# Patient Record
Sex: FEMALE | Race: WHITE | NOT HISPANIC OR LATINO | URBAN - METROPOLITAN AREA
[De-identification: names, ages, dates, MRNs, and addresses within clinical notes are randomized per-mention and may not be internally consistent; named-entity substitution may affect disease eponyms.]

---

## 2017-01-23 ENCOUNTER — INPATIENT (INPATIENT)
Facility: HOSPITAL | Age: 27
LOS: 3 days | Discharge: HOME | End: 2017-01-27
Attending: HOSPITALIST | Admitting: HOSPITALIST
Payer: MEDICAID

## 2017-01-23 VITALS
HEIGHT: 63 IN | TEMPERATURE: 98 F | RESPIRATION RATE: 17 BRPM | WEIGHT: 175.05 LBS | HEART RATE: 90 BPM | OXYGEN SATURATION: 100 % | DIASTOLIC BLOOD PRESSURE: 80 MMHG | SYSTOLIC BLOOD PRESSURE: 140 MMHG

## 2017-01-23 DIAGNOSIS — Z90.89 ACQUIRED ABSENCE OF OTHER ORGANS: Chronic | ICD-10-CM

## 2017-01-23 DIAGNOSIS — Z98.89 OTHER SPECIFIED POSTPROCEDURAL STATES: Chronic | ICD-10-CM

## 2017-01-23 LAB
ALBUMIN SERPL ELPH-MCNC: 3.4 G/DL — SIGNIFICANT CHANGE UP (ref 3.3–5)
ALP SERPL-CCNC: 88 U/L — SIGNIFICANT CHANGE UP (ref 40–120)
ALT FLD-CCNC: 207 U/L — HIGH (ref 12–78)
ANION GAP SERPL CALC-SCNC: 10 MMOL/L — SIGNIFICANT CHANGE UP (ref 5–17)
AST SERPL-CCNC: 904 U/L — HIGH (ref 15–37)
BASOPHILS # BLD AUTO: 0.2 K/UL — SIGNIFICANT CHANGE UP (ref 0–0.2)
BASOPHILS NFR BLD AUTO: 1.1 % — SIGNIFICANT CHANGE UP (ref 0–2)
BILIRUB SERPL-MCNC: 1 MG/DL — SIGNIFICANT CHANGE UP (ref 0.2–1.2)
BUN SERPL-MCNC: 5 MG/DL — LOW (ref 7–23)
CALCIUM SERPL-MCNC: 8.6 MG/DL — SIGNIFICANT CHANGE UP (ref 8.5–10.1)
CHLORIDE SERPL-SCNC: 106 MMOL/L — SIGNIFICANT CHANGE UP (ref 96–108)
CO2 SERPL-SCNC: 24 MMOL/L — SIGNIFICANT CHANGE UP (ref 22–31)
CREAT SERPL-MCNC: 0.66 MG/DL — SIGNIFICANT CHANGE UP (ref 0.5–1.3)
EOSINOPHIL # BLD AUTO: 0 K/UL — SIGNIFICANT CHANGE UP (ref 0–0.5)
EOSINOPHIL NFR BLD AUTO: 0.1 % — SIGNIFICANT CHANGE UP (ref 0–6)
FLUAV SPEC QL CULT: NEGATIVE — SIGNIFICANT CHANGE UP
FLUBV AG SPEC QL IA: NEGATIVE — SIGNIFICANT CHANGE UP
GLUCOSE SERPL-MCNC: 83 MG/DL — SIGNIFICANT CHANGE UP (ref 70–99)
HCG SERPL-ACNC: <1 MIU/ML — SIGNIFICANT CHANGE UP
HCT VFR BLD CALC: 40.5 % — SIGNIFICANT CHANGE UP (ref 34.5–45)
HGB BLD-MCNC: 14.4 G/DL — SIGNIFICANT CHANGE UP (ref 11.5–15.5)
LYMPHOCYTES # BLD AUTO: 14.7 % — SIGNIFICANT CHANGE UP (ref 13–44)
LYMPHOCYTES # BLD AUTO: 2.6 K/UL — SIGNIFICANT CHANGE UP (ref 1–3.3)
MCHC RBC-ENTMCNC: 30.8 PG — SIGNIFICANT CHANGE UP (ref 27–34)
MCHC RBC-ENTMCNC: 35.6 GM/DL — SIGNIFICANT CHANGE UP (ref 32–36)
MCV RBC AUTO: 86.6 FL — SIGNIFICANT CHANGE UP (ref 80–100)
MONOCYTES # BLD AUTO: 1.1 K/UL — HIGH (ref 0–0.9)
MONOCYTES NFR BLD AUTO: 6.2 % — SIGNIFICANT CHANGE UP (ref 2–14)
NEUTROPHILS # BLD AUTO: 13.9 K/UL — HIGH (ref 1.8–7.4)
NEUTROPHILS NFR BLD AUTO: 77.9 % — HIGH (ref 43–77)
PLATELET # BLD AUTO: 363 K/UL — SIGNIFICANT CHANGE UP (ref 150–400)
POTASSIUM SERPL-MCNC: 3.8 MMOL/L — SIGNIFICANT CHANGE UP (ref 3.5–5.3)
POTASSIUM SERPL-SCNC: 3.8 MMOL/L — SIGNIFICANT CHANGE UP (ref 3.5–5.3)
PROT SERPL-MCNC: 7.4 GM/DL — SIGNIFICANT CHANGE UP (ref 6–8.3)
RBC # BLD: 4.68 M/UL — SIGNIFICANT CHANGE UP (ref 3.8–5.2)
RBC # FLD: 11.2 % — SIGNIFICANT CHANGE UP (ref 11–15)
SODIUM SERPL-SCNC: 140 MMOL/L — SIGNIFICANT CHANGE UP (ref 135–145)
WBC # BLD: 17.9 K/UL — HIGH (ref 3.8–10.5)
WBC # FLD AUTO: 17.9 K/UL — HIGH (ref 3.8–10.5)

## 2017-01-23 PROCEDURE — 99285 EMERGENCY DEPT VISIT HI MDM: CPT

## 2017-01-23 RX ORDER — METOCLOPRAMIDE HCL 10 MG
10 TABLET ORAL ONCE
Qty: 0 | Refills: 0 | Status: COMPLETED | OUTPATIENT
Start: 2017-01-23 | End: 2017-01-23

## 2017-01-23 RX ORDER — SODIUM CHLORIDE 9 MG/ML
1000 INJECTION INTRAMUSCULAR; INTRAVENOUS; SUBCUTANEOUS ONCE
Qty: 0 | Refills: 0 | Status: COMPLETED | OUTPATIENT
Start: 2017-01-23 | End: 2017-01-23

## 2017-01-23 RX ORDER — DIPHENHYDRAMINE HCL 50 MG
50 CAPSULE ORAL ONCE
Qty: 0 | Refills: 0 | Status: COMPLETED | OUTPATIENT
Start: 2017-01-23 | End: 2017-01-23

## 2017-01-23 RX ADMIN — Medication 50 MILLIGRAM(S): at 22:59

## 2017-01-23 RX ADMIN — Medication 10 MILLIGRAM(S): at 23:01

## 2017-01-23 RX ADMIN — SODIUM CHLORIDE 1000 MILLILITER(S): 9 INJECTION INTRAMUSCULAR; INTRAVENOUS; SUBCUTANEOUS at 22:58

## 2017-01-23 NOTE — ED PROVIDER NOTE - CARE PLAN
Principal Discharge DX:	Rhabdomyolysis Principal Discharge DX:	Rhabdomyolysis  Secondary Diagnosis:	Sinusitis

## 2017-01-23 NOTE — ED PROVIDER NOTE - PHYSICAL EXAMINATION
Gen: Alert, NAD, speaking in complete sentences;  Head: NC, AT, EOMI, normal lids/conjunctiva;  ENT: normal hearing, patent oropharynx without erythema/exudate, uvula midline, MMM;  Neck: supple, no tenderness/meningismus, FROM;  Pulm: Bilateral clear BS, normal resp effort, no wheeze/stridor/retractions;  CV: RRR, no M/R/G, +dist pulses;  Abd: soft, no focal TTP, ND, +BS, no guarding/rebound tenderness;  Mskel: no edema/erythema/cyanosis;  Skin: no rash;  Neuro: AAOx3, no sensory/motor deficits Gen: Alert, NAD, speaking in complete sentences;  Head: NC, AT, EOMI, normal lids/conjunctiva;  ENT: normal hearing, patent oropharynx without erythema/exudate, uvula midline, MMM, +nasal congestion, +TTP over frontal sinuses;  Neck: supple, no tenderness/meningismus, FROM;  Pulm: Bilateral clear BS, normal resp effort, no wheeze/stridor/retractions;  CV: RRR, no M/R/G, +dist pulses;  Abd: soft, no focal TTP, ND, +BS, no guarding/rebound tenderness;  Mskel: no edema/erythema/cyanosis;  Skin: no rash;  Neuro: AAOx3, no sensory/motor deficits

## 2017-01-23 NOTE — ED ADULT TRIAGE NOTE - CHIEF COMPLAINT QUOTE
" I have been having a really bad headache for the past 2 days and it is not going away, I feel lightheaded" denies blurry vision, denies dizziness

## 2017-01-23 NOTE — ED PROVIDER NOTE - MEDICAL DECISION MAKING DETAILS
pt w above dx. hydrated in ED.  Admitted for further eval/mgmt. pt w above dx. hydrated in ED.  Given dose of abx for sinusitus.  Admitted for further eval/mgmt.

## 2017-01-23 NOTE — ED PROVIDER NOTE - OBJECTIVE STATEMENT
Pertinent PMH/PSH/FHx/SHx and Review of Systems contained within:    27yo F w PMH of anxiety, GERD, asthma presents to ED c/o HA & myalgias.  Pt states sx present x2d, not improved w tylenol or excedrin.  Denies fever, chills, SOB, abd pain, N/V/D.  Pt states she has been doing at home 30min exercises but they are not too strenuous.  Denies EtOH use or other illicit drug use.    No fever/chills, No photophobia/eye pain/changes in vision, No ear pain/sore throat/dysphagia, No chest pain/palpitations, no SOB/cough/wheeze/stridor, No abdominal pain, No N/V/D, no dysuria/frequency/discharge, no rash, no changes in neurological status/function.

## 2017-01-24 DIAGNOSIS — J45.909 UNSPECIFIED ASTHMA, UNCOMPLICATED: ICD-10-CM

## 2017-01-24 DIAGNOSIS — G43.009 MIGRAINE WITHOUT AURA, NOT INTRACTABLE, WITHOUT STATUS MIGRAINOSUS: ICD-10-CM

## 2017-01-24 DIAGNOSIS — D64.9 ANEMIA, UNSPECIFIED: ICD-10-CM

## 2017-01-24 DIAGNOSIS — M62.82 RHABDOMYOLYSIS: ICD-10-CM

## 2017-01-24 DIAGNOSIS — F41.9 ANXIETY DISORDER, UNSPECIFIED: ICD-10-CM

## 2017-01-24 DIAGNOSIS — J32.9 CHRONIC SINUSITIS, UNSPECIFIED: ICD-10-CM

## 2017-01-24 DIAGNOSIS — R74.0 NONSPECIFIC ELEVATION OF LEVELS OF TRANSAMINASE AND LACTIC ACID DEHYDROGENASE [LDH]: ICD-10-CM

## 2017-01-24 DIAGNOSIS — K21.9 GASTRO-ESOPHAGEAL REFLUX DISEASE WITHOUT ESOPHAGITIS: ICD-10-CM

## 2017-01-24 LAB
ALBUMIN SERPL ELPH-MCNC: 2.9 G/DL — LOW (ref 3.3–5)
ALP SERPL-CCNC: 78 U/L — SIGNIFICANT CHANGE UP (ref 40–120)
ALT FLD-CCNC: 193 U/L — HIGH (ref 12–78)
AMPHET UR-MCNC: NEGATIVE — SIGNIFICANT CHANGE UP
ANION GAP SERPL CALC-SCNC: 7 MMOL/L — SIGNIFICANT CHANGE UP (ref 5–17)
ANION GAP SERPL CALC-SCNC: 7 MMOL/L — SIGNIFICANT CHANGE UP (ref 5–17)
APPEARANCE UR: CLEAR — SIGNIFICANT CHANGE UP
AST SERPL-CCNC: 776 U/L — HIGH (ref 15–37)
BACTERIA # UR AUTO: ABNORMAL
BARBITURATES UR SCN-MCNC: NEGATIVE — SIGNIFICANT CHANGE UP
BENZODIAZ UR-MCNC: NEGATIVE — SIGNIFICANT CHANGE UP
BILIRUB SERPL-MCNC: 0.9 MG/DL — SIGNIFICANT CHANGE UP (ref 0.2–1.2)
BILIRUB UR-MCNC: NEGATIVE — SIGNIFICANT CHANGE UP
BUN SERPL-MCNC: 5 MG/DL — LOW (ref 7–23)
BUN SERPL-MCNC: 6 MG/DL — LOW (ref 7–23)
CALCIUM SERPL-MCNC: 8.3 MG/DL — LOW (ref 8.5–10.1)
CALCIUM SERPL-MCNC: 8.8 MG/DL — SIGNIFICANT CHANGE UP (ref 8.5–10.1)
CHLORIDE SERPL-SCNC: 105 MMOL/L — SIGNIFICANT CHANGE UP (ref 96–108)
CHLORIDE SERPL-SCNC: 107 MMOL/L — SIGNIFICANT CHANGE UP (ref 96–108)
CK SERPL-CCNC: ABNORMAL U/L (ref 26–192)
CK SERPL-CCNC: ABNORMAL U/L (ref 26–192)
CO2 SERPL-SCNC: 29 MMOL/L — SIGNIFICANT CHANGE UP (ref 22–31)
CO2 SERPL-SCNC: 29 MMOL/L — SIGNIFICANT CHANGE UP (ref 22–31)
COCAINE METAB.OTHER UR-MCNC: NEGATIVE — SIGNIFICANT CHANGE UP
COLOR SPEC: YELLOW — SIGNIFICANT CHANGE UP
COMMENT - URINE: SIGNIFICANT CHANGE UP
CREAT SERPL-MCNC: 0.74 MG/DL — SIGNIFICANT CHANGE UP (ref 0.5–1.3)
CREAT SERPL-MCNC: 0.78 MG/DL — SIGNIFICANT CHANGE UP (ref 0.5–1.3)
DIFF PNL FLD: NEGATIVE — SIGNIFICANT CHANGE UP
EPI CELLS # UR: ABNORMAL
GLUCOSE SERPL-MCNC: 106 MG/DL — HIGH (ref 70–99)
GLUCOSE SERPL-MCNC: 96 MG/DL — SIGNIFICANT CHANGE UP (ref 70–99)
GLUCOSE UR QL: NEGATIVE MG/DL — SIGNIFICANT CHANGE UP
KETONES UR-MCNC: ABNORMAL
LEUKOCYTE ESTERASE UR-ACNC: ABNORMAL
METHADONE UR-MCNC: NEGATIVE — SIGNIFICANT CHANGE UP
NITRITE UR-MCNC: NEGATIVE — SIGNIFICANT CHANGE UP
OPIATES UR-MCNC: NEGATIVE — SIGNIFICANT CHANGE UP
PCP SPEC-MCNC: SIGNIFICANT CHANGE UP
PCP UR-MCNC: NEGATIVE — SIGNIFICANT CHANGE UP
PH UR: 6 — SIGNIFICANT CHANGE UP (ref 4.8–8)
POTASSIUM SERPL-MCNC: 3.9 MMOL/L — SIGNIFICANT CHANGE UP (ref 3.5–5.3)
POTASSIUM SERPL-MCNC: 4.4 MMOL/L — SIGNIFICANT CHANGE UP (ref 3.5–5.3)
POTASSIUM SERPL-SCNC: 3.9 MMOL/L — SIGNIFICANT CHANGE UP (ref 3.5–5.3)
POTASSIUM SERPL-SCNC: 4.4 MMOL/L — SIGNIFICANT CHANGE UP (ref 3.5–5.3)
PROT SERPL-MCNC: 6.6 GM/DL — SIGNIFICANT CHANGE UP (ref 6–8.3)
PROT UR-MCNC: NEGATIVE MG/DL — SIGNIFICANT CHANGE UP
SODIUM SERPL-SCNC: 141 MMOL/L — SIGNIFICANT CHANGE UP (ref 135–145)
SODIUM SERPL-SCNC: 143 MMOL/L — SIGNIFICANT CHANGE UP (ref 135–145)
SP GR SPEC: 1.01 — SIGNIFICANT CHANGE UP (ref 1.01–1.02)
THC UR QL: NEGATIVE — SIGNIFICANT CHANGE UP
UROBILINOGEN FLD QL: NEGATIVE MG/DL — SIGNIFICANT CHANGE UP
WBC UR QL: SIGNIFICANT CHANGE UP

## 2017-01-24 PROCEDURE — 99223 1ST HOSP IP/OBS HIGH 75: CPT

## 2017-01-24 PROCEDURE — 71010: CPT | Mod: 26

## 2017-01-24 RX ORDER — PANTOPRAZOLE SODIUM 20 MG/1
40 TABLET, DELAYED RELEASE ORAL ONCE
Qty: 0 | Refills: 0 | Status: COMPLETED | OUTPATIENT
Start: 2017-01-24 | End: 2017-01-24

## 2017-01-24 RX ORDER — SODIUM CHLORIDE 9 MG/ML
1000 INJECTION, SOLUTION INTRAVENOUS
Qty: 0 | Refills: 0 | Status: DISCONTINUED | OUTPATIENT
Start: 2017-01-24 | End: 2017-01-27

## 2017-01-24 RX ORDER — KETOROLAC TROMETHAMINE 30 MG/ML
15 SYRINGE (ML) INJECTION ONCE
Qty: 0 | Refills: 0 | Status: DISCONTINUED | OUTPATIENT
Start: 2017-01-24 | End: 2017-01-24

## 2017-01-24 RX ORDER — PANTOPRAZOLE SODIUM 20 MG/1
40 TABLET, DELAYED RELEASE ORAL
Qty: 0 | Refills: 0 | Status: DISCONTINUED | OUTPATIENT
Start: 2017-01-24 | End: 2017-01-27

## 2017-01-24 RX ORDER — ENOXAPARIN SODIUM 100 MG/ML
40 INJECTION SUBCUTANEOUS EVERY 24 HOURS
Qty: 0 | Refills: 0 | Status: DISCONTINUED | OUTPATIENT
Start: 2017-01-24 | End: 2017-01-24

## 2017-01-24 RX ORDER — SODIUM CHLORIDE 9 MG/ML
2000 INJECTION INTRAMUSCULAR; INTRAVENOUS; SUBCUTANEOUS ONCE
Qty: 0 | Refills: 0 | Status: COMPLETED | OUTPATIENT
Start: 2017-01-24 | End: 2017-01-24

## 2017-01-24 RX ORDER — ENOXAPARIN SODIUM 100 MG/ML
40 INJECTION SUBCUTANEOUS DAILY
Qty: 0 | Refills: 0 | Status: DISCONTINUED | OUTPATIENT
Start: 2017-01-24 | End: 2017-01-27

## 2017-01-24 RX ADMIN — SODIUM CHLORIDE 150 MILLILITER(S): 9 INJECTION, SOLUTION INTRAVENOUS at 17:00

## 2017-01-24 RX ADMIN — Medication 15 MILLIGRAM(S): at 14:03

## 2017-01-24 RX ADMIN — SODIUM CHLORIDE 1000 MILLILITER(S): 9 INJECTION INTRAMUSCULAR; INTRAVENOUS; SUBCUTANEOUS at 04:24

## 2017-01-24 RX ADMIN — Medication 15 MILLIGRAM(S): at 04:55

## 2017-01-24 RX ADMIN — Medication 15 MILLIGRAM(S): at 21:34

## 2017-01-24 RX ADMIN — ENOXAPARIN SODIUM 40 MILLIGRAM(S): 100 INJECTION SUBCUTANEOUS at 18:39

## 2017-01-24 RX ADMIN — SODIUM CHLORIDE 150 MILLILITER(S): 9 INJECTION, SOLUTION INTRAVENOUS at 21:42

## 2017-01-24 RX ADMIN — SODIUM CHLORIDE 1000 MILLILITER(S): 9 INJECTION INTRAMUSCULAR; INTRAVENOUS; SUBCUTANEOUS at 02:11

## 2017-01-24 RX ADMIN — SODIUM CHLORIDE 200 MILLILITER(S): 9 INJECTION, SOLUTION INTRAVENOUS at 05:24

## 2017-01-24 RX ADMIN — Medication 15 MILLIGRAM(S): at 05:25

## 2017-01-24 RX ADMIN — Medication 1 TABLET(S): at 04:32

## 2017-01-24 RX ADMIN — PANTOPRAZOLE SODIUM 40 MILLIGRAM(S): 20 TABLET, DELAYED RELEASE ORAL at 18:40

## 2017-01-24 RX ADMIN — Medication 15 MILLIGRAM(S): at 14:20

## 2017-01-24 RX ADMIN — Medication 15 MILLIGRAM(S): at 21:19
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
## 2017-01-24 NOTE — CONSULT NOTE ADULT - PROBLEM SELECTOR RECOMMENDATION 9
likely secondary  to Rhabdo.  would check EBV titers as well  . continue  IV hydration and check abd sonogram /  hepatitis panel

## 2017-01-24 NOTE — H&P ADULT. - HISTORY OF PRESENT ILLNESS
ED MD stated: "25yo F w PMH of anxiety, GERD, asthma presents to ED c/o HA & myalgias.  Pt states sx present x2d, not improved w tylenol or excedrin.  Denies fever, chills, SOB, abd pain, N/V/D.  Pt states she has been doing at home 30min exercises but they are not too strenuous.  Denies EtOH use or other illicit drug use.    No fever/chills, No photophobia/eye pain/changes in vision, No ear pain/sore throat/dysphagia, No chest pain/palpitations, no SOB/cough/wheeze/stridor, No abdominal pain, No N/V/D, no dysuria/frequency/discharge, no rash, no changes in neurological status/function."
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
## 2017-01-24 NOTE — CONSULT NOTE ADULT - SUBJECTIVE AND OBJECTIVE BOX
Patient presents with URI, cough and myalgias, noted CPK >14k  Renal fxn stable.    MEDICATIONS  (STANDING):  dextrose 5% 1000milliLiter(s) IV Continuous <Continuous>    MEDICATIONS  (PRN):    PHYSICAL EXAM:      T(C): 36.7, Max: 36.7 (01-23 @ 18:44)  HR: 90 (90 - 90)  BP: 104/60 (104/60 - 140/80)  RR: 16 (16 - 17)  SpO2: 98% (98% - 100%)  Wt(kg): --  Respiratory: clear anteriorly, decreased BS at bases  Cardiovascular: S1 S2  Gastrointestinal: soft NT ND +BS  Extremities:    no edema                                    14.4   17.9  )-----------( 363      ( 23 Jan 2017 22:42 )             40.5     24 Jan 2017 11:00    141    |  105    |  5      ----------------------------<  106    3.9     |  29     |  0.78     Ca    8.3        24 Jan 2017 11:00    TPro  6.6    /  Alb  2.9    /  TBili  0.9    /  DBili  x      /  AST  776    /  ALT  193    /  AlkPhos  78     24 Jan 2017 11:00      LIVER FUNCTIONS - ( 24 Jan 2017 11:00 )  Alb: 2.9 g/dL / Pro: 6.6 gm/dL / ALK PHOS: 78 U/L / ALT: 193 U/L / AST: 776 U/L / GGT: x             Assessment and Plan:    Suspected viral induced rhabdomyolysis.  IVF with bicarbonate 150-200 ml/hr  Will follow trend, d/c once CPK less than 5000.

## 2017-01-25 DIAGNOSIS — E66.9 OBESITY, UNSPECIFIED: ICD-10-CM

## 2017-01-25 DIAGNOSIS — B34.9 VIRAL INFECTION, UNSPECIFIED: ICD-10-CM

## 2017-01-25 LAB
ALBUMIN SERPL ELPH-MCNC: 2.9 G/DL — LOW (ref 3.3–5)
ALP SERPL-CCNC: 72 U/L — SIGNIFICANT CHANGE UP (ref 40–120)
ALT FLD-CCNC: 190 U/L — HIGH (ref 12–78)
ANION GAP SERPL CALC-SCNC: 7 MMOL/L — SIGNIFICANT CHANGE UP (ref 5–17)
AST SERPL-CCNC: 532 U/L — HIGH (ref 15–37)
BILIRUB DIRECT SERPL-MCNC: 0.18 MG/DL — SIGNIFICANT CHANGE UP (ref 0.05–0.2)
BILIRUB INDIRECT FLD-MCNC: 0.3 MG/DL — SIGNIFICANT CHANGE UP (ref 0.2–1)
BILIRUB SERPL-MCNC: 0.5 MG/DL — SIGNIFICANT CHANGE UP (ref 0.2–1.2)
BUN SERPL-MCNC: 4 MG/DL — LOW (ref 7–23)
CALCIUM SERPL-MCNC: 8.3 MG/DL — LOW (ref 8.5–10.1)
CHLORIDE SERPL-SCNC: 106 MMOL/L — SIGNIFICANT CHANGE UP (ref 96–108)
CHOLEST SERPL-MCNC: 152 MG/DL — SIGNIFICANT CHANGE UP (ref 10–199)
CK SERPL-CCNC: ABNORMAL U/L (ref 26–192)
CO2 SERPL-SCNC: 29 MMOL/L — SIGNIFICANT CHANGE UP (ref 22–31)
CREAT SERPL-MCNC: 0.67 MG/DL — SIGNIFICANT CHANGE UP (ref 0.5–1.3)
EBV EA AB SER IA-ACNC: <5 U/ML — SIGNIFICANT CHANGE UP
EBV EA AB TITR SER IF: POSITIVE
EBV EA IGG SER-ACNC: NEGATIVE — SIGNIFICANT CHANGE UP
EBV NA IGG SER IA-ACNC: 520 U/ML — HIGH
EBV PATRN SPEC IB-IMP: SIGNIFICANT CHANGE UP
EBV VCA IGG AVIDITY SER QL IA: POSITIVE
EBV VCA IGM SER IA-ACNC: 559 U/ML — HIGH
EBV VCA IGM SER IA-ACNC: <10 U/ML — SIGNIFICANT CHANGE UP
EBV VCA IGM TITR FLD: NEGATIVE — SIGNIFICANT CHANGE UP
GLUCOSE SERPL-MCNC: 94 MG/DL — SIGNIFICANT CHANGE UP (ref 70–99)
HBA1C BLD-MCNC: 5.3 % — SIGNIFICANT CHANGE UP (ref 4–5.6)
HDLC SERPL-MCNC: 44 MG/DL — SIGNIFICANT CHANGE UP (ref 40–125)
LIPID PNL WITH DIRECT LDL SERPL: 99 MG/DL — SIGNIFICANT CHANGE UP
POTASSIUM SERPL-MCNC: 3.7 MMOL/L — SIGNIFICANT CHANGE UP (ref 3.5–5.3)
POTASSIUM SERPL-SCNC: 3.7 MMOL/L — SIGNIFICANT CHANGE UP (ref 3.5–5.3)
PROT SERPL-MCNC: 6.8 GM/DL — SIGNIFICANT CHANGE UP (ref 6–8.3)
SODIUM SERPL-SCNC: 142 MMOL/L — SIGNIFICANT CHANGE UP (ref 135–145)
TOTAL CHOLESTEROL/HDL RATIO MEASUREMENT: 3.5 RATIO — SIGNIFICANT CHANGE UP (ref 3.3–7.1)
TRIGL SERPL-MCNC: 46 MG/DL — SIGNIFICANT CHANGE UP (ref 10–149)
TSH SERPL-MCNC: 1.21 UU/ML — SIGNIFICANT CHANGE UP (ref 0.36–3.74)

## 2017-01-25 PROCEDURE — 99233 SBSQ HOSP IP/OBS HIGH 50: CPT

## 2017-01-25 RX ORDER — FLUTICASONE PROPIONATE 50 MCG
1 SPRAY, SUSPENSION NASAL
Qty: 0 | Refills: 0 | Status: DISCONTINUED | OUTPATIENT
Start: 2017-01-25 | End: 2017-01-27

## 2017-01-25 RX ORDER — BENZOCAINE AND MENTHOL 5; 1 G/100ML; G/100ML
1 LIQUID ORAL EVERY 6 HOURS
Qty: 0 | Refills: 0 | Status: DISCONTINUED | OUTPATIENT
Start: 2017-01-25 | End: 2017-01-27

## 2017-01-25 RX ORDER — IPRATROPIUM/ALBUTEROL SULFATE 18-103MCG
3 AEROSOL WITH ADAPTER (GRAM) INHALATION EVERY 6 HOURS
Qty: 0 | Refills: 0 | Status: DISCONTINUED | OUTPATIENT
Start: 2017-01-25 | End: 2017-01-27

## 2017-01-25 RX ADMIN — SODIUM CHLORIDE 150 MILLILITER(S): 9 INJECTION, SOLUTION INTRAVENOUS at 02:00

## 2017-01-25 RX ADMIN — Medication 1 SPRAY(S): at 17:07

## 2017-01-25 RX ADMIN — Medication 3 MILLILITER(S): at 16:45

## 2017-01-25 RX ADMIN — ENOXAPARIN SODIUM 40 MILLIGRAM(S): 100 INJECTION SUBCUTANEOUS at 11:27

## 2017-01-25 RX ADMIN — SODIUM CHLORIDE 150 MILLILITER(S): 9 INJECTION, SOLUTION INTRAVENOUS at 11:26

## 2017-01-25 RX ADMIN — PANTOPRAZOLE SODIUM 40 MILLIGRAM(S): 20 TABLET, DELAYED RELEASE ORAL at 07:52

## 2017-01-25 RX ADMIN — SODIUM CHLORIDE 150 MILLILITER(S): 9 INJECTION, SOLUTION INTRAVENOUS at 18:47

## 2017-01-25 NOTE — PROGRESS NOTE ADULT - SUBJECTIVE AND OBJECTIVE BOX
Patient is a 26y old  Female who presents with a chief complaint of headache and muscle pains (2017 08:55)      OVERNIGHT EVENTS: c/o generalized body aches    MEDICATIONS  (STANDING):  dextrose 5% 1000milliLiter(s) IV Continuous <Continuous>  enoxaparin Injectable 40milliGRAM(s) SubCutaneous daily  pantoprazole    Tablet 40milliGRAM(s) Oral before breakfast    REVIEW OF SYSTEMS:  CONSTITUTIONAL: generalized body aches  EYES: No eye pain, visual disturbances, or discharge  RESPIRATORY: +dry cough, No wheezing,  or hemoptysis; No shortness of breath  CARDIOVASCULAR: No chest pain, palpitations, dizziness, or leg swelling  GASTROINTESTINAL: No abdominal or epigastric pain. No nausea, vomiting, or hematemesis   GENITOURINARY: No dysuria, frequency, hematuria, or incontinence  NEUROLOGICAL:no memory loss, loss of strength, numbness, or tremors     Vital Signs Last 24 Hrs  T(C): 36.7, Max: 37.6 ( @ 22:26)  T(F): 98.1, Max: 99.6 ( @ 22:26)  HR: 83 (83 - 92)  BP: 117/73 (114/62 - 125/83)  BP(mean): --  RR: 17 (16 - 20)  SpO2: 93% (93% - 100%)    PHYSICAL EXAM:  GENERAL: NAD, well-groomed, well-developed  NERVOUS SYSTEM:  Alert & Oriented X3, Good concentration; Motor Strength 5/5 B/L upper and lower extremities   CHEST/LUNG: Clear to percussion bilaterally; No rales, rhonchi, wheezing, or rubs  HEART: Regular rate and rhythm; No murmurs, rubs, or gallops  ABDOMEN: Soft, Nontender, Nondistended; Bowel sounds present  EXTREMITIES:  2+ Peripheral Pulses, No clubbing, cyanosis, or edema          LABS:                        14.4   17.9  )-----------( 363      ( 2017 22:42 )             40.5     2017 08:22    142    |  106    |  4      ----------------------------<  94     3.7     |  29     |  0.67     Ca    8.3        2017 08:22    TPro  6.8    /  Alb  2.9    /  TBili  0.5    /  DBili  .18    /  AST  532    /  ALT  190    /  AlkPhos  72     2017 08:22       cardiac markers Troponin --  CK >06737    Urinalysis Basic - ( 2017 01:26 )    Color: Yellow / Appearance: Clear / S.010 / pH: x  Gluc: x / Ketone: Moderate  / Bili: Negative / Urobili: Negative mg/dL   Blood: x / Protein: Negative mg/dL / Nitrite: Negative   Leuk Esterase: Trace / RBC: x / WBC 3-5   Sq Epi: x / Non Sq Epi: Moderate / Bacteria: Moderate      CAPILLARY BLOOD GLUCOSE    Cultures    RADIOLOGY & ADDITIONAL TESTS:    Imaging Personally Reviewed:  [x ] YES  [ ] NO    Consultant(s) Notes Reviewed:  [x ] YES  [ ] NO    Care Discussed with Consultants/Other Providers [x ] YES  [ ] NO

## 2017-01-25 NOTE — PROGRESS NOTE ADULT - SUBJECTIVE AND OBJECTIVE BOX
Patient seen in follow up for rhabdomyolysis. No complaints. No edema or swelling.    MEDICATIONS  (STANDING):  dextrose 5% 1000milliLiter(s) IV Continuous <Continuous>  enoxaparin Injectable 40milliGRAM(s) SubCutaneous daily  pantoprazole    Tablet 40milliGRAM(s) Oral before breakfast    MEDICATIONS  (PRN):    PHYSICAL EXAM:      T(C): 36.7, Max: 37.6 (01-24 @ 22:26)  HR: 83 (83 - 92)  BP: 117/73 (114/62 - 125/83)  RR: 17 (16 - 20)  SpO2: 93% (93% - 100%)  Wt(kg): --  Respiratory: clear anteriorly, decreased BS at bases  Cardiovascular: S1 S2  Gastrointestinal: soft NT ND +BS  Extremities:    no edema                                    14.4   17.9  )-----------( 363      ( 23 Jan 2017 22:42 )             40.5     25 Jan 2017 08:22    142    |  106    |  4      ----------------------------<  94     3.7     |  29     |  0.67     Ca    8.3        25 Jan 2017 08:22    TPro  6.8    /  Alb  2.9    /  TBili  0.5    /  DBili  .18    /  AST  532    /  ALT  190    /  AlkPhos  72     25 Jan 2017 08:22      LIVER FUNCTIONS - ( 25 Jan 2017 08:22 )  Alb: 2.9 g/dL / Pro: 6.8 gm/dL / ALK PHOS: 72 U/L / ALT: 190 U/L / AST: 532 U/L / GGT: x             Assessment and Plan:  Suspected viral induced rhabdomyolysis;  IVF; once cpk < 5k ok for d/c

## 2017-01-25 NOTE — PROGRESS NOTE ADULT - SUBJECTIVE AND OBJECTIVE BOX
GASTROENTEROLOGY  Patient seen and examined bedside resting comfortably.  generalized soreness  no Abdominal pain   Denies nausea and vomiting. Tolerating diet.  flatus/BM.     T(F): 98.1, Max: 99.6 (01-24 @ 22:26)  HR: 83 (83 - 92)  BP: 117/73 (117/73 - 125/83)  RR: 17 (16 - 17)  SpO2: 93% (93% - 100%)  Wt(kg): --  CAPILLARY BLOOD GLUCOSE      PHYSICAL EXAM:  General: NAD, WDWN.   Neuro:  Alert & oriented x 3  HEENT: NCAT, EOMI, conjunctiva clear  CV: +S1+S2 regular rate and rhythm  Lung: clear to ausculation bilaterally, respirations nonlabored, good inspiratory effort  Abdomen: soft, NTND. Normactive BS  Extremities: no pedal edema or calf tenderness noted     LABS:                        14.4   17.9  )-----------( 363      ( 23 Jan 2017 22:42 )             40.5     25 Jan 2017 08:22    142    |  106    |  4      ----------------------------<  94     3.7     |  29     |  0.67     Ca    8.3        25 Jan 2017 08:22    TPro  6.8    /  Alb  2.9    /  TBili  0.5    /  DBili  .18    /  AST  532    /  ALT  190    /  AlkPhos  72     25 Jan 2017 08:22    LIVER FUNCTIONS - ( 25 Jan 2017 08:22 )  Alb: 2.9 g/dL / Pro: 6.8 gm/dL / ALK PHOS: 72 U/L / ALT: 190 U/L / AST: 532 U/L / GGT: x             I&O's Detail

## 2017-01-26 DIAGNOSIS — B27.90 INFECTIOUS MONONUCLEOSIS, UNSPECIFIED WITHOUT COMPLICATION: ICD-10-CM

## 2017-01-26 LAB
ALBUMIN SERPL ELPH-MCNC: 3.1 G/DL — LOW (ref 3.3–5)
ALP SERPL-CCNC: 74 U/L — SIGNIFICANT CHANGE UP (ref 40–120)
ALT FLD-CCNC: 173 U/L — HIGH (ref 12–78)
ANION GAP SERPL CALC-SCNC: 5 MMOL/L — SIGNIFICANT CHANGE UP (ref 5–17)
AST SERPL-CCNC: 294 U/L — HIGH (ref 15–37)
BILIRUB SERPL-MCNC: 0.6 MG/DL — SIGNIFICANT CHANGE UP (ref 0.2–1.2)
BUN SERPL-MCNC: 6 MG/DL — LOW (ref 7–23)
CALCIUM SERPL-MCNC: 8.9 MG/DL — SIGNIFICANT CHANGE UP (ref 8.5–10.1)
CHLORIDE SERPL-SCNC: 104 MMOL/L — SIGNIFICANT CHANGE UP (ref 96–108)
CK SERPL-CCNC: HIGH U/L (ref 26–192)
CO2 SERPL-SCNC: 33 MMOL/L — HIGH (ref 22–31)
CREAT SERPL-MCNC: 0.77 MG/DL — SIGNIFICANT CHANGE UP (ref 0.5–1.3)
GLUCOSE SERPL-MCNC: 96 MG/DL — SIGNIFICANT CHANGE UP (ref 70–99)
HCT VFR BLD CALC: 42.4 % — SIGNIFICANT CHANGE UP (ref 34.5–45)
HGB BLD-MCNC: 15 G/DL — SIGNIFICANT CHANGE UP (ref 11.5–15.5)
MCHC RBC-ENTMCNC: 31.4 PG — SIGNIFICANT CHANGE UP (ref 27–34)
MCHC RBC-ENTMCNC: 35.2 GM/DL — SIGNIFICANT CHANGE UP (ref 32–36)
MCV RBC AUTO: 89 FL — SIGNIFICANT CHANGE UP (ref 80–100)
PLATELET # BLD AUTO: 364 K/UL — SIGNIFICANT CHANGE UP (ref 150–400)
POTASSIUM SERPL-MCNC: 4.2 MMOL/L — SIGNIFICANT CHANGE UP (ref 3.5–5.3)
POTASSIUM SERPL-SCNC: 4.2 MMOL/L — SIGNIFICANT CHANGE UP (ref 3.5–5.3)
PROT SERPL-MCNC: 7.2 GM/DL — SIGNIFICANT CHANGE UP (ref 6–8.3)
RBC # BLD: 4.76 M/UL — SIGNIFICANT CHANGE UP (ref 3.8–5.2)
RBC # FLD: 11.6 % — SIGNIFICANT CHANGE UP (ref 11–15)
SODIUM SERPL-SCNC: 142 MMOL/L — SIGNIFICANT CHANGE UP (ref 135–145)
WBC # BLD: 10.3 K/UL — SIGNIFICANT CHANGE UP (ref 3.8–10.5)
WBC # FLD AUTO: 10.3 K/UL — SIGNIFICANT CHANGE UP (ref 3.8–10.5)

## 2017-01-26 PROCEDURE — 99233 SBSQ HOSP IP/OBS HIGH 50: CPT

## 2017-01-26 RX ADMIN — BENZOCAINE AND MENTHOL 1 LOZENGE: 5; 1 LIQUID ORAL at 00:12

## 2017-01-26 RX ADMIN — SODIUM CHLORIDE 150 MILLILITER(S): 9 INJECTION, SOLUTION INTRAVENOUS at 23:45

## 2017-01-26 RX ADMIN — PANTOPRAZOLE SODIUM 40 MILLIGRAM(S): 20 TABLET, DELAYED RELEASE ORAL at 07:55

## 2017-01-26 RX ADMIN — SODIUM CHLORIDE 150 MILLILITER(S): 9 INJECTION, SOLUTION INTRAVENOUS at 03:17

## 2017-01-26 RX ADMIN — ENOXAPARIN SODIUM 40 MILLIGRAM(S): 100 INJECTION SUBCUTANEOUS at 11:05

## 2017-01-26 RX ADMIN — BENZOCAINE AND MENTHOL 1 LOZENGE: 5; 1 LIQUID ORAL at 18:49

## 2017-01-26 RX ADMIN — Medication 1 SPRAY(S): at 06:30

## 2017-01-26 RX ADMIN — SODIUM CHLORIDE 150 MILLILITER(S): 9 INJECTION, SOLUTION INTRAVENOUS at 12:04

## 2017-01-26 RX ADMIN — BENZOCAINE AND MENTHOL 1 LOZENGE: 5; 1 LIQUID ORAL at 06:30

## 2017-01-26 RX ADMIN — Medication 1 SPRAY(S): at 17:05

## 2017-01-26 NOTE — PROGRESS NOTE ADULT - SUBJECTIVE AND OBJECTIVE BOX
GASTROENTEROLOGY  Patient seen and examined bedside resting comfortably.  No complaints offered.   Abdominal pain   Denies nausea and vomiting. Tolerating diet.  flatus/BM.     T(F): 97.8, Max: 98.8 (01-26 @ 06:33)  HR: 92 (85 - 98)  BP: 127/66 (112/55 - 137/63)  RR: 18 (16 - 18)  SpO2: 99% (97% - 100%)  Wt(kg): --  CAPILLARY BLOOD GLUCOSE      PHYSICAL EXAM:  General: NAD, WDWN.   Neuro:  Alert & oriented x 3  HEENT: NCAT, EOMI, conjunctiva clear  CV: +S1+S2 regular rate and rhythm  Lung: clear to ausculation bilaterally, respirations nonlabored, good inspiratory effort  Abdomen: soft, NTND. Normactive BS  Extremities: no pedal edema or calf tenderness noted     LABS:                        15.0   10.3  )-----------( 364      ( 26 Jan 2017 07:57 )             42.4     26 Jan 2017 07:57    142    |  104    |  6      ----------------------------<  96     4.2     |  33     |  0.77     Ca    8.9        26 Jan 2017 07:57    TPro  7.2    /  Alb  3.1    /  TBili  0.6    /  DBili  x      /  AST  294    /  ALT  173    /  AlkPhos  74     26 Jan 2017 07:57    LIVER FUNCTIONS - ( 26 Jan 2017 07:57 )  Alb: 3.1 g/dL / Pro: 7.2 gm/dL / ALK PHOS: 74 U/L / ALT: 173 U/L / AST: 294 U/L / GGT: x           Creatine Kinase, Serum: 98657 U/L (01.26.17 @ 07:57)      Magdy-Barr Virus Serologic Test (01.25.17 @ 10:13)    EBNA IgG EIA: 520.0 U/mL    EBV VCA IgM EIA: <10.0 U/mL    EBV EA Ab EIA: <5.0 U/mL    EBV VCA IgG EIA: 559.0 U/mL    Magdy Barr Virus IgM Antibody: Negative: Magdy-Barr Virus VCA IgM Antibody  Method: Liaison Chemiluminescent Immunoassay  Reference Range: (Expressed in U/mL)       Negative     < 36.0 U/mL       Equivocal    36.0 - 43.9 U/mL       Positive       >= 44.0 U/mL    Magdy-Barr Virus Capsid Antigen IgG: Positive: Magdy-Barr Virus VCA IgG Antibody  Method: Liaison Chemiluminescent Immunoassay  Reference Range: (Expressed in U/mL)       Negative        < 18.0 U/mL       Equivocal      18.0 - 21.9 U/mL       Positive         >= 22.0 U/mL    Magdy-Barr Virus Early Antigen: Negative: Magdy-Barr Virus EA IgG Antibody  Method: Liaison Chemiluminescent Immunoassay  Reference Range: (Expressed in U/mL)       Negative        < 9.0 U/mL       Equivocal       9.0 - 10.9 U/mL       Positive          >= 11.0 U/mL    Magdy-Barr Nuclear Antigen: Positive: Magdy-Barr Virus NA IgG Antibody  Method: Liaison Chemiluminescent Immunoassay  Reference Range: (Expressed in U/mL)       Negative        < 18.0 U/mL       Equivocal      18.0 - 21.9 U/mL       Positive         >= 22.0 U/mL    EBV Interpretation: See Note: INTERPRETATION OF MAGDY BARR VIRUS (EBV) ANTIBODY RESULTS  EBV VCA IGG AB EBV NA IGG AB EBV VCA IGM AB EBV EA IGG AB Diagnosis  NEG NEG NEG NEG EBV Sero-negative  NEG NEG POS NEG Suspected primary infection (Early Phase)  POS NEG POS POS/NEG Past EBSee Note: V infection ( Convalescence)  POS POS NEG POS/NEG Past EBV infection  POS POS POS/NEG POS Reactivated Infection        I&O's Detail  I & Os for 24h ending 26 Jan 2017 07:00  =============================================  IN:    dextrose 5%: 1800 ml    Oral Fluid: 380 ml    Total IN: 2180 ml  ---------------------------------------------  OUT:    Voided: 600 ml    Total OUT: 600 ml  ---------------------------------------------  Total NET: 1580 ml    I & Os for current day (as of 26 Jan 2017 19:23)  =============================================  IN:    Oral Fluid: 1160 ml    Total IN: 1160 ml  ---------------------------------------------  OUT:    Total OUT: 0 ml  ---------------------------------------------  Total NET: 1160 ml

## 2017-01-26 NOTE — PROGRESS NOTE ADULT - SUBJECTIVE AND OBJECTIVE BOX
Patient is a 26y old  Female who presents with a chief complaint of headache and muscle pains (24 Jan 2017 08:55)       OVERNIGHT EVENTS:    MEDICATIONS  (STANDING):  dextrose 5% 1000milliLiter(s) IV Continuous <Continuous>  enoxaparin Injectable 40milliGRAM(s) SubCutaneous daily  pantoprazole    Tablet 40milliGRAM(s) Oral before breakfast  fluticasone propionate 50 MICROgram(s)/spray Nasal Spray 1Spray(s) Both Nostrils two times a day    MEDICATIONS  (PRN):  ALBUTerol/ipratropium for Nebulization 3milliLiter(s) Nebulizer every 6 hours PRN Shortness of Breath and/or Wheezing  benzocaine 15 mG/menthol 3.6 mG Lozenge 1Lozenge Oral every 6 hours PRN Sore Throat    Vital Signs Last 24 Hrs  T(C): 36.6, Max: 37.1 (01-26 @ 06:33)  T(F): 97.8, Max: 98.8 (01-26 @ 06:33)  HR: 92 (85 - 98)  BP: 127/66 (112/55 - 137/63)  BP(mean): --  RR: 18 (16 - 18)  SpO2: 99% (97% - 100%)     PHYSICAL EXAM:  GENERAL: NAD, well-groomed, well-developed  NERVOUS SYSTEM:  Alert & Oriented X3, Good concentration; Motor Strength 5/5 B/L upper and lower extremities   CHEST/LUNG: Clear to percussion bilaterally; No rales, rhonchi, wheezing, or rubs  HEART: Regular rate and rhythm; No murmurs, rubs, or gallops  ABDOMEN: Soft, Nontender, Nondistended; Bowel sounds present  EXTREMITIES:  2+ Peripheral Pulses, No clubbing, cyanosis, or edema       LABS:                        15.0   10.3  )-----------( 364      ( 26 Jan 2017 07:57 )             42.4     26 Jan 2017 07:57    142    |  104    |  6      ----------------------------<  96     4.2     |  33     |  0.77     Ca    8.9        26 Jan 2017 07:57    TPro  7.2    /  Alb  3.1    /  TBili  0.6    /  DBili  x      /  AST  294    /  ALT  173    /  AlkPhos  74     26 Jan 2017 07:57       cardiac markers Troponin --  CK 77074      CAPILLARY BLOOD GLUCOSE    Cultures    RADIOLOGY & ADDITIONAL TESTS:    Imaging Personally Reviewed:  [x ] YES  [ ] NO    Consultant(s) Notes Reviewed:  [x ] YES  [ ] NO    Care Discussed with Consultants/Other Providers [x ] YES  [ ] NO

## 2017-01-26 NOTE — PROGRESS NOTE ADULT - SUBJECTIVE AND OBJECTIVE BOX
Subjective: no complaints. Urinating a lot      MEDICATIONS  (STANDING):  dextrose 5% 1000milliLiter(s) IV Continuous <Continuous>  enoxaparin Injectable 40milliGRAM(s) SubCutaneous daily  pantoprazole    Tablet 40milliGRAM(s) Oral before breakfast  fluticasone propionate 50 MICROgram(s)/spray Nasal Spray 1Spray(s) Both Nostrils two times a day    MEDICATIONS  (PRN):  ALBUTerol/ipratropium for Nebulization 3milliLiter(s) Nebulizer every 6 hours PRN Shortness of Breath and/or Wheezing  benzocaine 15 mG/menthol 3.6 mG Lozenge 1Lozenge Oral every 6 hours PRN Sore Throat          T(C): 37.1, Max: 37.1 (01-26 @ 06:33)  HR: 98 (83 - 105)  BP: 123/57 (117/73 - 137/63)  RR: 17 (16 - 17)  SpO2: 100% (93% - 100%)  Wt(kg): --        I&O's Detail           PHYSICAL EXAM:    GENERAL: no distress  EYES: EOMI, PERRLA, conjunctiva and sclera clear  NECK: Supple, no inc in JVP  CHEST/LUNG: Clear  HEART: S1S2  ABDOMEN: Soft, Nontender, Nondistended; Bowel sounds present  EXTREMITIES:  no edema  NEURO: no asterixis      LABS:  CBC Full  -  ( 26 Jan 2017 07:57 )  WBC Count : 10.3 K/uL  Hemoglobin : 15.0 g/dL  Hematocrit : 42.4 %  Platelet Count - Automated : 364 K/uL  Mean Cell Volume : 89.0 fl  Mean Cell Hemoglobin : 31.4 pg  Mean Cell Hemoglobin Concentration : 35.2 gm/dL  Auto Neutrophil # : x  Auto Lymphocyte # : x  Auto Monocyte # : x  Auto Eosinophil # : x  Auto Basophil # : x  Auto Neutrophil % : x  Auto Lymphocyte % : x  Auto Monocyte % : x  Auto Eosinophil % : x  Auto Basophil % : x    26 Jan 2017 07:57    142    |  104    |  6      ----------------------------<  96     4.2     |  33     |  0.77     Ca    8.9        26 Jan 2017 07:57    TPro  7.2    /  Alb  3.1    /  TBili  0.6    /  DBili  x      /  AST  294    /  ALT  173    /  AlkPhos  74     26 Jan 2017 07:57          ASSESSMENT and PLAN:    * Rhabdo -- due to viral illness, rigors. Cr NL. CPK trends down. Cont saline, daily Cr, CPK check.

## 2017-01-27 VITALS
RESPIRATION RATE: 18 BRPM | TEMPERATURE: 98 F | DIASTOLIC BLOOD PRESSURE: 66 MMHG | OXYGEN SATURATION: 99 % | SYSTOLIC BLOOD PRESSURE: 119 MMHG | HEART RATE: 88 BPM

## 2017-01-27 LAB
ALBUMIN SERPL ELPH-MCNC: 3 G/DL — LOW (ref 3.3–5)
ALP SERPL-CCNC: 77 U/L — SIGNIFICANT CHANGE UP (ref 40–120)
ALT FLD-CCNC: 136 U/L — HIGH (ref 12–78)
ANION GAP SERPL CALC-SCNC: 6 MMOL/L — SIGNIFICANT CHANGE UP (ref 5–17)
AST SERPL-CCNC: 140 U/L — HIGH (ref 15–37)
BILIRUB SERPL-MCNC: 0.5 MG/DL — SIGNIFICANT CHANGE UP (ref 0.2–1.2)
BUN SERPL-MCNC: 5 MG/DL — LOW (ref 7–23)
CALCIUM SERPL-MCNC: 8.9 MG/DL — SIGNIFICANT CHANGE UP (ref 8.5–10.1)
CHLORIDE SERPL-SCNC: 103 MMOL/L — SIGNIFICANT CHANGE UP (ref 96–108)
CK SERPL-CCNC: 5533 U/L — HIGH (ref 26–192)
CO2 SERPL-SCNC: 34 MMOL/L — HIGH (ref 22–31)
CREAT SERPL-MCNC: 0.71 MG/DL — SIGNIFICANT CHANGE UP (ref 0.5–1.3)
GLUCOSE SERPL-MCNC: 98 MG/DL — SIGNIFICANT CHANGE UP (ref 70–99)
HAV IGM SER-ACNC: SIGNIFICANT CHANGE UP
HBV CORE IGM SER-ACNC: SIGNIFICANT CHANGE UP
HBV SURFACE AG SER-ACNC: SIGNIFICANT CHANGE UP
HCV AB S/CO SERPL IA: 0.13 S/CO — SIGNIFICANT CHANGE UP
HCV AB SERPL-IMP: SIGNIFICANT CHANGE UP
POTASSIUM SERPL-MCNC: 3.9 MMOL/L — SIGNIFICANT CHANGE UP (ref 3.5–5.3)
POTASSIUM SERPL-SCNC: 3.9 MMOL/L — SIGNIFICANT CHANGE UP (ref 3.5–5.3)
PROT SERPL-MCNC: 6.7 GM/DL — SIGNIFICANT CHANGE UP (ref 6–8.3)
SODIUM SERPL-SCNC: 143 MMOL/L — SIGNIFICANT CHANGE UP (ref 135–145)

## 2017-01-27 PROCEDURE — 99239 HOSP IP/OBS DSCHRG MGMT >30: CPT

## 2017-01-27 RX ADMIN — ENOXAPARIN SODIUM 40 MILLIGRAM(S): 100 INJECTION SUBCUTANEOUS at 15:12

## 2017-01-27 RX ADMIN — PANTOPRAZOLE SODIUM 40 MILLIGRAM(S): 20 TABLET, DELAYED RELEASE ORAL at 07:53

## 2017-01-27 RX ADMIN — Medication 1 SPRAY(S): at 05:44

## 2017-01-27 NOTE — PROGRESS NOTE ADULT - PROBLEM SELECTOR PROBLEM 3
Transaminitis
Ameya Deleon infection
Gastroesophageal reflux disease without esophagitis
Transaminitis

## 2017-01-27 NOTE — DISCHARGE NOTE ADULT - PLAN OF CARE
resolve PO hydration, follow up PMD in 1-2 weeks PO hydration, follow up PMD in 1-2 weeks for repeat labs -life style modifications continue with home meds

## 2017-01-27 NOTE — PROGRESS NOTE ADULT - SUBJECTIVE AND OBJECTIVE BOX
GASTROENTEROLOGY    Patient seen and examined bedside resting comfortably.  No complaints offered.   Generalized muscle aching  Denies nausea and vomiting. Tolerating diet.  + flatus/BM.     T(F): 97.8, Max: 98 (01-26 @ 23:34)  HR: 83 (69 - 92)  BP: 101/62 (101/62 - 127/66)  RR: 16 (16 - 18)  SpO2: 98% (98% - 99%)  Wt(kg): --  CAPILLARY BLOOD GLUCOSE      PHYSICAL EXAM:  General: NAD, WDWN.   Neuro:  Alert & oriented x 3  HEENT: NCAT, EOMI, conjunctiva clear  CV: +S1+S2 regular rate and rhythm  Lung: clear to ausculation bilaterally, respirations nonlabored, good inspiratory effort  Abdomen: soft, NT ND. Normal active BS  Extremities: no pedal edema or calf tenderness noted     LABS:                        15.0   10.3  )-----------( 364      ( 26 Jan 2017 07:57 )             42.4     27 Jan 2017 06:10    143    |  103    |  5      ----------------------------<  98     3.9     |  34     |  0.71     Ca    8.9        27 Jan 2017 06:10    TPro  6.7    /  Alb  3.0    /  TBili  0.5    /  DBili  x      /  AST  140    /  ALT  136    /  AlkPhos  77     27 Jan 2017 06:10    LIVER FUNCTIONS - ( 27 Jan 2017 06:10 )  Alb: 3.0 g/dL / Pro: 6.7 gm/dL / ALK PHOS: 77 U/L / ALT: 136 U/L / AST: 140 U/L / GGT: x             I&O's Detail  I & Os for 24h ending 27 Jan 2017 07:00  =============================================  IN:    dextrose 5%: 1650 ml    Oral Fluid: 1160 ml    Total IN: 2810 ml  ---------------------------------------------  OUT:    Total OUT: 0 ml  ---------------------------------------------  Total NET: 2810 ml    I & Os for current day (as of 27 Jan 2017 14:32)  =============================================  IN:    Oral Fluid: 900 ml    Total IN: 900 ml  ---------------------------------------------  OUT:    Total OUT: 0 ml  ---------------------------------------------  Total NET: 900 ml

## 2017-01-27 NOTE — PROGRESS NOTE ADULT - SUBJECTIVE AND OBJECTIVE BOX
Patient seen in follow up for rhabdomyolysis. Feels well.    MEDICATIONS  (STANDING):  dextrose 5% 1000milliLiter(s) IV Continuous <Continuous>  enoxaparin Injectable 40milliGRAM(s) SubCutaneous daily  pantoprazole    Tablet 40milliGRAM(s) Oral before breakfast  fluticasone propionate 50 MICROgram(s)/spray Nasal Spray 1Spray(s) Both Nostrils two times a day    MEDICATIONS  (PRN):  ALBUTerol/ipratropium for Nebulization 3milliLiter(s) Nebulizer every 6 hours PRN Shortness of Breath and/or Wheezing  benzocaine 15 mG/menthol 3.6 mG Lozenge 1Lozenge Oral every 6 hours PRN Sore Throat    PHYSICAL EXAM:      T(C): 36.6, Max: 36.7 (01-26 @ 23:34)  HR: 83 (69 - 92)  BP: 101/62 (101/62 - 127/66)  RR: 16 (16 - 18)  SpO2: 98% (98% - 99%)  Wt(kg): --  Respiratory: clear anteriorly, decreased BS at bases  Cardiovascular: S1 S2  Gastrointestinal: soft NT ND +BS  Extremities:    no edema                                    15.0   10.3  )-----------( 364      ( 26 Jan 2017 07:57 )             42.4     27 Jan 2017 06:10    143    |  103    |  5      ----------------------------<  98     3.9     |  34     |  0.71     Ca    8.9        27 Jan 2017 06:10    TPro  6.7    /  Alb  3.0    /  TBili  0.5    /  DBili  x      /  AST  140    /  ALT  136    /  AlkPhos  77     27 Jan 2017 06:10      LIVER FUNCTIONS - ( 27 Jan 2017 06:10 )  Alb: 3.0 g/dL / Pro: 6.7 gm/dL / ALK PHOS: 77 U/L / ALT: 136 U/L / AST: 140 U/L / GGT: x             Assessment and Plan:    CPK trend improving; safe for d/c  Recommend blood work cpk basic panel with PMD Monday. Discussed with patient in detail.

## 2017-01-27 NOTE — DISCHARGE NOTE ADULT - MEDICATION SUMMARY - MEDICATIONS TO STOP TAKING
I will STOP taking the medications listed below when I get home from the hospital:    azithromycin 250 mg oral tablet  -- 1 tab(s) by mouth once a day x 4 days  -- Do not take dairy products, antacids, or iron preparations within one hour of this medication.  Finish all this medication unless otherwise directed by prescriber.

## 2017-01-27 NOTE — DISCHARGE NOTE ADULT - MEDICATION SUMMARY - MEDICATIONS TO TAKE
I will START or STAY ON the medications listed below when I get home from the hospital:    Advair HFA  --  inhaled   -- Indication: For Asthma    famotidine 20 mg oral tablet  -- 1 tab(s) by mouth 2 times a day  -- It is very important that you take or use this exactly as directed.  Do not skip doses or discontinue unless directed by your doctor.  Obtain medical advice before taking any non-prescription drugs as some may affect the action of this medication.    -- Indication: For Gastroesophageal reflux disease without esophagitis

## 2017-01-27 NOTE — DISCHARGE NOTE ADULT - SECONDARY DIAGNOSIS.
Viral syndrome Transaminitis Ameya Deleon infection Obesity (BMI 30-39.9) Uncomplicated asthma, unspecified asthma severity Gastroesophageal reflux disease without esophagitis

## 2017-01-27 NOTE — DISCHARGE NOTE ADULT - HOSPITAL COURSE
27yo F w/h/o anxiety, GERD, asthma presents to ED c/o HA & myalgias. Admitted CK>50372,elevated LFTS, EBV+  Renal ,GI consulted, IVF given , monitored until CK<6000.  improved LFTs.   encouraged PO hydration, follow up PMD in 1-2 weeks  d/c planning home

## 2017-01-27 NOTE — DISCHARGE NOTE ADULT - PATIENT PORTAL LINK FT
“You can access the [facility] Patient Portal, offered by [facility], by registering with the following website: [email or web address]”

## 2017-01-27 NOTE — PROGRESS NOTE ADULT - PROBLEM SELECTOR PLAN 3
-EBV+  -monitor LFTs, improving  GI f/u
-EBV+  -monitor LFTs, improving  GI f/u
Past infection, EBV IGM undetectable
continue PPI.

## 2017-01-27 NOTE — DISCHARGE NOTE ADULT - CARE PLAN
Principal Discharge DX:	Non-traumatic rhabdomyolysis  Goal:	resolve  Instructions for follow-up, activity and diet:	PO hydration, follow up PMD in 1-2 weeks  Secondary Diagnosis:	Viral syndrome  Instructions for follow-up, activity and diet:	PO hydration, follow up PMD in 1-2 weeks  Secondary Diagnosis:	Transaminitis  Instructions for follow-up, activity and diet:	PO hydration, follow up PMD in 1-2 weeks for repeat labs  Secondary Diagnosis:	Ameya Barr infection  Instructions for follow-up, activity and diet:	PO hydration, follow up PMD in 1-2 weeks  Secondary Diagnosis:	Obesity (BMI 30-39.9)  Instructions for follow-up, activity and diet:	-life style modifications  Secondary Diagnosis:	Uncomplicated asthma, unspecified asthma severity  Instructions for follow-up, activity and diet:	continue with home meds  Secondary Diagnosis:	Gastroesophageal reflux disease without esophagitis  Instructions for follow-up, activity and diet:	continue with home meds

## 2017-01-27 NOTE — PROGRESS NOTE ADULT - PROBLEM SELECTOR PLAN 1
-IVF   -Monitor CK, improving   -Renal follow up  -as per renal d/c home when CK<5000
-IVF   -Monitor CK  -Renal follow up  -monitor kidney function
Past infection, EBV IGM undetectable.
likely secondary  to Rhabdo .Continue  IV hydration and check abd sonogram
likely secondary  to Rhabdo.  would check EBV titers as well  .Continue  IV hydration and check abd sonogram /  hepatitis panel.

## 2017-01-27 NOTE — PROGRESS NOTE ADULT - PROBLEM SELECTOR PROBLEM 1
Non-traumatic rhabdomyolysis
Ameya Deleon infection
Non-traumatic rhabdomyolysis
Transaminitis
Transaminitis

## 2017-01-27 NOTE — PROGRESS NOTE ADULT - PROBLEM SELECTOR PROBLEM 2
Viral syndrome
Gastroesophageal reflux disease without esophagitis
Gastroesophageal reflux disease without esophagitis
Transaminitis
Viral syndrome

## 2017-01-31 DIAGNOSIS — F41.9 ANXIETY DISORDER, UNSPECIFIED: ICD-10-CM

## 2017-01-31 DIAGNOSIS — J45.909 UNSPECIFIED ASTHMA, UNCOMPLICATED: ICD-10-CM

## 2017-01-31 DIAGNOSIS — G44.89 OTHER HEADACHE SYNDROME: ICD-10-CM

## 2017-01-31 DIAGNOSIS — K21.9 GASTRO-ESOPHAGEAL REFLUX DISEASE WITHOUT ESOPHAGITIS: ICD-10-CM

## 2017-01-31 DIAGNOSIS — G43.009 MIGRAINE WITHOUT AURA, NOT INTRACTABLE, WITHOUT STATUS MIGRAINOSUS: ICD-10-CM

## 2017-01-31 DIAGNOSIS — D64.9 ANEMIA, UNSPECIFIED: ICD-10-CM

## 2017-01-31 DIAGNOSIS — R51 HEADACHE: ICD-10-CM

## 2017-01-31 DIAGNOSIS — R74.0 NONSPECIFIC ELEVATION OF LEVELS OF TRANSAMINASE AND LACTIC ACID DEHYDROGENASE [LDH]: ICD-10-CM

## 2017-01-31 DIAGNOSIS — M62.82 RHABDOMYOLYSIS: ICD-10-CM

## 2017-01-31 DIAGNOSIS — Z88.8 ALLERGY STATUS TO OTHER DRUGS, MEDICAMENTS AND BIOLOGICAL SUBSTANCES STATUS: ICD-10-CM

## 2017-06-25 ENCOUNTER — EMERGENCY (EMERGENCY)
Facility: HOSPITAL | Age: 27
LOS: 0 days | Discharge: HOME | End: 2017-06-25
Attending: EMERGENCY MEDICINE
Payer: MEDICAID

## 2017-06-25 VITALS
HEART RATE: 110 BPM | DIASTOLIC BLOOD PRESSURE: 80 MMHG | TEMPERATURE: 98 F | HEIGHT: 65 IN | SYSTOLIC BLOOD PRESSURE: 131 MMHG | RESPIRATION RATE: 17 BRPM | OXYGEN SATURATION: 100 % | WEIGHT: 128.97 LBS

## 2017-06-25 VITALS
HEART RATE: 78 BPM | SYSTOLIC BLOOD PRESSURE: 103 MMHG | OXYGEN SATURATION: 97 % | DIASTOLIC BLOOD PRESSURE: 59 MMHG | RESPIRATION RATE: 16 BRPM | TEMPERATURE: 98 F

## 2017-06-25 DIAGNOSIS — Z98.89 OTHER SPECIFIED POSTPROCEDURAL STATES: Chronic | ICD-10-CM

## 2017-06-25 DIAGNOSIS — R51 HEADACHE: ICD-10-CM

## 2017-06-25 DIAGNOSIS — Z90.89 ACQUIRED ABSENCE OF OTHER ORGANS: Chronic | ICD-10-CM

## 2017-06-25 DIAGNOSIS — J45.909 UNSPECIFIED ASTHMA, UNCOMPLICATED: ICD-10-CM

## 2017-06-25 DIAGNOSIS — F41.9 ANXIETY DISORDER, UNSPECIFIED: ICD-10-CM

## 2017-06-25 PROCEDURE — 99283 EMERGENCY DEPT VISIT LOW MDM: CPT | Mod: 25

## 2017-06-25 RX ORDER — ACETAMINOPHEN 500 MG
650 TABLET ORAL ONCE
Qty: 0 | Refills: 0 | Status: COMPLETED | OUTPATIENT
Start: 2017-06-25 | End: 2017-06-25

## 2017-06-25 RX ADMIN — Medication 650 MILLIGRAM(S): at 08:23
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
## 2017-06-25 NOTE — ED ADULT NURSE NOTE - OBJECTIVE STATEMENT
pt is AxOx4; c/o fall. pt tripped and fell hitting her head. LOC - for a couple seconds. pt states she feels lightheaded.

## 2017-06-25 NOTE — ED PROVIDER NOTE - MEDICAL DECISION MAKING DETAILS
Ddx: Mechanical trip and fall/ young, mild headache, unlikely ich, not on blood thinner  Plan: Tylenol, reassess

## 2017-08-03 ENCOUNTER — EMERGENCY (EMERGENCY)
Facility: HOSPITAL | Age: 27
LOS: 0 days | Discharge: LEFT BEFORE DISCHARGE | End: 2017-08-03

## 2017-08-03 VITALS
RESPIRATION RATE: 18 BRPM | HEIGHT: 61 IN | SYSTOLIC BLOOD PRESSURE: 114 MMHG | DIASTOLIC BLOOD PRESSURE: 59 MMHG | TEMPERATURE: 98 F | OXYGEN SATURATION: 99 % | HEART RATE: 118 BPM | WEIGHT: 132.06 LBS

## 2017-08-03 DIAGNOSIS — R50.9 FEVER, UNSPECIFIED: ICD-10-CM

## 2017-08-03 DIAGNOSIS — Z90.89 ACQUIRED ABSENCE OF OTHER ORGANS: Chronic | ICD-10-CM

## 2017-08-03 DIAGNOSIS — Z98.89 OTHER SPECIFIED POSTPROCEDURAL STATES: Chronic | ICD-10-CM

## 2017-08-04 ENCOUNTER — INPATIENT (INPATIENT)
Facility: HOSPITAL | Age: 27
LOS: 6 days | Discharge: HOME | End: 2017-08-11
Attending: INTERNAL MEDICINE | Admitting: INTERNAL MEDICINE
Payer: MEDICAID

## 2017-08-04 VITALS
OXYGEN SATURATION: 99 % | RESPIRATION RATE: 18 BRPM | TEMPERATURE: 103 F | SYSTOLIC BLOOD PRESSURE: 109 MMHG | HEART RATE: 129 BPM | DIASTOLIC BLOOD PRESSURE: 65 MMHG

## 2017-08-04 DIAGNOSIS — Z90.89 ACQUIRED ABSENCE OF OTHER ORGANS: Chronic | ICD-10-CM

## 2017-08-04 DIAGNOSIS — N12 TUBULO-INTERSTITIAL NEPHRITIS, NOT SPECIFIED AS ACUTE OR CHRONIC: ICD-10-CM

## 2017-08-04 DIAGNOSIS — K21.9 GASTRO-ESOPHAGEAL REFLUX DISEASE WITHOUT ESOPHAGITIS: ICD-10-CM

## 2017-08-04 DIAGNOSIS — Z98.89 OTHER SPECIFIED POSTPROCEDURAL STATES: Chronic | ICD-10-CM

## 2017-08-04 LAB
ALBUMIN SERPL ELPH-MCNC: 3.4 G/DL — SIGNIFICANT CHANGE UP (ref 3.3–5)
ALP SERPL-CCNC: 157 U/L — HIGH (ref 40–120)
ALT FLD-CCNC: 29 U/L — SIGNIFICANT CHANGE UP (ref 12–78)
ANION GAP SERPL CALC-SCNC: 13 MMOL/L — SIGNIFICANT CHANGE UP (ref 5–17)
ANISOCYTOSIS BLD QL: SLIGHT — SIGNIFICANT CHANGE UP
APPEARANCE UR: ABNORMAL
APTT BLD: 30.5 SEC — SIGNIFICANT CHANGE UP (ref 27.5–37.4)
AST SERPL-CCNC: 20 U/L — SIGNIFICANT CHANGE UP (ref 15–37)
BACTERIA # UR AUTO: ABNORMAL
BILIRUB SERPL-MCNC: 0.9 MG/DL — SIGNIFICANT CHANGE UP (ref 0.2–1.2)
BILIRUB UR-MCNC: NEGATIVE — SIGNIFICANT CHANGE UP
BUN SERPL-MCNC: 15 MG/DL — SIGNIFICANT CHANGE UP (ref 7–23)
CALCIUM SERPL-MCNC: 9.2 MG/DL — SIGNIFICANT CHANGE UP (ref 8.5–10.1)
CHLORIDE SERPL-SCNC: 103 MMOL/L — SIGNIFICANT CHANGE UP (ref 96–108)
CO2 SERPL-SCNC: 21 MMOL/L — LOW (ref 22–31)
COLOR SPEC: YELLOW — SIGNIFICANT CHANGE UP
CREAT SERPL-MCNC: 1.04 MG/DL — SIGNIFICANT CHANGE UP (ref 0.5–1.3)
DIFF PNL FLD: ABNORMAL
EPI CELLS # UR: SIGNIFICANT CHANGE UP
GLUCOSE SERPL-MCNC: 99 MG/DL — SIGNIFICANT CHANGE UP (ref 70–99)
GLUCOSE UR QL: NEGATIVE MG/DL — SIGNIFICANT CHANGE UP
HCG SERPL-ACNC: <1 MIU/ML — SIGNIFICANT CHANGE UP
HCG UR QL: NEGATIVE — SIGNIFICANT CHANGE UP
HCT VFR BLD CALC: 34.7 % — SIGNIFICANT CHANGE UP (ref 34.5–45)
HGB BLD-MCNC: 10.3 G/DL — LOW (ref 11.5–15.5)
HYPOCHROMIA BLD QL: SLIGHT — SIGNIFICANT CHANGE UP
INR BLD: 1.33 RATIO — HIGH (ref 0.88–1.16)
KETONES UR-MCNC: NEGATIVE — SIGNIFICANT CHANGE UP
LACTATE SERPL-SCNC: 1.3 MMOL/L — SIGNIFICANT CHANGE UP (ref 0.7–2)
LACTATE SERPL-SCNC: 2.5 MMOL/L — HIGH (ref 0.7–2)
LEUKOCYTE ESTERASE UR-ACNC: ABNORMAL
LYMPHOCYTES # BLD AUTO: 6 % — LOW (ref 13–44)
LYMPHOCYTES # SPEC AUTO: 1 % — HIGH (ref 0–0)
MCHC RBC-ENTMCNC: 24.4 PG — LOW (ref 27–34)
MCHC RBC-ENTMCNC: 29.8 GM/DL — LOW (ref 32–36)
MCV RBC AUTO: 82 FL — SIGNIFICANT CHANGE UP (ref 80–100)
MICROCYTES BLD QL: SLIGHT — SIGNIFICANT CHANGE UP
MONOCYTES NFR BLD AUTO: 5 % — SIGNIFICANT CHANGE UP (ref 2–14)
NEUTROPHILS NFR BLD AUTO: 80 % — HIGH (ref 43–77)
NEUTS BAND # BLD: 8 % — SIGNIFICANT CHANGE UP (ref 0–8)
NITRITE UR-MCNC: POSITIVE
PH UR: 5 — SIGNIFICANT CHANGE UP (ref 5–8)
PLAT MORPH BLD: NORMAL — SIGNIFICANT CHANGE UP
PLATELET # BLD AUTO: 324 K/UL — SIGNIFICANT CHANGE UP (ref 150–400)
POLYCHROMASIA BLD QL SMEAR: SLIGHT — SIGNIFICANT CHANGE UP
POTASSIUM SERPL-MCNC: 3.5 MMOL/L — SIGNIFICANT CHANGE UP (ref 3.5–5.3)
POTASSIUM SERPL-SCNC: 3.5 MMOL/L — SIGNIFICANT CHANGE UP (ref 3.5–5.3)
PROT SERPL-MCNC: 8.2 GM/DL — SIGNIFICANT CHANGE UP (ref 6–8.3)
PROT UR-MCNC: 500 MG/DL
PROTHROM AB SERPL-ACNC: 14.6 SEC — HIGH (ref 9.8–12.7)
RBC # BLD: 4.23 M/UL — SIGNIFICANT CHANGE UP (ref 3.8–5.2)
RBC # FLD: 17.4 % — HIGH (ref 11–15)
RBC BLD AUTO: ABNORMAL
RBC CASTS # UR COMP ASSIST: ABNORMAL /HPF (ref 0–4)
SODIUM SERPL-SCNC: 137 MMOL/L — SIGNIFICANT CHANGE UP (ref 135–145)
SP GR SPEC: 1.02 — SIGNIFICANT CHANGE UP (ref 1.01–1.02)
UROBILINOGEN FLD QL: NEGATIVE MG/DL — SIGNIFICANT CHANGE UP
WBC # BLD: 31.2 K/UL — HIGH (ref 3.8–10.5)
WBC # FLD AUTO: 31.2 K/UL — HIGH (ref 3.8–10.5)
WBC UR QL: >50

## 2017-08-04 PROCEDURE — 99285 EMERGENCY DEPT VISIT HI MDM: CPT

## 2017-08-04 PROCEDURE — 74176 CT ABD & PELVIS W/O CONTRAST: CPT | Mod: 26

## 2017-08-04 RX ORDER — PIPERACILLIN AND TAZOBACTAM 4; .5 G/20ML; G/20ML
3.38 INJECTION, POWDER, LYOPHILIZED, FOR SOLUTION INTRAVENOUS EVERY 8 HOURS
Qty: 0 | Refills: 0 | Status: DISCONTINUED | OUTPATIENT
Start: 2017-08-04 | End: 2017-08-07

## 2017-08-04 RX ORDER — GENTAMICIN SULFATE 40 MG/ML
100 VIAL (ML) INJECTION ONCE
Qty: 0 | Refills: 0 | Status: COMPLETED | OUTPATIENT
Start: 2017-08-04 | End: 2017-08-04

## 2017-08-04 RX ORDER — ACETAMINOPHEN 500 MG
650 TABLET ORAL EVERY 6 HOURS
Qty: 0 | Refills: 0 | Status: DISCONTINUED | OUTPATIENT
Start: 2017-08-04 | End: 2017-08-11

## 2017-08-04 RX ORDER — MORPHINE SULFATE 50 MG/1
2 CAPSULE, EXTENDED RELEASE ORAL ONCE
Qty: 0 | Refills: 0 | Status: DISCONTINUED | OUTPATIENT
Start: 2017-08-04 | End: 2017-08-04

## 2017-08-04 RX ORDER — CEFTRIAXONE 500 MG/1
1 INJECTION, POWDER, FOR SOLUTION INTRAMUSCULAR; INTRAVENOUS ONCE
Qty: 0 | Refills: 0 | Status: DISCONTINUED | OUTPATIENT
Start: 2017-08-04 | End: 2017-08-04

## 2017-08-04 RX ORDER — ACETAMINOPHEN 500 MG
650 TABLET ORAL ONCE
Qty: 0 | Refills: 0 | Status: COMPLETED | OUTPATIENT
Start: 2017-08-04 | End: 2017-08-04

## 2017-08-04 RX ORDER — SODIUM CHLORIDE 9 MG/ML
2000 INJECTION INTRAMUSCULAR; INTRAVENOUS; SUBCUTANEOUS ONCE
Qty: 0 | Refills: 0 | Status: COMPLETED | OUTPATIENT
Start: 2017-08-04 | End: 2017-08-04

## 2017-08-04 RX ORDER — ONDANSETRON 8 MG/1
4 TABLET, FILM COATED ORAL ONCE
Qty: 0 | Refills: 0 | Status: COMPLETED | OUTPATIENT
Start: 2017-08-04 | End: 2017-08-04

## 2017-08-04 RX ORDER — KETOROLAC TROMETHAMINE 30 MG/ML
30 SYRINGE (ML) INJECTION ONCE
Qty: 0 | Refills: 0 | Status: DISCONTINUED | OUTPATIENT
Start: 2017-08-04 | End: 2017-08-04

## 2017-08-04 RX ORDER — ONDANSETRON 8 MG/1
4 TABLET, FILM COATED ORAL EVERY 8 HOURS
Qty: 0 | Refills: 0 | Status: COMPLETED | OUTPATIENT
Start: 2017-08-04 | End: 2017-08-05

## 2017-08-04 RX ORDER — IBUPROFEN 200 MG
600 TABLET ORAL EVERY 6 HOURS
Qty: 0 | Refills: 0 | Status: DISCONTINUED | OUTPATIENT
Start: 2017-08-04 | End: 2017-08-11

## 2017-08-04 RX ORDER — SODIUM CHLORIDE 9 MG/ML
1000 INJECTION INTRAMUSCULAR; INTRAVENOUS; SUBCUTANEOUS
Qty: 0 | Refills: 0 | Status: DISCONTINUED | OUTPATIENT
Start: 2017-08-04 | End: 2017-08-11

## 2017-08-04 RX ORDER — PIPERACILLIN AND TAZOBACTAM 4; .5 G/20ML; G/20ML
3.38 INJECTION, POWDER, LYOPHILIZED, FOR SOLUTION INTRAVENOUS ONCE
Qty: 0 | Refills: 0 | Status: COMPLETED | OUTPATIENT
Start: 2017-08-04 | End: 2017-08-04

## 2017-08-04 RX ADMIN — MORPHINE SULFATE 2 MILLIGRAM(S): 50 CAPSULE, EXTENDED RELEASE ORAL at 18:13

## 2017-08-04 RX ADMIN — Medication 600 MILLIGRAM(S): at 19:41

## 2017-08-04 RX ADMIN — Medication 650 MILLIGRAM(S): at 16:01

## 2017-08-04 RX ADMIN — PIPERACILLIN AND TAZOBACTAM 200 GRAM(S): 4; .5 INJECTION, POWDER, LYOPHILIZED, FOR SOLUTION INTRAVENOUS at 20:03

## 2017-08-04 RX ADMIN — Medication 500 MILLIGRAM(S): at 18:26

## 2017-08-04 RX ADMIN — SODIUM CHLORIDE 4000 MILLILITER(S): 9 INJECTION INTRAMUSCULAR; INTRAVENOUS; SUBCUTANEOUS at 17:56

## 2017-08-04 RX ADMIN — SODIUM CHLORIDE 100 MILLILITER(S): 9 INJECTION INTRAMUSCULAR; INTRAVENOUS; SUBCUTANEOUS at 20:01

## 2017-08-04 RX ADMIN — ONDANSETRON 4 MILLIGRAM(S): 8 TABLET, FILM COATED ORAL at 16:07

## 2017-08-04 RX ADMIN — SODIUM CHLORIDE 2000 MILLILITER(S): 9 INJECTION INTRAMUSCULAR; INTRAVENOUS; SUBCUTANEOUS at 15:05

## 2017-08-04 RX ADMIN — Medication 600 MILLIGRAM(S): at 18:52

## 2017-08-04 RX ADMIN — ONDANSETRON 4 MILLIGRAM(S): 8 TABLET, FILM COATED ORAL at 22:16

## 2017-08-04 RX ADMIN — Medication 30 MILLIGRAM(S): at 16:01

## 2017-08-04 RX ADMIN — MORPHINE SULFATE 2 MILLIGRAM(S): 50 CAPSULE, EXTENDED RELEASE ORAL at 17:58

## 2017-08-04 NOTE — H&P ADULT - NSHPPHYSICALEXAM_GEN_ALL_CORE
General: A/ox 3, No acute Distress  skin : Normal  Head. Delilah. No lacerations  Neck: Supple, NO JVD  Cardiac: S1 S2, No M/R/G  Pulmonary: CTAP, Breathing unlabored, No Rhonchi/Rales/Wheezing  Abdomen: Soft, Non -tender, +BS x 4 quads. Rt flank tenderness  Extremities: No Rashes, No edema  Neuro: A/o x 3, No focal deficits. Normal Motor and sensory exam  Vascular: Normal distal pulses.  Extremities: No edema  Decubiti ; None

## 2017-08-04 NOTE — H&P ADULT - NSHPLABSRESULTS_GEN_ALL_CORE
(08-04 @ 15:32):               10.3   31.2 )-----------(324                34.7   Neurophils% (auto):   80.0    manual%: x      Lymphocytes% (auto):  6.0     manual%: x      Eosinphils% (auto):   x       manual%: x      Bands%: 008.0   blasts%: x      08-04 @ 15:32    137 | 103 | 15  /9.2 | -- | --  _______________________/  3.5 | 21 | 1.04 \99                          \  Pregnancy test neg  08-04 @ 15:30  EXAM:  CT RENAL STONE [facility]UNT                            PROCEDURE DATE:  08/04/2017          INTERPRETATION:  CLINICAL INFORMATION: Right flank pain    COMPARISON: No prior examinations are available for comparison.    PROCEDURE:    Serial axial sections through the abdomen and pelvis were obtained with   the patient in prone position without the use of intravenous contrast.    Coronal and sagittal 3-D reformats were created from the axial images.    FINDINGS:    Evaluation of solid organs and vascular structures is limited by lack of   intravenous contrast, which is standard for urinary calculus assessment   CT.     LOWER CHEST: Clear lung bases.    ABDOMEN:  RIGHT KIDNEY AND URETER: No calculi. No hydronephrosis. Right kidney   appears edematous with perinephric stranding.  LEFT KIDNEY AND URETER: No calculi. No hydronephrosis.  LIVER: Within normal limits.  BILE DUCTS: Normal caliber.  GALLBLADDER: No calcified gallstones. Normal caliber wall.  PANCREAS: Within normal limits.  SPLEEN: Within normal limits.  ADRENALS: Within normal limits.    PELVIS:  REPRODUCTIVE ORGANS: The uterus and adnexa are within normal limits.  URINARY BLADDER: Within normal limits.    BOWEL: Normal caliber.  APPENDIX: Within normal limits.  PERITONEUM: No ascites or free air. No fluid collection.  VESSELS: Within normal limits.  RETROPERITONEUM: Within normal limits.  ABDOMINAL WALL: Within normal limits.  BONES: Within normal limits.    IMPRESSION:    No urolithiasis or hydronephrosis.  Edematous right kidney with perinephric stranding, which may represent   right pyelonephritis.    < end of copied text >

## 2017-08-04 NOTE — ED ADULT TRIAGE NOTE - CHIEF COMPLAINT QUOTE
pt complaining of right flank pain, difficulty urinating  and generalized body aches times 2 days. pt has history of asthma.

## 2017-08-04 NOTE — ED PROVIDER NOTE - OBJECTIVE STATEMENT
25 yo female s/p  three months ago presents with right falnk/back pain for 5 days. Patient states that she took a pill a few days ago which improved pain. Patient denies dysuria, chills, fever, rash, abdominal pain. 27 yo female s/p c section three months ago presents with right flank/back pain for 5 days. Patient states that she took a pill a few days ago which improved pain. Patient denies dysuria, chills, fever, rash, abdominal pain, vaginal discharge.
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
## 2017-08-05 DIAGNOSIS — R11.10 VOMITING, UNSPECIFIED: ICD-10-CM

## 2017-08-05 DIAGNOSIS — J45.909 UNSPECIFIED ASTHMA, UNCOMPLICATED: ICD-10-CM

## 2017-08-05 DIAGNOSIS — F41.9 ANXIETY DISORDER, UNSPECIFIED: ICD-10-CM

## 2017-08-05 RX ORDER — SODIUM CHLORIDE 9 MG/ML
2000 INJECTION INTRAMUSCULAR; INTRAVENOUS; SUBCUTANEOUS ONCE
Qty: 0 | Refills: 0 | Status: COMPLETED | OUTPATIENT
Start: 2017-08-05 | End: 2017-08-05

## 2017-08-05 RX ORDER — ONDANSETRON 8 MG/1
4 TABLET, FILM COATED ORAL ONCE
Qty: 0 | Refills: 0 | Status: COMPLETED | OUTPATIENT
Start: 2017-08-05 | End: 2017-08-05

## 2017-08-05 RX ADMIN — Medication 600 MILLIGRAM(S): at 06:30

## 2017-08-05 RX ADMIN — SODIUM CHLORIDE 2000 MILLILITER(S): 9 INJECTION INTRAMUSCULAR; INTRAVENOUS; SUBCUTANEOUS at 01:40

## 2017-08-05 RX ADMIN — PIPERACILLIN AND TAZOBACTAM 25 GRAM(S): 4; .5 INJECTION, POWDER, LYOPHILIZED, FOR SOLUTION INTRAVENOUS at 21:19

## 2017-08-05 RX ADMIN — Medication 600 MILLIGRAM(S): at 11:20

## 2017-08-05 RX ADMIN — Medication 600 MILLIGRAM(S): at 12:30

## 2017-08-05 RX ADMIN — ONDANSETRON 4 MILLIGRAM(S): 8 TABLET, FILM COATED ORAL at 05:50

## 2017-08-05 RX ADMIN — Medication 600 MILLIGRAM(S): at 18:51

## 2017-08-05 RX ADMIN — Medication 600 MILLIGRAM(S): at 05:50

## 2017-08-05 RX ADMIN — Medication 600 MILLIGRAM(S): at 18:15

## 2017-08-05 RX ADMIN — SODIUM CHLORIDE 100 MILLILITER(S): 9 INJECTION INTRAMUSCULAR; INTRAVENOUS; SUBCUTANEOUS at 18:15

## 2017-08-05 RX ADMIN — SODIUM CHLORIDE 100 MILLILITER(S): 9 INJECTION INTRAMUSCULAR; INTRAVENOUS; SUBCUTANEOUS at 05:50

## 2017-08-05 RX ADMIN — PIPERACILLIN AND TAZOBACTAM 25 GRAM(S): 4; .5 INJECTION, POWDER, LYOPHILIZED, FOR SOLUTION INTRAVENOUS at 05:50

## 2017-08-05 RX ADMIN — PIPERACILLIN AND TAZOBACTAM 25 GRAM(S): 4; .5 INJECTION, POWDER, LYOPHILIZED, FOR SOLUTION INTRAVENOUS at 14:14

## 2017-08-05 RX ADMIN — ONDANSETRON 4 MILLIGRAM(S): 8 TABLET, FILM COATED ORAL at 14:14

## 2017-08-05 RX ADMIN — ONDANSETRON 4 MILLIGRAM(S): 8 TABLET, FILM COATED ORAL at 21:51

## 2017-08-05 NOTE — PROGRESS NOTE ADULT - ASSESSMENT
urinary tract infection   continue antibiotics urinary tract infection   continue antibiotics   follow up labs am

## 2017-08-05 NOTE — PROGRESS NOTE ADULT - SUBJECTIVE AND OBJECTIVE BOX
Patient is a 26y old  Female who presents with a chief complaint of acute pyelonephritis. (04 Aug 2017 17:44)      INTERVAL HPI/OVERNIGHT EVENTS:improving. fever has subsided.     MEDICATIONS  (STANDING):  sodium chloride 0.9%. 1000 milliLiter(s) (100 mL/Hr) IV Continuous <Continuous>  ibuprofen  Tablet 600 milliGRAM(s) Oral every 6 hours  piperacillin/tazobactam IVPB. 3.375 Gram(s) IV Intermittent every 8 hours  ondansetron Injectable 4 milliGRAM(s) IV Push every 8 hours    MEDICATIONS  (PRN):  acetaminophen   Tablet 650 milliGRAM(s) Oral every 6 hours PRN For Temp greater than 38 C (100.4 F)      Allergies    codeine (Hives)    Intolerances        REVIEW OF SYSTEMS:  CONSTITUTIONAL: No fever, weight loss, or fatigue  EYES: No eye pain, visual disturbances, or discharge  ENMT:  No difficulty hearing, tinnitus, vertigo; No sinus or throat pain  NECK: No pain or stiffness  BREASTS: No pain, masses, or nipple discharge  RESPIRATORY: No cough, wheezing, chills or hemoptysis; No shortness of breath  CARDIOVASCULAR: No chest pain, palpitations, dizziness, or leg swelling  GASTROINTESTINAL: No abdominal or epigastric pain. No nausea, vomiting, or hematemesis; No diarrhea or constipation. No melena or hematochezia.  GENITOURINARY: No dysuria, frequency, hematuria, or incontinence  NEUROLOGICAL: No headaches, memory loss, loss of strength, numbness, or tremors  SKIN: No itching, burning, rashes, or lesions   LYMPH NODES: No enlarged glands  ENDOCRINE: No heat or cold intolerance; No hair loss  MUSCULOSKELETAL: No joint pain or swelling; No muscle, back, or extremity pain  PSYCHIATRIC: No depression, anxiety, mood swings, or difficulty sleeping  HEME/LYMPH: No easy bruising, or bleeding gums  ALLERY AND IMMUNOLOGIC: No hives or eczema    Vital Signs Last 24 Hrs  T(C): 36.1 (05 Aug 2017 05:34), Max: 39.3 (04 Aug 2017 14:51)  T(F): 97 (05 Aug 2017 05:34), Max: 102.8 (04 Aug 2017 16:08)  HR: 104 (05 Aug 2017 05:34) (91 - 129)  BP: 92/47 (05 Aug 2017 05:34) (92/44 - 109/65)  BP(mean): --  RR: 14 (05 Aug 2017 05:34) (14 - 19)  SpO2: 98% (05 Aug 2017 05:34) (97% - 100%)    PHYSICAL EXAM:  GENERAL: NAD, well-groomed, well-developed  HEAD:  Atraumatic, Normocephalic  EYES: EOMI, PERRLA, conjunctiva and sclera clear  ENMT: No tonsillar erythema, exudates, or enlargement; Moist mucous membranes, Good dentition, No lesions  NECK: Supple, No JVD, Normal thyroid  NERVOUS SYSTEM:  Alert & Oriented X3, Good concentration; Motor Strength 5/5 B/L upper and lower extremities; DTRs 2+ intact and symmetric  CHEST/LUNG: Clear to percussion bilaterally; No rales, rhonchi, wheezing, or rubs  HEART: Regular rate and rhythm; No murmurs, rubs, or gallops  ABDOMEN: Soft, Nontender, Nondistended; Bowel sounds present  EXTREMITIES:  2+ Peripheral Pulses, No clubbing, cyanosis, or edema  LYMPH: No lymphadenopathy noted  SKIN: No rashes or lesions    LABS:                        10.3   31.2  )-----------( 324      ( 04 Aug 2017 15:32 )             34.7     08-04    137  |  103  |  15  ----------------------------<  99  3.5   |  21<L>  |  1.04    Ca    9.2      04 Aug 2017 15:32    TPro  8.2  /  Alb  3.4  /  TBili  0.9  /  DBili  x   /  AST  20  /  ALT  29  /  AlkPhos  157<H>  08-04      PT/INR - ( 04 Aug 2017 15:32 )   PT: 14.6 sec;   INR: 1.33 ratio         PTT - ( 04 Aug 2017 15:32 )  PTT:30.5 sec  Urinalysis Basic - ( 04 Aug 2017 15:32 )    Color: Yellow / Appearance: x / S.020 / pH: x  Gluc: x / Ketone: Negative  / Bili: Negative / Urobili: Negative mg/dL   Blood: x / Protein: 500 mg/dL / Nitrite: Positive   Leuk Esterase: Moderate / RBC: 25-50 /HPF / WBC >50   Sq Epi: x / Non Sq Epi: Few / Bacteria: Many      CAPILLARY BLOOD GLUCOSE                    RADIOLOGY & ADDITIONAL TESTS:    Imaging Personally Reviewed:  [ ] YES  [ ] NO    Consultant(s) Notes Reviewed:  [ ] YES  [ ] NO    Care Discussed with Consultants/Other Providers [ ] YES  [ ] NO    Care discussed with family,         [  ]   yes  [  ]  No    imp:    stable[ ]    unstable[  ]     improving [ x  ]       unchanged  [  ]                Plans:  Continue present plans  [  x]               New consult [  ]   specialty  .......               order test[  ]    test name.  labs in AM                Discharge Planning  [  ]
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
## 2017-08-06 LAB
-  AMIKACIN: SIGNIFICANT CHANGE UP
-  AMPICILLIN/SULBACTAM: SIGNIFICANT CHANGE UP
-  AMPICILLIN: SIGNIFICANT CHANGE UP
-  AZTREONAM: SIGNIFICANT CHANGE UP
-  CEFAZOLIN: SIGNIFICANT CHANGE UP
-  CEFEPIME: SIGNIFICANT CHANGE UP
-  CEFOXITIN: SIGNIFICANT CHANGE UP
-  CEFTAZIDIME: SIGNIFICANT CHANGE UP
-  CEFTRIAXONE: SIGNIFICANT CHANGE UP
-  CIPROFLOXACIN: SIGNIFICANT CHANGE UP
-  ERTAPENEM: SIGNIFICANT CHANGE UP
-  GENTAMICIN: SIGNIFICANT CHANGE UP
-  IMIPENEM: SIGNIFICANT CHANGE UP
-  LEVOFLOXACIN: SIGNIFICANT CHANGE UP
-  MEROPENEM: SIGNIFICANT CHANGE UP
-  NITROFURANTOIN: SIGNIFICANT CHANGE UP
-  PIPERACILLIN/TAZOBACTAM: SIGNIFICANT CHANGE UP
-  TOBRAMYCIN: SIGNIFICANT CHANGE UP
-  TRIMETHOPRIM/SULFAMETHOXAZOLE: SIGNIFICANT CHANGE UP
ANION GAP SERPL CALC-SCNC: 9 MMOL/L — SIGNIFICANT CHANGE UP (ref 5–17)
BASOPHILS # BLD AUTO: 0.1 K/UL — SIGNIFICANT CHANGE UP (ref 0–0.2)
BASOPHILS NFR BLD AUTO: 0.4 % — SIGNIFICANT CHANGE UP (ref 0–2)
BUN SERPL-MCNC: 5 MG/DL — LOW (ref 7–23)
CALCIUM SERPL-MCNC: 7.8 MG/DL — LOW (ref 8.5–10.1)
CHLORIDE SERPL-SCNC: 114 MMOL/L — HIGH (ref 96–108)
CO2 SERPL-SCNC: 20 MMOL/L — LOW (ref 22–31)
CREAT SERPL-MCNC: 0.62 MG/DL — SIGNIFICANT CHANGE UP (ref 0.5–1.3)
CULTURE RESULTS: SIGNIFICANT CHANGE UP
EOSINOPHIL # BLD AUTO: 0 K/UL — SIGNIFICANT CHANGE UP (ref 0–0.5)
EOSINOPHIL NFR BLD AUTO: 0.2 % — SIGNIFICANT CHANGE UP (ref 0–6)
GLUCOSE SERPL-MCNC: 91 MG/DL — SIGNIFICANT CHANGE UP (ref 70–99)
HCT VFR BLD CALC: 25.9 % — LOW (ref 34.5–45)
HGB BLD-MCNC: 8.1 G/DL — LOW (ref 11.5–15.5)
LYMPHOCYTES # BLD AUTO: 1.2 K/UL — SIGNIFICANT CHANGE UP (ref 1–3.3)
LYMPHOCYTES # BLD AUTO: 5.9 % — LOW (ref 13–44)
MCHC RBC-ENTMCNC: 25.4 PG — LOW (ref 27–34)
MCHC RBC-ENTMCNC: 31.2 GM/DL — LOW (ref 32–36)
MCV RBC AUTO: 81.4 FL — SIGNIFICANT CHANGE UP (ref 80–100)
METHOD TYPE: SIGNIFICANT CHANGE UP
MONOCYTES # BLD AUTO: 1.9 K/UL — HIGH (ref 0–0.9)
MONOCYTES NFR BLD AUTO: 9.8 % — SIGNIFICANT CHANGE UP (ref 2–14)
NEUTROPHILS # BLD AUTO: 16.5 K/UL — HIGH (ref 1.8–7.4)
NEUTROPHILS NFR BLD AUTO: 83.7 % — HIGH (ref 43–77)
ORGANISM # SPEC MICROSCOPIC CNT: SIGNIFICANT CHANGE UP
ORGANISM # SPEC MICROSCOPIC CNT: SIGNIFICANT CHANGE UP
PLATELET # BLD AUTO: 281 K/UL — SIGNIFICANT CHANGE UP (ref 150–400)
POTASSIUM SERPL-MCNC: 3.2 MMOL/L — LOW (ref 3.5–5.3)
POTASSIUM SERPL-SCNC: 3.2 MMOL/L — LOW (ref 3.5–5.3)
RBC # BLD: 3.18 M/UL — LOW (ref 3.8–5.2)
RBC # FLD: 17.6 % — HIGH (ref 11–15)
SODIUM SERPL-SCNC: 143 MMOL/L — SIGNIFICANT CHANGE UP (ref 135–145)
SPECIMEN SOURCE: SIGNIFICANT CHANGE UP
WBC # BLD: 19.7 K/UL — HIGH (ref 3.8–10.5)
WBC # FLD AUTO: 19.7 K/UL — HIGH (ref 3.8–10.5)

## 2017-08-06 RX ORDER — METOCLOPRAMIDE HCL 10 MG
5 TABLET ORAL ONCE
Qty: 0 | Refills: 0 | Status: COMPLETED | OUTPATIENT
Start: 2017-08-06 | End: 2017-08-07

## 2017-08-06 RX ORDER — POTASSIUM CHLORIDE 20 MEQ
40 PACKET (EA) ORAL EVERY 4 HOURS
Qty: 0 | Refills: 0 | Status: COMPLETED | OUTPATIENT
Start: 2017-08-06 | End: 2017-08-06

## 2017-08-06 RX ORDER — ACETAMINOPHEN 500 MG
1000 TABLET ORAL ONCE
Qty: 0 | Refills: 0 | Status: COMPLETED | OUTPATIENT
Start: 2017-08-06 | End: 2017-08-06

## 2017-08-06 RX ORDER — METOCLOPRAMIDE HCL 10 MG
5 TABLET ORAL ONCE
Qty: 0 | Refills: 0 | Status: COMPLETED | OUTPATIENT
Start: 2017-08-06 | End: 2017-08-06

## 2017-08-06 RX ADMIN — Medication 5 MILLIGRAM(S): at 05:44

## 2017-08-06 RX ADMIN — SODIUM CHLORIDE 100 MILLILITER(S): 9 INJECTION INTRAMUSCULAR; INTRAVENOUS; SUBCUTANEOUS at 12:35

## 2017-08-06 RX ADMIN — PIPERACILLIN AND TAZOBACTAM 25 GRAM(S): 4; .5 INJECTION, POWDER, LYOPHILIZED, FOR SOLUTION INTRAVENOUS at 05:45

## 2017-08-06 RX ADMIN — Medication 40 MILLIEQUIVALENT(S): at 14:54

## 2017-08-06 RX ADMIN — PIPERACILLIN AND TAZOBACTAM 25 GRAM(S): 4; .5 INJECTION, POWDER, LYOPHILIZED, FOR SOLUTION INTRAVENOUS at 21:50

## 2017-08-06 RX ADMIN — Medication 600 MILLIGRAM(S): at 06:38

## 2017-08-06 RX ADMIN — Medication 600 MILLIGRAM(S): at 18:20

## 2017-08-06 RX ADMIN — SODIUM CHLORIDE 100 MILLILITER(S): 9 INJECTION INTRAMUSCULAR; INTRAVENOUS; SUBCUTANEOUS at 03:02

## 2017-08-06 RX ADMIN — Medication 600 MILLIGRAM(S): at 17:37

## 2017-08-06 RX ADMIN — Medication 600 MILLIGRAM(S): at 00:30

## 2017-08-06 RX ADMIN — PIPERACILLIN AND TAZOBACTAM 25 GRAM(S): 4; .5 INJECTION, POWDER, LYOPHILIZED, FOR SOLUTION INTRAVENOUS at 14:55

## 2017-08-06 RX ADMIN — Medication 600 MILLIGRAM(S): at 05:44

## 2017-08-06 RX ADMIN — Medication 40 MILLIEQUIVALENT(S): at 10:36

## 2017-08-06 RX ADMIN — Medication 600 MILLIGRAM(S): at 01:00

## 2017-08-06 NOTE — PROGRESS NOTE ADULT - SUBJECTIVE AND OBJECTIVE BOX
Patient is a 26y old  Female who presents with a chief complaint of acute pyelonephritis. (04 Aug 2017 17:44)  acute pyelonephritis with vomiting, leukomoid reaction. stll has elevated WBC count. . urine culture  growing e. coli.    INTERVAL HPI/OVERNIGHT EVENTS: uneventful. no fever. pain better controlled. hypokalemia    MEDICATIONS  (STANDING):  sodium chloride 0.9%. 1000 milliLiter(s) (100 mL/Hr) IV Continuous <Continuous>  ibuprofen  Tablet 600 milliGRAM(s) Oral every 6 hours  piperacillin/tazobactam IVPB. 3.375 Gram(s) IV Intermittent every 8 hours  potassium chloride    Tablet ER 40 milliEquivalent(s) Oral every 4 hours    MEDICATIONS  (PRN):  acetaminophen   Tablet 650 milliGRAM(s) Oral every 6 hours PRN For Temp greater than 38 C (100.4 F)      Allergies    codeine (Hives)    Intolerances        REVIEW OF SYSTEMS:  CONSTITUTIONAL: No fever, weight loss, or fatigue  EYES: No eye pain, visual disturbances, or discharge  ENMT:  No difficulty hearing, tinnitus, vertigo; No sinus or throat pain  NECK: No pain or stiffness  BREASTS: No pain, masses, or nipple discharge  RESPIRATORY: No cough, wheezing, chills or hemoptysis; No shortness of breath  CARDIOVASCULAR: No chest pain, palpitations, dizziness, or leg swelling  GASTROINTESTINAL: No abdominal or epigastric pain. No nausea, vomiting, or hematemesis; No diarrhea or constipation. No melena or hematochezia.  GENITOURINARY: No dysuria, frequency, hematuria, or incontinence  NEUROLOGICAL: No headaches, memory loss, loss of strength, numbness, or tremors  SKIN: No itching, burning, rashes, or lesions   LYMPH NODES: No enlarged glands  ENDOCRINE: No heat or cold intolerance; No hair loss  MUSCULOSKELETAL: No joint pain or swelling; No muscle, back, or extremity pain  PSYCHIATRIC: No depression, anxiety, mood swings, or difficulty sleeping  HEME/LYMPH: No easy bruising, or bleeding gums  ALLERY AND IMMUNOLOGIC: No hives or eczema    Vital Signs Last 24 Hrs  T(C): 36.7 (06 Aug 2017 05:30), Max: 37.2 (05 Aug 2017 11:25)  T(F): 98.1 (06 Aug 2017 05:30), Max: 99 (05 Aug 2017 11:25)  HR: 110 (06 Aug 2017 07:20) (97 - 110)  BP: 98/60 (06 Aug 2017 07:20) (82/41 - 110/52)  BP(mean): --  RR: 16 (06 Aug 2017 05:30) (16 - 18)  SpO2: 100% (06 Aug 2017 07:20) (97% - 100%)    PHYSICAL EXAM:  GENERAL: NAD, well-groomed, well-developed  HEAD:  Atraumatic, Normocephalic  EYES: EOMI, PERRLA, conjunctiva and sclera clear  ENMT: No tonsillar erythema, exudates, or enlargement; Moist mucous membranes, Good dentition, No lesions  NECK: Supple, No JVD, Normal thyroid  NERVOUS SYSTEM:  Alert & Oriented X3, Good concentration; Motor Strength 5/5 B/L upper and lower extremities; DTRs 2+ intact and symmetric  CHEST/LUNG: Clear to percussion bilaterally; No rales, rhonchi, wheezing, or rubs  HEART: Regular rate and rhythm; No murmurs, rubs, or gallops  ABDOMEN: Soft, Nontender, Nondistended; Bowel sounds present  EXTREMITIES:  2+ Peripheral Pulses, No clubbing, cyanosis, or edema  LYMPH: No lymphadenopathy noted  SKIN: No rashes or lesions    LABS:                        8.1    19.7  )-----------( 281      ( 06 Aug 2017 08:07 )             25.9     08-06    143  |  114<H>  |  5<L>  ----------------------------<  91  3.2<L>   |  20<L>  |  0.62    Ca    7.8<L>      06 Aug 2017 08:07    TPro  8.2  /  Alb  3.4  /  TBili  0.9  /  DBili  x   /  AST  20  /  ALT  29  /  AlkPhos  157<H>  08-04      PT/INR - ( 04 Aug 2017 15:32 )   PT: 14.6 sec;   INR: 1.33 ratio         PTT - ( 04 Aug 2017 15:32 )  PTT:30.5 sec  Urinalysis Basic - ( 04 Aug 2017 15:32 )    Color: Yellow / Appearance: x / S.020 / pH: x  Gluc: x / Ketone: Negative  / Bili: Negative / Urobili: Negative mg/dL   Blood: x / Protein: 500 mg/dL / Nitrite: Positive   Leuk Esterase: Moderate / RBC: 25-50 /HPF / WBC >50   Sq Epi: x / Non Sq Epi: Few / Bacteria: Many        Consultant(s) Notes Reviewed:  [ ] YES  [ ] NO    Care Discussed with Consultants/Other Providers [ x] YES  [ ] NO    Care discussed with family,         [  ]   yes  [  ]  No    imp:    stable[ ]    unstable[  ]     improving [  x ]       unchanged  [  ]                Plans:  Continue present plans  [x  ] potassium supplement. continue Iv zosyn.  ID folow up               New consult [  ]   specialty  .......               order test[  ]    test name. cbc, BMP in Am                  Discharge Planning  [  ]

## 2017-08-06 NOTE — CHART NOTE - NSCHARTNOTEFT_GEN_A_CORE
House Medicine PA note    Asked to evaluate patient by RN for complaints of epigastric pain and sob. Patient seen and examined at bedside.  Patient c/o severe and constant epigastric pain that started 2 hours ago, rated as 10/10 in severity, described as a squeezing, pressure pain, denies radiation of pain to back or shoulders. Associated with mild nausea. Patient states she ate some [place] food for dinner which is the usual food she eats. She states she also feels bloated and that her abdomen looks more swollen and bigger. Denies experiencing this kind of pain ever before, came to the hospital with right flank pain different then what she is experiencing currently. States she is having pain when she breaths and when she lays in bed, slightly relieved if she sits up. Denies CP, palpitations, SOB, dizziness, fever/chills, Nausea/vomiting, or change in bowel movements. Last BM normal this AM. +Flatus.     Vital Signs Last 24 Hrs  T(C): 37.3 (06 Aug 2017 23:31), Max: 37.3 (06 Aug 2017 23:31)  T(F): 99.2 (06 Aug 2017 23:31), Max: 99.2 (06 Aug 2017 23:31)  HR: 90 (06 Aug 2017 23:31) (90 - 110)  BP: 107/68 (06 Aug 2017 23:31) (82/41 - 111/62)  BP(mean): --  RR: 18 (06 Aug 2017 23:31) (16 - 18)  SpO2: 98% (06 Aug 2017 23:31) (98% - 100%)    PE:  General: sitting up in bed, appears slightly anxious  Lungs: CTABL  CV: Normal S1S2  Abdomen: Mildly distended, +BS, Soft, ++Epigastric Tenderness, no guarding  Extr: 2+ radial pulses b/l    A/P:     -CT Abdomen/pelvis STAT  -Reglan 5mg IVP for nausea  -IV Tylenol for pain  -Serial abdominal exams, monitor patient  -Continue current medical management/supportive care  -Discussed above with Dr. Bangura, agree and aware of A/P. House Medicine PA note    Asked to evaluate patient by RN for complaints of epigastric pain and sob. Patient seen and examined at bedside.  Patient c/o severe and constant epigastric pain that started 2 hours ago, rated as 10/10 in severity, described as a squeezing, pressure pain, denies radiation of pain to back or shoulders. Associated with mild nausea. Patient states she ate some [place] food for dinner which is the usual food she eats. She states she also feels bloated and that her abdomen looks more swollen and bigger. Denies experiencing this kind of pain ever before, came to the hospital with right flank pain different then what she is experiencing currently. States she is having pain when she breaths and when she lays in bed, slightly relieved if she sits up. Denies CP, palpitations, SOB, dizziness, fever/chills, Nausea/vomiting, or change in bowel movements. Last BM normal this AM. +Flatus.     Vital Signs Last 24 Hrs  T(C): 37.3 (06 Aug 2017 23:31), Max: 37.3 (06 Aug 2017 23:31)  T(F): 99.2 (06 Aug 2017 23:31), Max: 99.2 (06 Aug 2017 23:31)  HR: 90 (06 Aug 2017 23:31) (90 - 110)  BP: 107/68 (06 Aug 2017 23:31) (82/41 - 111/62)  BP(mean): --  RR: 18 (06 Aug 2017 23:31) (16 - 18)  SpO2: 98% (06 Aug 2017 23:31) (98% - 100%)    PE:  General: sitting up in bed, appears slightly anxious  Lungs: CTABL  CV: Normal S1S2  Abdomen: Mildly distended, +BS, Soft, ++Epigastric Tenderness, no guarding  Extr: 2+ radial pulses b/l    A/P: 26F with PMH Anxiety, Asthma, and GERD a/w Pyelonephritis of Right Kidney. Now with new onset epigastric abdominal pain and distention.    -CT Abdomen/pelvis w/ PO contrast  -Reglan 5mg IVP for nausea  -IV Tylenol for pain  -Serial abdominal exams, monitor patient  -Continue current medical management/supportive care  -Discussed above with Dr. Bangura, agree and aware of A/P.

## 2017-08-07 LAB
ANION GAP SERPL CALC-SCNC: 10 MMOL/L — SIGNIFICANT CHANGE UP (ref 5–17)
BASOPHILS # BLD AUTO: 0.1 K/UL — SIGNIFICANT CHANGE UP (ref 0–0.2)
BASOPHILS NFR BLD AUTO: 0.4 % — SIGNIFICANT CHANGE UP (ref 0–2)
BUN SERPL-MCNC: 8 MG/DL — SIGNIFICANT CHANGE UP (ref 7–23)
CALCIUM SERPL-MCNC: 8.4 MG/DL — LOW (ref 8.5–10.1)
CHLORIDE SERPL-SCNC: 113 MMOL/L — HIGH (ref 96–108)
CO2 SERPL-SCNC: 18 MMOL/L — LOW (ref 22–31)
CREAT SERPL-MCNC: 0.63 MG/DL — SIGNIFICANT CHANGE UP (ref 0.5–1.3)
EOSINOPHIL # BLD AUTO: 0 K/UL — SIGNIFICANT CHANGE UP (ref 0–0.5)
EOSINOPHIL NFR BLD AUTO: 0.2 % — SIGNIFICANT CHANGE UP (ref 0–6)
GLUCOSE SERPL-MCNC: 86 MG/DL — SIGNIFICANT CHANGE UP (ref 70–99)
HCT VFR BLD CALC: 25 % — LOW (ref 34.5–45)
HGB BLD-MCNC: 7.8 G/DL — LOW (ref 11.5–15.5)
LYMPHOCYTES # BLD AUTO: 1.7 K/UL — SIGNIFICANT CHANGE UP (ref 1–3.3)
LYMPHOCYTES # BLD AUTO: 9.2 % — LOW (ref 13–44)
MCHC RBC-ENTMCNC: 25.2 PG — LOW (ref 27–34)
MCHC RBC-ENTMCNC: 31.2 GM/DL — LOW (ref 32–36)
MCV RBC AUTO: 80.8 FL — SIGNIFICANT CHANGE UP (ref 80–100)
MONOCYTES # BLD AUTO: 2.2 K/UL — HIGH (ref 0–0.9)
MONOCYTES NFR BLD AUTO: 11.9 % — SIGNIFICANT CHANGE UP (ref 2–14)
NEUTROPHILS # BLD AUTO: 14.8 K/UL — HIGH (ref 1.8–7.4)
NEUTROPHILS NFR BLD AUTO: 78.4 % — HIGH (ref 43–77)
PLATELET # BLD AUTO: 303 K/UL — SIGNIFICANT CHANGE UP (ref 150–400)
POTASSIUM SERPL-MCNC: 3.9 MMOL/L — SIGNIFICANT CHANGE UP (ref 3.5–5.3)
POTASSIUM SERPL-SCNC: 3.9 MMOL/L — SIGNIFICANT CHANGE UP (ref 3.5–5.3)
RBC # BLD: 3.1 M/UL — LOW (ref 3.8–5.2)
RBC # FLD: 18.3 % — HIGH (ref 11–15)
SODIUM SERPL-SCNC: 141 MMOL/L — SIGNIFICANT CHANGE UP (ref 135–145)
WBC # BLD: 18.8 K/UL — HIGH (ref 3.8–10.5)
WBC # FLD AUTO: 18.8 K/UL — HIGH (ref 3.8–10.5)

## 2017-08-07 PROCEDURE — 74176 CT ABD & PELVIS W/O CONTRAST: CPT | Mod: 26

## 2017-08-07 RX ORDER — GENTAMICIN SULFATE 40 MG/ML
300 VIAL (ML) INJECTION EVERY 24 HOURS
Qty: 0 | Refills: 0 | Status: COMPLETED | OUTPATIENT
Start: 2017-08-07 | End: 2017-08-07

## 2017-08-07 RX ORDER — METOCLOPRAMIDE HCL 10 MG
5 TABLET ORAL ONCE
Qty: 0 | Refills: 0 | Status: COMPLETED | OUTPATIENT
Start: 2017-08-07 | End: 2017-08-07

## 2017-08-07 RX ORDER — IOHEXOL 300 MG/ML
30 INJECTION, SOLUTION INTRAVENOUS ONCE
Qty: 0 | Refills: 0 | Status: COMPLETED | OUTPATIENT
Start: 2017-08-07 | End: 2017-08-07

## 2017-08-07 RX ADMIN — SODIUM CHLORIDE 100 MILLILITER(S): 9 INJECTION INTRAMUSCULAR; INTRAVENOUS; SUBCUTANEOUS at 05:42

## 2017-08-07 RX ADMIN — Medication 200 MILLIGRAM(S): at 21:28

## 2017-08-07 RX ADMIN — IOHEXOL 30 MILLILITER(S): 300 INJECTION, SOLUTION INTRAVENOUS at 00:49

## 2017-08-07 RX ADMIN — Medication 5 MILLIGRAM(S): at 21:28

## 2017-08-07 RX ADMIN — Medication 5 MILLIGRAM(S): at 00:25

## 2017-08-07 RX ADMIN — PIPERACILLIN AND TAZOBACTAM 25 GRAM(S): 4; .5 INJECTION, POWDER, LYOPHILIZED, FOR SOLUTION INTRAVENOUS at 05:43

## 2017-08-07 RX ADMIN — Medication 1000 MILLIGRAM(S): at 01:15

## 2017-08-07 RX ADMIN — PIPERACILLIN AND TAZOBACTAM 25 GRAM(S): 4; .5 INJECTION, POWDER, LYOPHILIZED, FOR SOLUTION INTRAVENOUS at 13:18

## 2017-08-07 RX ADMIN — Medication 400 MILLIGRAM(S): at 00:25

## 2017-08-07 NOTE — PROGRESS NOTE ADULT - ASSESSMENT
urinary tract infection   continue antibiotics   urine pan sensitive e coli   start levaquin   fluoro quinolones are the drugs of choice

## 2017-08-07 NOTE — CONSULT NOTE ADULT - ASSESSMENT
right pyelonephritis, , urine culture positive , on antibiotics. no surgical intervention necessary. Will follow wbcs

## 2017-08-07 NOTE — PROGRESS NOTE ADULT - SUBJECTIVE AND OBJECTIVE BOX
Patient is a 26y old  Female who presents with a chief complaint of acute pyelonephritis. (04 Aug 2017 17:44)  Improving. Pyelonephritis, acute with elevated WBC count. repaeat Ct scan showing swelling of left kydney    INTERVAL HPI/OVERNIGHT EVENTS: T max 99.2    MEDICATIONS  (STANDING):  sodium chloride 0.9%. 1000 milliLiter(s) (100 mL/Hr) IV Continuous <Continuous>  ibuprofen  Tablet 600 milliGRAM(s) Oral every 6 hours  piperacillin/tazobactam IVPB. 3.375 Gram(s) IV Intermittent every 8 hours    MEDICATIONS  (PRN):  acetaminophen   Tablet 650 milliGRAM(s) Oral every 6 hours PRN For Temp greater than 38 C (100.4 F)      Allergies none    codeine (Hives)    Intolerances        REVIEW OF SYSTEMS:  CONSTITUTIONAL: No fever, weight loss, or fatigue  EYES: No eye pain, visual disturbances, or discharge  ENMT:  No difficulty hearing, tinnitus, vertigo; No sinus or throat pain  NECK: No pain or stiffness  BREASTS: No pain, masses, or nipple discharge  RESPIRATORY: No cough, wheezing, chills or hemoptysis; No shortness of breath  CARDIOVASCULAR: No chest pain, palpitations, dizziness, or leg swelling  GASTROINTESTINAL: No abdominal or epigastric pain. No nausea, vomiting, or hematemesis; No diarrhea or constipation. No melena or hematochezia.  GENITOURINARY: No dysuria, frequency, hematuria, or incontinence  NEUROLOGICAL: No headaches, memory loss, loss of strength, numbness, or tremors  SKIN: No itching, burning, rashes, or lesions   LYMPH NODES: No enlarged glands  ENDOCRINE: No heat or cold intolerance; No hair loss  MUSCULOSKELETAL: No joint pain or swelling; No muscle, back, or extremity pain  PSYCHIATRIC: No depression, anxiety, mood swings, or difficulty sleeping  HEME/LYMPH: No easy bruising, or bleeding gums  ALLERY AND IMMUNOLOGIC: No hives or eczema    Vital Signs Last 24 Hrs  T(C): 37.1 (07 Aug 2017 11:01), Max: 37.3 (06 Aug 2017 23:31)  T(F): 98.8 (07 Aug 2017 11:01), Max: 99.2 (06 Aug 2017 23:31)  HR: 90 (07 Aug 2017 11:01) (87 - 103)  BP: 104/60 (07 Aug 2017 11:01) (94/57 - 111/62)  BP(mean): --  RR: 15 (07 Aug 2017 11:01) (15 - 18)  SpO2: 99% (07 Aug 2017 11:01) (98% - 99%)    PHYSICAL EXAM:  GENERAL: NAD, well-groomed, well-developed  HEAD:  Atraumatic, Normocephalic  EYES: EOMI, PERRLA, conjunctiva and sclera clear  ENMT: No tonsillar erythema, exudates, or enlargement; Moist mucous membranes, Good dentition, No lesions  NECK: Supple, No JVD, Normal thyroid  NERVOUS SYSTEM:  Alert & Oriented X3, Good concentration; Motor Strength 5/5 B/L upper and lower extremities; DTRs 2+ intact and symmetric  CHEST/LUNG: Clear to percussion bilaterally; No rales, rhonchi, wheezing, or rubs  HEART: Regular rate and rhythm; No murmurs, rubs, or gallops  ABDOMEN: Soft, Nontender, Nondistended; Bowel sounds present. Left flank tenderness  EXTREMITIES:  2+ Peripheral Pulses, No clubbing, cyanosis, or edema  LYMPH: No lymphadenopathy noted  SKIN: No rashes or lesions    LABS:                        7.8    18.8  )-----------( 303      ( 07 Aug 2017 07:08 )             25.0     08-07    141  |  113<H>  |  8   ----------------------------<  86  3.9   |  18<L>  |  0.63    Ca    8.4<L>      07 Aug 2017 07:08            CAPILLARY BLOOD GLUCOSE                    RADIOLOGY & ADDITIONAL TESTS:    Imaging Personally Reviewed:  [ ] YES  [ ] NO    Consultant(s) Notes Reviewed:  [ ] YES  [ ] NO    Care Discussed with Consultants/Other Providers [ ] YES  [ ] NO    Care discussed with family,         [  ]   yes  [  ]  No    imp:    stable[ ]    unstable[  ]     improving [  x ]       unchanged  [  ]                Plans:  Continue present plans  [  x]               New consult [  ]   specialty  .......               order test[  ]    test name.  cbc                Discharge Planning  [  ] Patient is a 26y old  Female who presents with a chief complaint of acute pyelonephritis. (04 Aug 2017 17:44)  Improving. Pyelonephritis, acute with elevated WBC count. repaeat Ct scan showing swelling of right kydney    INTERVAL HPI/OVERNIGHT EVENTS: T max 99.2    MEDICATIONS  (STANDING):  sodium chloride 0.9%. 1000 milliLiter(s) (100 mL/Hr) IV Continuous <Continuous>  ibuprofen  Tablet 600 milliGRAM(s) Oral every 6 hours  piperacillin/tazobactam IVPB. 3.375 Gram(s) IV Intermittent every 8 hours    MEDICATIONS  (PRN):  acetaminophen   Tablet 650 milliGRAM(s) Oral every 6 hours PRN For Temp greater than 38 C (100.4 F)      Allergies none    codeine (Hives)    Intolerances        REVIEW OF SYSTEMS:  CONSTITUTIONAL: No fever, weight loss, or fatigue  EYES: No eye pain, visual disturbances, or discharge  ENMT:  No difficulty hearing, tinnitus, vertigo; No sinus or throat pain  NECK: No pain or stiffness  BREASTS: No pain, masses, or nipple discharge  RESPIRATORY: No cough, wheezing, chills or hemoptysis; No shortness of breath  CARDIOVASCULAR: No chest pain, palpitations, dizziness, or leg swelling  GASTROINTESTINAL: No abdominal or epigastric pain. No nausea, vomiting, or hematemesis; No diarrhea or constipation. No melena or hematochezia.  GENITOURINARY: No dysuria, frequency, hematuria, or incontinence  NEUROLOGICAL: No headaches, memory loss, loss of strength, numbness, or tremors  SKIN: No itching, burning, rashes, or lesions   LYMPH NODES: No enlarged glands  ENDOCRINE: No heat or cold intolerance; No hair loss  MUSCULOSKELETAL: No joint pain or swelling; No muscle, back, or extremity pain  PSYCHIATRIC: No depression, anxiety, mood swings, or difficulty sleeping  HEME/LYMPH: No easy bruising, or bleeding gums  ALLERY AND IMMUNOLOGIC: No hives or eczema    Vital Signs Last 24 Hrs  T(C): 37.1 (07 Aug 2017 11:01), Max: 37.3 (06 Aug 2017 23:31)  T(F): 98.8 (07 Aug 2017 11:01), Max: 99.2 (06 Aug 2017 23:31)  HR: 90 (07 Aug 2017 11:01) (87 - 103)  BP: 104/60 (07 Aug 2017 11:01) (94/57 - 111/62)  BP(mean): --  RR: 15 (07 Aug 2017 11:01) (15 - 18)  SpO2: 99% (07 Aug 2017 11:01) (98% - 99%)    PHYSICAL EXAM:  GENERAL: NAD, well-groomed, well-developed  HEAD:  Atraumatic, Normocephalic  EYES: EOMI, PERRLA, conjunctiva and sclera clear  ENMT: No tonsillar erythema, exudates, or enlargement; Moist mucous membranes, Good dentition, No lesions  NECK: Supple, No JVD, Normal thyroid  NERVOUS SYSTEM:  Alert & Oriented X3, Good concentration; Motor Strength 5/5 B/L upper and lower extremities; DTRs 2+ intact and symmetric  CHEST/LUNG: Clear to percussion bilaterally; No rales, rhonchi, wheezing, or rubs  HEART: Regular rate and rhythm; No murmurs, rubs, or gallops  ABDOMEN: Soft, Nontender, Nondistended; Bowel sounds present. Right flank tenderness  EXTREMITIES:  2+ Peripheral Pulses, No clubbing, cyanosis, or edema  LYMPH: No lymphadenopathy noted  SKIN: No rashes or lesions    LABS:                        7.8    18.8  )-----------( 303      ( 07 Aug 2017 07:08 )             25.0     08-07    141  |  113<H>  |  8   ----------------------------<  86  3.9   |  18<L>  |  0.63    Ca    8.4<L>      07 Aug 2017 07:08            CAPILLARY BLOOD GLUCOSE                    RADIOLOGY & ADDITIONAL TESTS:    Imaging Personally Reviewed:  [ ] YES  [ ] NO    Consultant(s) Notes Reviewed:  [ ] YES  [ ] NO    Care Discussed with Consultants/Other Providers [ ] YES  [ ] NO    Care discussed with family,         [  ]   yes  [  ]  No    imp:    stable[ ]    unstable[  ]     improving [  x ]       unchanged  [  ]                Plans:  Continue present plans  [  x]               New consult [  ]   specialty  .......               order test[  ]    test name.  cbc                Discharge Planning  [  ]
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
## 2017-08-08 LAB
ALBUMIN SERPL ELPH-MCNC: 2.4 G/DL — LOW (ref 3.3–5)
ALP SERPL-CCNC: 160 U/L — HIGH (ref 40–120)
ALT FLD-CCNC: 93 U/L — HIGH (ref 12–78)
ANION GAP SERPL CALC-SCNC: 11 MMOL/L — SIGNIFICANT CHANGE UP (ref 5–17)
AST SERPL-CCNC: 34 U/L — SIGNIFICANT CHANGE UP (ref 15–37)
BILIRUB SERPL-MCNC: 0.7 MG/DL — SIGNIFICANT CHANGE UP (ref 0.2–1.2)
BUN SERPL-MCNC: 8 MG/DL — SIGNIFICANT CHANGE UP (ref 7–23)
CALCIUM SERPL-MCNC: 8.4 MG/DL — LOW (ref 8.5–10.1)
CHLORIDE SERPL-SCNC: 109 MMOL/L — HIGH (ref 96–108)
CO2 SERPL-SCNC: 19 MMOL/L — LOW (ref 22–31)
CREAT SERPL-MCNC: 0.62 MG/DL — SIGNIFICANT CHANGE UP (ref 0.5–1.3)
GLUCOSE SERPL-MCNC: 74 MG/DL — SIGNIFICANT CHANGE UP (ref 70–99)
HCT VFR BLD CALC: 25.8 % — LOW (ref 34.5–45)
HGB BLD-MCNC: 8.2 G/DL — LOW (ref 11.5–15.5)
MCHC RBC-ENTMCNC: 26 PG — LOW (ref 27–34)
MCHC RBC-ENTMCNC: 31.9 GM/DL — LOW (ref 32–36)
MCV RBC AUTO: 81.5 FL — SIGNIFICANT CHANGE UP (ref 80–100)
PLATELET # BLD AUTO: 381 K/UL — SIGNIFICANT CHANGE UP (ref 150–400)
POTASSIUM SERPL-MCNC: 3.7 MMOL/L — SIGNIFICANT CHANGE UP (ref 3.5–5.3)
POTASSIUM SERPL-SCNC: 3.7 MMOL/L — SIGNIFICANT CHANGE UP (ref 3.5–5.3)
PROT SERPL-MCNC: 6.6 GM/DL — SIGNIFICANT CHANGE UP (ref 6–8.3)
RBC # BLD: 3.16 M/UL — LOW (ref 3.8–5.2)
RBC # FLD: 17.9 % — HIGH (ref 11–15)
SODIUM SERPL-SCNC: 139 MMOL/L — SIGNIFICANT CHANGE UP (ref 135–145)
WBC # BLD: 17.4 K/UL — HIGH (ref 3.8–10.5)
WBC # FLD AUTO: 17.4 K/UL — HIGH (ref 3.8–10.5)

## 2017-08-08 RX ADMIN — Medication 600 MILLIGRAM(S): at 00:41

## 2017-08-08 RX ADMIN — Medication 600 MILLIGRAM(S): at 02:52

## 2017-08-08 RX ADMIN — Medication 650 MILLIGRAM(S): at 23:49

## 2017-08-08 NOTE — PROGRESS NOTE ADULT - ASSESSMENT
urinary tract infection   continue antibiotics   urine pan sensitive e coli   start levaquin   fluoro quinolones are the drugs of choice urinary tract infection   continue antibiotics   urine pan sensitive e coli   start levaquin   fluoro quinolones are the drugs of choice   unclear why still febrile   last ct was worse edema   Gu follow up
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
## 2017-08-08 NOTE — PROGRESS NOTE ADULT - SUBJECTIVE AND OBJECTIVE BOX
Patient seen and examined bedside resting comfortably.  States she feels better.  Voiding without difficulty, denies hematuria and dysuria.  Nausea has improved.     T(F): 98.2 (08-08-17 @ 05:20), Max: 101.8 (08-07-17 @ 23:35)  HR: 83 (08-08-17 @ 05:20) (83 - 106)  BP: 97/64 (08-08-17 @ 05:20) (97/64 - 121/76)  RR: 16 (08-08-17 @ 05:20) (16 - 16)  SpO2: 98% (08-08-17 @ 05:20) (97% - 98%)    PHYSICAL EXAM:  General: NAD, alert and awake  HEENT: NCAT, EOMI, conjunctiva clear  Chest: nonlabored respirations, good inspiratory effort, CTAB  Abdomen: soft, NTND.   Extremities: no pedal edema or calf tenderness noted   : no CVAT    LABS:                        8.2    17.4  )-----------( 381      ( 08 Aug 2017 08:19 )             25.8   08-08    139  |  109<H>  |  8   ----------------------------<  74  3.7   |  19<L>  |  0.62    Ca    8.4<L>      08 Aug 2017 08:19    TPro  6.6  /  Alb  2.4<L>  /  TBili  0.7  /  DBili  x   /  AST  34  /  ALT  93<H>  /  AlkPhos  160<H>  08-08    Culture - Urine (08.04.17 @ 23:01)    -  Ampicillin/Sulbactam: S <=4/2    -  Imipenem: S <=1    -  Levofloxacin: S <=1    -  Trimethoprim/Sulfamethoxazole: S <=0.5/9.5    -  Ampicillin: S 4    -  Cefazolin: S <=2    -  Cefoxitin: S <=4    -  Ertapenem: S <=0.5    -  Gentamicin: S <=1    -  Meropenem: S <=1    -  Tobramycin: S <=2    -  Ceftazidime: S <=1    -  Ceftriaxone: S <=1    -  Ciprofloxacin: S <=0.5    -  Nitrofurantoin: S <=32    -  Piperacillin/Tazobactam: S <=8    -  Amikacin: S <=8    -  Aztreonam: S <=4    -  Cefepime: S <=2    Specimen Source: .Urine Clean Catch (Midstream)    Culture Results:   >100,000 CFU/ml Escherichia coli    Organism Identification: Escherichia coli    Organism: Escherichia coli    Method Type: ANDERSON        Impression: 26y Female admitted with right pyelonephritis, elevated WBC improving    Plan:  -cont medical management and IV levaquin (sensitivity reviewed). F/u am WBC. Monitor fevers  no surgical intervention indicated  will discuss with Dr Aggarwal for further recommendations

## 2017-08-08 NOTE — PROGRESS NOTE ADULT - SUBJECTIVE AND OBJECTIVE BOX
Patient is a 26y old  Female who presents with a chief complaint of acute pyelonephritis. (04 Aug 2017 17:44)      INTERVAL HPI/OVERNIGHT EVENTS: spiked fever 101.4 this am. ABX changed to levaquin yesterday. nauseous.    MEDICATIONS  (STANDING):  sodium chloride 0.9%. 1000 milliLiter(s) (100 mL/Hr) IV Continuous <Continuous>  ibuprofen  Tablet 600 milliGRAM(s) Oral every 6 hours  levoFLOXacin IVPB   IV Intermittent   levoFLOXacin IVPB 500 milliGRAM(s) IV Intermittent every 24 hours    MEDICATIONS  (PRN):  acetaminophen   Tablet 650 milliGRAM(s) Oral every 6 hours PRN For Temp greater than 38 C (100.4 F)      Allergies    codeine (Hives)    Intolerances        REVIEW OF SYSTEMS:  CONSTITUTIONAL: No fever, weight loss, or fatigue  EYES: No eye pain, visual disturbances, or discharge  ENMT:  No difficulty hearing, tinnitus, vertigo; No sinus or throat pain  NECK: No pain or stiffness  BREASTS: No pain, masses, or nipple discharge  RESPIRATORY: No cough, wheezing, chills or hemoptysis; No shortness of breath  CARDIOVASCULAR: No chest pain, palpitations, dizziness, or leg swelling  GASTROINTESTINAL: No abdominal or epigastric pain. No nausea, vomiting, or hematemesis; No diarrhea or constipation. No melena or hematochezia.  GENITOURINARY: No dysuria, frequency, hematuria, or incontinence  NEUROLOGICAL: No headaches, memory loss, loss of strength, numbness, or tremors  SKIN: No itching, burning, rashes, or lesions   LYMPH NODES: No enlarged glands  ENDOCRINE: No heat or cold intolerance; No hair loss  MUSCULOSKELETAL: No joint pain or swelling; No muscle, back, or extremity pain  PSYCHIATRIC: No depression, anxiety, mood swings, or difficulty sleeping  HEME/LYMPH: No easy bruising, or bleeding gums  ALLERY AND IMMUNOLOGIC: No hives or eczema    Vital Signs Last 24 Hrs  T(C): 36.8 (08 Aug 2017 05:20), Max: 38.8 (07 Aug 2017 23:35)  T(F): 98.2 (08 Aug 2017 05:20), Max: 101.8 (07 Aug 2017 23:35)  HR: 83 (08 Aug 2017 05:20) (83 - 106)  BP: 97/64 (08 Aug 2017 05:20) (97/64 - 121/76)  BP(mean): --  RR: 16 (08 Aug 2017 05:20) (15 - 16)  SpO2: 98% (08 Aug 2017 05:20) (97% - 99%)    PHYSICAL EXAM:  GENERAL: NAD, well-groomed, well-developed  HEAD:  Atraumatic, Normocephalic  EYES: EOMI, PERRLA, conjunctiva and sclera clear  ENMT: No tonsillar erythema, exudates, or enlargement; Moist mucous membranes, Good dentition, No lesions  NECK: Supple, No JVD, Normal thyroid  NERVOUS SYSTEM:  Alert & Oriented X3, Good concentration; Motor Strength 5/5 B/L upper and lower extremities; DTRs 2+ intact and symmetric  CHEST/LUNG: Clear to percussion bilaterally; No rales, rhonchi, wheezing, or rubs  HEART: Regular rate and rhythm; No murmurs, rubs, or gallops  ABDOMEN: Soft, Nontender, Nondistended; Bowel sounds present. rt flank tenderness present  EXTREMITIES:  2+ Peripheral Pulses, No clubbing, cyanosis, or edema  LYMPH: No lymphadenopathy noted  SKIN: No rashes or lesions    LABS:                        8.2    17.4  )-----------( 381      ( 08 Aug 2017 08:19 )             25.8     08-08    139  |  109<H>  |  8   ----------------------------<  74  3.7   |  19<L>  |  0.62    Ca    8.4<L>      08 Aug 2017 08:19    TPro  6.6  /  Alb  2.4<L>  /  TBili  0.7  /  DBili  x   /  AST  34  /  ALT  93<H>  /  AlkPhos  160<H>  08-08        Consultant(s) Notes Reviewed:  [ ] YES  [ ] NO    Care Discussed with Consultants/Other Providers [ x] YES  [ ] NO    Care discussed with family,         [  ]   yes  [  ]  No    imp:    stable[ ]    unstable[  ]     improving [ x  ]       unchanged  [  ]                Plans:  Continue present plans  [ x ]               New consult [  ]   specialty  .......               order test[  ]    test name.                  Discharge Planning  [  ]

## 2017-08-09 LAB
ALBUMIN SERPL ELPH-MCNC: 2.6 G/DL — LOW (ref 3.3–5)
ALP SERPL-CCNC: 186 U/L — HIGH (ref 40–120)
ALT FLD-CCNC: 79 U/L — HIGH (ref 12–78)
ANION GAP SERPL CALC-SCNC: 10 MMOL/L — SIGNIFICANT CHANGE UP (ref 5–17)
AST SERPL-CCNC: 22 U/L — SIGNIFICANT CHANGE UP (ref 15–37)
BILIRUB SERPL-MCNC: 0.7 MG/DL — SIGNIFICANT CHANGE UP (ref 0.2–1.2)
BUN SERPL-MCNC: 7 MG/DL — SIGNIFICANT CHANGE UP (ref 7–23)
CALCIUM SERPL-MCNC: 8.5 MG/DL — SIGNIFICANT CHANGE UP (ref 8.5–10.1)
CHLORIDE SERPL-SCNC: 106 MMOL/L — SIGNIFICANT CHANGE UP (ref 96–108)
CO2 SERPL-SCNC: 23 MMOL/L — SIGNIFICANT CHANGE UP (ref 22–31)
CREAT SERPL-MCNC: 0.65 MG/DL — SIGNIFICANT CHANGE UP (ref 0.5–1.3)
GLUCOSE SERPL-MCNC: 81 MG/DL — SIGNIFICANT CHANGE UP (ref 70–99)
HCT VFR BLD CALC: 27.6 % — LOW (ref 34.5–45)
HGB BLD-MCNC: 8.5 G/DL — LOW (ref 11.5–15.5)
MAGNESIUM SERPL-MCNC: 2.4 MG/DL — SIGNIFICANT CHANGE UP (ref 1.6–2.6)
MCHC RBC-ENTMCNC: 24.5 PG — LOW (ref 27–34)
MCHC RBC-ENTMCNC: 30.7 GM/DL — LOW (ref 32–36)
MCV RBC AUTO: 79.6 FL — LOW (ref 80–100)
PHOSPHATE SERPL-MCNC: 4.1 MG/DL — SIGNIFICANT CHANGE UP (ref 2.5–4.5)
PLATELET # BLD AUTO: 486 K/UL — HIGH (ref 150–400)
POTASSIUM SERPL-MCNC: 3.5 MMOL/L — SIGNIFICANT CHANGE UP (ref 3.5–5.3)
POTASSIUM SERPL-SCNC: 3.5 MMOL/L — SIGNIFICANT CHANGE UP (ref 3.5–5.3)
PROT SERPL-MCNC: 7.1 GM/DL — SIGNIFICANT CHANGE UP (ref 6–8.3)
RBC # BLD: 3.46 M/UL — LOW (ref 3.8–5.2)
RBC # FLD: 18.3 % — HIGH (ref 11–15)
SODIUM SERPL-SCNC: 139 MMOL/L — SIGNIFICANT CHANGE UP (ref 135–145)
WBC # BLD: 14.9 K/UL — HIGH (ref 3.8–10.5)
WBC # FLD AUTO: 14.9 K/UL — HIGH (ref 3.8–10.5)

## 2017-08-09 PROCEDURE — 74177 CT ABD & PELVIS W/CONTRAST: CPT | Mod: 26

## 2017-08-09 RX ADMIN — Medication 600 MILLIGRAM(S): at 11:56

## 2017-08-09 RX ADMIN — Medication 600 MILLIGRAM(S): at 13:00

## 2017-08-09 NOTE — PROGRESS NOTE ADULT - SUBJECTIVE AND OBJECTIVE BOX
Patient is a 26y old  Female who presents with a chief complaint of acute pyelonephritis. (04 Aug 2017 17:44)  much improved    INTERVAL HPI/OVERNIGHT EVENTS: uneventful    MEDICATIONS  (STANDING):  sodium chloride 0.9%. 1000 milliLiter(s) (100 mL/Hr) IV Continuous <Continuous>  ibuprofen  Tablet 600 milliGRAM(s) Oral every 6 hours  levoFLOXacin  Tablet 500 milliGRAM(s) Oral every 24 hours    MEDICATIONS  (PRN):  acetaminophen   Tablet 650 milliGRAM(s) Oral every 6 hours PRN For Temp greater than 38 C (100.4 F)      Allergies    codeine (Hives)    Intolerances        REVIEW OF SYSTEMS:  CONSTITUTIONAL: No fever, weight loss, or fatigue  EYES: No eye pain, visual disturbances, or discharge  ENMT:  No difficulty hearing, tinnitus, vertigo; No sinus or throat pain  NECK: No pain or stiffness  BREASTS: No pain, masses, or nipple discharge  RESPIRATORY: No cough, wheezing, chills or hemoptysis; No shortness of breath  CARDIOVASCULAR: No chest pain, palpitations, dizziness, or leg swelling  GASTROINTESTINAL: No abdominal or epigastric pain. No nausea, vomiting, or hematemesis; No diarrhea or constipation. No melena or hematochezia.  GENITOURINARY: No dysuria, frequency, hematuria, or incontinence  NEUROLOGICAL: No headaches, memory loss, loss of strength, numbness, or tremors  SKIN: No itching, burning, rashes, or lesions   LYMPH NODES: No enlarged glands  ENDOCRINE: No heat or cold intolerance; No hair loss  MUSCULOSKELETAL: No joint pain or swelling; No muscle, back, or extremity pain  PSYCHIATRIC: No depression, anxiety, mood swings, or difficulty sleeping  HEME/LYMPH: No easy bruising, or bleeding gums  ALLERY AND IMMUNOLOGIC: No hives or eczema    Vital Signs Last 24 Hrs  T(C): 36.7 (09 Aug 2017 05:42), Max: 38.7 (08 Aug 2017 23:53)  T(F): 98.1 (09 Aug 2017 05:42), Max: 101.7 (08 Aug 2017 23:53)  HR: 80 (09 Aug 2017 05:42) (80 - 94)  BP: 115/66 (09 Aug 2017 05:42) (104/55 - 115/66)  BP(mean): --  RR: 15 (09 Aug 2017 05:42) (15 - 20)  SpO2: 98% (09 Aug 2017 05:42) (96% - 99%)    PHYSICAL EXAM:  GENERAL: NAD, well-groomed, well-developed  HEAD:  Atraumatic, Normocephalic  EYES: EOMI, PERRLA, conjunctiva and sclera clear  ENMT: No tonsillar erythema, exudates, or enlargement; Moist mucous membranes, Good dentition, No lesions  NECK: Supple, No JVD, Normal thyroid  NERVOUS SYSTEM:  Alert & Oriented X3, Good concentration; Motor Strength 5/5 B/L upper and lower extremities; DTRs 2+ intact and symmetric  CHEST/LUNG: Clear to percussion bilaterally; No rales, rhonchi, wheezing, or rubs  HEART: Regular rate and rhythm; No murmurs, rubs, or gallops  ABDOMEN: Soft, Nontender, Nondistended; Bowel sounds present  EXTREMITIES:  2+ Peripheral Pulses, No clubbing, cyanosis, or edema  LYMPH: No lymphadenopathy noted  SKIN: No rashes or lesions    LABS:                        8.5    14.9  )-----------( 486      ( 09 Aug 2017 07:40 )             27.6     08-09    139  |  106  |  7   ----------------------------<  81  3.5   |  23  |  0.65    Ca    8.5      09 Aug 2017 07:40  Phos  4.1     08-09  Mg     2.4     08-09    TPro  7.1  /  Alb  2.6<L>  /  TBili  0.7  /  DBili  x   /  AST  22  /  ALT  79<H>  /  AlkPhos  186<H>  08-09          CAPILLARY BLOOD GLUCOSE                    RADIOLOGY & ADDITIONAL TESTS:    Imaging Personally Reviewed:  [ ] YES  [ ] NO    Consultant(s) Notes Reviewed:  [ ] YES  [ ] NO    Care Discussed with Consultants/Other Providers [ ] YES  [ ] NO    Care discussed with family,         [  ]   yes  [  ]  No    imp:    stable[ ]    unstable[  ]     improving [   ]       unchanged  [  ]                Plans:  Continue present plans  [x  ]               New consult [  ]   specialty  .......               order test[  ]    test name. xlabs in am                 Discharge Planning  [  ]

## 2017-08-09 NOTE — PROGRESS NOTE ADULT - ASSESSMENT
urinary tract infection   continue antibiotics   urine pan sensitive e coli   start levaquin   fluoro quinolones are the drugs of choice   unclear why still febrile   last ct was worse edema   Gu follow up urinary tract infection   continue antibiotics   urine pan sensitive e coli   on levaquin po as refused iv antibiotics yesterday   fluoro quinolones are the drugs of choice   unclear why still febrile   last ct was worse edema   Gu follow up
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
## 2017-08-09 NOTE — PROGRESS NOTE ADULT - SUBJECTIVE AND OBJECTIVE BOX
Patient seen and examined at bedside in no distress.  Voiding without difficulty, but admits to urinary frequency. Denies dysuria, hematuria.   Admits to fever overnight.   Denies abdominal pain, nausea, vomiting, chest pain, sob.       T(F): 98.1 (08-09-17 @ 05:42), Max: 101.7 (08-08-17 @ 23:53)  HR: 80 (08-09-17 @ 05:42) (80 - 94)  BP: 115/66 (08-09-17 @ 05:42) (104/55 - 115/66)  RR: 15 (08-09-17 @ 05:42) (15 - 16)  SpO2: 98% (08-09-17 @ 05:42) (96% - 99%)    General: Alert, oriented, NAD  CV: +S1S2 regular rate and rhythm  Lung: clear to ausculation bilaterally, respirations nonlabored  Abdomen: soft, NTND. Normactive BS  Extremities: no pedal edema or calf tenderness noted b/l  : + right CVA tenderness, no suprapubic tenderness    LABS:                        8.5    14.9  )-----------( 486      ( 09 Aug 2017 07:40 )             27.6     08-09    139  |  106  |  7   ----------------------------<  81  3.5   |  23  |  0.65    Ca    8.5      09 Aug 2017 07:40  Phos  4.1     08-09  Mg     2.4     08-09    TPro  7.1  /  Alb  2.6<L>  /  TBili  0.7  /  DBili  x   /  AST  22  /  ALT  79<H>  /  AlkPhos  186<H>  08-09    Impression: 26 year old female admitted with right pyelonephritis, leukocytosis- improving   Plan:   - continue levaquin  - antipyretics PRN  - pain management PRN  - f/u labs  - continue all medical management and supportive care  - will discuss with Dr Aggarwal

## 2017-08-09 NOTE — CHART NOTE - NSCHARTNOTEFT_GEN_A_CORE
Medicine Hospitalist PA    Requested by RN to place an IV heplock in patient. As per RN pt is a difficult stick. Placed IV heplock in right forearm. #22 IV flushed and secured. RN aware.

## 2017-08-10 ENCOUNTER — TRANSCRIPTION ENCOUNTER (OUTPATIENT)
Age: 27
End: 2017-08-10

## 2017-08-10 LAB
CULTURE RESULTS: SIGNIFICANT CHANGE UP
CULTURE RESULTS: SIGNIFICANT CHANGE UP
HCT VFR BLD CALC: 30.1 % — LOW (ref 34.5–45)
HGB BLD-MCNC: 9.2 G/DL — LOW (ref 11.5–15.5)
MCHC RBC-ENTMCNC: 25 PG — LOW (ref 27–34)
MCHC RBC-ENTMCNC: 30.8 GM/DL — LOW (ref 32–36)
MCV RBC AUTO: 81.3 FL — SIGNIFICANT CHANGE UP (ref 80–100)
PLATELET # BLD AUTO: 498 K/UL — HIGH (ref 150–400)
RBC # BLD: 3.7 M/UL — LOW (ref 3.8–5.2)
RBC # FLD: 17.9 % — HIGH (ref 11–15)
SPECIMEN SOURCE: SIGNIFICANT CHANGE UP
SPECIMEN SOURCE: SIGNIFICANT CHANGE UP
WBC # BLD: 9.4 K/UL — SIGNIFICANT CHANGE UP (ref 3.8–10.5)
WBC # FLD AUTO: 9.4 K/UL — SIGNIFICANT CHANGE UP (ref 3.8–10.5)

## 2017-08-10 RX ORDER — FERROUS SULFATE 325(65) MG
325 TABLET ORAL DAILY
Qty: 0 | Refills: 0 | Status: DISCONTINUED | OUTPATIENT
Start: 2017-08-10 | End: 2017-08-11

## 2017-08-10 RX ORDER — RIVAROXABAN 15 MG-20MG
15 KIT ORAL
Qty: 0 | Refills: 0 | Status: DISCONTINUED | OUTPATIENT
Start: 2017-08-10 | End: 2017-08-11

## 2017-08-10 RX ORDER — RIVAROXABAN 15 MG-20MG
1 KIT ORAL
Qty: 0 | Refills: 0 | COMMUNITY
Start: 2017-08-10

## 2017-08-10 RX ADMIN — Medication 600 MILLIGRAM(S): at 13:00

## 2017-08-10 RX ADMIN — Medication 600 MILLIGRAM(S): at 11:55

## 2017-08-10 RX ADMIN — Medication 600 MILLIGRAM(S): at 23:05

## 2017-08-10 RX ADMIN — Medication 600 MILLIGRAM(S): at 03:26

## 2017-08-10 RX ADMIN — Medication 600 MILLIGRAM(S): at 17:36

## 2017-08-10 RX ADMIN — Medication 600 MILLIGRAM(S): at 07:17

## 2017-08-10 RX ADMIN — RIVAROXABAN 15 MILLIGRAM(S): KIT at 17:37

## 2017-08-10 RX ADMIN — Medication 600 MILLIGRAM(S): at 05:44

## 2017-08-10 RX ADMIN — Medication 600 MILLIGRAM(S): at 00:22

## 2017-08-10 RX ADMIN — Medication 325 MILLIGRAM(S): at 11:55

## 2017-08-10 NOTE — DISCHARGE NOTE ADULT - MEDICATION SUMMARY - MEDICATIONS TO TAKE
I will START or STAY ON the medications listed below when I get home from the hospital:    rivaroxaban 15 mg oral tablet  -- 1 tab(s) by mouth 2 times a day  -- Indication: For for thrombophelbitis of rt gonadal vein    levoFLOXacin 500 mg oral tablet  -- 1 tab(s) by mouth every 24 hours x 7 days  -- Indication: For for pyelonephritis-e. coli

## 2017-08-10 NOTE — PROGRESS NOTE ADULT - ASSESSMENT
recovering Acute non obstructive pyelonephritis. CT Right OVT. Dr. Mendiola's note noted and appreciated.  continue antibiotic and anticoagulation

## 2017-08-10 NOTE — PROGRESS NOTE ADULT - SUBJECTIVE AND OBJECTIVE BOX
Patient seen and examined bedside resting comfortably.  pain abdomen still present  Voiding spontaenously without difficulty.  Denies hematuria and dysuria.  Denies nausea and vomiting. Tolerating diet.      T(F): 98.8 (08-10-17 @ 10:52), Max: 98.8 (08-10-17 @ 10:52)  HR: 80 (08-10-17 @ 10:52) (78 - 83)  BP: 102/63 (08-10-17 @ 10:52) (96/50 - 106/58)  RR: 16 (08-10-17 @ 10:52) (16 - 18)  SpO2: 97% (08-10-17 @ 10:52) (96% - 99%)      PHYSICAL EXAM:  General: NAD, WDWN  Neuro:  Alert & oriented x 3  HEENT: NCAT, EOMI, conjunctiva clear  Abdomen: soft, mild tenderness right flank  Extremities: no pedal edema or calf tenderness noted     LABS:                        9.2    9.4   )-----------( 498      ( 10 Aug 2017 07:52 )             30.1     08-09    139  |  106  |  7   ----------------------------<  81  3.5   |  23  |  0.65    Ca    8.5      09 Aug 2017 07:40  Phos  4.1     08-09  Mg     2.4     08-09    TPro  7.1  /  Alb  2.6<L>  /  TBili  0.7  /  DBili  x   /  AST  22  /  ALT  79<H>  /  AlkPhos  186<H>  08-09      I&O's Detail    09 Aug 2017 07:01  -  10 Aug 2017 07:00  --------------------------------------------------------  IN:    Oral Fluid: 450 mL  Total IN: 450 mL    OUT:  Total OUT: 0 mL    Total NET: 450 mL    Radiology:     < from: CT Abdomen and Pelvis w/ IV Cont (08.09.17 @ 22:31) >    EXAM:  CT ABDOMEN AND PELVIS IC                            PROCEDURE DATE:  08/09/2017          INTERPRETATION:  .    CLINICAL INFORMATION: Pyelonephritis. Follow-up scan from 8/7/2017.    TECHNIQUE: Helical axial images were obtained from the domes of the   diaphragm through the pubic symphysis. 88 mls of Omnipaque-350  was   administered intravenously without complication and 12 mls were   discarded. Oral contrast was also administered. Coronal and Sagittal   reconstructions were obtained from the source data.     COMPARISON: Prior CT examination dated 8/7/2017.     FINDINGS: A trace pericardial effusion is notable which appears   unchanged. The descending aorta and branch arterial vessels appear   unremarkable.    A filling defect is notable within the right gonadal vein. This was not   well evaluated on the prior examination secondary to exclusion of IV   contrast.    A trace left-sided pleural effusion is notable with adjacent atelectasis.   The right lung base appears grossly unremarkable.    The liver, gallbladder, biliary tree, spleen, pancreas, and adrenal   glands appear unremarkable.    Patchy areas of hypoenhancement are notable throughout the right kidney   with a resultant striated nephrogram. There is no hydroureteronephrosis.   The left kidney appears unremarkable.    The urinary bladder is underdistended, limiting evaluation.    There is no abdominal or pelvic lymphadenopathy.    There is no bowel obstruction or abdominal ascites. Gas and stool are   notable throughout the large bowel loops. The appendix is normal.    The uterus and bilateral ovaries appear unremarkable. There is a trace   amount of pelvic fluid.    The visualized osseous structures are unremarkable.    IMPRESSION:    1. Right-sided pyelonephritis.    2. Right-sided gonadal vein thrombophlebitis.    3. Unchanged trace pericardial effusion.    Dr. Nazario Franz discussed findings with MARY Baker on 8/9/2017 at 1:02 AM                  NAZARIO FRANZ M.D., ATTENDING RADIOLOGIST  This document has been electronically signed. Aug 10 2017  1:03AM

## 2017-08-10 NOTE — DISCHARGE NOTE ADULT - PATIENT PORTAL LINK FT
“You can access the [facility] Patient Portal, offered by [facility], by registering with the following website: [email or web address]”

## 2017-08-10 NOTE — PROGRESS NOTE ADULT - ASSESSMENT
urinary tract infection   continue antibiotics   urine pan sensitive e coli   fluoro quinolones are the drugs of choice   last ct was worse edema   Gu follow up noted  discharge on po levaquin acceptable  end date is 8/18

## 2017-08-10 NOTE — CHART NOTE - NSCHARTNOTEFT_GEN_A_CORE
Medicine Hospitalist PA    Was notified by radiology Dr. Donald of CT abd and pelvis findings stating pt has R pyelonephritis along with R gonadal vein thrombosis/thrombophelibitis. Notified to Dr. Aggarwal who does not want any intervention at this time. Dr. Stockton aware.
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
## 2017-08-10 NOTE — PROGRESS NOTE ADULT - SUBJECTIVE AND OBJECTIVE BOX
Patient is a 26y old  Female who presents with a chief complaint of acute pyelonephritis. 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 1000 milliLiter(s) (100 mL/Hr) IV Continuous <Continuous>  ibuprofen  Tablet 600 milliGRAM(s) Oral every 6 hours  levoFLOXacin  Tablet 500 milliGRAM(s) Oral every 24 hours  rivaroxaban 15 milliGRAM(s) Oral two times a day  ferrous    sulfate 325 milliGRAM(s) Oral daily    MEDICATIONS  (PRN):  acetaminophen   Tablet 650 milliGRAM(s) Oral every 6 hours PRN For Temp greater than 38 C (100.4 F)      Allergies    codeine (Hives)    Intolerances        REVIEW OF SYSTEMS:  CONSTITUTIONAL: No fever, weight loss, or fatigue  EYES: No eye pain, visual disturbances, or discharge  ENMT:  No difficulty hearing, tinnitus, vertigo; No sinus or throat pain  NECK: No pain or stiffness  BREASTS: No pain, masses, or nipple discharge  RESPIRATORY: No cough, wheezing, chills or hemoptysis; No shortness of breath  CARDIOVASCULAR: No chest pain, palpitations, dizziness, or leg swelling  GASTROINTESTINAL: No abdominal or epigastric pain. No nausea, vomiting, or hematemesis; No diarrhea or constipation. No melena or hematochezia.  GENITOURINARY: No dysuria, frequency, hematuria, or incontinence  NEUROLOGICAL: No headaches, memory loss, loss of strength, numbness, or tremors  SKIN: No itching, burning, rashes, or lesions   LYMPH NODES: No enlarged glands  ENDOCRINE: No heat or cold intolerance; No hair loss  MUSCULOSKELETAL: No joint pain or swelling; No muscle, back, or extremity pain  PSYCHIATRIC: No depression, anxiety, mood swings, or difficulty sleeping  HEME/LYMPH: No easy bruising, or bleeding gums  ALLERY AND IMMUNOLOGIC: No hives or eczema    Vital Signs Last 24 Hrs  T(C): 36.3 (10 Aug 2017 05:44), Max: 36.7 (10 Aug 2017 00:42)  T(F): 97.4 (10 Aug 2017 05:44), Max: 98 (10 Aug 2017 00:42)  HR: 81 (10 Aug 2017 05:44) (78 - 83)  BP: 98/54 (10 Aug 2017 05:44) (96/50 - 106/58)  BP(mean): --  RR: 18 (10 Aug 2017 05:44) (16 - 18)  SpO2: 98% (10 Aug 2017 05:44) (96% - 99%)    PHYSICAL EXAM:  GENERAL: NAD, well-groomed, well-developed  HEAD:  Atraumatic, Normocephalic  EYES: EOMI, PERRLA, conjunctiva and sclera clear  ENMT: No tonsillar erythema, exudates, or enlargement; Moist mucous membranes, Good dentition, No lesions  NECK: Supple, No JVD, Normal thyroid  NERVOUS SYSTEM:  Alert & Oriented X3, Good concentration; Motor Strength 5/5 B/L upper and lower extremities; DTRs 2+ intact and symmetric  CHEST/LUNG: Clear to percussion bilaterally; No rales, rhonchi, wheezing, or rubs  HEART: Regular rate and rhythm; No murmurs, rubs, or gallops  ABDOMEN: Soft, Nontender, Nondistended; Bowel sounds present  EXTREMITIES:  2+ Peripheral Pulses, No clubbing, cyanosis, or edema  LYMPH: No lymphadenopathy noted  SKIN: No rashes or lesions    LABS:                        9.2    9.4   )-----------( 498      ( 10 Aug 2017 07:52 )             30.1     08-09    139  |  106  |  7   ----------------------------<  81  3.5   |  23  |  0.65    Ca    8.5      09 Aug 2017 07:40  Phos  4.1     08-09  Mg     2.4     08-09    TPro  7.1  /  Alb  2.6<L>  /  TBili  0.7  /  DBili  x   /  AST  22  /  ALT  79<H>  /  AlkPhos  186<H>  08-09      RADIOLOGY & ADDITIONAL TESTS:  < from: CT Abdomen and Pelvis w/ IV Cont (08.09.17 @ 22:31) >  EXAM:  CT ABDOMEN AND PELVIS IC                            PROCEDURE DATE:  08/09/2017          INTERPRETATION:  .    CLINICAL INFORMATION: Pyelonephritis. Follow-up scan from 8/7/2017.    TECHNIQUE: Helical axial images were obtained from the domes of the   diaphragm through the pubic symphysis. 88 mls of Omnipaque-350  was   administered intravenously without complication and 12 mls were   discarded. Oral contrast was also administered. Coronal and Sagittal   reconstructions were obtained from the source data.     COMPARISON: Prior CT examination dated 8/7/2017.     FINDINGS: A trace pericardial effusion is notable which appears   unchanged. The descending aorta and branch arterial vessels appear   unremarkable.    A filling defect is notable within the right gonadal vein. This was not   well evaluated on the prior examination secondary to exclusion of IV   contrast.    A trace left-sided pleural effusion is notable with adjacent atelectasis.   The right lung base appears grossly unremarkable.    The liver, gallbladder, biliary tree, spleen, pancreas, and adrenal   glands appear unremarkable.    Patchy areas of hypoenhancement are notable throughout the right kidney   with a resultant striated nephrogram. There is no hydroureteronephrosis.   The left kidney appears unremarkable.    The urinary bladder is underdistended, limiting evaluation.    There is no abdominal or pelvic lymphadenopathy.    There is no bowel obstruction or abdominal ascites. Gas and stool are   notable throughout the large bowel loops. The appendix is normal.    The uterus and bilateral ovaries appear unremarkable. There is a trace   amount of pelvic fluid.    The visualized osseous structures are unremarkable.    IMPRESSION:    1. Right-sided pyelonephritis.    2. Right-sided gonadal vein thrombophlebitis.    3. Unchanged trace pericardial effusion.    Dr. 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               order test[  ]    test name.                  Discharge Planning  [  ]

## 2017-08-10 NOTE — DISCHARGE NOTE ADULT - CARE PLAN
Principal Discharge DX:	Pyelonephritis  Goal:	resolved. must follow up with PMD  Instructions for follow-up, activity and diet:	regular diet, activity as tolerated. must follow up with PMD. Xarelto for 6 months. Only 15 mg after 3 weeks.  Secondary Diagnosis:	Thrombophlebitis of vein of pelvis  Goal:	on Xarelto for 6 months. Must follow up with PMD

## 2017-08-10 NOTE — CHART NOTE - NSCHARTNOTEFT_GEN_A_CORE
Pt's CT result discussed with Dr. Toro. He requested a vascular surgery evaluation. Dr Mendiola notified of consult.

## 2017-08-10 NOTE — DISCHARGE NOTE ADULT - NSTOBACCOHOTLINE_GEN_A_CS
[facility] Smokers Quitline ([id number]-[id number]) [facility] Smokers Quitline ([id number])

## 2017-08-10 NOTE — DISCHARGE NOTE ADULT - HOSPITAL COURSE
patient was admitted with sepsis and sever  acute right sided pyelonephritis. . She was started on Zosyn and after blood cultures were positive for e. coli, was switched to levalquin.P[atient was seen and followed by Lennie Givens and HITESH Peralta. She had follow up ct scan of abd and pelvis which shoed the pyelo was resolving bu also showed  a right side d gonadal vein thrombosis. this looked chronic and was probably secondary to  the c section , she had recently. Patient was seen by vascular, dr Mendiola and strted on Xarelto. . Patient will be discharged on levaquin for 7 more days and xarelto for ACT , to be followed up by her PMD in the community.

## 2017-08-10 NOTE — DISCHARGE NOTE ADULT - PLAN OF CARE
resolved. must follow up with PMD on Xarelto for 6 months. Must follow up with PMD regular diet, activity as tolerated. must follow up with PMD. Xarelto for 6 months. Only 15 mg after 3 weeks.

## 2017-08-10 NOTE — PROGRESS NOTE ADULT - SUBJECTIVE AND OBJECTIVE BOX
HPI:  25 yo female s/p c section three months ago presents with right flank/back pain for 5 days. Patient states that she took a pill a few days ago which improved pain. Patient denies dysuria, chills, fever, rash, abdominal pain, vaginal discharge.  work up consistent with pyelonephritis       Allergies    codeine (Hives)    Intolerances        MEDICATIONS  (STANDING):  sodium chloride 0.9%. 1000 milliLiter(s) (100 mL/Hr) IV Continuous <Continuous>  ibuprofen  Tablet 600 milliGRAM(s) Oral every 6 hours  levoFLOXacin  Tablet 500 milliGRAM(s) Oral every 24 hours  rivaroxaban 15 milliGRAM(s) Oral two times a day  ferrous    sulfate 325 milliGRAM(s) Oral daily    MEDICATIONS  (PRN):  acetaminophen   Tablet 650 milliGRAM(s) Oral every 6 hours PRN For Temp greater than 38 C (100.4 F)      REVIEW OF SYSTEMS:    doing better   no dyuria    VITAL SIGNS:  T(C): 37.1 (08-10-17 @ 17:25), Max: 37.1 (08-10-17 @ 10:52)  T(F): 98.8 (08-10-17 @ 17:25), Max: 98.8 (08-10-17 @ 10:52)  HR: 68 (08-10-17 @ 17:25) (68 - 81)  BP: 105/58 (08-10-17 @ 17:25) (96/50 - 105/58)  RR: 17 (08-10-17 @ 17:25) (16 - 18)  SpO2: 98% (08-10-17 @ 17:25) (97% - 99%)  Wt(kg): --    PHYSICAL EXAM:    GENERAL: not in any distress  HEENT: Neck is supple, normocephalic, atraumatic   CHEST/LUNG: Clear  bilaterally; No rales, rhonchi, wheezing  HEART: Regular rate and rhythm; No murmurs, rubs, or gallops  ABDOMEN: Soft, Nontender, Nondistended; Bowel sounds present, no rebound   EXTREMITIES:  2+ Peripheral Pulses, No clubbing, cyanosis, or edema  SKIN: No rashes or lesions  BACK: no pressor sore   NERVOUS SYSTEM:  Alert & Oriented X3,   PSYCH: normal affect     LABS:                         9.2    9.4   )-----------( 498      ( 10 Aug 2017 07:52 )             30.1     08-09    139  |  106  |  7   ----------------------------<  81  3.5   |  23  |  0.65    Ca    8.5      09 Aug 2017 07:40  Phos  4.1     08-09  Mg     2.4     08-09    TPro  7.1  /  Alb  2.6<L>  /  TBili  0.7  /  DBili  x   /  AST  22  /  ALT  79<H>  /  AlkPhos  186<H>  08-09    LIVER FUNCTIONS - ( 09 Aug 2017 07:40 )  Alb: 2.6 g/dL / Pro: 7.1 gm/dL / ALK PHOS: 186 U/L / ALT: 79 U/L / AST: 22 U/L / GGT: x                                                 Radiology:

## 2017-08-11 VITALS
SYSTOLIC BLOOD PRESSURE: 91 MMHG | HEART RATE: 69 BPM | DIASTOLIC BLOOD PRESSURE: 46 MMHG | RESPIRATION RATE: 15 BRPM | OXYGEN SATURATION: 98 % | TEMPERATURE: 98 F

## 2017-08-11 LAB
HCT VFR BLD CALC: 30.6 % — LOW (ref 34.5–45)
HGB BLD-MCNC: 9.3 G/DL — LOW (ref 11.5–15.5)
MCHC RBC-ENTMCNC: 24.8 PG — LOW (ref 27–34)
MCHC RBC-ENTMCNC: 30.4 GM/DL — LOW (ref 32–36)
MCV RBC AUTO: 81.4 FL — SIGNIFICANT CHANGE UP (ref 80–100)
PLATELET # BLD AUTO: 579 K/UL — HIGH (ref 150–400)
RBC # BLD: 3.76 M/UL — LOW (ref 3.8–5.2)
RBC # FLD: 18 % — HIGH (ref 11–15)
WBC # BLD: 10.4 K/UL — SIGNIFICANT CHANGE UP (ref 3.8–10.5)
WBC # FLD AUTO: 10.4 K/UL — SIGNIFICANT CHANGE UP (ref 3.8–10.5)

## 2017-08-11 RX ADMIN — Medication 600 MILLIGRAM(S): at 01:24

## 2017-08-11 RX ADMIN — RIVAROXABAN 15 MILLIGRAM(S): KIT at 09:39

## 2017-08-13 LAB
CULTURE RESULTS: SIGNIFICANT CHANGE UP
CULTURE RESULTS: SIGNIFICANT CHANGE UP
SPECIMEN SOURCE: SIGNIFICANT CHANGE UP
SPECIMEN SOURCE: SIGNIFICANT CHANGE UP

## 2017-08-15 DIAGNOSIS — B96.20 UNSPECIFIED ESCHERICHIA COLI [E. COLI] AS THE CAUSE OF DISEASES CLASSIFIED ELSEWHERE: ICD-10-CM

## 2017-08-15 DIAGNOSIS — I80.01 PHLEBITIS AND THROMBOPHLEBITIS OF SUPERFICIAL VESSELS OF RIGHT LOWER EXTREMITY: ICD-10-CM

## 2017-08-15 DIAGNOSIS — T85.868A THROMBOSIS DUE TO OTHER INTERNAL PROSTHETIC DEVICES, IMPLANTS AND GRAFTS, INITIAL ENCOUNTER: ICD-10-CM
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
## 2017-08-15 DIAGNOSIS — A41.9 SEPSIS, UNSPECIFIED ORGANISM: ICD-10-CM
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
## 2017-08-15 DIAGNOSIS — N10 ACUTE PYELONEPHRITIS: ICD-10-CM
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
## 2017-08-15 DIAGNOSIS — Z53.29 PROCEDURE AND TREATMENT NOT CARRIED OUT BECAUSE OF PATIENT'S DECISION FOR OTHER REASONS: ICD-10-CM

## 2017-08-15 DIAGNOSIS — J45.909 UNSPECIFIED ASTHMA, UNCOMPLICATED: ICD-10-CM

## 2017-08-15 DIAGNOSIS — K21.9 GASTRO-ESOPHAGEAL REFLUX DISEASE WITHOUT ESOPHAGITIS: ICD-10-CM

## 2017-08-15 DIAGNOSIS — F41.9 ANXIETY DISORDER, UNSPECIFIED: ICD-10-CM

## 2017-08-15 DIAGNOSIS — N39.0 URINARY TRACT INFECTION, SITE NOT SPECIFIED: ICD-10-CM

## 2017-08-15 DIAGNOSIS — D72.823 LEUKEMOID REACTION: ICD-10-CM

## 2018-01-01 ENCOUNTER — OUTPATIENT (OUTPATIENT)
Dept: OUTPATIENT SERVICES | Facility: HOSPITAL | Age: 28
LOS: 1 days | End: 2018-01-01
Payer: MEDICAID

## 2018-01-01 PROCEDURE — G9001: CPT

## 2018-01-19 ENCOUNTER — EMERGENCY (EMERGENCY)
Facility: HOSPITAL | Age: 28
LOS: 0 days | Discharge: HOME | End: 2018-01-20
Attending: EMERGENCY MEDICINE
Payer: MEDICAID

## 2018-01-19 DIAGNOSIS — D64.9 ANEMIA, UNSPECIFIED: ICD-10-CM

## 2018-01-19 DIAGNOSIS — J45.909 UNSPECIFIED ASTHMA, UNCOMPLICATED: ICD-10-CM

## 2018-01-19 DIAGNOSIS — M62.82 RHABDOMYOLYSIS: ICD-10-CM

## 2018-01-19 DIAGNOSIS — M79.1 MYALGIA: ICD-10-CM

## 2018-01-19 PROCEDURE — 99284 EMERGENCY DEPT VISIT MOD MDM: CPT | Mod: 25

## 2018-01-20 VITALS
RESPIRATION RATE: 16 BRPM | SYSTOLIC BLOOD PRESSURE: 115 MMHG | WEIGHT: 199.96 LBS | DIASTOLIC BLOOD PRESSURE: 71 MMHG | OXYGEN SATURATION: 100 % | TEMPERATURE: 98 F | HEIGHT: 63 IN | HEART RATE: 67 BPM

## 2018-01-20 VITALS
HEART RATE: 67 BPM | OXYGEN SATURATION: 100 % | DIASTOLIC BLOOD PRESSURE: 61 MMHG | SYSTOLIC BLOOD PRESSURE: 106 MMHG | TEMPERATURE: 98 F | RESPIRATION RATE: 17 BRPM

## 2018-01-20 LAB
ALBUMIN SERPL ELPH-MCNC: 3.5 G/DL — SIGNIFICANT CHANGE UP (ref 3.3–5)
ALP SERPL-CCNC: 101 U/L — SIGNIFICANT CHANGE UP (ref 40–120)
ALT FLD-CCNC: 18 U/L — SIGNIFICANT CHANGE UP (ref 12–78)
ANION GAP SERPL CALC-SCNC: 7 MMOL/L — SIGNIFICANT CHANGE UP (ref 5–17)
APPEARANCE UR: CLEAR — SIGNIFICANT CHANGE UP
AST SERPL-CCNC: 9 U/L — LOW (ref 15–37)
BASOPHILS # BLD AUTO: 0.1 K/UL — SIGNIFICANT CHANGE UP (ref 0–0.2)
BASOPHILS NFR BLD AUTO: 1.3 % — SIGNIFICANT CHANGE UP (ref 0–2)
BILIRUB SERPL-MCNC: 0.8 MG/DL — SIGNIFICANT CHANGE UP (ref 0.2–1.2)
BILIRUB UR-MCNC: NEGATIVE — SIGNIFICANT CHANGE UP
BUN SERPL-MCNC: 6 MG/DL — LOW (ref 7–23)
CALCIUM SERPL-MCNC: 8.7 MG/DL — SIGNIFICANT CHANGE UP (ref 8.5–10.1)
CHLORIDE SERPL-SCNC: 108 MMOL/L — SIGNIFICANT CHANGE UP (ref 96–108)
CK SERPL-CCNC: 63 U/L — SIGNIFICANT CHANGE UP (ref 26–192)
CO2 SERPL-SCNC: 25 MMOL/L — SIGNIFICANT CHANGE UP (ref 22–31)
COLOR SPEC: YELLOW — SIGNIFICANT CHANGE UP
CREAT SERPL-MCNC: 0.72 MG/DL — SIGNIFICANT CHANGE UP (ref 0.5–1.3)
DIFF PNL FLD: NEGATIVE — SIGNIFICANT CHANGE UP
EOSINOPHIL # BLD AUTO: 0 K/UL — SIGNIFICANT CHANGE UP (ref 0–0.5)
EOSINOPHIL NFR BLD AUTO: 0.6 % — SIGNIFICANT CHANGE UP (ref 0–6)
FLUAV SPEC QL CULT: NEGATIVE — SIGNIFICANT CHANGE UP
FLUBV AG SPEC QL IA: NEGATIVE — SIGNIFICANT CHANGE UP
GLUCOSE SERPL-MCNC: 93 MG/DL — SIGNIFICANT CHANGE UP (ref 70–99)
GLUCOSE UR QL: NEGATIVE MG/DL — SIGNIFICANT CHANGE UP
HCG UR QL: NEGATIVE — SIGNIFICANT CHANGE UP
HCT VFR BLD CALC: 40.4 % — SIGNIFICANT CHANGE UP (ref 34.5–45)
HGB BLD-MCNC: 14 G/DL — SIGNIFICANT CHANGE UP (ref 11.5–15.5)
KETONES UR-MCNC: NEGATIVE — SIGNIFICANT CHANGE UP
LACTATE SERPL-SCNC: 1 MMOL/L — SIGNIFICANT CHANGE UP (ref 0.7–2)
LEUKOCYTE ESTERASE UR-ACNC: NEGATIVE — SIGNIFICANT CHANGE UP
LYMPHOCYTES # BLD AUTO: 2.7 K/UL — SIGNIFICANT CHANGE UP (ref 1–3.3)
LYMPHOCYTES # BLD AUTO: 35.5 % — SIGNIFICANT CHANGE UP (ref 13–44)
MCHC RBC-ENTMCNC: 30.7 PG — SIGNIFICANT CHANGE UP (ref 27–34)
MCHC RBC-ENTMCNC: 34.7 GM/DL — SIGNIFICANT CHANGE UP (ref 32–36)
MCV RBC AUTO: 88.5 FL — SIGNIFICANT CHANGE UP (ref 80–100)
MONOCYTES # BLD AUTO: 0.8 K/UL — SIGNIFICANT CHANGE UP (ref 0–0.9)
MONOCYTES NFR BLD AUTO: 10.1 % — SIGNIFICANT CHANGE UP (ref 2–14)
NEUTROPHILS # BLD AUTO: 3.9 K/UL — SIGNIFICANT CHANGE UP (ref 1.8–7.4)
NEUTROPHILS NFR BLD AUTO: 52.6 % — SIGNIFICANT CHANGE UP (ref 43–77)
NITRITE UR-MCNC: NEGATIVE — SIGNIFICANT CHANGE UP
PH UR: 6 — SIGNIFICANT CHANGE UP (ref 5–8)
PLATELET # BLD AUTO: 348 K/UL — SIGNIFICANT CHANGE UP (ref 150–400)
POTASSIUM SERPL-MCNC: 3.4 MMOL/L — LOW (ref 3.5–5.3)
POTASSIUM SERPL-SCNC: 3.4 MMOL/L — LOW (ref 3.5–5.3)
PROT SERPL-MCNC: 7.7 GM/DL — SIGNIFICANT CHANGE UP (ref 6–8.3)
PROT UR-MCNC: NEGATIVE MG/DL — SIGNIFICANT CHANGE UP
RBC # BLD: 4.56 M/UL — SIGNIFICANT CHANGE UP (ref 3.8–5.2)
RBC # FLD: 12 % — SIGNIFICANT CHANGE UP (ref 11–15)
SODIUM SERPL-SCNC: 140 MMOL/L — SIGNIFICANT CHANGE UP (ref 135–145)
SP GR SPEC: 1 — LOW (ref 1.01–1.02)
UROBILINOGEN FLD QL: NEGATIVE MG/DL — SIGNIFICANT CHANGE UP
WBC # BLD: 7.5 K/UL — SIGNIFICANT CHANGE UP (ref 3.8–10.5)
WBC # FLD AUTO: 7.5 K/UL — SIGNIFICANT CHANGE UP (ref 3.8–10.5)

## 2018-01-20 RX ORDER — KETOROLAC TROMETHAMINE 30 MG/ML
30 SYRINGE (ML) INJECTION ONCE
Qty: 0 | Refills: 0 | Status: DISCONTINUED | OUTPATIENT
Start: 2018-01-20 | End: 2018-01-20

## 2018-01-20 RX ORDER — SODIUM CHLORIDE 9 MG/ML
1000 INJECTION INTRAMUSCULAR; INTRAVENOUS; SUBCUTANEOUS ONCE
Qty: 0 | Refills: 0 | Status: COMPLETED | OUTPATIENT
Start: 2018-01-20 | End: 2018-01-20

## 2018-01-20 RX ADMIN — Medication 30 MILLIGRAM(S): at 05:24

## 2018-01-20 RX ADMIN — SODIUM CHLORIDE 1000 MILLILITER(S): 9 INJECTION INTRAMUSCULAR; INTRAVENOUS; SUBCUTANEOUS at 02:37

## 2018-01-20 RX ADMIN — Medication 30 MILLIGRAM(S): at 04:39

## 2018-01-20 NOTE — ED PROVIDER NOTE - PHYSICAL EXAMINATION
Gen: Alert, NAD  Head: NC, AT, EOMI, normal lids/conjunctiva  ENT: normal hearing, patent oropharynx, MMM  Neck: supple, no tenderness/meningismus, FROM  Pulm: Bilateral clear BS, normal resp effort, no wheeze/stridor/retractions  CV: RRR, no M/R/G, +dist pulses  Abd: soft, NT/ND, +BS, no guarding/rebound tenderness  Mskel: no edema/erythema/cyanosis  Skin: no rash  Neuro: AAOx3, no sensory/motor deficits

## 2018-01-20 NOTE — ED ADULT NURSE NOTE - OBJECTIVE STATEMENT
patient received, came here for generalized muscle pain x 2 days, worsening, with head ache, was sent by pmd to r/o rhabdomyolysis.

## 2018-01-20 NOTE — ED PROVIDER NOTE - OBJECTIVE STATEMENT
Pertinent PMH/PSH/FHx/SHx and Review of Systems contained within:    28yo F w PMH of carmelita presents to ED for eval of body aches x2d.  Pt states she felt sx, and thought she may have flu, went to PMD, who instructed her to come to ED for eval.  Denies fever, chills, cough, SOB, vomiting, diarrhea.  Pt has been taking tylenol w limited relief.    No fever/chills, No photophobia/eye pain/changes in vision, No ear pain/sore throat/dysphagia, No chest pain/palpitations, no SOB/cough/wheeze/stridor, No abdominal pain, No N/V/D, no dysuria/frequency/discharge, no rash, no changes in neurological status/function.

## 2018-01-20 NOTE — ED PROVIDER NOTE - MEDICAL DECISION MAKING DETAILS
Pt improved in ED.  Labs neg for acute pathology.  Discussed results and outcome of testing with the patient, given copy as well.  Patient advised to please follow up with their primary care doctor within the next 24 hours and return to the Emergency Department for worsening symptoms or any other concerns.  Patient advised that their doctor may call  to follow up on the specific results of the tests performed today in the emergency department.

## 2018-01-22 DIAGNOSIS — R69 ILLNESS, UNSPECIFIED: ICD-10-CM

## 2018-04-17 NOTE — H&P ADULT. - RESPIRATORY
Julee Herbert then she will need to see me in [place]
Needs fernando , only one month supply
Spoke with patient, informed to schedule appointment for June. Patient verbalized understanding, she will call again to schedule appointment.
Spoke with patient, patient requesting refill for microgestin. Patient has not been seen since 11/22/2017. Medication pending.
Spoke with patient, stated she is away for school and will not be back until June.
detailed exam

## 2018-05-01 ENCOUNTER — OUTPATIENT (OUTPATIENT)
Dept: OUTPATIENT SERVICES | Facility: HOSPITAL | Age: 28
LOS: 1 days | End: 2018-05-01
Payer: MEDICAID

## 2018-05-01 PROCEDURE — G9001: CPT

## 2018-05-05 DIAGNOSIS — R69 ILLNESS, UNSPECIFIED: ICD-10-CM

## 2020-02-19 ENCOUNTER — EMERGENCY (EMERGENCY)
Facility: HOSPITAL | Age: 30
LOS: 0 days | Discharge: HOME | End: 2020-02-19
Attending: EMERGENCY MEDICINE
Payer: SELF-PAY

## 2020-02-19 VITALS
DIASTOLIC BLOOD PRESSURE: 50 MMHG | TEMPERATURE: 99 F | HEART RATE: 88 BPM | SYSTOLIC BLOOD PRESSURE: 89 MMHG | OXYGEN SATURATION: 100 % | RESPIRATION RATE: 18 BRPM

## 2020-02-19 VITALS
SYSTOLIC BLOOD PRESSURE: 107 MMHG | DIASTOLIC BLOOD PRESSURE: 63 MMHG | HEART RATE: 122 BPM | HEIGHT: 61 IN | TEMPERATURE: 100 F | RESPIRATION RATE: 18 BRPM | WEIGHT: 115.96 LBS | OXYGEN SATURATION: 99 %

## 2020-02-19 DIAGNOSIS — J45.909 UNSPECIFIED ASTHMA, UNCOMPLICATED: ICD-10-CM

## 2020-02-19 DIAGNOSIS — R10.30 LOWER ABDOMINAL PAIN, UNSPECIFIED: ICD-10-CM

## 2020-02-19 DIAGNOSIS — F41.9 ANXIETY DISORDER, UNSPECIFIED: ICD-10-CM

## 2020-02-19 DIAGNOSIS — N95.1 MENOPAUSAL AND FEMALE CLIMACTERIC STATES: ICD-10-CM

## 2020-02-19 DIAGNOSIS — N70.91 SALPINGITIS, UNSPECIFIED: ICD-10-CM

## 2020-02-19 DIAGNOSIS — N73.9 FEMALE PELVIC INFLAMMATORY DISEASE, UNSPECIFIED: ICD-10-CM

## 2020-02-19 DIAGNOSIS — Z98.89 OTHER SPECIFIED POSTPROCEDURAL STATES: Chronic | ICD-10-CM

## 2020-02-19 DIAGNOSIS — Z88.8 ALLERGY STATUS TO OTHER DRUGS, MEDICAMENTS AND BIOLOGICAL SUBSTANCES STATUS: ICD-10-CM

## 2020-02-19 DIAGNOSIS — R14.0 ABDOMINAL DISTENSION (GASEOUS): ICD-10-CM

## 2020-02-19 DIAGNOSIS — K21.9 GASTRO-ESOPHAGEAL REFLUX DISEASE WITHOUT ESOPHAGITIS: ICD-10-CM

## 2020-02-19 DIAGNOSIS — Z90.89 ACQUIRED ABSENCE OF OTHER ORGANS: Chronic | ICD-10-CM

## 2020-02-19 DIAGNOSIS — N72 INFLAMMATORY DISEASE OF CERVIX UTERI: ICD-10-CM

## 2020-02-19 LAB
ALBUMIN SERPL ELPH-MCNC: 3.6 G/DL — SIGNIFICANT CHANGE UP (ref 3.3–5)
ALP SERPL-CCNC: 84 U/L — SIGNIFICANT CHANGE UP (ref 40–120)
ALT FLD-CCNC: 15 U/L — SIGNIFICANT CHANGE UP (ref 12–78)
ANION GAP SERPL CALC-SCNC: 8 MMOL/L — SIGNIFICANT CHANGE UP (ref 5–17)
ANISOCYTOSIS BLD QL: SLIGHT — SIGNIFICANT CHANGE UP
AST SERPL-CCNC: 10 U/L — LOW (ref 15–37)
BASOPHILS # BLD AUTO: 0.05 K/UL — SIGNIFICANT CHANGE UP (ref 0–0.2)
BASOPHILS NFR BLD AUTO: 0.2 % — SIGNIFICANT CHANGE UP (ref 0–2)
BILIRUB SERPL-MCNC: 1.1 MG/DL — SIGNIFICANT CHANGE UP (ref 0.2–1.2)
BUN SERPL-MCNC: 9 MG/DL — SIGNIFICANT CHANGE UP (ref 7–23)
CALCIUM SERPL-MCNC: 8.8 MG/DL — SIGNIFICANT CHANGE UP (ref 8.5–10.1)
CHLORIDE SERPL-SCNC: 106 MMOL/L — SIGNIFICANT CHANGE UP (ref 96–108)
CO2 SERPL-SCNC: 25 MMOL/L — SIGNIFICANT CHANGE UP (ref 22–31)
CREAT SERPL-MCNC: 0.67 MG/DL — SIGNIFICANT CHANGE UP (ref 0.5–1.3)
EOSINOPHIL # BLD AUTO: 0.02 K/UL — SIGNIFICANT CHANGE UP (ref 0–0.5)
EOSINOPHIL NFR BLD AUTO: 0.1 % — SIGNIFICANT CHANGE UP (ref 0–6)
GIANT PLATELETS BLD QL SMEAR: PRESENT — SIGNIFICANT CHANGE UP
GLUCOSE SERPL-MCNC: 85 MG/DL — SIGNIFICANT CHANGE UP (ref 70–99)
HCG SERPL-ACNC: <1 MIU/ML — SIGNIFICANT CHANGE UP
HCT VFR BLD CALC: 30.5 % — LOW (ref 34.5–45)
HGB BLD-MCNC: 8.7 G/DL — LOW (ref 11.5–15.5)
HIV 1 & 2 AB SERPL IA.RAPID: SIGNIFICANT CHANGE UP
IMM GRANULOCYTES NFR BLD AUTO: 0.6 % — SIGNIFICANT CHANGE UP (ref 0–1.5)
LACTATE SERPL-SCNC: 1.1 MMOL/L — SIGNIFICANT CHANGE UP (ref 0.7–2)
LG PLATELETS BLD QL AUTO: SLIGHT — SIGNIFICANT CHANGE UP
LIDOCAIN IGE QN: 49 U/L — LOW (ref 73–393)
LYMPHOCYTES # BLD AUTO: 1.08 K/UL — SIGNIFICANT CHANGE UP (ref 1–3.3)
LYMPHOCYTES # BLD AUTO: 4.8 % — LOW (ref 13–44)
MANUAL SMEAR VERIFICATION: SIGNIFICANT CHANGE UP
MCHC RBC-ENTMCNC: 21.3 PG — LOW (ref 27–34)
MCHC RBC-ENTMCNC: 28.5 GM/DL — LOW (ref 32–36)
MCV RBC AUTO: 74.8 FL — LOW (ref 80–100)
MONOCYTES # BLD AUTO: 1.25 K/UL — HIGH (ref 0–0.9)
MONOCYTES NFR BLD AUTO: 5.5 % — SIGNIFICANT CHANGE UP (ref 2–14)
NEUTROPHILS # BLD AUTO: 20.05 K/UL — HIGH (ref 1.8–7.4)
NEUTROPHILS NFR BLD AUTO: 88.8 % — HIGH (ref 43–77)
NRBC # BLD: 0 /100 WBCS — SIGNIFICANT CHANGE UP (ref 0–0)
PLAT MORPH BLD: NORMAL — SIGNIFICANT CHANGE UP
PLATELET # BLD AUTO: 514 K/UL — HIGH (ref 150–400)
PLATELET COUNT - ESTIMATE: ABNORMAL
POLYCHROMASIA BLD QL SMEAR: SLIGHT — SIGNIFICANT CHANGE UP
POTASSIUM SERPL-MCNC: 3.2 MMOL/L — LOW (ref 3.5–5.3)
POTASSIUM SERPL-SCNC: 3.2 MMOL/L — LOW (ref 3.5–5.3)
PROT SERPL-MCNC: 7.8 GM/DL — SIGNIFICANT CHANGE UP (ref 6–8.3)
RBC # BLD: 4.08 M/UL — SIGNIFICANT CHANGE UP (ref 3.8–5.2)
RBC # FLD: 21.4 % — HIGH (ref 10.3–14.5)
RBC BLD AUTO: ABNORMAL
SODIUM SERPL-SCNC: 139 MMOL/L — SIGNIFICANT CHANGE UP (ref 135–145)
WBC # BLD: 22.59 K/UL — HIGH (ref 3.8–10.5)
WBC # FLD AUTO: 22.59 K/UL — HIGH (ref 3.8–10.5)

## 2020-02-19 PROCEDURE — 76856 US EXAM PELVIC COMPLETE: CPT | Mod: 26

## 2020-02-19 PROCEDURE — 99285 EMERGENCY DEPT VISIT HI MDM: CPT

## 2020-02-19 RX ORDER — AZITHROMYCIN 500 MG/1
1000 TABLET, FILM COATED ORAL ONCE
Refills: 0 | Status: COMPLETED | OUTPATIENT
Start: 2020-02-19 | End: 2020-02-19

## 2020-02-19 RX ORDER — IBUPROFEN 200 MG
1 TABLET ORAL
Qty: 20 | Refills: 0
Start: 2020-02-19 | End: 2020-02-23

## 2020-02-19 RX ORDER — KETOROLAC TROMETHAMINE 30 MG/ML
30 SYRINGE (ML) INJECTION ONCE
Refills: 0 | Status: DISCONTINUED | OUTPATIENT
Start: 2020-02-19 | End: 2020-02-19

## 2020-02-19 RX ORDER — TRAMADOL HYDROCHLORIDE 50 MG/1
50 TABLET ORAL ONCE
Refills: 0 | Status: DISCONTINUED | OUTPATIENT
Start: 2020-02-19 | End: 2020-02-19

## 2020-02-19 RX ORDER — CEFTRIAXONE 500 MG/1
250 INJECTION, POWDER, FOR SOLUTION INTRAMUSCULAR; INTRAVENOUS ONCE
Refills: 0 | Status: COMPLETED | OUTPATIENT
Start: 2020-02-19 | End: 2020-02-19

## 2020-02-19 RX ORDER — METRONIDAZOLE 500 MG
1 TABLET ORAL
Qty: 28 | Refills: 0
Start: 2020-02-19 | End: 2020-03-03

## 2020-02-19 RX ORDER — TRAMADOL HYDROCHLORIDE 50 MG/1
1 TABLET ORAL
Qty: 12 | Refills: 0
Start: 2020-02-19 | End: 2020-02-21

## 2020-02-19 RX ORDER — SODIUM CHLORIDE 9 MG/ML
1000 INJECTION INTRAMUSCULAR; INTRAVENOUS; SUBCUTANEOUS ONCE
Refills: 0 | Status: COMPLETED | OUTPATIENT
Start: 2020-02-19 | End: 2020-02-19

## 2020-02-19 RX ORDER — PIPERACILLIN AND TAZOBACTAM 4; .5 G/20ML; G/20ML
3.38 INJECTION, POWDER, LYOPHILIZED, FOR SOLUTION INTRAVENOUS ONCE
Refills: 0 | Status: COMPLETED | OUTPATIENT
Start: 2020-02-19 | End: 2020-02-19

## 2020-02-19 RX ADMIN — PIPERACILLIN AND TAZOBACTAM 200 GRAM(S): 4; .5 INJECTION, POWDER, LYOPHILIZED, FOR SOLUTION INTRAVENOUS at 18:27

## 2020-02-19 RX ADMIN — Medication 30 MILLIGRAM(S): at 14:57

## 2020-02-19 RX ADMIN — TRAMADOL HYDROCHLORIDE 50 MILLIGRAM(S): 50 TABLET ORAL at 19:00

## 2020-02-19 RX ADMIN — SODIUM CHLORIDE 1000 MILLILITER(S): 9 INJECTION INTRAMUSCULAR; INTRAVENOUS; SUBCUTANEOUS at 14:57

## 2020-02-19 RX ADMIN — Medication 30 MILLIGRAM(S): at 15:20

## 2020-02-19 RX ADMIN — CEFTRIAXONE 250 MILLIGRAM(S): 500 INJECTION, POWDER, FOR SOLUTION INTRAMUSCULAR; INTRAVENOUS at 14:57

## 2020-02-19 RX ADMIN — AZITHROMYCIN 1000 MILLIGRAM(S): 500 TABLET, FILM COATED ORAL at 14:56

## 2020-02-19 RX ADMIN — SODIUM CHLORIDE 1000 MILLILITER(S): 9 INJECTION INTRAMUSCULAR; INTRAVENOUS; SUBCUTANEOUS at 18:27

## 2020-02-19 RX ADMIN — Medication 100 MILLIGRAM(S): at 14:56

## 2020-02-19 NOTE — ED PROVIDER NOTE - CARE PLAN
Principal Discharge DX:	Pyosalpinx  Secondary Diagnosis:	PID (pelvic inflammatory disease)  Secondary Diagnosis:	Cervicitis

## 2020-02-19 NOTE — ED PROVIDER NOTE - PATIENT PORTAL LINK FT
You can access the FollowMyHealth Patient Portal offered by [facility] by registering at the following website: [email or web address]. By joining [employer]’s FollowMyHealth portal, you will also be able to view your health information using other applications (apps) compatible with our system.

## 2020-02-19 NOTE — ED PROVIDER NOTE - OBJECTIVE STATEMENT
30 y/o female with a PMHx Asthma, anxiety, GERD, and kidney infection, presents to the ER for intermittent sharp, cramping  lower abdominal  pain, non-radiating. Pt began having cramping abdominal  pain during sex 2 days ago, which has worsened. C/o hot flashes, chills, bloating, NVC, weakness, frequent urination, and unable to tolerate food or drink. Took Ibuprofen with some relief.  Denies diarrhea, CP, SOB, or burning with urination. Pt is allergic to codeine. Patient denies EtOH/tobacco/illicit substance use.

## 2020-02-19 NOTE — ED ADULT TRIAGE NOTE - CHIEF COMPLAINT QUOTE
Pain to abdomen and rectal area for 2 days. The pains is worse now. She been: vomiting, not passing gas,  feels weak,  decreased appetite, and last movement 2 days ago.  She has a history of Kidney infection and sepsis. She feels the same way now. Pain to abdomen and rectal area for 2 days. The pains is worse now. She been: vomiting, not passing gas,  feels weak,  decreased appetite, and last movement 2 days ago.  She has a history of Kidney infection and sepsis. She feels the same way now. [facility] 2/9/2020

## 2020-02-19 NOTE — ED PROVIDER NOTE - CLINICAL SUMMARY MEDICAL DECISION MAKING FREE TEXT BOX
pt with likely pyosalpinx otherwise BP always low, 90/50s, discussed with GYN Dr. Christopher, discussed area of US noted likely pyosalpinx, otherwise not likely TOA but will follow up outpt in 2-3 days, otherwise may dc on doxy/flagyl and given additional dose of zosyn.

## 2020-02-19 NOTE — ED ADULT NURSE NOTE - CHIEF COMPLAINT QUOTE
Pain to abdomen and rectal area for 2 days. The pains is worse now. She been: vomiting, not passing gas,  feels weak,  decreased appetite, and last movement 2 days ago.  She has a history of Kidney infection and sepsis. She feels the same way now. [facility] 2/9/2020

## 2020-02-20 LAB
C TRACH RRNA SPEC QL NAA+PROBE: SIGNIFICANT CHANGE UP
N GONORRHOEA RRNA SPEC QL NAA+PROBE: SIGNIFICANT CHANGE UP
SPECIMEN SOURCE: SIGNIFICANT CHANGE UP

## 2020-05-29 NOTE — DISCHARGE NOTE ADULT - PRINCIPAL DIAGNOSIS
What Type Of Note Output Would You Prefer (Optional)?: Bullet Format
Hpi Title: Evaluation of Skin Lesions
How Severe Are Your Spot(S)?: mild
Have Your Spot(S) Been Treated In The Past?: has not been treated
Location: Left calf
Year Removed: 2016
Non-traumatic rhabdomyolysis

## 2020-06-28 ENCOUNTER — EMERGENCY (EMERGENCY)
Facility: HOSPITAL | Age: 30
LOS: 0 days | Discharge: HOME | End: 2020-06-28
Attending: EMERGENCY MEDICINE
Payer: SELF-PAY

## 2020-06-28 VITALS
HEART RATE: 98 BPM | WEIGHT: 115.96 LBS | RESPIRATION RATE: 18 BRPM | HEIGHT: 61 IN | DIASTOLIC BLOOD PRESSURE: 75 MMHG | TEMPERATURE: 99 F | OXYGEN SATURATION: 99 % | SYSTOLIC BLOOD PRESSURE: 110 MMHG

## 2020-06-28 VITALS
SYSTOLIC BLOOD PRESSURE: 122 MMHG | RESPIRATION RATE: 18 BRPM | DIASTOLIC BLOOD PRESSURE: 89 MMHG | HEART RATE: 91 BPM | TEMPERATURE: 98 F | OXYGEN SATURATION: 95 %

## 2020-06-28 DIAGNOSIS — R05 COUGH: ICD-10-CM

## 2020-06-28 DIAGNOSIS — Z98.89 OTHER SPECIFIED POSTPROCEDURAL STATES: Chronic | ICD-10-CM

## 2020-06-28 DIAGNOSIS — K21.9 GASTRO-ESOPHAGEAL REFLUX DISEASE WITHOUT ESOPHAGITIS: ICD-10-CM

## 2020-06-28 DIAGNOSIS — R06.02 SHORTNESS OF BREATH: ICD-10-CM

## 2020-06-28 DIAGNOSIS — R07.9 CHEST PAIN, UNSPECIFIED: ICD-10-CM

## 2020-06-28 DIAGNOSIS — Z88.8 ALLERGY STATUS TO OTHER DRUGS, MEDICAMENTS AND BIOLOGICAL SUBSTANCES STATUS: ICD-10-CM

## 2020-06-28 DIAGNOSIS — B34.9 VIRAL INFECTION, UNSPECIFIED: ICD-10-CM

## 2020-06-28 DIAGNOSIS — J45.909 UNSPECIFIED ASTHMA, UNCOMPLICATED: ICD-10-CM

## 2020-06-28 DIAGNOSIS — F41.9 ANXIETY DISORDER, UNSPECIFIED: ICD-10-CM

## 2020-06-28 DIAGNOSIS — Z90.89 ACQUIRED ABSENCE OF OTHER ORGANS: Chronic | ICD-10-CM

## 2020-06-28 DIAGNOSIS — F12.10 CANNABIS ABUSE, UNCOMPLICATED: ICD-10-CM

## 2020-06-28 LAB
ALBUMIN SERPL ELPH-MCNC: 4.1 G/DL — SIGNIFICANT CHANGE UP (ref 3.3–5)
ALP SERPL-CCNC: 85 U/L — SIGNIFICANT CHANGE UP (ref 40–120)
ALT FLD-CCNC: 20 U/L — SIGNIFICANT CHANGE UP (ref 12–78)
ANION GAP SERPL CALC-SCNC: 6 MMOL/L — SIGNIFICANT CHANGE UP (ref 5–17)
ANISOCYTOSIS BLD QL: SIGNIFICANT CHANGE UP
APTT BLD: 26 SEC — LOW (ref 27.5–36.3)
AST SERPL-CCNC: 14 U/L — LOW (ref 15–37)
BASO STIPL BLD QL SMEAR: PRESENT — SIGNIFICANT CHANGE UP
BASOPHILS # BLD AUTO: 0.09 K/UL — SIGNIFICANT CHANGE UP (ref 0–0.2)
BASOPHILS NFR BLD AUTO: 0.7 % — SIGNIFICANT CHANGE UP (ref 0–2)
BILIRUB SERPL-MCNC: 0.9 MG/DL — SIGNIFICANT CHANGE UP (ref 0.2–1.2)
BUN SERPL-MCNC: 9 MG/DL — SIGNIFICANT CHANGE UP (ref 7–23)
CALCIUM SERPL-MCNC: 9.3 MG/DL — SIGNIFICANT CHANGE UP (ref 8.5–10.1)
CHLORIDE SERPL-SCNC: 103 MMOL/L — SIGNIFICANT CHANGE UP (ref 96–108)
CO2 SERPL-SCNC: 27 MMOL/L — SIGNIFICANT CHANGE UP (ref 22–31)
CREAT SERPL-MCNC: 0.7 MG/DL — SIGNIFICANT CHANGE UP (ref 0.5–1.3)
D DIMER BLD IA.RAPID-MCNC: <150 NG/ML DDU — SIGNIFICANT CHANGE UP
EOSINOPHIL # BLD AUTO: 0.02 K/UL — SIGNIFICANT CHANGE UP (ref 0–0.5)
EOSINOPHIL NFR BLD AUTO: 0.2 % — SIGNIFICANT CHANGE UP (ref 0–6)
GLUCOSE SERPL-MCNC: 92 MG/DL — SIGNIFICANT CHANGE UP (ref 70–99)
HCG SERPL-ACNC: <1 MIU/ML — SIGNIFICANT CHANGE UP
HCT VFR BLD CALC: 31.9 % — LOW (ref 34.5–45)
HGB BLD-MCNC: 9 G/DL — LOW (ref 11.5–15.5)
HYPOCHROMIA BLD QL: SLIGHT — SIGNIFICANT CHANGE UP
IMM GRANULOCYTES NFR BLD AUTO: 0.2 % — SIGNIFICANT CHANGE UP (ref 0–1.5)
INR BLD: 1.09 RATIO — SIGNIFICANT CHANGE UP (ref 0.88–1.16)
LG PLATELETS BLD QL AUTO: SLIGHT — SIGNIFICANT CHANGE UP
LYMPHOCYTES # BLD AUTO: 1.36 K/UL — SIGNIFICANT CHANGE UP (ref 1–3.3)
LYMPHOCYTES # BLD AUTO: 10.8 % — LOW (ref 13–44)
MACROCYTES BLD QL: SLIGHT — SIGNIFICANT CHANGE UP
MANUAL SMEAR VERIFICATION: SIGNIFICANT CHANGE UP
MCHC RBC-ENTMCNC: 20.5 PG — LOW (ref 27–34)
MCHC RBC-ENTMCNC: 28.2 GM/DL — LOW (ref 32–36)
MCV RBC AUTO: 72.8 FL — LOW (ref 80–100)
MICROCYTES BLD QL: SLIGHT — SIGNIFICANT CHANGE UP
MONOCYTES # BLD AUTO: 0.23 K/UL — SIGNIFICANT CHANGE UP (ref 0–0.9)
MONOCYTES NFR BLD AUTO: 1.8 % — LOW (ref 2–14)
NEUTROPHILS # BLD AUTO: 10.91 K/UL — HIGH (ref 1.8–7.4)
NEUTROPHILS NFR BLD AUTO: 86.3 % — HIGH (ref 43–77)
NRBC # BLD: 0 /100 WBCS — SIGNIFICANT CHANGE UP (ref 0–0)
PLAT MORPH BLD: NORMAL — SIGNIFICANT CHANGE UP
PLATELET # BLD AUTO: 629 K/UL — HIGH (ref 150–400)
PLATELET COUNT - ESTIMATE: ABNORMAL
POIKILOCYTOSIS BLD QL AUTO: SLIGHT — SIGNIFICANT CHANGE UP
POLYCHROMASIA BLD QL SMEAR: SLIGHT — SIGNIFICANT CHANGE UP
POTASSIUM SERPL-MCNC: 3.7 MMOL/L — SIGNIFICANT CHANGE UP (ref 3.5–5.3)
POTASSIUM SERPL-SCNC: 3.7 MMOL/L — SIGNIFICANT CHANGE UP (ref 3.5–5.3)
PROT SERPL-MCNC: 8.5 GM/DL — HIGH (ref 6–8.3)
PROTHROM AB SERPL-ACNC: 12.1 SEC — SIGNIFICANT CHANGE UP (ref 10–12.9)
RBC # BLD: 4.38 M/UL — SIGNIFICANT CHANGE UP (ref 3.8–5.2)
RBC # FLD: 19.9 % — HIGH (ref 10.3–14.5)
RBC BLD AUTO: ABNORMAL
SARS-COV-2 RNA SPEC QL NAA+PROBE: SIGNIFICANT CHANGE UP
SCHISTOCYTES BLD QL AUTO: SLIGHT — SIGNIFICANT CHANGE UP
SODIUM SERPL-SCNC: 136 MMOL/L — SIGNIFICANT CHANGE UP (ref 135–145)
TARGETS BLD QL SMEAR: SLIGHT — SIGNIFICANT CHANGE UP
TROPONIN I SERPL-MCNC: <.015 NG/ML — SIGNIFICANT CHANGE UP (ref 0.01–0.04)
WBC # BLD: 12.64 K/UL — HIGH (ref 3.8–10.5)
WBC # FLD AUTO: 12.64 K/UL — HIGH (ref 3.8–10.5)

## 2020-06-28 PROCEDURE — 71275 CT ANGIOGRAPHY CHEST: CPT | Mod: 26

## 2020-06-28 PROCEDURE — 93010 ELECTROCARDIOGRAM REPORT: CPT

## 2020-06-28 PROCEDURE — 71045 X-RAY EXAM CHEST 1 VIEW: CPT | Mod: 26

## 2020-06-28 PROCEDURE — 99285 EMERGENCY DEPT VISIT HI MDM: CPT

## 2020-06-28 RX ORDER — PANTOPRAZOLE SODIUM 20 MG/1
40 TABLET, DELAYED RELEASE ORAL ONCE
Refills: 0 | Status: COMPLETED | OUTPATIENT
Start: 2020-06-28 | End: 2020-06-28

## 2020-06-28 RX ORDER — SODIUM CHLORIDE 9 MG/ML
1000 INJECTION INTRAMUSCULAR; INTRAVENOUS; SUBCUTANEOUS ONCE
Refills: 0 | Status: COMPLETED | OUTPATIENT
Start: 2020-06-28 | End: 2020-06-28

## 2020-06-28 RX ADMIN — PANTOPRAZOLE SODIUM 40 MILLIGRAM(S): 20 TABLET, DELAYED RELEASE ORAL at 17:31

## 2020-06-28 RX ADMIN — SODIUM CHLORIDE 1000 MILLILITER(S): 9 INJECTION INTRAMUSCULAR; INTRAVENOUS; SUBCUTANEOUS at 17:29

## 2020-06-28 RX ADMIN — Medication 30 MILLILITER(S): at 17:29

## 2020-06-28 NOTE — ED PROVIDER NOTE - SKIN, MLM
S/P bladder repair    Status post total hip replacement, right  5/21/18
Skin normal color for race, warm, dry and intact. No evidence of rash.

## 2020-06-28 NOTE — ED PROVIDER NOTE - OBJECTIVE STATEMENT
29n years old female walked in c/o cough, sob, chest pain sore throat and was seen at [facility] treated with prednisone one week ago but not improving. Pt admits to smoke marijuana. Pt denies recent hx of trauma, headache, dizziness, blurred visions, light sensitivities, neck/back pain, focal/distal weakness or numbness, nausea., vomiting, difficulty of swallowing, fever, chills, abd pain, dysuria, hematuria, vaginal spotting or discharge or irregular bowel movements.

## 2020-06-28 NOTE — ED PROVIDER NOTE - PATIENT PORTAL LINK FT
You can access the FollowMyHealth Patient Portal offered by [facility] by registering at the following website: [email or web address]. By joining [employer]’s FollowMyHealth portal, you will also be able to view your health information using other applications (apps) compatible with our system.

## 2020-06-28 NOTE — ED PROVIDER NOTE - NONTENDER LOCATION
left upper quadrant/umbilical/right costovertebral angle/left lower quadrant/periumbilical/suprapubic/left costovertebral angle/right upper quadrant/right lower quadrant

## 2020-06-28 NOTE — ED ADULT TRIAGE NOTE - CHIEF COMPLAINT QUOTE
pt complaining of chest pain times 2 days and "feeling like mucus is stuck in my thorat." states she was seen at [facility] and prescribed prednisone with no improvement. history of asthma. pt able to speak in full sentences, breathing without difficulty.

## 2020-06-28 NOTE — ED ADULT NURSE NOTE - OBJECTIVE STATEMENT
29 year old female presents with SOB, chest pains, for 2 days  and throat pains She was seen at [facility] placed on Steroids states she does not feels better. She denies the chest pains she has throat discomfort

## 2020-06-28 NOTE — ED PROVIDER NOTE - CONSTITUTIONAL, MLM
normal... Well appearing, awake, alert, oriented to person, place, time/situation and in no apparent distress. Speaking in clear full sentences no nasal flaring no shoulders retractions no diaphoresis not holding her head/chest/abdomen, smiling appears very comfortable

## 2020-06-28 NOTE — ED PROVIDER NOTE - PROGRESS NOTE DETAILS
Pt is eating soda and eating chips Pt is very comfortable pt is given and explained all test reports and advised to follow up with pmd and return if symptoms persist or worsen.

## 2020-06-28 NOTE — ED ADULT NURSE NOTE - CHIEF COMPLAINT QUOTE
pt complaining of chest pain times 2 days and "feeling like mucus is stuck in my thorat." states she was seen at [facility] and prescribed prednisone with no improvement. history of asthma. pt able to speak in full sentences, breathing without difficulty.

## 2020-07-09 ENCOUNTER — EMERGENCY (EMERGENCY)
Facility: HOSPITAL | Age: 30
LOS: 0 days | Discharge: HOME | End: 2020-07-09
Attending: EMERGENCY MEDICINE
Payer: COMMERCIAL

## 2020-07-09 VITALS
TEMPERATURE: 99 F | RESPIRATION RATE: 14 BRPM | SYSTOLIC BLOOD PRESSURE: 170 MMHG | OXYGEN SATURATION: 96 % | DIASTOLIC BLOOD PRESSURE: 75 MMHG | HEART RATE: 94 BPM

## 2020-07-09 VITALS
SYSTOLIC BLOOD PRESSURE: 124 MMHG | WEIGHT: 190.04 LBS | DIASTOLIC BLOOD PRESSURE: 77 MMHG | TEMPERATURE: 98 F | HEART RATE: 97 BPM | HEIGHT: 63 IN | OXYGEN SATURATION: 100 % | RESPIRATION RATE: 17 BRPM

## 2020-07-09 LAB
ALBUMIN SERPL ELPH-MCNC: 3.7 G/DL — SIGNIFICANT CHANGE UP (ref 3.3–5)
ALP SERPL-CCNC: 77 U/L — SIGNIFICANT CHANGE UP (ref 40–120)
ALT FLD-CCNC: 19 U/L — SIGNIFICANT CHANGE UP (ref 12–78)
ANION GAP SERPL CALC-SCNC: 4 MMOL/L — LOW (ref 5–17)
APPEARANCE UR: CLEAR — SIGNIFICANT CHANGE UP
AST SERPL-CCNC: 14 U/L — LOW (ref 15–37)
BACTERIA # UR AUTO: ABNORMAL
BASOPHILS # BLD AUTO: 0.05 K/UL — SIGNIFICANT CHANGE UP (ref 0–0.2)
BASOPHILS NFR BLD AUTO: 0.3 % — SIGNIFICANT CHANGE UP (ref 0–2)
BILIRUB SERPL-MCNC: 1.2 MG/DL — SIGNIFICANT CHANGE UP (ref 0.2–1.2)
BILIRUB UR-MCNC: NEGATIVE — SIGNIFICANT CHANGE UP
BUN SERPL-MCNC: 6 MG/DL — LOW (ref 7–23)
CALCIUM SERPL-MCNC: 8.8 MG/DL — SIGNIFICANT CHANGE UP (ref 8.5–10.1)
CHLORIDE SERPL-SCNC: 107 MMOL/L — SIGNIFICANT CHANGE UP (ref 96–108)
CO2 SERPL-SCNC: 27 MMOL/L — SIGNIFICANT CHANGE UP (ref 22–31)
COLOR SPEC: YELLOW — SIGNIFICANT CHANGE UP
CREAT SERPL-MCNC: 0.7 MG/DL — SIGNIFICANT CHANGE UP (ref 0.5–1.3)
DIFF PNL FLD: NEGATIVE — SIGNIFICANT CHANGE UP
EOSINOPHIL # BLD AUTO: 0.02 K/UL — SIGNIFICANT CHANGE UP (ref 0–0.5)
EOSINOPHIL NFR BLD AUTO: 0.1 % — SIGNIFICANT CHANGE UP (ref 0–6)
EPI CELLS # UR: ABNORMAL
GLUCOSE SERPL-MCNC: 89 MG/DL — SIGNIFICANT CHANGE UP (ref 70–99)
GLUCOSE UR QL: NEGATIVE MG/DL — SIGNIFICANT CHANGE UP
HCG SERPL-ACNC: <1 MIU/ML — SIGNIFICANT CHANGE UP
HCT VFR BLD CALC: 40 % — SIGNIFICANT CHANGE UP (ref 34.5–45)
HGB BLD-MCNC: 13.5 G/DL — SIGNIFICANT CHANGE UP (ref 11.5–15.5)
IMM GRANULOCYTES NFR BLD AUTO: 0.3 % — SIGNIFICANT CHANGE UP (ref 0–1.5)
KETONES UR-MCNC: NEGATIVE — SIGNIFICANT CHANGE UP
LEUKOCYTE ESTERASE UR-ACNC: ABNORMAL
LYMPHOCYTES # BLD AUTO: 14.1 % — SIGNIFICANT CHANGE UP (ref 13–44)
LYMPHOCYTES # BLD AUTO: 2.38 K/UL — SIGNIFICANT CHANGE UP (ref 1–3.3)
MCHC RBC-ENTMCNC: 30.8 PG — SIGNIFICANT CHANGE UP (ref 27–34)
MCHC RBC-ENTMCNC: 33.8 GM/DL — SIGNIFICANT CHANGE UP (ref 32–36)
MCV RBC AUTO: 91.3 FL — SIGNIFICANT CHANGE UP (ref 80–100)
MONOCYTES # BLD AUTO: 1.2 K/UL — HIGH (ref 0–0.9)
MONOCYTES NFR BLD AUTO: 7.1 % — SIGNIFICANT CHANGE UP (ref 2–14)
NEUTROPHILS # BLD AUTO: 13.15 K/UL — HIGH (ref 1.8–7.4)
NEUTROPHILS NFR BLD AUTO: 78.1 % — HIGH (ref 43–77)
NITRITE UR-MCNC: NEGATIVE — SIGNIFICANT CHANGE UP
NRBC # BLD: 0 /100 WBCS — SIGNIFICANT CHANGE UP (ref 0–0)
PH UR: 6.5 — SIGNIFICANT CHANGE UP (ref 5–8)
PLATELET # BLD AUTO: 362 K/UL — SIGNIFICANT CHANGE UP (ref 150–400)
POTASSIUM SERPL-MCNC: 4 MMOL/L — SIGNIFICANT CHANGE UP (ref 3.5–5.3)
POTASSIUM SERPL-SCNC: 4 MMOL/L — SIGNIFICANT CHANGE UP (ref 3.5–5.3)
PROT SERPL-MCNC: 8.2 GM/DL — SIGNIFICANT CHANGE UP (ref 6–8.3)
PROT UR-MCNC: 15 MG/DL
RBC # BLD: 4.38 M/UL — SIGNIFICANT CHANGE UP (ref 3.8–5.2)
RBC # FLD: 12.5 % — SIGNIFICANT CHANGE UP (ref 10.3–14.5)
SODIUM SERPL-SCNC: 138 MMOL/L — SIGNIFICANT CHANGE UP (ref 135–145)
SP GR SPEC: 1.01 — SIGNIFICANT CHANGE UP (ref 1.01–1.02)
UROBILINOGEN FLD QL: NEGATIVE MG/DL — SIGNIFICANT CHANGE UP
WBC # BLD: 16.85 K/UL — HIGH (ref 3.8–10.5)
WBC # FLD AUTO: 16.85 K/UL — HIGH (ref 3.8–10.5)
WBC UR QL: ABNORMAL

## 2020-07-09 PROCEDURE — 99285 EMERGENCY DEPT VISIT HI MDM: CPT

## 2020-07-09 PROCEDURE — 74177 CT ABD & PELVIS W/CONTRAST: CPT | Mod: 26

## 2020-07-09 RX ORDER — CIPROFLOXACIN LACTATE 400MG/40ML
500 VIAL (ML) INTRAVENOUS ONCE
Refills: 0 | Status: COMPLETED | OUTPATIENT
Start: 2020-07-09 | End: 2020-07-09

## 2020-07-09 RX ORDER — KETOROLAC TROMETHAMINE 30 MG/ML
15 SYRINGE (ML) INJECTION ONCE
Refills: 0 | Status: DISCONTINUED | OUTPATIENT
Start: 2020-07-09 | End: 2020-07-09

## 2020-07-09 RX ORDER — METRONIDAZOLE 500 MG
500 TABLET ORAL ONCE
Refills: 0 | Status: COMPLETED | OUTPATIENT
Start: 2020-07-09 | End: 2020-07-09

## 2020-07-09 RX ORDER — MOXIFLOXACIN HYDROCHLORIDE TABLETS, 400 MG 400 MG/1
1 TABLET, FILM COATED ORAL
Qty: 20 | Refills: 0
Start: 2020-07-09 | End: 2020-07-18

## 2020-07-09 RX ORDER — IBUPROFEN 200 MG
1 TABLET ORAL
Qty: 15 | Refills: 0
Start: 2020-07-09 | End: 2020-07-13

## 2020-07-09 RX ORDER — METRONIDAZOLE 500 MG
1 TABLET ORAL
Qty: 30 | Refills: 0
Start: 2020-07-09 | End: 2020-07-18

## 2020-07-09 RX ADMIN — Medication 500 MILLIGRAM(S): at 22:51

## 2020-07-09 RX ADMIN — Medication 15 MILLIGRAM(S): at 22:22

## 2020-07-09 RX ADMIN — Medication 20 MILLIGRAM(S): at 22:24

## 2020-07-09 RX ADMIN — Medication 15 MILLIGRAM(S): at 18:34

## 2020-07-09 NOTE — ED PROVIDER NOTE - PATIENT PORTAL LINK FT
You can access the FollowMyHealth Patient Portal offered by [facility] by registering at the following website: [email or web address]. By joining [employer]’s FollowMyHealth portal, you will also be able to view your health information using other applications (apps) compatible with our system.

## 2020-07-09 NOTE — ED PROVIDER NOTE - CARE PROVIDER_API CALL
David Walker S  GASTROENTEROLOGY  [address] [place] [address]  Phone: [phone]  Fax: [phone]  Follow Up Time:

## 2020-07-09 NOTE — ED PROVIDER NOTE - NSFOLLOWUPINSTRUCTIONS_ED_ALL_ED_FT
CT with acute uncomplicated diverticulitis.     You were prescribed 2 antibiotics. Please complete the full course.     Follow up with your primary care doctor within the next 24-48 hours and bring copy of your results.  Return to the Emergency Department for worsening or persistent symptoms or any other concerns incl. Fever, worsening abdominal pain, vomiting, chest pain, shortness of breath, dizziness, inability to tolerate oral intake.  Rest, drink plenty of fluids.  Advance activity as tolerated.  Continue all previously prescribed medications as directed. You can use motrin 600mg every 6-8 hours for pain or fever, and/or Tylenol 650 mg every 4 hours for pain/fever (Max 4g/day of tylenol, but try to stay under 3.5g/day.)

## 2020-07-09 NOTE — ED ADULT NURSE NOTE - OBJECTIVE STATEMENT
Pt c/o of 5 days of LLQ abd. pain with diarrhea x 3 days. Diarrhea stopped after taking pepto bismol yesterday.. Denies any vomiting.

## 2020-07-09 NOTE — ED PROVIDER NOTE - OBJECTIVE STATEMENT
30yo F w/ PMH chronic migraines, asthma presents to ED for 5 days of worsening LLQ abd pain. Pt reports pain is constant, w/ intermittent stabbing sensation, associated w/ diarrhea x 3 days. Diarrhea improved s/p Pepto-Bismol, no change in abd pain. Pt reports decr appetite today, nausea. Pt noticed feeling 'hot' last night. Pt reports pain is worse w/ ambulation. ROS otherwise neg. Pt reports similar pain in the past, told her appendix was inflamed. Pt went to urgent care [facility], sent to ED. Pt had UA (normal) and preg test (neg).     PMH as above, PSH none, Meds sumatriptan, topiramate, albuterol inhaler PRN. Neg social hx. LMP 6/19. 28yo F w/ PMH chronic migraines, asthma presents to ED for 5 days of worsening LLQ abd pain. Pt reports pain is constant, w/ intermittent stabbing sensation, associated w/ diarrhea x 3 days. Diarrhea improved s/p Pepto-Bismol, no change in abd pain. Pt reports decr appetite today, nausea. Pt noticed feeling 'hot' last night. Pt reports pain is worse w/ ambulation. ROS otherwise neg. Pt reports similar pain in the past, told her appendix was inflamed. Pt went to urgent care [facility], sent to ED. Pt had UA (normal) and preg test (neg) done at  [facility].     PMH as above, PSH none, Meds sumatriptan, topiramate, albuterol inhaler PRN. Neg social hx. LMP 6/19.

## 2020-07-09 NOTE — ED PROVIDER NOTE - CPE EDP EYES NORM
Date & Time: 2/5/2021, 3:12 AM  Patient: Justin Leblanc  Encounter Provider(s):    Lana Combs MD       To Whom It May Concern:    Justin Leblanc was seen and treated in our department on 2/5/2021. He should not return to work until 02/07/2021.     If you have normal...

## 2020-07-09 NOTE — ED PROVIDER NOTE - CLINICAL SUMMARY MEDICAL DECISION MAKING FREE TEXT BOX
30yo F w/ PMH migraines, asthma presents to ED for 5 days of worsening LLQ abd pain, associated w/ diarrhea. AFVSS. Pt well appearing, in NAD. On exam, abd soft, nondistended, mild TTP LLQ and suprapubic area. Will obtain labs, HCG, UA, CT imaging. 28yo F w/ PMH migraines, asthma presents to ED for 5 days of worsening LLQ abd pain, associated w/ diarrhea. AFVSS. Pt well appearing, in NAD. On exam, abd soft, nondistended, mild TTP LLQ and suprapubic area. Will obtain labs, HCG, UA, CT imaging to r/o emergent etiology of abd pain. HCG neg, UA likely contaminated sample. WBC elevated to 16, otherwise CBC/CMP unremarkable. Pt care signed out to Dr Ragsdale at change of shift. CT pending. Dispo per imaging.

## 2020-07-09 NOTE — ED PROVIDER NOTE - PROGRESS NOTE DETAILS
Pt resting comfortably in bed, in NAD. endorsed by attending, pending CT. Radiology down, d/w radiology dr hager pt with acute uncomplicated diverticulitis. abd soft, minimally tender in LLQ. Pt feeling improved. pt reports longstanding history of constipation. dc with abx. fu with gi.

## 2020-07-10 DIAGNOSIS — R10.32 LEFT LOWER QUADRANT PAIN: ICD-10-CM

## 2020-07-10 DIAGNOSIS — K57.32 DIVERTICULITIS OF LARGE INTESTINE WITHOUT PERFORATION OR ABSCESS WITHOUT BLEEDING: ICD-10-CM

## 2020-07-10 DIAGNOSIS — R19.7 DIARRHEA, UNSPECIFIED: ICD-10-CM

## 2020-07-10 RX ORDER — IBUPROFEN 200 MG
1 TABLET ORAL
Qty: 15 | Refills: 0
Start: 2020-07-10 | End: 2020-07-14

## 2020-07-10 RX ORDER — MOXIFLOXACIN HYDROCHLORIDE TABLETS, 400 MG 400 MG/1
1 TABLET, FILM COATED ORAL
Qty: 20 | Refills: 0
Start: 2020-07-10 | End: 2020-07-19

## 2020-07-10 RX ORDER — METRONIDAZOLE 500 MG
1 TABLET ORAL
Qty: 30 | Refills: 0
Start: 2020-07-10 | End: 2020-07-19

## 2020-07-13 PROBLEM — M62.82 RHABDOMYOLYSIS: Chronic | Status: ACTIVE | Noted: 2018-01-20

## 2020-10-04 ENCOUNTER — EMERGENCY (EMERGENCY)
Facility: HOSPITAL | Age: 30
LOS: 1 days | Discharge: HOME | End: 2020-10-04
Admitting: EMERGENCY MEDICINE
Payer: MEDICAID

## 2020-10-04 VITALS
OXYGEN SATURATION: 100 % | RESPIRATION RATE: 16 BRPM | DIASTOLIC BLOOD PRESSURE: 70 MMHG | SYSTOLIC BLOOD PRESSURE: 110 MMHG | TEMPERATURE: 99 F | HEART RATE: 68 BPM | HEIGHT: 61 IN

## 2020-10-04 DIAGNOSIS — Z98.89 OTHER SPECIFIED POSTPROCEDURAL STATES: Chronic | ICD-10-CM

## 2020-10-04 DIAGNOSIS — Z90.89 ACQUIRED ABSENCE OF OTHER ORGANS: Chronic | ICD-10-CM

## 2020-10-04 PROCEDURE — 99283 EMERGENCY DEPT VISIT LOW MDM: CPT

## 2020-10-04 NOTE — ED ADULT TRIAGE NOTE - NS ED NURSE DIRECT TO ROOM YN
----- Message from Beverly Esparza NP sent at 3/8/2018  7:28 AM CST -----  Does she have UTI symptoms? If so, I will treat with cirpo 500 mg po BID for 5 days. Unfortunately, this was ordered without a culture or sensitivity   No

## 2020-10-04 NOTE — ED PROVIDER NOTE - NSFOLLOWUPCLINICS_GEN_ALL_ED_FT
[facility]  Behavioral Health  [id number] [place] [address]  Phone: [phone]  Fax:   Follow Up Time:
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
## 2020-10-04 NOTE — ED PROVIDER NOTE - PATIENT PORTAL LINK FT
You can access the FollowMyHealth Patient Portal offered by [facility] by registering at the following website: [email or web address]. By joining [employer]’s FollowMyHealth portal, you will also be able to view your health information using other applications (apps) compatible with our system.

## 2020-10-04 NOTE — ED PROVIDER NOTE - CLINICAL SUMMARY MEDICAL DECISION MAKING FREE TEXT BOX
30 y/o F hx Asthma, Anxiety, GERD    Medical evaluation performed. There is no clinical evidence of intoxication or any acute medical problem requiring immediate intervention.  Recommend following up with [facility].

## 2020-10-14 ENCOUNTER — EMERGENCY (EMERGENCY)
Facility: HOSPITAL | Age: 30
LOS: 0 days | Discharge: HOME | End: 2020-10-15
Attending: EMERGENCY MEDICINE
Payer: MEDICAID

## 2020-10-14 VITALS
HEIGHT: 61 IN | OXYGEN SATURATION: 100 % | TEMPERATURE: 99 F | SYSTOLIC BLOOD PRESSURE: 106 MMHG | DIASTOLIC BLOOD PRESSURE: 69 MMHG | RESPIRATION RATE: 19 BRPM | WEIGHT: 115.96 LBS | HEART RATE: 80 BPM

## 2020-10-14 DIAGNOSIS — Z87.59 PERSONAL HISTORY OF OTHER COMPLICATIONS OF PREGNANCY, CHILDBIRTH AND THE PUERPERIUM: ICD-10-CM

## 2020-10-14 DIAGNOSIS — K21.9 GASTRO-ESOPHAGEAL REFLUX DISEASE WITHOUT ESOPHAGITIS: ICD-10-CM

## 2020-10-14 DIAGNOSIS — J02.9 ACUTE PHARYNGITIS, UNSPECIFIED: ICD-10-CM

## 2020-10-14 DIAGNOSIS — R11.2 NAUSEA WITH VOMITING, UNSPECIFIED: ICD-10-CM

## 2020-10-14 DIAGNOSIS — J45.909 UNSPECIFIED ASTHMA, UNCOMPLICATED: ICD-10-CM

## 2020-10-14 DIAGNOSIS — F41.9 ANXIETY DISORDER, UNSPECIFIED: ICD-10-CM

## 2020-10-14 DIAGNOSIS — E87.6 HYPOKALEMIA: ICD-10-CM

## 2020-10-14 DIAGNOSIS — K27.9 PEPTIC ULCER, SITE UNSPECIFIED, UNSPECIFIED AS ACUTE OR CHRONIC, WITHOUT HEMORRHAGE OR PERFORATION: ICD-10-CM

## 2020-10-14 DIAGNOSIS — Z72.89 OTHER PROBLEMS RELATED TO LIFESTYLE: ICD-10-CM

## 2020-10-14 DIAGNOSIS — Z88.5 ALLERGY STATUS TO NARCOTIC AGENT: ICD-10-CM

## 2020-10-14 DIAGNOSIS — Z90.89 ACQUIRED ABSENCE OF OTHER ORGANS: Chronic | ICD-10-CM

## 2020-10-14 DIAGNOSIS — Z98.89 OTHER SPECIFIED POSTPROCEDURAL STATES: Chronic | ICD-10-CM

## 2020-10-14 DIAGNOSIS — R10.10 UPPER ABDOMINAL PAIN, UNSPECIFIED: ICD-10-CM

## 2020-10-14 DIAGNOSIS — Z86.718 PERSONAL HISTORY OF OTHER VENOUS THROMBOSIS AND EMBOLISM: ICD-10-CM

## 2020-10-14 DIAGNOSIS — F12.90 CANNABIS USE, UNSPECIFIED, UNCOMPLICATED: ICD-10-CM

## 2020-10-14 LAB
ALBUMIN SERPL ELPH-MCNC: 3.7 G/DL — SIGNIFICANT CHANGE UP (ref 3.3–5)
ALP SERPL-CCNC: 73 U/L — SIGNIFICANT CHANGE UP (ref 40–120)
ALT FLD-CCNC: 16 U/L — SIGNIFICANT CHANGE UP (ref 12–78)
AMYLASE P1 CFR SERPL: 126 U/L — HIGH (ref 25–115)
ANION GAP SERPL CALC-SCNC: 4 MMOL/L — LOW (ref 5–17)
ANISOCYTOSIS BLD QL: SLIGHT — SIGNIFICANT CHANGE UP
APTT BLD: 30.3 SEC — SIGNIFICANT CHANGE UP (ref 27.5–35.5)
AST SERPL-CCNC: 16 U/L — SIGNIFICANT CHANGE UP (ref 15–37)
BASOPHILS # BLD AUTO: 0.05 K/UL — SIGNIFICANT CHANGE UP (ref 0–0.2)
BASOPHILS NFR BLD AUTO: 0.4 % — SIGNIFICANT CHANGE UP (ref 0–2)
BILIRUB SERPL-MCNC: 0.6 MG/DL — SIGNIFICANT CHANGE UP (ref 0.2–1.2)
BUN SERPL-MCNC: 6 MG/DL — LOW (ref 7–23)
BURR CELLS BLD QL SMEAR: PRESENT — SIGNIFICANT CHANGE UP
CALCIUM SERPL-MCNC: 8.8 MG/DL — SIGNIFICANT CHANGE UP (ref 8.5–10.1)
CHLORIDE SERPL-SCNC: 105 MMOL/L — SIGNIFICANT CHANGE UP (ref 96–108)
CO2 SERPL-SCNC: 29 MMOL/L — SIGNIFICANT CHANGE UP (ref 22–31)
CREAT SERPL-MCNC: 0.67 MG/DL — SIGNIFICANT CHANGE UP (ref 0.5–1.3)
EOSINOPHIL # BLD AUTO: 0.07 K/UL — SIGNIFICANT CHANGE UP (ref 0–0.5)
EOSINOPHIL NFR BLD AUTO: 0.6 % — SIGNIFICANT CHANGE UP (ref 0–6)
GLUCOSE SERPL-MCNC: 98 MG/DL — SIGNIFICANT CHANGE UP (ref 70–99)
HCG SERPL-ACNC: <1 MIU/ML — SIGNIFICANT CHANGE UP
HCT VFR BLD CALC: 29.2 % — LOW (ref 34.5–45)
HGB BLD-MCNC: 8.4 G/DL — LOW (ref 11.5–15.5)
HYPOCHROMIA BLD QL: SIGNIFICANT CHANGE UP
IMM GRANULOCYTES NFR BLD AUTO: 0.3 % — SIGNIFICANT CHANGE UP (ref 0–1.5)
INR BLD: 1.16 RATIO — SIGNIFICANT CHANGE UP (ref 0.88–1.16)
LACTATE SERPL-SCNC: 1.3 MMOL/L — SIGNIFICANT CHANGE UP (ref 0.7–2)
LG PLATELETS BLD QL AUTO: SLIGHT — SIGNIFICANT CHANGE UP
LIDOCAIN IGE QN: 65 U/L — LOW (ref 73–393)
LYMPHOCYTES # BLD AUTO: 3.99 K/UL — HIGH (ref 1–3.3)
LYMPHOCYTES # BLD AUTO: 34.6 % — SIGNIFICANT CHANGE UP (ref 13–44)
MACROCYTES BLD QL: SLIGHT — SIGNIFICANT CHANGE UP
MAGNESIUM SERPL-MCNC: 2.3 MG/DL — SIGNIFICANT CHANGE UP (ref 1.6–2.6)
MANUAL SMEAR VERIFICATION: SIGNIFICANT CHANGE UP
MCHC RBC-ENTMCNC: 20.5 PG — LOW (ref 27–34)
MCHC RBC-ENTMCNC: 28.8 GM/DL — LOW (ref 32–36)
MCV RBC AUTO: 71.2 FL — LOW (ref 80–100)
MICROCYTES BLD QL: SLIGHT — SIGNIFICANT CHANGE UP
MONOCYTES # BLD AUTO: 0.86 K/UL — SIGNIFICANT CHANGE UP (ref 0–0.9)
MONOCYTES NFR BLD AUTO: 7.5 % — SIGNIFICANT CHANGE UP (ref 2–14)
NEUTROPHILS # BLD AUTO: 6.54 K/UL — SIGNIFICANT CHANGE UP (ref 1.8–7.4)
NEUTROPHILS NFR BLD AUTO: 56.6 % — SIGNIFICANT CHANGE UP (ref 43–77)
NRBC # BLD: 0 /100 WBCS — SIGNIFICANT CHANGE UP (ref 0–0)
PLAT MORPH BLD: NORMAL — SIGNIFICANT CHANGE UP
PLATELET # BLD AUTO: 603 K/UL — HIGH (ref 150–400)
POIKILOCYTOSIS BLD QL AUTO: SLIGHT — SIGNIFICANT CHANGE UP
POTASSIUM SERPL-MCNC: 2.9 MMOL/L — CRITICAL LOW (ref 3.5–5.3)
POTASSIUM SERPL-SCNC: 2.9 MMOL/L — CRITICAL LOW (ref 3.5–5.3)
PROT SERPL-MCNC: 7.8 GM/DL — SIGNIFICANT CHANGE UP (ref 6–8.3)
PROTHROM AB SERPL-ACNC: 13.4 SEC — SIGNIFICANT CHANGE UP (ref 10.6–13.6)
RBC # BLD: 4.1 M/UL — SIGNIFICANT CHANGE UP (ref 3.8–5.2)
RBC # FLD: 19.2 % — HIGH (ref 10.3–14.5)
RBC BLD AUTO: SIGNIFICANT CHANGE UP
SODIUM SERPL-SCNC: 138 MMOL/L — SIGNIFICANT CHANGE UP (ref 135–145)
WBC # BLD: 11.54 K/UL — HIGH (ref 3.8–10.5)
WBC # FLD AUTO: 11.54 K/UL — HIGH (ref 3.8–10.5)

## 2020-10-14 PROCEDURE — 99285 EMERGENCY DEPT VISIT HI MDM: CPT

## 2020-10-14 RX ORDER — POTASSIUM CHLORIDE 20 MEQ
40 PACKET (EA) ORAL ONCE
Refills: 0 | Status: COMPLETED | OUTPATIENT
Start: 2020-10-14 | End: 2020-10-14

## 2020-10-14 RX ORDER — PANTOPRAZOLE SODIUM 20 MG/1
80 TABLET, DELAYED RELEASE ORAL ONCE
Refills: 0 | Status: COMPLETED | OUTPATIENT
Start: 2020-10-14 | End: 2020-10-14

## 2020-10-14 RX ORDER — SODIUM CHLORIDE 9 MG/ML
2000 INJECTION INTRAMUSCULAR; INTRAVENOUS; SUBCUTANEOUS ONCE
Refills: 0 | Status: COMPLETED | OUTPATIENT
Start: 2020-10-14 | End: 2020-10-14

## 2020-10-14 RX ORDER — IOHEXOL 300 MG/ML
30 INJECTION, SOLUTION INTRAVENOUS ONCE
Refills: 0 | Status: COMPLETED | OUTPATIENT
Start: 2020-10-14 | End: 2020-10-14

## 2020-10-14 RX ADMIN — PANTOPRAZOLE SODIUM 80 MILLIGRAM(S): 20 TABLET, DELAYED RELEASE ORAL at 22:53

## 2020-10-14 RX ADMIN — IOHEXOL 30 MILLILITER(S): 300 INJECTION, SOLUTION INTRAVENOUS at 22:54

## 2020-10-14 RX ADMIN — SODIUM CHLORIDE 2000 MILLILITER(S): 9 INJECTION INTRAMUSCULAR; INTRAVENOUS; SUBCUTANEOUS at 22:54

## 2020-10-14 RX ADMIN — Medication 30 MILLILITER(S): at 22:53

## 2020-10-14 NOTE — ED PROVIDER NOTE - PHYSICAL EXAMINATION
Gen: Alert, NAD, well appearing  Head: NC, AT, PERRL, EOMI, normal lids/conjunctiva  ENT: normal hearing, patent oropharynx without erythema/exudate, uvula midline  Neck: +supple, no tenderness/meningismus/JVD, +Trachea midline, no thyroid enlargement or neck swelling, no trismus, Mallampati 1  Pulm: Bilateral BS, normal resp effort, no wheeze/stridor/retractions  CV: RRR, no M/R/G, +dist pulses  Abd: soft, +epigastric ttp, ND, Negative [place] signs, +BS, no palpable masses  Mskel: no edema/erythema/cyanosis  Skin: no rash, warm/dry  Neuro: AAOx3, no apparent sensory/motor deficits, coordination intact

## 2020-10-14 NOTE — ED PROVIDER NOTE - NS ED ROS FT
No fever/chills, No photophobia/eye pain/changes in vision, No ear pain/sore throat/dysphagia, No chest pain/palpitations, no SOB/cough/wheeze/stridor, +Abdominal pain, +N/V, No Diarrhea, no dysuria/frequency/discharge, No neck/back pain, no rash, no changes in neurological status/function.

## 2020-10-14 NOTE — ED PROVIDER NOTE - CLINICAL SUMMARY MEDICAL DECISION MAKING FREE TEXT BOX
Patient with abdominal pains, blood in vomit.  VSS.  Looks well, nontoxic, eating at bedside.  Lbs with Hgb 8.4 which is her baseline, patient not having any bloody vomiting, no stridor, no hoarseness.  Patient states that she feels much better and wants to be discharged.  Patient advised to ease up on alcohol, marijuana, caffeine, NSAIDs, will give GI follow up, advised to return for any worsening.  Also discussed dietary repletion of potassium.  Discussed results and outcome of today's visit with the patient.  Patient advised to please follow up with another healthcare provider within the next 24 hours and return to the Emergency Department for worsening symptoms or any other concerns.  Patient advised that their doctor may call  to follow up on the specific results of the tests performed today in the emergency department.   Patient appears well on discharge. Patient with abdominal pains, blood in vomit.  VSS.  Looks well, nontoxic, eating at bedside.  Lbs with Hgb 8.4 which is her baseline, patient not having any bloody vomiting, no stridor, no hoarseness.  Suspect Kirti Lyn tear vs bleeding ulcer. Patient states that she feels much better and wants to be discharged, says that her throat is no longer sore.  Patient advised to ease up on alcohol, marijuana, caffeine, NSAIDs, will give GI follow up, advised to return for any worsening.  Also discussed dietary repletion of potassium.  Discussed results and outcome of today's visit with the patient.  Patient advised to please follow up with another healthcare provider within the next 24 hours and return to the Emergency Department for worsening symptoms or any other concerns.  Patient advised that their doctor may call  to follow up on the specific results of the tests performed today in the emergency department.   Patient appears well on discharge.

## 2020-10-14 NOTE — ED PROVIDER NOTE - OBJECTIVE STATEMENT
Triage Nurse's Notes: "BIBA- N/VX4 days with abd pain/throat soreness. Last BM X5days ago. HX GERD, asthma".    Pertinent PMH/PSH/FHx/SHx and Review of Systems contained within:     30 y/o F with a PMHx of Anxiety, Asthma GERD, PSHx of 1  and Tonsillectomy presents to the ED c/o upper abdominal pain associated with nausea and vomiting for days now. Patient states she had 4 episodes of vomiting bright red blood today. Patient now feels her throat is swollen and reports a soreness sensation in her throat. Patient admits to drinking heavily during the past month as she has been consuming hard liquor on a daily basis. Patient also reports smoking marijuana regularly. Patient stools have been brown. Denies fever, chills, diarrhea, melena, use of hard drugs, difficulty breathing, back pain or use of blood thinners. Triage Nurse's Notes: "BIBA- N/VX4 days with abd pain/throat soreness. Last BM X5days ago. HX GERD, asthma".    Pertinent PMH/PSH/FHx/SHx and Review of Systems contained within:     28 y/o F with a PMHx of Anxiety, Asthma GERD, PSHx of 1  and Tonsillectomy presents to the ED c/o upper abdominal pain associated with nausea and vomiting for days now. Patient states she had 4 episodes of vomiting, the more recent emesis contained bright red blood today. Patient now feels her throat is swollen and reports a soreness sensation in her throat. Patient admits to drinking heavily during the past month as she has been consuming hard liquor on a daily basis for recreation. Patient also reports smoking marijuana regularly. Patient stools have been brown. Denies fever, chills, diarrhea, melena, use of hard drugs, difficulty breathing, back pain or use of blood thinners.  AAOx3, normal insight, no S/H TIP, no command hallucinations.  Patient is eating chips, has no stridor or drooling. Triage Nurse's Notes: "BIBA- N/VX4 days with abd pain/throat soreness. Last BM X5days ago. HX GERD, asthma".    Pertinent PMH/PSH/FHx/SHx and Review of Systems contained within:     28 y/o F with a PMHx of Anxiety, Asthma GERD, DVT (no longer on rivaroxaban), PSHx of 1  and Tonsillectomy presents to the ED c/o upper abdominal pain associated with nausea and vomiting for days now. Patient states she had 4 episodes of vomiting, the more recent emesis contained bright red blood today. Patient now feels her throat is swollen and reports a soreness sensation in her throat. Patient admits to drinking heavily during the past month as she has been consuming hard liquor on a daily basis for recreation. Patient also reports smoking marijuana regularly. Patient stools have been brown. Denies fever, chills, diarrhea, melena, hemoptysis, use of hard drugs, difficulty breathing, back pain or use of NSAIDs.  AAOx3, normal insight, no S/H TIP, no command hallucinations.  Patient is eating chips, has no stridor or drooling.

## 2020-10-14 NOTE — ED PROVIDER NOTE - CARE PROVIDER_API CALL
Adam Delgadillo S  GASTROENTEROLOGY  [address], [address]  [place] [address]  Phone: [phone]  Fax: [phone]  Follow Up Time: 1-3 Days

## 2020-10-14 NOTE — ED PROVIDER NOTE - PATIENT PORTAL LINK FT
You can access the FollowMyHealth Patient Portal offered by [facility] by registering at the following website: [email or web address]. By joining [employer]’s FollowMyHealth portal, you will also be able to view your health information using other applications (apps) compatible with our system.

## 2020-10-14 NOTE — ED ADULT NURSE NOTE - OBJECTIVE STATEMENT
Pt received at bed F A&Ox4. Pt co of abdominal burning that started 4 days ago 10/10. States NV, vomiting blood. Endorses throat pain and constipation. Denies fevers/chills.

## 2020-10-15 VITALS
TEMPERATURE: 98 F | DIASTOLIC BLOOD PRESSURE: 63 MMHG | OXYGEN SATURATION: 100 % | SYSTOLIC BLOOD PRESSURE: 117 MMHG | HEART RATE: 85 BPM | RESPIRATION RATE: 18 BRPM

## 2020-10-15 PROCEDURE — 74177 CT ABD & PELVIS W/CONTRAST: CPT | Mod: 26,MA

## 2020-10-15 PROCEDURE — 71045 X-RAY EXAM CHEST 1 VIEW: CPT | Mod: 26

## 2020-10-15 PROCEDURE — 93010 ELECTROCARDIOGRAM REPORT: CPT

## 2020-10-15 PROCEDURE — 70360 X-RAY EXAM OF NECK: CPT | Mod: 26

## 2020-10-15 RX ORDER — CALCIUM CARBONATE 500(1250)
1 TABLET ORAL
Qty: 30 | Refills: 0
Start: 2020-10-15 | End: 2020-10-29

## 2020-10-15 RX ORDER — ONDANSETRON 8 MG/1
1 TABLET, FILM COATED ORAL
Qty: 6 | Refills: 0
Start: 2020-10-15 | End: 2020-10-16

## 2020-10-15 RX ORDER — OMEPRAZOLE 10 MG/1
1 CAPSULE, DELAYED RELEASE ORAL
Qty: 30 | Refills: 0
Start: 2020-10-15 | End: 2020-11-13

## 2020-10-15 RX ADMIN — Medication 40 MILLIEQUIVALENT(S): at 00:28

## 2020-10-15 RX ADMIN — SODIUM CHLORIDE 2000 MILLILITER(S): 9 INJECTION INTRAMUSCULAR; INTRAVENOUS; SUBCUTANEOUS at 02:06

## 2020-11-30 NOTE — ED ADULT NURSE NOTE - SUICIDE SCREENING QUESTION 2
No Cheiloplasty (Less Than 50%) Text: A decision was made to reconstruct the defect with a  cheiloplasty.  The defect was undermined extensively.  Additional obicularis oris muscle was excised with a 15 blade scalpel.  The defect was converted into a full thickness wedge, of less than 50% of the vertical height of the lip, to facilite a better cosmetic result.  Small vessels were then tied off with 5-0 monocyrl. The obicularis oris, superficial fascia, adipose and dermis were then reapproximated.  After the deeper layers were approximated the epidermis was reapproximated with particular care given to realign the vermilion border.

## 2021-01-05 ENCOUNTER — TRANSCRIPTION ENCOUNTER (OUTPATIENT)
Age: 31
End: 2021-01-05

## 2021-01-05 ENCOUNTER — INPATIENT (INPATIENT)
Facility: HOSPITAL | Age: 31
LOS: 0 days | Discharge: LEFT BEFORE DISCHARGE | End: 2021-01-05
Attending: INTERNAL MEDICINE | Admitting: INTERNAL MEDICINE
Payer: MEDICAID

## 2021-01-05 VITALS
SYSTOLIC BLOOD PRESSURE: 109 MMHG | DIASTOLIC BLOOD PRESSURE: 63 MMHG | OXYGEN SATURATION: 100 % | TEMPERATURE: 97 F | RESPIRATION RATE: 19 BRPM | HEIGHT: 61 IN | HEART RATE: 60 BPM | WEIGHT: 111.99 LBS

## 2021-01-05 VITALS
SYSTOLIC BLOOD PRESSURE: 98 MMHG | DIASTOLIC BLOOD PRESSURE: 63 MMHG | TEMPERATURE: 99 F | HEART RATE: 82 BPM | RESPIRATION RATE: 17 BRPM | OXYGEN SATURATION: 98 %

## 2021-01-05 DIAGNOSIS — Z90.89 ACQUIRED ABSENCE OF OTHER ORGANS: Chronic | ICD-10-CM

## 2021-01-05 DIAGNOSIS — D50.9 IRON DEFICIENCY ANEMIA, UNSPECIFIED: ICD-10-CM

## 2021-01-05 DIAGNOSIS — Z29.9 ENCOUNTER FOR PROPHYLACTIC MEASURES, UNSPECIFIED: ICD-10-CM

## 2021-01-05 DIAGNOSIS — Z98.89 OTHER SPECIFIED POSTPROCEDURAL STATES: Chronic | ICD-10-CM

## 2021-01-05 DIAGNOSIS — K92.0 HEMATEMESIS: ICD-10-CM

## 2021-01-05 DIAGNOSIS — K21.9 GASTRO-ESOPHAGEAL REFLUX DISEASE WITHOUT ESOPHAGITIS: ICD-10-CM

## 2021-01-05 LAB
ALBUMIN SERPL ELPH-MCNC: 3.9 G/DL — SIGNIFICANT CHANGE UP (ref 3.3–5)
ALLERGY+IMMUNOLOGY DIAG STUDY NOTE: SIGNIFICANT CHANGE UP
ALP SERPL-CCNC: 94 U/L — SIGNIFICANT CHANGE UP (ref 40–120)
ALT FLD-CCNC: 29 U/L — SIGNIFICANT CHANGE UP (ref 12–78)
ANION GAP SERPL CALC-SCNC: 7 MMOL/L — SIGNIFICANT CHANGE UP (ref 5–17)
ANISOCYTOSIS BLD QL: SLIGHT — SIGNIFICANT CHANGE UP
ANTIBODY INTERPRETATION 2: SIGNIFICANT CHANGE UP
APPEARANCE UR: CLEAR — SIGNIFICANT CHANGE UP
APTT BLD: 28.3 SEC — SIGNIFICANT CHANGE UP (ref 27.5–35.5)
AST SERPL-CCNC: 20 U/L — SIGNIFICANT CHANGE UP (ref 15–37)
BACTERIA # UR AUTO: ABNORMAL
BASOPHILS # BLD AUTO: 0.08 K/UL — SIGNIFICANT CHANGE UP (ref 0–0.2)
BASOPHILS NFR BLD AUTO: 0.8 % — SIGNIFICANT CHANGE UP (ref 0–2)
BILIRUB SERPL-MCNC: 0.6 MG/DL — SIGNIFICANT CHANGE UP (ref 0.2–1.2)
BILIRUB UR-MCNC: NEGATIVE — SIGNIFICANT CHANGE UP
BLD GP AB SCN SERPL QL: SIGNIFICANT CHANGE UP
BUN SERPL-MCNC: 6 MG/DL — LOW (ref 7–23)
CALCIUM SERPL-MCNC: 9 MG/DL — SIGNIFICANT CHANGE UP (ref 8.5–10.1)
CHLORIDE SERPL-SCNC: 107 MMOL/L — SIGNIFICANT CHANGE UP (ref 96–108)
CO2 SERPL-SCNC: 27 MMOL/L — SIGNIFICANT CHANGE UP (ref 22–31)
COLOR SPEC: YELLOW — SIGNIFICANT CHANGE UP
CREAT SERPL-MCNC: 0.64 MG/DL — SIGNIFICANT CHANGE UP (ref 0.5–1.3)
DIFF PNL FLD: NEGATIVE — SIGNIFICANT CHANGE UP
DIR ANTIGLOB POLYSPECIFIC INTERPRETATION: SIGNIFICANT CHANGE UP
EOSINOPHIL # BLD AUTO: 0.11 K/UL — SIGNIFICANT CHANGE UP (ref 0–0.5)
EOSINOPHIL NFR BLD AUTO: 1.1 % — SIGNIFICANT CHANGE UP (ref 0–6)
EPI CELLS # UR: SIGNIFICANT CHANGE UP
GLUCOSE SERPL-MCNC: 98 MG/DL — SIGNIFICANT CHANGE UP (ref 70–99)
GLUCOSE UR QL: NEGATIVE MG/DL — SIGNIFICANT CHANGE UP
HCT VFR BLD CALC: 25.6 % — LOW (ref 34.5–45)
HCT VFR BLD CALC: 28.9 % — LOW (ref 34.5–45)
HGB BLD-MCNC: 7.2 G/DL — LOW (ref 11.5–15.5)
HGB BLD-MCNC: 8.2 G/DL — LOW (ref 11.5–15.5)
HYPOCHROMIA BLD QL: SLIGHT — SIGNIFICANT CHANGE UP
IMM GRANULOCYTES NFR BLD AUTO: 0.2 % — SIGNIFICANT CHANGE UP (ref 0–1.5)
INR BLD: 1.04 RATIO — SIGNIFICANT CHANGE UP (ref 0.88–1.16)
IRON SATN MFR SERPL: 13 UG/DL — LOW (ref 30–160)
IRON SATN MFR SERPL: 3 % — LOW (ref 14–50)
KETONES UR-MCNC: NEGATIVE — SIGNIFICANT CHANGE UP
LEUKOCYTE ESTERASE UR-ACNC: NEGATIVE — SIGNIFICANT CHANGE UP
LIDOCAIN IGE QN: 113 U/L — SIGNIFICANT CHANGE UP (ref 73–393)
LYMPHOCYTES # BLD AUTO: 3.98 K/UL — HIGH (ref 1–3.3)
LYMPHOCYTES # BLD AUTO: 39.6 % — SIGNIFICANT CHANGE UP (ref 13–44)
MACROCYTES BLD QL: SLIGHT — SIGNIFICANT CHANGE UP
MAGNESIUM SERPL-MCNC: 2.4 MG/DL — SIGNIFICANT CHANGE UP (ref 1.6–2.6)
MANUAL SMEAR VERIFICATION: SIGNIFICANT CHANGE UP
MCHC RBC-ENTMCNC: 20.7 PG — LOW (ref 27–34)
MCHC RBC-ENTMCNC: 28.4 GM/DL — LOW (ref 32–36)
MCV RBC AUTO: 72.8 FL — LOW (ref 80–100)
MICROCYTES BLD QL: SLIGHT — SIGNIFICANT CHANGE UP
MONOCYTES # BLD AUTO: 0.83 K/UL — SIGNIFICANT CHANGE UP (ref 0–0.9)
MONOCYTES NFR BLD AUTO: 8.3 % — SIGNIFICANT CHANGE UP (ref 2–14)
NEUTROPHILS # BLD AUTO: 5.02 K/UL — SIGNIFICANT CHANGE UP (ref 1.8–7.4)
NEUTROPHILS NFR BLD AUTO: 50 % — SIGNIFICANT CHANGE UP (ref 43–77)
NITRITE UR-MCNC: NEGATIVE — SIGNIFICANT CHANGE UP
NRBC # BLD: 0 /100 WBCS — SIGNIFICANT CHANGE UP (ref 0–0)
OB PNL STL: NEGATIVE — SIGNIFICANT CHANGE UP
PH UR: 9 — HIGH (ref 5–8)
PLAT MORPH BLD: NORMAL — SIGNIFICANT CHANGE UP
PLATELET # BLD AUTO: 695 K/UL — HIGH (ref 150–400)
POIKILOCYTOSIS BLD QL AUTO: SLIGHT — SIGNIFICANT CHANGE UP
POTASSIUM SERPL-MCNC: 3.3 MMOL/L — LOW (ref 3.5–5.3)
POTASSIUM SERPL-SCNC: 3.3 MMOL/L — LOW (ref 3.5–5.3)
PROT SERPL-MCNC: 7.8 GM/DL — SIGNIFICANT CHANGE UP (ref 6–8.3)
PROT UR-MCNC: 15 MG/DL
PROTHROM AB SERPL-ACNC: 12 SEC — SIGNIFICANT CHANGE UP (ref 10.6–13.6)
RAPID RVP RESULT: DETECTED
RBC # BLD: 3.97 M/UL — SIGNIFICANT CHANGE UP (ref 3.8–5.2)
RBC # FLD: 20.2 % — HIGH (ref 10.3–14.5)
RBC BLD AUTO: ABNORMAL
RV+EV RNA SPEC QL NAA+PROBE: DETECTED
SARS-COV-2 RNA SPEC QL NAA+PROBE: SIGNIFICANT CHANGE UP
SODIUM SERPL-SCNC: 141 MMOL/L — SIGNIFICANT CHANGE UP (ref 135–145)
SP GR SPEC: 1.01 — SIGNIFICANT CHANGE UP (ref 1.01–1.02)
TIBC SERPL-MCNC: 441 UG/DL — HIGH (ref 220–430)
UIBC SERPL-MCNC: 428 UG/DL — HIGH (ref 110–370)
UROBILINOGEN FLD QL: NEGATIVE MG/DL — SIGNIFICANT CHANGE UP
WBC # BLD: 10.04 K/UL — SIGNIFICANT CHANGE UP (ref 3.8–10.5)
WBC # FLD AUTO: 10.04 K/UL — SIGNIFICANT CHANGE UP (ref 3.8–10.5)
WBC UR QL: SIGNIFICANT CHANGE UP

## 2021-01-05 PROCEDURE — 99222 1ST HOSP IP/OBS MODERATE 55: CPT

## 2021-01-05 PROCEDURE — 86077 PHYS BLOOD BANK SERV XMATCH: CPT

## 2021-01-05 PROCEDURE — 71045 X-RAY EXAM CHEST 1 VIEW: CPT | Mod: 26

## 2021-01-05 PROCEDURE — 99285 EMERGENCY DEPT VISIT HI MDM: CPT

## 2021-01-05 PROCEDURE — 12345: CPT | Mod: NC

## 2021-01-05 PROCEDURE — 93010 ELECTROCARDIOGRAM REPORT: CPT

## 2021-01-05 RX ORDER — SODIUM CHLORIDE 9 MG/ML
1000 INJECTION INTRAMUSCULAR; INTRAVENOUS; SUBCUTANEOUS
Refills: 0 | Status: DISCONTINUED | OUTPATIENT
Start: 2021-01-05 | End: 2021-01-05

## 2021-01-05 RX ORDER — ONDANSETRON 8 MG/1
4 TABLET, FILM COATED ORAL EVERY 6 HOURS
Refills: 0 | Status: DISCONTINUED | OUTPATIENT
Start: 2021-01-05 | End: 2021-01-05

## 2021-01-05 RX ORDER — POTASSIUM CHLORIDE 20 MEQ
40 PACKET (EA) ORAL ONCE
Refills: 0 | Status: DISCONTINUED | OUTPATIENT
Start: 2021-01-05 | End: 2021-01-05

## 2021-01-05 RX ORDER — ACETAMINOPHEN 500 MG
2 TABLET ORAL
Qty: 0 | Refills: 0 | DISCHARGE

## 2021-01-05 RX ORDER — ONDANSETRON 8 MG/1
8 TABLET, FILM COATED ORAL ONCE
Refills: 0 | Status: COMPLETED | OUTPATIENT
Start: 2021-01-05 | End: 2021-01-05

## 2021-01-05 RX ORDER — RIVAROXABAN 15 MG-20MG
1 KIT ORAL
Qty: 42 | Refills: 0

## 2021-01-05 RX ORDER — PANTOPRAZOLE SODIUM 20 MG/1
80 TABLET, DELAYED RELEASE ORAL ONCE
Refills: 0 | Status: COMPLETED | OUTPATIENT
Start: 2021-01-05 | End: 2021-01-05

## 2021-01-05 RX ORDER — PANTOPRAZOLE SODIUM 20 MG/1
8 TABLET, DELAYED RELEASE ORAL
Qty: 80 | Refills: 0 | Status: DISCONTINUED | OUTPATIENT
Start: 2021-01-05 | End: 2021-01-05

## 2021-01-05 RX ORDER — ACETAMINOPHEN 500 MG
650 TABLET ORAL EVERY 6 HOURS
Refills: 0 | Status: DISCONTINUED | OUTPATIENT
Start: 2021-01-05 | End: 2021-01-05

## 2021-01-05 RX ADMIN — Medication 650 MILLIGRAM(S): at 09:55

## 2021-01-05 RX ADMIN — ONDANSETRON 8 MILLIGRAM(S): 8 TABLET, FILM COATED ORAL at 03:29

## 2021-01-05 RX ADMIN — SODIUM CHLORIDE 125 MILLILITER(S): 9 INJECTION INTRAMUSCULAR; INTRAVENOUS; SUBCUTANEOUS at 05:33

## 2021-01-05 RX ADMIN — PANTOPRAZOLE SODIUM 80 MILLIGRAM(S): 20 TABLET, DELAYED RELEASE ORAL at 03:26

## 2021-01-05 RX ADMIN — Medication 30 MILLILITER(S): at 03:30

## 2021-01-05 RX ADMIN — PANTOPRAZOLE SODIUM 10 MG/HR: 20 TABLET, DELAYED RELEASE ORAL at 06:10

## 2021-01-05 NOTE — CHART NOTE - NSCHARTNOTEFT_GEN_A_CORE
Code Srinivasan called overhead as patient agitated saying she needs to leave right now to go  her children from another family member who was watching them. Discussed with patient that her hemoglobin levels were dangerously low and could drop lower if she were to continue to have her presenting symptoms. Patient verbalized understanding that she was at risk of death if she were to leave at this time. Discussed with patient that if she were to have more episodes of hematemesis or bloody BM or feelings of weakness to report to nearest ED for evaluation. Patient verbalized understanding and states she will follow up with her PMD as soon as possible. Discussed case with Dr. Evans. RN at the bedside as witness. AMA form signed and dated

## 2021-01-05 NOTE — H&P ADULT - PROBLEM SELECTOR PLAN 2
- Pt reports her BL ~7-8, Hb currently stable  - pt reports nor currently taking any iron supplements  - f/u anemia panel  - start Iron

## 2021-01-05 NOTE — ED PROVIDER NOTE - CLINICAL SUMMARY MEDICAL DECISION MAKING FREE TEXT BOX
pt likely with ulcer, will do labs and admit given prior anemia. pt likely with ulcer, will do labs and admit given prior anemia. dw dr johnson for admisison.

## 2021-01-05 NOTE — H&P ADULT - HISTORY OF PRESENT ILLNESS
31 y/o female w/ PMHx of GERD, Gastritis, possible PUD; multiple admissions for hematemesis and coffee ground emesis however never had EGD done (did not follow up outpt) presents to the ED w/ hematemesis. Pt reports earlier yesterday after eating lunch (lobster ravioli), she developed a HA. Pt reports she took 2 tylenols w/ about a cup of alcohol then became nauseous and began vomiting. Pt reports initially her emesis was the food she ate then it became bloody. Pt reports intermittent bloody and coffee ground emesis; total about 3 quarts bowl size. Pt has 1 episode in ED which was maroon in color per ED staff. Pt reports crampy abdominal which began after vomiting started that has since improved. Pt reports she drinks alcohol intermittently and is not a heavy drinker. Pt denies fever, chills, or NSAID use.     In ED       29 y/o female w/ PMHx of DVT s/p treatment w/ Xarelto, GERD, Gastritis, Chronic iron deficiency, anemia, possible PUD; multiple admissions for hematemesis and coffee ground emesis however never had EGD done (did not follow up outpt) presents to the ED w/ hematemesis. Pt reports earlier yesterday after eating lunch (lobster ravioli), she developed a HA. Pt reports she took 2 tylenols w/ about a cup of alcohol then became nauseous and began vomiting. Pt reports initially her emesis was the food she ate then it became bloody. Pt reports intermittent bloody and coffee ground emesis; total about 3 quarts bowl size. Pt has 1 episode in ED which was maroon in color per ED staff. Pt reports crampy abdominal which began after vomiting started that has since improved. Pt reports she drinks alcohol intermittently and is not a heavy drinker. Of note pt reports her stools have been black for the last few days and admits to taking ibuprofen as well in the last few days for HAs. Pt denies fever, or chills, dizziness, cp or SOB,.     In ED vitals stable. Labs sig for H/H 8.2/28.9, MCV 72.8, plt 695. Pt w/ +maroon colored emesis in ED. Pt given maalox and PPI.

## 2021-01-05 NOTE — H&P ADULT - NSHPPHYSICALEXAM_GEN_ALL_CORE
PHYSICAL EXAM:    Vital Signs Last 24 Hrs  T(C): 36.5 (05 Jan 2021 05:09), Max: 36.5 (05 Jan 2021 05:09)  T(F): 97.7 (05 Jan 2021 05:09), Max: 97.7 (05 Jan 2021 05:09)  HR: 81 (05 Jan 2021 05:09) (60 - 81)  BP: 109/74 (05 Jan 2021 05:09) (109/63 - 109/74)  BP(mean): --  RR: 16 (05 Jan 2021 05:09) (16 - 19)  SpO2: 100% (05 Jan 2021 05:09) (100% - 100%)    GENERAL: Pt lying in bed comfortably in NAD  HEENT:  Atraumatic, EOMI, PERRL, conjunctiva and sclera clear, MMM  NECK: Supple,  CHEST/LUNG: Clear to auscultation bilaterally, Unlabored respirations  HEART: Regular rate and rhythm  ABDOMEN: Bowel sounds present; Soft, mild epigastric tenderness, Nondistended. No guarding or rigidity    EXTREMITIES:  2+ Peripheral Pulses, brisk capillary refill. No edema  NEUROLOGICAL:  Alert & Oriented X3, No focal deficits   MSK: FROM x 4 extremities   SKIN: No rashes or lesions

## 2021-01-05 NOTE — DISCHARGE NOTE PROVIDER - HOSPITAL COURSE
Code Srinivasan called overhead as patient agitated saying she needs to leave right now to go  her children from another family member who was watching them. Discussed with patient that her hemoglobin levels were dangerously low and could drop lower if she were to continue to have her presenting symptoms. Patient verbalized understanding that she was at risk of death if she were to leave at this time. Discussed with patient that if she were to have more episodes of hematemesis or bloody BM or feelings of weakness to report to nearest ED for evaluation. Patient verbalized understanding and states she will follow up with her PMD as soon as possible. Discussed case with Dr. Evans. RN at the bedside as witness. AMA form signed and dated.

## 2021-01-05 NOTE — H&P ADULT - ASSESSMENT
31 y/o female w/ PMHx of GERD, Gastritis, possible PUD; multiple admissions for hematemesis and coffee ground emesis however never had EGD done (did not follow up outpt) presents to the ED w/ hematemesis. Pt being admitted for further monitoring and GI eval.     IMPROVE VTE Individual Risk Assessment    RISK                                                                Points    [  ] Previous VTE                                                  3    [  ] Thrombophilia                                               2    [  ] Lower limb paralysis                                      2        (unable to hold up >15 seconds)      [  ] Current Cancer                                              2         (within 6 months)    [  ] Immobilization > 24 hrs                                1    [  ] ICU/CCU stay > 24 hours                              1    [  ] Age > 60                                                      1    IMPROVE VTE Score _________    IMPROVE Score 0-1: Low Risk, No VTE prophylaxis required for most patients, encourage ambulation.   IMPROVE Score 2-3: At risk, pharmacologic VTE prophylaxis is indicated for most patients (in the absence of a contraindication)  IMPROVE Score > or = 4: High Risk, pharmacologic VTE prophylaxis is indicated for most patients (in the absence of a contraindication)

## 2021-01-05 NOTE — DISCHARGE NOTE PROVIDER - NSDCMRMEDTOKEN_GEN_ALL_CORE_FT
Multiple Vitamins oral tablet: 1 tab(s) orally once a day  omeprazole 40 mg oral delayed release capsule: 1 cap(s) orally once a day  ondansetron 4 mg oral tablet: 1 tab(s) orally every 8 hours as needed for nausea, vomiting  Tums 500 mg oral tablet, chewable: 1 tab(s) chewed 2 times a day for gastric burning  Tylenol 500 mg oral tablet: 2 tab(s) orally every 6 hours

## 2021-01-05 NOTE — H&P ADULT - NSHPLABSRESULTS_GEN_ALL_CORE
T(C): 36.5 (01-05-21 @ 05:09), Max: 36.5 (01-05-21 @ 05:09)  HR: 81 (01-05-21 @ 05:09) (60 - 81)  BP: 109/74 (01-05-21 @ 05:09) (109/63 - 109/74)  RR: 16 (01-05-21 @ 05:09) (16 - 19)  SpO2: 100% (01-05-21 @ 05:09) (100% - 100%)                        8.2    10.04 )-----------( 695      ( 05 Jan 2021 03:30 )             28.9     01-05    141  |  107  |  6<L>  ----------------------------<  98  3.3<L>   |  27  |  0.64    Ca    9.0      05 Jan 2021 03:30  Mg     2.4     01-05    TPro  7.8  /  Alb  3.9  /  TBili  0.6  /  DBili  x   /  AST  20  /  ALT  29  /  AlkPhos  94  01-05    LIVER FUNCTIONS - ( 05 Jan 2021 03:30 )  Alb: 3.9 g/dL / Pro: 7.8 gm/dL / ALK PHOS: 94 U/L / ALT: 29 U/L / AST: 20 U/L / GGT: x           PT/INR - ( 05 Jan 2021 03:30 )   PT: 12.0 sec;   INR: 1.04 ratio         PTT - ( 05 Jan 2021 03:30 )  PTT:28.3 sec        aluminum hydroxide/magnesium hydroxide/simethicone Suspension 30 milliLiter(s) Oral every 6 hours PRN  ondansetron Injectable 4 milliGRAM(s) IV Push every 6 hours PRN  pantoprazole Infusion 8 mG/Hr IV Continuous <Continuous>  potassium chloride   Powder 40 milliEquivalent(s) Oral once  sodium chloride 0.9%. 1000 milliLiter(s) IV Continuous <Continuous>

## 2021-01-05 NOTE — ED PROVIDER NOTE - OBJECTIVE STATEMENT
29yo female with pmh anemia, gerd, anxiety, depression, presents with epigastric pain and vomiting blood, a few times, after drinking 2 cups of alcohol today. PT did note black stool past few days.  Pt had similar episode in Oct, had CT at that time. pt currently on pepcid.     No fever/chills, No photophobia/eye pain/changes in vision, No ear pain/sore throat/dysphagia, No chest pain/palpitations, no SOB/cough, no wheeze/stridor, +abdominal pain, +N/V, no D, no dysuria/frequency/discharge, No neck/back pain, no rash, no changes in neurological status/function.

## 2021-01-05 NOTE — H&P ADULT - PROBLEM SELECTOR PLAN 1
- pt w/ reports hematemesis and maroon emesis in ED  - c/w PPI drip for now  - NPO  - IVF  - Pt has neglected to f/u w/ GI outpt in past, would consult to evaluate while here for possible EGD  - H/H q6, keep active T&S.  - pt advised to avoid NSAIDs

## 2021-01-05 NOTE — ED ADULT NURSE REASSESSMENT NOTE - NS ED NURSE REASSESS COMMENT FT1
Patient remains stable while in the ED, admitted to medicine for Coffee ground emesis awaiting bed availability. Started on IVF NS at 125ml/hr. Report handoff to 1D holding DANIKA ram. Pending tranmsport for transfer to 1D.
Received pt in stable condition. PT has no expressed complaint. Protonix in progress. Continue to monitor pt.
Soraya andrew called on patient. Pt screaming and trying to leave with a heplock on.  Pt signed AMA form With MARY Cates, heplock removed and pt left.
Patient remains stable while in the ED, resting and sleeping, verbalized improvement. No more vomiting noted. Pending lab results and MD re-assessment.

## 2021-01-05 NOTE — ED ADULT NURSE NOTE - OBJECTIVE STATEMENT
30F bibems([employer]) from home activated by patient herself with c/o coffee ground emesis that started tonight few hours PTA in ED. Patient reports h/o GERD and Asthma, reports she was drinking some alcohol tonight. Afebrile, no recent travels or sick contacts. VS WDL. IV access inserted, labs sent pending results.

## 2021-01-05 NOTE — DISCHARGE NOTE PROVIDER - NSDCCPCAREPLAN_GEN_ALL_CORE_FT
PRINCIPAL DISCHARGE DIAGNOSIS  Diagnosis: Coffee ground emesis  Assessment and Plan of Treatment:       SECONDARY DISCHARGE DIAGNOSES  Diagnosis: Acute blood loss anemia  Assessment and Plan of Treatment:

## 2021-01-05 NOTE — ED PROVIDER NOTE - PHYSICAL EXAMINATION
Gen: Alert, appears in pain, + coffeee ground/maroon stool    Head: NC, AT, PERRL, normal lids/conjunctiva   ENT:  patent oropharynx without erythema/exudate, uvula midline  Neck: supple, no tenderness/meningismus  Pulm: Bilateral clear BS, normal resp effort  CV: RRR, no M/R/G, +dist pulses   Abd: soft, + epigastric tender, ND, +BS, no guarding/rebound tenderness  rectal: brown stool  Mskel: no edema/erythema/cyanosis   Skin: no rash, no bruising  Neuro: AAOx3, no sensory/motor deficits, CN 2-12 intact

## 2021-01-06 LAB
CULTURE RESULTS: SIGNIFICANT CHANGE UP
FERRITIN SERPL-MCNC: 2 NG/ML — LOW (ref 15–150)
SARS-COV-2 IGG SERPL QL IA: NEGATIVE — SIGNIFICANT CHANGE UP
SARS-COV-2 IGM SERPL IA-ACNC: 0.07 INDEX — SIGNIFICANT CHANGE UP
SPECIMEN SOURCE: SIGNIFICANT CHANGE UP

## 2021-01-07 DIAGNOSIS — K29.71 GASTRITIS, UNSPECIFIED, WITH BLEEDING: ICD-10-CM

## 2021-01-07 DIAGNOSIS — K21.9 GASTRO-ESOPHAGEAL REFLUX DISEASE WITHOUT ESOPHAGITIS: ICD-10-CM

## 2021-01-07 DIAGNOSIS — K27.4 CHRONIC OR UNSPECIFIED PEPTIC ULCER, SITE UNSPECIFIED, WITH HEMORRHAGE: ICD-10-CM

## 2021-01-07 DIAGNOSIS — Z86.718 PERSONAL HISTORY OF OTHER VENOUS THROMBOSIS AND EMBOLISM: ICD-10-CM

## 2021-01-07 DIAGNOSIS — D62 ACUTE POSTHEMORRHAGIC ANEMIA: ICD-10-CM

## 2021-01-07 DIAGNOSIS — K92.0 HEMATEMESIS: ICD-10-CM

## 2021-01-19 ENCOUNTER — EMERGENCY (EMERGENCY)
Facility: HOSPITAL | Age: 31
LOS: 0 days | Discharge: HOME | End: 2021-01-19
Attending: STUDENT IN AN ORGANIZED HEALTH CARE EDUCATION/TRAINING PROGRAM
Payer: MEDICAID

## 2021-01-19 VITALS
DIASTOLIC BLOOD PRESSURE: 60 MMHG | HEIGHT: 61 IN | RESPIRATION RATE: 19 BRPM | WEIGHT: 113.98 LBS | TEMPERATURE: 99 F | HEART RATE: 98 BPM | SYSTOLIC BLOOD PRESSURE: 115 MMHG | OXYGEN SATURATION: 100 %

## 2021-01-19 VITALS
TEMPERATURE: 100 F | OXYGEN SATURATION: 99 % | SYSTOLIC BLOOD PRESSURE: 104 MMHG | RESPIRATION RATE: 18 BRPM | DIASTOLIC BLOOD PRESSURE: 60 MMHG | HEART RATE: 90 BPM

## 2021-01-19 DIAGNOSIS — Z88.6 ALLERGY STATUS TO ANALGESIC AGENT: ICD-10-CM

## 2021-01-19 DIAGNOSIS — Z90.89 ACQUIRED ABSENCE OF OTHER ORGANS: Chronic | ICD-10-CM

## 2021-01-19 DIAGNOSIS — R10.32 LEFT LOWER QUADRANT PAIN: ICD-10-CM

## 2021-01-19 DIAGNOSIS — N83.202 UNSPECIFIED OVARIAN CYST, LEFT SIDE: ICD-10-CM

## 2021-01-19 DIAGNOSIS — Z98.89 OTHER SPECIFIED POSTPROCEDURAL STATES: Chronic | ICD-10-CM

## 2021-01-19 LAB
ALBUMIN SERPL ELPH-MCNC: 4.1 G/DL — SIGNIFICANT CHANGE UP (ref 3.3–5)
ALLERGY+IMMUNOLOGY DIAG STUDY NOTE: SIGNIFICANT CHANGE UP
ALP SERPL-CCNC: 84 U/L — SIGNIFICANT CHANGE UP (ref 40–120)
ALT FLD-CCNC: 20 U/L — SIGNIFICANT CHANGE UP (ref 12–78)
ANION GAP SERPL CALC-SCNC: 4 MMOL/L — LOW (ref 5–17)
ANISOCYTOSIS BLD QL: SLIGHT — SIGNIFICANT CHANGE UP
ANTIBODY INTERPRETATION 2: SIGNIFICANT CHANGE UP
AST SERPL-CCNC: 10 U/L — LOW (ref 15–37)
BASOPHILS # BLD AUTO: 0.06 K/UL — SIGNIFICANT CHANGE UP (ref 0–0.2)
BASOPHILS NFR BLD AUTO: 0.3 % — SIGNIFICANT CHANGE UP (ref 0–2)
BILIRUB SERPL-MCNC: 0.5 MG/DL — SIGNIFICANT CHANGE UP (ref 0.2–1.2)
BLD GP AB SCN SERPL QL: SIGNIFICANT CHANGE UP
BUN SERPL-MCNC: 9 MG/DL — SIGNIFICANT CHANGE UP (ref 7–23)
CALCIUM SERPL-MCNC: 8.9 MG/DL — SIGNIFICANT CHANGE UP (ref 8.5–10.1)
CHLORIDE SERPL-SCNC: 105 MMOL/L — SIGNIFICANT CHANGE UP (ref 96–108)
CO2 SERPL-SCNC: 27 MMOL/L — SIGNIFICANT CHANGE UP (ref 22–31)
CREAT SERPL-MCNC: 0.61 MG/DL — SIGNIFICANT CHANGE UP (ref 0.5–1.3)
DIR ANTIGLOB POLYSPECIFIC INTERPRETATION: SIGNIFICANT CHANGE UP
ELLIPTOCYTES BLD QL SMEAR: SLIGHT — SIGNIFICANT CHANGE UP
EOSINOPHIL # BLD AUTO: 0.05 K/UL — SIGNIFICANT CHANGE UP (ref 0–0.5)
EOSINOPHIL NFR BLD AUTO: 0.3 % — SIGNIFICANT CHANGE UP (ref 0–6)
GLUCOSE SERPL-MCNC: 88 MG/DL — SIGNIFICANT CHANGE UP (ref 70–99)
HCG SERPL-ACNC: <1 MIU/ML — SIGNIFICANT CHANGE UP
HCT VFR BLD CALC: 30.6 % — LOW (ref 34.5–45)
HGB BLD-MCNC: 8.5 G/DL — LOW (ref 11.5–15.5)
HYPOCHROMIA BLD QL: SIGNIFICANT CHANGE UP
IMM GRANULOCYTES NFR BLD AUTO: 0.3 % — SIGNIFICANT CHANGE UP (ref 0–1.5)
LYMPHOCYTES # BLD AUTO: 11.8 % — LOW (ref 13–44)
LYMPHOCYTES # BLD AUTO: 2.07 K/UL — SIGNIFICANT CHANGE UP (ref 1–3.3)
MACROCYTES BLD QL: SLIGHT — SIGNIFICANT CHANGE UP
MANUAL SMEAR VERIFICATION: SIGNIFICANT CHANGE UP
MCHC RBC-ENTMCNC: 20 PG — LOW (ref 27–34)
MCHC RBC-ENTMCNC: 27.8 GM/DL — LOW (ref 32–36)
MCV RBC AUTO: 71.8 FL — LOW (ref 80–100)
MICROCYTES BLD QL: SLIGHT — SIGNIFICANT CHANGE UP
MONOCYTES # BLD AUTO: 1.12 K/UL — HIGH (ref 0–0.9)
MONOCYTES NFR BLD AUTO: 6.4 % — SIGNIFICANT CHANGE UP (ref 2–14)
NEUTROPHILS # BLD AUTO: 14.18 K/UL — HIGH (ref 1.8–7.4)
NEUTROPHILS NFR BLD AUTO: 80.9 % — HIGH (ref 43–77)
NRBC # BLD: 0 /100 WBCS — SIGNIFICANT CHANGE UP (ref 0–0)
OVALOCYTES BLD QL SMEAR: SLIGHT — SIGNIFICANT CHANGE UP
PLAT MORPH BLD: NORMAL — SIGNIFICANT CHANGE UP
PLATELET # BLD AUTO: 227 K/UL — SIGNIFICANT CHANGE UP (ref 150–400)
POIKILOCYTOSIS BLD QL AUTO: SLIGHT — SIGNIFICANT CHANGE UP
POTASSIUM SERPL-MCNC: 3.5 MMOL/L — SIGNIFICANT CHANGE UP (ref 3.5–5.3)
POTASSIUM SERPL-SCNC: 3.5 MMOL/L — SIGNIFICANT CHANGE UP (ref 3.5–5.3)
PROT SERPL-MCNC: 8.1 GM/DL — SIGNIFICANT CHANGE UP (ref 6–8.3)
RBC # BLD: 4.26 M/UL — SIGNIFICANT CHANGE UP (ref 3.8–5.2)
RBC # FLD: 20.2 % — HIGH (ref 10.3–14.5)
RBC BLD AUTO: ABNORMAL
SODIUM SERPL-SCNC: 136 MMOL/L — SIGNIFICANT CHANGE UP (ref 135–145)
WBC # BLD: 17.53 K/UL — HIGH (ref 3.8–10.5)
WBC # FLD AUTO: 17.53 K/UL — HIGH (ref 3.8–10.5)

## 2021-01-19 PROCEDURE — 86077 PHYS BLOOD BANK SERV XMATCH: CPT

## 2021-01-19 PROCEDURE — 76830 TRANSVAGINAL US NON-OB: CPT | Mod: 26

## 2021-01-19 PROCEDURE — 74176 CT ABD & PELVIS W/O CONTRAST: CPT | Mod: 26,MC

## 2021-01-19 PROCEDURE — 99284 EMERGENCY DEPT VISIT MOD MDM: CPT

## 2021-01-19 RX ORDER — ACETAMINOPHEN 500 MG
2 TABLET ORAL
Qty: 40 | Refills: 0
Start: 2021-01-19 | End: 2021-01-23

## 2021-01-19 RX ORDER — SODIUM CHLORIDE 9 MG/ML
1000 INJECTION INTRAMUSCULAR; INTRAVENOUS; SUBCUTANEOUS ONCE
Refills: 0 | Status: COMPLETED | OUTPATIENT
Start: 2021-01-19 | End: 2021-01-19

## 2021-01-19 RX ORDER — IBUPROFEN 200 MG
1 TABLET ORAL
Qty: 20 | Refills: 0
Start: 2021-01-19 | End: 2021-01-23

## 2021-01-19 RX ORDER — KETOROLAC TROMETHAMINE 30 MG/ML
15 SYRINGE (ML) INJECTION ONCE
Refills: 0 | Status: DISCONTINUED | OUTPATIENT
Start: 2021-01-19 | End: 2021-01-19

## 2021-01-19 RX ADMIN — Medication 15 MILLIGRAM(S): at 17:45

## 2021-01-19 RX ADMIN — SODIUM CHLORIDE 1000 MILLILITER(S): 9 INJECTION INTRAMUSCULAR; INTRAVENOUS; SUBCUTANEOUS at 17:46

## 2021-01-19 NOTE — ED ADULT NURSE NOTE - OBJECTIVE STATEMENT
Pt is a 31 yo F, AOx4 c/o generalized abdominal pain rated at 8/10 beginning last night. Pt described as sharp cramping and is intermittent. Denies n/v/d, fever chills and no known contact.

## 2021-01-19 NOTE — ED ADULT TRIAGE NOTE - CHIEF COMPLAINT QUOTE
patient c/o of lower abdominal pain radiating in the back started last night , c/o of dysuria no blood in stool or urine ,

## 2021-01-19 NOTE — ED PROVIDER NOTE - CARE PROVIDER_API CALL
Curt Walker)  Obstetrics and Gynecology  [address] [place] [address]  Phone: [phone]  Fax: [phone]  Follow Up Time: 1-3 Days

## 2021-01-19 NOTE — ED PROVIDER NOTE - CLINICAL SUMMARY MEDICAL DECISION MAKING FREE TEXT BOX
31yo F w/ PMH anemia, gerd, asthma, renal stones pw LLW pain / L flank pain since last night. AFVSS. Pt well appearing, in NAD, + TTP LLQ, no CVAT. Plan: Obtain CBC, CMP, UA/C, HCG, T&S given hx severe anemia,  CT renal stone hunt. Give IVF, Toradol. Re-eval. 29yo F w/ PMH anemia, gerd, asthma, renal stones pw LLW pain / L flank pain since last night. AFVSS. Pt well appearing, in NAD, + TTP LLQ, no CVAT. Plan: Obtain CBC, CMP, UA/C, HCG, T&S given hx severe anemia, CT renal stone hunt. Give IVF, Toradol. Re-eval. + leukocytosis to 17, H/H stable. HCG neg. CT neg for stone / hydro, + L ovarian cyst. TVUS / UA pending. Pt care signed out at change of shift.

## 2021-01-19 NOTE — ED PROVIDER NOTE - PATIENT PORTAL LINK FT
You can access the FollowMyHealth Patient Portal offered by [facility] by registering at the following website: [email or web address]. By joining [employer]’s FollowMyHealth portal, you will also be able to view your health information using other applications (apps) compatible with our system.

## 2021-01-19 NOTE — ED ADULT NURSE REASSESSMENT NOTE - NS ED NURSE REASSESS COMMENT FT1
Pt AA&OX3, in no apparent acute distress  pt reports complete relief of pain sx after pain medication administration, denies N/V/D at this time  MD Shukla aware of patient's elevated WBC, no Abx/further interventions at this time, pt to f/u with GYN outpatient  pt is discharged, d/c instructions provided, verbalized understanding, going home in private Taxi

## 2021-01-19 NOTE — ED PROVIDER NOTE - PROGRESS NOTE DETAILS
CT neg for renal stone, hydro, + L ovarian cyst. TVUS added, pending UA. Results reported to patient--grossly benign, WBC with neutrophil predominance likely reactive, no fever or cold symptoms, + L hemorrhagic ovarian cyst on US and CT is cause of pt.'s pain  Pt. reports feeling better after meds  pt. agrees to f/u with primary care outpt. as well as ob/gyn, referrals given   pt. understands to return to ED if symptoms worsen; will d/c with tylenol/motrin prn

## 2021-01-19 NOTE — ED ADULT NURSE NOTE - CADM POA CENTRAL LINE
----- Message from Ann Yfn sent at 5/29/2020 11:46 AM CDT -----  Contact: Self [phone]  Type: Patient Call Back    Who called: Self    What is the request in detail: pt. Calling in regards to COVID-19 test results.    Can the clinic reply by [facility]? Call back     Would the patient rather a call back or a response via [facility]? Call back     Best call back number: [phone]         No

## 2021-01-19 NOTE — ED PROVIDER NOTE - OBJECTIVE STATEMENT
29yo F w/ PMH anemia, gerd, asthma pw LLQ pain w/ rad into L flank since last night. Pt reports unable to walk d/t pain, pt reports similar pain previously w/ renal stone. Pt endorses unable to urinate / move bowels d/t pain w/ pressure. Denies F/C, CP, SOB, cough, N/V/D, vag bleeding or d/c. Pt endorses dizziness, but states she is very anemic and thinks this is likely etiology. Hx transfusion x 2 in past.       PMH as above, PSH , meds albuterol, pepcid PRN, allergy codeine  chart review shows pt w/ recent admit for coffee ground emesis, anemia, ulcer, etoh use. Pt signed out AMA 29yo F w/ PMH anemia, gerd, asthma pw LLQ pain w/ rad into L flank since last night. Pt reports unable to walk d/t pain, pt reports similar pain previously w/ renal stone. Pt endorses unable to urinate / move bowels d/t pain w/ pressure. Denies F/C, CP, SOB, cough, N/V/D, vag bleeding or d/c. Pt endorses dizziness, but states she is very anemic and thinks this is likely etiology. Hx transfusion x 2 in past.     PMH as above, PSH , meds albuterol, pepcid PRN, allergy codeine  Chart review shows pt w/ recent admit for coffee ground emesis, anemia, ulcer, etoh use. Pt signed out AMA

## 2021-01-19 NOTE — ED ADULT NURSE NOTE - BOWEL SOUNDS RLQ
Medication(s) Requested: Vyvanse 40 mg  Last office visit: 02/21/19  Last refill: 02/26/9  Last dispensed: 02/28/19  Is the patient due for refill of this medication(s): Yes  PDMP review: Criteria met. Forwarded to Physician/FRANNY for signature.      lisdexamfetamine (VYVANSE) 40 MG capsule  Take 1 capsule by mouth every morning.      Mom would like a call back when Rx is placed with pharmacy   Yes present

## 2021-01-19 NOTE — ED PROVIDER NOTE - PHYSICAL EXAMINATION
GEN: Awake, alert, interactive, NAD.  HEAD AND NECK: NC/AT. Airway patent. Neck supple.   EYES:  Clear b/l. EOMI. PERRL.   ENT: Moist mucus membranes.   CARDIAC: Regular rate, regular rhythm. No evident pedal edema.    RESP/CHEST: Normal respiratory effort with no use of accessory muscles or retractions. Clear throughout on auscultation.  ABD: Soft, non-distended, + TTP LLQ. No rebound, no guarding.   BACK: No midline spinal TTP. No CVAT.   EXTREMITIES: Moving all extremities with no apparent deformities.   SKIN: Warm, dry, intact normal color. No rash.   NEURO: AOx3, CN II-XII grossly intact, no focal deficits.   PSYCH: Appropriate mood and affect.

## 2021-02-28 NOTE — ED ADULT NURSE NOTE - HOW OFTEN DO YOU HAVE A DRINK CONTAINING ALCOHOL?
Patient is a 53 y/o female whom denies prior PMHx nor past surgical history who presents with complaints of abdominal pain. Patient notes she has been feeling well until yesterday. She noted pain that started earlier in the day and progressively became worse. Pain in right lower quadrant and radiates across to left lower abdomen. Pain is sharp, constant, and with no alleviating factors at home. She notes lat bowel movement was 24 hours prior and was normal. She notes she is passing gas and notes last night she only had a glass of milk for her meal. She has not had anything further to eat or drink. She notes she feels like she has the chills and cannot get comfortable. She feels best when she is laying flat. Denies prior Colonoscopy.  Never

## 2021-03-08 ENCOUNTER — EMERGENCY (EMERGENCY)
Facility: HOSPITAL | Age: 31
LOS: 1 days | Discharge: HOME | End: 2021-03-08
Admitting: EMERGENCY MEDICINE
Payer: COMMERCIAL

## 2021-03-08 VITALS
HEIGHT: 63 IN | DIASTOLIC BLOOD PRESSURE: 75 MMHG | SYSTOLIC BLOOD PRESSURE: 126 MMHG | TEMPERATURE: 98 F | OXYGEN SATURATION: 96 % | RESPIRATION RATE: 20 BRPM | HEART RATE: 87 BPM

## 2021-03-08 DIAGNOSIS — Z79.2 LONG TERM (CURRENT) USE OF ANTIBIOTICS: ICD-10-CM

## 2021-03-08 DIAGNOSIS — Z20.822 CONTACT WITH AND (SUSPECTED) EXPOSURE TO COVID-19: ICD-10-CM

## 2021-03-08 DIAGNOSIS — Z79.1 LONG TERM (CURRENT) USE OF NON-STEROIDAL ANTI-INFLAMMATORIES (NSAID): ICD-10-CM

## 2021-03-08 DIAGNOSIS — J45.909 UNSPECIFIED ASTHMA, UNCOMPLICATED: ICD-10-CM

## 2021-03-08 DIAGNOSIS — Z79.899 OTHER LONG TERM (CURRENT) DRUG THERAPY: ICD-10-CM

## 2021-03-08 LAB — SARS-COV-2 RNA SPEC QL NAA+PROBE: SIGNIFICANT CHANGE UP

## 2021-03-08 PROCEDURE — 99282 EMERGENCY DEPT VISIT SF MDM: CPT

## 2021-03-08 NOTE — ED PROVIDER NOTE - OBJECTIVE STATEMENT
31 y/o female presents to the ED requesting COVID-19 testing. Patient is currently asymptomatic and has no other medical complaints at this time. Denies fever, chills, chest pain, SOB, cough.

## 2021-03-08 NOTE — ED PROVIDER NOTE - PATIENT PORTAL LINK FT
You can access the FollowMyHealth Patient Portal offered by [facility] by registering at the following website: [email or web address]. By joining [facility]’s FollowMyHealth portal, you will also be able to view your health information using other applications (apps) compatible with our system.

## 2021-03-08 NOTE — ED PROVIDER NOTE - NSFOLLOWUPINSTRUCTIONS_ED_ALL_ED_FT
THE COVID 19 TEST RESULTS  - results may take up to 2-3 days to become available   - if you have consented, you will receive your results electronically   -  you can check [facility] or call [phone] to discuss your results with our nursing staff    Please continue to self quarantine (home isolation) until your result is back and follow instructions accordingly  - positive: complete home isolation for a total of 14 days since day of testing and no more fever with feeling back to baseline   - negative: you will be able to stop home isolation but still practice standard precautions, similar to how you would manage a regular flu/cold.    Return to ER for any worsening symptoms, such as persistent fever >100.4F, shortness of breath, coughing up bloody sputum, chest pain, lethargy, and fainting    Please remember to wash your hands frequently (>20 seconds each time), avoid touching your face, and cover your cough/sneeze.  Always wear a mask when you are outside of your home and practice social distancing.    Only take Tylenol for fever/pain control and avoid NSAIDs (ibuprofen/Advil/Aleve/naproxen) due to potential increased risk of exacerbating COVID-19 infection
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
## 2021-07-12 ENCOUNTER — INPATIENT (INPATIENT)
Facility: HOSPITAL | Age: 31
LOS: 3 days | Discharge: HOME | End: 2021-07-16
Attending: STUDENT IN AN ORGANIZED HEALTH CARE EDUCATION/TRAINING PROGRAM | Admitting: STUDENT IN AN ORGANIZED HEALTH CARE EDUCATION/TRAINING PROGRAM
Payer: COMMERCIAL

## 2021-07-12 VITALS
HEART RATE: 62 BPM | RESPIRATION RATE: 18 BRPM | TEMPERATURE: 98 F | OXYGEN SATURATION: 100 % | HEIGHT: 63 IN | DIASTOLIC BLOOD PRESSURE: 92 MMHG | SYSTOLIC BLOOD PRESSURE: 132 MMHG

## 2021-07-12 LAB
ALBUMIN SERPL ELPH-MCNC: 4.6 G/DL — SIGNIFICANT CHANGE UP (ref 3.3–5)
ALP SERPL-CCNC: 60 U/L — SIGNIFICANT CHANGE UP (ref 40–120)
ALT FLD-CCNC: 17 U/L — SIGNIFICANT CHANGE UP (ref 4–33)
ANION GAP SERPL CALC-SCNC: 14 MMOL/L — SIGNIFICANT CHANGE UP (ref 7–14)
APPEARANCE UR: ABNORMAL
AST SERPL-CCNC: 10 U/L — SIGNIFICANT CHANGE UP (ref 4–32)
BACTERIA # UR AUTO: ABNORMAL
BASOPHILS # BLD AUTO: 0.09 K/UL — SIGNIFICANT CHANGE UP (ref 0–0.2)
BASOPHILS NFR BLD AUTO: 0.7 % — SIGNIFICANT CHANGE UP (ref 0–2)
BILIRUB SERPL-MCNC: 1 MG/DL — SIGNIFICANT CHANGE UP (ref 0.2–1.2)
BILIRUB UR-MCNC: NEGATIVE — SIGNIFICANT CHANGE UP
BUN SERPL-MCNC: 5 MG/DL — LOW (ref 7–23)
CALCIUM SERPL-MCNC: 9.5 MG/DL — SIGNIFICANT CHANGE UP (ref 8.4–10.5)
CHLORIDE SERPL-SCNC: 107 MMOL/L — SIGNIFICANT CHANGE UP (ref 98–107)
CO2 SERPL-SCNC: 21 MMOL/L — LOW (ref 22–31)
COLOR SPEC: YELLOW — SIGNIFICANT CHANGE UP
CREAT SERPL-MCNC: 0.81 MG/DL — SIGNIFICANT CHANGE UP (ref 0.5–1.3)
DIFF PNL FLD: NEGATIVE — SIGNIFICANT CHANGE UP
EOSINOPHIL # BLD AUTO: 0.06 K/UL — SIGNIFICANT CHANGE UP (ref 0–0.5)
EOSINOPHIL NFR BLD AUTO: 0.5 % — SIGNIFICANT CHANGE UP (ref 0–6)
EPI CELLS # UR: 10 /HPF — HIGH (ref 0–5)
GLUCOSE SERPL-MCNC: 80 MG/DL — SIGNIFICANT CHANGE UP (ref 70–99)
GLUCOSE UR QL: NEGATIVE — SIGNIFICANT CHANGE UP
HCT VFR BLD CALC: 40.8 % — SIGNIFICANT CHANGE UP (ref 34.5–45)
HGB BLD-MCNC: 13.6 G/DL — SIGNIFICANT CHANGE UP (ref 11.5–15.5)
HYALINE CASTS # UR AUTO: 2 /LPF — SIGNIFICANT CHANGE UP (ref 0–7)
IANC: 7.41 K/UL — SIGNIFICANT CHANGE UP (ref 1.5–8.5)
IMM GRANULOCYTES NFR BLD AUTO: 0.3 % — SIGNIFICANT CHANGE UP (ref 0–1.5)
KETONES UR-MCNC: ABNORMAL
LEUKOCYTE ESTERASE UR-ACNC: NEGATIVE — SIGNIFICANT CHANGE UP
LIDOCAIN IGE QN: 19 U/L — SIGNIFICANT CHANGE UP (ref 7–60)
LYMPHOCYTES # BLD AUTO: 35.4 % — SIGNIFICANT CHANGE UP (ref 13–44)
LYMPHOCYTES # BLD AUTO: 4.58 K/UL — HIGH (ref 1–3.3)
MCHC RBC-ENTMCNC: 30.9 PG — SIGNIFICANT CHANGE UP (ref 27–34)
MCHC RBC-ENTMCNC: 33.3 GM/DL — SIGNIFICANT CHANGE UP (ref 32–36)
MCV RBC AUTO: 92.7 FL — SIGNIFICANT CHANGE UP (ref 80–100)
MONOCYTES # BLD AUTO: 0.74 K/UL — SIGNIFICANT CHANGE UP (ref 0–0.9)
MONOCYTES NFR BLD AUTO: 5.7 % — SIGNIFICANT CHANGE UP (ref 2–14)
NEUTROPHILS # BLD AUTO: 7.41 K/UL — HIGH (ref 1.8–7.4)
NEUTROPHILS NFR BLD AUTO: 57.4 % — SIGNIFICANT CHANGE UP (ref 43–77)
NITRITE UR-MCNC: NEGATIVE — SIGNIFICANT CHANGE UP
NRBC # BLD: 0 /100 WBCS — SIGNIFICANT CHANGE UP
NRBC # FLD: 0 K/UL — SIGNIFICANT CHANGE UP
PH UR: 6.5 — SIGNIFICANT CHANGE UP (ref 5–8)
PLATELET # BLD AUTO: 370 K/UL — SIGNIFICANT CHANGE UP (ref 150–400)
POTASSIUM SERPL-MCNC: 3.7 MMOL/L — SIGNIFICANT CHANGE UP (ref 3.5–5.3)
POTASSIUM SERPL-SCNC: 3.7 MMOL/L — SIGNIFICANT CHANGE UP (ref 3.5–5.3)
PROT SERPL-MCNC: 7.7 G/DL — SIGNIFICANT CHANGE UP (ref 6–8.3)
PROT UR-MCNC: ABNORMAL
RBC # BLD: 4.4 M/UL — SIGNIFICANT CHANGE UP (ref 3.8–5.2)
RBC # FLD: 12.9 % — SIGNIFICANT CHANGE UP (ref 10.3–14.5)
RBC CASTS # UR COMP ASSIST: 1 /HPF — SIGNIFICANT CHANGE UP (ref 0–4)
SODIUM SERPL-SCNC: 142 MMOL/L — SIGNIFICANT CHANGE UP (ref 135–145)
SP GR SPEC: 1.03 — HIGH (ref 1.01–1.02)
UROBILINOGEN FLD QL: ABNORMAL
WBC # BLD: 12.92 K/UL — HIGH (ref 3.8–10.5)
WBC # FLD AUTO: 12.92 K/UL — HIGH (ref 3.8–10.5)
WBC UR QL: 5 /HPF — SIGNIFICANT CHANGE UP (ref 0–5)

## 2021-07-12 RX ORDER — SODIUM CHLORIDE 9 MG/ML
2000 INJECTION INTRAMUSCULAR; INTRAVENOUS; SUBCUTANEOUS ONCE
Refills: 0 | Status: COMPLETED | OUTPATIENT
Start: 2021-07-12 | End: 2021-07-12

## 2021-07-12 RX ORDER — MORPHINE SULFATE 50 MG/1
2 CAPSULE, EXTENDED RELEASE ORAL ONCE
Refills: 0 | Status: DISCONTINUED | OUTPATIENT
Start: 2021-07-12 | End: 2021-07-12

## 2021-07-12 RX ORDER — ONDANSETRON 8 MG/1
4 TABLET, FILM COATED ORAL ONCE
Refills: 0 | Status: COMPLETED | OUTPATIENT
Start: 2021-07-12 | End: 2021-07-12

## 2021-07-12 RX ADMIN — SODIUM CHLORIDE 2000 MILLILITER(S): 9 INJECTION INTRAMUSCULAR; INTRAVENOUS; SUBCUTANEOUS at 20:59

## 2021-07-12 RX ADMIN — ONDANSETRON 4 MILLIGRAM(S): 8 TABLET, FILM COATED ORAL at 20:59

## 2021-07-12 RX ADMIN — MORPHINE SULFATE 2 MILLIGRAM(S): 50 CAPSULE, EXTENDED RELEASE ORAL at 21:18

## 2021-07-12 NOTE — ED PROVIDER NOTE - OBJECTIVE STATEMENT
30 yof with hx of Asthma and diverticulitis complaining of months of abdominal pain. Pt states that she has had chronic pain from diverticulitis that has never resolved, has followed up with GI but unable to get scoped. Pt states she had daily diarrhea, nausea, unable to eat for days, weak, headache, dry mouth, "gurgling" in stomach. Pt also noted weight loss.

## 2021-07-12 NOTE — ED ADULT NURSE NOTE - ALCOHOL PRE SCREEN (AUDIT - C)
Addended by: ILDEFONSO WILSON on: 2/12/2021 03:25 PM     Modules accepted: Orders     Statement Selected

## 2021-07-12 NOTE — ED PROVIDER NOTE - MUSCULOSKELETAL, MLM
Hpi Title: Evaluation of Skin Lesions How Severe Are Your Spot(S)?: mild Have Your Spot(S) Been Treated In The Past?: has not been treated Spine appears normal, range of motion is not limited, no muscle or joint tenderness

## 2021-07-12 NOTE — ED PROVIDER NOTE - PROGRESS NOTE DETAILS
Hari ZUÑIGA: patient still in pain, nauseous. Does state that she smokes marijuana and hot showers sometimes help the pain, but pain began before she started smoking marijuana. Observation unit is full. Patient admitted to hospitalist.

## 2021-07-12 NOTE — ED ADULT TRIAGE NOTE - CHIEF COMPLAINT QUOTE
Pt c/o abdominal pain and bloating accompanied by nausea and vomiting since last night. PMH asthma, diverticulitis

## 2021-07-13 DIAGNOSIS — Z02.9 ENCOUNTER FOR ADMINISTRATIVE EXAMINATIONS, UNSPECIFIED: ICD-10-CM

## 2021-07-13 DIAGNOSIS — Z29.9 ENCOUNTER FOR PROPHYLACTIC MEASURES, UNSPECIFIED: ICD-10-CM

## 2021-07-13 DIAGNOSIS — R10.9 UNSPECIFIED ABDOMINAL PAIN: ICD-10-CM

## 2021-07-13 DIAGNOSIS — J45.909 UNSPECIFIED ASTHMA, UNCOMPLICATED: ICD-10-CM

## 2021-07-13 DIAGNOSIS — Z98.890 OTHER SPECIFIED POSTPROCEDURAL STATES: Chronic | ICD-10-CM

## 2021-07-13 LAB
ALBUMIN SERPL ELPH-MCNC: 4.4 G/DL — SIGNIFICANT CHANGE UP (ref 3.3–5)
ALP SERPL-CCNC: 54 U/L — SIGNIFICANT CHANGE UP (ref 40–120)
ALT FLD-CCNC: 14 U/L — SIGNIFICANT CHANGE UP (ref 4–33)
ANION GAP SERPL CALC-SCNC: 14 MMOL/L — SIGNIFICANT CHANGE UP (ref 7–14)
AST SERPL-CCNC: 12 U/L — SIGNIFICANT CHANGE UP (ref 4–32)
B PERT DNA SPEC QL NAA+PROBE: SIGNIFICANT CHANGE UP
BASOPHILS # BLD AUTO: 0.08 K/UL — SIGNIFICANT CHANGE UP (ref 0–0.2)
BASOPHILS NFR BLD AUTO: 0.7 % — SIGNIFICANT CHANGE UP (ref 0–2)
BILIRUB SERPL-MCNC: 0.9 MG/DL — SIGNIFICANT CHANGE UP (ref 0.2–1.2)
BUN SERPL-MCNC: 3 MG/DL — LOW (ref 7–23)
C PNEUM DNA SPEC QL NAA+PROBE: SIGNIFICANT CHANGE UP
CALCIUM SERPL-MCNC: 9.1 MG/DL — SIGNIFICANT CHANGE UP (ref 8.4–10.5)
CHLORIDE SERPL-SCNC: 106 MMOL/L — SIGNIFICANT CHANGE UP (ref 98–107)
CO2 SERPL-SCNC: 20 MMOL/L — LOW (ref 22–31)
CREAT SERPL-MCNC: 0.79 MG/DL — SIGNIFICANT CHANGE UP (ref 0.5–1.3)
CRP SERPL-MCNC: 4.1 MG/L — SIGNIFICANT CHANGE UP
EOSINOPHIL # BLD AUTO: 0.08 K/UL — SIGNIFICANT CHANGE UP (ref 0–0.5)
EOSINOPHIL NFR BLD AUTO: 0.7 % — SIGNIFICANT CHANGE UP (ref 0–6)
ERYTHROCYTE [SEDIMENTATION RATE] IN BLOOD: 8 MM/HR — SIGNIFICANT CHANGE UP (ref 4–25)
FLUAV SUBTYP SPEC NAA+PROBE: SIGNIFICANT CHANGE UP
FLUBV RNA SPEC QL NAA+PROBE: SIGNIFICANT CHANGE UP
GLUCOSE SERPL-MCNC: 73 MG/DL — SIGNIFICANT CHANGE UP (ref 70–99)
HADV DNA SPEC QL NAA+PROBE: SIGNIFICANT CHANGE UP
HCG SERPL-ACNC: <5 MIU/ML — SIGNIFICANT CHANGE UP
HCOV 229E RNA SPEC QL NAA+PROBE: SIGNIFICANT CHANGE UP
HCOV HKU1 RNA SPEC QL NAA+PROBE: SIGNIFICANT CHANGE UP
HCOV NL63 RNA SPEC QL NAA+PROBE: SIGNIFICANT CHANGE UP
HCOV OC43 RNA SPEC QL NAA+PROBE: SIGNIFICANT CHANGE UP
HCT VFR BLD CALC: 38.3 % — SIGNIFICANT CHANGE UP (ref 34.5–45)
HGB BLD-MCNC: 12.8 G/DL — SIGNIFICANT CHANGE UP (ref 11.5–15.5)
HMPV RNA SPEC QL NAA+PROBE: SIGNIFICANT CHANGE UP
HPIV1 RNA SPEC QL NAA+PROBE: SIGNIFICANT CHANGE UP
HPIV2 RNA SPEC QL NAA+PROBE: SIGNIFICANT CHANGE UP
HPIV3 RNA SPEC QL NAA+PROBE: SIGNIFICANT CHANGE UP
HPIV4 RNA SPEC QL NAA+PROBE: SIGNIFICANT CHANGE UP
IANC: 5.41 K/UL — SIGNIFICANT CHANGE UP (ref 1.5–8.5)
IMM GRANULOCYTES NFR BLD AUTO: 0.3 % — SIGNIFICANT CHANGE UP (ref 0–1.5)
LYMPHOCYTES # BLD AUTO: 4.6 K/UL — HIGH (ref 1–3.3)
LYMPHOCYTES # BLD AUTO: 42.4 % — SIGNIFICANT CHANGE UP (ref 13–44)
MAGNESIUM SERPL-MCNC: 1.9 MG/DL — SIGNIFICANT CHANGE UP (ref 1.6–2.6)
MCHC RBC-ENTMCNC: 30.7 PG — SIGNIFICANT CHANGE UP (ref 27–34)
MCHC RBC-ENTMCNC: 33.4 GM/DL — SIGNIFICANT CHANGE UP (ref 32–36)
MCV RBC AUTO: 91.8 FL — SIGNIFICANT CHANGE UP (ref 80–100)
MONOCYTES # BLD AUTO: 0.66 K/UL — SIGNIFICANT CHANGE UP (ref 0–0.9)
MONOCYTES NFR BLD AUTO: 6.1 % — SIGNIFICANT CHANGE UP (ref 2–14)
NEUTROPHILS # BLD AUTO: 5.41 K/UL — SIGNIFICANT CHANGE UP (ref 1.8–7.4)
NEUTROPHILS NFR BLD AUTO: 49.8 % — SIGNIFICANT CHANGE UP (ref 43–77)
NRBC # BLD: 0 /100 WBCS — SIGNIFICANT CHANGE UP
NRBC # FLD: 0 K/UL — SIGNIFICANT CHANGE UP
PHOSPHATE SERPL-MCNC: 2.1 MG/DL — LOW (ref 2.5–4.5)
PLATELET # BLD AUTO: 338 K/UL — SIGNIFICANT CHANGE UP (ref 150–400)
POTASSIUM SERPL-MCNC: 3.7 MMOL/L — SIGNIFICANT CHANGE UP (ref 3.5–5.3)
POTASSIUM SERPL-SCNC: 3.7 MMOL/L — SIGNIFICANT CHANGE UP (ref 3.5–5.3)
PROT SERPL-MCNC: 7.2 G/DL — SIGNIFICANT CHANGE UP (ref 6–8.3)
RAPID RVP RESULT: SIGNIFICANT CHANGE UP
RBC # BLD: 4.17 M/UL — SIGNIFICANT CHANGE UP (ref 3.8–5.2)
RBC # FLD: 12.9 % — SIGNIFICANT CHANGE UP (ref 10.3–14.5)
RSV RNA SPEC QL NAA+PROBE: SIGNIFICANT CHANGE UP
RV+EV RNA SPEC QL NAA+PROBE: SIGNIFICANT CHANGE UP
SARS-COV-2 RNA SPEC QL NAA+PROBE: SIGNIFICANT CHANGE UP
SODIUM SERPL-SCNC: 140 MMOL/L — SIGNIFICANT CHANGE UP (ref 135–145)
WBC # BLD: 10.86 K/UL — HIGH (ref 3.8–10.5)
WBC # FLD AUTO: 10.86 K/UL — HIGH (ref 3.8–10.5)

## 2021-07-13 PROCEDURE — 99222 1ST HOSP IP/OBS MODERATE 55: CPT | Mod: GC

## 2021-07-13 PROCEDURE — 12345: CPT | Mod: NC,GC

## 2021-07-13 PROCEDURE — 99223 1ST HOSP IP/OBS HIGH 75: CPT

## 2021-07-13 PROCEDURE — 93010 ELECTROCARDIOGRAM REPORT: CPT

## 2021-07-13 RX ORDER — SUMATRIPTAN SUCCINATE 4 MG/.5ML
25 INJECTION, SOLUTION SUBCUTANEOUS ONCE
Refills: 0 | Status: COMPLETED | OUTPATIENT
Start: 2021-07-13 | End: 2021-07-13

## 2021-07-13 RX ORDER — PANTOPRAZOLE SODIUM 20 MG/1
40 TABLET, DELAYED RELEASE ORAL
Refills: 0 | Status: DISCONTINUED | OUTPATIENT
Start: 2021-07-13 | End: 2021-07-16

## 2021-07-13 RX ORDER — MORPHINE SULFATE 50 MG/1
4 CAPSULE, EXTENDED RELEASE ORAL ONCE
Refills: 0 | Status: DISCONTINUED | OUTPATIENT
Start: 2021-07-13 | End: 2021-07-13

## 2021-07-13 RX ORDER — SUCRALFATE 1 G
1 TABLET ORAL EVERY 12 HOURS
Refills: 0 | Status: DISCONTINUED | OUTPATIENT
Start: 2021-07-13 | End: 2021-07-16

## 2021-07-13 RX ORDER — KETOROLAC TROMETHAMINE 30 MG/ML
15 SYRINGE (ML) INJECTION ONCE
Refills: 0 | Status: DISCONTINUED | OUTPATIENT
Start: 2021-07-13 | End: 2021-07-13

## 2021-07-13 RX ORDER — ACETAMINOPHEN 500 MG
650 TABLET ORAL EVERY 6 HOURS
Refills: 0 | Status: DISCONTINUED | OUTPATIENT
Start: 2021-07-13 | End: 2021-07-16

## 2021-07-13 RX ORDER — SODIUM CHLORIDE 9 MG/ML
1000 INJECTION INTRAMUSCULAR; INTRAVENOUS; SUBCUTANEOUS
Refills: 0 | Status: DISCONTINUED | OUTPATIENT
Start: 2021-07-13 | End: 2021-07-14

## 2021-07-13 RX ORDER — ENOXAPARIN SODIUM 100 MG/ML
40 INJECTION SUBCUTANEOUS DAILY
Refills: 0 | Status: DISCONTINUED | OUTPATIENT
Start: 2021-07-13 | End: 2021-07-15

## 2021-07-13 RX ORDER — SODIUM,POTASSIUM PHOSPHATES 278-250MG
1 POWDER IN PACKET (EA) ORAL ONCE
Refills: 0 | Status: COMPLETED | OUTPATIENT
Start: 2021-07-13 | End: 2021-07-13

## 2021-07-13 RX ORDER — ONDANSETRON 8 MG/1
4 TABLET, FILM COATED ORAL ONCE
Refills: 0 | Status: COMPLETED | OUTPATIENT
Start: 2021-07-13 | End: 2021-07-13

## 2021-07-13 RX ORDER — ONDANSETRON 8 MG/1
4 TABLET, FILM COATED ORAL EVERY 8 HOURS
Refills: 0 | Status: DISCONTINUED | OUTPATIENT
Start: 2021-07-13 | End: 2021-07-16

## 2021-07-13 RX ADMIN — Medication 15 MILLIGRAM(S): at 17:35

## 2021-07-13 RX ADMIN — SUMATRIPTAN SUCCINATE 25 MILLIGRAM(S): 4 INJECTION, SOLUTION SUBCUTANEOUS at 22:43

## 2021-07-13 RX ADMIN — Medication 650 MILLIGRAM(S): at 15:30

## 2021-07-13 RX ADMIN — MORPHINE SULFATE 4 MILLIGRAM(S): 50 CAPSULE, EXTENDED RELEASE ORAL at 00:22

## 2021-07-13 RX ADMIN — Medication 15 MILLIGRAM(S): at 08:47

## 2021-07-13 RX ADMIN — SUMATRIPTAN SUCCINATE 25 MILLIGRAM(S): 4 INJECTION, SOLUTION SUBCUTANEOUS at 23:11

## 2021-07-13 RX ADMIN — ONDANSETRON 4 MILLIGRAM(S): 8 TABLET, FILM COATED ORAL at 01:32

## 2021-07-13 RX ADMIN — Medication 1 PACKET(S): at 11:41

## 2021-07-13 RX ADMIN — ONDANSETRON 4 MILLIGRAM(S): 8 TABLET, FILM COATED ORAL at 17:05

## 2021-07-13 RX ADMIN — Medication 650 MILLIGRAM(S): at 14:53

## 2021-07-13 RX ADMIN — ONDANSETRON 4 MILLIGRAM(S): 8 TABLET, FILM COATED ORAL at 08:50

## 2021-07-13 RX ADMIN — SODIUM CHLORIDE 75 MILLILITER(S): 9 INJECTION INTRAMUSCULAR; INTRAVENOUS; SUBCUTANEOUS at 08:59

## 2021-07-13 RX ADMIN — Medication 15 MILLIGRAM(S): at 17:04

## 2021-07-13 RX ADMIN — Medication 15 MILLIGRAM(S): at 09:05

## 2021-07-13 RX ADMIN — PANTOPRAZOLE SODIUM 40 MILLIGRAM(S): 20 TABLET, DELAYED RELEASE ORAL at 09:29

## 2021-07-13 NOTE — PROGRESS NOTE ADULT - PROBLEM SELECTOR PLAN 3
Hold ppx given pt ambulatory  - pt on progestin only oral contraceptive does not recall name, holding will need to clarify med rec w/ pharmacy ([facility] in [place]) in am, although pt states that she gets prescription from [place]. Hold ppx given pt ambulatory  - pt on progestin only oral contraceptive does not recall name, holding will need to clarify med rec w/ pharmacy ([facility] in [place]) in am, although pt states that she gets prescription from [place].  - pt's boyfriend to bring pill pack in for verification w/pharmacy Hold ppx given pt ambulatory  - pt on oral contraceptive Microgynon 30, which she gets from [place] (country[place] pt's boyfriend to bring pill pack in today for verification w/pharmacy

## 2021-07-13 NOTE — H&P ADULT - NSHPLABSRESULTS_GEN_ALL_CORE
LABS:                        13.6   12.92 )-----------( 370      ( 2021 21:25 )             40.8     07-12    142  |  107  |  5<L>  ----------------------------<  80  3.7   |  21<L>  |  0.81    Ca    9.5      2021 21:25    TPro  7.7  /  Alb  4.6  /  TBili  1.0  /  DBili  x   /  AST  10  /  ALT  17  /  AlkPhos  60  07-12      Urinalysis Basic - ( 2021 21:05 )    Color: Yellow / Appearance: Slightly Turbid / S.027 / pH: x  Gluc: x / Ketone: Small  / Bili: Negative / Urobili: 3 mg/dL   Blood: x / Protein: Trace / Nitrite: Negative   Leuk Esterase: Negative / RBC: 1 /HPF / WBC 5 /HPF   Sq Epi: x / Non Sq Epi: 10 /HPF / Bacteria: Few          RADIOLOGY & ADDITIONAL TESTS:    < from: CT Abdomen and Pelvis w/ IV Cont (21 @ 22:05) >      EXAM:  CT ABDOMEN AND PELVIS IC        PROCEDURE DATE:  2021         INTERPRETATION:  CLINICAL INFORMATION: Left lower quadrant pain    COMPARISON: 2020.    CONTRAST/COMPLICATIONS:  IV Contrast: Omnipaque 350  90 cc administered   10 cc discarded  Oral Contrast: NONE  Complications: None reported at time of study completion    PROCEDURE: CT of the Abdomen and Pelvis was performed.  Sagittal and coronal reformats were performed.    FINDINGS:    LOWER CHEST: Within normal limits.  LIVER: Right hepatic subcentimeter hypodensity suggestive of hemangioma, but too small to characterize.  BILE DUCTS: Normal caliber.  GALLBLADDER: Within normal limits.  SPLEEN: Within normal limits.  PANCREAS: Within normal limits.  ADRENALS: Within normal limits.    KIDNEYS/URETERS: Within normal limits.  BLADDER: Within normal limits.  REPRODUCTIVE ORGANS: Small left ovarian cyst is suggested. Unremarkable right adnexa and uterus.    BOWEL: No bowel obstruction. Appendix is normal. Colonic diverticulosis without evidence for acute diverticulitis.  PERITONEUM: No free air.  VESSELS: Within normal limits.  RETROPERITONEUM/LYMPH NODES: No lymphadenopathy.  ABDOMINAL WALL: Linear circumferential opacity along the abdominal wall in the postsurgical correlate with history.  BONES: Within normal limits.    IMPRESSION:    Normal appendix. No bowel obstruction or free air.  Colonic diverticulosis without acute diverticulitis.        --- End of Report ---            RAISSA ARNOLD MD; Resident Interventional Radiology  This document has been electronically signed.  FALLON CHAMPION MD; Attending Radiologist  This document has been electronically signed. 2021 11:44PM

## 2021-07-13 NOTE — PROGRESS NOTE ADULT - PROBLEM SELECTOR PLAN 1
Pt w/ reported hx H. Pylori s/p eradication at age 18, acute diverticulitis at [facility] 7/2020 s/p abx, w/ recurrent abd pain since 5/2021, sx worsening over 2 days  - LLQ abd pain w/ radiation to back, epigastric discomfort, w/ n/v/d, inability to tolerate po intake  - Requiring IV morphine, zofran in ED, s/p 2L NS  - CTAP revealing nml appendix, no bowel obstruction or free air, colonic diverticulosis w/o acute diverticulitis  - DDx w/ regard to GI source includes IBD, IBS Less likely infxs colitis given CTAP and lack of fever, however w/ leukocytosis. Obtain stool Cx, GI PCR, O&P, C. diff PCR, ESR, CRP.   - c/s GI, appreciate recs  -  source less likely given lack of urinary sx and UA w/o overt c/f infxn  - GYN source less likely given on OCP, LMP 1 wk ago, w/o exacerbation by sexual activity or vaginal discharge, however check HCG  - clear liquid diet, advance diet as tolerated  - Zofran prn nausea, pain control prn  - monitor WBC, fever curve

## 2021-07-13 NOTE — H&P ADULT - PROBLEM SELECTOR PLAN 3
Hold ppx given pt ambulatory Hold ppx given pt ambulatory  - pt on oral contraceptive does not recall name, holding will need to clarify med rec w/ pharmacy ([facility] in [place]) in am

## 2021-07-13 NOTE — CONSULT NOTE ADULT - ATTENDING COMMENTS
Patient reports abdominal pain, nausea, vomiting and diarrhea since May 2021. She has a history of diverticulitis in 7/2020 at which time she noted LLQ abdominal pain, which resolved after course of antibiotic treatment. She was asymptomatic until her current symptoms began in May at which time she was told she had recurrent diverticulitis and given another course of antibiotics without relief. She began marijuana to help her nausea symptoms with some improvement. She denies any GI bleeding. No prior colonoscopy.  Patient is afebrile, HD stable and well-appearing. Mild TTP of left abdomen without rebound or guarding. Labs with mild leukocytosis, but otherwise unrevealing, including normal ESR and CRP. CT without acute GI pathology noted.   Etiology of symptoms unclear. Possible infectious (ie CDI) vs SCAD vs disorder of brain-gut axis (ie IBS-D, cannabinoid hyperemesis syndrome) vs less likely IBD, celiac disease.   If patient's stool testing is negative for C diff (or patient without recurrent diarrhea to provide sample), will plan for EGD and colonoscopy on Thursday to further evaluate.   Plan discussed with patient and aunt at bedside.

## 2021-07-13 NOTE — CONSULT NOTE ADULT - SUBJECTIVE AND OBJECTIVE BOX
HPI:  30F PMH with asthma, cannabis use, H pylori infection, and diverticulosis presenting for further evaluation in setting of ongoing abdominal pain, N, V and poor appetite since May. Around that time, patient reports being admitted in [place] for diverticulitis and getting terated with antibiotics but not improving afterwards. Since the, she has been having intermittent abdominal pain located mostly in the left side associated with intermittent N and V. BOth her Abd pain and N/V gets exacerbated with PO intake. SHe has been having 4 watery/losse BM - 8 BM per day once she eats. ROS+ night sweat, 20 lbs weight loss, poor appetite. Denies any blood in the stool or recent sick contact.    Allergies:  No Known Allergies      Hospital Medications:  acetaminophen   Tablet .. 650 milliGRAM(s) Oral every 6 hours PRN  enoxaparin Injectable 40 milliGRAM(s) SubCutaneous daily  ondansetron Injectable 4 milliGRAM(s) IV Push every 8 hours PRN  pantoprazole    Tablet 40 milliGRAM(s) Oral before breakfast  sodium chloride 0.9%. 1000 milliLiter(s) IV Continuous <Continuous>  sucralfate 1 Gram(s) Oral every 12 hours PRN      PMHX/PSHX:  Migraines    Asthma    Anemia    Rhabdomyolysis    Seasonal allergies    No significant past surgical history    S/P arthroscopy of shoulder      Family history:  FH: hypertension (Mother)    FH: hypothyroidism (Mother)    Family hx of colon cancer (Grandparent)        Social History: no smoking, + cannabis and ETOH use    ROS:   All system negative except as mentioned above.      PHYSICAL EXAM:   GENERAL:  NAD, Appears stated age  HEENT:  NC/AT,  conjunctivae clear and pink, sclera -anicteric  CHEST:  CTA B/L, Normal effort  HEART:  RRR S1/S2,  ABDOMEN:  tenderness in Left side of abdomen, no rebound tenderness  EXTREMITIES:  No cyanosis or Edema  SKIN:  Warm & Dry. No rash or erythema  NEURO:  Alert, oriented, no focal deficit    Vital Signs:  Vital Signs Last 24 Hrs  T(C): 36.7 (2021 13:10), Max: 37.1 (2021 23:30)  T(F): 98 (2021 13:10), Max: 98.8 (2021 23:30)  HR: 61 (2021 13:10) (59 - 71)  BP: 120/73 (2021 13:10) (120/73 - 142/67)  BP(mean): --  RR: 18 (2021 13:10) (17 - 18)  SpO2: 100% (2021 13:10) (100% - 100%)  Daily     Daily     LABS:                        12.8   10.86 )-----------( 338      ( 2021 07:03 )             38.3     Mean Cell Volume: 91.8 fL (- @ 07:03)    -    140  |  106  |  3<L>  ----------------------------<  73  3.7   |  20<L>  |  0.79    Ca    9.1      2021 07:03  Phos  2.1       Mg     1.90         TPro  7.2  /  Alb  4.4  /  TBili  0.9  /  DBili  x   /  AST  12  /  ALT  14  /  AlkPhos  54  0713    LIVER FUNCTIONS - ( 2021 07:03 )  Alb: 4.4 g/dL / Pro: 7.2 g/dL / ALK PHOS: 54 U/L / ALT: 14 U/L / AST: 12 U/L / GGT: x             Urinalysis Basic - ( 2021 21:05 )    Color: Yellow / Appearance: Slightly Turbid / S.027 / pH: x  Gluc: x / Ketone: Small  / Bili: Negative / Urobili: 3 mg/dL   Blood: x / Protein: Trace / Nitrite: Negative   Leuk Esterase: Negative / RBC: 1 /HPF / WBC 5 /HPF   Sq Epi: x / Non Sq Epi: 10 /HPF / Bacteria: Few      Amylase Serum--      Lipase serum19       Ammonia--                          12.8   10.86 )-----------( 338      ( 2021 07:03 )             38.3                         13.6   12.92 )-----------( 370      ( 2021 21:25 )             40.8     Imaging:  < from: CT Abdomen and Pelvis w/ IV Cont (21 @ 22:05) >  FINDINGS:    LOWER CHEST: Within normal limits.  LIVER: Right hepatic subcentimeter hypodensity suggestive of hemangioma, but too small to characterize.  BILE DUCTS: Normal caliber.  GALLBLADDER: Within normal limits.  SPLEEN: Within normal limits.  PANCREAS: Within normal limits.  ADRENALS: Within normal limits.    KIDNEYS/URETERS: Within normal limits.  BLADDER: Within normal limits.  REPRODUCTIVE ORGANS: Small left ovarian cyst is suggested. Unremarkable right adnexa and uterus.    BOWEL: No bowel obstruction. Appendix is normal. Colonic diverticulosis without evidence for acute diverticulitis.  PERITONEUM: No free air.  VESSELS: Within normal limits.  RETROPERITONEUM/LYMPH NODES: No lymphadenopathy.  ABDOMINAL WALL: Linear circumferential opacity along the abdominal wall in the postsurgical correlate with history.  BONES: Within normal limits.    IMPRESSION:    Normal appendix. No bowel obstruction or free air.  Colonic diverticulosis without acute diverticulitis.    < end of copied text >

## 2021-07-13 NOTE — H&P ADULT - PROBLEM SELECTOR PLAN 1
Pt w/ reported hx H. Pylori s/p eradication at age 18, acute diverticulitis at [facility] 7/2020 s/p abx, w/ recurrent abd pain since 5/2021, sx worsening over 2 days  - LLQ abd pain w/ radiation to back, epigastric discomfort, w/ n/v/d, inability to tolerate po intake  - Requiring IV morphine, zofran in ED, s/p 2L NS  - CTAP revealing nml appendix, no bowel obstruction or free air, colonic diverticulosis w/o acute diverticulitis  - DDx w/ regard to GI source includes IBD, IBS Less likely infxs colitis given CTAP and lack of fever, however w/ leukocytosis  -  source less likely given lack of urinary sx and UA w/o overt c/f infxn  - GYN source less likely given on OCP, LMP 1 wk ago, w/o exacerbation by sexual activity or vaginal discharge, however check HCG  - clear liquid diet, advance diet as tolerated  - Zofran prn nausea, pain control prn  - monitor WBC, fever curve Pt w/ reported hx H. Pylori s/p eradication at age 18, acute diverticulitis at [facility] 7/2020 s/p abx, w/ recurrent abd pain since 5/2021, sx worsening over 2 days  - LLQ abd pain w/ radiation to back, epigastric discomfort, w/ n/v/d, inability to tolerate po intake  - Requiring IV morphine, zofran in ED, s/p 2L NS  - CTAP revealing nml appendix, no bowel obstruction or free air, colonic diverticulosis w/o acute diverticulitis  - DDx w/ regard to GI source includes IBD, IBS Less likely infxs colitis given CTAP and lack of fever, however w/ leukocytosis. Obtain stool Cx, GI PCR, O&P, C. diff PCR  -  source less likely given lack of urinary sx and UA w/o overt c/f infxn  - GYN source less likely given on OCP, LMP 1 wk ago, w/o exacerbation by sexual activity or vaginal discharge, however check HCG  - clear liquid diet, advance diet as tolerated  - Zofran prn nausea, pain control prn  - monitor WBC, fever curve Pt w/ reported hx H. Pylori s/p eradication at age 18, acute diverticulitis at [facility] 7/2020 s/p abx, w/ recurrent abd pain since 5/2021, sx worsening over 2 days  - LLQ abd pain w/ radiation to back, epigastric discomfort, w/ n/v/d, inability to tolerate po intake  - Requiring IV morphine, zofran in ED, s/p 2L NS  - CTAP revealing nml appendix, no bowel obstruction or free air, colonic diverticulosis w/o acute diverticulitis  - DDx w/ regard to GI source includes IBD, IBS Less likely infxs colitis given CTAP and lack of fever, however w/ leukocytosis. Obtain stool Cx, GI PCR, O&P, C. diff PCR, ESR, CRP.   - c/s GI, appreciate recs  -  source less likely given lack of urinary sx and UA w/o overt c/f infxn  - GYN source less likely given on OCP, LMP 1 wk ago, w/o exacerbation by sexual activity or vaginal discharge, however check HCG  - clear liquid diet, advance diet as tolerated  - Zofran prn nausea, pain control prn  - monitor WBC, fever curve

## 2021-07-13 NOTE — H&P ADULT - NSHPREVIEWOFSYSTEMS_GEN_ALL_CORE
REVIEW OF SYSTEMS:  CONSTITUTIONAL: No fever or chills +night sweats as per HPI  EYES: No visual disturbances  ENMT: No sinus or throat pain  RESPIRATORY: No shortness of breath or cough  CARDIOVASCULAR: No chest pain or dizziness  GASTROINTESTINAL: +as per HPI  GENITOURINARY: No dysuria or hematuria  NEUROLOGICAL: No headaches, loss of strength, numbness, or tremors  MUSCULOSKELETAL: No joint pain or swelling; No muscle, back, or extremity pain REVIEW OF SYSTEMS:    CONSTITUTIONAL: No weakness, fevers or chills  EYES/ENT: No visual changes;  No vertigo or throat pain   NECK: No pain or stiffness  RESPIRATORY: No cough, wheezing, hemoptysis; No shortness of breath  CARDIOVASCULAR: No chest pain or palpitations  GASTROINTESTINAL: +LLQ pain. No nausea, vomiting, or hematemesis; No diarrhea or constipation. No melena or hematochezia.  GENITOURINARY: No dysuria, frequency or hematuria  NEUROLOGICAL: No numbness or weakness  SKIN: No itching, burning, rashes, or lesions   All other review of systems is negative unless indicated above.

## 2021-07-13 NOTE — H&P ADULT - NSHPPHYSICALEXAM_GEN_ALL_CORE
PHYSICAL EXAM:  GENERAL: NAD, well-groomed, well-developed  HEAD: Atraumatic, Normocephalic  EYES: PERRL, +conjunctival injection b/l  ENMT: No tonsillar erythema, exudates, or enlargement; Moist mucous membranes  NECK: Supple  NERVOUS SYSTEM: Alert & Oriented X3, Good concentration; Motor Strength 5/5 B/L upper and lower extremities  CHEST/LUNG: Clear to auscultation bilaterally; No rales, rhonchi, wheezing, or rubs  HEART: Regular rate and rhythm; No murmurs, rubs, or gallops  ABDOMEN: Soft, Nondistended; Bowel sounds present +tender to palpation diffusely, most prominently in epigastric region. No guarding, rebound tenderness, or rigidity.   EXTREMITIES: 2+ Peripheral Pulses, No edema Vital Signs Last 24 Hrs  T(C): 37 (13 Jul 2021 04:07), Max: 37.1 (12 Jul 2021 23:30)  T(F): 98.6 (13 Jul 2021 04:07), Max: 98.8 (12 Jul 2021 23:30)  HR: 61 (13 Jul 2021 04:07) (59 - 64)  BP: 120/83 (13 Jul 2021 04:07) (120/83 - 142/67)  BP(mean): --  RR: 17 (13 Jul 2021 04:07) (17 - 18)  SpO2: 100% (13 Jul 2021 04:07) (100% - 100%)    PHYSICAL EXAM:  GENERAL: NAD, well-groomed, well-developed  HEAD: Atraumatic, Normocephalic  EYES: PERRL, +conjunctival injection b/l  ENMT: No tonsillar erythema, exudates, or enlargement; Moist mucous membranes  NECK: Supple  NERVOUS SYSTEM: Alert & Oriented X3, Good concentration; Motor Strength 5/5 B/L upper and lower extremities  CHEST/LUNG: Clear to auscultation bilaterally; No rales, rhonchi, wheezing, or rubs  HEART: Regular rate and rhythm; No murmurs, rubs, or gallops  ABDOMEN: Soft, Nondistended; Bowel sounds present +tender to palpation diffusely, most prominently in epigastric region. No guarding, rebound tenderness, or rigidity.   EXTREMITIES: 2+ Peripheral Pulses, No edema

## 2021-07-13 NOTE — H&P ADULT - NSICDXFAMILYHX_GEN_ALL_CORE_FT
FAMILY HISTORY:  Mother  Still living? Unknown  FH: hypertension, Age at diagnosis: Age Unknown  FH: hypothyroidism, Age at diagnosis: Age Unknown    Grandparent  Still living? Unknown  Family hx of colon cancer, Age at diagnosis: Age Unknown

## 2021-07-13 NOTE — H&P ADULT - HISTORY OF PRESENT ILLNESS
ED Course  VS: afebrile Tmax 37.1C, HR 59-64, hypertensive BP 120s-140s/60s-90s, RR 18, SpO2% 100 on RA  Labs: CBC leukocytosis 12.9; CMP low bicarb 21, lipase wnl  Micro: UA not c/f infxn; pending RVP w/ SARS COV2  Imaging: CTAP nml appendix, no bowel obstruction or free air, colonic diverticulosis w/o acute diverticulitis  Received: morphine IV 2mg, 4mg; zofran IV 4mg x 2, NS 2L IV     Admitted to medicine for further evaluation and management 30F PMH diverticulitis, asthma, migraines, seasonal allergies, who presents iso acute exacerbation of chronic abd pain. Pt describes hx of epigastric pain at age 18 iso H. Pylori infxn s/p tx x 2 w/ [facility] (does not recall physician name), followed by episode of diverticulitis dx during inpt hospitalization at [facility] 7/2020 iso LLQ abd pain. While traveling to [place] 5/2021 for extended vacation pt reported to ED for abd pain, found to have c/f diverticulitis, tx w/ abx however pain did not subside, requiring multiple ED presentations w/o formal dx. Since this time, she describes nausea, emesis (color of food, foamy w/o blood or bile), progressive inability to tolerate solid food, diarrhea described as loose and floating w/ color dark at first but progressively normal in color w/o exceedingly foul odor, LLQ abd pain w/o clear provoking factor, of sharp quality, radiating to back at times, of 7-10/10 severity, waxing and waning in nature, abd distension, epigastric discomfort. Pt states that she presented to ED today due to exacerbation of pain x 2 days w/ night sweats, increase in frequency of emesis (10x ON), and diarrhea (10x ON), w/o any po intake today.     ED Course  VS: afebrile Tmax 37.1C, HR 59-64, hypertensive BP 120s-140s/60s-90s, RR 18, SpO2% 100 on RA  Labs: CBC leukocytosis 12.9; CMP low bicarb 21, lipase wnl  Micro: UA not c/f infxn; pending RVP w/ SARS COV2  Imaging: CTAP nml appendix, no bowel obstruction or free air, colonic diverticulosis w/o acute diverticulitis  Received: morphine IV 2mg, 4mg; zofran IV 4mg x 2, NS 2L IV     Admitted to medicine for further evaluation and management 30F PMH diverticulitis, asthma, migraines, seasonal allergies, who presents iso acute exacerbation of chronic abd pain. Pt describes hx of epigastric pain at age 18 iso H. Pylori infxn s/p tx x 2 w/ [facility] (does not recall physician name), followed by episode of diverticulitis dx during inpt hospitalization at [facility] 7/2020 iso LLQ abd pain. While traveling to [place] 5/2021 for extended vacation pt reported to ED for abd pain, found to have c/f diverticulitis, tx w/ abx however pain did not subside, requiring multiple ED presentations w/o formal dx. Since this time, she describes nausea, emesis (color of food, foamy w/o blood or bile), progressive inability to tolerate solid food, diarrhea described as loose and floating w/ color dark at first but progressively normal in color w/o exceedingly foul odor, LLQ abd pain w/o clear provoking factor, of sharp quality, radiating to back at times, of 7-10/10 severity, waxing and waning in nature, abd distension, epigastric discomfort. LMP last wk, on oral contraceptive pills, pt denies worsening of abd pain w/ sexual intercourse, or vaginal discharge. Otherwise denies fever, chills, rhinorrhea, odynophagia, dysphagia, CP, SOB, dysuria/hematuria, or numbness/weakness/tingling.     Pt states that she presented to ED today due to exacerbation of pain x 2 days w/ night sweats, increase in frequency of emesis (10x ON), and diarrhea (10x ON), w/o any po intake today.     ED Course  VS: afebrile Tmax 37.1C, HR 59-64, hypertensive BP 120s-140s/60s-90s, RR 18, SpO2% 100 on RA  Labs: CBC leukocytosis 12.9; CMP low bicarb 21, lipase wnl  Micro: UA not c/f infxn; pending RVP w/ SARS COV2  Imaging: CTAP nml appendix, no bowel obstruction or free air, colonic diverticulosis w/o acute diverticulitis  Received: morphine IV 2mg, 4mg; zofran IV 4mg x 2, NS 2L IV     Admitted to medicine for further evaluation and management 30F PMH diverticulitis, asthma, migraines, seasonal allergies, who presents iso acute exacerbation of chronic abd pain. Pt describes hx of epigastric pain at age 18 iso H. Pylori infxn s/p tx x 2 w/ [facility] (does not recall physician name), followed by episode of diverticulitis dx during inpt hospitalization at [facility] 7/2020 iso LLQ abd pain. While traveling to [place] 5/2021 for extended vacation pt reported to ED for abd pain, found to have c/f diverticulitis, tx w/ abx however pain did not subside, requiring multiple ED presentations w/o formal dx. Since this time, she describes nausea, emesis (color of food, foamy w/o blood or bile), progressive inability to tolerate solid food, diarrhea described as loose and floating w/ color dark at first but progressively normal in color w/o exceedingly foul odor, LLQ abd pain w/o clear provoking factor, of sharp quality, radiating to back at times, of 7-10/10 severity, waxing and waning in nature, abd distension, epigastric discomfort. LMP last wk, on oral contraceptive pills, pt denies worsening of abd pain w/ sexual intercourse, or vaginal discharge. Otherwise denies fever, chills, rhinorrhea, odynophagia, dysphagia, CP, SOB, dysuria/hematuria, or numbness/weakness/tingling.     Pt states that she presented to ED today due to exacerbation of pain x 2 days w/ night sweats, increase in frequency of emesis (10x ON), and diarrhea (10x ON), w/o any po intake today. Of note reports Marijuana use since 05/2021 to stimulate appetite, reports some resolution of sx w/ hot showers.     ED Course  VS: afebrile Tmax 37.1C, HR 59-64, hypertensive BP 120s-140s/60s-90s, RR 18, SpO2% 100 on RA  Labs: CBC leukocytosis 12.9; CMP low bicarb 21, lipase wnl  Micro: UA not c/f infxn; pending RVP w/ SARS COV2  Imaging: CTAP nml appendix, no bowel obstruction or free air, colonic diverticulosis w/o acute diverticulitis  Received: morphine IV 2mg, 4mg; zofran IV 4mg x 2, NS 2L IV     Admitted to medicine for further evaluation and management 30F PMH diverticulitis, asthma, migraines, seasonal allergies, who presents iso acute exacerbation of chronic abd pain. Pt describes hx of epigastric pain at age 18 iso H. Pylori infxn s/p tx x 2 w/ [facility] (does not recall physician name), followed by episode of diverticulitis dx during inpt hospitalization at [facility] 7/2020 iso LLQ abd pain. While traveling to [place] 5/2021 for extended vacation pt reported to ED for abd pain, found to have c/f diverticulitis, tx w/ abx however pain did not subside, requiring multiple ED presentations w/o formal dx. Since this time, she describes nausea, emesis (color of food, foamy w/o blood or bile), progressive inability to tolerate solid food, diarrhea described as loose and floating w/ color dark at first but progressively normal in color w/o exceedingly foul odor, LLQ abd pain w/o clear provoking factor, of sharp quality, radiating to back at times, of 7-10/10 severity, waxing and waning in nature, abd distension, epigastric discomfort. Pt reports referral to GI recently for endoscopy however per insurance issues was not able to present. LMP last wk, on oral contraceptive pills, pt denies worsening of abd pain w/ sexual intercourse, or vaginal discharge. Otherwise denies fever, chills, rhinorrhea, odynophagia, dysphagia, CP, SOB, dysuria/hematuria, or numbness/weakness/tingling.     Pt states that she presented to ED today due to exacerbation of pain x 2 days w/ night sweats, increase in frequency of emesis (10x ON), and diarrhea (10x ON), w/o any po intake today. Of note reports Marijuana use since 05/2021 to stimulate appetite, reports some resolution of sx w/ hot showers.     ED Course  VS: afebrile Tmax 37.1C, HR 59-64, hypertensive BP 120s-140s/60s-90s, RR 18, SpO2% 100 on RA  Labs: CBC leukocytosis 12.9; CMP low bicarb 21, lipase wnl  Micro: UA not c/f infxn; pending RVP w/ SARS COV2  Imaging: CTAP nml appendix, no bowel obstruction or free air, colonic diverticulosis w/o acute diverticulitis  Received: morphine IV 2mg, 4mg; zofran IV 4mg x 2, NS 2L IV     Admitted to medicine for further evaluation and management 30F PMH diverticulitis,H. pylori infection, asthma, migraines, seasonal allergies, who presents iso acute exacerbation of chronic abd pain. Pt describes hx of epigastric pain at age 18 iso H. Pylori infxn s/p tx x 2 w/ [facility] (does not recall physician name), followed by episode of diverticulitis dx during inpt hospitalization at [facility] 7/2020 iso LLQ abd pain. While traveling to [place] 5/2021 for extended vacation pt reported to ED for abd pain, found to have c/f diverticulitis, tx w/ abx however pain did not subside, requiring multiple ED presentations w/o formal dx. Since this time, she describes nausea, emesis (color of food, foamy w/o blood or bile), progressive inability to tolerate solid food, diarrhea described as loose and floating w/ color dark at first but progressively normal in color w/o exceedingly foul odor, LLQ abd pain w/o clear provoking factor, of sharp quality, radiating to back at times, of 7-10/10 severity, waxing and waning in nature, abd distension, epigastric discomfort. Pt reports referral to GI recently for endoscopy however per insurance issues was not able to present. LMP last wk, on oral contraceptive pills, pt denies worsening of abd pain w/ sexual intercourse, or vaginal discharge. Otherwise denies fever, chills, rhinorrhea, odynophagia, dysphagia, CP, SOB, dysuria/hematuria, or numbness/weakness/tingling.     Pt states that she presented to ED today due to exacerbation of pain x 2 days w/ night sweats, increase in frequency of emesis (10x ON), and diarrhea (10x ON), w/o any po intake today. Of note reports Marijuana use since 05/2021 to stimulate appetite, reports some resolution of sx w/ hot showers.     ED Course  VS: afebrile Tmax 37.1C, HR 59-64, hypertensive BP 120s-140s/60s-90s, RR 18, SpO2% 100 on RA  Labs: CBC leukocytosis 12.9; CMP low bicarb 21, lipase wnl  Micro: UA not c/f infxn; pending RVP w/ SARS COV2  Imaging: CTAP nml appendix, no bowel obstruction or free air, colonic diverticulosis w/o acute diverticulitis  Received: morphine IV 2mg, 4mg; zofran IV 4mg x 2, NS 2L IV     Admitted to medicine for further evaluation and management

## 2021-07-13 NOTE — CONSULT NOTE ADULT - ASSESSMENT
30F PMH with asthma, cannabis use, H pylori infection, and diverticulosis presenting with intermittent left sided abdominal pain associated with chronic nausea, vomiting, and diarrhea.       #Lt sided Abdominal pain associated with nausea and vomiting  #Chronic diarrhea     Differential diagnosis includes: IBD vs segmental colitis associated with diverticulosis vs infectious etiology (less likely) vs Celiac disease vs IBS-D.    Recommendations:  -Infectious Stool studies: (C diff, stool culture, stool PCR, O+P)  -Reasonable to obtain HIV  -Please obtain B12, iron panel, ferritin, and folate to assess for malabsorption.  -CRP, ESR, and fecal calprotectin to assess for IBD  -tTG-IgA and IgA level to assess for Celiac disease  -Please keep on clear liquid tomorrow, will plan for EGD and Colonoscopy on Thursday      Elías Kennedy  Gastroenterology Fellow  Pager: [phone]  Please call answering service [phone] / on-call GI fellow after 5pm and before 8am, and on weekends. 30F PMH with asthma, cannabis use, H pylori infection, and diverticulosis presenting with intermittent left sided abdominal pain associated with chronic nausea, vomiting, and diarrhea.     #Lt sided Abdominal pain associated with nausea and vomiting  #Chronic diarrhea     Differential diagnosis includes: IBD vs segmental colitis associated with diverticulosis vs infectious etiology (less likely) vs Celiac disease vs IBS-D.    Recommendations:  -Infectious Stool studies: (C diff, stool culture, stool PCR, O+P)  -Reasonable to obtain HIV  -Please obtain B12, iron panel, ferritin, and folate to assess for malabsorption.  -CRP, ESR, and fecal calprotectin to assess for IBD  -tTG-IgA and IgA level to assess for Celiac disease  -Please keep on clear liquid tomorrow for tentatively EGD and Colonoscopy on Thursday    Elías Kennedy MD  Gastroenterology Fellow  Pager: [phone]  Please call answering service [phone] / on-call GI fellow after 5pm and before 8am, and on weekends.

## 2021-07-13 NOTE — H&P ADULT - NSICDXPASTMEDICALHX_GEN_ALL_CORE_FT
PAST MEDICAL HISTORY:  Anemia     Asthma     Migraines     Rhabdomyolysis     Seasonal allergies

## 2021-07-13 NOTE — H&P ADULT - ASSESSMENT
30F PMH diverticulitis, asthma, migraines, seasonal allergies, who presents iso acute exacerbation of chronic abd pain w/ n/v/d, inability to tolerate po intake 30F PMH diverticulitis, asthma, migraines, seasonal allergies, who presents iso acute exacerbation of chronic abd pain w/ n/v/d, inability to tolerate po intake, most likely due to unlikely ?inflammatory colitis.

## 2021-07-13 NOTE — H&P ADULT - NSHPSOCIALHISTORY_GEN_ALL_CORE
Denies tobacco use, infrequent social EtOH, Marijuana use since 05/2021 to stimulate appetite. Lives in house, independent in ADLs. 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 Immigrated from [place] at age 7, travels to [place] often.

## 2021-07-13 NOTE — PROGRESS NOTE ADULT - ASSESSMENT
30F PMH diverticulitis, asthma, migraines, seasonal allergies, who presents w/ acute exacerbation of chronic abd pain w/ n/v/d, inability to tolerate po intake, most likely due to unlikely ?inflammatory colitis.

## 2021-07-13 NOTE — PROGRESS NOTE ADULT - SUBJECTIVE AND OBJECTIVE BOX
PROGRESS NOTE:   Authored by Lauren Peralta, MS4 Pager: [phone]    Patient is a 30y old  Female who presents with a chief complaint of Abd pain (2021 03:06)      SUBJECTIVE / OVERNIGHT EVENTS: No acute events overnight. Pt seen and examined at bedside. She states that she has felt better since about 4AM with no episodes of vomiting. She continues to have 7/10 LLQ pain     ADDITIONAL REVIEW OF SYSTEMS:    MEDICATIONS  (STANDING):  enoxaparin Injectable 40 milliGRAM(s) SubCutaneous daily  pantoprazole    Tablet 40 milliGRAM(s) Oral before breakfast  sodium chloride 0.9%. 1000 milliLiter(s) (75 mL/Hr) IV Continuous <Continuous>    MEDICATIONS  (PRN):  acetaminophen   Tablet .. 650 milliGRAM(s) Oral every 6 hours PRN Temp greater or equal to 38C (100.4F), Mild Pain (1 - 3), Moderate Pain (4 - 6)  ondansetron Injectable 4 milliGRAM(s) IV Push every 8 hours PRN Nausea and/or Vomiting  sucralfate 1 Gram(s) Oral every 12 hours PRN abdominal pain      CAPILLARY BLOOD GLUCOSE        I&O's Summary      PHYSICAL EXAM:  Vital Signs Last 24 Hrs  T(C): 37 (2021 04:07), Max: 37.1 (2021 23:30)  T(F): 98.6 (2021 04:07), Max: 98.8 (2021 23:30)  HR: 61 (2021 04:07) (59 - 64)  BP: 120/83 (2021 04:07) (120/83 - 142/67)  BP(mean): --  RR: 17 (2021 04:07) (17 - 18)  SpO2: 100% (2021 04:07) (100% - 100%)    GENERAL: No acute distress, well-developed  HEAD:  Atraumatic, Normocephalic  EYES: EOMI, PERRLA, conjunctiva and sclera clear  NECK: Supple, no lymphadenopathy, no JVD  CHEST/LUNG: CTAB; No wheezes, rales, or rhonchi  HEART: Regular rate and rhythm; No murmurs, rubs, or gallops  ABDOMEN: Soft, non-tender, non-distended; normal bowel sounds, no organomegaly  EXTREMITIES:  2+ peripheral pulses b/l, No clubbing, cyanosis, or edema  NEUROLOGY: A&O x 3, no focal deficits  SKIN: No rashes or lesions    LABS:                        12.8   10.86 )-----------( 338      ( 2021 07:03 )             38.3     07-13    140  |  106  |  3<L>  ----------------------------<  73  3.7   |  20<L>  |  0.79    Ca    9.1      2021 07:03  Phos  2.1     07-13  Mg     1.90     07-    TPro  7.2  /  Alb  4.4  /  TBili  0.9  /  DBili  x   /  AST  12  /  ALT  14  /  AlkPhos  54  07-13          Urinalysis Basic - ( 2021 21:05 )    Color: Yellow / Appearance: Slightly Turbid / S.027 / pH: x  Gluc: x / Ketone: Small  / Bili: Negative / Urobili: 3 mg/dL   Blood: x / Protein: Trace / Nitrite: Negative   Leuk Esterase: Negative / RBC: 1 /HPF / WBC 5 /HPF   Sq Epi: x / Non Sq Epi: 10 /HPF / Bacteria: Few          RADIOLOGY & ADDITIONAL TESTS:  Results Reviewed:   Imaging Personally Reviewed:  Electrocardiogram Personally Reviewed:    COORDINATION OF CARE:  Care Discussed with Consultants/Other Providers [Y/N]:  Prior or Outpatient Records Reviewed [Y/N]:   PROGRESS NOTE:   Authored by Lauren Peralta, MS4 Pager: [phone]    Patient is a 30y old  Female who presents with a chief complaint of Abd pain (2021 03:06)      SUBJECTIVE / OVERNIGHT EVENTS: No acute events overnight. Pt seen and examined at bedside. She states that she has felt better since about 4AM with no episodes of vomiting. She continues to have 7/10 LLQ pain. She denies chest pain, shortness of breath, leg pain. Pt reports that she has not had any bowel movements since being in the hospital. She will try to have PO intake today and notes 20lb weight loss due to poor PO intake in the past 2 months. Marijuana use ~2-3x per week helps improve her symptoms.     ADDITIONAL REVIEW OF SYSTEMS:    MEDICATIONS  (STANDING):  enoxaparin Injectable 40 milliGRAM(s) SubCutaneous daily  pantoprazole    Tablet 40 milliGRAM(s) Oral before breakfast  sodium chloride 0.9%. 1000 milliLiter(s) (75 mL/Hr) IV Continuous <Continuous>    MEDICATIONS  (PRN):  acetaminophen   Tablet .. 650 milliGRAM(s) Oral every 6 hours PRN Temp greater or equal to 38C (100.4F), Mild Pain (1 - 3), Moderate Pain (4 - 6)  ondansetron Injectable 4 milliGRAM(s) IV Push every 8 hours PRN Nausea and/or Vomiting  sucralfate 1 Gram(s) Oral every 12 hours PRN abdominal pain      CAPILLARY BLOOD GLUCOSE        I&O's Summary      PHYSICAL EXAM:  Vital Signs Last 24 Hrs  T(C): 37 (2021 04:07), Max: 37.1 (2021 23:30)  T(F): 98.6 (2021 04:07), Max: 98.8 (2021 23:30)  HR: 61 (2021 04:07) (59 - 64)  BP: 120/83 (2021 04:07) (120/83 - 142/67)  BP(mean): --  RR: 17 (2021 04:07) (17 - 18)  SpO2: 100% (2021 04:07) (100% - 100%)    GENERAL: No acute distress, well-developed  HEAD:  Atraumatic, Normocephalic  EYES: EOMI, PERRLA, conjunctiva and sclera clear  NECK: Supple, no lymphadenopathy, no JVD  CHEST/LUNG: CTAB; No wheezes, rales, or rhonchi  HEART: Regular rate and rhythm; No murmurs, rubs, or gallops  ABDOMEN: Soft, LLQ > LUQ tenderness to palpation, voluntary guarding, non-distended; normal bowel sounds, no organomegaly  EXTREMITIES:  2+ peripheral pulses b/l, No clubbing, cyanosis, or edema  NEUROLOGY: A&O x 3, no focal deficits    LABS:                        12.8   10.86 )-----------( 338      ( 2021 07:03 )             38.3     07-13    140  |  106  |  3<L>  ----------------------------<  73  3.7   |  20<L>  |  0.79    Ca    9.1      2021 07:03  Phos  2.1     07-13  Mg     1.90     07-13    TPro  7.2  /  Alb  4.4  /  TBili  0.9  /  DBili  x   /  AST  12  /  ALT  14  /  AlkPhos  54  07-13          Urinalysis Basic - ( 2021 21:05 )    Color: Yellow / Appearance: Slightly Turbid / S.027 / pH: x  Gluc: x / Ketone: Small  / Bili: Negative / Urobili: 3 mg/dL   Blood: x / Protein: Trace / Nitrite: Negative   Leuk Esterase: Negative / RBC: 1 /HPF / WBC 5 /HPF   Sq Epi: x / Non Sq Epi: 10 /HPF / Bacteria: Few          RADIOLOGY & ADDITIONAL TESTS:  Results Reviewed:   Imaging Personally Reviewed:  Electrocardiogram Personally Reviewed:    COORDINATION OF CARE:  Care Discussed with Consultants/Other Providers [Y/N]:  Prior or Outpatient Records Reviewed [Y/N]:   PROGRESS NOTE:   Authored by Lauren Peralta, MS4 Pager: [phone]    Patient is a 30y old  Female who presents with a chief complaint of Abd pain (2021 03:06)      SUBJECTIVE / OVERNIGHT EVENTS: No acute events overnight. Pt seen and examined at bedside. She states that she has felt better since about 4AM with no episodes of vomiting. She continues to have 7/10 LLQ pain. She denies chest pain, shortness of breath, leg pain. Pt reports that she has not had any bowel movements since being in the hospital. She will try to have PO intake today and notes 20lb weight loss due to poor PO intake in the past 2 months. Marijuana use ~2-3x per week helps improve her symptoms. Nausea/vomiting preceded marijuana use.     ADDITIONAL REVIEW OF SYSTEMS:    MEDICATIONS  (STANDING):  enoxaparin Injectable 40 milliGRAM(s) SubCutaneous daily  pantoprazole    Tablet 40 milliGRAM(s) Oral before breakfast  sodium chloride 0.9%. 1000 milliLiter(s) (75 mL/Hr) IV Continuous <Continuous>    MEDICATIONS  (PRN):  acetaminophen   Tablet .. 650 milliGRAM(s) Oral every 6 hours PRN Temp greater or equal to 38C (100.4F), Mild Pain (1 - 3), Moderate Pain (4 - 6)  ondansetron Injectable 4 milliGRAM(s) IV Push every 8 hours PRN Nausea and/or Vomiting  sucralfate 1 Gram(s) Oral every 12 hours PRN abdominal pain      CAPILLARY BLOOD GLUCOSE        I&O's Summary      PHYSICAL EXAM:  Vital Signs Last 24 Hrs  T(C): 37 (2021 04:07), Max: 37.1 (2021 23:30)  T(F): 98.6 (2021 04:07), Max: 98.8 (2021 23:30)  HR: 61 (2021 04:07) (59 - 64)  BP: 120/83 (2021 04:07) (120/83 - 142/67)  BP(mean): --  RR: 17 (2021 04:07) (17 - 18)  SpO2: 100% (2021 04:07) (100% - 100%)    GENERAL: No acute distress, well-developed  HEAD:  Atraumatic, Normocephalic  EYES: EOMI, PERRLA, conjunctiva and sclera clear  NECK: Supple, no lymphadenopathy, no JVD  CHEST/LUNG: CTAB; No wheezes, rales, or rhonchi  HEART: Regular rate and rhythm; No murmurs, rubs, or gallops  ABDOMEN: Soft, LLQ > LUQ tenderness to palpation, voluntary guarding, non-distended; normal bowel sounds, no organomegaly  EXTREMITIES:  2+ peripheral pulses b/l, No clubbing, cyanosis, or edema  NEUROLOGY: A&O x 3, no focal deficits    LABS:                        12.8   10.86 )-----------( 338      ( 2021 07:03 )             38.3     07-13    140  |  106  |  3<L>  ----------------------------<  73  3.7   |  20<L>  |  0.79    Ca    9.1      2021 07:03  Phos  2.1     07-13  Mg     1.90     07-13    TPro  7.2  /  Alb  4.4  /  TBili  0.9  /  DBili  x   /  AST  12  /  ALT  14  /  AlkPhos  54  07-13          Urinalysis Basic - ( 2021 21:05 )    Color: Yellow / Appearance: Slightly Turbid / S.027 / pH: x  Gluc: x / Ketone: Small  / Bili: Negative / Urobili: 3 mg/dL   Blood: x / Protein: Trace / Nitrite: Negative   Leuk Esterase: Negative / RBC: 1 /HPF / WBC 5 /HPF   Sq Epi: x / Non Sq Epi: 10 /HPF / Bacteria: Few          RADIOLOGY & ADDITIONAL TESTS:  Results Reviewed:   Imaging Personally Reviewed:  Electrocardiogram Personally Reviewed:    COORDINATION OF CARE:  Care Discussed with Consultants/Other Providers [Y/N]:  Prior or Outpatient Records Reviewed [Y/N]:

## 2021-07-13 NOTE — H&P ADULT - PROBLEM SELECTOR PLAN 4
1. Name of PCP  2. PCP Contacted on Admission [ ] Y [ ] N [ ] N/A  3. PCP Contacted on Discharge [ ] Y [ ] N [ ] N/A  4. Post discharge appointment Date and Location:  5. Summary of Handoff Given to PCP [ ] Y [ ] N 1. Name of PCP: Dr. Zaldivar  2. PCP Contacted on Admission [ ] Y [ ] N [ ] N/A  3. PCP Contacted on Discharge [ ] Y [ ] N [ ] N/A  4. Post discharge appointment Date and Location:  5. Summary of Handoff Given to PCP [ ] Y [ ] N

## 2021-07-14 ENCOUNTER — TRANSCRIPTION ENCOUNTER (OUTPATIENT)
Age: 31
End: 2021-07-14

## 2021-07-14 LAB
ALBUMIN SERPL ELPH-MCNC: 4.5 G/DL — SIGNIFICANT CHANGE UP (ref 3.3–5)
ALP SERPL-CCNC: 57 U/L — SIGNIFICANT CHANGE UP (ref 40–120)
ALT FLD-CCNC: 22 U/L — SIGNIFICANT CHANGE UP (ref 4–33)
ANION GAP SERPL CALC-SCNC: 16 MMOL/L — HIGH (ref 7–14)
AST SERPL-CCNC: 16 U/L — SIGNIFICANT CHANGE UP (ref 4–32)
BASOPHILS # BLD AUTO: 0.06 K/UL — SIGNIFICANT CHANGE UP (ref 0–0.2)
BASOPHILS NFR BLD AUTO: 0.7 % — SIGNIFICANT CHANGE UP (ref 0–2)
BILIRUB SERPL-MCNC: 1.2 MG/DL — SIGNIFICANT CHANGE UP (ref 0.2–1.2)
BUN SERPL-MCNC: 4 MG/DL — LOW (ref 7–23)
CALCIUM SERPL-MCNC: 9.5 MG/DL — SIGNIFICANT CHANGE UP (ref 8.4–10.5)
CHLORIDE SERPL-SCNC: 104 MMOL/L — SIGNIFICANT CHANGE UP (ref 98–107)
CO2 SERPL-SCNC: 20 MMOL/L — LOW (ref 22–31)
COVID-19 SPIKE DOMAIN AB INTERP: POSITIVE
COVID-19 SPIKE DOMAIN ANTIBODY RESULT: >250 U/ML — HIGH
CREAT SERPL-MCNC: 0.82 MG/DL — SIGNIFICANT CHANGE UP (ref 0.5–1.3)
CRP SERPL-MCNC: 5.6 MG/L — HIGH
CULTURE RESULTS: SIGNIFICANT CHANGE UP
EOSINOPHIL # BLD AUTO: 0.08 K/UL — SIGNIFICANT CHANGE UP (ref 0–0.5)
EOSINOPHIL NFR BLD AUTO: 0.9 % — SIGNIFICANT CHANGE UP (ref 0–6)
FERRITIN SERPL-MCNC: 88 NG/ML — SIGNIFICANT CHANGE UP (ref 15–150)
FOLATE SERPL-MCNC: 9.9 NG/ML — SIGNIFICANT CHANGE UP (ref 3.1–17.5)
GLUCOSE SERPL-MCNC: 74 MG/DL — SIGNIFICANT CHANGE UP (ref 70–99)
HCG SERPL-ACNC: <5 MIU/ML — SIGNIFICANT CHANGE UP
HCT VFR BLD CALC: 40.5 % — SIGNIFICANT CHANGE UP (ref 34.5–45)
HGB BLD-MCNC: 13.6 G/DL — SIGNIFICANT CHANGE UP (ref 11.5–15.5)
IANC: 4.28 K/UL — SIGNIFICANT CHANGE UP (ref 1.5–8.5)
IGA FLD-MCNC: 143 MG/DL — SIGNIFICANT CHANGE UP (ref 70–400)
IGG FLD-MCNC: 1293 MG/DL — SIGNIFICANT CHANGE UP (ref 700–1600)
IGM SERPL-MCNC: 107 MG/DL — SIGNIFICANT CHANGE UP (ref 40–230)
IMM GRANULOCYTES NFR BLD AUTO: 0.2 % — SIGNIFICANT CHANGE UP (ref 0–1.5)
IRON SATN MFR SERPL: 32 % — SIGNIFICANT CHANGE UP (ref 14–50)
IRON SATN MFR SERPL: 93 UG/DL — SIGNIFICANT CHANGE UP (ref 30–160)
LYMPHOCYTES # BLD AUTO: 3.64 K/UL — HIGH (ref 1–3.3)
LYMPHOCYTES # BLD AUTO: 41.9 % — SIGNIFICANT CHANGE UP (ref 13–44)
MAGNESIUM SERPL-MCNC: 2 MG/DL — SIGNIFICANT CHANGE UP (ref 1.6–2.6)
MCHC RBC-ENTMCNC: 31.1 PG — SIGNIFICANT CHANGE UP (ref 27–34)
MCHC RBC-ENTMCNC: 33.6 GM/DL — SIGNIFICANT CHANGE UP (ref 32–36)
MCV RBC AUTO: 92.5 FL — SIGNIFICANT CHANGE UP (ref 80–100)
MONOCYTES # BLD AUTO: 0.6 K/UL — SIGNIFICANT CHANGE UP (ref 0–0.9)
MONOCYTES NFR BLD AUTO: 6.9 % — SIGNIFICANT CHANGE UP (ref 2–14)
NEUTROPHILS # BLD AUTO: 4.28 K/UL — SIGNIFICANT CHANGE UP (ref 1.8–7.4)
NEUTROPHILS NFR BLD AUTO: 49.4 % — SIGNIFICANT CHANGE UP (ref 43–77)
NRBC # BLD: 0 /100 WBCS — SIGNIFICANT CHANGE UP
NRBC # FLD: 0 K/UL — SIGNIFICANT CHANGE UP
PHOSPHATE SERPL-MCNC: 2.5 MG/DL — SIGNIFICANT CHANGE UP (ref 2.5–4.5)
PLATELET # BLD AUTO: 344 K/UL — SIGNIFICANT CHANGE UP (ref 150–400)
POTASSIUM SERPL-MCNC: 4.1 MMOL/L — SIGNIFICANT CHANGE UP (ref 3.5–5.3)
POTASSIUM SERPL-SCNC: 4.1 MMOL/L — SIGNIFICANT CHANGE UP (ref 3.5–5.3)
PROT SERPL-MCNC: 7.4 G/DL — SIGNIFICANT CHANGE UP (ref 6–8.3)
RBC # BLD: 4.38 M/UL — SIGNIFICANT CHANGE UP (ref 3.8–5.2)
RBC # FLD: 12.9 % — SIGNIFICANT CHANGE UP (ref 10.3–14.5)
SARS-COV-2 IGG+IGM SERPL QL IA: >250 U/ML — HIGH
SARS-COV-2 IGG+IGM SERPL QL IA: POSITIVE
SODIUM SERPL-SCNC: 140 MMOL/L — SIGNIFICANT CHANGE UP (ref 135–145)
SPECIMEN SOURCE: SIGNIFICANT CHANGE UP
TIBC SERPL-MCNC: 289 UG/DL — SIGNIFICANT CHANGE UP (ref 220–430)
UIBC SERPL-MCNC: 196 UG/DL — SIGNIFICANT CHANGE UP (ref 110–370)
VIT B12 SERPL-MCNC: 581 PG/ML — SIGNIFICANT CHANGE UP (ref 200–900)
WBC # BLD: 8.68 K/UL — SIGNIFICANT CHANGE UP (ref 3.8–10.5)
WBC # FLD AUTO: 8.68 K/UL — SIGNIFICANT CHANGE UP (ref 3.8–10.5)

## 2021-07-14 PROCEDURE — 99232 SBSQ HOSP IP/OBS MODERATE 35: CPT | Mod: GC

## 2021-07-14 RX ORDER — SUMATRIPTAN SUCCINATE 4 MG/.5ML
25 INJECTION, SOLUTION SUBCUTANEOUS ONCE
Refills: 0 | Status: COMPLETED | OUTPATIENT
Start: 2021-07-14 | End: 2021-07-14

## 2021-07-14 RX ORDER — SOD SULF/SODIUM/NAHCO3/KCL/PEG
4000 SOLUTION, RECONSTITUTED, ORAL ORAL ONCE
Refills: 0 | Status: COMPLETED | OUTPATIENT
Start: 2021-07-14 | End: 2021-07-14

## 2021-07-14 RX ADMIN — PANTOPRAZOLE SODIUM 40 MILLIGRAM(S): 20 TABLET, DELAYED RELEASE ORAL at 06:30

## 2021-07-14 RX ADMIN — Medication 1 GRAM(S): at 07:53

## 2021-07-14 RX ADMIN — SUMATRIPTAN SUCCINATE 25 MILLIGRAM(S): 4 INJECTION, SOLUTION SUBCUTANEOUS at 21:45

## 2021-07-14 RX ADMIN — SUMATRIPTAN SUCCINATE 25 MILLIGRAM(S): 4 INJECTION, SOLUTION SUBCUTANEOUS at 22:35

## 2021-07-14 RX ADMIN — ONDANSETRON 4 MILLIGRAM(S): 8 TABLET, FILM COATED ORAL at 16:39

## 2021-07-14 RX ADMIN — ONDANSETRON 4 MILLIGRAM(S): 8 TABLET, FILM COATED ORAL at 07:53

## 2021-07-14 RX ADMIN — Medication 4000 MILLILITER(S): at 16:36

## 2021-07-14 RX ADMIN — ENOXAPARIN SODIUM 40 MILLIGRAM(S): 100 INJECTION SUBCUTANEOUS at 12:30

## 2021-07-14 NOTE — DISCHARGE NOTE PROVIDER - NSDCCPTREATMENT_GEN_ALL_CORE_FT
PRINCIPAL PROCEDURE  Procedure: Colonoscopy and upper gastrointestinal endoscopy  Findings and Treatment: While in the hospital you had both an upper and lower endoscopy for evaluation of your abdominal pain, nausea, and vomiting. This revealed ______      SECONDARY PROCEDURE  Procedure: CT scan of abdomen and pelvis  Findings and Treatment: While you were in the ED, you had a CT of the abdomen and pelvis which showed diverticulosis, but no inflammation anywhere in the colon.     PRINCIPAL PROCEDURE  Procedure: Colonoscopy and upper gastrointestinal endoscopy  Findings and Treatment: While in the hospital you had both an upper and lower endoscopy for evaluation of your abdominal pain, nausea, and vomiting. This revealed  no findings that could explain the diarrhea. Moderate diverticulosis was seen in the entire colon. There was redness of the tissue  in the descending colon (left side of your large bowel) as well as congested tissue in the distal ileum, the end of the small intestine.  Biopsies were taken. Upper endoscopy showed parts of the esophagus  with changes suspicious for short-segment Dickens's esophagus, a condition that can be caused by chronic heartburn. Non-bleeding ulcers were seen in the stomach, possibly due to recent vomiting. Biopsies were taken for H. pylori testing. Please follow-up these biopsy results with your GI provider.      SECONDARY PROCEDURE  Procedure: CT scan of abdomen and pelvis  Findings and Treatment: While you were in the ED, you had a CT of the abdomen and pelvis which showed diverticulosis, but no inflammation anywhere in the colon.

## 2021-07-14 NOTE — DISCHARGE NOTE PROVIDER - CARE PROVIDER_API CALL
Trish Wade)  Gastroenterology; Internal Medicine  [phone] [place] [address]  Phone: [phone]  Fax: [phone]  Follow Up Time:

## 2021-07-14 NOTE — DISCHARGE NOTE PROVIDER - NSDCCPCAREPLAN_GEN_ALL_CORE_FT
PRINCIPAL DISCHARGE DIAGNOSIS  Diagnosis: Abdominal pain  Assessment and Plan of Treatment: You were treated for abdominal pain, which was mostly left sided. For this you received morphine while in the ED, as well as Tylenol and Toradol during the admission. You additionally received pantoprazole and sucralfate for upper abdominal pain.      SECONDARY DISCHARGE DIAGNOSES  Diagnosis: Nausea vomiting and diarrhea  Assessment and Plan of Treatment: You were treated for nausea and vomiting with Zofran. Additionally you were having diarrhea daily for 2 months prior to coming to the hospital, which resolved on its own while you were here.     PRINCIPAL DISCHARGE DIAGNOSIS  Diagnosis: Abdominal pain  Assessment and Plan of Treatment: You were treated for abdominal pain, which was mostly left sided. For this you received morphine while in the ED, as well as Tylenol and Toradol during the admission. You additionally received pantoprazole and sucralfate for upper abdominal pain. Please continue taking a low dose of pantoprazole until your GI doctor recommends that you discontinue it. We are waiting for the results of your endoscopy and colonoscopy biopsies to come out - please follow up outpatient with GI.      SECONDARY DISCHARGE DIAGNOSES  Diagnosis: Nausea vomiting and diarrhea  Assessment and Plan of Treatment: You were treated for nausea and vomiting with Zofran. Additionally you were having diarrhea daily for 2 months prior to coming to the hospital, which resolved on its own while you were here.    Diagnosis: Migraine  Assessment and Plan of Treatment: While you were in the hospital you had a few migraines, which were treated successfully with sumatriptan, which you had previously been taking for migraines.

## 2021-07-14 NOTE — DISCHARGE NOTE PROVIDER - NSDCMRMEDTOKEN_GEN_ALL_CORE_FT
acetaminophen 325 mg oral tablet: 2 tab(s) orally every 6 hours, As needed, Temp greater or equal to 38C (100.4F), Mild Pain (1 - 3), Moderate Pain (4 - 6)  loperamide 2 mg oral tablet: 1 tab(s) orally every 6 hours, As Needed for diarrhea  pantoprazole 40 mg oral delayed release tablet: 1 tab(s) orally once a day (before a meal)  psyllium 3.4 g/11 g oral powder for reconstitution: 1 gram(s) orally once a day, As Needed   Zofran 4 mg oral tablet: 1 tab(s) orally every 8 hours    acetaminophen 325 mg oral tablet: 2 tab(s) orally every 6 hours, As needed, Temp greater or equal to 38C (100.4F), Mild Pain (1 - 3), Moderate Pain (4 - 6)  loperamide 2 mg oral tablet: 1 tab(s) orally every 6 hours, As Needed for diarrhea  pantoprazole 40 mg oral delayed release tablet: 1 tab(s) orally once a day (before a meal)  psyllium 3.4 g/11 g oral powder for reconstitution: 1 gram(s) orally once a day, As Needed   SUMAtriptan 25 mg oral tablet: 1 tab(s) orally once a day, As Needed -for headache   Zofran 4 mg oral tablet: 1 tab(s) orally every 8 hours

## 2021-07-14 NOTE — PROGRESS NOTE ADULT - PROBLEM SELECTOR PLAN 3
Hold ppx given pt ambulatory  - pt on oral contraceptive Microgynon 30, which she gets from [place] pt's boyfriend to bring pill pack in today for verification w/pharmacy

## 2021-07-14 NOTE — PROGRESS NOTE ADULT - ASSESSMENT
30F PMH with asthma, cannabis use, H pylori infection, and diverticulosis presenting with intermittent left sided abdominal pain associated with chronic nausea, vomiting, and diarrhea.     #Lt sided Abdominal pain associated with nausea and vomiting  #Chronic diarrhea  #Wt loss    Differential diagnosis includes: segmental colitis associated with diverticulosis vs Celiac disease (tTG-IgA still pending) vs IBS-D. Normal inflammatory markers makes IBD and infectious etiology less likely.     Recommendations:  -Please keep on clear liquid today for EGD and Colonoscopy tomorrow.   -NPO after midnight.     Elías Kennedy MD  Gastroenterology Fellow  Pager: [phone]  Please call answering service [phone] / on-call GI fellow after 5pm and before 8am, and on weekends.      Elías Nelson MD   Gastroenterology Fellow  Pager: [phone]  Please call answering service [phone] / on-call GI fellow after 5pm and before 8am, and on weekends.

## 2021-07-14 NOTE — DISCHARGE NOTE PROVIDER - HOSPITAL COURSE
30F PMH diverticulitis,H. pylori infection, asthma, migraines, seasonal allergies, who presents iso acute exacerbation of chronic abd pain. Pt describes hx of epigastric pain at age 18 iso H. Pylori infxn s/p tx x 2 w/ [facility] (does not recall physician name), followed by episode of diverticulitis dx during inpt hospitalization at [facility] 7/2020 iso LLQ abd pain. While traveling to [place] 5/2021 for extended vacation pt reported to ED for abd pain, found to have c/f diverticulitis, tx w/ abx however pain did not subside, requiring multiple ED presentations w/o formal dx. Since this time, she describes nausea, emesis (color of food, foamy w/o blood or bile), progressive inability to tolerate solid food, diarrhea described as loose and floating w/ color dark at first but progressively normal in color w/o exceedingly foul odor, LLQ abd pain w/o clear provoking factor, of sharp quality, radiating to back at times, of 7-10/10 severity, waxing and waning in nature, abd distension, epigastric discomfort. Pt reports referral to GI recently for endoscopy however per insurance issues was not able to present. LMP last wk, on oral contraceptive pills, pt denies worsening of abd pain w/ sexual intercourse, or vaginal discharge. Otherwise denies fever, chills, rhinorrhea, odynophagia, dysphagia, CP, SOB, dysuria/hematuria, or numbness/weakness/tingling.     Pt states that she presented to ED today due to exacerbation of pain x 2 days w/ night sweats, increase in frequency of emesis (10x ON), and diarrhea (10x ON), w/o any po intake today. Of note reports Marijuana use since 05/2021 to stimulate appetite, reports some resolution of sx w/ hot showers.     ED Course  VS: afebrile Tmax 37.1C, HR 59-64, hypertensive BP 120s-140s/60s-90s, RR 18, SpO2% 100 on RA  Labs: CBC leukocytosis 12.9; CMP low bicarb 21, lipase wnl  Micro: UA not c/f infxn; pending RVP w/ SARS COV2  Imaging: CTAP nml appendix, no bowel obstruction or free air, colonic diverticulosis w/o acute diverticulitis  Received: morphine IV 2mg, 4mg; zofran IV 4mg x 2, NS 2L IV     Pt was admitted to medicine for further evaluation and management    ***  While on the medical floor, pt received Zofran for nausea as well as Tylenol and Toradol for pain. She additionally received pantoprazole and sucralfate for heartburn/epigastric pain. While in the hospital pt had a migraine, for which she received sumatriptan. She had a workup gastrointestinal infection, inflammatory bowel disease, and Celiac disease, which showed ___. EGD/Colonoscopy showed ____   30F PMH diverticulitis,H. pylori infection, asthma, migraines, seasonal allergies, who presents iso acute exacerbation of chronic abd pain. Pt describes hx of epigastric pain at age 18 iso H. Pylori infxn s/p tx x 2 w/ [facility] (does not recall physician name), followed by episode of diverticulitis dx during inpt hospitalization at [facility] 7/2020 iso LLQ abd pain. While traveling to [place] 5/2021 for extended vacation pt reported to ED for abd pain, found to have c/f diverticulitis, tx w/ abx however pain did not subside, requiring multiple ED presentations w/o formal dx. Since this time, she describes nausea, emesis (color of food, foamy w/o blood or bile), progressive inability to tolerate solid food, diarrhea described as loose and floating w/ color dark at first but progressively normal in color w/o exceedingly foul odor, LLQ abd pain w/o clear provoking factor, of sharp quality, radiating to back at times, of 7-10/10 severity, waxing and waning in nature, abd distension, epigastric discomfort. Pt reports referral to GI recently for endoscopy however per insurance issues was not able to present. LMP last wk, on oral contraceptive pills, pt denies worsening of abd pain w/ sexual intercourse, or vaginal discharge. Otherwise denies fever, chills, rhinorrhea, odynophagia, dysphagia, CP, SOB, dysuria/hematuria, or numbness/weakness/tingling.     Pt states that she presented to ED today due to exacerbation of pain x 2 days w/ night sweats, increase in frequency of emesis (10x ON), and diarrhea (10x ON), w/o any po intake today. Of note reports Marijuana use since 05/2021 to stimulate appetite, reports some resolution of sx w/ hot showers.     ED Course  VS: afebrile Tmax 37.1C, HR 59-64, hypertensive BP 120s-140s/60s-90s, RR 18, SpO2% 100 on RA  Labs: CBC leukocytosis 12.9; CMP low bicarb 21, lipase wnl  Micro: UA not c/f infxn; pending RVP w/ SARS COV2  Imaging: CTAP nml appendix, no bowel obstruction or free air, colonic diverticulosis w/o acute diverticulitis  Received: morphine IV 2mg, 4mg; zofran IV 4mg x 2, NS 2L IV     Pt was admitted to medicine for further evaluation and management    ***  While on the medical floor, pt received Zofran for nausea as well as Tylenol and Toradol for pain. She additionally received pantoprazole and sucralfate for heartburn/epigastric pain. While in the hospital pt had a migraine, for which she received sumatriptan. She had a workup gastrointestinal infection, inflammatory bowel disease, and Celiac disease, which showed ___. Colonoscopy showed no findings that can explain patient's diarrhea. Moderate diverticulosis in the entire colon. There was erythematous mucosa in the descending colon as well as congested mucosa int he distal ileum. Biopsies were taken. EGD showed irregular Z-line. Esophageal mucosal changes suspicious for short-segment Dickens's esophagus. Non-bleeding gastric ulcers in body, possibly in setting of recent vomiting. Normal examined duodenum. Biopsies were taken with a cold forceps for Helicobacter pylori testing.     Follow-up pathology reports. Consider trial of TCA for possible d/o of brain-gut axis. Consider nuclear medicine gastric emptying scan  continue daily low dose PPI    nucelar medicien gastric emptying scan (GES)    -sx persist as outpt,                          - Supportive care with anti-emetics PRN. 30F PMH diverticulitis,H. pylori infection, asthma, migraines, seasonal allergies, who presents iso acute exacerbation of chronic abd pain. Pt describes hx of epigastric pain at age 18 iso H. Pylori infxn s/p tx x 2 w/ [facility] (does not recall physician name), followed by episode of diverticulitis dx during inpt hospitalization at [facility] 7/2020 iso LLQ abd pain. While traveling to [place] 5/2021 for extended vacation pt reported to ED for abd pain, found to have c/f diverticulitis, tx w/ abx however pain did not subside, requiring multiple ED presentations w/o formal dx. Since this time, she describes nausea, emesis (color of food, foamy w/o blood or bile), progressive inability to tolerate solid food, diarrhea described as loose and floating w/ color dark at first but progressively normal in color w/o exceedingly foul odor, LLQ abd pain w/o clear provoking factor, of sharp quality, radiating to back at times, of 7-10/10 severity, waxing and waning in nature, abd distension, epigastric discomfort. Pt reports referral to GI recently for endoscopy however per insurance issues was not able to present. LMP last wk, on oral contraceptive pills, pt denies worsening of abd pain w/ sexual intercourse, or vaginal discharge. Otherwise denies fever, chills, rhinorrhea, odynophagia, dysphagia, CP, SOB, dysuria/hematuria, or numbness/weakness/tingling.     Pt states that she presented to ED today due to exacerbation of pain x 2 days w/ night sweats, increase in frequency of emesis (10x ON), and diarrhea (10x ON), w/o any po intake today. Of note reports Marijuana use since 05/2021 to stimulate appetite, reports some resolution of sx w/ hot showers.     ED Course  VS: afebrile Tmax 37.1C, HR 59-64, hypertensive BP 120s-140s/60s-90s, RR 18, SpO2% 100 on RA  Labs: CBC leukocytosis 12.9; CMP low bicarb 21, lipase wnl  Micro: UA not c/f infxn; pending RVP w/ SARS COV2  Imaging: CTAP nml appendix, no bowel obstruction or free air, colonic diverticulosis w/o acute diverticulitis  Received: morphine IV 2mg, 4mg; zofran IV 4mg x 2, NS 2L IV     Pt was admitted to medicine for further evaluation and management    ***  While on the medical floor, pt received Zofran for nausea as well as Tylenol and Toradol for pain. She additionally received pantoprazole and sucralfate for heartburn/epigastric pain. While in the hospital pt had migraines, for which she received sumatriptan. She had a workup gastrointestinal infection, inflammatory bowel disease, and Celiac disease, which were largely negative. Colonoscopy showed no findings that could explain patient's diarrhea. Moderate diverticulosis was seen in the entire colon. There was erythematous mucosa in the descending colon as well as congested mucosa in the  distal ileum and biopsies were taken. EGD showed irregular Z-line. Esophageal mucosal changes suspicious for short-segment Dickens's esophagus. Non-bleeding gastric ulcers in body, possibly in setting of recent vomiting. Biopsies were taken for Helicobacter pylori testing. 30F PMH diverticulitis,H. pylori infection, asthma, migraines, seasonal allergies, who presents iso acute exacerbation of chronic abd pain. Pt describes hx of epigastric pain at age 18 iso H. Pylori infxn s/p tx x 2 w/ [facility] (does not recall physician name), followed by episode of diverticulitis dx during inpt hospitalization at [facility] 7/2020 iso LLQ abd pain. While traveling to [place] 5/2021 for extended vacation pt reported to ED for abd pain, found to have c/f diverticulitis, tx w/ abx however pain did not subside, requiring multiple ED presentations w/o formal dx. Since this time, she describes nausea, emesis (color of food, foamy w/o blood or bile), progressive inability to tolerate solid food, diarrhea described as loose and floating w/ color dark at first but progressively normal in color w/o exceedingly foul odor, LLQ abd pain w/o clear provoking factor, of sharp quality, radiating to back at times, of 7-10/10 severity, waxing and waning in nature, abd distension, epigastric discomfort. Pt reports referral to GI recently for endoscopy however per insurance issues was not able to present. LMP last wk, on oral contraceptive pills, pt denies worsening of abd pain w/ sexual intercourse, or vaginal discharge. Otherwise denies fever, chills, rhinorrhea, odynophagia, dysphagia, CP, SOB, dysuria/hematuria, or numbness/weakness/tingling.     Pt states that she presented to ED today due to exacerbation of pain x 2 days w/ night sweats, increase in frequency of emesis (10x ON), and diarrhea (10x ON), w/o any po intake today. Of note reports Marijuana use since 05/2021 to stimulate appetite, reports some resolution of sx w/ hot showers.     ED Course  VS: afebrile Tmax 37.1C, HR 59-64, hypertensive BP 120s-140s/60s-90s, RR 18, SpO2% 100 on RA  Labs: CBC leukocytosis 12.9; CMP low bicarb 21, lipase wnl  Micro: UA not c/f infxn; pending RVP w/ SARS COV2  Imaging: CTAP nml appendix, no bowel obstruction or free air, colonic diverticulosis w/o acute diverticulitis  Received: morphine IV 2mg, 4mg; zofran IV 4mg x 2, NS 2L IV     Pt was admitted to medicine for further evaluation and management    ***  While on the medical floor, pt received Zofran for nausea as well as Tylenol and Toradol for pain. She additionally received pantoprazole and sucralfate for heartburn/epigastric pain. While in the hospital pt had migraines, for which she received sumatriptan. She had a workup gastrointestinal infection, inflammatory bowel disease, and Celiac disease, which were largely negative. Colonoscopy showed no findings that could explain patient's diarrhea. Moderate diverticulosis was seen in the entire colon. There was erythematous mucosa in the descending colon as well as congested mucosa in the  distal ileum and biopsies were taken. EGD showed irregular Z-line. Esophageal mucosal changes suspicious for short-segment Dickens's esophagus. Non-bleeding gastric ulcers in body, possibly in setting of recent vomiting. Biopsies were taken for Helicobacter pylori testing. Per GI, pt deemed stable for follow-up outpatient. On 7/16/21, patient deemed stable for discharge home.

## 2021-07-14 NOTE — PROGRESS NOTE ADULT - SUBJECTIVE AND OBJECTIVE BOX
Interval Events:   Patient continues reporting abdominal pain in Lt upper and lower quadrant. Although patient still has nausea, she has not experienced any vomiting. Had formed BM in the afternoon.    Hospital Medications:  acetaminophen   Tablet .. 650 milliGRAM(s) Oral every 6 hours PRN  enoxaparin Injectable 40 milliGRAM(s) SubCutaneous daily  ondansetron Injectable 4 milliGRAM(s) IV Push every 8 hours PRN  pantoprazole    Tablet 40 milliGRAM(s) Oral before breakfast  polyethylene glycol/electrolyte Solution. 4000 milliLiter(s) Oral once  sodium chloride 0.9%. 1000 milliLiter(s) IV Continuous <Continuous>  sucralfate 1 Gram(s) Oral every 12 hours PRN        ROS:   General:  No fevers, chills or night sweats  ENT:  No sore throat or dysphagia  CV:  No pain or palpitations  Resp:  No dyspnea, cough or  wheezing  GI:  as above  Skin:  No rash or edema  Neuro: no weakness   Hematologic: no bleeding  Musculoskeletal: no muscle pain or join pain  Psych: no agitation      PHYSICAL EXAM:   Vital Signs:  Vital Signs Last 24 Hrs  T(C): 36.8 (2021 14:14), Max: 36.9 (2021 21:34)  T(F): 98.3 (2021 14:14), Max: 98.5 (2021 21:34)  HR: 68 (2021 14:14) (57 - 89)  BP: 113/76 (2021 14:14) (113/76 - 123/70)  BP(mean): --  RR: 17 (2021 14:14) (16 - 18)  SpO2: 100% (2021 14:14) (100% - 100%)  Daily     Daily     GENERAL:  NAD, Appears stated age  HEENT:  NC/AT,  conjunctivae clear and pink, sclera -anicteric  CHEST:  Normal Effort, Breath sounds clear  HEART:  RRR, S1 + S2, no murmurs  ABDOMEN:  Tender to palpation in left side of the abdomen.  EXTREMITIES:  no cyanosis or edema  SKIN:  Warm & Dry. No rash or erythema  NEURO:  Alert, oriented, no focal deficit    LABS:                        13.6   8.68  )-----------( 344      ( 2021 08:41 )             40.5     Mean Cell Volume: 92.5 fL (0714- @ 08:41)        140  |  104  |  4<L>  ----------------------------<  74  4.1   |  20<L>  |  0.82    Ca    9.5      2021 08:41  Phos  2.5       Mg     2.00         TPro  7.4  /  Alb  4.5  /  TBili  1.2  /  DBili  x   /  AST  16  /  ALT  22  /  AlkPhos  57  -14    LIVER FUNCTIONS - ( 2021 08:41 )  Alb: 4.5 g/dL / Pro: 7.4 g/dL / ALK PHOS: 57 U/L / ALT: 22 U/L / AST: 16 U/L / GGT: x             Urinalysis Basic - ( 2021 21:05 )    Color: Yellow / Appearance: Slightly Turbid / S.027 / pH: x  Gluc: x / Ketone: Small  / Bili: Negative / Urobili: 3 mg/dL   Blood: x / Protein: Trace / Nitrite: Negative   Leuk Esterase: Negative / RBC: 1 /HPF / WBC 5 /HPF   Sq Epi: x / Non Sq Epi: 10 /HPF / Bacteria: Few                              13.6   8.68  )-----------( 344      ( 2021 08:41 )             40.5                         12.8   10.86 )-----------( 338      ( 2021 07:03 )             38.3                         13.6   12.92 )-----------( 370      ( 2021 21:25 )             40.8       Imaging:  -Images reviewed, no new images appreciated.

## 2021-07-14 NOTE — PROGRESS NOTE ADULT - SUBJECTIVE AND OBJECTIVE BOX
PROGRESS NOTE:   Authored by Lauren Peralta, MS4 Pager: [phone]    Patient is a 30y old  Female who presents with a chief complaint of Abd pain (2021 19:34)      SUBJECTIVE / OVERNIGHT EVENTS: Overnight pt had a migraine, improved with sumatriptan, which she previously used outpt. No BMs this admission      ADDITIONAL REVIEW OF SYSTEMS:    MEDICATIONS  (STANDING):  enoxaparin Injectable 40 milliGRAM(s) SubCutaneous daily  pantoprazole    Tablet 40 milliGRAM(s) Oral before breakfast  sodium chloride 0.9%. 1000 milliLiter(s) (75 mL/Hr) IV Continuous <Continuous>    MEDICATIONS  (PRN):  acetaminophen   Tablet .. 650 milliGRAM(s) Oral every 6 hours PRN Temp greater or equal to 38C (100.4F), Mild Pain (1 - 3), Moderate Pain (4 - 6)  ondansetron Injectable 4 milliGRAM(s) IV Push every 8 hours PRN Nausea and/or Vomiting  sucralfate 1 Gram(s) Oral every 12 hours PRN abdominal pain      CAPILLARY BLOOD GLUCOSE        I&O's Summary      PHYSICAL EXAM:  Vital Signs Last 24 Hrs  T(C): 36.9 (2021 06:34), Max: 36.9 (2021 21:34)  T(F): 98.4 (2021 06:34), Max: 98.5 (2021 21:34)  HR: 89 (2021 06:34) (57 - 89)  BP: 114/67 (2021 06:34) (114/67 - 123/70)  BP(mean): --  RR: 16 (2021 06:34) (16 - 18)  SpO2: 100% (2021 06:34) (100% - 100%)    GENERAL: No acute distress, well-developed  HEAD:  Atraumatic, Normocephalic  EYES: EOMI, PERRLA, conjunctiva and sclera clear  CHEST/LUNG: CTAB; No wheezes, rales, or rhonchi  HEART: Regular rate and rhythm; No murmurs, rubs, or gallops  ABDOMEN: Soft, mild TTP LLQ, no guarding today, non-distended; normal bowel sounds, no organomegaly  EXTREMITIES:  2+ peripheral pulses b/l, No clubbing, cyanosis, or edema  NEUROLOGY: A&O x 3, no focal deficits  SKIN: No rashes or lesions    LABS:                        13.6   8.68  )-----------( 344      ( 2021 08:41 )             40.5     07-14    140  |  104  |  4<L>  ----------------------------<  74  4.1   |  20<L>  |  0.82    Ca    9.5      2021 08:41  Phos  2.5     -  Mg     2.00     -    TPro  7.4  /  Alb  4.5  /  TBili  1.2  /  DBili  x   /  AST  16  /  ALT  22  /  AlkPhos  57  -          Urinalysis Basic - ( 2021 21:05 )    Color: Yellow / Appearance: Slightly Turbid / S.027 / pH: x  Gluc: x / Ketone: Small  / Bili: Negative / Urobili: 3 mg/dL   Blood: x / Protein: Trace / Nitrite: Negative   Leuk Esterase: Negative / RBC: 1 /HPF / WBC 5 /HPF   Sq Epi: x / Non Sq Epi: 10 /HPF / Bacteria: Few          RADIOLOGY & ADDITIONAL TESTS:  Results Reviewed:   Imaging Personally Reviewed:  Electrocardiogram Personally Reviewed:    COORDINATION OF CARE:  Care Discussed with Consultants/Other Providers [Y/N]:  Prior or Outpatient Records Reviewed [Y/N]:   PROGRESS NOTE:   Authored by Lauren Peralta, MS4 Pager: [phone]    Patient is a 30y old  Female who presents with a chief complaint of Abd pain (2021 19:34)      SUBJECTIVE / OVERNIGHT EVENTS: Overnight pt had a migraine, improved with sumatriptan, which she previously used outpt. No BMs this admission, prev 6 BMs diarrhea per day. Pt reports nausea, improved by Zofran. Additionally she reports night sweats intermittently beginning in ,  which is related to severity of abd pain.      ADDITIONAL REVIEW OF SYSTEMS:    MEDICATIONS  (STANDING):  enoxaparin Injectable 40 milliGRAM(s) SubCutaneous daily  pantoprazole    Tablet 40 milliGRAM(s) Oral before breakfast  sodium chloride 0.9%. 1000 milliLiter(s) (75 mL/Hr) IV Continuous <Continuous>    MEDICATIONS  (PRN):  acetaminophen   Tablet .. 650 milliGRAM(s) Oral every 6 hours PRN Temp greater or equal to 38C (100.4F), Mild Pain (1 - 3), Moderate Pain (4 - 6)  ondansetron Injectable 4 milliGRAM(s) IV Push every 8 hours PRN Nausea and/or Vomiting  sucralfate 1 Gram(s) Oral every 12 hours PRN abdominal pain      CAPILLARY BLOOD GLUCOSE        I&O's Summary      PHYSICAL EXAM:  Vital Signs Last 24 Hrs  T(C): 36.9 (2021 06:34), Max: 36.9 (2021 21:34)  T(F): 98.4 (2021 06:34), Max: 98.5 (2021 21:34)  HR: 89 (2021 06:34) (57 - 89)  BP: 114/67 (2021 06:34) (114/67 - 123/70)  BP(mean): --  RR: 16 (2021 06:34) (16 - 18)  SpO2: 100% (2021 06:34) (100% - 100%)    GENERAL: No acute distress, well-developed  HEAD:  Atraumatic, Normocephalic  EYES: EOMI, PERRLA, conjunctiva and sclera clear  CHEST/LUNG: CTAB; No wheezes, rales, or rhonchi  HEART: Regular rate and rhythm; No murmurs, rubs, or gallops  ABDOMEN: Soft, mild TTP LLQ, no guarding today, non-distended; normal bowel sounds, no organomegaly  EXTREMITIES:  2+ peripheral pulses b/l, No clubbing, cyanosis, or edema  NEUROLOGY: A&O x 3, no focal deficits  SKIN: No rashes or lesions    LABS:                        13.6   8.68  )-----------( 344      ( 2021 08:41 )             40.5     07-14    140  |  104  |  4<L>  ----------------------------<  74  4.1   |  20<L>  |  0.82    Ca    9.5      2021 08:41  Phos  2.5     -14  Mg     2.00     -14    TPro  7.4  /  Alb  4.5  /  TBili  1.2  /  DBili  x   /  AST  16  /  ALT  22  /  AlkPhos  57  07-14          Urinalysis Basic - ( 2021 21:05 )    Color: Yellow / Appearance: Slightly Turbid / S.027 / pH: x  Gluc: x / Ketone: Small  / Bili: Negative / Urobili: 3 mg/dL   Blood: x / Protein: Trace / Nitrite: Negative   Leuk Esterase: Negative / RBC: 1 /HPF / WBC 5 /HPF   Sq Epi: x / Non Sq Epi: 10 /HPF / Bacteria: Few          RADIOLOGY & ADDITIONAL TESTS:  Results Reviewed:   Imaging Personally Reviewed:  Electrocardiogram Personally Reviewed:    COORDINATION OF CARE:  Care Discussed with Consultants/Other Providers [Y/N]:  Prior or Outpatient Records Reviewed [Y/N]:   PROGRESS NOTE:   Authored by Lauren Peralta, MS4 Pager: [phone]    Patient is a 30y old  Female who presents with a chief complaint of Abd pain (2021 19:34)      SUBJECTIVE / OVERNIGHT EVENTS: Overnight pt had a migraine, improved with sumatriptan, which she previously used outpt. No BMs this admission, prev 6 BMs diarrhea per day. Pt reports nausea, improved by Zofran. Additionally she reports night sweats intermittently beginning in ,  which is related to severity of abd pain. Tolerating some PO intake (clear liquid diet), with a few bouts of emesis yesterday.      ADDITIONAL REVIEW OF SYSTEMS:    MEDICATIONS  (STANDING):  enoxaparin Injectable 40 milliGRAM(s) SubCutaneous daily  pantoprazole    Tablet 40 milliGRAM(s) Oral before breakfast  sodium chloride 0.9%. 1000 milliLiter(s) (75 mL/Hr) IV Continuous <Continuous>    MEDICATIONS  (PRN):  acetaminophen   Tablet .. 650 milliGRAM(s) Oral every 6 hours PRN Temp greater or equal to 38C (100.4F), Mild Pain (1 - 3), Moderate Pain (4 - 6)  ondansetron Injectable 4 milliGRAM(s) IV Push every 8 hours PRN Nausea and/or Vomiting  sucralfate 1 Gram(s) Oral every 12 hours PRN abdominal pain      CAPILLARY BLOOD GLUCOSE        I&O's Summary      PHYSICAL EXAM:  Vital Signs Last 24 Hrs  T(C): 36.9 (2021 06:34), Max: 36.9 (2021 21:34)  T(F): 98.4 (2021 06:34), Max: 98.5 (2021 21:34)  HR: 89 (2021 06:34) (57 - 89)  BP: 114/67 (2021 06:34) (114/67 - 123/70)  BP(mean): --  RR: 16 (2021 06:34) (16 - 18)  SpO2: 100% (2021 06:34) (100% - 100%)    GENERAL: No acute distress, well-developed  HEAD:  Atraumatic, Normocephalic  EYES: EOMI, PERRLA, conjunctiva and sclera clear  CHEST/LUNG: CTAB; No wheezes, rales, or rhonchi  HEART: Regular rate and rhythm; No murmurs, rubs, or gallops  ABDOMEN: Soft, mild TTP LLQ, no guarding today, non-distended; normal bowel sounds, no organomegaly  EXTREMITIES:  2+ peripheral pulses b/l, No clubbing, cyanosis, or edema  NEUROLOGY: A&O x 3, no focal deficits  SKIN: No rashes or lesions    LABS:                        13.6   8.68  )-----------( 344      ( 2021 08:41 )             40.5     07-14    140  |  104  |  4<L>  ----------------------------<  74  4.1   |  20<L>  |  0.82    Ca    9.5      2021 08:41  Phos  2.5     07-14  Mg     2.00     -14    TPro  7.4  /  Alb  4.5  /  TBili  1.2  /  DBili  x   /  AST  16  /  ALT  22  /  AlkPhos  57  07-14          Urinalysis Basic - ( 2021 21:05 )    Color: Yellow / Appearance: Slightly Turbid / S.027 / pH: x  Gluc: x / Ketone: Small  / Bili: Negative / Urobili: 3 mg/dL   Blood: x / Protein: Trace / Nitrite: Negative   Leuk Esterase: Negative / RBC: 1 /HPF / WBC 5 /HPF   Sq Epi: x / Non Sq Epi: 10 /HPF / Bacteria: Few          RADIOLOGY & ADDITIONAL TESTS:  Results Reviewed:   Imaging Personally Reviewed:  Electrocardiogram Personally Reviewed:    COORDINATION OF CARE:  Care Discussed with Consultants/Other Providers [Y/N]:  Prior or Outpatient Records Reviewed [Y/N]:

## 2021-07-14 NOTE — PROGRESS NOTE ADULT - PROBLEM SELECTOR PLAN 1
Pt w/ reported hx H. Pylori s/p eradication at age 18, acute diverticulitis at [facility] 7/2020 s/p abx, w/ recurrent abd pain since 5/2021, sx worsening over 2 days. LLQ abd pain w/ radiation to back, epigastric discomfort, w/ n/v/d, inability to tolerate po intake. Requiring IV morphine, zofran in ED, s/p 2L NS. CTAP revealing nml appendix, no bowel obstruction or free air, colonic diverticulosis w/o acute diverticulitis. DDx w/ regard to GI source includes IBD, IBS Less likely infxs colitis given CTAP and lack of fever, however w/ leukocytosis.  source less likely given lack of urinary sx and UA w/o overt c/f infxn. GYN source less likely given on OCP, LMP 1 wk ago, w/o exacerbation by sexual activity or vaginal discharge, hCG negative    - Appreciate GI recs:   -f/u Infectious Stool studies: (C diff, stool culture, stool PCR, O+P)  -Reasonable to obtain HIV  -Malabsorption w/u (B12, iron panel, ferritin, and folate) wnl.  -CRP slightly elevated at 5.6ESR, and fecal calprotectin to assess for IBD  -tTG-IgA and IgA level to assess for Celiac disease  - Keep on clear liquid diet for tentatively EGD and Colonoscopy on Thursday, pending C. diff neg  - Zofran prn nausea, pain control prn, pantoprazole, sucralfate  - monitor WBC, fever curve Pt w/ reported hx H. Pylori s/p eradication at age 18, acute diverticulitis at [facility] 7/2020 s/p abx, w/ recurrent abd pain since 5/2021, sx worsening over 2 days. LLQ abd pain w/ radiation to back, epigastric discomfort, w/ n/v/d, inability to tolerate po intake. Requiring IV morphine, zofran in ED, s/p 2L NS. CTAP revealing nml appendix, no bowel obstruction or free air, colonic diverticulosis w/o acute diverticulitis. DDx w/ regard to GI source includes IBD, IBS Less likely infxs colitis given CTAP and lack of fever, however w/ leukocytosis.  source less likely given lack of urinary sx and UA w/o overt c/f infxn. GYN source less likely given on OCP, LMP 1 wk ago, w/o exacerbation by sexual activity or vaginal discharge, hCG negative    - Appreciate GI recs:   -f/u Infectious Stool studies: (C diff, stool culture, stool PCR, O+P)  -Reasonable to obtain HIV  -Malabsorption w/u (B12, iron panel, ferritin, and folate) wnl.  -CRP slightly elevated at 5.6, ESR 8, f/u fecal calprotectin to assess for IBD  -tTG-IgA and IgA level to assess for Celiac disease  - Keep on clear liquid diet for tentatively EGD and Colonoscopy on Thursday, pending C. diff neg  - Zofran prn nausea, pain control prn, pantoprazole, sucralfate  - monitor WBC, fever curve Pt w/ reported hx H. Pylori s/p eradication at age 18, acute diverticulitis at [facility] 7/2020 s/p abx, w/ recurrent abd pain since 5/2021, sx worsening over 2 days. LLQ abd pain w/ radiation to back, epigastric discomfort, w/ n/v/d, inability to tolerate po intake. Requiring IV morphine, zofran in ED, s/p 2L NS. CTAP revealing nml appendix, no bowel obstruction or free air, colonic diverticulosis w/o acute diverticulitis. DDx w/ regard to GI source includes IBD, IBS Less likely infxs colitis given CTAP and lack of fever, however w/ leukocytosis.  source less likely given lack of urinary sx and UA w/o overt c/f infxn. GYN source less likely given on OCP, LMP 1 wk ago, w/o exacerbation by sexual activity or vaginal discharge, hCG negative    - Appreciate GI recs:   -f/u Infectious Stool studies: (C diff, stool culture, stool PCR, O+P)  -Reasonable to obtain HIV  -Malabsorption w/u (B12, iron panel, ferritin, and folate) wnl.  -CRP slightly elevated at 5.6, ESR 8, f/u fecal calprotectin to assess for IBD  -tTG-IgA and IgA level to assess for Celiac disease  - Keep on clear liquid diet today, NPO at midnight for EGD and Colonoscopy tmrw  - Prep to start at 4PM w/timed Zofran for pt nausea  - Zofran prn nausea, pain control prn, pantoprazole, sucralfate  - monitor WBC, fever curve

## 2021-07-15 ENCOUNTER — RESULT REVIEW (OUTPATIENT)
Age: 31
End: 2021-07-15

## 2021-07-15 LAB
ALBUMIN SERPL ELPH-MCNC: 4.3 G/DL — SIGNIFICANT CHANGE UP (ref 3.3–5)
ALP SERPL-CCNC: 57 U/L — SIGNIFICANT CHANGE UP (ref 40–120)
ALT FLD-CCNC: 21 U/L — SIGNIFICANT CHANGE UP (ref 4–33)
ANION GAP SERPL CALC-SCNC: 17 MMOL/L — HIGH (ref 7–14)
APTT BLD: 28.1 SEC — SIGNIFICANT CHANGE UP (ref 27–36.3)
AST SERPL-CCNC: 13 U/L — SIGNIFICANT CHANGE UP (ref 4–32)
BILIRUB SERPL-MCNC: 1 MG/DL — SIGNIFICANT CHANGE UP (ref 0.2–1.2)
BUN SERPL-MCNC: 5 MG/DL — LOW (ref 7–23)
CALCIUM SERPL-MCNC: 9.5 MG/DL — SIGNIFICANT CHANGE UP (ref 8.4–10.5)
CHLORIDE SERPL-SCNC: 105 MMOL/L — SIGNIFICANT CHANGE UP (ref 98–107)
CO2 SERPL-SCNC: 18 MMOL/L — LOW (ref 22–31)
CREAT SERPL-MCNC: 0.88 MG/DL — SIGNIFICANT CHANGE UP (ref 0.5–1.3)
GLUCOSE SERPL-MCNC: 72 MG/DL — SIGNIFICANT CHANGE UP (ref 70–99)
HCT VFR BLD CALC: 39.6 % — SIGNIFICANT CHANGE UP (ref 34.5–45)
HGB BLD-MCNC: 13.4 G/DL — SIGNIFICANT CHANGE UP (ref 11.5–15.5)
INR BLD: 1.2 RATIO — HIGH (ref 0.88–1.16)
MAGNESIUM SERPL-MCNC: 2 MG/DL — SIGNIFICANT CHANGE UP (ref 1.6–2.6)
MCHC RBC-ENTMCNC: 31.1 PG — SIGNIFICANT CHANGE UP (ref 27–34)
MCHC RBC-ENTMCNC: 33.8 GM/DL — SIGNIFICANT CHANGE UP (ref 32–36)
MCV RBC AUTO: 91.9 FL — SIGNIFICANT CHANGE UP (ref 80–100)
NRBC # BLD: 0 /100 WBCS — SIGNIFICANT CHANGE UP
NRBC # FLD: 0 K/UL — SIGNIFICANT CHANGE UP
PHOSPHATE SERPL-MCNC: 2.5 MG/DL — SIGNIFICANT CHANGE UP (ref 2.5–4.5)
PLATELET # BLD AUTO: 333 K/UL — SIGNIFICANT CHANGE UP (ref 150–400)
POTASSIUM SERPL-MCNC: 3.8 MMOL/L — SIGNIFICANT CHANGE UP (ref 3.5–5.3)
POTASSIUM SERPL-SCNC: 3.8 MMOL/L — SIGNIFICANT CHANGE UP (ref 3.5–5.3)
PROT SERPL-MCNC: 7.3 G/DL — SIGNIFICANT CHANGE UP (ref 6–8.3)
PROTHROM AB SERPL-ACNC: 13.6 SEC — SIGNIFICANT CHANGE UP (ref 10.6–13.6)
RBC # BLD: 4.31 M/UL — SIGNIFICANT CHANGE UP (ref 3.8–5.2)
RBC # FLD: 12.6 % — SIGNIFICANT CHANGE UP (ref 10.3–14.5)
SODIUM SERPL-SCNC: 140 MMOL/L — SIGNIFICANT CHANGE UP (ref 135–145)
TTG IGA SER-ACNC: <1.2 U/ML — SIGNIFICANT CHANGE UP
TTG IGA SER-ACNC: NEGATIVE — SIGNIFICANT CHANGE UP
TTG IGG SER IA-ACNC: NEGATIVE — SIGNIFICANT CHANGE UP
TTG IGG SER-ACNC: <1.2 U/ML — SIGNIFICANT CHANGE UP
WBC # BLD: 9.54 K/UL — SIGNIFICANT CHANGE UP (ref 3.8–10.5)
WBC # FLD AUTO: 9.54 K/UL — SIGNIFICANT CHANGE UP (ref 3.8–10.5)

## 2021-07-15 PROCEDURE — 99232 SBSQ HOSP IP/OBS MODERATE 35: CPT | Mod: GC

## 2021-07-15 PROCEDURE — 43239 EGD BIOPSY SINGLE/MULTIPLE: CPT

## 2021-07-15 PROCEDURE — 45380 COLONOSCOPY AND BIOPSY: CPT | Mod: GC

## 2021-07-15 PROCEDURE — 88305 TISSUE EXAM BY PATHOLOGIST: CPT | Mod: 26

## 2021-07-15 RX ORDER — SUMATRIPTAN SUCCINATE 4 MG/.5ML
25 INJECTION, SOLUTION SUBCUTANEOUS ONCE
Refills: 0 | Status: COMPLETED | OUTPATIENT
Start: 2021-07-15 | End: 2021-07-15

## 2021-07-15 RX ADMIN — Medication 650 MILLIGRAM(S): at 21:16

## 2021-07-15 RX ADMIN — PANTOPRAZOLE SODIUM 40 MILLIGRAM(S): 20 TABLET, DELAYED RELEASE ORAL at 06:04

## 2021-07-15 RX ADMIN — Medication 650 MILLIGRAM(S): at 22:10

## 2021-07-15 RX ADMIN — SUMATRIPTAN SUCCINATE 25 MILLIGRAM(S): 4 INJECTION, SOLUTION SUBCUTANEOUS at 17:52

## 2021-07-15 NOTE — PROGRESS NOTE ADULT - PROBLEM SELECTOR PLAN 1
Pt w/ reported hx H. Pylori s/p eradication at age 18, acute diverticulitis at [facility] 7/2020 s/p abx, w/ recurrent abd pain since 5/2021, sx worsening over 2 days. LLQ abd pain w/ radiation to back, epigastric discomfort, w/ n/v/d, inability to tolerate po intake. Requiring IV morphine, zofran in ED, s/p 2L NS. CTAP revealing nml appendix, no bowel obstruction or free air, colonic diverticulosis w/o acute diverticulitis. DDx w/ regard to GI source includes IBD, IBS Less likely infxs colitis given CTAP and lack of fever, however w/ leukocytosis.  source less likely given lack of urinary sx and UA w/o overt c/f infxn. GYN source less likely given on OCP, LMP 1 wk ago, w/o exacerbation by sexual activity or vaginal discharge, hCG negative    - Appreciate GI recs:   -stool PCR negative,  f/u stool culture, O+P  -Reasonable to obtain HIV  -Malabsorption w/u (B12, iron panel, ferritin, and folate) wnl.  -CRP slightly elevated at 5.6, ESR 8, f/u fecal calprotectin to assess for IBD  -Celiac w/u negative  - EGD/[place] today  - Zofran prn nausea, pain control prn, pantoprazole, sucralfate  - monitor WBC, fever curve Pt w/ reported hx H. Pylori s/p eradication at age 18, acute diverticulitis at [facility] 7/2020 s/p abx, w/ recurrent abd pain since 5/2021, sx worsening over 2 days. LLQ abd pain w/ radiation to back, epigastric discomfort, w/ n/v/d, inability to tolerate po intake. Requiring IV morphine, zofran in ED, s/p 2L NS. CTAP revealing nml appendix, no bowel obstruction or free air, colonic diverticulosis w/o acute diverticulitis. DDx w/ regard to GI source includes IBD, IBS Less likely infxs colitis given CTAP and lack of fever, however w/ leukocytosis.  source less likely given lack of urinary sx and UA w/o overt c/f infxn. GYN source less likely given on OCP, LMP 1 wk ago, w/o exacerbation by sexual activity or vaginal discharge, hCG negative    - Appreciate GI recs:   -stool PCR negative,  f/u stool culture  -Reasonable to obtain HIV  -Malabsorption w/u (B12, iron panel, ferritin, and folate) wnl.  -CRP slightly elevated at 5.6, ESR 8, f/u fecal calprotectin to assess for IBD  -Celiac w/u negative  - EGD/[facility] today  - Zofran prn nausea, pain control prn, pantoprazole, sucralfate  - monitor WBC, fever curve Pt w/ reported hx H. Pylori s/p eradication at age 18, acute diverticulitis at [facility] 7/2020 s/p abx, w/ recurrent abd pain since 5/2021, sx worsening over 2 days. LLQ abd pain w/ radiation to back, epigastric discomfort, w/ n/v/d, inability to tolerate po intake. Requiring IV morphine, zofran in ED, s/p 2L NS. CTAP revealing nml appendix, no bowel obstruction or free air, colonic diverticulosis w/o acute diverticulitis. DDx w/ regard to GI source includes IBD, IBS Less likely infxs colitis given CTAP and lack of fever, however w/ leukocytosis.  source less likely given lack of urinary sx and UA w/o overt c/f infxn. GYN source less likely given on OCP, LMP 1 wk ago, w/o exacerbation by sexual activity or vaginal discharge, hCG negative    - Appreciate GI recs:   -stool PCR negative,  f/u stool culture  -Reasonable to obtain HIV  -Malabsorption w/u (B12, iron panel, ferritin, and folate) wnl.  -CRP slightly elevated at 5.6, ESR 8, f/u fecal calprotectin to assess for IBD  -Celiac w/u negative  - EGD/[place] today  - Zofran prn nausea, pain control prn, pantoprazole, sucralfate. Check EKG today for QTc given Zofran use  - monitor WBC, fever curve

## 2021-07-15 NOTE — PROGRESS NOTE ADULT - SUBJECTIVE AND OBJECTIVE BOX
PROGRESS NOTE:   Authored by Lauren Peralta, MS4 Pager: [phone]    Patient is a 30y old  Female who presents with a chief complaint of Abd pain, nausea, vomiting  (14 Jul 2021 16:16)      SUBJECTIVE / OVERNIGHT EVENTS:  Overnight pt received sumatriptan 25mg x 1 for migraine  Pt seen and examined at bedside  She states  She denies     ADDITIONAL REVIEW OF SYSTEMS:    MEDICATIONS  (STANDING):  Minigynon 30 (0.15mg/0.03mg) Tablet 1 Tablet(s) 1 Tablet(s) Oral daily  pantoprazole    Tablet 40 milliGRAM(s) Oral before breakfast    MEDICATIONS  (PRN):  acetaminophen   Tablet .. 650 milliGRAM(s) Oral every 6 hours PRN Temp greater or equal to 38C (100.4F), Mild Pain (1 - 3), Moderate Pain (4 - 6)  ondansetron Injectable 4 milliGRAM(s) IV Push every 8 hours PRN Nausea and/or Vomiting  sucralfate 1 Gram(s) Oral every 12 hours PRN abdominal pain      CAPILLARY BLOOD GLUCOSE        I&O's Summary      PHYSICAL EXAM:  Vital Signs Last 24 Hrs  T(C): 37.1 (15 Jul 2021 05:54), Max: 37.1 (15 Jul 2021 05:54)  T(F): 98.7 (15 Jul 2021 05:54), Max: 98.7 (15 Jul 2021 05:54)  HR: 78 (15 Jul 2021 05:54) (68 - 78)  BP: 109/69 (15 Jul 2021 05:54) (109/69 - 141/87)  BP(mean): --  RR: 16 (15 Jul 2021 05:54) (16 - 17)  SpO2: 100% (15 Jul 2021 05:54) (100% - 100%)    GENERAL: No acute distress, well-developed  HEAD:  Atraumatic, Normocephalic  EYES: EOMI, PERRLA, conjunctiva and sclera clear  NECK: Supple, no lymphadenopathy, no JVD  CHEST/LUNG: CTAB; No wheezes, rales, or rhonchi  HEART: Regular rate and rhythm; No murmurs, rubs, or gallops  ABDOMEN: Soft, non-tender, non-distended; normal bowel sounds, no organomegaly  EXTREMITIES:  2+ peripheral pulses b/l, No clubbing, cyanosis, or edema  NEUROLOGY: A&O x 3, no focal deficits  SKIN: No rashes or lesions    LABS:                        13.6   8.68  )-----------( 344      ( 14 Jul 2021 08:41 )             40.5     07-14    140  |  104  |  4<L>  ----------------------------<  74  4.1   |  20<L>  |  0.82    Ca    9.5      14 Jul 2021 08:41  Phos  2.5     07-14  Mg     2.00     07-14    TPro  7.4  /  Alb  4.5  /  TBili  1.2  /  DBili  x   /  AST  16  /  ALT  22  /  AlkPhos  57  07-14              GI PCR Panel, Stool (collected 14 Jul 2021 12:12)  Source: .Stool Feces, orange top c&amp;s  Final Report (14 Jul 2021 17:46):    GI PCR Results: NOT detected    *******Please Note:*******    GI panel PCR evaluates for:    Campylobacter, Plesiomonas shigelloides, Salmonella,    Vibrio, Yersinia enterocolitica, Enteroaggregative    Escherichia coli (EAEC), Enteropathogenic E.coli (EPEC),    Enterotoxigenic E. coli (ETEC) lt/st, Shiga-like    toxin-producing E. coli (STEC) stx1/stx2,    Shigella/ Enteroinvasive E. coli (EIEC), Cryptosporidium,    Cyclospora cayetanensis, Entamoeba histolytica,    Giardia lamblia, Adenovirus F 40/41, Astrovirus,    Norovirus GI/GII, Rotavirus A, Sapovirus        RADIOLOGY & ADDITIONAL TESTS:  Results Reviewed:   Imaging Personally Reviewed:  Electrocardiogram Personally Reviewed:    COORDINATION OF CARE:  Care Discussed with Consultants/Other Providers [Y/N]:  Prior or Outpatient Records Reviewed [Y/N]:   PROGRESS NOTE:   Authored by Lauren Peralta, MS4 Pager: [phone]    Patient is a 30y old  Female who presents with a chief complaint of Abd pain, nausea, vomiting  (14 Jul 2021 16:16)      SUBJECTIVE / OVERNIGHT EVENTS:  Overnight pt received sumatriptan 25mg x 1 for migraine. Pt seen and examined at bedside. She attributes these migraines to not sleeping well in the hospital. The colonoscopy prep made her nauseated. LLQ abdominal pain continues to be 5/10, which is her baseline. She denies chest pain, shortness of breath, leg pain.     ADDITIONAL REVIEW OF SYSTEMS:    MEDICATIONS  (STANDING):  Minigynon 30 (0.15mg/0.03mg) Tablet 1 Tablet(s) 1 Tablet(s) Oral daily  pantoprazole    Tablet 40 milliGRAM(s) Oral before breakfast    MEDICATIONS  (PRN):  acetaminophen   Tablet .. 650 milliGRAM(s) Oral every 6 hours PRN Temp greater or equal to 38C (100.4F), Mild Pain (1 - 3), Moderate Pain (4 - 6)  ondansetron Injectable 4 milliGRAM(s) IV Push every 8 hours PRN Nausea and/or Vomiting  sucralfate 1 Gram(s) Oral every 12 hours PRN abdominal pain      CAPILLARY BLOOD GLUCOSE        I&O's Summary      PHYSICAL EXAM:  Vital Signs Last 24 Hrs  T(C): 37.1 (15 Jul 2021 05:54), Max: 37.1 (15 Jul 2021 05:54)  T(F): 98.7 (15 Jul 2021 05:54), Max: 98.7 (15 Jul 2021 05:54)  HR: 78 (15 Jul 2021 05:54) (68 - 78)  BP: 109/69 (15 Jul 2021 05:54) (109/69 - 141/87)  BP(mean): --  RR: 16 (15 Jul 2021 05:54) (16 - 17)  SpO2: 100% (15 Jul 2021 05:54) (100% - 100%)    GENERAL: No acute distress, well-developed  HEAD:  Atraumatic, Normocephalic  EYES: EOMI, PERRLA, conjunctiva and sclera clear  CHEST/LUNG: CTAB; No wheezes, rales, or rhonchi  HEART: Regular rate and rhythm; No murmurs, rubs, or gallops  ABDOMEN: Soft, mild LLQ tender to palpation without guarding, non-distended; normal bowel sounds, no organomegaly  EXTREMITIES:  No clubbing, cyanosis, or edema  NEUROLOGY: A&O x 3, no focal deficits  SKIN: No rashes or lesions    LABS:                        13.6   8.68  )-----------( 344      ( 14 Jul 2021 08:41 )             40.5     07-14    140  |  104  |  4<L>  ----------------------------<  74  4.1   |  20<L>  |  0.82    Ca    9.5      14 Jul 2021 08:41  Phos  2.5     07-14  Mg     2.00     07-14    TPro  7.4  /  Alb  4.5  /  TBili  1.2  /  DBili  x   /  AST  16  /  ALT  22  /  AlkPhos  57  07-14              GI PCR Panel, Stool (collected 14 Jul 2021 12:12)  Source: .Stool Feces, orange top c&amp;s  Final Report (14 Jul 2021 17:46):    GI PCR Results: NOT detected    *******Please Note:*******    GI panel PCR evaluates for:    Campylobacter, Plesiomonas shigelloides, Salmonella,    Vibrio, Yersinia enterocolitica, Enteroaggregative    Escherichia coli (EAEC), Enteropathogenic E.coli (EPEC),    Enterotoxigenic E. coli (ETEC) lt/st, Shiga-like    toxin-producing E. coli (STEC) stx1/stx2,    Shigella/ Enteroinvasive E. coli (EIEC), Cryptosporidium,    Cyclospora cayetanensis, Entamoeba histolytica,    Giardia lamblia, Adenovirus F 40/41, Astrovirus,    Norovirus GI/GII, Rotavirus A, Sapovirus        RADIOLOGY & ADDITIONAL TESTS:  Results Reviewed:   Imaging Personally Reviewed:  Electrocardiogram Personally Reviewed:    COORDINATION OF CARE:  Care Discussed with Consultants/Other Providers [Y/N]:  Prior or Outpatient Records Reviewed [Y/N]:

## 2021-07-16 ENCOUNTER — TRANSCRIPTION ENCOUNTER (OUTPATIENT)
Age: 31
End: 2021-07-16

## 2021-07-16 VITALS
OXYGEN SATURATION: 99 % | RESPIRATION RATE: 18 BRPM | DIASTOLIC BLOOD PRESSURE: 76 MMHG | HEART RATE: 63 BPM | SYSTOLIC BLOOD PRESSURE: 132 MMHG | TEMPERATURE: 98 F

## 2021-07-16 LAB
CALPROTECTIN STL-MCNT: 23 UG/G — SIGNIFICANT CHANGE UP (ref 0–120)
CULTURE RESULTS: SIGNIFICANT CHANGE UP
SPECIMEN SOURCE: SIGNIFICANT CHANGE UP

## 2021-07-16 PROCEDURE — 99239 HOSP IP/OBS DSCHRG MGMT >30: CPT | Mod: GC

## 2021-07-16 PROCEDURE — 99232 SBSQ HOSP IP/OBS MODERATE 35: CPT | Mod: GC

## 2021-07-16 RX ORDER — SUMATRIPTAN SUCCINATE 4 MG/.5ML
25 INJECTION, SOLUTION SUBCUTANEOUS ONCE
Refills: 0 | Status: COMPLETED | OUTPATIENT
Start: 2021-07-16 | End: 2021-07-16

## 2021-07-16 RX ORDER — LOPERAMIDE HCL 2 MG
1 TABLET ORAL
Qty: 56 | Refills: 0
Start: 2021-07-16 | End: 2021-07-29

## 2021-07-16 RX ORDER — ACETAMINOPHEN 500 MG
2 TABLET ORAL
Qty: 240 | Refills: 0
Start: 2021-07-16 | End: 2021-08-14

## 2021-07-16 RX ORDER — PANTOPRAZOLE SODIUM 20 MG/1
1 TABLET, DELAYED RELEASE ORAL
Qty: 30 | Refills: 0
Start: 2021-07-16 | End: 2021-08-14

## 2021-07-16 RX ORDER — ONDANSETRON 8 MG/1
1 TABLET, FILM COATED ORAL
Qty: 9 | Refills: 0
Start: 2021-07-16 | End: 2021-07-18

## 2021-07-16 RX ORDER — PANTOPRAZOLE SODIUM 20 MG/1
1 TABLET, DELAYED RELEASE ORAL
Qty: 0 | Refills: 0 | DISCHARGE
Start: 2021-07-16

## 2021-07-16 RX ORDER — ACETAMINOPHEN 500 MG
2 TABLET ORAL
Qty: 0 | Refills: 0 | DISCHARGE
Start: 2021-07-16

## 2021-07-16 RX ORDER — PSYLLIUM SEED (WITH DEXTROSE)
1 POWDER (GRAM) ORAL
Qty: 14 | Refills: 0
Start: 2021-07-16 | End: 2021-07-29

## 2021-07-16 RX ORDER — SUMATRIPTAN SUCCINATE 4 MG/.5ML
1 INJECTION, SOLUTION SUBCUTANEOUS
Qty: 14 | Refills: 0
Start: 2021-07-16 | End: 2021-07-29

## 2021-07-16 RX ADMIN — PANTOPRAZOLE SODIUM 40 MILLIGRAM(S): 20 TABLET, DELAYED RELEASE ORAL at 05:25

## 2021-07-16 RX ADMIN — SUMATRIPTAN SUCCINATE 25 MILLIGRAM(S): 4 INJECTION, SOLUTION SUBCUTANEOUS at 10:44

## 2021-07-16 RX ADMIN — SUMATRIPTAN SUCCINATE 25 MILLIGRAM(S): 4 INJECTION, SOLUTION SUBCUTANEOUS at 09:44

## 2021-07-16 RX ADMIN — Medication 650 MILLIGRAM(S): at 04:50

## 2021-07-16 RX ADMIN — Medication 650 MILLIGRAM(S): at 04:06

## 2021-07-16 NOTE — DISCHARGE NOTE NURSING/CASE MANAGEMENT/SOCIAL WORK - PATIENT PORTAL LINK FT
You can access the FollowMyHealth Patient Portal offered by [facility] by registering at the following website: [email or web address]. By joining [facility]’s FollowMyHealth portal, you will also be able to view your health information using other applications (apps) compatible with our system.

## 2021-07-16 NOTE — PROGRESS NOTE ADULT - PROBLEM SELECTOR PLAN 1
Pt w/ reported hx H. Pylori s/p eradication at age 18, acute diverticulitis at [facility] 7/2020 s/p abx, w/ recurrent abd pain since 5/2021, sx worsening over 2 days. LLQ abd pain w/ radiation to back, epigastric discomfort, w/ n/v/d, inability to tolerate po intake. Requiring IV morphine, zofran in ED, s/p 2L NS. CTAP revealing nml appendix, no bowel obstruction or free air, colonic diverticulosis w/o acute diverticulitis. DDx w/ regard to GI source includes IBD, IBS Less likely infxs colitis given CTAP and lack of fever, however w/ leukocytosis.  source less likely given lack of urinary sx and UA w/o overt c/f infxn. GYN source less likely given on OCP, LMP 1 wk ago, w/o exacerbation by sexual activity or vaginal discharge, hCG negative.     - Appreciate GI recs:  -stool PCR negative,  f/u stool culture  -Reasonable to obtain HIV  -Malabsorption w/u (B12, iron panel, ferritin, and folate) wnl.  -CRP slightly elevated at 5.6, ESR 8, f/u fecal calprotectin.  -Celiac w/u negative  - EGD/[place] not concerning for segmental colitis associated with diverticulosis.   - final GI recs: Zofran prn nausea, low dose PPI, Psyllium for bulking, Loperamide PRN, consider trial of TCA. Check EKG for QTc given Zofran use  - monitor WBC, fever curve

## 2021-07-16 NOTE — PROGRESS NOTE ADULT - SUBJECTIVE AND OBJECTIVE BOX
ANESTHESIA POSTOP NOTE  30y Female POSTOP DAY 1  No complaints  Vital Signs Last 24 Hrs  T(C): 37.1 (16 Jul 2021 05:23), Max: 37.4 (15 Jul 2021 21:43)  T(F): 98.8 (16 Jul 2021 05:23), Max: 99.3 (15 Jul 2021 21:43)  HR: 72 (16 Jul 2021 05:23) (55 - 76)  BP: 118/64 (16 Jul 2021 05:23) (113/62 - 130/68)  BP(mean): --  RR: 18 (16 Jul 2021 05:23) (18 - 27)  SpO2: 99% (16 Jul 2021 05:23) (97% - 100%)  I&O's Summary      [ X ] NO APPARENT ANESTHESIA COMPLICATIONS      Comments:

## 2021-07-16 NOTE — PROGRESS NOTE ADULT - ATTENDING COMMENTS
No further diarrhea, but pain persists. Low suspicion for C diff as diarrhea not ongoing.   Plan for EGD/colonoscopy tomorrow.
S/p EGD and colonoscopy yesterday - overall unremarkable for etiology of patient's symptoms, but path pending.   Patient feeling better today.   No further GI interventions planned.  Patient will be called once path results available.
30 year old female with a history of diverticulitis, asthma, migraines, seasonal allergies presenting with nausea, vomiting, diarrhea and abdominal pain. Patient reports she was told to get an outpatient colonoscopy however could not get it scheduled due to lack of medical insurance. Diarrhea has now improved.     -GI following, appreciate recs  -S/p EGD/colonoscopy today, esophageal mucosal changes suspicious for short-segment Dickens's esophagus noted, as well as moderate diverticulosis throughout entire colon and sigmoid colon spasm  -Regular diet as tolerated  -GI stool PCR negative  -Cdiff PCR d/rukhsana, patient no longer having diarrhea  -Anticipate d/c home tomorrow if patient tolerating diet    D/w MS4 Lauren
30 year old female with a history of diverticulitis, asthma, migraines, seasonal allergies presenting with nausea, vomiting, diarrhea and abdominal pain. Patient reports she was told to get an outpatient colonoscopy however could not get it scheduled due to lack of medical insurance. Diarrhea has now improved.     -GI consulted  -Clear liquid diet for now  -Check Cdiff, GI stool PCR if patient continues to have diarrhea    D/w MS4 Lauren
30 year old female with a history of diverticulitis, asthma, migraines, seasonal allergies presenting with nausea, vomiting, diarrhea and abdominal pain. Patient reports she was told to get an outpatient colonoscopy however could not get it scheduled due to lack of medical insurance. Diarrhea has now improved.     -GI following, appreciate recs  -Plan for EGD/colonoscopy tomorrow  -NPO after midnight  -Clear liquid diet for now  -F/u GI stool PCR  -Cdiff PCR d/rukhsana, patient no longer having diarrhea    D/w MS4 Lauren

## 2021-07-16 NOTE — PROGRESS NOTE ADULT - SUBJECTIVE AND OBJECTIVE BOX
Interval Events:   No abdominal pain  No nausea /vomiting / diarrhea   No melena / bloody bm     Hospital Medications:  acetaminophen   Tablet .. 650 milliGRAM(s) Oral every 6 hours PRN  Minigynon 30 (0.15mg/0.03mg) Tablet 1 Tablet(s) 1 Tablet(s) Oral daily  ondansetron Injectable 4 milliGRAM(s) IV Push every 8 hours PRN  pantoprazole    Tablet 40 milliGRAM(s) Oral before breakfast  sucralfate 1 Gram(s) Oral every 12 hours PRN        ROS:   General:  No fevers, chills or night sweats  ENT:  No sore throat or dysphagia  CV:  No pain or palpitations  Resp:  No dyspnea, cough or  wheezing  GI:  as above  Skin:  No rash or edema  Neuro: no weakness   Hematologic: no bleeding  Musculoskeletal: no muscle pain or join pain  Psych: no agitation      PHYSICAL EXAM:   Vital Signs:  Vital Signs Last 24 Hrs  T(C): 37.1 (16 Jul 2021 05:23), Max: 37.4 (15 Jul 2021 21:43)  T(F): 98.8 (16 Jul 2021 05:23), Max: 99.3 (15 Jul 2021 21:43)  HR: 72 (16 Jul 2021 05:23) (55 - 76)  BP: 118/64 (16 Jul 2021 05:23) (113/62 - 130/68)  BP(mean): --  RR: 18 (16 Jul 2021 05:23) (18 - 28)  SpO2: 99% (16 Jul 2021 05:23) (97% - 100%)  Daily Height in cm: 160.02 (15 Jul 2021 08:47)    Daily     GENERAL:  NAD, Appears stated age  HEENT:  NC/AT,  conjunctivae clear and pink, sclera -anicteric  CHEST:  Normal Effort, Breath sounds clear  HEART:  RRR, S1 + S2, no murmurs  ABDOMEN:  Soft, non-tender, non-distended, normoactive bowel sounds,  no masses  EXTREMITIES:  no cyanosis or edema  SKIN:  Warm & Dry. No rash or erythema  NEURO:  Alert, oriented, no focal deficit    LABS:                        13.4   9.54  )-----------( 333      ( 15 Jul 2021 07:34 )             39.6     Mean Cell Volume: 91.9 fL (07-15-21 @ 07:34)    07-15    140  |  105  |  5<L>  ----------------------------<  72  3.8   |  18<L>  |  0.88    Ca    9.5      15 Jul 2021 07:34  Phos  2.5     07-15  Mg     2.00     07-15    TPro  7.3  /  Alb  4.3  /  TBili  1.0  /  DBili  x   /  AST  13  /  ALT  21  /  AlkPhos  57  07-15    LIVER FUNCTIONS - ( 15 Jul 2021 07:34 )  Alb: 4.3 g/dL / Pro: 7.3 g/dL / ALK PHOS: 57 U/L / ALT: 21 U/L / AST: 13 U/L / GGT: x           PT/INR - ( 15 Jul 2021 07:34 )   PT: 13.6 sec;   INR: 1.20 ratio         PTT - ( 15 Jul 2021 07:34 )  PTT:28.1 sec                            13.4   9.54  )-----------( 333      ( 15 Jul 2021 07:34 )             39.6                         13.6   8.68  )-----------( 344      ( 14 Jul 2021 08:41 )             40.5       Imaging:    Elías Nelson MD   Gastroenterology Fellow  Pager: [phone]  Please call answering service [phone] / on-call GI fellow after 5pm and before 8am, and on weekends.     Interval Events:   No new complaint this morning, Patient denies having any N and V. Tolerating food. Reports pain is the best its been and its wanting to go home.    Hospital Medications:  acetaminophen   Tablet .. 650 milliGRAM(s) Oral every 6 hours PRN  Minigynon 30 (0.15mg/0.03mg) Tablet 1 Tablet(s) 1 Tablet(s) Oral daily  ondansetron Injectable 4 milliGRAM(s) IV Push every 8 hours PRN  pantoprazole    Tablet 40 milliGRAM(s) Oral before breakfast  sucralfate 1 Gram(s) Oral every 12 hours PRN        ROS:   General:  No fevers, chills or night sweats  ENT:  No sore throat or dysphagia  CV:  No pain or palpitations  Resp:  No dyspnea, cough or  wheezing  GI:  as above  Skin:  No rash or edema  Neuro: no weakness   Hematologic: no bleeding  Musculoskeletal: no muscle pain or join pain  Psych: no agitation      PHYSICAL EXAM:   Vital Signs:  Vital Signs Last 24 Hrs  T(C): 37.1 (16 Jul 2021 05:23), Max: 37.4 (15 Jul 2021 21:43)  T(F): 98.8 (16 Jul 2021 05:23), Max: 99.3 (15 Jul 2021 21:43)  HR: 72 (16 Jul 2021 05:23) (55 - 76)  BP: 118/64 (16 Jul 2021 05:23) (113/62 - 130/68)  BP(mean): --  RR: 18 (16 Jul 2021 05:23) (18 - 28)  SpO2: 99% (16 Jul 2021 05:23) (97% - 100%)  Daily Height in cm: 160.02 (15 Jul 2021 08:47)    Daily     GENERAL:  NAD, Appears stated age  HEENT:  NC/AT,  conjunctivae clear and pink, sclera -anicteric  CHEST:  Normal Effort, Breath sounds clear  HEART:  RRR, S1 + S2, no murmurs  ABDOMEN:  Soft, non-tender, non-distended, normoactive bowel sounds,  no masses  EXTREMITIES:  no cyanosis or edema  SKIN:  Warm & Dry. No rash or erythema  NEURO:  Alert, oriented, no focal deficit    LABS:                        13.4   9.54  )-----------( 333      ( 15 Jul 2021 07:34 )             39.6     Mean Cell Volume: 91.9 fL (07-15-21 @ 07:34)    07-15    140  |  105  |  5<L>  ----------------------------<  72  3.8   |  18<L>  |  0.88    Ca    9.5      15 Jul 2021 07:34  Phos  2.5     07-15  Mg     2.00     07-15    TPro  7.3  /  Alb  4.3  /  TBili  1.0  /  DBili  x   /  AST  13  /  ALT  21  /  AlkPhos  57  07-15    LIVER FUNCTIONS - ( 15 Jul 2021 07:34 )  Alb: 4.3 g/dL / Pro: 7.3 g/dL / ALK PHOS: 57 U/L / ALT: 21 U/L / AST: 13 U/L / GGT: x           PT/INR - ( 15 Jul 2021 07:34 )   PT: 13.6 sec;   INR: 1.20 ratio         PTT - ( 15 Jul 2021 07:34 )  PTT:28.1 sec                            13.4   9.54  )-----------( 333      ( 15 Jul 2021 07:34 )             39.6                         13.6   8.68  )-----------( 344      ( 14 Jul 2021 08:41 )             40.5       Imaging:    Elías Nelson MD   Gastroenterology Fellow  Pager: [phone]  Please call answering service [phone] / on-call GI fellow after 5pm and before 8am, and on weekends.     Interval Events:   No new complaint this morning, Patient denies having any N and V. Tolerating food. Reports pain is the best its been and its wanting to go home.    Hospital Medications:  acetaminophen   Tablet .. 650 milliGRAM(s) Oral every 6 hours PRN  Minigynon 30 (0.15mg/0.03mg) Tablet 1 Tablet(s) 1 Tablet(s) Oral daily  ondansetron Injectable 4 milliGRAM(s) IV Push every 8 hours PRN  pantoprazole    Tablet 40 milliGRAM(s) Oral before breakfast  sucralfate 1 Gram(s) Oral every 12 hours PRN        ROS:   General:  No fevers, chills or night sweats  ENT:  No sore throat or dysphagia  CV:  No pain or palpitations  Resp:  No dyspnea, cough or  wheezing  GI:  as above  Skin:  No rash or edema  Neuro: no weakness   Hematologic: no bleeding  Musculoskeletal: no muscle pain or join pain  Psych: no agitation      PHYSICAL EXAM:   Vital Signs:  Vital Signs Last 24 Hrs  T(C): 37.1 (16 Jul 2021 05:23), Max: 37.4 (15 Jul 2021 21:43)  T(F): 98.8 (16 Jul 2021 05:23), Max: 99.3 (15 Jul 2021 21:43)  HR: 72 (16 Jul 2021 05:23) (55 - 76)  BP: 118/64 (16 Jul 2021 05:23) (113/62 - 130/68)  BP(mean): --  RR: 18 (16 Jul 2021 05:23) (18 - 28)  SpO2: 99% (16 Jul 2021 05:23) (97% - 100%)  Daily Height in cm: 160.02 (15 Jul 2021 08:47)    Daily     GENERAL:  NAD, Appears stated age  HEENT:  NC/AT,  conjunctivae clear and pink, sclera -anicteric  CHEST:  Normal Effort, Breath sounds clear  HEART:  RRR, S1 + S2, no murmurs  ABDOMEN:  Mild abdominal discomfort in Left side of abdomen, mostly lower quadrant  EXTREMITIES:  no cyanosis or edema  SKIN:  Warm & Dry. No rash or erythema  NEURO:  Alert, oriented, no focal deficit    LABS:                        13.4   9.54  )-----------( 333      ( 15 Jul 2021 07:34 )             39.6     Mean Cell Volume: 91.9 fL (07-15-21 @ 07:34)    07-15    140  |  105  |  5<L>  ----------------------------<  72  3.8   |  18<L>  |  0.88    Ca    9.5      15 Jul 2021 07:34  Phos  2.5     07-15  Mg     2.00     07-15    TPro  7.3  /  Alb  4.3  /  TBili  1.0  /  DBili  x   /  AST  13  /  ALT  21  /  AlkPhos  57  07-15    LIVER FUNCTIONS - ( 15 Jul 2021 07:34 )  Alb: 4.3 g/dL / Pro: 7.3 g/dL / ALK PHOS: 57 U/L / ALT: 21 U/L / AST: 13 U/L / GGT: x           PT/INR - ( 15 Jul 2021 07:34 )   PT: 13.6 sec;   INR: 1.20 ratio         PTT - ( 15 Jul 2021 07:34 )  PTT:28.1 sec                            13.4   9.54  )-----------( 333      ( 15 Jul 2021 07:34 )             39.6                         13.6   8.68  )-----------( 344      ( 14 Jul 2021 08:41 )             40.5       Imaging:  Colonoscopy    - No findings that can explain patient's diarrhea.                        Biopsies for histology were taken with a cold forceps                        from the right colon and left colon for evaluation of                        microscopic colitis.                       - Moderate diverticulosis in the entire colon.                       - Non-bleeding internal hemorrhoids.                       - Erythematous mucosa in the descendingcolon. Biopsied.                       - Erythematous and congested mucosa in the distal ileum.                        Biopsied.    EGD:  < from: Upper Endoscopy (07.15.21 @ 09:48) >  - Z-line irregular.                       - Esophageal mucosal changes suspicious for                        short-segment Dickens's esophagus. Biopsied.             - Non-bleeding gastric ulcers in body, possibly in                        setting of recent vomiting.                       - Normal examined duodenum. Biopsied.                       - Biopsies were taken with a cold forceps for                Helicobacter pylori testing.    < end of copied text >

## 2021-07-16 NOTE — PROGRESS NOTE ADULT - ASSESSMENT
30F PMH diverticulitis, asthma, migraines, seasonal allergies, who presents w/ acute exacerbation of chronic abd pain w/ n/v/d, inability to tolerate po intake. Now with improved pain, tolerating PO, and workup including EGD/colonoscopy not suggestive of IBD or infection.

## 2021-07-16 NOTE — PROGRESS NOTE ADULT - PROBLEM SELECTOR PLAN 2
Not on home meds. No c/o SOB.  - SpO2% adequate on RA  - Monitor pulse ox
Not on home meds  - SpO2% adequate on RA  - Monitor pulse ox

## 2021-07-16 NOTE — SBIRT NOTE ADULT - NSSBIRTDRGCOMPLAIN_GEN_A_CORE
"Patient: Chaitanya Cortes    Procedure Summary     Date:  05/20/20 Room / Location:  [facility] OR  / [facility] MAIN OR    Anesthesia Start:  1135 Anesthesia Stop:  1428    Procedure:  Davinci assisted laparoscopic right bilateral inguinal hernia repair (Right Abdomen) Diagnosis:       Right inguinal hernia      (Right inguinal hernia [K40.90])    Surgeon:  Luis Enrique Stafford Jr., MD Provider:  Noel Welsh MD    Anesthesia Type:  general ASA Status:  1          Anesthesia Type: general    Vitals  Vitals Value Taken Time   /73 5/20/2020  3:30 PM   Temp 36.8 °C (98.2 °F) 5/20/2020  3:15 PM   Pulse 86 5/20/2020  3:31 PM   Resp 16 5/20/2020  3:15 PM   SpO2 100 % 5/20/2020  3:31 PM   Vitals shown include unvalidated device data.        Post Anesthesia Care and Evaluation    Patient location during evaluation: bedside  Patient participation: complete - patient participated  Level of consciousness: awake  Pain score: 2  Pain management: adequate  Airway patency: patent  Anesthetic complications: No anesthetic complications  PONV Status: none  Cardiovascular status: acceptable  Respiratory status: acceptable  Hydration status: acceptable    Comments: /71   Pulse 87   Temp 36.8 °C (98.2 °F) (Oral)   Resp 16   Ht 177.8 cm (70\")   Wt 73.3 kg (161 lb 9.6 oz)   SpO2 100%   BMI 23.19 kg/m²         " No

## 2021-07-16 NOTE — SBIRT NOTE ADULT - NSSBIRTDRGBRIEFINTDET_GEN_A_CORE
This writer reviewed Mja Effects on the Body literature. Motivational interviewing provided and risk reduction discussed.

## 2021-07-16 NOTE — SBIRT NOTE ADULT - NSSBIRTALCPOSREINDET_GEN_A_CORE
Provided SBIRT services: Full screen Positive. Positive reinforcement provided given patient currently within healthy guidelines. Education materials reviewed.

## 2021-07-16 NOTE — DISCHARGE NOTE NURSING/CASE MANAGEMENT/SOCIAL WORK - NSDCPETBCESMAN_GEN_ALL_CORE
7427 Received patient from waiting area. Armband and allergies verbally confirmed with patient. Procedure explained and consents signed. Mervin Santillan in to see consent signed  7042 TRANSFER - OUT REPORT:    Verbal report given to Clifton(name) on Fernanda Mcmahon.  being transferred to stress (unit) for routine progression of care       Report consisted of patients Situation, Background, Assessment and   Recommendations(SBAR). Information from the following report(s) Procedure Summary was reviewed with the receiving nurse. Lines:   Peripheral IV 09/27/19 Left; Outer Forearm (Active)   Site Assessment Clean, dry, & intact 9/27/2019 12:11 PM        Opportunity for questions and clarification was provided.       Patient transported with:   Registered Nurse If you are a smoker, it is important for your health to stop smoking. Please be aware that second hand smoke is also harmful.

## 2021-07-16 NOTE — PROGRESS NOTE ADULT - ASSESSMENT
30F PMH with asthma, cannabis use, H pylori infection, and diverticulosis presenting with intermittent left sided abdominal pain associated with chronic nausea, vomiting, and diarrhea.     #Lt sided Abdominal pain associated with nausea and vomiting  #Chronic diarrhea  #Wt loss    Differential diagnosis includes: Ruling out microscopic colitis biopsy still pending vs  IBS-D. Colonoscoy finding not concerning for segmental colitis associated with diverticulosis. tTG-IgA negative for  Celiac disease. Normal inflammatory markers makes IBD and infectious etiology less likely.     Recommendations:  -Support with anti-emetics PRN at home  -Can consider trial of TCA for possible brain-gut axis  -Continue daily low-dose PPI  -Given infectious work up negative, can support with Psyllium for bulking agent and Loperamide PRN.    No further inpatient work up from GI perspective, please call for any question or concern.        Elías Nelson MD   Gastroenterology Fellow  Pager: [phone]  Please call answering service [phone] / on-call GI fellow after 5pm and before 8am, and on weekends. 30F PMH with asthma, cannabis use, H pylori infection, and diverticulosis presenting with intermittent left sided abdominal pain associated with chronic nausea, vomiting, and diarrhea.     #Lt sided Abdominal pain associated with nausea and vomiting  #Chronic diarrhea  #Wt loss    Differential diagnosis includes: Ruling out microscopic colitis biopsy still pending vs  IBS-D. Colonoscopy finding not concerning for segmental colitis associated with diverticulosis. tTG-IgA negative for Celiac disease. Normal inflammatory markers makes IBD and infectious etiology less likely.     Recommendations:  -Support with anti-emetics PRN at home  -Can consider trial of TCA for possible brain-gut axis if pain worsens.  -Continue daily low-dose PPI  -Given infectious work up negative, can support with Psyllium to bulk stool and Loperamide PRN.    No further inpatient work up from GI perspective, please call for any question or concern.      Elías Nelson MD   Gastroenterology Fellow  Pager: [phone]  Please call answering service [phone] / on-call GI fellow after 5pm and before 8am, and on weekends. 30F PMH with asthma, cannabis use, H pylori infection, and diverticulosis presenting with intermittent left sided abdominal pain associated with chronic nausea, vomiting, and diarrhea.     #Lt sided Abdominal pain associated with nausea and vomiting  #Chronic diarrhea  #Wt loss    Differential diagnosis includes: Ruling out microscopic colitis biopsy still pending vs  IBS-D. Colonoscopy finding not concerning for segmental colitis associated with diverticulosis. tTG-IgA negative for Celiac disease. Normal inflammatory markers makes IBD and infectious etiology less likely.     Recommendations:  -Support with anti-emetics PRN at home  -Can consider trial of TCA for possible brain-gut axis if pain worsens and QTc allows  -Continue daily low-dose PPI  -Given infectious work up negative, can support with Psyllium to bulk stool and Loperamide PRN.    No further inpatient work up from GI perspective, please call for any question or concern.      Elías Nelson MD   Gastroenterology Fellow  Pager: [phone]  Please call answering service [phone] / on-call GI fellow after 5pm and before 8am, and on weekends.

## 2021-07-16 NOTE — PROGRESS NOTE ADULT - SUBJECTIVE AND OBJECTIVE BOX
PROGRESS NOTE:   Authored by Lauren Peralta, MS4 Pager: [phone]    Patient is a 30y old  Female who presents with a chief complaint of Abd pain (16 Jul 2021 07:13)      SUBJECTIVE / OVERNIGHT EVENTS: No acute events overnight. Pt seen and examined at bedside. She states  She denies     ADDITIONAL REVIEW OF SYSTEMS:    MEDICATIONS  (STANDING):  Minigynon 30 (0.15mg/0.03mg) Tablet 1 Tablet(s) 1 Tablet(s) Oral daily  pantoprazole    Tablet 40 milliGRAM(s) Oral before breakfast    MEDICATIONS  (PRN):  acetaminophen   Tablet .. 650 milliGRAM(s) Oral every 6 hours PRN Temp greater or equal to 38C (100.4F), Mild Pain (1 - 3), Moderate Pain (4 - 6)  ondansetron Injectable 4 milliGRAM(s) IV Push every 8 hours PRN Nausea and/or Vomiting  sucralfate 1 Gram(s) Oral every 12 hours PRN abdominal pain      CAPILLARY BLOOD GLUCOSE        I&O's Summary      PHYSICAL EXAM:  Vital Signs Last 24 Hrs  T(C): 37.1 (16 Jul 2021 05:23), Max: 37.4 (15 Jul 2021 21:43)  T(F): 98.8 (16 Jul 2021 05:23), Max: 99.3 (15 Jul 2021 21:43)  HR: 72 (16 Jul 2021 05:23) (55 - 76)  BP: 118/64 (16 Jul 2021 05:23) (113/62 - 130/68)  BP(mean): --  RR: 18 (16 Jul 2021 05:23) (18 - 28)  SpO2: 99% (16 Jul 2021 05:23) (97% - 100%)    GENERAL: No acute distress, well-developed  HEAD:  Atraumatic, Normocephalic  EYES: EOMI, PERRLA, conjunctiva and sclera clear  NECK: Supple, no lymphadenopathy, no JVD  CHEST/LUNG: CTAB; No wheezes, rales, or rhonchi  HEART: Regular rate and rhythm; No murmurs, rubs, or gallops  ABDOMEN: Soft, non-tender, non-distended; normal bowel sounds, no organomegaly  EXTREMITIES:  2+ peripheral pulses b/l, No clubbing, cyanosis, or edema  NEUROLOGY: A&O x 3, no focal deficits  SKIN: No rashes or lesions    LABS:                        13.4   9.54  )-----------( 333      ( 15 Jul 2021 07:34 )             39.6     07-15    140  |  105  |  5<L>  ----------------------------<  72  3.8   |  18<L>  |  0.88    Ca    9.5      15 Jul 2021 07:34  Phos  2.5     07-15  Mg     2.00     07-15    TPro  7.3  /  Alb  4.3  /  TBili  1.0  /  DBili  x   /  AST  13  /  ALT  21  /  AlkPhos  57  07-15    PT/INR - ( 15 Jul 2021 07:34 )   PT: 13.6 sec;   INR: 1.20 ratio         PTT - ( 15 Jul 2021 07:34 )  PTT:28.1 sec          Culture - Stool (collected 14 Jul 2021 14:34)  Source: .Stool Feces, orange top c&amp;s  Preliminary Report (15 Jul 2021 21:54):    No enteric pathogens to date: Final culture pending    GI PCR Panel, Stool (collected 14 Jul 2021 12:12)  Source: .Stool Feces, orange top c&amp;s  Final Report (14 Jul 2021 17:46):    GI PCR Results: NOT detected    *******Please Note:*******    GI panel PCR evaluates for:    Campylobacter, Plesiomonas shigelloides, Salmonella,    Vibrio, Yersinia enterocolitica, Enteroaggregative    Escherichia coli (EAEC), Enteropathogenic E.coli (EPEC),    Enterotoxigenic E. coli (ETEC) lt/st, Shiga-like    toxin-producing E. coli (STEC) stx1/stx2,    Shigella/ Enteroinvasive E. coli (EIEC), Cryptosporidium,    Cyclospora cayetanensis, Entamoeba histolytica,    Giardia lamblia, Adenovirus F 40/41, Astrovirus,    Norovirus GI/GII, Rotavirus A, Sapovirus        RADIOLOGY & ADDITIONAL TESTS:  Results Reviewed:   Imaging Personally Reviewed:  Electrocardiogram Personally Reviewed:    COORDINATION OF CARE:  Care Discussed with Consultants/Other Providers [Y/N]:  Prior or Outpatient Records Reviewed [Y/N]:

## 2021-07-16 NOTE — PROGRESS NOTE ADULT - PROVIDER SPECIALTY LIST ADULT
Anesthesia
Internal Medicine
Gastroenterology
Gastroenterology
Internal Medicine

## 2021-07-17 LAB — SURGICAL PATHOLOGY STUDY: SIGNIFICANT CHANGE UP

## 2021-07-20 ENCOUNTER — NON-APPOINTMENT (OUTPATIENT)
Age: 31
End: 2021-07-20

## 2021-07-20 PROBLEM — Z00.00 ENCOUNTER FOR PREVENTIVE HEALTH EXAMINATION: Status: ACTIVE | Noted: 2021-07-20

## 2021-07-20 NOTE — CHART NOTE - NSCHARTNOTEFT_GEN_A_CORE
Path available from recent EGD and colonoscopy.   Multiple attempts made to reach patient at available number; number goes directly to  and message left.   Will have [employer] chart opened for patient so that letter can be sent out.    1. Terminal ileum, biopsy:   - Small intestinal mucosa with no significant   histopathologic findings.   2. Colon, right, biopsy:   - Colonic mucosa with no significant histopathologic   findings.   3. Colon, left, biopsy:   - Colonic mucosa with no significant histopathologic   findings.   4. Duodenum, biopsy:   - Chronic peptic duodenitis.   - Negative for histological features of celiac disease,   parasites and granulomata.   5. Stomach, gastric, biopsy:   - Gastric antral-oxyntic mucosa with chronic gastritis   - No morphologic evidence of Helicobacter microorganisms.   - Negative for intestinal metaplasia.   6. GE Junction, biopsy:   - Gastric cardiac-fundic mucosa with chronic carditis.   - Negative for intestinal metaplasia.   - Squamous mucosa with mild reactive changes.

## 2021-08-04 PROBLEM — J30.2 OTHER SEASONAL ALLERGIC RHINITIS: Chronic | Status: ACTIVE | Noted: 2021-07-13

## 2021-08-27 ENCOUNTER — APPOINTMENT (OUTPATIENT)
Dept: GASTROENTEROLOGY | Facility: CLINIC | Age: 31
End: 2021-08-27
Payer: COMMERCIAL

## 2021-08-27 ENCOUNTER — TRANSCRIPTION ENCOUNTER (OUTPATIENT)
Age: 31
End: 2021-08-27

## 2021-08-27 ENCOUNTER — NON-APPOINTMENT (OUTPATIENT)
Age: 31
End: 2021-08-27

## 2021-08-27 VITALS
HEIGHT: 63 IN | RESPIRATION RATE: 18 BRPM | OXYGEN SATURATION: 98 % | WEIGHT: 177 LBS | DIASTOLIC BLOOD PRESSURE: 75 MMHG | TEMPERATURE: 98 F | BODY MASS INDEX: 31.36 KG/M2 | HEART RATE: 77 BPM | SYSTOLIC BLOOD PRESSURE: 115 MMHG

## 2021-08-27 DIAGNOSIS — R10.9 UNSPECIFIED ABDOMINAL PAIN: ICD-10-CM

## 2021-08-27 DIAGNOSIS — K52.9 NONINFECTIVE GASTROENTERITIS AND COLITIS, UNSPECIFIED: ICD-10-CM

## 2021-08-27 PROCEDURE — 99204 OFFICE O/P NEW MOD 45 MIN: CPT | Mod: 25

## 2021-08-30 LAB
ALBUMIN SERPL ELPH-MCNC: 4.6 G/DL
ALP BLD-CCNC: 65 U/L
ALT SERPL-CCNC: 16 U/L
ANION GAP SERPL CALC-SCNC: 9 MMOL/L
AST SERPL-CCNC: 14 U/L
BASOPHILS # BLD AUTO: 0.08 K/UL
BASOPHILS NFR BLD AUTO: 1 %
BILIRUB SERPL-MCNC: 0.9 MG/DL
BUN SERPL-MCNC: 7 MG/DL
CALCIUM SERPL-MCNC: 10.3 MG/DL
CHLORIDE SERPL-SCNC: 109 MMOL/L
CO2 SERPL-SCNC: 24 MMOL/L
CREAT SERPL-MCNC: 0.83 MG/DL
EOSINOPHIL # BLD AUTO: 0.06 K/UL
EOSINOPHIL NFR BLD AUTO: 0.7 %
GLUCOSE SERPL-MCNC: 92 MG/DL
HCT VFR BLD CALC: 42.5 %
HGB BLD-MCNC: 14 G/DL
IMM GRANULOCYTES NFR BLD AUTO: 0.2 %
LYMPHOCYTES # BLD AUTO: 2.65 K/UL
LYMPHOCYTES NFR BLD AUTO: 32.5 %
MAN DIFF?: NORMAL
MCHC RBC-ENTMCNC: 31.3 PG
MCHC RBC-ENTMCNC: 32.9 GM/DL
MCV RBC AUTO: 94.9 FL
MONOCYTES # BLD AUTO: 0.54 K/UL
MONOCYTES NFR BLD AUTO: 6.6 %
NEUTROPHILS # BLD AUTO: 4.8 K/UL
NEUTROPHILS NFR BLD AUTO: 59 %
PLATELET # BLD AUTO: 371 K/UL
POTASSIUM SERPL-SCNC: 4.4 MMOL/L
PROT SERPL-MCNC: 7.2 G/DL
RBC # BLD: 4.48 M/UL
RBC # FLD: 13.4 %
SODIUM SERPL-SCNC: 143 MMOL/L
TSH SERPL-ACNC: 0.37 UIU/ML
WBC # FLD AUTO: 8.15 K/UL

## 2021-09-08 ENCOUNTER — NON-APPOINTMENT (OUTPATIENT)
Age: 31
End: 2021-09-08

## 2021-09-08 LAB
C DIFF TOX GENS STL QL NAA+PROBE: NORMAL
CDIFF BY PCR: DETECTED

## 2021-09-08 RX ORDER — VANCOMYCIN HYDROCHLORIDE 125 MG/1
125 CAPSULE ORAL 4 TIMES DAILY
Qty: 56 | Refills: 0 | Status: ACTIVE | COMMUNITY
Start: 2021-09-08 | End: 1900-01-01

## 2021-09-29 ENCOUNTER — NON-APPOINTMENT (OUTPATIENT)
Age: 31
End: 2021-09-29

## 2021-10-05 ENCOUNTER — NON-APPOINTMENT (OUTPATIENT)
Age: 31
End: 2021-10-05

## 2021-10-20 ENCOUNTER — INPATIENT (INPATIENT)
Facility: HOSPITAL | Age: 31
LOS: 0 days | Discharge: HOME | End: 2021-10-21
Attending: INTERNAL MEDICINE | Admitting: INTERNAL MEDICINE
Payer: COMMERCIAL

## 2021-10-20 VITALS
HEIGHT: 63 IN | DIASTOLIC BLOOD PRESSURE: 71 MMHG | RESPIRATION RATE: 16 BRPM | SYSTOLIC BLOOD PRESSURE: 130 MMHG | TEMPERATURE: 98 F | HEART RATE: 90 BPM | OXYGEN SATURATION: 100 %

## 2021-10-20 DIAGNOSIS — R10.9 UNSPECIFIED ABDOMINAL PAIN: ICD-10-CM

## 2021-10-20 DIAGNOSIS — Z98.890 OTHER SPECIFIED POSTPROCEDURAL STATES: Chronic | ICD-10-CM

## 2021-10-20 DIAGNOSIS — K52.9 NONINFECTIVE GASTROENTERITIS AND COLITIS, UNSPECIFIED: ICD-10-CM

## 2021-10-20 DIAGNOSIS — R11.2 NAUSEA WITH VOMITING, UNSPECIFIED: ICD-10-CM

## 2021-10-20 DIAGNOSIS — Z29.9 ENCOUNTER FOR PROPHYLACTIC MEASURES, UNSPECIFIED: ICD-10-CM

## 2021-10-20 LAB
ALBUMIN SERPL ELPH-MCNC: 4.8 G/DL — SIGNIFICANT CHANGE UP (ref 3.3–5)
ALP SERPL-CCNC: 80 U/L — SIGNIFICANT CHANGE UP (ref 40–120)
ALT FLD-CCNC: 13 U/L — SIGNIFICANT CHANGE UP (ref 4–33)
ANION GAP SERPL CALC-SCNC: 15 MMOL/L — HIGH (ref 7–14)
APPEARANCE UR: ABNORMAL
AST SERPL-CCNC: 14 U/L — SIGNIFICANT CHANGE UP (ref 4–32)
BASOPHILS # BLD AUTO: 0.09 K/UL — SIGNIFICANT CHANGE UP (ref 0–0.2)
BASOPHILS NFR BLD AUTO: 1 % — SIGNIFICANT CHANGE UP (ref 0–2)
BILIRUB SERPL-MCNC: 1.1 MG/DL — SIGNIFICANT CHANGE UP (ref 0.2–1.2)
BILIRUB UR-MCNC: NEGATIVE — SIGNIFICANT CHANGE UP
BLOOD GAS VENOUS COMPREHENSIVE RESULT: SIGNIFICANT CHANGE UP
BUN SERPL-MCNC: 6 MG/DL — LOW (ref 7–23)
CALCIUM SERPL-MCNC: 10.1 MG/DL — SIGNIFICANT CHANGE UP (ref 8.4–10.5)
CHLORIDE SERPL-SCNC: 102 MMOL/L — SIGNIFICANT CHANGE UP (ref 98–107)
CO2 SERPL-SCNC: 23 MMOL/L — SIGNIFICANT CHANGE UP (ref 22–31)
COLOR SPEC: ABNORMAL
CREAT SERPL-MCNC: 0.81 MG/DL — SIGNIFICANT CHANGE UP (ref 0.5–1.3)
CULTURE RESULTS: SIGNIFICANT CHANGE UP
DIFF PNL FLD: NEGATIVE — SIGNIFICANT CHANGE UP
EOSINOPHIL # BLD AUTO: 0.03 K/UL — SIGNIFICANT CHANGE UP (ref 0–0.5)
EOSINOPHIL NFR BLD AUTO: 0.3 % — SIGNIFICANT CHANGE UP (ref 0–6)
GLUCOSE SERPL-MCNC: 84 MG/DL — SIGNIFICANT CHANGE UP (ref 70–99)
GLUCOSE UR QL: NEGATIVE — SIGNIFICANT CHANGE UP
HCG SERPL-ACNC: <5 MIU/ML — SIGNIFICANT CHANGE UP
HCG UR QL: NEGATIVE — SIGNIFICANT CHANGE UP
HCT VFR BLD CALC: 45.1 % — HIGH (ref 34.5–45)
HGB BLD-MCNC: 15.4 G/DL — SIGNIFICANT CHANGE UP (ref 11.5–15.5)
IANC: 6.06 K/UL — SIGNIFICANT CHANGE UP (ref 1.5–8.5)
IMM GRANULOCYTES NFR BLD AUTO: 0.3 % — SIGNIFICANT CHANGE UP (ref 0–1.5)
KETONES UR-MCNC: NEGATIVE — SIGNIFICANT CHANGE UP
LEUKOCYTE ESTERASE UR-ACNC: NEGATIVE — SIGNIFICANT CHANGE UP
LYMPHOCYTES # BLD AUTO: 2.66 K/UL — SIGNIFICANT CHANGE UP (ref 1–3.3)
LYMPHOCYTES # BLD AUTO: 28.2 % — SIGNIFICANT CHANGE UP (ref 13–44)
MAGNESIUM SERPL-MCNC: 1.9 MG/DL — SIGNIFICANT CHANGE UP (ref 1.6–2.6)
MCHC RBC-ENTMCNC: 31.5 PG — SIGNIFICANT CHANGE UP (ref 27–34)
MCHC RBC-ENTMCNC: 34.1 GM/DL — SIGNIFICANT CHANGE UP (ref 32–36)
MCV RBC AUTO: 92.2 FL — SIGNIFICANT CHANGE UP (ref 80–100)
MONOCYTES # BLD AUTO: 0.56 K/UL — SIGNIFICANT CHANGE UP (ref 0–0.9)
MONOCYTES NFR BLD AUTO: 5.9 % — SIGNIFICANT CHANGE UP (ref 2–14)
NEUTROPHILS # BLD AUTO: 6.06 K/UL — SIGNIFICANT CHANGE UP (ref 1.8–7.4)
NEUTROPHILS NFR BLD AUTO: 64.3 % — SIGNIFICANT CHANGE UP (ref 43–77)
NITRITE UR-MCNC: NEGATIVE — SIGNIFICANT CHANGE UP
NRBC # BLD: 0 /100 WBCS — SIGNIFICANT CHANGE UP
NRBC # FLD: 0 K/UL — SIGNIFICANT CHANGE UP
PH UR: 6 — SIGNIFICANT CHANGE UP (ref 5–8)
PLATELET # BLD AUTO: 412 K/UL — HIGH (ref 150–400)
POTASSIUM SERPL-MCNC: 4 MMOL/L — SIGNIFICANT CHANGE UP (ref 3.5–5.3)
POTASSIUM SERPL-SCNC: 4 MMOL/L — SIGNIFICANT CHANGE UP (ref 3.5–5.3)
PROT SERPL-MCNC: 8.1 G/DL — SIGNIFICANT CHANGE UP (ref 6–8.3)
PROT UR-MCNC: ABNORMAL
RBC # BLD: 4.89 M/UL — SIGNIFICANT CHANGE UP (ref 3.8–5.2)
RBC # FLD: 13.4 % — SIGNIFICANT CHANGE UP (ref 10.3–14.5)
SARS-COV-2 RNA SPEC QL NAA+PROBE: SIGNIFICANT CHANGE UP
SODIUM SERPL-SCNC: 140 MMOL/L — SIGNIFICANT CHANGE UP (ref 135–145)
SP GR SPEC: 1.03 — SIGNIFICANT CHANGE UP (ref 1–1.05)
SPECIMEN SOURCE: SIGNIFICANT CHANGE UP
UROBILINOGEN FLD QL: ABNORMAL
WBC # BLD: 9.43 K/UL — SIGNIFICANT CHANGE UP (ref 3.8–10.5)
WBC # FLD AUTO: 9.43 K/UL — SIGNIFICANT CHANGE UP (ref 3.8–10.5)

## 2021-10-20 PROCEDURE — 99285 EMERGENCY DEPT VISIT HI MDM: CPT

## 2021-10-20 PROCEDURE — 99223 1ST HOSP IP/OBS HIGH 75: CPT | Mod: GC

## 2021-10-20 RX ORDER — ONDANSETRON 8 MG/1
4 TABLET, FILM COATED ORAL EVERY 8 HOURS
Refills: 0 | Status: DISCONTINUED | OUTPATIENT
Start: 2021-10-20 | End: 2021-10-20

## 2021-10-20 RX ORDER — SODIUM CHLORIDE 9 MG/ML
2000 INJECTION INTRAMUSCULAR; INTRAVENOUS; SUBCUTANEOUS ONCE
Refills: 0 | Status: COMPLETED | OUTPATIENT
Start: 2021-10-20 | End: 2021-10-20

## 2021-10-20 RX ORDER — SODIUM CHLORIDE 9 MG/ML
1000 INJECTION, SOLUTION INTRAVENOUS
Refills: 0 | Status: DISCONTINUED | OUTPATIENT
Start: 2021-10-20 | End: 2021-10-21

## 2021-10-20 RX ORDER — FAMOTIDINE 10 MG/ML
20 INJECTION INTRAVENOUS ONCE
Refills: 0 | Status: COMPLETED | OUTPATIENT
Start: 2021-10-20 | End: 2021-10-20

## 2021-10-20 RX ORDER — ONDANSETRON 8 MG/1
4 TABLET, FILM COATED ORAL ONCE
Refills: 0 | Status: COMPLETED | OUTPATIENT
Start: 2021-10-20 | End: 2021-10-20

## 2021-10-20 RX ORDER — LANOLIN ALCOHOL/MO/W.PET/CERES
3 CREAM (GRAM) TOPICAL AT BEDTIME
Refills: 0 | Status: DISCONTINUED | OUTPATIENT
Start: 2021-10-20 | End: 2021-10-20

## 2021-10-20 RX ORDER — ACETAMINOPHEN 500 MG
650 TABLET ORAL EVERY 6 HOURS
Refills: 0 | Status: DISCONTINUED | OUTPATIENT
Start: 2021-10-20 | End: 2021-10-20

## 2021-10-20 RX ORDER — ONDANSETRON 8 MG/1
4 TABLET, FILM COATED ORAL EVERY 6 HOURS
Refills: 0 | Status: DISCONTINUED | OUTPATIENT
Start: 2021-10-20 | End: 2021-10-21

## 2021-10-20 RX ORDER — ACETAMINOPHEN 500 MG
1000 TABLET ORAL ONCE
Refills: 0 | Status: COMPLETED | OUTPATIENT
Start: 2021-10-20 | End: 2021-10-20

## 2021-10-20 RX ADMIN — SODIUM CHLORIDE 2000 MILLILITER(S): 9 INJECTION INTRAMUSCULAR; INTRAVENOUS; SUBCUTANEOUS at 11:35

## 2021-10-20 RX ADMIN — Medication 400 MILLIGRAM(S): at 19:34

## 2021-10-20 RX ADMIN — Medication 30 MILLILITER(S): at 11:47

## 2021-10-20 RX ADMIN — ONDANSETRON 4 MILLIGRAM(S): 8 TABLET, FILM COATED ORAL at 19:34

## 2021-10-20 RX ADMIN — ONDANSETRON 4 MILLIGRAM(S): 8 TABLET, FILM COATED ORAL at 11:32

## 2021-10-20 RX ADMIN — FAMOTIDINE 20 MILLIGRAM(S): 10 INJECTION INTRAVENOUS at 11:32

## 2021-10-20 RX ADMIN — SODIUM CHLORIDE 50 MILLILITER(S): 9 INJECTION, SOLUTION INTRAVENOUS at 19:53

## 2021-10-20 NOTE — H&P ADULT - PROBLEM SELECTOR PLAN 1
-diagnosed with C. 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 Non-bleeding gastric ulcers in body.  Colonoscopy 7/21: showed diverticulosis and nonbleeding hemorrhoids     Plan:   -f/u on stool studies, C. diff, GI PCR

## 2021-10-20 NOTE — ED ADULT NURSE NOTE - OBJECTIVE STATEMENT
pt received in ed rm 21. pt A+Ox4 and VSS.  pt c/o nausea, diarrhea,  and fatigue since Sunday. pt reports having hx C-Diff in July.  PMH of asthma. pt denies chest pain, sob, fever, and chills. IV L 22g in the AC and labs sent. will continue to monitor and administer medication as per md orders.

## 2021-10-20 NOTE — H&P ADULT - PROBLEM SELECTOR PLAN 3
DVT: no pharmacologic ppx indicated  Diet: Clear liquid as tolerated  Dispo: pending clinical improvement

## 2021-10-20 NOTE — H&P ADULT - NSHPREVIEWOFSYSTEMS_GEN_ALL_CORE
CONSTITUTIONAL: No fever, weight loss, or fatigue  EYES: No eye pain, visual disturbances, or discharge  ENMT:  No difficulty hearing, tinnitus, vertigo; No sinus or throat pain  RESPIRATORY: No cough, wheezing, chills or hemoptysis; No shortness of breath  CARDIOVASCULAR: No chest pain, palpitations, dizziness, or leg swelling  GASTROINTESTINAL: No abdominal or epigastric pain. No nausea, vomiting, or hematemesis; No diarrhea or constipation. No melena or hematochezia.  GENITOURINARY: No dysuria, frequency, hematuria, or incontinence  NEUROLOGICAL: No headaches, loss of strength, numbness, or tremors  SKIN: No itching, burning, rashes, or lesions   LYMPH NODES: No enlarged glands  ENDOCRINE: No heat or cold intolerance; No polydipsia or polyuria  MUSCULOSKELETAL: No joint pain or swelling;   PSYCHIATRIC: Denies depression, anxiety  HEME/LYMPH: No easy bruising, or bleeding gums  ALLERGY AND IMMUNOLOGIC: No hives or eczema CONSTITUTIONAL: denies fevers, chills  ENMT:  feels dehydrated, unable to tolerate po intake  RESPIRATORY: has asthma, does not use inhaler frequently  CARDIOVASCULAR: No chest pain, palpitations, dizziness, or leg swelling  GASTROINTESTINAL: see hpi  GENITOURINARY: no dysuria   SKIN: no rash  LYMPH NODES: No enlarged glands  PSYCH: started becoming tearful at end of encounter when describing gurgling sounds in stomach

## 2021-10-20 NOTE — H&P ADULT - ASSESSMENT
Ms. Hawkins is a 29 y/o woman w hx diverticulosis and chronic gastritis seen on endoscopy/colonoscopy in July 2021 presenting for worsening diarrhea, vomiting after being tx for cdiff in september. Labs pending for cdiff, GI PCR. GI following.
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
## 2021-10-20 NOTE — ED PROVIDER NOTE - OBJECTIVE STATEMENT
Pt is a 29 y/o F PMHx H. pylori, gastritis, diverticulitis p/w diarrhea x 4 days.  Pt reports she has had daily nausea, vomiting, intermittent moderate intensity periumbilical abdominal cramping associated with daily diarrhea since 5/2021.  She reports she had more diarrhea than usual 1.5 months ago.  At that time she reports she saw gastroenterologist Dr. Wade with whom pt was found to be + for c diff, for which pt states she completed a 2 week course of antibiotics. Pt reports for past 3-4 days, she has had increased diarrhea, 12-15 episodes of loose, watery nonbloody bowel movements/day.  She reports she feels associated lightheadedness and tiredness, similar to when she has been dehydrated in the past.  Pt denies any fevers, chills, dysuria, cloudy urine, known sick contacts, ETOH abuse.  She reports smoking marijuana 1-2 joints/week.

## 2021-10-20 NOTE — CONSULT NOTE ADULT - ATTENDING COMMENTS
Patient seen yesterday in ED with GI team.   Recently admitted in July for nausea, vomiting and diarrhea. During hospitalization, GI PCR sent (negative), but patient was unable to provide stool sample for C. diff testing. She underwent inpatient EGD/colonoscopy, which were overall unrevealing. As outpatient, she had continued symptoms and C. diff testing was sent and resulted positive (9/8). She was given a standard 14d course of PO vanc (started approximately 9/12). She reported significant nausea/vomiting with vanc, but did state that after a few days the diarrhea improved (no further noctural symptoms and decreased frequency). Nausea/vomiting persisted during the vancomycin course. Approximately 2 weeks after vanc course was completed, she reported symptoms recurred; diarrhea became more frequent and again nocturnal.   Patient is well-appearing, afebrile. Labs with mild thrombocytosis, but no leukocytosis. CMP grossly normal. GI PCR negative, but C. diff testing remains in progress.   Concern for ongoing C. diff infection. Per patient, symptoms never truly resolved and returned to baseline to consider this a recurrence. Given patient's severe nausea/vomiting with her vanc course, may not have adequately tolerated/taken course. As C. diff testing in progress, would await results. However, as patient had significant intolerance of PO vanc, would favor fidaxomicin for treatment if/when C. diff testing results.

## 2021-10-20 NOTE — H&P ADULT - PROBLEM SELECTOR PLAN 2
patient reports chronic nausea and vomiting since 5/2021  non bloody and unable to specify quantity  electrolytes are normal   with associated abdominal pain  patient stopped taking birth control in september     Plan:   - f/u CT A&P   -zofran  -IVF  -F/u pregnancy test   monitor electrolytes

## 2021-10-20 NOTE — ED PROVIDER NOTE - CLINICAL SUMMARY MEDICAL DECISION MAKING FREE TEXT BOX
Pt is a 31 y/o F PMHx H. pylori, gastritis, diverticulitis p/w diarrhea x 4 days. -- acute on chronic diarrhea, chronic abdominal pain, not clinically concerning for surgical abdomen or any acute surgical intra-abdominal pathology; not clinically concerning for diverticulitis or colitis -- labs, iv fluids, antiemetics, gi pcr, c diff

## 2021-10-20 NOTE — H&P ADULT - ATTENDING COMMENTS
Pt seen and examined on 10/20/21  31 yo F w/ hx diverticulosis last treated with antibiotics in 5/21 was admitted in 7/21 for abdominal pain underwent EGD/colonoscopy chronic gastritis/duodenitis, recently diagnosed with c diff was rx vancomycin and had intermittent vomiting. +prior Hx marijuana use but denied drug use. PE +BS soft NT; labs normal WBC normal electrolytes.      diarrhea- concern for recurrence of c diff                - IVF               - stool count              - c diff and GI PCR              - CT A/P              - GI c/s               - antiemetics PRN               - f/u EKG

## 2021-10-20 NOTE — ED ADULT TRIAGE NOTE - CHIEF COMPLAINT QUOTE
Pt c/o vomiting and diarrhea x 3 days, states she was dx with C-diff 1 month ago and did the tx, but is still having symptoms. PMH: asthma.

## 2021-10-20 NOTE — H&P ADULT - NSHPLABSRESULTS_GEN_ALL_CORE
LABS:                        15.4   9.43  )-----------( 412      ( 20 Oct 2021 11:31 )             45.1     20 Oct 2021 11:31    140    |  102    |  6      ----------------------------<  84     4.0     |  23     |  0.81     Ca    10.1       20 Oct 2021 11:31  Mg     1.90      20 Oct 2021 11:31    TPro  8.1    /  Alb  4.8    /  TBili  1.1    /  DBili  x      /  AST  14     /  ALT  13     /  AlkPhos  80     20 Oct 2021 11:31      CAPILLARY BLOOD GLUCOSE        BLOOD CULTURE    RADIOLOGY & ADDITIONAL TESTS:    Imaging Personally Reviewed:  [ ] YES

## 2021-10-20 NOTE — ED PROVIDER NOTE - ATTENDING CONTRIBUTION TO CARE
Attending note:   After face to face evaluation of this patient, I concur with above noted hx, pe, and care plan for this patient.  Aranda: 30 yof with worsening diarrhea. Pt diagnosed with Cdiff treated with Vanco for two weeks with improvement initially but now with profuse daily diarrhea. No fever. Also with severe nausea and loss of appetite. Pt seen by [facility] - Dr. Wade. Pt is well appearing, dry lips, hr normal, clear lungs, RRR, abd soft with mild diffuse tn, no CVAT, no edema, neuro and msk grossly normal. - Worsening diarrhea - concerning for repeat Cdiff - labs, hydrate, cdiff pcr, sx relief.

## 2021-10-20 NOTE — ED ADULT NURSE REASSESSMENT NOTE - NS ED NURSE REASSESS COMMENT FT1
Received report from day shift RN. Pt in room 21. Pt is A&Ox4. Pt is calm and resting in bed. Pt states after being medicated with zofran the nausea has subsided. Pt has no complaints at this time. VSS. Will CTM.

## 2021-10-20 NOTE — H&P ADULT - HISTORY OF PRESENT ILLNESS
Pt is a 31 y/o woman w hx diverticulosis and chronic gastritis seen on endoscopy/colonoscopy in July 2021 presenting for worsening diarrhea, vomiting after being tx for cdiff in september. Afebrile, has abdominal pain. She was tx w abx for diverticulitis in May, was admitted in July for abdominal pain, underwent EGD/colonoscopy in July, found to have chronic peptic duodenitis in duodenum, chronic gastritis on biopsy, H pylori neg. CT with diverticulosis but no diverticulitis. She tested pos for cdiff in sept 9 and was started on oral vanc for 2 weeks, which she states she completed without any missed doses. However, she was vomiting during this time. After finishing vanc tx course, she continued to have sx of diarrhea/nausea/vomiting. Since saturday, unable to tolerate any food but tolerates orange juice. SHe feels dehydrated. Currently feels urge to have bm. Abdominal pain does not improve after bms. Amount of diarrhea is unclear, she states ranges from little to a lot and is watery, non bloody. SHe also vomits but unable to specify how often, also nonbloody. Denies fevers, chills. No sick contacts. No recent travel. Stopped birth control in september because was throwing up and is sexually active. No IUD.     For asthma, does not use inhaler frequently.   Other surgeries include shoulder surgery in past.   Medications - none currently

## 2021-10-20 NOTE — CONSULT NOTE ADULT - ASSESSMENT
29 y/o F PMHx H. pylori, gastritis, diverticulitis, recent C. diff treatment with Vancomycin presenting with Diarrhea, Nausea and worst PO intake since Yifan 10/17. Patient had difficulty tolerating the Vancomycin, had significant N/V during her 2 week ABX course. She may not have fully finished her ABX course.     #Diarrhea   Patient started ABX course 1 week after being diagnosed in early September with C. diff and had significant N/V when she took the ABX; She may not have fully finished her 2 week ABX course since she was not tolerating PO and vomiting  Patient is having 8-10 loose Non-bloody bowel movements a day   Likely C. Diff infection    #Nausea/Vomiting   Patient has had inability to tolerate anything PO since Sunday   She has had decreased PO intake and NBNB vomiting        Recommendations   - Will need to remain inpatient for evaluation and for inability to tolerate PO   - F/U Stool studies and C. diff results   - Trend CBC  - Can put on clear liquid diet if patient can tolerate   - Zofran IV PRN (Check QTc)   - No ABX jomar since patient not tolerating PO, but will need ABX likely Fidaxomicin in AM  29 y/o F PMHx H. pylori, gastritis, diverticulitis, recent C. diff treatment with Vancomycin presenting with Diarrhea, Nausea and worst PO intake since Yifan 10/17. Patient had difficulty tolerating the Vancomycin, had significant N/V during her 2 week ABX course. She may not have fully finished her ABX course.     #Diarrhea   Patient started ABX course 1 week after being diagnosed in early September with C. diff and had significant N/V when she took the ABX; She may not have fully finished her 2 week ABX course since she was not tolerating PO and vomiting  Patient is having 8-10 loose Non-bloody bowel movements a day   Likely C. Diff infection    #Nausea/Vomiting   Patient has had inability to tolerate anything PO since Sunday   She has had decreased PO intake and NBNB vomiting        Recommendations   - Will need to remain inpatient for evaluation and for inability to tolerate PO   - F/U Stool studies and C. diff results   - Trend CBC  - Can put on clear liquid diet if patient can tolerate (and advance as tolerated)   - Zofran IV PRN (Check QTc)   - No ABX jomar since patient not tolerating PO, but will need ABX likely Fidaxomicin in AM     NON-URGENT CONSULTS:  Please email [email or web address]  MONDAY-FRIDAY 8AM-5PM: Pager# [phone]  AT NIGHT AND ON WEEKENDS:  Contact on-call GI fellow via answering service ([phone]) from 5pm-8am and on weekends/holidays

## 2021-10-20 NOTE — H&P ADULT - NSHPPHYSICALEXAM_GEN_ALL_CORE
PHYSICAL EXAM:  GENERAL: NAD, lying in bed comfortably  HEAD:  Atraumatic, Normocephalic  EYES: EOMI, PERRLA, conjunctiva and sclera clear  ENT: Moist mucous membranes  NECK: Supple, No JVD  CHEST/LUNG: Clear to auscultation bilaterally; No rales, rhonchi, wheezing, or rubs. Unlabored respirations  HEART: Regular rate and rhythm; No murmurs, rubs, or gallops  ABDOMEN: Bowel sounds present; Soft, Nontender, Nondistended. No hepatomegally  EXTREMITIES:  2+ Peripheral Pulses, brisk capillary refill. No clubbing, cyanosis, or edema  NERVOUS SYSTEM:  Alert & Oriented X3, speech clear. No deficits   MSK: FROM all 4 extremities, full and equal strength  SKIN: No rashes or lesions PHYSICAL EXAM:  GENERAL: NAD, lying in bed comfortably  HEAD:  Atraumatic, Normocephalic  ENT: Moist mucous membranes  CHEST/LUNG: Clear to auscultation bilaterally  HEART: Regular rate and rhythm; No murmurs  ABDOMEN: Bowel sounds present; Soft, Nontender, Nondistended. No hepatomegally  EXTREMITIES:  moves all extremities, no edema  NERVOUS SYSTEM:  Alert & Oriented X3, speech clear. No deficits   MSK: FROM all 4 extremities, full and equal strength  SKIN: No rashes or lesions

## 2021-10-21 ENCOUNTER — TRANSCRIPTION ENCOUNTER (OUTPATIENT)
Age: 31
End: 2021-10-21

## 2021-10-21 VITALS
RESPIRATION RATE: 16 BRPM | DIASTOLIC BLOOD PRESSURE: 71 MMHG | TEMPERATURE: 98 F | SYSTOLIC BLOOD PRESSURE: 122 MMHG | OXYGEN SATURATION: 100 % | HEART RATE: 55 BPM

## 2021-10-21 LAB
ANION GAP SERPL CALC-SCNC: 14 MMOL/L — SIGNIFICANT CHANGE UP (ref 7–14)
BUN SERPL-MCNC: 4 MG/DL — LOW (ref 7–23)
CALCIUM SERPL-MCNC: 9.1 MG/DL — SIGNIFICANT CHANGE UP (ref 8.4–10.5)
CHLORIDE SERPL-SCNC: 105 MMOL/L — SIGNIFICANT CHANGE UP (ref 98–107)
CO2 SERPL-SCNC: 20 MMOL/L — LOW (ref 22–31)
COVID-19 SPIKE DOMAIN AB INTERP: POSITIVE
COVID-19 SPIKE DOMAIN ANTIBODY RESULT: >250 U/ML — HIGH
CREAT SERPL-MCNC: 0.64 MG/DL — SIGNIFICANT CHANGE UP (ref 0.5–1.3)
CULTURE RESULTS: SIGNIFICANT CHANGE UP
GLUCOSE SERPL-MCNC: 73 MG/DL — SIGNIFICANT CHANGE UP (ref 70–99)
HCT VFR BLD CALC: 39.9 % — SIGNIFICANT CHANGE UP (ref 34.5–45)
HGB BLD-MCNC: 13.9 G/DL — SIGNIFICANT CHANGE UP (ref 11.5–15.5)
MAGNESIUM SERPL-MCNC: 1.8 MG/DL — SIGNIFICANT CHANGE UP (ref 1.6–2.6)
MCHC RBC-ENTMCNC: 32.2 PG — SIGNIFICANT CHANGE UP (ref 27–34)
MCHC RBC-ENTMCNC: 34.8 GM/DL — SIGNIFICANT CHANGE UP (ref 32–36)
MCV RBC AUTO: 92.4 FL — SIGNIFICANT CHANGE UP (ref 80–100)
NRBC # BLD: 0 /100 WBCS — SIGNIFICANT CHANGE UP
NRBC # FLD: 0 K/UL — SIGNIFICANT CHANGE UP
PHOSPHATE SERPL-MCNC: 2.8 MG/DL — SIGNIFICANT CHANGE UP (ref 2.5–4.5)
PLATELET # BLD AUTO: 364 K/UL — SIGNIFICANT CHANGE UP (ref 150–400)
POTASSIUM SERPL-MCNC: 3.8 MMOL/L — SIGNIFICANT CHANGE UP (ref 3.5–5.3)
POTASSIUM SERPL-SCNC: 3.8 MMOL/L — SIGNIFICANT CHANGE UP (ref 3.5–5.3)
RBC # BLD: 4.32 M/UL — SIGNIFICANT CHANGE UP (ref 3.8–5.2)
RBC # FLD: 13.3 % — SIGNIFICANT CHANGE UP (ref 10.3–14.5)
SARS-COV-2 IGG+IGM SERPL QL IA: >250 U/ML — HIGH
SARS-COV-2 IGG+IGM SERPL QL IA: POSITIVE
SODIUM SERPL-SCNC: 139 MMOL/L — SIGNIFICANT CHANGE UP (ref 135–145)
SPECIMEN SOURCE: SIGNIFICANT CHANGE UP
WBC # BLD: 10.07 K/UL — SIGNIFICANT CHANGE UP (ref 3.8–10.5)
WBC # FLD AUTO: 10.07 K/UL — SIGNIFICANT CHANGE UP (ref 3.8–10.5)

## 2021-10-21 PROCEDURE — 99232 SBSQ HOSP IP/OBS MODERATE 35: CPT | Mod: GC

## 2021-10-21 PROCEDURE — 74177 CT ABD & PELVIS W/CONTRAST: CPT | Mod: 26

## 2021-10-21 PROCEDURE — 99232 SBSQ HOSP IP/OBS MODERATE 35: CPT

## 2021-10-21 PROCEDURE — 90792 PSYCH DIAG EVAL W/MED SRVCS: CPT

## 2021-10-21 RX ORDER — ONDANSETRON 8 MG/1
1 TABLET, FILM COATED ORAL
Qty: 14 | Refills: 0
Start: 2021-10-21 | End: 2021-10-27

## 2021-10-21 RX ORDER — ACETAMINOPHEN 500 MG
650 TABLET ORAL ONCE
Refills: 0 | Status: COMPLETED | OUTPATIENT
Start: 2021-10-21 | End: 2021-10-21

## 2021-10-21 RX ORDER — ONDANSETRON 8 MG/1
4 TABLET, FILM COATED ORAL ONCE
Refills: 0 | Status: COMPLETED | OUTPATIENT
Start: 2021-10-21 | End: 2021-10-21

## 2021-10-21 RX ORDER — INFLUENZA VIRUS VACCINE 15; 15; 15; 15 UG/.5ML; UG/.5ML; UG/.5ML; UG/.5ML
0.5 SUSPENSION INTRAMUSCULAR ONCE
Refills: 0 | Status: DISCONTINUED | OUTPATIENT
Start: 2021-10-21 | End: 2021-10-21

## 2021-10-21 RX ORDER — ACETAMINOPHEN 500 MG
1000 TABLET ORAL ONCE
Refills: 0 | Status: COMPLETED | OUTPATIENT
Start: 2021-10-21 | End: 2021-10-21

## 2021-10-21 RX ADMIN — SODIUM CHLORIDE 50 MILLILITER(S): 9 INJECTION, SOLUTION INTRAVENOUS at 02:56

## 2021-10-21 RX ADMIN — Medication 1000 MILLIGRAM(S): at 16:00

## 2021-10-21 RX ADMIN — Medication 650 MILLIGRAM(S): at 08:05

## 2021-10-21 RX ADMIN — SODIUM CHLORIDE 50 MILLILITER(S): 9 INJECTION, SOLUTION INTRAVENOUS at 15:35

## 2021-10-21 RX ADMIN — ONDANSETRON 4 MILLIGRAM(S): 8 TABLET, FILM COATED ORAL at 07:35

## 2021-10-21 RX ADMIN — Medication 400 MILLIGRAM(S): at 15:35

## 2021-10-21 RX ADMIN — Medication 650 MILLIGRAM(S): at 07:35

## 2021-10-21 NOTE — PROGRESS NOTE ADULT - SUBJECTIVE AND OBJECTIVE BOX
MD CY Tavarez [phone]/[id number]    [facility] Progress Note    Chief Complaint:  Patient is a 30y old  Female who presents with a chief complaint of diarrhea, nausea vomiting (20 Oct 2021 15:13)      Interval Events / Subjective:     No events overnight. Patient denies any fevers or chills. She says the Zofran helps her nausea 5/10 compared to 8/10 when she is not taking it. Denies any vomiting but had not eaten anything when I spoke with her. She had 2 bowel movements this morning with the same consistency as she had the day prior; no blood in the stool. She is on clear liquid diet but had not eaten yet when I was there.      REVIEW OF SYSTEMS:    CONSTITUTIONAL: No weakness, fevers or chills  RESPIRATORY: No cough, wheezing, hemoptysis; No shortness of breath  CARDIOVASCULAR: No chest pain or palpitations  GASTROINTESTINAL: ABD pain unchanged from days prior; + N/V;+ Diarrhea; no melena or BRBPR   GENITOURINARY: No dysuria, frequency or hematuria  NEUROLOGICAL: No numbness or weakness        MEDICATIONS:   MEDICATIONS  (STANDING):  influenza   Vaccine 0.5 milliLiter(s) IntraMuscular once  lactated ringers. 1000 milliLiter(s) (50 mL/Hr) IV Continuous <Continuous>    MEDICATIONS  (PRN):  ondansetron Injectable 4 milliGRAM(s) IV Push every 6 hours PRN Nausea and/or Vomiting      ALLERGIES:   Allergies    No Known Allergies    Intolerances        VITAL SIGNS:   Vital Signs Last 24 Hrs  T(C): 36.8 (21 Oct 2021 05:40), Max: 37.1 (20 Oct 2021 22:30)  T(F): 98.3 (21 Oct 2021 05:40), Max: 98.7 (20 Oct 2021 22:30)  HR: 75 (21 Oct 2021 05:40) (58 - 97)  BP: 100/67 (21 Oct 2021 05:40) (100/67 - 123/71)  BP(mean): --  RR: 18 (21 Oct 2021 05:40) (17 - 19)  SpO2: 100% (21 Oct 2021 05:40) (100% - 100%)  I&O's Summary    21 Oct 2021 07:01  -  21 Oct 2021 11:02  --------------------------------------------------------  IN: 500 mL / OUT: 0 mL / NET: 500 mL        VITALS:   T(C): 36.8 (10-21-21 @ 05:40), Max: 37.1 (10-20-21 @ 22:30)  HR: 75 (10-21-21 @ 05:40) (58 - 97)  BP: 100/67 (10-21-21 @ 05:40) (100/67 - 123/71)  RR: 18 (10-21-21 @ 05:40) (17 - 19)  SpO2: 100% (10-21-21 @ 05:40) (100% - 100%)    GENERAL: NAD; Standing in her room   HEART: Regular rate and rhythm, no murmurs, rubs, or gallops  LUNGS: Unlabored respirations.  Clear to auscultation bilaterally, no crackles, wheezing, or rhonchi  ABDOMEN: Soft, nondistended, +BS; TPP in Left upper and L lower quadrants   EXTREMITIES: 2+ peripheral pulses bilaterally. No clubbing, cyanosis, or edema  NERVOUS SYSTEM:  A&Ox3, no focal deficits       LABS:  CBC Full  -  ( 21 Oct 2021 07:21 )  WBC Count : 10.07 K/uL  RBC Count : 4.32 M/uL  Hemoglobin : 13.9 g/dL  Hematocrit : 39.9 %  Platelet Count - Automated : 364 K/uL  Mean Cell Volume : 92.4 fL  Mean Cell Hemoglobin : 32.2 pg  Mean Cell Hemoglobin Concentration : 34.8 gm/dL  Auto Neutrophil # : x  Auto Lymphocyte # : x  Auto Monocyte # : x  Auto Eosinophil # : x  Auto Basophil # : x  Auto Neutrophil % : x  Auto Lymphocyte % : x  Auto Monocyte % : x  Auto Eosinophil % : x  Auto Basophil % : x    10-21    139  |  105  |  4<L>  ----------------------------<  73  3.8   |  20<L>  |  0.64    Ca    9.1      21 Oct 2021 07:21  Phos  2.8     10-  Mg     1.80     10-21    TPro  8.1  /  Alb  4.8  /  TBili  1.1  /  DBili  x   /  AST  14  /  ALT  13  /  AlkPhos  80  10-20    LIVER FUNCTIONS - ( 20 Oct 2021 11:31 )  Alb: 4.8 g/dL / Pro: 8.1 g/dL / ALK PHOS: 80 U/L / ALT: 13 U/L / AST: 14 U/L / GGT: x             Urinalysis Basic - ( 20 Oct 2021 11:31 )    Color: Dark Orange / Appearance: Turbid / S.028 / pH: x  Gluc: x / Ketone: Negative  / Bili: Negative / Urobili: 3 mg/dL   Blood: x / Protein: 30 mg/dL / Nitrite: Negative   Leuk Esterase: Negative / RBC: x / WBC 2 /HPF   Sq Epi: x / Non Sq Epi: Few / Bacteria: x        GI PCR Panel, Stool (collected 20 Oct 2021 16:04)  Source: .Stool Feces  Final Report (20 Oct 2021 22:42):    GI PCR Results: NOT detected    *******Please Note:*******    GI panel PCR evaluates for:    Campylobacter, Plesiomonas shigelloides, Salmonella,    Vibrio, Yersinia enterocolitica, Enteroaggregative    Escherichia coli (EAEC), Enteropathogenic E.coli (EPEC),    Enterotoxigenic E. coli (ETEC) lt/st, Shiga-like    toxin-producing E. coli (STEC) stx1/stx2,    Shigella/ Enteroinvasive E. coli (EIEC), Cryptosporidium,    Cyclospora cayetanensis, Entamoeba histolytica,    Giardia lamblia, Adenovirus F 40/41, Astrovirus,    Norovirus GI/GII, Rotavirus A, Sapovirus          RADIOLOGY & ADDITIONAL TESTS:

## 2021-10-21 NOTE — DISCHARGE NOTE NURSING/CASE MANAGEMENT/SOCIAL WORK - PATIENT PORTAL LINK FT
You can access the FollowMyHealth Patient Portal offered by [facility] by registering at the following website: [email or web address]. By joining [facility]’s FollowMyHealth portal, you will also be able to view your health information using other applications (apps) compatible with our system.

## 2021-10-21 NOTE — BH CONSULTATION LIAISON ASSESSMENT NOTE - NSBHCHARTREVIEWVS_PSY_A_CORE FT
Vital Signs Last 24 Hrs  T(C): 36.8 (21 Oct 2021 05:40), Max: 37.1 (20 Oct 2021 22:30)  T(F): 98.3 (21 Oct 2021 05:40), Max: 98.7 (20 Oct 2021 22:30)  HR: 75 (21 Oct 2021 05:40) (58 - 75)  BP: 100/67 (21 Oct 2021 05:40) (100/67 - 122/80)  BP(mean): --  RR: 18 (21 Oct 2021 05:40) (18 - 19)  SpO2: 100% (21 Oct 2021 05:40) (100% - 100%)

## 2021-10-21 NOTE — DISCHARGE NOTE PROVIDER - NSDCFUADDAPPT_GEN_ALL_CORE_FT
[ ] follow up with your GI doctor outpatient.  [ ] follow up with your GI doctor outpatient (Dr. Wade)

## 2021-10-21 NOTE — PROGRESS NOTE ADULT - SUBJECTIVE AND OBJECTIVE BOX
PROGRESS NOTE:    Freeman Casper MD  Internal Medicine PGY-1  [phone]  [facility] [address]    Patient is a 30y old  Female who presents with a chief complaint of diarrhea, nausea vomiting (20 Oct 2021 15:13)      SUBJECTIVE / OVERNIGHT EVENTS: No acute overnight events. Pt seen and examined. Denies fevers, chills, CP, SOB, Abdominal pain, N/V, Constipation, Diarrhea      MEDICATIONS  (STANDING):  influenza   Vaccine 0.5 milliLiter(s) IntraMuscular once  lactated ringers. 1000 milliLiter(s) (50 mL/Hr) IV Continuous <Continuous>    MEDICATIONS  (PRN):  ondansetron Injectable 4 milliGRAM(s) IV Push every 6 hours PRN Nausea and/or Vomiting      I&O's Summary    21 Oct 2021 07:01  -  21 Oct 2021 09:39  --------------------------------------------------------  IN: 500 mL / OUT: 0 mL / NET: 500 mL        Vital Signs Last 24 Hrs  T(C): 36.8 (21 Oct 2021 05:40), Max: 37.1 (20 Oct 2021 22:30)  T(F): 98.3 (21 Oct 2021 05:40), Max: 98.7 (20 Oct 2021 22:30)  HR: 75 (21 Oct 2021 05:40) (58 - 97)  BP: 100/67 (21 Oct 2021 05:40) (100/67 - 130/71)  BP(mean): --  RR: 18 (21 Oct 2021 05:40) (16 - 19)  SpO2: 100% (21 Oct 2021 05:40) (100% - 100%)    =================PHYSICAL EXAM=================    GENERAL: Laying comfortably, NAD  EYES: EOMI, PERRL, no scleral icterus  NECK: No JVD  LUNG: Clear to auscultation bilaterally; No wheeze, crackles or rhonci  HEART: Regular rate and rhythm; No murmurs, rubs, or gallops  ABDOMEN: Soft, Nontender, Nondistended  EXTREMITIES:  No LE edema, 2+ Peripheral Pulses, No clubbing, cyanosis, or edema  PSYCH: AAOx3  NEUROLOGY: non-focal, strength 5/5 in all extremities, sensation intact  SKIN: No rashes or lesions    =================================================    LABS:                        13.9   10.07 )-----------( 364      ( 21 Oct 2021 07:21 )             39.9     Auto Eosinophil # x     / Auto Eosinophil % x     / Auto Neutrophil # x     / Auto Neutrophil % x     / BANDS % x                            15.4   9.43  )-----------( 412      ( 20 Oct 2021 11:31 )             45.1     Auto Eosinophil # 0.03  / Auto Eosinophil % 0.3   / Auto Neutrophil # 6.06  / Auto Neutrophil % 64.3  / BANDS % x        10-21    139  |  105  |  4<L>  ----------------------------<  73  3.8   |  20<L>  |  0.64  10-20    140  |  102  |  6<L>  ----------------------------<  84  4.0   |  23  |  0.81    Ca    9.1      21 Oct 2021 07:21  Mg     1.80     10-21  Phos  2.8     10-21  TPro  8.1  /  Alb  4.8  /  TBili  1.1  /  DBili  x   /  AST  14  /  ALT  13  /  AlkPhos  80  10-20          Urinalysis Basic - ( 20 Oct 2021 11:31 )    Color: Dark Orange / Appearance: Turbid / S.028 / pH: x  Gluc: x / Ketone: Negative  / Bili: Negative / Urobili: 3 mg/dL   Blood: x / Protein: 30 mg/dL / Nitrite: Negative   Leuk Esterase: Negative / RBC: x / WBC 2 /HPF   Sq Epi: x / Non Sq Epi: Few / Bacteria: x            RADIOLOGY & ADDITIONAL TESTS:    Imaging Personally Reviewed:    Consultant(s) Notes Reviewed:      Care Discussed with Consultants/Other Providers:   PROGRESS NOTE:    Freeman Casper MD  Internal Medicine PGY-1  [phone]  [facility] [address]    Patient is a 30y old  Female who presents with a chief complaint of diarrhea, nausea vomiting (20 Oct 2021 15:13)      SUBJECTIVE / OVERNIGHT EVENTS: No acute overnight events. Pt seen and examined. She reports severe abdominal pain. She also still feels nauseous, however she did not vomit last night. She had 3 loose bowel movements last night and 2 loose bowel movements this morning. She reports that her bowel movements this morning were a little more formed.  Denies fevers, chills, CP, SOB, or Constipation.       MEDICATIONS  (STANDING):  influenza   Vaccine 0.5 milliLiter(s) IntraMuscular once  lactated ringers. 1000 milliLiter(s) (50 mL/Hr) IV Continuous <Continuous>    MEDICATIONS  (PRN):  ondansetron Injectable 4 milliGRAM(s) IV Push every 6 hours PRN Nausea and/or Vomiting      I&O's Summary    21 Oct 2021 07:01  -  21 Oct 2021 09:39  --------------------------------------------------------  IN: 500 mL / OUT: 0 mL / NET: 500 mL        Vital Signs Last 24 Hrs  T(C): 36.8 (21 Oct 2021 05:40), Max: 37.1 (20 Oct 2021 22:30)  T(F): 98.3 (21 Oct 2021 05:40), Max: 98.7 (20 Oct 2021 22:30)  HR: 75 (21 Oct 2021 05:40) (58 - 97)  BP: 100/67 (21 Oct 2021 05:40) (100/67 - 130/71)  BP(mean): --  RR: 18 (21 Oct 2021 05:40) (16 - 19)  SpO2: 100% (21 Oct 2021 05:40) (100% - 100%)    =================PHYSICAL EXAM=================    GENERAL: NAD, sitting up in bed  HEAD:  Atraumatic, Normocephalic  ENT: Moist mucous membranes  CHEST/LUNG: Clear to auscultation bilaterally, no wheezes, rhonchi or crackles   HEART: Regular rate and rhythm; No murmurs  ABDOMEN: Bowel sounds present; Soft, slight TTP in upper left quadrant, Nondistended. No hepatomegaly  EXTREMITIES:  moves all extremities, no edema  NERVOUS SYSTEM:  Alert & Oriented X3, speech clear. No deficits   MSK: FROM all 4 extremities, full and equal strength  SKIN: No rashes or lesions    =================================================    LABS:                        13.9   10.07 )-----------( 364      ( 21 Oct 2021 07:21 )             39.9     Auto Eosinophil # x     / Auto Eosinophil % x     / Auto Neutrophil # x     / Auto Neutrophil % x     / BANDS % x                            15.4   9.43  )-----------( 412      ( 20 Oct 2021 11:31 )             45.1     Auto Eosinophil # 0.03  / Auto Eosinophil % 0.3   / Auto Neutrophil # 6.06  / Auto Neutrophil % 64.3  / BANDS % x        10-21    139  |  105  |  4<L>  ----------------------------<  73  3.8   |  20<L>  |  0.64  10-20    140  |  102  |  6<L>  ----------------------------<  84  4.0   |  23  |  0.81    Ca    9.1      21 Oct 2021 07:21  Mg     1.80     10-21  Phos  2.8     10-21  TPro  8.1  /  Alb  4.8  /  TBili  1.1  /  DBili  x   /  AST  14  /  ALT  13  /  AlkPhos  80  10-20          Urinalysis Basic - ( 20 Oct 2021 11:31 )    Color: Dark Orange / Appearance: Turbid / S.028 / pH: x  Gluc: x / Ketone: Negative  / Bili: Negative / Urobili: 3 mg/dL   Blood: x / Protein: 30 mg/dL / Nitrite: Negative   Leuk Esterase: Negative / RBC: x / WBC 2 /HPF   Sq Epi: x / Non Sq Epi: Few / Bacteria: x            RADIOLOGY & ADDITIONAL TESTS:    Imaging Personally Reviewed:    Consultant(s) Notes Reviewed:      Care Discussed with Consultants/Other Providers:   PROGRESS NOTE:    Freeman Casper MD  Internal Medicine PGY-1  [phone]  [facility] [address]    Patient is a 30y old  Female who presents with a chief complaint of diarrhea, nausea vomiting (20 Oct 2021 15:13)      SUBJECTIVE / OVERNIGHT EVENTS: No acute overnight events. Pt seen and examined. She reports severe abdominal pain. She also still feels nauseous, however she did not vomit last night. She had 3 loose bowel movements last night and 2 loose bowel movements this morning. She reports that her bowel movements this morning were a little more formed.  Denies fevers, chills, CP, SOB, or Constipation.       MEDICATIONS  (STANDING):  influenza   Vaccine 0.5 milliLiter(s) IntraMuscular once  lactated ringers. 1000 milliLiter(s) (50 mL/Hr) IV Continuous <Continuous>    MEDICATIONS  (PRN):  ondansetron Injectable 4 milliGRAM(s) IV Push every 6 hours PRN Nausea and/or Vomiting      I&O's Summary    21 Oct 2021 07:01  -  21 Oct 2021 09:39  --------------------------------------------------------  IN: 500 mL / OUT: 0 mL / NET: 500 mL        Vital Signs Last 24 Hrs  T(C): 36.8 (21 Oct 2021 05:40), Max: 37.1 (20 Oct 2021 22:30)  T(F): 98.3 (21 Oct 2021 05:40), Max: 98.7 (20 Oct 2021 22:30)  HR: 75 (21 Oct 2021 05:40) (58 - 97)  BP: 100/67 (21 Oct 2021 05:40) (100/67 - 130/71)  BP(mean): --  RR: 18 (21 Oct 2021 05:40) (16 - 19)  SpO2: 100% (21 Oct 2021 05:40) (100% - 100%)    =================PHYSICAL EXAM=================    GENERAL: NAD, sitting up in bed  HEAD:  Atraumatic, Normocephalic  ENT: Moist mucous membranes  CHEST/LUNG: Clear to auscultation bilaterally, no wheezes, rhonchi or crackles   HEART: Regular rate and rhythm; No murmurs  ABDOMEN: Bowel sounds present; Soft, slight TTP in upper left quadrant, Nondistended. No hepatomegaly  EXTREMITIES:  moves all extremities, no edema  NERVOUS SYSTEM:  Alert & Oriented X3, speech clear. No deficits   MSK: FROM all 4 extremities, full and equal strength  SKIN: No rashes or lesions    =================================================    LABS:                        13.9   10.07 )-----------( 364      ( 21 Oct 2021 07:21 )             39.9     Auto Eosinophil # x     / Auto Eosinophil % x     / Auto Neutrophil # x     / Auto Neutrophil % x     / BANDS % x                            15.4   9.43  )-----------( 412      ( 20 Oct 2021 11:31 )             45.1     Auto Eosinophil # 0.03  / Auto Eosinophil % 0.3   / Auto Neutrophil # 6.06  / Auto Neutrophil % 64.3  / BANDS % x        10-21    139  |  105  |  4<L>  ----------------------------<  73  3.8   |  20<L>  |  0.64  10-20    140  |  102  |  6<L>  ----------------------------<  84  4.0   |  23  |  0.81    Ca    9.1      21 Oct 2021 07:21  Mg     1.80     10-21  Phos  2.8     10-21  TPro  8.1  /  Alb  4.8  /  TBili  1.1  /  DBili  x   /  AST  14  /  ALT  13  /  AlkPhos  80  10-20          Urinalysis Basic - ( 20 Oct 2021 11:31 )    Color: Dark Orange / Appearance: Turbid / S.028 / pH: x  Gluc: x / Ketone: Negative  / Bili: Negative / Urobili: 3 mg/dL   Blood: x / Protein: 30 mg/dL / Nitrite: Negative   Leuk Esterase: Negative / RBC: x / WBC 2 /HPF   Sq Epi: x / Non Sq Epi: Few / Bacteria: x            RADIOLOGY & ADDITIONAL TESTS:    Imaging Personally Reviewed:ct    Consultant(s) Notes Reviewed:      Care Discussed with Consultants/Other Providers:

## 2021-10-21 NOTE — PROGRESS NOTE ADULT - ASSESSMENT
29 y/o F PMHx H. pylori, gastritis, diverticulitis, recent C. diff treatment with Vancomycin presenting with Diarrhea, Nausea and worst PO intake since Yifan 10/17. Patient had difficulty tolerating the Vancomycin, had significant N/V during her 2 week ABX course. She may not have fully finished her ABX course. Pending C. diff results.     #Diarrhea   Patient started ABX course 1 week after being diagnosed in early September with C. diff and had significant N/V when she took the ABX; She may not have fully finished her 2 week ABX course since she was not tolerating PO and vomiting  Patient is having 8-10 loose Non-bloody bowel movements a day   Likely C. Diff infection  Negative Stool PCR     #Nausea/Vomiting   Patient has had inability to tolerate anything PO since Sunday   She has had decreased PO intake and NBNB vomiting   Zofran is helping her nausea        Recommendations   - Will need to remain inpatient for evaluation and for inability to tolerate PO   - F/U C. diff results   - Trend CBC  - C/w liquid diet if patient can tolerate (and advance as tolerated)   - C/w Zofran IV PRN (Check QTc)   - Patient had significant intolerance of PO vanc, would favor fidaxomicin for treatment if/when C. diff testing results    NON-URGENT CONSULTS:  Please email [email or web address]  MONDAY-FRIDAY 8AM-5PM: Pager# [phone]  AT NIGHT AND ON WEEKENDS:  Contact on-call GI fellow via answering service ([phone]) from 5pm-8am and on weekends/holidays
Ms. Hawkins is a 31 y/o woman w hx diverticulosis and chronic gastritis seen on endoscopy/colonoscopy in July 2021 presenting for worsening diarrhea, vomiting after being tx for cdiff in september. GI PCR is negative. Labs pending for c diff. GI following.

## 2021-10-21 NOTE — BH CONSULTATION LIAISON ASSESSMENT NOTE - NSBHCHARTREVIEWLAB_PSY_A_CORE FT
CBC Full  -  ( 21 Oct 2021 07:21 )  WBC Count : 10.07 K/uL  RBC Count : 4.32 M/uL  Hemoglobin : 13.9 g/dL  Hematocrit : 39.9 %  Platelet Count - Automated : 364 K/uL  Mean Cell Volume : 92.4 fL  Mean Cell Hemoglobin : 32.2 pg  Mean Cell Hemoglobin Concentration : 34.8 gm/dL  Auto Neutrophil # : x  Auto Lymphocyte # : x  Auto Monocyte # : x  Auto Eosinophil # : x  Auto Basophil # : x  Auto Neutrophil % : x  Auto Lymphocyte % : x  Auto Monocyte % : x  Auto Eosinophil % : x  Auto Basophil % : x  10-    139  |  105  |  4<L>  ----------------------------<  73  3.8   |  20<L>  |  0.64    Ca    9.1      21 Oct 2021 07:21  Phos  2.8     10-21  Mg     1.80     10-21    TPro  8.1  /  Alb  4.8  /  TBili  1.1  /  DBili  x   /  AST  14  /  ALT  13  /  AlkPhos  80  10-20  Urinalysis Basic - ( 20 Oct 2021 11:31 )    Color: Dark Orange / Appearance: Turbid / S.028 / pH: x  Gluc: x / Ketone: Negative  / Bili: Negative / Urobili: 3 mg/dL   Blood: x / Protein: 30 mg/dL / Nitrite: Negative   Leuk Esterase: Negative / RBC: x / WBC 2 /HPF   Sq Epi: x / Non Sq Epi: Few / Bacteria: x

## 2021-10-21 NOTE — BH CONSULTATION LIAISON ASSESSMENT NOTE - SUMMARY
Pt is a 29 y/o F with a PMH of diverticulosis and chronic gastritis. Pt lives at home with her boyfriend and is currently unemployed, but used to work as a . Denies hx of inpatient or outpatient psychiatric evaluations. Denies hx of psychiatric medications, diagnoses, or FH of psychiatric illnesses. Pt was admitted to [facility] for abdominal pain, vomiting, and diarrhea, psychiatry consulted for sadness.    Based on today's assessment, pt is lying in bed, willing to talk to psychiatry. A&Ox4. Pt expresses concerns regarding lack of communication and states that she normally is a very happy person. Pt able to distinguish between frustration and sadness. No SI/SA/HI. No PPH.    RECOMMENDATIONS:  --No CO1:1  --No psychiatric medications  Pt is a 29 y/o F with a PMH of diverticulosis and chronic gastritis. Pt lives at home with her boyfriend and is currently unemployed, but used to work as a . Denies hx of inpatient or outpatient psychiatric evaluations. Denies hx of psychiatric medications, diagnoses, or FH of psychiatric illnesses. Pt was admitted to [facility] for abdominal pain, vomiting, and diarrhea, psychiatry consulted for sadness.    Based on today's assessment, pt is lying in bed, willing to talk to psychiatry. A&Ox4. Pt expresses concerns regarding lack of communication and states that she normally is a very happy person. Pt able to distinguish between frustration and sadness. No SI/SA/HI. No PPH.    RECOMMENDATIONS:  --No CO1:1  --No psychiatric recommendations at this time. Pt is a 29 y/o F with a PMH of diverticulosis and chronic gastritis. Pt lives at home with her boyfriend and is currently unemployed, but used to work as a . Denies hx of inpatient or outpatient psychiatric evaluations. Denies hx of psychiatric medications, diagnoses, or FH of psychiatric illnesses. Pt was admitted to [facility] for abdominal pain, vomiting, and diarrhea, psychiatry consulted at the request of the patient.    Based on today's assessment, patient denies any mood symptoms, denies any si or hi, and no psychosis.     RECOMMENDATIONS:  === medicine team to provide patient with the support, medical information and empathy she needs

## 2021-10-21 NOTE — DISCHARGE NOTE PROVIDER - NSDCCPCAREPLAN_GEN_ALL_CORE_FT
PRINCIPAL DISCHARGE DIAGNOSIS  Diagnosis: Abdominal pain, vomiting, and diarrhea  Assessment and Plan of Treatment: You were admitted for inability to tolerate food/water for few days. You wanted to leave without waiting to see if you can tolerate food or for cdiff result.       PRINCIPAL DISCHARGE DIAGNOSIS  Diagnosis: Abdominal pain, vomiting, and diarrhea  Assessment and Plan of Treatment: You were admitted for inability to tolerate food/water for few days. We tested for stool studies including C.diff. You can follow up outpatient with Dr. Wade regarding C.diff results. You were able to tolerate oral food without nausea/vomiting afterwards, making for a safe discharge.

## 2021-10-21 NOTE — PROGRESS NOTE ADULT - PROBLEM SELECTOR PLAN 2
patient reports chronic nausea and vomiting since 5/2021  non bloody and unable to specify quantity  electrolytes are normal   with associated abdominal pain  patient stopped taking birth control in september   pregnancy test = negative    Plan:   - f/u CT A&P   -zofran  -IVF  monitor electrolytes

## 2021-10-21 NOTE — PROGRESS NOTE ADULT - PROBLEM SELECTOR PLAN 1
-diagnosed with C. 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 Non-bleeding gastric ulcers in body.  Colonoscopy 7/21: showed diverticulosis and nonbleeding hemorrhoids   GI PCR is negative    Plan:   -f/u C. diff  -stool count  -f/u GI recs

## 2021-10-21 NOTE — DISCHARGE NOTE PROVIDER - CARE PROVIDER_API CALL
Trish Wade)  Gastroenterology; Internal Medicine  [phone] [place] [address]  Phone: [phone]  Fax: [phone]  Established Patient  Follow Up Time:

## 2021-10-21 NOTE — BH CONSULTATION LIAISON ASSESSMENT NOTE - CURRENT MEDICATION
MEDICATIONS  (STANDING):  influenza   Vaccine 0.5 milliLiter(s) IntraMuscular once  lactated ringers. 1000 milliLiter(s) (50 mL/Hr) IV Continuous <Continuous>    MEDICATIONS  (PRN):  ondansetron Injectable 4 milliGRAM(s) IV Push every 6 hours PRN Nausea and/or Vomiting

## 2021-10-21 NOTE — BH CONSULTATION LIAISON ASSESSMENT NOTE - CASE SUMMARY
met with the patient along with PA-S, impression and plan discussed and agreed upon. Communicated above recommendation to medicine team as well.

## 2021-10-21 NOTE — DISCHARGE NOTE PROVIDER - HOSPITAL COURSE
Pt is a 29 y/o woman w hx diverticulosis and chronic gastritis seen on endoscopy/colonoscopy in July 2021 presenting for worsening diarrhea, vomiting after being tx for cdiff in september. Afebrile, has abdominal pain. She was tx w abx for diverticulitis in May, was admitted in July for abdominal pain, underwent EGD/colonoscopy in July, found to have chronic peptic duodenitis in duodenum, chronic gastritis on biopsy, H pylori neg. CT with diverticulosis but no diverticulitis. She tested pos for cdiff in sept 9 and was started on oral vanc for 2 weeks, which she states she completed without any missed doses. However, she was vomiting during this time. After finishing vanc tx course, she continued to have sx of diarrhea/nausea/vomiting. Since saturday, unable to tolerate any food but tolerates orange juice. SHe feels dehydrated. Currently feels urge to have bm. Abdominal pain does not improve after bms. Amount of diarrhea is unclear, she states ranges from little to a lot and is watery, non bloody. SHe also vomits but unable to specify how often, also nonbloody. Denies fevers, chills. No sick contacts. No recent travel. Stopped birth control in september because was throwing up and is sexually active. No IUD.     For asthma, does not use inhaler frequently.   Other surgeries include shoulder surgery in past.   Medications - none currently     Hospital course:   GI PCR negative. Cdiff pending, pt does not want to wait for results to determine treatment at this time.   CT abdomen w/wo contrast - normal  No fevers or leukocytosis.   GI on board, thought likely functional GI pain. Possible cdiff pending stool study.   Became upset regaring multiple hospitalizations, wants to leave AMA without waiting for stool study or to see if she can tolerate oral diet. Pt is a 31 y/o woman w hx diverticulosis and chronic gastritis seen on endoscopy/colonoscopy in July 2021 presenting for worsening diarrhea, vomiting after being tx for cdiff in september. Afebrile, has abdominal pain. She was tx w abx for diverticulitis in May, was admitted in July for abdominal pain, underwent EGD/colonoscopy in July, found to have chronic peptic duodenitis in duodenum, chronic gastritis on biopsy, H pylori neg. CT with diverticulosis but no diverticulitis. She tested pos for cdiff in sept 9 and was started on oral vanc for 2 weeks, which she states she completed without any missed doses. However, she was vomiting during this time. After finishing vanc tx course, she continued to have sx of diarrhea/nausea/vomiting. Since saturday, unable to tolerate any food but tolerates orange juice. SHe feels dehydrated. Currently feels urge to have bm. Abdominal pain does not improve after bms. Amount of diarrhea is unclear, she states ranges from little to a lot and is watery, non bloody. SHe also vomits but unable to specify how often, also nonbloody. Denies fevers, chills. No sick contacts. No recent travel. Stopped birth control in september because was throwing up and is sexually active. No IUD.     For asthma, does not use inhaler frequently.   Other surgeries include shoulder surgery in past.   Medications - none currently     Hospital course:   GI PCR negative. Cdiff pending, pt will follow up outpatient with Dr. Patel regarding cdiff results.   CT abdomen w/wo contrast - normal  No fevers or leukocytosis.   GI on board, thought likely functional GI pain. Possible cdiff pending stool study.   Pt tolerated regular food and tolerated it. Will be discharged to follow up outpatient with Dr. patel. Pt is a 31 y/o woman w hx diverticulosis and chronic gastritis seen on endoscopy/colonoscopy in July 2021 presenting for worsening diarrhea, vomiting after being tx for cdiff in september. Afebrile, has abdominal pain. She was tx w abx for diverticulitis in May, was admitted in July for abdominal pain, underwent EGD/colonoscopy in July, found to have chronic peptic duodenitis in duodenum, chronic gastritis on biopsy, H pylori neg. CT with diverticulosis but no diverticulitis. She tested pos for cdiff in sept 9 and was started on oral vanc for 2 weeks, which she states she completed without any missed doses. However, she was vomiting during this time. After finishing vanc tx course, she continued to have sx of diarrhea/nausea/vomiting. Since saturday, unable to tolerate any food but tolerates orange juice. SHe feels dehydrated. Currently feels urge to have bm. Abdominal pain does not improve after bms. Amount of diarrhea is unclear, she states ranges from little to a lot and is watery, non bloody. SHe also vomits but unable to specify how often, also nonbloody. Denies fevers, chills. No sick contacts. No recent travel. Stopped birth control in september because was throwing up and is sexually active. No IUD.     For asthma, does not use inhaler frequently.   Other surgeries include shoulder surgery in past.   Medications - none currently     Hospital course:   GI PCR negative. Cdiff pending, pt will follow up outpatient with Dr. Patel regarding cdiff results.   CT abdomen w/wo contrast - normal  No fevers or leukocytosis.   GI on board, thought likely functional GI pain. Possible cdiff pending stool study.   Pt tolerated regular food and tolerated it. Will be discharged to follow up outpatient with Dr. patel.   Pt was adamant on leaving if she can tolerate oral diet without nausea/vomiting. She tolerated sandwich and did not experience abodminal pain/nausea/vomiting. She stated Dr. Patel had discussed with her she could take medication outpatient if cdiff was positive and did not see a reason to stay in the hospital. Discharge paperwork was completed, will follow up with Dr. Patel regarding medications. Pt is a 29 y/o woman w hx diverticulosis and chronic gastritis seen on endoscopy/colonoscopy in July 2021 presenting for worsening diarrhea, vomiting after being tx for cdiff in september. Afebrile, has abdominal pain. She was tx w abx for diverticulitis in May, was admitted in July for abdominal pain, underwent EGD/colonoscopy in July, found to have chronic peptic duodenitis in duodenum, chronic gastritis on biopsy, H pylori neg. CT with diverticulosis but no diverticulitis. She tested pos for cdiff in sept 9 and was started on oral vanc for 2 weeks, which she states she completed without any missed doses. However, she was vomiting during this time. After finishing vanc tx course, she continued to have sx of diarrhea/nausea/vomiting. Since saturday, unable to tolerate any food but tolerates orange juice. SHe feels dehydrated. Currently feels urge to have bm. Abdominal pain does not improve after bms. Amount of diarrhea is unclear, she states ranges from little to a lot and is watery, non bloody. SHe also vomits but unable to specify how often, also nonbloody. Denies fevers, chills. No sick contacts. No recent travel. Stopped birth control in september because was throwing up and is sexually active. No IUD.     For asthma, does not use inhaler frequently.   Other surgeries include shoulder surgery in past.   Medications - none currently     Hospital course:   GI PCR negative. Cdiff pending, pt will follow up outpatient with Dr. Patel regarding cdiff results.   CT abdomen w/wo contrast - normal  No fevers or leukocytosis.   GI on board, thought likely functional GI pain. Possible cdiff pending stool study.   Pt tolerated regular food and tolerated it. Will be discharged to follow up outpatient with Dr. patel.   Pt was adamant on leaving if she can tolerate oral diet without nausea/vomiting. She tolerated sandwich and did not experience abodminal pain/nausea/vomiting. She stated Dr. Patel had discussed with her she could take medication outpatient if cdiff was positive and did not see a reason to stay in the hospital. Discharge paperwork was completed, will follow up with Dr. Patel regarding medications.     10/22:   Discussed with Dr. Patel regarding positive cdiff results. Prescribed fidoxamycin 200 bid for 10d course. Called pt, pt to  prescription and follow up with Dr. Patel Nov 12th appointment. Pt knows to call Dr. Patel's NP if any issues with intolerance of medication. She tolerated diet well yesterday night and feels better. Pt is a 31 y/o woman w hx diverticulosis and chronic gastritis seen on endoscopy/colonoscopy in July 2021 presenting for worsening diarrhea, vomiting after being tx for cdiff in september. Afebrile, has abdominal pain. She was tx w abx for diverticulitis in May, was admitted in July for abdominal pain, underwent EGD/colonoscopy in July, found to have chronic peptic duodenitis in duodenum, chronic gastritis on biopsy, H pylori neg. CT with diverticulosis but no diverticulitis. She tested pos for cdiff in sept 9 and was started on oral vanc for 2 weeks, which she states she completed without any missed doses. However, she was vomiting during this time. After finishing vanc tx course, she continued to have sx of diarrhea/nausea/vomiting. Since saturday, unable to tolerate any food but tolerates orange juice. SHe feels dehydrated. Currently feels urge to have bm. Abdominal pain does not improve after bms. Amount of diarrhea is unclear, she states ranges from little to a lot and is watery, non bloody. SHe also vomits but unable to specify how often, also nonbloody. Denies fevers, chills. No sick contacts. No recent travel. Stopped birth control in september because was throwing up and is sexually active. No IUD.     For asthma, does not use inhaler frequently.   Other surgeries include shoulder surgery in past.   Medications - none currently     Hospital course:   GI PCR negative. Cdiff pending, pt will follow up outpatient with Dr. Patel regarding cdiff results.   CT abdomen w/wo contrast - normal  No fevers or leukocytosis.   GI on board, thought likely functional GI pain. Possible cdiff pending stool study.   Pt tolerated regular food and tolerated it. Will be discharged to follow up outpatient with Dr. patel.   Pt was adamant on leaving if she can tolerate oral diet without nausea/vomiting. She tolerated sandwich and did not experience abodminal pain/nausea/vomiting. She stated Dr. Patel had discussed with her she could take medication outpatient if cdiff was positive and did not see a reason to stay in the hospital. Discharge paperwork was completed, will follow up with Dr. Patel regarding medications.     10/22:   Discussed with Dr. Patel regarding positive cdiff results. Prescribed fidoxamycin 200 bid for 10d course. Called pt, pt to  prescription and follow up with Dr. Patel Nov 12th appointment. Pt knows to call Dr. Patel's NP if any issues with intolerance of medication. She tolerated diet well yesterday night and feels better.     10/24: received call from pharmacy that Fidaxomycin is not covered by patient's pharmacy. Discontinued Fidaxomycin, and prescribed 14d course vancomycin 125mg oral q6h. Pt will need to follow up outpatient with Dr. Patel to continue taper as this is the first recurrence and she will require longer course.

## 2021-10-21 NOTE — BH CONSULTATION LIAISON ASSESSMENT NOTE - HPI (INCLUDE ILLNESS QUALITY, SEVERITY, DURATION, TIMING, CONTEXT, MODIFYING FACTORS, ASSOCIATED SIGNS AND SYMPTOMS)
Pt is a 31 y/o F with a PMH of diverticulosis and chronic gastritis. Pt lives at home with her boyfriend and is currently unemployed, but used to work as a . Denies hx of inpatient or outpatient psychiatric evaluations. Denies hx of psychiatric medications, diagnoses, or FH of psychiatric illnesses. Pt was admitted to [facility] for abdominal pain, vomiting, and diarrhea, psychiatry consulted for sadness.    Upon arrival, pt is lying in bed on the phone, normal grooming and hygiene, cooperative and engaged in the interview. A&Ox4. Pt states that she has not feeling sad and is normally a happy person, but the repeat hospitalizations since July 2/2 poss. C. dif. infection are frustrating to her. She states that she feels she is not being adequately communicated with. Pt denies anhedonia and fatigue. Admits to decreased sleep, appetite, and concentration 2/2 illness. Denies SI/SA/HI. Denies AH, VH, or delusions. Pt admits to having a few drinks socially of Gladys. Pt admits to smoking marijuana infrequently. Denies tobacco use. Pt is a 31 y/o F with a PMH of diverticulosis and chronic gastritis. Pt lives at home with her boyfriend and is currently unemployed, but used to work as a . Denies hx of inpatient or outpatient psychiatric evaluations. Denies hx of psychiatric medications, diagnoses, or FH of psychiatric illnesses. Pt was admitted to [facility] for abdominal pain, vomiting, and diarrhea. Psychiatry was consulted as per patient request    Upon arrival, pt is lying in bed on the phone, normal grooming and hygiene, cooperative and engaged in the interview. A&Ox4. Pt states that she has not been feeling sad and is normally a happy person, but the repeat hospitalizations since July 2/2 poss. C. dif. infection are frustrating to her. She denies any symptoms of depression. Denies SI/SA/HI. Denies AH, VH, or delusions. Pt admits to having a few drinks socially of Ferdinand. Pt admits to smoking marijuana infrequently. Denies tobacco use.    She is hoping the medicine team can provide her with the support, medical information and empathy.

## 2021-10-21 NOTE — PROGRESS NOTE ADULT - ATTENDING COMMENTS
Patient reports feeling similar today, possibly a little better.   States was upset this AM because she had a CT scan and unsure why it was ordered.   Reviewed CT results with patient (no significant pathology noted).   Suspect symptoms likely due to CDI, but also may have underlying functional GI disorder.   Still awaiting C diff results. If positive, would start fidaxomicin.   Would advance diet as tolerated. Zofran PRN.

## 2021-10-21 NOTE — DISCHARGE NOTE PROVIDER - NSDCMRMEDTOKEN_GEN_ALL_CORE_FT
Zofran 4 mg oral tablet: 1 tab(s) orally 2 times a day, As Needed    fidaxomicin 200 mg oral tablet: 1 tab(s) orally 2 times a day   Zofran 4 mg oral tablet: 1 tab(s) orally 2 times a day, As Needed    vancomycin 125 mg oral capsule: 1 cap(s) orally every 6 hours   Zofran 4 mg oral tablet: 1 tab(s) orally 2 times a day, As Needed

## 2021-10-21 NOTE — BH CONSULTATION LIAISON ASSESSMENT NOTE - RISK ASSESSMENT
No chronic risk factors, acute risk factors present, but in no imminent danger to self or others. No hx of SI/SA/HI No chronic risk factors, acute risk factors present, but in no imminent danger to self or others.   -acute risks: current illness course, recent unemployment  -Denies SI/HI currently, no hx of inpatient psychiatric admissions, denies psychosis, no SA hx  no imminent danger to self or others.   -acute risks: current illness course, recent unemployment  -Denies SI/HI currently, no hx of inpatient psychiatric admissions, denies psychosis, no SA hx

## 2021-10-21 NOTE — PROGRESS NOTE ADULT - ATTENDING COMMENTS
29 yo F w/ hx diverticulosis last treated with antibiotics in 5/21 was admitted in 7/21 for abdominal pain underwent EGD/colonoscopy chronic gastritis/duodenitis, recently diagnosed with c diff was rx vancomycin and had intermittent vomiting. +prior Hx marijuana use but denied drug use.  labs normal WBC normal electrolytes. GI PCR neg. states had 2 loose BMs this AM has not tolerated any PO. Pt frustrated as she has these symptoms for past few months and states has lost her job due to this pt in tears and upset and frustrated with her current situation     diarrhea- concern for recurrence of c diff                - IVF               - stool count              - c diff               - CT A/P              - appreciate GI recs if c diff pos will need fidaxomicin              - antiemetics PRN 31 yo F w/ hx diverticulosis last treated with antibiotics in 5/21 was admitted in 7/21 for abdominal pain underwent EGD/colonoscopy chronic gastritis/duodenitis, recently diagnosed with c diff was rx vancomycin and had intermittent vomiting. +prior Hx marijuana use but denied drug use.  labs normal WBC normal electrolytes. CT A/P- GI PCR neg. states had 2 loose BMs this AM has not tolerated any PO. Pt frustrated as she has these symptoms for past few months and states has lost her job due to this pt in tears and upset and frustrated with her current situation     diarrhea- concern for recurrence of c diff                - IVF               - stool count              - c diff               - CT A/P              - appreciate GI recs if c diff pos will need fidaxomicin              - antiemetics PRN

## 2021-10-22 LAB
C DIFF BY PCR RESULT: DETECTED
C DIFF TOX GENS STL QL NAA+PROBE: SIGNIFICANT CHANGE UP
CULTURE RESULTS: SIGNIFICANT CHANGE UP
SPECIMEN SOURCE: SIGNIFICANT CHANGE UP

## 2021-10-22 RX ORDER — FIDAXOMICIN 200 MG/5ML
1 GRANULE, FOR SUSPENSION ORAL
Qty: 20 | Refills: 0
Start: 2021-10-22 | End: 2021-10-31

## 2021-10-24 RX ORDER — VANCOMYCIN HCL 1 G
1 VIAL (EA) INTRAVENOUS
Qty: 56 | Refills: 0
Start: 2021-10-24 | End: 2021-11-06

## 2021-11-12 ENCOUNTER — APPOINTMENT (OUTPATIENT)
Dept: GASTROENTEROLOGY | Facility: CLINIC | Age: 31
End: 2021-11-12
Payer: COMMERCIAL

## 2021-11-12 VITALS
OXYGEN SATURATION: 99 % | DIASTOLIC BLOOD PRESSURE: 70 MMHG | HEIGHT: 63 IN | SYSTOLIC BLOOD PRESSURE: 120 MMHG | HEART RATE: 60 BPM | BODY MASS INDEX: 28.88 KG/M2 | WEIGHT: 163 LBS

## 2021-11-12 DIAGNOSIS — A04.72 ENTEROCOLITIS DUE TO CLOSTRIDIUM DIFFICILE, NOT SPECIFIED AS RECURRENT: ICD-10-CM

## 2021-11-12 PROCEDURE — 99214 OFFICE O/P EST MOD 30 MIN: CPT

## 2021-11-12 RX ORDER — VANCOMYCIN HYDROCHLORIDE 125 MG/1
125 CAPSULE ORAL
Qty: 28 | Refills: 0 | Status: ACTIVE | COMMUNITY
Start: 2021-11-12 | End: 1900-01-01

## 2021-11-30 NOTE — ED ADULT TRIAGE NOTE - SOURCE OF INFORMATION
Jaime Thompson Patient Age: 64 year old  MESSAGE: Interpreting service used: No      IM/FP- Symptom-  Adult-  Other-     Symptom(s): stuffy ,  Runny nose           Appointment: requesting a call back     Site: [place]- Route message to provider's clinical support pool       ALLERGIES:  Lisinopril and Penicillins  Current Outpatient Medications   Medication   • glimepiride (AMARYL) 1 MG tablet   • carvedilol (COREG) 6.25 MG tablet   • NIFEdipine CC (ADALAT CC) 90 MG 24 hr tablet   • clopidogrel (PLAVIX) 75 MG tablet   • levothyroxine 150 MCG tablet   • tadalafil (CIALIS) 20 MG tablet   • b complex vitamins tablet   • ferrous sulfate dried (Slow Iron) 160 (50 Fe) MG extended-release tablet   • nicotine (NICODERM) 21 MG/24HR patch   • chlorthalidone (THALITONE) 25 MG tablet   • Onglyza 5 MG Tab   • atorvastatin (LIPITOR) 80 MG tablet   • doxazosin (CARDURA) 8 MG tablet   • metformin (GLUCOPHAGE) 1000 MG tablet   • buPROPion (Wellbutrin SR) 150 MG 12 hr tablet   • aspirin 81 MG chewable tablet   • Blood Glucose Monitoring Suppl w/Device Kit     No current facility-administered medications for this visit.     PHARMACY to use: see below           Pharmacy preference(s) on file:   [id number] - [address] AT [facility] [address]  [address] [facility]  [place] [address]  Phone: [phone] Fax: [phone]      CALL BACK INFO: Ok to leave response (including medical information) with family member or on answering machine      PCP: Keily Ambrocio MD         INS: Payor: [employer] / Plan: [id number] / Product Type: PPO MISC   PATIENT ADDRESS:  [address] [place] [address] [place] [address]    
Noted.  
Phone call to patient   sx x 1 week  Sinus congestion denies ear problems  Clear drainage    St  No fever    [facility] for [facility]    The patient agrees with this plan    
Please triage  
Patient/EMS

## 2021-12-21 ENCOUNTER — INPATIENT (INPATIENT)
Facility: HOSPITAL | Age: 31
LOS: 3 days | Discharge: LEFT BEFORE DISCHARGE | End: 2021-12-25
Attending: INTERNAL MEDICINE | Admitting: INTERNAL MEDICINE
Payer: MEDICAID

## 2021-12-21 VITALS
HEIGHT: 61 IN | TEMPERATURE: 100 F | SYSTOLIC BLOOD PRESSURE: 118 MMHG | OXYGEN SATURATION: 100 % | RESPIRATION RATE: 18 BRPM | HEART RATE: 111 BPM | DIASTOLIC BLOOD PRESSURE: 75 MMHG

## 2021-12-21 DIAGNOSIS — Z98.89 OTHER SPECIFIED POSTPROCEDURAL STATES: Chronic | ICD-10-CM

## 2021-12-21 DIAGNOSIS — Z90.89 ACQUIRED ABSENCE OF OTHER ORGANS: Chronic | ICD-10-CM

## 2021-12-21 PROCEDURE — 99285 EMERGENCY DEPT VISIT HI MDM: CPT

## 2021-12-21 NOTE — ED ADULT TRIAGE NOTE - CHIEF COMPLAINT QUOTE
Sent by MD for generalized edema, worsening in abdomen & proteinuria. States tested positive for COVID today. Endorses cough, fever, chills. Denies SOB, chest pain.  PMHx: asthma

## 2021-12-21 NOTE — ED ADULT NURSE NOTE - OBJECTIVE STATEMENT
alert oriented cooperative c/o frontal headache generalized weakness no dizziness or blurry vision no c/o chest pain SR on scope  skin warm color good  is slightly SOB at rest RR31 SaO2 100% on R/A  c/o diffuse abd pain  abd soft non tender to touch  has puffiness around eyes feel like neck is swollen  patricia LE edema noted c/o patricia leg pain  airborne and contact precautions maintained

## 2021-12-22 DIAGNOSIS — J45.909 UNSPECIFIED ASTHMA, UNCOMPLICATED: ICD-10-CM

## 2021-12-22 DIAGNOSIS — D64.9 ANEMIA, UNSPECIFIED: ICD-10-CM

## 2021-12-22 DIAGNOSIS — Z29.9 ENCOUNTER FOR PROPHYLACTIC MEASURES, UNSPECIFIED: ICD-10-CM

## 2021-12-22 DIAGNOSIS — K21.9 GASTRO-ESOPHAGEAL REFLUX DISEASE WITHOUT ESOPHAGITIS: ICD-10-CM

## 2021-12-22 DIAGNOSIS — N04.9 NEPHROTIC SYNDROME WITH UNSPECIFIED MORPHOLOGIC CHANGES: ICD-10-CM

## 2021-12-22 DIAGNOSIS — R80.9 PROTEINURIA, UNSPECIFIED: ICD-10-CM

## 2021-12-22 DIAGNOSIS — U07.1 COVID-19: ICD-10-CM

## 2021-12-22 DIAGNOSIS — R09.89 OTHER SPECIFIED SYMPTOMS AND SIGNS INVOLVING THE CIRCULATORY AND RESPIRATORY SYSTEMS: ICD-10-CM

## 2021-12-22 LAB
ALBUMIN SERPL ELPH-MCNC: 1.2 G/DL — LOW (ref 3.3–5)
ALP SERPL-CCNC: 77 U/L — SIGNIFICANT CHANGE UP (ref 40–120)
ALT FLD-CCNC: 29 U/L — SIGNIFICANT CHANGE UP (ref 4–33)
ANION GAP SERPL CALC-SCNC: 6 MMOL/L — LOW (ref 7–14)
ANISOCYTOSIS BLD QL: SLIGHT — SIGNIFICANT CHANGE UP
APPEARANCE UR: CLEAR — SIGNIFICANT CHANGE UP
AST SERPL-CCNC: 35 U/L — HIGH (ref 4–32)
BACTERIA # UR AUTO: ABNORMAL
BASOPHILS # BLD AUTO: 0.09 K/UL — SIGNIFICANT CHANGE UP (ref 0–0.2)
BASOPHILS NFR BLD AUTO: 1 % — SIGNIFICANT CHANGE UP (ref 0–2)
BILIRUB SERPL-MCNC: <0.2 MG/DL — SIGNIFICANT CHANGE UP (ref 0.2–1.2)
BILIRUB UR-MCNC: NEGATIVE — SIGNIFICANT CHANGE UP
BUN SERPL-MCNC: 10 MG/DL — SIGNIFICANT CHANGE UP (ref 7–23)
CALCIUM SERPL-MCNC: 7.4 MG/DL — LOW (ref 8.4–10.5)
CHLORIDE SERPL-SCNC: 102 MMOL/L — SIGNIFICANT CHANGE UP (ref 98–107)
CO2 SERPL-SCNC: 23 MMOL/L — SIGNIFICANT CHANGE UP (ref 22–31)
COLOR SPEC: YELLOW — SIGNIFICANT CHANGE UP
CREAT SERPL-MCNC: 0.67 MG/DL — SIGNIFICANT CHANGE UP (ref 0.5–1.3)
CRP SERPL-MCNC: <4 MG/L — SIGNIFICANT CHANGE UP
DIFF PNL FLD: NEGATIVE — SIGNIFICANT CHANGE UP
EOSINOPHIL # BLD AUTO: 0 K/UL — SIGNIFICANT CHANGE UP (ref 0–0.5)
EOSINOPHIL NFR BLD AUTO: 0 % — SIGNIFICANT CHANGE UP (ref 0–6)
EPI CELLS # UR: 10 /HPF — HIGH (ref 0–5)
FERRITIN SERPL-MCNC: 90 NG/ML — SIGNIFICANT CHANGE UP (ref 15–150)
GIANT PLATELETS BLD QL SMEAR: PRESENT — SIGNIFICANT CHANGE UP
GLUCOSE SERPL-MCNC: 84 MG/DL — SIGNIFICANT CHANGE UP (ref 70–99)
GLUCOSE UR QL: NEGATIVE — SIGNIFICANT CHANGE UP
HCG SERPL-ACNC: <5 MIU/ML — SIGNIFICANT CHANGE UP
HCT VFR BLD CALC: 32.7 % — LOW (ref 34.5–45)
HGB BLD-MCNC: 9 G/DL — LOW (ref 11.5–15.5)
HYALINE CASTS # UR AUTO: 10 /LPF — HIGH (ref 0–7)
IANC: 6.07 K/UL — SIGNIFICANT CHANGE UP (ref 1.5–8.5)
IRON SATN MFR SERPL: 23 UG/DL — LOW (ref 30–160)
IRON SATN MFR SERPL: 31 % — SIGNIFICANT CHANGE UP (ref 14–50)
KETONES UR-MCNC: NEGATIVE — SIGNIFICANT CHANGE UP
LDH SERPL L TO P-CCNC: 675 U/L — HIGH (ref 135–225)
LEUKOCYTE ESTERASE UR-ACNC: ABNORMAL
LYMPHOCYTES # BLD AUTO: 0.7 K/UL — LOW (ref 1–3.3)
LYMPHOCYTES # BLD AUTO: 7.7 % — LOW (ref 13–44)
MACROCYTES BLD QL: SLIGHT — SIGNIFICANT CHANGE UP
MAGNESIUM SERPL-MCNC: 1.9 MG/DL — SIGNIFICANT CHANGE UP (ref 1.6–2.6)
MANUAL SMEAR VERIFICATION: SIGNIFICANT CHANGE UP
MCHC RBC-ENTMCNC: 21.1 PG — LOW (ref 27–34)
MCHC RBC-ENTMCNC: 27.5 GM/DL — LOW (ref 32–36)
MCV RBC AUTO: 76.6 FL — LOW (ref 80–100)
MONOCYTES # BLD AUTO: 1.31 K/UL — HIGH (ref 0–0.9)
MONOCYTES NFR BLD AUTO: 14.4 % — HIGH (ref 2–14)
NEUTROPHILS # BLD AUTO: 6.64 K/UL — SIGNIFICANT CHANGE UP (ref 1.8–7.4)
NEUTROPHILS NFR BLD AUTO: 73.1 % — SIGNIFICANT CHANGE UP (ref 43–77)
NITRITE UR-MCNC: NEGATIVE — SIGNIFICANT CHANGE UP
OVALOCYTES BLD QL SMEAR: SLIGHT — SIGNIFICANT CHANGE UP
PH UR: 6.5 — SIGNIFICANT CHANGE UP (ref 5–8)
PHOSPHATE SERPL-MCNC: 2.8 MG/DL — SIGNIFICANT CHANGE UP (ref 2.5–4.5)
PLAT MORPH BLD: NORMAL — SIGNIFICANT CHANGE UP
PLATELET # BLD AUTO: 381 K/UL — SIGNIFICANT CHANGE UP (ref 150–400)
PLATELET COUNT - ESTIMATE: NORMAL — SIGNIFICANT CHANGE UP
POIKILOCYTOSIS BLD QL AUTO: SLIGHT — SIGNIFICANT CHANGE UP
POLYCHROMASIA BLD QL SMEAR: SLIGHT — SIGNIFICANT CHANGE UP
POTASSIUM SERPL-MCNC: 3.8 MMOL/L — SIGNIFICANT CHANGE UP (ref 3.5–5.3)
POTASSIUM SERPL-SCNC: 3.8 MMOL/L — SIGNIFICANT CHANGE UP (ref 3.5–5.3)
PROCALCITONIN SERPL-MCNC: 0.07 NG/ML — SIGNIFICANT CHANGE UP (ref 0.02–0.1)
PROT SERPL-MCNC: 4.1 G/DL — LOW (ref 6–8.3)
PROT UR-MCNC: ABNORMAL
RBC # BLD: 4.27 M/UL — SIGNIFICANT CHANGE UP (ref 3.8–5.2)
RBC # FLD: 25.2 % — HIGH (ref 10.3–14.5)
RBC BLD AUTO: ABNORMAL
RBC CASTS # UR COMP ASSIST: 8 /HPF — HIGH (ref 0–4)
SARS-COV-2 RNA SPEC QL NAA+PROBE: DETECTED
SMUDGE CELLS # BLD: PRESENT — SIGNIFICANT CHANGE UP
SODIUM SERPL-SCNC: 131 MMOL/L — LOW (ref 135–145)
SP GR SPEC: >1.05 (ref 1–1.05)
TIBC SERPL-MCNC: 74 UG/DL — LOW (ref 220–430)
UIBC SERPL-MCNC: 51 UG/DL — LOW (ref 110–370)
UROBILINOGEN FLD QL: ABNORMAL
VARIANT LYMPHS # BLD: 3.8 % — SIGNIFICANT CHANGE UP (ref 0–6)
WBC # BLD: 9.08 K/UL — SIGNIFICANT CHANGE UP (ref 3.8–10.5)
WBC # FLD AUTO: 9.08 K/UL — SIGNIFICANT CHANGE UP (ref 3.8–10.5)
WBC UR QL: 68 /HPF — HIGH (ref 0–5)

## 2021-12-22 PROCEDURE — 99223 1ST HOSP IP/OBS HIGH 75: CPT

## 2021-12-22 RX ORDER — BENZOCAINE AND MENTHOL 5; 1 G/100ML; G/100ML
1 LIQUID ORAL EVERY 4 HOURS
Refills: 0 | Status: DISCONTINUED | OUTPATIENT
Start: 2021-12-22 | End: 2021-12-22

## 2021-12-22 RX ORDER — ACETAMINOPHEN 500 MG
650 TABLET ORAL EVERY 6 HOURS
Refills: 0 | Status: DISCONTINUED | OUTPATIENT
Start: 2021-12-22 | End: 2021-12-25

## 2021-12-22 RX ORDER — CHLORHEXIDINE GLUCONATE 213 G/1000ML
1 SOLUTION TOPICAL DAILY
Refills: 0 | Status: DISCONTINUED | OUTPATIENT
Start: 2021-12-22 | End: 2021-12-25

## 2021-12-22 RX ORDER — INFLUENZA VIRUS VACCINE 15; 15; 15; 15 UG/.5ML; UG/.5ML; UG/.5ML; UG/.5ML
0.5 SUSPENSION INTRAMUSCULAR ONCE
Refills: 0 | Status: DISCONTINUED | OUTPATIENT
Start: 2021-12-22 | End: 2021-12-25

## 2021-12-22 RX ORDER — ACETAMINOPHEN 500 MG
650 TABLET ORAL ONCE
Refills: 0 | Status: COMPLETED | OUTPATIENT
Start: 2021-12-22 | End: 2021-12-22

## 2021-12-22 RX ORDER — SENNA PLUS 8.6 MG/1
2 TABLET ORAL AT BEDTIME
Refills: 0 | Status: DISCONTINUED | OUTPATIENT
Start: 2021-12-22 | End: 2021-12-25

## 2021-12-22 RX ORDER — LANOLIN ALCOHOL/MO/W.PET/CERES
3 CREAM (GRAM) TOPICAL AT BEDTIME
Refills: 0 | Status: DISCONTINUED | OUTPATIENT
Start: 2021-12-22 | End: 2021-12-25

## 2021-12-22 RX ORDER — BUMETANIDE 0.25 MG/ML
1 INJECTION INTRAMUSCULAR; INTRAVENOUS
Refills: 0 | Status: DISCONTINUED | OUTPATIENT
Start: 2021-12-22 | End: 2021-12-25

## 2021-12-22 RX ORDER — ONDANSETRON 8 MG/1
4 TABLET, FILM COATED ORAL ONCE
Refills: 0 | Status: COMPLETED | OUTPATIENT
Start: 2021-12-22 | End: 2021-12-22

## 2021-12-22 RX ORDER — BENZOCAINE AND MENTHOL 5; 1 G/100ML; G/100ML
1 LIQUID ORAL EVERY 4 HOURS
Refills: 0 | Status: DISCONTINUED | OUTPATIENT
Start: 2021-12-22 | End: 2021-12-25

## 2021-12-22 RX ORDER — ALBUTEROL 90 UG/1
2 AEROSOL, METERED ORAL EVERY 6 HOURS
Refills: 0 | Status: DISCONTINUED | OUTPATIENT
Start: 2021-12-22 | End: 2021-12-25

## 2021-12-22 RX ORDER — FERROUS SULFATE 325(65) MG
325 TABLET ORAL DAILY
Refills: 0 | Status: DISCONTINUED | OUTPATIENT
Start: 2021-12-22 | End: 2021-12-25

## 2021-12-22 RX ORDER — ENOXAPARIN SODIUM 100 MG/ML
40 INJECTION SUBCUTANEOUS DAILY
Refills: 0 | Status: DISCONTINUED | OUTPATIENT
Start: 2021-12-22 | End: 2021-12-25

## 2021-12-22 RX ADMIN — Medication 650 MILLIGRAM(S): at 22:27

## 2021-12-22 RX ADMIN — Medication 650 MILLIGRAM(S): at 02:28

## 2021-12-22 RX ADMIN — ONDANSETRON 4 MILLIGRAM(S): 8 TABLET, FILM COATED ORAL at 18:26

## 2021-12-22 RX ADMIN — Medication 650 MILLIGRAM(S): at 21:27

## 2021-12-22 RX ADMIN — Medication 650 MILLIGRAM(S): at 07:07

## 2021-12-22 RX ADMIN — ENOXAPARIN SODIUM 40 MILLIGRAM(S): 100 INJECTION SUBCUTANEOUS at 12:29

## 2021-12-22 RX ADMIN — Medication 325 MILLIGRAM(S): at 12:29

## 2021-12-22 NOTE — ED PROVIDER NOTE - OBJECTIVE STATEMENT
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 Was scheduled for kidney biopsy in 6 days but no longer able to have it done due to testing COVID+ today (unvaccinated, positive exposure at home). Saw her PCP Dr. Eloy Rock who was going to start lasix today but was concerned b/c of her low BP (pt has chronically low BP). Endorsing chills which started last night. No chest pain, SOB, abdominal pain. Swelling mostly to b/l LE's, abdomen, face. Has hx of kidney disease in grandmother but unknown specifics

## 2021-12-22 NOTE — H&P ADULT - NSHPSOCIALHISTORY_GEN_ALL_CORE
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

## 2021-12-22 NOTE — H&P ADULT - PROBLEM SELECTOR PLAN 4
- Maalox prn for dyspepsia -not on any medicatiojs  - Maalox prn for dyspepsia -not on any medications  - Maalox prn for dyspepsia

## 2021-12-22 NOTE — ED PROVIDER NOTE - PHYSICAL EXAMINATION
Physical Exam:  Gen: generalized edema, awake alert   HEENT: periorbital edema noted, normal conjunctiva, oral mucosa moist  Lung: CTAB, no respiratory distress, no wheezes/rhonchi/rales B/L, speaking in full sentences  CV: Regular, tachycardic  Abd: soft, NT, ND, no guarding, no rigidity, no rebound tenderness, no CVA tenderness   MSK: no visible deformities  Skin: Warm, well perfused, no rash, no leg swelling  Psych: normal affect, calm  ~Scott Perez MD (PGY-2)

## 2021-12-22 NOTE — H&P ADULT - PROBLEM SELECTOR PLAN 3
- no wheezing noted, on RA - no wheezing noted, on RA  - c/w home medication: albuterol PRN, per patient, she hasn't use it in the last 6 months

## 2021-12-22 NOTE — H&P ADULT - ASSESSMENT
31 year old female w/ PMHx of  asthma, GERD, chronic iron deficiency, anemia, thrombophlebitis of R gonadal vein ( was previously on Xarelto),COVID-19, recent admission to [facility]  (kidney biopsy planned but cancelled due to COVID-19) now presents with worsening generalized edema and proteinuria. Admit to medicine.  31 year old female w/ PMHx of  asthma, GERD, chronic iron deficiency, anemia, thrombophlebitis of R gonadal vein, DVT (was previously on Xarelto), UTI, recent admission to [facility]  (12/4-12/7 for proteinuria with plan for kidney biopsy,  but cancelled due to COVID-19 PCR detected yesterday 12/21, she now presents to [facility] with worsening generalized edema and proteinuria. Admit to medicine.

## 2021-12-22 NOTE — CONSULT NOTE ADULT - SUBJECTIVE AND OBJECTIVE BOX
[facility] DIVISION OF KIDNEY DISEASES AND HYPERTENSION -- [phone]  -- INITIAL CONSULT NOTE  --------------------------------------------------------------------------------  HPI: 31 year old female with asthma, prior history of DVT admitted to [facility] with worsening generalized swelling / LE edema. Pt. recently hospitalized at [facility] with similar complaints and planned o undergo kidney biopsy for Nephrotic syndrome however procedure was cancelled due positive COVID 19 diagnosis. Admission labs relevant for hypoalbuminemia with serum albumin of 1.2. UA positive for significant proteinuria.     Pt. seen and examined at bedside. Pt. gives history of sudden onset LE edema ~ 2 weeks ago when she first noticed it at a gathering. Symptoms have worsened over the last 2 weeks and now with abdominal distention / facial puffiness. Pt. denied any dysuria, hematuria or prior history of kidney disease.    PAST HISTORY  --------------------------------------------------------------------------------  PAST MEDICAL & SURGICAL HISTORY:  Asthma  GERD (gastroesophageal reflux disease)  Anemia  Gastritis  Thrombophlebitis/phlebitis  thrombophlebitis of R gonadal vein  UTI (urinary tract infection)  H/O:   History of tonsillectomy    FAMILY HISTORY: HTN    PAST SOCIAL HISTORY: Denies heavy alcohol use    ALLERGIES & MEDICATIONS  --------------------------------------------------------------------------------  Allergies    codeine (Hives)    Intolerances    Standing Inpatient Medications  enoxaparin Injectable 40 milliGRAM(s) SubCutaneous daily  ferrous    sulfate 325 milliGRAM(s) Oral daily  senna 2 Tablet(s) Oral at bedtime    REVIEW OF SYSTEMS  --------------------------------------------------------------------------------  Gen: + weight gain  Respiratory: No dyspnea  CV: No chest pain  GI: + abdominal distention  MSK: + LE edema    All other systems were reviewed and are negative, except as noted.    VITALS/PHYSICAL EXAM  --------------------------------------------------------------------------------  T(C): 37 (21 @ 08:20), Max: 37.5 (21 @ 21:09)  HR: 103 (21 @ 08:20) (98 - 111)  BP: 99/64 (21 @ 08:20) (99/64 - 118/75)  RR: 18 (21 @ 08:20) (18 - 31)  SpO2: 97% (21 @ 08:20) (97% - 100%)  Wt(kg): --  Height (cm): 154.9 (21 @ 21:09)    Physical Exam:  	Gen: Well appearing female  	HEENT: Facial / kenzie-orbital puffiness  	Pulm: No tachynpnea  	CV: Deferred  	Abd: Soft, +BS , Distended  	Ext: Extensive pitting LE edema B/L  	Neuro: Awake  	Skin: Warm and dry    LABS/STUDIES  --------------------------------------------------------------------------------              9.0    9.08  >-----------<  381      [21 00:57]              32.7     131  |  102  |  10  ----------------------------<  84      [21 00:57]  3.8   |  23  |  0.67        Ca     7.4     [21:57]      Mg     1.90     [21:57]      Phos  2.8     [21 00:57]    TPro  4.1  /  Alb  1.2  /  TBili  <0.2  /  DBili  x   /  AST  35  /  ALT  29  /  AlkPhos  77  [21 00:57]          [21 @ 01:02]    Creatinine Trend:  SCr 0.67 [ 00:57]    Urinalysis - [21 @ 01:07]      Color Yellow / Appearance Clear / SG >1.050 / pH 6.5      Gluc Negative / Ketone Negative  / Bili Negative / Urobili 3 mg/dL       Blood Negative / Protein >600 mg/dL / Leuk Est Small / Nitrite Negative      RBC 8 / WBC 68 / Hyaline 10 / Gran  / Sq Epi  / Non Sq Epi 10 / Bacteria Few    Iron 23, TIBC 74, %sat 31      [21 @ 01:02]  Ferritin 90      [21 01:02]  TSH 3.03      [21 01:02]    HIV Nonreact      [20 @ 14:43] [facility] DIVISION OF KIDNEY DISEASES AND HYPERTENSION -- [phone]  -- INITIAL CONSULT NOTE  --------------------------------------------------------------------------------  HPI: 31 year old female with asthma, prior history of DVT admitted to [facility] with worsening generalized swelling / LE edema. Pt. recently hospitalized at [facility] with similar complaints and planned o undergo kidney biopsy for Nephrotic syndrome however procedure was cancelled due positive COVID 19 diagnosis. Admission labs relevant for hypoalbuminemia with serum albumin of 1.2. UA positive for significant proteinuria. Reports symptoms developed suddenly while at a party.  Denies nsaid use, drugs, other medical problems.    Pt. seen and examined at bedside. Pt. gives history of sudden onset LE edema ~ 2 weeks ago when she first noticed it at a gathering. Symptoms have worsened over the last 2 weeks and now with abdominal distention / facial puffiness. Pt. denied any dysuria, hematuria or prior history of kidney disease.    PAST HISTORY  --------------------------------------------------------------------------------  PAST MEDICAL & SURGICAL HISTORY:  Asthma  GERD (gastroesophageal reflux disease)  Anemia  Gastritis  Thrombophlebitis/phlebitis  thrombophlebitis of R gonadal vein  UTI (urinary tract infection)  H/O:   History of tonsillectomy    FAMILY HISTORY: HTN    PAST SOCIAL HISTORY: Denies heavy alcohol use    ALLERGIES & MEDICATIONS  --------------------------------------------------------------------------------  Allergies    codeine (Hives)    Intolerances    Standing Inpatient Medications  enoxaparin Injectable 40 milliGRAM(s) SubCutaneous daily  ferrous    sulfate 325 milliGRAM(s) Oral daily  senna 2 Tablet(s) Oral at bedtime    REVIEW OF SYSTEMS  --------------------------------------------------------------------------------  Gen: + weight gain  Respiratory: No dyspnea  CV: No chest pain  GI: + abdominal distention  MSK: + LE edema    All other systems were reviewed and are negative, except as noted.    VITALS/PHYSICAL EXAM  --------------------------------------------------------------------------------  T(C): 37 (21 @ 08:20), Max: 37.5 (21 @ 21:09)  HR: 103 (21 @ 08:20) (98 - 111)  BP: 99/64 (21 @ 08:20) (99/64 - 118/75)  RR: 18 (21 @ 08:20) (18 - 31)  SpO2: 97% (21 @ 08:20) (97% - 100%)  Wt(kg): --  Height (cm): 154.9 (21 @ 21:09)    Physical Exam:  	Gen: Well appearing female  	HEENT: Facial / kenzie-orbital puffiness  	Pulm: No tachypnea  	CV: Deferred  	Abd: Soft, +BS , Distended  	Ext: Extensive pitting LE edema B/L  	Neuro: Awake  	Skin: Warm and dry    LABS/STUDIES  --------------------------------------------------------------------------------              9.0    9.08  >-----------<  381      [21 00:57]              32.7     131  |  102  |  10  ----------------------------<  84      [21:57]  3.8   |  23  |  0.67        Ca     7.4     [21:57]      Mg     1.90     [21:57]      Phos  2.8     [21:57]    TPro  4.1  /  Alb  1.2  /  TBili  <0.2  /  DBili  x   /  AST  35  /  ALT  29  /  AlkPhos  77  [21 00:57]          [21 01:02]    Creatinine Trend:  SCr 0.67 [:57]    Urinalysis - [21 01:07]      Color Yellow / Appearance Clear / SG >1.050 / pH 6.5      Gluc Negative / Ketone Negative  / Bili Negative / Urobili 3 mg/dL       Blood Negative / Protein >600 mg/dL / Leuk Est Small / Nitrite Negative      RBC 8 / WBC 68 / Hyaline 10 / Gran  / Sq Epi  / Non Sq Epi 10 / Bacteria Few    Iron 23, TIBC 74, %sat 31      [21 01:02]  Ferritin 90      [21 01:02]  TSH 3.03      [21 01:02]    HIV Nonreact      [20 @ 14:43]

## 2021-12-22 NOTE — CONSULT NOTE ADULT - PROBLEM SELECTOR RECOMMENDATION 9
Pt. admitted with anasarca in the Nephrotic syndrome. Lab and clinical criteria diagnostic of Nephrotic syndrome. Of note, pt. also diagnosed with COVID 19, however symptoms preceded infection. Pt. likely with underlying minimal change disease given sudden onset of symptoms, however given prior history of VTE, will need to rule out primary / secondary membranous nephropathy as well. Serum albumin low at 1.2. Pt. recently evaluated at OSH for nephrotic syndrome and planned to undergo kidney biopsy , however procedure was cancelled due to COVID 19. Pt. clinically hypervolemic. Recommend starting IV Bumex 1mg BID for fluid overload management. Diuretic response might be sub-optimal given hypoalbuminemia, will reassess and titrate as needed. Pt. currently being ruled out for LE DVT, recommend renal duplex to rule out RVT as well. Will consider systemic anti-coagulation if there is any evidence of VTE. Send serologic work for Nephrotic syndrome. Check SHELTON, HBsAg, Hep C antibody, HIV, Parvovirus, C3, C4, PLA2R Ab, serum and urine immunofixation. Ideally would schedule for kidney biopsy, however will wait until COVID 19 resolves since pt. will not be a candidate for immunosuppression right away. Monitor UOP. Daily weights.    Viet Pisano  Nephrology Fellow  [phone]  (After 5pm or on weekends please page the on-call fellow)

## 2021-12-22 NOTE — ED PROVIDER NOTE - CARE PLAN
1 Principal Discharge DX:	Anasarca associated with disorder of kidney  Secondary Diagnosis:	2019 novel coronavirus disease (COVID-19)

## 2021-12-22 NOTE — H&P ADULT - NSICDXPASTMEDICALHX_GEN_ALL_CORE_FT
PAST MEDICAL HISTORY:  2019 novel coronavirus disease (COVID-19)     Anemia     Anxiety     Asthma     Gastritis     GERD (gastroesophageal reflux disease)     Thrombophlebitis/phlebitis thrombophlebitis of R gonadal vein     PAST MEDICAL HISTORY:  Anemia     Asthma     Gastritis     GERD (gastroesophageal reflux disease)     Thrombophlebitis/phlebitis thrombophlebitis of R gonadal vein    UTI (urinary tract infection)

## 2021-12-22 NOTE — H&P ADULT - NSHPREVIEWOFSYSTEMS_GEN_ALL_CORE
CONSTITUTIONAL: +fever; + chills; No night sweats; No weight loss; + fatigue  EYES: + periorbital swelling, No eye pain; No blurry vision  ENMT:  No difficulty hearing; No sinus or throat pain  NECK: No pain or stiffness  RESPIRATORY: No cough; No wheezing; No hemoptysis; No shortness of breath; No sputum production  CARDIOVASCULAR: No chest pain; No palpitations; + b/l leg swelling  GASTROINTESTINAL: No abdominal pain; No nausea; No vomiting; No hematemesis; No diarrhea; No constipation. No melena or hematochezia.  GENITOURINARY: No dysuria; No frequency; No hematuria; No incontinence  NEUROLOGICAL: No headaches; No loss of strength; No numbness  SKIN: No itching/burning; No rashes or lesions   MUSCULOSKELETAL: No joint pain or swelling; No muscle, back, or extremity pain  HEME/LYMPH: No easy bruising, or bleeding gums CONSTITUTIONAL: +fever; + chills; No night sweats; No weight loss; + fatigue  EYES: + periorbital swelling, No eye pain; No blurry vision  ENMT:  No difficulty hearing; No sinus, +sore throat pain  NECK: No pain or stiffness  RESPIRATORY: No cough; No wheezing; No hemoptysis; No shortness of breath; No sputum production  CARDIOVASCULAR: No chest pain; No palpitations; + b/l leg swelling  GASTROINTESTINAL: No abdominal pain; No nausea; No vomiting; No hematemesis; No diarrhea; No constipation. No melena or hematochezia.  GENITOURINARY: No dysuria; No frequency; No hematuria; No incontinence  NEUROLOGICAL: No headaches; No loss of strength; No numbness  SKIN: No itching/burning; No rashes or lesions   MUSCULOSKELETAL: No joint pain or swelling; + lower back muscle, + L behind the knee pain  HEME/LYMPH: No easy bruising, or bleeding gums

## 2021-12-22 NOTE — CONSULT NOTE ADULT - ATTENDING COMMENTS
Agree with above diuretics but can also add IV albumin to assist with BP.  hold off on biopsy until covid improves as all treatable causes of nephrotic syndrome involve immunosuppression.

## 2021-12-22 NOTE — H&P ADULT - PROBLEM SELECTOR PLAN 1
- recent hospitalized from [facility] x 2 weeks, kidney biopsy cancelled due to COVID PCR detected  -will consult renal  -will likely need Lasix and albumin given generalized edema and hypotension  -urine culture pending  -b/l leg swelling, VA duplex of BL LE ordered to r/o DVT - possible nephrotic syndrome, recent hospitalized from [facility] x 2 weeks, kidney biopsy cancelled due to COVID PCR detected  -will consult renal  -will likely need Lasix and albumin given generalized edema and hypotension  -urine culture pending  -b/l leg swelling, VA duplex of BL LE ordered to r/o DVT - possible nephrotic syndrome, recent hospitalized [place] [facility] [place] 2 weeks, kidney biopsy cancelled due to COVID PCR detected  - > 600 protein detected in urine w/ low albumin (1.2)  -House renal consulted, recommending US kidney w/ duplex to r/o trombosis  - Check urine protein/ creatinine ratio  -will likely need Lasix and albumin given generalized edema and hypotension  -urine culture pending  -b/l leg swelling, VA duplex of BL LE ordered to r/o DVT Possible nephrotic syndrome, recent hospitalized from [facility] x 2 weeks, kidney biopsy cancelled due to COVID PCR detected  - > 600 protein detected in urine w/ low albumin (1.2)  -House renal consulted, recommending US kidney w/ duplex to r/o trombosis  - Check urine protein/ creatinine ratio  -will likely need Lasix and albumin given generalized edema and hypotension  -urine culture pending  -b/l leg swelling, VA duplex of BL LE ordered to r/o DVT

## 2021-12-22 NOTE — H&P ADULT - PROBLEM SELECTOR PLAN 5
-DVT ppx: Lovenox 40mg SQ daily - Today's hgb/hct  9/32.7, baseline hgb ~8, hx of iron deficiency anemia  - denies any s/s bleeding  - maintain active type and screen  - will start on daily ferrous sulfate  - start on bowel regimen

## 2021-12-22 NOTE — H&P ADULT - NSHPPHYSICALEXAM_GEN_ALL_CORE
PHYSICAL EXAM:  GENERAL: NAD  HEAD:  Atraumatic, Normocephalic  EYES: EOMI, PERRLA, conjunctiva and sclera clear, + periorbital edema   NECK: Supple, No JVD  CHEST/LUNG: Clear to auscultation bilaterally; No wheeze  HEART: Regular rate and rhythm; No murmurs, rubs, or gallops  ABDOMEN: Soft, Nontender, Nondistended; Bowel sounds present  EXTREMITIES:  2+ Peripheral Pulses, No clubbing, cyanosis, +b/l LE edema  PSYCH: AAOx3  NEUROLOGY: non-focal, CNII- XII intact  SKIN: No rashes or lesions PHYSICAL EXAM:  GENERAL: NAD  HEAD:  Atraumatic, Normocephalic  EYES: EOMI, PERRLA, conjunctiva and sclera clear, + periorbital edema   NECK: Supple, No JVD  CHEST/LUNG: Clear to auscultation bilaterally; No wheeze  HEART: Regular rate and rhythm; No murmurs, rubs, or gallops  ABDOMEN: round, Soft, Nontender, Nondistended; Bowel sounds present  EXTREMITIES:  2+ Peripheral Pulses, No clubbing, cyanosis, +b/l LE edema  PSYCH: AAOx3  NEUROLOGY: non-focal, CNII- XII intact  SKIN: No rashes or lesions ICU Vital Signs Last 24 Hrs  T(C): 37.3 (22 Dec 2021 04:51), Max: 37.5 (21 Dec 2021 21:09)  T(F): 99.2 (22 Dec 2021 04:51), Max: 99.5 (21 Dec 2021 21:09)  HR: 111 (22 Dec 2021 04:51) (98 - 111)  BP: 103/63 (22 Dec 2021 04:51) (103/63 - 118/75)  BP(mean): --  ABP: --  ABP(mean): --  RR: 18 (22 Dec 2021 04:51) (18 - 31)  SpO2: 100% (22 Dec 2021 04:51) (100% - 100%)  PHYSICAL EXAM:  GENERAL: NAD  HEAD:  Atraumatic, Normocephalic  EYES: EOMI, PERRLA, conjunctiva and sclera clear, + periorbital edema   NECK: Supple, No JVD  CHEST/LUNG: Clear to auscultation bilaterally; No wheeze  HEART: Regular rate and rhythm; No murmurs, rubs, or gallops  ABDOMEN: round, Soft, Nontender, Nondistended; Bowel sounds present  EXTREMITIES:  2+ Peripheral Pulses, No clubbing, cyanosis, +b/l LE edema   PSYCH: AAOx3  NEUROLOGY: non-focal, CNII- XII intact  SKIN: No rashes or lesions

## 2021-12-22 NOTE — ED PROVIDER NOTE - NS ED ROS FT
CONST: no fevers, no chills  ENT: no sore throat  CV: no chest pain, +leg swelling  RESP: no shortness of breath, no cough  ABD: no abdominal pain, no nausea, no vomiting, no diarrhea  : no dysuria, no flank pain, no hematuria  MSK: no back pain, no extremity pain  SKIN: no rash , +generalized edema

## 2021-12-22 NOTE — H&P ADULT - PROBLEM SELECTOR PLAN 2
-02 saturation 98% on RA, unvaccinated, + exposure from home  -Tylenol prn for myalgia and fever  -incentive spirometer per routine  -trend inflammatory markers q72 hrs  - monitor oxygen saturations closely, maintain 02 sat >93%

## 2021-12-22 NOTE — ED ADULT NURSE REASSESSMENT NOTE - NS ED NURSE REASSESS COMMENT FT1
Patient received from hoa RN. Patient resting quietly in bed, breathing even and nonlabored. No acute distress. Labs sent as ordered. COVID swab sent. Urine sample sent. [facility] running at this time. Safety maintained. Patient stable upon exiting the room.

## 2021-12-22 NOTE — H&P ADULT - HISTORY OF PRESENT ILLNESS
31 year old female w/ PMHx of  asthma, GERD, chronic iron deficiency, anemia, thrombophlebitis of R gonadal vein ( was previously on Xarelto),COVID-19, recent admission to [facility]  (kidney biopsy planned but cancelled due to COVID-19) now presents with worsening generalized edema and proteinuria. She reports worsening edema in her legs ( bilateral), face and abdomen.  She was sent in by  Dr. Eloy Rock (PCP) for diuresing, which will need to be monitored since patient's blood pressure is borderline hypotensive. Patient is unvaccinated, and reports exposure from family.  Patient denies any chest pain, palpitations, shortness of breath, abdominal pain, nausea, vomiting, diarrhea, melena, dysuria or increase in urinary frequency.      31 year old female w/ PMHx of  asthma, GERD, chronic iron deficiency, anemia, thrombophlebitis of R gonadal vein, DVT (was previously on Xarelto), UTI, recent admission to [facility]  (12/4-12/7 for proteinuria with plan for kidney biopsy,  but cancelled due to COVID-19 PCR detected yesterday 12/2), she now presents to [facility] with worsening generalized edema and proteinuria. She was sent in by  Dr. Eloy Rock (PCP) for diuresing (Lasix), which will need to be monitored since patient's blood pressure is borderline hypotensive.  She reports worsening edema in her legs ( bilateral), face and abdomen.  She reports L behind the knee pain.  Since yesterday, she reports fever, chills, myalgia and sore throat.  Patient is unvaccinated, and reports exposure from family ( likely 12/17, she had a birthday party with her family).  Patient denies any chest pain, palpitations, shortness of breath, abdominal pain, nausea, vomiting, diarrhea, melena, dysuria or increase in urinary frequency. Last period 12/6/21.

## 2021-12-22 NOTE — ED PROVIDER NOTE - ATTENDING CONTRIBUTION TO CARE
I performed a face-to-face evaluation of the patient and performed a history and physical examination along with the resident or ACP, and/or medical student above.  I agree with the history and physical examination as documented by the resident or ACP, and/or medical student above.  Wood:  32yo F w/ pmh as above sent in by PMD for workup/admission for worsening Anasarca. Pt recently found to have proteinuria and was scheduled for outpt kidney biopsy (which is now delayed because pt is covid +). PMD was to start pt on lasix but given low BPs, fear of causing symptomatic hypotension, and fear of worsening edema leading to pulm edema if pt continues to go untreated as outpt. labs, UA, TBA.

## 2021-12-22 NOTE — H&P ADULT - ATTENDING COMMENTS
Pt seen and examined. Agree with above.     -symptomatic treatment for COVID as stated above. Pt not meeting criteria for medications at this time   -pending renal official recs for protenuria and edema. Would attempt to obtain outpt recs if possible. Check urine protein/ creatinine ratio. Would also obtain HIV, SHELTON, C3/C4 and total, and hepatitis B and C serologies to start with. Pt may need further w/u.

## 2021-12-22 NOTE — ED PROVIDER NOTE - CLINICAL SUMMARY MEDICAL DECISION MAKING FREE TEXT BOX
31F p/w anasarca in setting of proteinuria for past 2 weeks. Hypotensive, rest of VSS in ED. Lungs ctab, edema noted worse in b/l LE's and periorbital region  Will order labs, ua/ucx, ucg, likely admit for diuresis in controlled setting and concerns for possibly developing pulmonary edema

## 2021-12-23 LAB
ANION GAP SERPL CALC-SCNC: 5 MMOL/L — LOW (ref 7–14)
BUN SERPL-MCNC: 12 MG/DL — SIGNIFICANT CHANGE UP (ref 7–23)
C3 SERPL-MCNC: 132 MG/DL — SIGNIFICANT CHANGE UP (ref 90–180)
C4 SERPL-MCNC: 35 MG/DL — SIGNIFICANT CHANGE UP (ref 10–40)
CALCIUM SERPL-MCNC: 7.7 MG/DL — LOW (ref 8.4–10.5)
CHLORIDE SERPL-SCNC: 104 MMOL/L — SIGNIFICANT CHANGE UP (ref 98–107)
CO2 SERPL-SCNC: 23 MMOL/L — SIGNIFICANT CHANGE UP (ref 22–31)
CREAT SERPL-MCNC: 0.74 MG/DL — SIGNIFICANT CHANGE UP (ref 0.5–1.3)
CULTURE RESULTS: SIGNIFICANT CHANGE UP
GLUCOSE SERPL-MCNC: 92 MG/DL — SIGNIFICANT CHANGE UP (ref 70–99)
HBV SURFACE AB SER-ACNC: SIGNIFICANT CHANGE UP
HCT VFR BLD CALC: 37.7 % — SIGNIFICANT CHANGE UP (ref 34.5–45)
HCV AB S/CO SERPL IA: 0.1 S/CO — SIGNIFICANT CHANGE UP (ref 0–0.99)
HCV AB SERPL-IMP: SIGNIFICANT CHANGE UP
HGB BLD-MCNC: 10.4 G/DL — LOW (ref 11.5–15.5)
HIV 1+2 AB+HIV1 P24 AG SERPL QL IA: SIGNIFICANT CHANGE UP
MAGNESIUM SERPL-MCNC: 1.9 MG/DL — SIGNIFICANT CHANGE UP (ref 1.6–2.6)
MCHC RBC-ENTMCNC: 20.8 PG — LOW (ref 27–34)
MCHC RBC-ENTMCNC: 27.6 GM/DL — LOW (ref 32–36)
MCV RBC AUTO: 75.6 FL — LOW (ref 80–100)
NRBC # BLD: 0 /100 WBCS — SIGNIFICANT CHANGE UP
NRBC # FLD: 0 K/UL — SIGNIFICANT CHANGE UP
PHOSPHATE SERPL-MCNC: 3.4 MG/DL — SIGNIFICANT CHANGE UP (ref 2.5–4.5)
PLATELET # BLD AUTO: 435 K/UL — HIGH (ref 150–400)
POTASSIUM SERPL-MCNC: 4 MMOL/L — SIGNIFICANT CHANGE UP (ref 3.5–5.3)
POTASSIUM SERPL-SCNC: 4 MMOL/L — SIGNIFICANT CHANGE UP (ref 3.5–5.3)
RBC # BLD: 4.99 M/UL — SIGNIFICANT CHANGE UP (ref 3.8–5.2)
RBC # FLD: 26.1 % — HIGH (ref 10.3–14.5)
SODIUM SERPL-SCNC: 132 MMOL/L — LOW (ref 135–145)
SPECIMEN SOURCE: SIGNIFICANT CHANGE UP
WBC # BLD: 6.85 K/UL — SIGNIFICANT CHANGE UP (ref 3.8–10.5)
WBC # FLD AUTO: 6.85 K/UL — SIGNIFICANT CHANGE UP (ref 3.8–10.5)

## 2021-12-23 PROCEDURE — 99233 SBSQ HOSP IP/OBS HIGH 50: CPT

## 2021-12-23 PROCEDURE — 86334 IMMUNOFIX E-PHORESIS SERUM: CPT | Mod: 26

## 2021-12-23 PROCEDURE — 93976 VASCULAR STUDY: CPT | Mod: 26

## 2021-12-23 RX ORDER — BUMETANIDE 0.25 MG/ML
1 INJECTION INTRAMUSCULAR; INTRAVENOUS ONCE
Refills: 0 | Status: COMPLETED | OUTPATIENT
Start: 2021-12-23 | End: 2021-12-23

## 2021-12-23 RX ORDER — METOCLOPRAMIDE HCL 10 MG
10 TABLET ORAL ONCE
Refills: 0 | Status: COMPLETED | OUTPATIENT
Start: 2021-12-23 | End: 2021-12-23

## 2021-12-23 RX ORDER — ALBUMIN HUMAN 25 %
50 VIAL (ML) INTRAVENOUS ONCE
Refills: 0 | Status: COMPLETED | OUTPATIENT
Start: 2021-12-23 | End: 2021-12-23

## 2021-12-23 RX ADMIN — ENOXAPARIN SODIUM 40 MILLIGRAM(S): 100 INJECTION SUBCUTANEOUS at 11:30

## 2021-12-23 RX ADMIN — Medication 10 MILLIGRAM(S): at 16:47

## 2021-12-23 RX ADMIN — Medication 325 MILLIGRAM(S): at 11:30

## 2021-12-23 RX ADMIN — BUMETANIDE 1 MILLIGRAM(S): 0.25 INJECTION INTRAMUSCULAR; INTRAVENOUS at 09:47

## 2021-12-23 RX ADMIN — CHLORHEXIDINE GLUCONATE 1 APPLICATION(S): 213 SOLUTION TOPICAL at 11:35

## 2021-12-23 RX ADMIN — Medication 50 MILLILITER(S): at 14:40

## 2021-12-23 RX ADMIN — Medication 650 MILLIGRAM(S): at 04:28

## 2021-12-23 RX ADMIN — Medication 50 MILLILITER(S): at 20:38

## 2021-12-23 RX ADMIN — BUMETANIDE 1 MILLIGRAM(S): 0.25 INJECTION INTRAMUSCULAR; INTRAVENOUS at 20:34

## 2021-12-23 RX ADMIN — Medication 650 MILLIGRAM(S): at 03:28

## 2021-12-23 NOTE — PROGRESS NOTE ADULT - SUBJECTIVE AND OBJECTIVE BOX
[facility] Division of Kidney Diseases & Hypertension  FOLLOW UP NOTE  [phone]--------------------------------------------------------------------------------    HPI: 31 year old female with asthma, prior history of DVT admitted to [facility] with worsening generalized swelling / LE edema. Pt. recently hospitalized at [facility] with similar complaints and planned o undergo kidney biopsy for Nephrotic syndrome however procedure was cancelled due positive COVID 19 diagnosis. Admission labs relevant for hypoalbuminemia with serum albumin of 1.2. UA positive for significant proteinuria. Reports symptoms developed suddenly while at a party. Denies nsaid use, drugs. Pt. gives history of sudden onset LE edema ~ 2 weeks ago when she first noticed it at a gathering. Symptoms have worsened over the last 2 weeks and now with abdominal distention / facial puffiness. Pt. denied any dysuria, hematuria or prior history of kidney disease.    Pt. seen and examined at bedside. Pt. states her facial puffiness is worse and she has throat fullness as well. Pt. complained of body aches as well. Diuretic therapy not initiated with concerns over hypotension.    PAST HISTORY  --------------------------------------------------------------------------------  No significant changes to PMH, PSH, FHx, SHx, unless otherwise noted    ALLERGIES & MEDICATIONS  --------------------------------------------------------------------------------  Allergies    codeine (Hives)    Intolerances    Standing Inpatient Medications  buMETAnide Injectable 1 milliGRAM(s) IV Push two times a day  chlorhexidine 2% Cloths 1 Application(s) Topical daily  enoxaparin Injectable 40 milliGRAM(s) SubCutaneous daily  ferrous    sulfate 325 milliGRAM(s) Oral daily  influenza   Vaccine 0.5 milliLiter(s) IntraMuscular once  senna 2 Tablet(s) Oral at bedtime    REVIEW OF SYSTEMS  --------------------------------------------------------------------------------  Gen: + fatigue  Respiratory: See HPI  CV: No chest pain  GI: See HPI  MSK: + LE edema  Neuro: No dizziness    All other systems were reviewed and are negative, except as noted.    VITALS/PHYSICAL EXAM  --------------------------------------------------------------------------------  T(C): 37.1 (12-23-21 @ 09:45), Max: 37.2 (12-22-21 @ 20:00)  HR: 100 (12-23-21 @ 09:45) (74 - 113)  BP: 97/56 (12-23-21 @ 09:45) (93/56 - 99/60)  RR: 18 (12-23-21 @ 09:45) (18 - 18)  SpO2: 99% (12-23-21 @ 09:45) (96% - 99%)  Wt(kg): --  Height (cm): 154.9 (12-21-21 @ 21:09)    Physical Exam:              Gen: Well appearing female  	HEENT: Facial / kenzie-orbital puffiness  	Pulm: No tachypnea  	CV: Deferred  	Abd: Soft, +BS , Distended  	Ext: Extensive pitting LE edema B/L  	Neuro: Awake  	Skin: Warm and dry    LABS/STUDIES  --------------------------------------------------------------------------------              10.4   6.85  >-----------<  435      [12-23-21 @ 07:26]              37.7     132  |  104  |  12  ----------------------------<  92      [12-23-21 @ 07:26]  4.0   |  23  |  0.74        Ca     7.7     [12-23-21 @ 07:26]      Mg     1.90     [12-23-21 @ 07:26]      Phos  3.4     [12-23-21 @ 07:26]    TPro  4.1  /  Alb  1.2  /  TBili  <0.2  /  DBili  x   /  AST  35  /  ALT  29  /  AlkPhos  77  [12-22-21 @ 00:57]    Creatinine Trend:  SCr 0.74 [12-23 @ 07:26]  SCr 0.67 [12-22 @ 00:57]    Urinalysis - [12-22-21 @ 01:07]      Color Yellow / Appearance Clear / SG >1.050 / pH 6.5      Gluc Negative / Ketone Negative  / Bili Negative / Urobili 3 mg/dL       Blood Negative / Protein >600 mg/dL / Leuk Est Small / Nitrite Negative      RBC 8 / WBC 68 / Hyaline 10 / Gran  / Sq Epi  / Non Sq Epi 10 / Bacteria Few    HIV Nonreact      [12-23-21 @ 07:26]    C3 Complement 132      [12-23-21 @ 07:26]  C4 Complement 35      [12-23-21 @ 07:26]

## 2021-12-23 NOTE — PROVIDER CONTACT NOTE (OTHER) - BACKGROUND
31 F who presented with generalized edema, and admitted diagnosis of nephrotic syndrome. Past medical history of asthma, GERD, thrombophlebitis.

## 2021-12-23 NOTE — PROGRESS NOTE ADULT - PROBLEM SELECTOR PLAN 1
Possible nephrotic syndrome, recent [place] [facility] [place], kidney biopsy cancelled due to COVID PCR detected  - > 600 protein detected in urine w/ low albumin (1.2)  -House renal consulted, recommending US kidney w/ duplex to r/o trombosis  - Check urine protein/ creatinine ratio  - start Lasix and albumin given generalized edema and hypotension  -urine culture pending  -b/l leg swelling, VA duplex of BL LE ordered to r/o DVT

## 2021-12-23 NOTE — PROGRESS NOTE ADULT - PROBLEM SELECTOR PLAN 1
Pt. admitted with anasarca in the setting of Nephrotic syndrome. Lab and clinical criteria diagnostic of Nephrotic syndrome. Of note, pt. also diagnosed with COVID 19, however symptoms preceded infection. Pt. likely with underlying minimal change disease given sudden onset of symptoms, however given prior history of VTE, will need to rule out primary / secondary membranous nephropathy as well. Serum albumin low at 1.2. Pt. recently evaluated at OSH for nephrotic syndrome and planned to undergo kidney biopsy , however procedure was cancelled due to COVID 19. Pt. clinically hypervolemic. Diuretics not initiated yesterday as recommend due to concerns over hypotension. Pt. now with worsening facial puffiness / throat fullness. Recommend starting IV Bumex 1mg BID for fluid overload management. Will also recommend 25% Albumin in 50cc BID 20-30 mins before Bumex to optimize BP and diuretic response. Give one dose Albumin this AM since morning dose of Bumex given already. Need to monitor response to diuretic, pt will likely need a higher dose of Bumex given degree of hypoalbuminemia. Will reassess and titrate as needed. Pt. currently being ruled out for LE DVT, recommend renal duplex to rule out RVT as well. Will consider systemic anti-coagulation if there is any evidence of VTE. Serologic work for Nephrotic syndrome sent : SHELTON, HBsAg, Hep C antibody, HIV, Parvovirus, C3, C4, PLA2R Ab, serum and urine immunofixation. Ideally would schedule for kidney biopsy, however will wait until COVID 19 resolves since pt. will not be a candidate for immunosuppression right away. Monitor UOP. Daily weights.    Viet Pisano  Nephrology Fellow  [phone]  (After 5pm or on weekends please page the on-call fellow).

## 2021-12-23 NOTE — PROGRESS NOTE ADULT - SUBJECTIVE AND OBJECTIVE BOX
[facility] Division of Hospital Medicine  Berkley Bernal MD  Pager (M-F, 8A-5P): [phone]  Other Times:  [id number]    Patient is a 31y old  Female who presents with a chief complaint of generalized edema (23 Dec 2021 11:12)    SUBJECTIVE / OVERNIGHT EVENTS:  continues to have sore throat - she is able to swallow, no drooling noted no change in voice.     MEDICATIONS  (STANDING):  albumin human 25% IVPB 50 milliLiter(s) IV Intermittent once  buMETAnide Injectable 1 milliGRAM(s) IV Push two times a day  chlorhexidine 2% Cloths 1 Application(s) Topical daily  enoxaparin Injectable 40 milliGRAM(s) SubCutaneous daily  ferrous    sulfate 325 milliGRAM(s) Oral daily  influenza   Vaccine 0.5 milliLiter(s) IntraMuscular once  senna 2 Tablet(s) Oral at bedtime    MEDICATIONS  (PRN):  acetaminophen     Tablet .. 650 milliGRAM(s) Oral every 6 hours PRN Temp greater or equal to 38C (100.4F), Mild Pain (1 - 3)  ALBUTerol    90 MICROgram(s) HFA Inhaler 2 Puff(s) Inhalation every 6 hours PRN Shortness of Breath and/or Wheezing  aluminum hydroxide/magnesium hydroxide/simethicone Suspension 30 milliLiter(s) Oral every 4 hours PRN Dyspepsia  benzocaine 15 mG/menthol 3.6 mG Lozenge 1 Lozenge Oral every 4 hours PRN Sore Throat  melatonin 3 milliGRAM(s) Oral at bedtime PRN Insomnia      CAPILLARY BLOOD GLUCOSE        I&O's Summary      PHYSICAL EXAM:  Vital Signs Last 24 Hrs  T(C): 36.8 (23 Dec 2021 12:00), Max: 37.2 (22 Dec 2021 20:00)  T(F): 98.3 (23 Dec 2021 12:00), Max: 99 (22 Dec 2021 20:00)  HR: 109 (23 Dec 2021 12:00) (74 - 113)  BP: 104/66 (23 Dec 2021 12:00) (93/56 - 104/66)  BP(mean): 76 (22 Dec 2021 17:19) (76 - 76)  RR: 18 (23 Dec 2021 12:00) (18 - 18)  SpO2: 98% (23 Dec 2021 12:00) (96% - 99%)    CONSTITUTIONAL: NAD, well-developed, well-groomed  EYES: EOMI; conjunctiva and sclera clear  ENMT: Moist oral mucosa; tonsillar erythema   RESPIRATORY: Normal respiratory effort; lungs are clear to auscultation bilaterally  CARDIOVASCULAR: Regular rate and rhythm, normal S1 and S2, no murmur; No lower extremity edema  ABDOMEN: Nontender to palpation, normoactive bowel sounds, no rebound/guarding  MUSCULOSKELETAL:   no clubbing or cyanosis of digits; no joint swelling or tenderness to palpation  PSYCH: A+O to person, place, and time; affect appropriate  NEUROLOGY: CN 2-12 are intact and symmetric; no gross sensory deficits   SKIN: No rashes; no palpable lesions    LABS:                        10.4   6.85  )-----------( 435      ( 23 Dec 2021 07:26 )             37.7     12-23    132<L>  |  104  |  12  ----------------------------<  92  4.0   |  23  |  0.74    Ca    7.7<L>      23 Dec 2021 07:26  Phos  3.4     12-23  Mg     1.90     12-23    TPro  4.1<L>  /  Alb  1.2<L>  /  TBili  <0.2  /  DBili  x   /  AST  35<H>  /  ALT  29  /  AlkPhos  77  12-22          Urinalysis Basic - ( 22 Dec 2021 01:07 )    Color: Yellow / Appearance: Clear / SG: >1.050 / pH: x  Gluc: x / Ketone: Negative  / Bili: Negative / Urobili: 3 mg/dL   Blood: x / Protein: >600 mg/dL / Nitrite: Negative   Leuk Esterase: Small / RBC: 8 /HPF / WBC 68 /HPF   Sq Epi: x / Non Sq Epi: 10 /HPF / Bacteria: Few        Culture - Urine (collected 22 Dec 2021 00:24)  Source: Clean Catch Clean Catch (Midstream)  Final Report (23 Dec 2021 11:53):    <10,000 CFU/mL Normal Urogenital Yuli      RADIOLOGY & ADDITIONAL TESTS:  Results Reviewed:   Imaging Personally Reviewed:  Electrocardiogram Personally Reviewed:    COORDINATION OF CARE:  Care Discussed with Consultants/Other Providers [Y/N]: Y  Prior or Outpatient Records Reviewed [Y/N]: Y

## 2021-12-23 NOTE — PROVIDER CONTACT NOTE (OTHER) - BACKGROUND
31 F Admitted with nephrotic syndrome. PSH of UTI, Gastritis, Asthma, GERD, and Thrombophlebitis/phlebitis.

## 2021-12-23 NOTE — PROGRESS NOTE ADULT - SUBJECTIVE AND OBJECTIVE BOX
[facility] Division of Kidney Diseases & Hypertension  FOLLOW UP NOTE  --------------------------------------------------------------------------------  HPI: 31 year old female with asthma, prior history of DVT admitted to [facility] with worsening generalized swelling / LE edema. Pt. recently hospitalized at [facility] with similar complaints and planned o undergo kidney biopsy for Nephrotic syndrome however procedure was cancelled due positive COVID 19 diagnosis. Admission labs relevant for hypoalbuminemia with serum albumin of 1.2. UA positive for significant proteinuria. Reports symptoms developed suddenly while at a party.  Denies nsaid use, drugs, other medical problems.    Seen and evaluated at bedside.  Did not receive diuretics or albumin this morning.  Patient reports feeling subjectively worse this morning with nausea and sort throat and facial swelling.      PAST HISTORY  --------------------------------------------------------------------------------  No significant changes to PMH, PSH, FHx, SHx, unless otherwise noted    ALLERGIES & MEDICATIONS  --------------------------------------------------------------------------------  Allergies    codeine (Hives)    Intolerances      Standing Inpatient Medications  buMETAnide Injectable 1 milliGRAM(s) IV Push two times a day  chlorhexidine 2% Cloths 1 Application(s) Topical daily  enoxaparin Injectable 40 milliGRAM(s) SubCutaneous daily  ferrous    sulfate 325 milliGRAM(s) Oral daily  influenza   Vaccine 0.5 milliLiter(s) IntraMuscular once  senna 2 Tablet(s) Oral at bedtime    PRN Inpatient Medications  acetaminophen     Tablet .. 650 milliGRAM(s) Oral every 6 hours PRN  ALBUTerol    90 MICROgram(s) HFA Inhaler 2 Puff(s) Inhalation every 6 hours PRN  aluminum hydroxide/magnesium hydroxide/simethicone Suspension 30 milliLiter(s) Oral every 4 hours PRN  benzocaine 15 mG/menthol 3.6 mG Lozenge 1 Lozenge Oral every 4 hours PRN  melatonin 3 milliGRAM(s) Oral at bedtime PRN      REVIEW OF SYSTEMS  --------------------------------------------------------------------------------  Gen: feels generally unwell  Respiratory: not sob  CV: no chest pain  GI: nauseous  : urinating  MSK: no pain    VITALS/PHYSICAL EXAM  --------------------------------------------------------------------------------  T(C): 37.1 (12-23-21 @ 09:45), Max: 37.2 (12-22-21 @ 20:00)  HR: 100 (12-23-21 @ 09:45) (74 - 113)  BP: 97/56 (12-23-21 @ 09:45) (93/56 - 99/60)  ABP: --  ABP(mean): --  RR: 18 (12-23-21 @ 09:45) (18 - 18)  SpO2: 99% (12-23-21 @ 09:45) (96% - 99%)  CVP(mm Hg): --  Height (cm): 154.9 (12-21-21 @ 21:09)    Physical Exam:              Gen: ill appearing woman  	HEENT: Facial / kenzie-orbital puffiness  	Pulm: No tachypnea  	CV: Deferred  	Abd: Soft, +BS , Distended  	Ext: Extensive pitting LE edema B/L  	Neuro: Awake  	Skin: Warm and dry    LABS/STUDIES  --------------------------------------------------------------------------------              10.4   6.85  >-----------<  435      [12-23-21 @ 07:26]              37.7     132  |  104  |  12  ----------------------------<  92      [12-23-21 @ 07:26]  4.0   |  23  |  0.74        Ca     7.7     [12-23-21 @ 07:26]      Mg     1.90     [12-23-21 @ 07:26]      Phos  3.4     [12-23-21 @ 07:26]    TPro  4.1  /  Alb  1.2  /  TBili  <0.2  /  DBili  x   /  AST  35  /  ALT  29  /  AlkPhos  77  [12-22-21 @ 00:57]              [12-22-21 @ 01:02]    Creatinine Trend:  SCr 0.74 [12-23 @ 07:26]  SCr 0.67 [12-22 @ 00:57]    Urinalysis - [12-22-21 @ 01:07]      Color Yellow / Appearance Clear / SG >1.050 / pH 6.5      Gluc Negative / Ketone Negative  / Bili Negative / Urobili 3 mg/dL       Blood Negative / Protein >600 mg/dL / Leuk Est Small / Nitrite Negative      RBC 8 / WBC 68 / Hyaline 10 / Gran  / Sq Epi  / Non Sq Epi 10 / Bacteria Few      Iron 23, TIBC 74, %sat 31      [12-22-21 @ 01:02]  Ferritin 90      [12-22-21 @ 01:02]  TSH 3.03      [12-22-21 @ 01:02]    HIV Nonreact      [12-23-21 @ 07:26]    C3 Complement 132      [12-23-21 @ 07:26]  C4 Complement 35      [12-23-21 @ 07:26]

## 2021-12-23 NOTE — PROGRESS NOTE ADULT - PROBLEM SELECTOR PLAN 1
Pt. admitted with anasarca in the Nephrotic syndrome. Lab and clinical criteria diagnostic of Nephrotic syndrome. Of note, pt. also diagnosed with COVID 19, however symptoms preceded infection. Pt. likely with underlying minimal change disease given sudden onset of symptoms, however given prior history of VTE, will need to rule out primary / secondary membranous nephropathy as well. Serum albumin low at 1.2. Pt. recently evaluated at OSH for nephrotic syndrome and planned to undergo kidney biopsy , however procedure was cancelled due to COVID 19. Pt. clinically hypervolemic.     Recommend starting IV Bumex 1mg BID for fluid overload management. Also recommend albumin 25% 50 cc vial BID to be given along with bumex. Recommend rule out for LE DVT, recommend renal duplex to rule out RVT as well. Will consider systemic anti-coagulation if there is any evidence of VTE but is hypercoagulble so agree with DVT ppx. Send serologic work for Nephrotic syndrome. URINE PROTEIN/CR RATIO Check SHELTON, HBsAg, Hep C antibody, HIV, Parvovirus, C3, C4, PLA2R Ab, serum and urine immunofixation. Ideally would schedule for kidney biopsy, however will wait until COVID 19 resolves since pt. will not be a candidate for immunosuppression right away. Monitor UOP. Daily weights.    Raman Figueroa MD   Division of Kidney Diseases and Hypertension  Office: [phone]  Cell: [phone]  Fellow Pager: [phone]

## 2021-12-24 VITALS
OXYGEN SATURATION: 100 % | TEMPERATURE: 99 F | SYSTOLIC BLOOD PRESSURE: 100 MMHG | RESPIRATION RATE: 18 BRPM | HEART RATE: 92 BPM | DIASTOLIC BLOOD PRESSURE: 59 MMHG

## 2021-12-24 LAB
ANA TITR SER: NEGATIVE — SIGNIFICANT CHANGE UP
ANION GAP SERPL CALC-SCNC: 4 MMOL/L — LOW (ref 7–14)
BUN SERPL-MCNC: 13 MG/DL — SIGNIFICANT CHANGE UP (ref 7–23)
CALCIUM SERPL-MCNC: 7.7 MG/DL — LOW (ref 8.4–10.5)
CHLORIDE SERPL-SCNC: 104 MMOL/L — SIGNIFICANT CHANGE UP (ref 98–107)
CK MB BLD-MCNC: <1.9 % — SIGNIFICANT CHANGE UP (ref 0–2.5)
CK MB CFR SERPL CALC: <1 NG/ML — SIGNIFICANT CHANGE UP
CK SERPL-CCNC: 54 U/L — SIGNIFICANT CHANGE UP (ref 25–170)
CO2 SERPL-SCNC: 26 MMOL/L — SIGNIFICANT CHANGE UP (ref 22–31)
CREAT SERPL-MCNC: 0.65 MG/DL — SIGNIFICANT CHANGE UP (ref 0.5–1.3)
GLUCOSE SERPL-MCNC: 98 MG/DL — SIGNIFICANT CHANGE UP (ref 70–99)
HCT VFR BLD CALC: 35.3 % — SIGNIFICANT CHANGE UP (ref 34.5–45)
HGB BLD-MCNC: 9.8 G/DL — LOW (ref 11.5–15.5)
MCHC RBC-ENTMCNC: 20.9 PG — LOW (ref 27–34)
MCHC RBC-ENTMCNC: 27.8 GM/DL — LOW (ref 32–36)
MCV RBC AUTO: 75.1 FL — LOW (ref 80–100)
NRBC # BLD: 0 /100 WBCS — SIGNIFICANT CHANGE UP
NRBC # FLD: 0 K/UL — SIGNIFICANT CHANGE UP
PLATELET # BLD AUTO: 477 K/UL — HIGH (ref 150–400)
POTASSIUM SERPL-MCNC: 3.8 MMOL/L — SIGNIFICANT CHANGE UP (ref 3.5–5.3)
POTASSIUM SERPL-SCNC: 3.8 MMOL/L — SIGNIFICANT CHANGE UP (ref 3.5–5.3)
RBC # BLD: 4.7 M/UL — SIGNIFICANT CHANGE UP (ref 3.8–5.2)
RBC # FLD: 25.9 % — HIGH (ref 10.3–14.5)
SODIUM SERPL-SCNC: 134 MMOL/L — LOW (ref 135–145)
TROPONIN T, HIGH SENSITIVITY RESULT: <6 NG/L — SIGNIFICANT CHANGE UP
WBC # BLD: 7.25 K/UL — SIGNIFICANT CHANGE UP (ref 3.8–10.5)
WBC # FLD AUTO: 7.25 K/UL — SIGNIFICANT CHANGE UP (ref 3.8–10.5)

## 2021-12-24 PROCEDURE — 93010 ELECTROCARDIOGRAM REPORT: CPT

## 2021-12-24 PROCEDURE — 99232 SBSQ HOSP IP/OBS MODERATE 35: CPT | Mod: GC

## 2021-12-24 RX ADMIN — Medication 650 MILLIGRAM(S): at 21:45

## 2021-12-24 RX ADMIN — Medication 30 MILLILITER(S): at 20:40

## 2021-12-24 RX ADMIN — Medication 325 MILLIGRAM(S): at 11:55

## 2021-12-24 RX ADMIN — BUMETANIDE 1 MILLIGRAM(S): 0.25 INJECTION INTRAMUSCULAR; INTRAVENOUS at 17:12

## 2021-12-24 RX ADMIN — Medication 30 MILLILITER(S): at 05:11

## 2021-12-24 RX ADMIN — Medication 650 MILLIGRAM(S): at 22:39

## 2021-12-24 RX ADMIN — CHLORHEXIDINE GLUCONATE 1 APPLICATION(S): 213 SOLUTION TOPICAL at 12:27

## 2021-12-24 NOTE — PROGRESS NOTE ADULT - PROBLEM SELECTOR PLAN 5
- Today's hgb/hct  9/32.7, baseline hgb ~8, hx of iron deficiency anemia  - denies any s/s bleeding  - maintain active type and screen  - daily ferrous sulfate  - bowel regimen
- Today's hgb/hct  9/32.7, baseline hgb ~8, hx of iron deficiency anemia  - denies any s/s bleeding  - maintain active type and screen  - daily ferrous sulfate  - bowel regimen

## 2021-12-24 NOTE — PROGRESS NOTE ADULT - SUBJECTIVE AND OBJECTIVE BOX
[facility] Division of Hospital Medicine  Berkley Bernal MD  Pager (M-F, 8A-5P): [phone]  Other Times:  [id number]    Patient is a 31y old  Female who presents with a chief complaint of generalized edema (23 Dec 2021 14:48)    SUBJECTIVE / OVERNIGHT EVENTS:      MEDICATIONS  (STANDING):  buMETAnide Injectable 1 milliGRAM(s) IV Push two times a day  chlorhexidine 2% Cloths 1 Application(s) Topical daily  enoxaparin Injectable 40 milliGRAM(s) SubCutaneous daily  ferrous    sulfate 325 milliGRAM(s) Oral daily  influenza   Vaccine 0.5 milliLiter(s) IntraMuscular once  senna 2 Tablet(s) Oral at bedtime    MEDICATIONS  (PRN):  acetaminophen     Tablet .. 650 milliGRAM(s) Oral every 6 hours PRN Temp greater or equal to 38C (100.4F), Mild Pain (1 - 3)  ALBUTerol    90 MICROgram(s) HFA Inhaler 2 Puff(s) Inhalation every 6 hours PRN Shortness of Breath and/or Wheezing  aluminum hydroxide/magnesium hydroxide/simethicone Suspension 30 milliLiter(s) Oral every 4 hours PRN Dyspepsia  benzocaine 15 mG/menthol 3.6 mG Lozenge 1 Lozenge Oral every 4 hours PRN Sore Throat  melatonin 3 milliGRAM(s) Oral at bedtime PRN Insomnia      CAPILLARY BLOOD GLUCOSE        I&O's Summary      PHYSICAL EXAM:  Vital Signs Last 24 Hrs  T(C): 36.9 (24 Dec 2021 05:00), Max: 37.1 (23 Dec 2021 09:45)  T(F): 98.4 (24 Dec 2021 05:00), Max: 98.8 (23 Dec 2021 09:45)  HR: 83 (24 Dec 2021 05:00) (75 - 109)  BP: 113/70 (24 Dec 2021 05:00) (97/56 - 119/78)  BP(mean): --  RR: 18 (24 Dec 2021 05:00) (18 - 18)  SpO2: 99% (24 Dec 2021 05:00) (97% - 100%)    CONSTITUTIONAL: NAD, well-developed, well-groomed  EYES: EOMI; conjunctiva and sclera clear  ENMT: Moist oral mucosa  RESPIRATORY: Normal respiratory effort; lungs are clear to auscultation bilaterally  CARDIOVASCULAR: Regular rate and rhythm, normal S1 and S2, no murmur; No lower extremity edema  ABDOMEN: Nontender to palpation, normoactive bowel sounds, no rebound/guarding  MUSCULOSKELETAL:   no clubbing or cyanosis of digits; no joint swelling or tenderness to palpation  PSYCH: A+O to person, place, and time; affect appropriate  NEUROLOGY: CN 2-12 are intact and symmetric; no gross sensory deficits   SKIN: No rashes; no palpable lesions    LABS:                        10.4   6.85  )-----------( 435      ( 23 Dec 2021 07:26 )             37.7     12-23    132<L>  |  104  |  12  ----------------------------<  92  4.0   |  23  |  0.74    Ca    7.7<L>      23 Dec 2021 07:26  Phos  3.4     12-23  Mg     1.90     12-23                Culture - Urine (collected 22 Dec 2021 00:24)  Source: Clean Catch Clean Catch (Midstream)  Final Report (23 Dec 2021 11:53):    <10,000 CFU/mL Normal Urogenital Yuli      RADIOLOGY & ADDITIONAL TESTS:  Results Reviewed:   Imaging Personally Reviewed:  Electrocardiogram Personally Reviewed:    COORDINATION OF CARE:  Care Discussed with Consultants/Other Providers [Y/N]: Y  Prior or Outpatient Records Reviewed [Y/N]: Y   [facility] Division of Hospital Medicine  Berkley Bernal MD  Pager (M-F, 8A-5P): [phone]  Other Times:  [id number]    Patient is a 31y old  Female who presents with a chief complaint of generalized edema (23 Dec 2021 14:48)    SUBJECTIVE / OVERNIGHT EVENTS:  Pt feels nauseous and thinks it is due to the albumin- feels her legs are less swollen but it has shifted to back and abdomen   Denies chest pain at this time    MEDICATIONS  (STANDING):  buMETAnide Injectable 1 milliGRAM(s) IV Push two times a day  chlorhexidine 2% Cloths 1 Application(s) Topical daily  enoxaparin Injectable 40 milliGRAM(s) SubCutaneous daily  ferrous    sulfate 325 milliGRAM(s) Oral daily  influenza   Vaccine 0.5 milliLiter(s) IntraMuscular once  senna 2 Tablet(s) Oral at bedtime    MEDICATIONS  (PRN):  acetaminophen     Tablet .. 650 milliGRAM(s) Oral every 6 hours PRN Temp greater or equal to 38C (100.4F), Mild Pain (1 - 3)  ALBUTerol    90 MICROgram(s) HFA Inhaler 2 Puff(s) Inhalation every 6 hours PRN Shortness of Breath and/or Wheezing  aluminum hydroxide/magnesium hydroxide/simethicone Suspension 30 milliLiter(s) Oral every 4 hours PRN Dyspepsia  benzocaine 15 mG/menthol 3.6 mG Lozenge 1 Lozenge Oral every 4 hours PRN Sore Throat  melatonin 3 milliGRAM(s) Oral at bedtime PRN Insomnia      CAPILLARY BLOOD GLUCOSE        I&O's Summary      PHYSICAL EXAM:  Vital Signs Last 24 Hrs  T(C): 36.9 (24 Dec 2021 05:00), Max: 37.1 (23 Dec 2021 09:45)  T(F): 98.4 (24 Dec 2021 05:00), Max: 98.8 (23 Dec 2021 09:45)  HR: 83 (24 Dec 2021 05:00) (75 - 109)  BP: 113/70 (24 Dec 2021 05:00) (97/56 - 119/78)  BP(mean): --  RR: 18 (24 Dec 2021 05:00) (18 - 18)  SpO2: 99% (24 Dec 2021 05:00) (97% - 100%)    CONSTITUTIONAL: NAD, well-developed, well-groomed  EYES: EOMI; conjunctiva and sclera clear  ENMT: Moist oral mucosa  RESPIRATORY: Normal respiratory effort; lungs are clear to auscultation bilaterally  CARDIOVASCULAR: Regular rate and rhythm, normal S1 and S2, no murmur; + lower extremity edema  ABDOMEN: Nontender to palpation, normoactive bowel sounds, no rebound/guarding  MUSCULOSKELETAL:   no clubbing or cyanosis of digits; no joint swelling or tenderness to palpation  PSYCH: A+O to person, place, and time; affect appropriate  NEUROLOGY: CN 2-12 are intact and symmetric; no gross sensory deficits   SKIN: No rashes; no palpable lesions    LABS:                        10.4   6.85  )-----------( 435      ( 23 Dec 2021 07:26 )             37.7     12-23    132<L>  |  104  |  12  ----------------------------<  92  4.0   |  23  |  0.74    Ca    7.7<L>      23 Dec 2021 07:26  Phos  3.4     12-23  Mg     1.90     12-23                Culture - Urine (collected 22 Dec 2021 00:24)  Source: Clean Catch Clean Catch (Midstream)  Final Report (23 Dec 2021 11:53):    <10,000 CFU/mL Normal Urogenital Yuli      RADIOLOGY & ADDITIONAL TESTS:  Results Reviewed:   Imaging Personally Reviewed:  Electrocardiogram Personally Reviewed:    COORDINATION OF CARE:  Care Discussed with Consultants/Other Providers [Y/N]: Y  Prior or Outpatient Records Reviewed [Y/N]: Y

## 2021-12-24 NOTE — PROGRESS NOTE ADULT - PROBLEM SELECTOR PROBLEM 1
Nephrotic syndrome
Proteinuria, unspecified type
Proteinuria, unspecified type

## 2021-12-24 NOTE — PROVIDER CONTACT NOTE (OTHER) - ASSESSMENT
Patient reports feeling like throat is closing. Patient's face is swollen. Normal respirations
Patient complains of chest tightness, possibly indigestion. Vital signs are stable. /70, P 83, O2 99% and Temp 98.4
pt resting, no acute distress noted.   /56  HR 91  SpO2 97  T 97.7
Patient vitals are BP 98/62, P 74, Temperature 99, and O2 96% on room air, buMETAnide withheld at this time. Patient also complains of abdominal pain and swelling and requests to see provider.

## 2021-12-24 NOTE — PROGRESS NOTE ADULT - PROBLEM SELECTOR PLAN 2
-02 saturation 98% on RA, unvaccinated, + exposure from home  -Tylenol prn for myalgia and fever  -incentive spirometer per routine  -trend inflammatory markers q72 hrs  - monitor oxygen saturations closely, maintain 02 sat >93%
-02 saturation 98% on RA, unvaccinated, + exposure from home  -Tylenol prn for myalgia and fever  -incentive spirometer per routine  -trend inflammatory markers q72 hrs  - monitor oxygen saturations closely, maintain 02 sat >93%

## 2021-12-24 NOTE — PROVIDER CONTACT NOTE (OTHER) - BACKGROUND
31 F PMH asthma, GERD. 31 F PMH asthma, GERD, presented with generalized weakness and new onset shortness of breathe

## 2021-12-24 NOTE — CHART NOTE - NSCHARTNOTEFT_GEN_A_CORE
ACP MEDICINE COVERAGE - Medicine Subsequent Hospital Care Note    CC: Chest tightness  HPI/Subjective: Contacted by RN regarding patient experiencing chest tightness. Patient states she likely attributes it to gas. Went to the bedside to evaluate the patient. She denies palpitations, SOB, headache, numbness, tingling or any other symptoms of concern. Patient admits that chest tightness has improved since Maalox was taking. "I feel the gas moving around." Of note patient states that she has felt achy during her hospital stay and that this is not a new symptom for her.       Vital Signs Last 24 Hrs  T(C): 36.7 (12-23-21 @ 21:38), Max: 37.1 (12-23-21 @ 09:45)  T(F): 98.1 (12-23-21 @ 21:38), Max: 98.8 (12-23-21 @ 09:45)  HR: 75 (12-23-21 @ 21:38) (75 - 109)  BP: 108/63 (12-23-21 @ 21:38) (97/56 - 119/78)  RR: 18 (12-23-21 @ 21:38) (18 - 18)  SpO2: 100% (12-23-21 @ 21:38) (96% - 100%) on (O2)    PHYSICAL EXAM:  Constitutional: NAD, well-developed, well-nourished  Neck: supple, no JVD  Respiratory: Clear to auscultation bilaterally. No wheezes, rales or rhonchi. Normal respiratory effort  Cardiovascular: regular rate and rhythm, S1 and S2, no murmurs, rubs or gallops, no edema, 2+ peripheral pulses  Gastrointestinal: soft, nontender, nondistended, +bowel sounds, no hernia  Neurologic: sensation grossly intact, CN grossly intact, non-focal exam  Extremities: equal calf symmetry, no calf tenderness   Psychiatric: A&Ox3, appropriate mood, affect        I reviewed the above labs, radiology, medications, tests, telemetry, and EKG interpretation.    ASSESSMENT/PLAN:  Patient complaining of chest pain most likely attributed to gas.   -EKG ordered- normal sinus rate and rhythm  -Cardiac enzymes ordered   -Maalox 30 ml administered; patient experienced relief   - Clinical findings, labs, tests, telemetry, and ekg reviewed with attending. Will monitor patient closely.   -Will convey overnight events with day team to ensure patient is monitored.  -Advised patient to notify RN if worsening of symptoms.  -Patient well versed and verbally agreed to plan.     Low complexity/risk (30min) [facility] MEDICINE COVERAGE - Medicine Subsequent Hospital Care Note    CC: Chest tightness  HPI/Subjective: Contacted by RN regarding patient experiencing chest tightness. Patient states she likely attributes it to gas. Went to the bedside to evaluate the patient. She denies palpitations, SOB, headache, numbness, tingling or any other symptoms of concern. Patient admits that chest tightness has improved since Maalox was taken. "I feel the gas moving around." Of note patient states that she has felt achy during her hospital stay and that this is not a new symptom for her.       Vital Signs Last 24 Hrs  T(C): 36.7 (12-23-21 @ 21:38), Max: 37.1 (12-23-21 @ 09:45)  T(F): 98.1 (12-23-21 @ 21:38), Max: 98.8 (12-23-21 @ 09:45)  HR: 75 (12-23-21 @ 21:38) (75 - 109)  BP: 108/63 (12-23-21 @ 21:38) (97/56 - 119/78)  RR: 18 (12-23-21 @ 21:38) (18 - 18)  SpO2: 100% (12-23-21 @ 21:38) (96% - 100%) on (O2)    PHYSICAL EXAM:  Constitutional: NAD, well-developed, well-nourished  Neck: supple, no JVD  Respiratory: Clear to auscultation bilaterally. No wheezes, rales or rhonchi. Normal respiratory effort  Cardiovascular: regular rate and rhythm, S1 and S2, no murmurs, rubs or gallops, no edema, 2+ peripheral pulses  Gastrointestinal: soft, nontender, nondistended, +bowel sounds, no hernia  Neurologic: sensation grossly intact, CN grossly intact, non-focal exam  Extremities: equal calf symmetry, no calf tenderness   Psychiatric: A&Ox3, appropriate mood, affect        I reviewed the above labs, radiology, medications, tests, telemetry, and EKG interpretation.    ASSESSMENT/PLAN:  Patient complaining of chest pain most likely attributed to gas.   -EKG ordered- normal sinus rate and rhythm  -Cardiac enzymes ordered   -Maalox 30 ml administered; patient experienced relief   - Clinical findings, labs, tests, telemetry, and ekg reviewed with attending. Will monitor patient closely.   -Will convey overnight events with day team to ensure patient is monitored.  -Advised patient to notify RN if worsening of symptoms.  -Patient well versed and verbally agreed to plan.   -Overnight events were discussed with day team and will be followed up on     Low complexity/risk (30min)

## 2021-12-24 NOTE — PROGRESS NOTE ADULT - SUBJECTIVE AND OBJECTIVE BOX
[facility] DIVISION OF KIDNEY DISEASES AND HYPERTENSION -- FOLLOW UP NOTE  --------------------------------------------------------------------------------  HPI: 31 year old female with asthma, prior history of DVT admitted to [facility] with worsening generalized swelling / LE edema. Pt. recently hospitalized at [facility] with similar complaints and planned o undergo kidney biopsy for Nephrotic syndrome however procedure was cancelled due positive COVID 19 diagnosis. Admission labs relevant for hypoalbuminemia with serum albumin of 1.2. UA positive for significant proteinuria. Reports symptoms developed suddenly while at a party.  Denies NSAIDs use, drugs, other medical problems.    Pt. seen and examined at bedside. Pt. c/o nausea and lethargy. Denies SOB, CP, HA, abdominal pain or dizziness. States the LE swelling improved (better then yesterday).    PAST HISTORY  --------------------------------------------------------------------------------  No significant changes to PMH, PSH, FHx, SHx, unless otherwise noted    ALLERGIES & MEDICATIONS  --------------------------------------------------------------------------------  Allergies    codeine (Hives)    Intolerances    Standing Inpatient Medications  buMETAnide Injectable 1 milliGRAM(s) IV Push two times a day  chlorhexidine 2% Cloths 1 Application(s) Topical daily  enoxaparin Injectable 40 milliGRAM(s) SubCutaneous daily  ferrous    sulfate 325 milliGRAM(s) Oral daily  influenza   Vaccine 0.5 milliLiter(s) IntraMuscular once  senna 2 Tablet(s) Oral at bedtime    PRN Inpatient Medications  acetaminophen     Tablet .. 650 milliGRAM(s) Oral every 6 hours PRN  ALBUTerol    90 MICROgram(s) HFA Inhaler 2 Puff(s) Inhalation every 6 hours PRN  aluminum hydroxide/magnesium hydroxide/simethicone Suspension 30 milliLiter(s) Oral every 4 hours PRN  benzocaine 15 mG/menthol 3.6 mG Lozenge 1 Lozenge Oral every 4 hours PRN  melatonin 3 milliGRAM(s) Oral at bedtime PRN    REVIEW OF SYSTEMS  --------------------------------------------------------------------------------  Gen: + weight gain  Respiratory: No dyspnea  CV: No chest pain  GI: + abdominal distention  MSK: + LE edema    All other systems were reviewed and are negative, except as noted.    VITALS/PHYSICAL EXAM  --------------------------------------------------------------------------------  T(C): 36.6 (12-24-21 @ 12:25), Max: 36.9 (12-24-21 @ 05:00)  HR: 79 (12-24-21 @ 12:25) (75 - 91)  BP: 101/71 (12-24-21 @ 12:25) (100/56 - 119/78)  RR: 18 (12-24-21 @ 12:25) (18 - 18)  SpO2: 97% (12-24-21 @ 12:25) (97% - 100%)  Wt(kg): --    Weight (kg): 57.4 (12-23-21 @ 17:27)    Physical Exam:  	Gen: Well appearing female  	HEENT: Facial / kenzie-orbital puffiness  	Pulm: No tachypnea  	CV: Deferred  	Abd: Soft, +BS , Distended  	Ext: Extensive pitting LE edema B/L  	Neuro: Awake  	Skin: Warm and dry    LABS/STUDIES  --------------------------------------------------------------------------------              9.8    7.25  >-----------<  477      [12-24-21 @ 11:05]              35.3     134  |  104  |  13  ----------------------------<  98      [12-24-21 @ 11:05]  3.8   |  26  |  0.65        Ca     7.7     [12-24-21 @ 11:05]      Mg     1.90     [12-23-21 @ 07:26]      Phos  3.4     [12-23-21 @ 07:26]    CK 54      [12-24-21 @ 11:05]    Creatinine Trend:  SCr 0.65 [12-24 @ 11:05]  SCr 0.74 [12-23 @ 07:26]  SCr 0.67 [12-22 @ 00:57]    Urinalysis - [12-22-21 @ 01:07]      Color Yellow / Appearance Clear / SG >1.050 / pH 6.5      Gluc Negative / Ketone Negative  / Bili Negative / Urobili 3 mg/dL       Blood Negative / Protein >600 mg/dL / Leuk Est Small / Nitrite Negative      RBC 8 / WBC 68 / Hyaline 10 / Gran  / Sq Epi  / Non Sq Epi 10 / Bacteria Few      Iron 23, TIBC 74, %sat 31      [12-22-21 @ 01:02]  Ferritin 90      [12-22-21 @ 01:02]  TSH 3.03      [12-22-21 @ 01:02]    HBsAb Nonreact      [12-23-21 @ 09:45]  HCV 0.10, Nonreact      [12-23-21 @ 09:45]  HIV Nonreact      [12-23-21 @ 07:26]    C3 Complement 132      [12-23-21 @ 07:26]  C4 Complement 35      [12-23-21 @ 07:26] [facility] DIVISION OF KIDNEY DISEASES AND HYPERTENSION -- FOLLOW UP NOTE  --------------------------------------------------------------------------------  HPI: 31 year old female with asthma, prior history of DVT admitted to [facility] with worsening generalized swelling / LE edema. Pt. recently hospitalized at [facility] with similar complaints and planned to undergo kidney biopsy for Nephrotic syndrome however procedure was cancelled due positive COVID 19 diagnosis. Admission labs relevant for hypoalbuminemia with serum albumin of 1.2. UA positive for significant proteinuria. Reports symptoms developed suddenly while at a party.  Denies NSAIDs use, drugs, other medical problems.    Pt. seen and examined at bedside. Pt. c/o nausea and lethargy. Denies SOB, CP, HA, abdominal pain or dizziness. States the LE swelling improved (better then yesterday).    PAST HISTORY  --------------------------------------------------------------------------------  No significant changes to PMH, PSH, FHx, SHx, unless otherwise noted    ALLERGIES & MEDICATIONS  --------------------------------------------------------------------------------  Allergies    codeine (Hives)    Intolerances    Standing Inpatient Medications  buMETAnide Injectable 1 milliGRAM(s) IV Push two times a day  chlorhexidine 2% Cloths 1 Application(s) Topical daily  enoxaparin Injectable 40 milliGRAM(s) SubCutaneous daily  ferrous    sulfate 325 milliGRAM(s) Oral daily  influenza   Vaccine 0.5 milliLiter(s) IntraMuscular once  senna 2 Tablet(s) Oral at bedtime    PRN Inpatient Medications  acetaminophen     Tablet .. 650 milliGRAM(s) Oral every 6 hours PRN  ALBUTerol    90 MICROgram(s) HFA Inhaler 2 Puff(s) Inhalation every 6 hours PRN  aluminum hydroxide/magnesium hydroxide/simethicone Suspension 30 milliLiter(s) Oral every 4 hours PRN  benzocaine 15 mG/menthol 3.6 mG Lozenge 1 Lozenge Oral every 4 hours PRN  melatonin 3 milliGRAM(s) Oral at bedtime PRN    REVIEW OF SYSTEMS  --------------------------------------------------------------------------------  Gen: + weight gain  Respiratory: No dyspnea  CV: No chest pain  GI: + abdominal distention  MSK: + LE edema    All other systems were reviewed and are negative, except as noted.    VITALS/PHYSICAL EXAM  --------------------------------------------------------------------------------  T(C): 36.6 (12-24-21 @ 12:25), Max: 36.9 (12-24-21 @ 05:00)  HR: 79 (12-24-21 @ 12:25) (75 - 91)  BP: 101/71 (12-24-21 @ 12:25) (100/56 - 119/78)  RR: 18 (12-24-21 @ 12:25) (18 - 18)  SpO2: 97% (12-24-21 @ 12:25) (97% - 100%)  Wt(kg): --    Weight (kg): 57.4 (12-23-21 @ 17:27)    Physical Exam:  	Gen: Well appearing female  	HEENT: Facial / kenzie-orbital puffiness  	Pulm: No tachypnea  	CV: Deferred  	Abd: Soft, +BS , Distended  	Ext: Extensive pitting LE edema B/L  	Neuro: Awake  	Skin: Warm and dry    LABS/STUDIES  --------------------------------------------------------------------------------              9.8    7.25  >-----------<  477      [12-24-21 @ 11:05]              35.3     134  |  104  |  13  ----------------------------<  98      [12-24-21 @ 11:05]  3.8   |  26  |  0.65        Ca     7.7     [12-24-21 @ 11:05]      Mg     1.90     [12-23-21 @ 07:26]      Phos  3.4     [12-23-21 @ 07:26]    CK 54      [12-24-21 @ 11:05]    Creatinine Trend:  SCr 0.65 [12-24 @ 11:05]  SCr 0.74 [12-23 @ 07:26]  SCr 0.67 [12-22 @ 00:57]    Urinalysis - [12-22-21 @ 01:07]      Color Yellow / Appearance Clear / SG >1.050 / pH 6.5      Gluc Negative / Ketone Negative  / Bili Negative / Urobili 3 mg/dL       Blood Negative / Protein >600 mg/dL / Leuk Est Small / Nitrite Negative      RBC 8 / WBC 68 / Hyaline 10 / Gran  / Sq Epi  / Non Sq Epi 10 / Bacteria Few      Iron 23, TIBC 74, %sat 31      [12-22-21 @ 01:02]  Ferritin 90      [12-22-21 @ 01:02]  TSH 3.03      [12-22-21 @ 01:02]    HBsAb Nonreact      [12-23-21 @ 09:45]  HCV 0.10, Nonreact      [12-23-21 @ 09:45]  HIV Nonreact      [12-23-21 @ 07:26]    C3 Complement 132      [12-23-21 @ 07:26]  C4 Complement 35      [12-23-21 @ 07:26]

## 2021-12-24 NOTE — PROGRESS NOTE ADULT - ATTENDING COMMENTS
Patient with nephrotic syndrome and fluid retention.  Diuretics and adjustment of same per response.  Serologic evaluation in progress with eventual consideration of renal biopsy.  Needs daily weights.  Will follow with you.

## 2021-12-24 NOTE — PROGRESS NOTE ADULT - PROBLEM SELECTOR PLAN 1
Possible nephrotic syndrome, recent hospitalized from [facility] x 2 weeks, kidney biopsy cancelled due to COVID PCR detected  - > 600 protein detected in urine w/ low albumin (1.2)  -House renal consulted, recommending US kidney w/ duplex to r/o trombosis  - Check urine protein/ creatinine ratio  - start Lasix and albumin given generalized edema and hypotension  -urine culture pending  -b/l leg swelling, VA duplex of BL LE ordered to r/o DVT Possible nephrotic syndrome, recent hospitalized from [facility] x 2 weeks, kidney biopsy cancelled due to COVID PCR detected  - > 600 protein detected in urine w/ low albumin (1.2)  -House renal recs reviewed   - US kidney w/ duplex neg for thrombosis   - Check urine protein/ creatinine ratio  - Lasix and albumin given generalized edema and hypotension  -urine culture pending  -b/l leg swelling, VA duplex of BL LE ordered to r/o DVT

## 2021-12-24 NOTE — PROGRESS NOTE ADULT - ASSESSMENT
31 year old female w/ PMHx of  asthma, GERD, chronic iron deficiency, anemia, thrombophlebitis of R gonadal vein, DVT (was previously on Xarelto), UTI, recent admission to [facility]  (12/4-12/7 for proteinuria with plan for kidney biopsy,  but cancelled due to COVID-19 PCR detected yesterday 12/21, she now presents to [facility] with worsening generalized edema and proteinuria. Admit to medicine.
Pt. admitted with Nephrotic syndrome and anasarca
 31 year old female w/ PMHx of  asthma, GERD, chronic iron deficiency, anemia, thrombophlebitis of R gonadal vein, DVT (was previously on Xarelto), UTI, recent admission to [facility]  (12/4-12/7 for proteinuria with plan for kidney biopsy,  but cancelled due to COVID-19 PCR detected yesterday 12/21, she now presents to [facility] with worsening generalized edema and proteinuria. Admit to medicine.

## 2021-12-24 NOTE — PROGRESS NOTE ADULT - PROBLEM SELECTOR PLAN 1
Pt. admitted with anasarca in the Nephrotic syndrome. Lab and clinical criteria diagnostic of Nephrotic syndrome. Of note, pt. also diagnosed with COVID 19, however symptoms preceded infection. Pt. likely with underlying minimal change disease given sudden onset of symptoms, however given prior history of VTE, will need to rule out primary / secondary membranous nephropathy as well. Serum albumin low at 1.2 on admission. Pt. recently evaluated at OSH for nephrotic syndrome and planned to undergo kidney biopsy, however procedure was cancelled due to COVID 19. Pt. clinically hypervolemic. Started on IV Bumex 1mg BID for fluid overload management on 12/22/21. Diuretic response might be sub-optimal given hypoalbuminemia, will reassess and titrate as needed. Renal duplex ruled out RVT. Will consider systemic anti-coagulation if there is any evidence of VTE. Follow up serologic work for Nephrotic syndrome. Check SHE[place], HBsAg(NR), Hep C antibody(NR), HIV (Neg), Parvovirus, C3 (WNL), C4 (WNl), PLA2R Ab, serum and urine immunofixation. Ideally would schedule for kidney biopsy, however will wait until COVID 19 resolves since pt. will not be a candidate for immunosuppression right away. Monitor UOP. Daily weights.    If you have any questions, please feel free to contact me  Solitario Woodson  Nephrology Fellow  [phone]  (After 5pm or on weekends please page the on-call fellow).

## 2021-12-24 NOTE — PROGRESS NOTE ADULT - PROBLEM SELECTOR PLAN 3
- no wheezing noted, on RA  - c/w home medication: albuterol PRN, per patient, she hasn't use it in the last 6 months
- no wheezing noted, on RA  - c/w home medication: albuterol PRN, per patient, she hasn't use it in the last 6 months

## 2021-12-24 NOTE — PROVIDER CONTACT NOTE (OTHER) - RECOMMENDATIONS
notify provider. will try again later.
Provider made aware
Provider was aware. EKG ordered, and stat labs
Provider made aware

## 2021-12-25 ENCOUNTER — INPATIENT (INPATIENT)
Facility: HOSPITAL | Age: 31
LOS: 3 days | Discharge: HOME | DRG: 698 | End: 2021-12-29
Attending: STUDENT IN AN ORGANIZED HEALTH CARE EDUCATION/TRAINING PROGRAM | Admitting: STUDENT IN AN ORGANIZED HEALTH CARE EDUCATION/TRAINING PROGRAM
Payer: MEDICAID

## 2021-12-25 ENCOUNTER — EMERGENCY (EMERGENCY)
Facility: HOSPITAL | Age: 31
LOS: 1 days | Discharge: HOME | End: 2021-12-25
Attending: EMERGENCY MEDICINE
Payer: COMMERCIAL

## 2021-12-25 VITALS
SYSTOLIC BLOOD PRESSURE: 154 MMHG | HEART RATE: 78 BPM | WEIGHT: 149.91 LBS | TEMPERATURE: 98 F | HEIGHT: 63 IN | OXYGEN SATURATION: 100 % | DIASTOLIC BLOOD PRESSURE: 88 MMHG | RESPIRATION RATE: 18 BRPM

## 2021-12-25 VITALS
DIASTOLIC BLOOD PRESSURE: 82 MMHG | RESPIRATION RATE: 17 BRPM | OXYGEN SATURATION: 100 % | SYSTOLIC BLOOD PRESSURE: 133 MMHG | TEMPERATURE: 98 F | HEART RATE: 80 BPM

## 2021-12-25 VITALS
TEMPERATURE: 98 F | RESPIRATION RATE: 18 BRPM | SYSTOLIC BLOOD PRESSURE: 99 MMHG | HEART RATE: 109 BPM | HEIGHT: 61 IN | DIASTOLIC BLOOD PRESSURE: 66 MMHG | OXYGEN SATURATION: 100 %

## 2021-12-25 DIAGNOSIS — Z98.890 OTHER SPECIFIED POSTPROCEDURAL STATES: Chronic | ICD-10-CM

## 2021-12-25 DIAGNOSIS — Z98.89 OTHER SPECIFIED POSTPROCEDURAL STATES: Chronic | ICD-10-CM

## 2021-12-25 DIAGNOSIS — R60.9 EDEMA, UNSPECIFIED: ICD-10-CM

## 2021-12-25 DIAGNOSIS — Z90.89 ACQUIRED ABSENCE OF OTHER ORGANS: Chronic | ICD-10-CM

## 2021-12-25 LAB
ALBUMIN SERPL ELPH-MCNC: 1.2 G/DL — LOW (ref 3.3–5)
ALBUMIN SERPL ELPH-MCNC: 4.5 G/DL — SIGNIFICANT CHANGE UP (ref 3.3–5)
ALP SERPL-CCNC: 59 U/L — SIGNIFICANT CHANGE UP (ref 40–120)
ALP SERPL-CCNC: 99 U/L — SIGNIFICANT CHANGE UP (ref 40–120)
ALT FLD-CCNC: 12 U/L — SIGNIFICANT CHANGE UP (ref 10–45)
ALT FLD-CCNC: 18 U/L — SIGNIFICANT CHANGE UP (ref 10–45)
ANION GAP SERPL CALC-SCNC: 14 MMOL/L — SIGNIFICANT CHANGE UP (ref 5–17)
ANION GAP SERPL CALC-SCNC: 6 MMOL/L — SIGNIFICANT CHANGE UP (ref 5–17)
ANISOCYTOSIS BLD QL: SIGNIFICANT CHANGE UP
APPEARANCE UR: CLEAR — SIGNIFICANT CHANGE UP
APTT BLD: 30.1 SEC — SIGNIFICANT CHANGE UP (ref 27.5–35.5)
AST SERPL-CCNC: 18 U/L — SIGNIFICANT CHANGE UP (ref 10–40)
AST SERPL-CCNC: 19 U/L — SIGNIFICANT CHANGE UP (ref 10–40)
BACTERIA # UR AUTO: ABNORMAL
BASOPHILS # BLD AUTO: 0.05 K/UL — SIGNIFICANT CHANGE UP (ref 0–0.2)
BASOPHILS # BLD AUTO: 0.09 K/UL — SIGNIFICANT CHANGE UP (ref 0–0.2)
BASOPHILS NFR BLD AUTO: 0.4 % — SIGNIFICANT CHANGE UP (ref 0–2)
BASOPHILS NFR BLD AUTO: 0.9 % — SIGNIFICANT CHANGE UP (ref 0–2)
BILIRUB SERPL-MCNC: 0.7 MG/DL — SIGNIFICANT CHANGE UP (ref 0.2–1.2)
BILIRUB SERPL-MCNC: <0.1 MG/DL — LOW (ref 0.2–1.2)
BILIRUB UR-MCNC: NEGATIVE — SIGNIFICANT CHANGE UP
BUN SERPL-MCNC: 12 MG/DL — SIGNIFICANT CHANGE UP (ref 7–23)
BUN SERPL-MCNC: 6 MG/DL — LOW (ref 7–23)
BURR CELLS BLD QL SMEAR: PRESENT — SIGNIFICANT CHANGE UP
CALCIUM SERPL-MCNC: 7.5 MG/DL — LOW (ref 8.4–10.5)
CALCIUM SERPL-MCNC: 9.4 MG/DL — SIGNIFICANT CHANGE UP (ref 8.4–10.5)
CHLORIDE SERPL-SCNC: 101 MMOL/L — SIGNIFICANT CHANGE UP (ref 96–108)
CHLORIDE SERPL-SCNC: 104 MMOL/L — SIGNIFICANT CHANGE UP (ref 96–108)
CO2 SERPL-SCNC: 22 MMOL/L — SIGNIFICANT CHANGE UP (ref 22–31)
CO2 SERPL-SCNC: 26 MMOL/L — SIGNIFICANT CHANGE UP (ref 22–31)
COLOR SPEC: YELLOW — SIGNIFICANT CHANGE UP
COMMENT - URINE: SIGNIFICANT CHANGE UP
CREAT SERPL-MCNC: 0.67 MG/DL — SIGNIFICANT CHANGE UP (ref 0.5–1.3)
CREAT SERPL-MCNC: 0.69 MG/DL — SIGNIFICANT CHANGE UP (ref 0.5–1.3)
DIFF PNL FLD: ABNORMAL
EOSINOPHIL # BLD AUTO: 0 K/UL — SIGNIFICANT CHANGE UP (ref 0–0.5)
EOSINOPHIL # BLD AUTO: 0.03 K/UL — SIGNIFICANT CHANGE UP (ref 0–0.5)
EOSINOPHIL NFR BLD AUTO: 0 % — SIGNIFICANT CHANGE UP (ref 0–6)
EOSINOPHIL NFR BLD AUTO: 0.2 % — SIGNIFICANT CHANGE UP (ref 0–6)
EPI CELLS # UR: 2 — SIGNIFICANT CHANGE UP
GLUCOSE SERPL-MCNC: 84 MG/DL — SIGNIFICANT CHANGE UP (ref 70–99)
GLUCOSE SERPL-MCNC: 86 MG/DL — SIGNIFICANT CHANGE UP (ref 70–99)
GLUCOSE UR QL: NEGATIVE — SIGNIFICANT CHANGE UP
HCT VFR BLD CALC: 37.9 % — SIGNIFICANT CHANGE UP (ref 34.5–45)
HCT VFR BLD CALC: 43.6 % — SIGNIFICANT CHANGE UP (ref 34.5–45)
HGB BLD-MCNC: 10.7 G/DL — LOW (ref 11.5–15.5)
HGB BLD-MCNC: 14.6 G/DL — SIGNIFICANT CHANGE UP (ref 11.5–15.5)
IMM GRANULOCYTES NFR BLD AUTO: 0.2 % — SIGNIFICANT CHANGE UP (ref 0–1.5)
INR BLD: 1.03 RATIO — SIGNIFICANT CHANGE UP (ref 0.88–1.16)
KETONES UR-MCNC: NEGATIVE — SIGNIFICANT CHANGE UP
LACTATE SERPL-SCNC: 0.8 MMOL/L — SIGNIFICANT CHANGE UP (ref 0.7–2)
LEUKOCYTE ESTERASE UR-ACNC: NEGATIVE — SIGNIFICANT CHANGE UP
LIDOCAIN IGE QN: 14 U/L — SIGNIFICANT CHANGE UP (ref 7–60)
LYMPHOCYTES # BLD AUTO: 1.55 K/UL — SIGNIFICANT CHANGE UP (ref 1–3.3)
LYMPHOCYTES # BLD AUTO: 16.4 % — SIGNIFICANT CHANGE UP (ref 13–44)
LYMPHOCYTES # BLD AUTO: 29 % — SIGNIFICANT CHANGE UP (ref 13–44)
LYMPHOCYTES # BLD AUTO: 3.5 K/UL — HIGH (ref 1–3.3)
MAGNESIUM SERPL-MCNC: 2 MG/DL — SIGNIFICANT CHANGE UP (ref 1.6–2.6)
MANUAL SMEAR VERIFICATION: SIGNIFICANT CHANGE UP
MCHC RBC-ENTMCNC: 21.2 PG — LOW (ref 27–34)
MCHC RBC-ENTMCNC: 28.2 GM/DL — LOW (ref 32–36)
MCHC RBC-ENTMCNC: 31.9 PG — SIGNIFICANT CHANGE UP (ref 27–34)
MCHC RBC-ENTMCNC: 33.5 GM/DL — SIGNIFICANT CHANGE UP (ref 32–36)
MCV RBC AUTO: 75 FL — LOW (ref 80–100)
MCV RBC AUTO: 95.2 FL — SIGNIFICANT CHANGE UP (ref 80–100)
MICROCYTES BLD QL: SLIGHT — SIGNIFICANT CHANGE UP
MONOCYTES # BLD AUTO: 0.77 K/UL — SIGNIFICANT CHANGE UP (ref 0–0.9)
MONOCYTES # BLD AUTO: 0.77 K/UL — SIGNIFICANT CHANGE UP (ref 0–0.9)
MONOCYTES NFR BLD AUTO: 6.4 % — SIGNIFICANT CHANGE UP (ref 2–14)
MONOCYTES NFR BLD AUTO: 8.2 % — SIGNIFICANT CHANGE UP (ref 2–14)
NEUTROPHILS # BLD AUTO: 7.04 K/UL — SIGNIFICANT CHANGE UP (ref 1.8–7.4)
NEUTROPHILS # BLD AUTO: 7.68 K/UL — HIGH (ref 1.8–7.4)
NEUTROPHILS NFR BLD AUTO: 63.8 % — SIGNIFICANT CHANGE UP (ref 43–77)
NEUTROPHILS NFR BLD AUTO: 74.5 % — SIGNIFICANT CHANGE UP (ref 43–77)
NITRITE UR-MCNC: NEGATIVE — SIGNIFICANT CHANGE UP
NRBC # BLD: 0 /100 WBCS — SIGNIFICANT CHANGE UP (ref 0–0)
OVALOCYTES BLD QL SMEAR: SLIGHT — SIGNIFICANT CHANGE UP
PH UR: 6.5 — SIGNIFICANT CHANGE UP (ref 5–8)
PHOSPHATE SERPL-MCNC: 2.3 MG/DL — LOW (ref 2.5–4.5)
PLAT MORPH BLD: NORMAL — SIGNIFICANT CHANGE UP
PLATELET # BLD AUTO: 319 K/UL — SIGNIFICANT CHANGE UP (ref 150–400)
PLATELET # BLD AUTO: 542 K/UL — HIGH (ref 150–400)
POIKILOCYTOSIS BLD QL AUTO: SLIGHT — SIGNIFICANT CHANGE UP
POTASSIUM SERPL-MCNC: 3.6 MMOL/L — SIGNIFICANT CHANGE UP (ref 3.5–5.3)
POTASSIUM SERPL-MCNC: 3.6 MMOL/L — SIGNIFICANT CHANGE UP (ref 3.5–5.3)
POTASSIUM SERPL-SCNC: 3.6 MMOL/L — SIGNIFICANT CHANGE UP (ref 3.5–5.3)
POTASSIUM SERPL-SCNC: 3.6 MMOL/L — SIGNIFICANT CHANGE UP (ref 3.5–5.3)
PROT SERPL-MCNC: 4.4 G/DL — LOW (ref 6–8.3)
PROT SERPL-MCNC: 7.5 G/DL — SIGNIFICANT CHANGE UP (ref 6–8.3)
PROT UR-MCNC: ABNORMAL
PROTHROM AB SERPL-ACNC: 12.3 SEC — SIGNIFICANT CHANGE UP (ref 10.6–13.6)
RAPID RVP RESULT: SIGNIFICANT CHANGE UP
RBC # BLD: 4.58 M/UL — SIGNIFICANT CHANGE UP (ref 3.8–5.2)
RBC # BLD: 5.05 M/UL — SIGNIFICANT CHANGE UP (ref 3.8–5.2)
RBC # FLD: 13.2 % — SIGNIFICANT CHANGE UP (ref 10.3–14.5)
RBC # FLD: 25.9 % — HIGH (ref 10.3–14.5)
RBC BLD AUTO: ABNORMAL
RBC CASTS # UR COMP ASSIST: 15 /HPF — HIGH (ref 0–4)
SARS-COV-2 RNA SPEC QL NAA+PROBE: SIGNIFICANT CHANGE UP
SODIUM SERPL-SCNC: 133 MMOL/L — LOW (ref 135–145)
SODIUM SERPL-SCNC: 140 MMOL/L — SIGNIFICANT CHANGE UP (ref 135–145)
SP GR SPEC: >1.05 — HIGH (ref 1.01–1.02)
UROBILINOGEN FLD QL: NEGATIVE — SIGNIFICANT CHANGE UP
WBC # BLD: 12.06 K/UL — HIGH (ref 3.8–10.5)
WBC # BLD: 9.45 K/UL — SIGNIFICANT CHANGE UP (ref 3.8–10.5)
WBC # FLD AUTO: 12.06 K/UL — HIGH (ref 3.8–10.5)
WBC # FLD AUTO: 9.45 K/UL — SIGNIFICANT CHANGE UP (ref 3.8–10.5)
WBC UR QL: 3 /HPF — SIGNIFICANT CHANGE UP (ref 0–5)

## 2021-12-25 PROCEDURE — 83605 ASSAY OF LACTIC ACID: CPT

## 2021-12-25 PROCEDURE — 96374 THER/PROPH/DIAG INJ IV PUSH: CPT

## 2021-12-25 PROCEDURE — 83690 ASSAY OF LIPASE: CPT

## 2021-12-25 PROCEDURE — 99284 EMERGENCY DEPT VISIT MOD MDM: CPT | Mod: 25

## 2021-12-25 PROCEDURE — 83735 ASSAY OF MAGNESIUM: CPT

## 2021-12-25 PROCEDURE — 99285 EMERGENCY DEPT VISIT HI MDM: CPT

## 2021-12-25 PROCEDURE — 93010 ELECTROCARDIOGRAM REPORT: CPT

## 2021-12-25 PROCEDURE — 96361 HYDRATE IV INFUSION ADD-ON: CPT

## 2021-12-25 PROCEDURE — 80053 COMPREHEN METABOLIC PANEL: CPT

## 2021-12-25 PROCEDURE — 96375 TX/PRO/DX INJ NEW DRUG ADDON: CPT

## 2021-12-25 PROCEDURE — 85025 COMPLETE CBC W/AUTO DIFF WBC: CPT

## 2021-12-25 PROCEDURE — 0225U NFCT DS DNA&RNA 21 SARSCOV2: CPT

## 2021-12-25 PROCEDURE — 84100 ASSAY OF PHOSPHORUS: CPT

## 2021-12-25 RX ORDER — VANCOMYCIN HCL 1 G
1 VIAL (EA) INTRAVENOUS
Qty: 56 | Refills: 0
Start: 2021-12-25 | End: 2022-01-07

## 2021-12-25 RX ORDER — VANCOMYCIN HCL 1 G
125 VIAL (EA) INTRAVENOUS ONCE
Refills: 0 | Status: COMPLETED | OUTPATIENT
Start: 2021-12-25 | End: 2021-12-25

## 2021-12-25 RX ORDER — SODIUM CHLORIDE 9 MG/ML
1000 INJECTION INTRAMUSCULAR; INTRAVENOUS; SUBCUTANEOUS ONCE
Refills: 0 | Status: COMPLETED | OUTPATIENT
Start: 2021-12-25 | End: 2021-12-25

## 2021-12-25 RX ORDER — MORPHINE SULFATE 50 MG/1
4 CAPSULE, EXTENDED RELEASE ORAL ONCE
Refills: 0 | Status: DISCONTINUED | OUTPATIENT
Start: 2021-12-25 | End: 2021-12-25

## 2021-12-25 RX ORDER — ONDANSETRON 8 MG/1
4 TABLET, FILM COATED ORAL ONCE
Refills: 0 | Status: COMPLETED | OUTPATIENT
Start: 2021-12-25 | End: 2021-12-25

## 2021-12-25 RX ADMIN — MORPHINE SULFATE 4 MILLIGRAM(S): 50 CAPSULE, EXTENDED RELEASE ORAL at 19:37

## 2021-12-25 RX ADMIN — SODIUM CHLORIDE 1000 MILLILITER(S): 9 INJECTION INTRAMUSCULAR; INTRAVENOUS; SUBCUTANEOUS at 19:37

## 2021-12-25 RX ADMIN — ONDANSETRON 4 MILLIGRAM(S): 8 TABLET, FILM COATED ORAL at 19:37

## 2021-12-25 RX ADMIN — Medication 125 MILLIGRAM(S): at 23:53

## 2021-12-25 RX ADMIN — MORPHINE SULFATE 4 MILLIGRAM(S): 50 CAPSULE, EXTENDED RELEASE ORAL at 23:31

## 2021-12-25 RX ADMIN — SODIUM CHLORIDE 1000 MILLILITER(S): 9 INJECTION INTRAMUSCULAR; INTRAVENOUS; SUBCUTANEOUS at 23:32

## 2021-12-25 NOTE — ED PROVIDER NOTE - CHIEF COMPLAINT
The patient is a 31y Female complaining of  The patient is a 31y Female complaining of abdominal distension.

## 2021-12-25 NOTE — ED PROVIDER NOTE - CLINICAL SUMMARY MEDICAL DECISION MAKING FREE TEXT BOX
Dr. Ludwig Note: recurrent, persistent diarrhea with nausea and vomiting now 3d and inability to take PO in setting of known persistent c.dif but now with +exposure to covid, unvaccinated -- consider persistent c.dif vs other GI pathology including viral/covid -- in setting of likely dehydration and vomiting -- metabolic check, fluids, antiemetics, determine inpt vs outpt Dr. Ludwig Note: recurrent, persistent diarrhea with nausea and vomiting now 3d and inability to take PO in setting of known persistent c.dif but now with +exposure to covid, unvaccinated -- consider persistent c.dif vs other GI pathology including viral/covid -- in setting of likely dehydration and vomiting -- metabolic check, fluids, antiemetics, determine inpt vs outpt    Stevo: pt with hx of c diff not on active tx here now with n/v/d for several days, difficulty tolerating PO. no fevers, sick contact with covid. abd pain, diffuse. nontoxic appearing. vitals stable, will check electrolytes, wbc count, lactate, pain control, fluids, c diff and covid testing. dispo pending results consider imaging for markedly elevated WBC or lactate.

## 2021-12-25 NOTE — ED PROVIDER NOTE - CLINICAL SUMMARY MEDICAL DECISION MAKING FREE TEXT BOX
Joseph Frankel PGY3: 32 yo with abdominal distensnion in possible setting of nephrotic syndrome. VSS. Patient looks well and is non toxic appearing. PE as above. No concern for SBP. Given tenderness will scan abdomen to ro acute pathology. Will also scan chest to assess for systemic disease such as malignancy vs sarcoid. Will also get labs, urine, and reassess. Joseph Frankel PGY3: 32 yo with abdominal distensnion in possible setting of nephrotic syndrome. VSS. Patient looks well and is non toxic appearing. PE as above. No concern for SBP. Given tenderness will scan abdomen to ro acute pathology. Will also scan chest to assess for systemic disease such as malignancy vs sarcoid. Will also get labs, urine, nephrology cons .admission ZR

## 2021-12-25 NOTE — ED PROVIDER NOTE - NSFOLLOWUPCLINICSTOKEN_GEN_ALL_ED_FT
[id number]: || ||00\01||False;[id number]: || ||00\01||False;[id number]: || ||00\01||False;[id number]: || ||00\01||False;

## 2021-12-25 NOTE — ED ADULT NURSE NOTE - OBJECTIVE STATEMENT
31y F arrived to the ED c/o abd pain. Pt presents to ED with x3 days diarrhea and nausea. Pt reports similar symptoms to when she had c-diff in July. Pt endorses nausea at present. Pt denies chest pain, SOB, HA, urinary symptoms, hematuria. Pt placed in position of comfort. Pt educated on call bell system and provided call bell. Bed in lowest position, wheels locked, appropriate side rails raised. Pt denies needs at this time.

## 2021-12-25 NOTE — ED PROVIDER NOTE - NS_EDPROVIDERDISPOUSERTYPE_ED_A_ED
Patient found out she is pregnant    Want to know if she can take amoxicillin and valtrex (Up to Date says they are safe)    Would like a prescription for prenatal vitamins    LMP end of Jan 2021    Gale LEWIS RN BSN  [facility]  [phone]           Attending Attestation (For Attendings USE Only)...

## 2021-12-25 NOTE — ED PROVIDER NOTE - PHYSICAL EXAMINATION
Gen: Alert and oriented. Lying comfortably in bed. Answering questions appropriately  HEENT: extra occular movements intact, no nasal discharge, mucous membranes moist  CV: Regular rate and rhythm, +S1/S2, no murmurs/rubs/gallops,   Resp: Clear to ausculation bilaterally, no wheezes/rhonchi/rales  GI: Distended abdomen. No overlying erythema. Mild TTP in lower abdomen. no masses  MSK: No open wounds, no bruising, no LE edema  Neuro: A&Ox4, following commands, moving all four extremities spontaneously  Psych: appropriate mood

## 2021-12-25 NOTE — ED ADULT TRIAGE NOTE - CHIEF COMPLAINT QUOTE
"I had CDiff in July, I took the medications and then it came back in september. Now I am vomiting and diarrhea for 3 days with stomach pain so my doctor told me to come to the ER"

## 2021-12-25 NOTE — PROVIDER CONTACT NOTE (OTHER) - ACTION/TREATMENT ORDERED:
Provider made aware. No orders given at this time
Attempted to talk to patient, but patient refused to talk to RN. PA seen patient at bedside. pt currently not actively trying to leave, no action at present time. Monitoring continues. will endorse
Provider made aware. Provider will come and see patient.
Provider was aware . EKG ordered, and stat labs
Provider made aware. try again later.

## 2021-12-25 NOTE — ED PROVIDER NOTE - ATTENDING CONTRIBUTION TO CARE
Pt with 8months reported on and off diarrhea and ab pains with +c.dif multiple times on chronic vancomycin PO, downtitrating to 125mg every other day this week with 3d of nausea, vomiting persistent and very frequent loose stools, no formed stools per patient, assoc with abdominal pain/cramps.  Sister also with covid (ER patient here).  Unable to tolerate anything for past 3d per patient.  Pt appears depressed affect, MMM, clear lungs, ab soft, nt, neuro intact.

## 2021-12-25 NOTE — CHART NOTE - NSCHARTNOTEFT_GEN_A_CORE
Rn called me 2/2 pt wished to AMA, pt was also most AMA with me the day prior.  Today I arrived at her beside and pt was already dresser in street cloths and transport staff/wheelchair was awaiting transport to lobby/taxi.  Pt verbalized risks for D/c AMA, including : Death, Stroke, and disability.  AMA from completed and Dr. Bernal was notified.    Tano Abdi ACNP-C  Pager # [phone]

## 2021-12-25 NOTE — ED PROVIDER NOTE - OBJECTIVE STATEMENT
31 F hx c. diff on vanco at home (currently not taking), here with abd pain, n/v, diarrhea. >10 episodes, nonbloody, a/w diffuse abd pain, worse LLQ. no fevers. sick contact sister with COVID. No cp, sob, loc.

## 2021-12-25 NOTE — ED PROVIDER NOTE - PROGRESS NOTE DETAILS
Joseph Frankel PGY3: Will admit for further work up. Labs thus far remain consistent with nephrotic syndrome. Cts pending. Will admit.

## 2021-12-25 NOTE — ED ADULT NURSE NOTE - NSICDXPASTMEDICALHX_GEN_ALL_CORE_FT
PAST MEDICAL HISTORY:  Anemia     Asthma     Gastritis     GERD (gastroesophageal reflux disease)     Thrombophlebitis/phlebitis thrombophlebitis of R gonadal vein    UTI (urinary tract infection)

## 2021-12-25 NOTE — ED ADULT NURSE REASSESSMENT NOTE - NS ED NURSE REASSESS COMMENT FT1
report from ai pinto, pt complains of nausea without vomiting. pt states she has intermittent lower abdominal pain. pt denies chest pain,shortness of breath

## 2021-12-25 NOTE — ED PROVIDER NOTE - PATIENT PORTAL LINK FT
You can access the FollowMyHealth Patient Portal offered by [facility] by registering at the following website: [email or web address]. By joining [employer]’s FollowMyHealth portal, you will also be able to view your health information using other applications (apps) compatible with our system.

## 2021-12-25 NOTE — PROVIDER CONTACT NOTE (OTHER) - REASON
refusing , Labs, vital signs AM medication
Patient complains of chest tightness, possibly indigestion. Vital signs are stable.
pt refusing bumetanide
Patient blood pressure is 98/62 and complains of abdominal pain and swelling
Patient reports feeling like throat is closing. Patient's face is swollen. Normal respirations

## 2021-12-25 NOTE — ED PROVIDER NOTE - PHYSICAL EXAMINATION
Vitals: I have reviewed the patients vital signs  General: uncomfortable appearing  HEENT: Atraumatic, normocephalic, airway patent  Eyes: EOMI, tracking appropriately  Neck: no tracheal deviation, no JVD  Chest/Lungs: no trauma, symmetric chest rise, speaking in complete sentences, no WOB, CTAB  Abd: soft, LLQ tenderness, no rebound or gaurding  Heart: skin and extremities well perfused, regular rate and rhythm  Neuro: A+Ox3, ambulating without difficulty, CN grossly intact  MSK: strength at baseline in all extremities, no muscle wasting or atrophy  Skin: no cyanosis, no jaundice, no new emergent lesions

## 2021-12-25 NOTE — ED PROVIDER NOTE - NS ED ROS FT
Gen: Denies fever, chills, +generalized weakness  CV: Denies chest pain, palpitations  HEENT: Denies neck pain, headache, vision changes  Skin: Denies rash, erythema, color changes  Resp: Denies SOB, cough  Endo: Denies sensitivity to heat, cold, increased urination  GI: Denies constipation, nausea, vomiting, diarrhea  Msk: Denies back pain, LE swelling, extremity pain  : Denies dysuria, increased frequency  Neuro: Denies LOC, focal weakness, numbness, tingling  Psych: Denies hx of psych, SI, HI

## 2021-12-25 NOTE — ED PROVIDER NOTE - NSFOLLOWUPCLINICS_GEN_ALL_ED_FT
[facility] - Infectious Disease  Infectious Disease  [address], [place] [address]  Phone: [phone]  Fax:     [facility]  Gastroenterology  [address] [place] [address]  Phone: [phone]  Fax:     [facility]  Gastroenterology  [address]  [place] [address]  Phone: [phone]  Fax:     [facility]  Gastroenterology  [address] [place] [address]  Phone: [phone]  Fax: [phone]

## 2021-12-25 NOTE — ED ADULT NURSE NOTE - OBJECTIVE STATEMENT
32 y/o female arrives to the ER complaining of abdominal pain.  PMH of Asthma, Anemia GERD, Thrombophlebitis, nephrotic syndrome.  Pt is A&Ox4, speaking coherently. Pt states having abdominal distention and edema on the extremities for 2 weeks. Pt report going to the [facility] the Dec 14th being diagnosed with nephrotic syndrome and sent to have a biopsy of the kidney, which was cancelled due to that the pt got infected with Covid on  Dec 17th. Pt reports having diffuse constant pain with waves of sharp pain, Pt states having nausea, no vomiting, and decrease appetite, and foul smell in the urine. Pt denies SOB, chest pain, dizziness, fevers, chills.  On assessment airway is patent, breathing spontaneously and unlabored. Skin is dry, warm and color appropriate to race.  Abdomen is firm, distended, tender to palpation. Full ROM in all extremities.  Patient undressed and placed into gown, side rails up with bed locked and in lowest position for safety. call bell within reach. [place] provided. Comfort and safety provided. Educated not to ambulate without assistance. will continue to reassess.

## 2021-12-25 NOTE — PROVIDER CONTACT NOTE (OTHER) - SITUATION
Patient complains of chest tightness, possibly indigestion. Vital signs are stable. /70, P 83, O2 99% and Temp 98.4
Patient reports feeling like throat is closing. Patient's face is swollen. Normal respirations
Pt refused AM labs, vital signs, medication and . Pt stated she wanted to see a MD because she wants to sign out AMA
pt refusing bumetanide
Patient vitals are BP 98/62, P 74, Temperature 99, and O2 96% on room air, buMETAnide withheld at this time. Patient also complains of abdominal pain and swelling and requests to see provider.

## 2021-12-25 NOTE — ED PROVIDER NOTE - NS ED ROS FT
Constitutional: (-) fever (-) vomiting  Eyes/ENT: (-) vision changes, (-) hearing changes  Cardiovascular: (-) chest pain, (-) wheezing  Respiratory: (-) cough, (-) shortness of breath  Gastrointestinal: (+) vomiting, (+) diarrhea, (+) abdominal pain  : (-) dysuria   Musculoskeletal: (-) back pain  Integumentary: (-) rash, (-) edema  Neurological: (-)loc  Allergic/Immunologic: (-) pruritus  Endocrine: No history of thyroid disease

## 2021-12-25 NOTE — ED PROVIDER NOTE - OBJECTIVE STATEMENT
31 year old female with pmh of asthma, prior history of DVT after csection 4 years ago presenting with abdominal distension and pain. Patient recently admitted to [facility] and also recently discharged yesterday for LI with worsening generalized swelling/LE edema concern for possible nephrotic syndrome. Plan was to get a renal biopsy but patient tested positive for COVID. Patient felt she was not getting any treatment so left [facility] and came here. States she developed acute onset LE edema, facial swelling, and abdominal distension three weeks ago. Was treated with albumin and lasix with some improvement. No longer having LE swelling. Now with worsening abdominal distension and lower abdominal pain. Crampy. Waxing and waning. Non radiating. No modifying factors. Denies fever, chills, n/v,/d, urinary symptoms.

## 2021-12-25 NOTE — ED PROVIDER NOTE - PROGRESS NOTE DETAILS
Stevo: pt tolerating PO, has not provided stool sample, states she took anti diarrheal prior to arrival. will treat clinically for cdiff given history, vanco and zofran to pharmacy. will give ID and GI f/u. Results explained, printed and given to patient, patient given discharge instructions and information for follow up and return precautions.

## 2021-12-25 NOTE — ED PROVIDER NOTE - NSFOLLOWUPINSTRUCTIONS_ED_ALL_ED_FT
You came to the Er with abdominal pain, we evaluated you with bloodwork and at this time you are stable for discharge. Your results are attached. Follow up with a gastroenterologist and infectious disease specialist. Please take the vancomycin prescribed to you until you can see GI or Infectious Disease. You have also been prescribed zofran, please take it AS NEEDED for nausea and vomiting.    Return to the ER for worsening pain, inability to eat or drink, passing out, new fevers, or for any concern you would like evaluated.     You can take TYLENOL/ACETAMINOPHEN up to 4,000mg a day for your symptoms in four divided doses.     You can take MOTRIN/IBUPROFEN up to 2,400mg a day in four divided doses (up to 2 weeks)

## 2021-12-26 DIAGNOSIS — U07.1 COVID-19: ICD-10-CM

## 2021-12-26 DIAGNOSIS — R80.9 PROTEINURIA, UNSPECIFIED: ICD-10-CM

## 2021-12-26 DIAGNOSIS — Z29.9 ENCOUNTER FOR PROPHYLACTIC MEASURES, UNSPECIFIED: ICD-10-CM

## 2021-12-26 DIAGNOSIS — R60.0 LOCALIZED EDEMA: ICD-10-CM

## 2021-12-26 DIAGNOSIS — R10.9 UNSPECIFIED ABDOMINAL PAIN: ICD-10-CM

## 2021-12-26 DIAGNOSIS — R09.89 OTHER SPECIFIED SYMPTOMS AND SIGNS INVOLVING THE CIRCULATORY AND RESPIRATORY SYSTEMS: ICD-10-CM

## 2021-12-26 DIAGNOSIS — D50.9 IRON DEFICIENCY ANEMIA, UNSPECIFIED: ICD-10-CM

## 2021-12-26 DIAGNOSIS — R11.2 NAUSEA WITH VOMITING, UNSPECIFIED: ICD-10-CM

## 2021-12-26 DIAGNOSIS — N04.9 NEPHROTIC SYNDROME WITH UNSPECIFIED MORPHOLOGIC CHANGES: ICD-10-CM

## 2021-12-26 DIAGNOSIS — K21.9 GASTRO-ESOPHAGEAL REFLUX DISEASE WITHOUT ESOPHAGITIS: ICD-10-CM

## 2021-12-26 DIAGNOSIS — J45.909 UNSPECIFIED ASTHMA, UNCOMPLICATED: ICD-10-CM

## 2021-12-26 DIAGNOSIS — R14.0 ABDOMINAL DISTENSION (GASEOUS): ICD-10-CM

## 2021-12-26 PROBLEM — K29.70 GASTRITIS, UNSPECIFIED, WITHOUT BLEEDING: Chronic | Status: ACTIVE | Noted: 2021-12-22

## 2021-12-26 PROBLEM — D64.9 ANEMIA, UNSPECIFIED: Chronic | Status: ACTIVE | Noted: 2021-12-22

## 2021-12-26 PROBLEM — N39.0 URINARY TRACT INFECTION, SITE NOT SPECIFIED: Chronic | Status: ACTIVE | Noted: 2021-12-22

## 2021-12-26 PROBLEM — I80.9 PHLEBITIS AND THROMBOPHLEBITIS OF UNSPECIFIED SITE: Chronic | Status: ACTIVE | Noted: 2021-12-22

## 2021-12-26 LAB
ALBUMIN SERPL ELPH-MCNC: 1.2 G/DL — LOW (ref 3.3–5)
ALP SERPL-CCNC: 45 U/L — SIGNIFICANT CHANGE UP (ref 40–120)
ALT FLD-CCNC: 13 U/L — SIGNIFICANT CHANGE UP (ref 10–45)
ANION GAP SERPL CALC-SCNC: 8 MMOL/L — SIGNIFICANT CHANGE UP (ref 5–17)
AST SERPL-CCNC: 18 U/L — SIGNIFICANT CHANGE UP (ref 10–40)
BILIRUB SERPL-MCNC: 0.1 MG/DL — LOW (ref 0.2–1.2)
BUN SERPL-MCNC: 12 MG/DL — SIGNIFICANT CHANGE UP (ref 7–23)
CALCIUM SERPL-MCNC: 7.5 MG/DL — LOW (ref 8.4–10.5)
CHLORIDE SERPL-SCNC: 103 MMOL/L — SIGNIFICANT CHANGE UP (ref 96–108)
CHOLEST SERPL-MCNC: 789 MG/DL — HIGH
CO2 SERPL-SCNC: 22 MMOL/L — SIGNIFICANT CHANGE UP (ref 22–31)
CREAT ?TM UR-MCNC: 312 MG/DL — SIGNIFICANT CHANGE UP
CREAT SERPL-MCNC: 0.63 MG/DL — SIGNIFICANT CHANGE UP (ref 0.5–1.3)
CULTURE RESULTS: SIGNIFICANT CHANGE UP
GLUCOSE SERPL-MCNC: 87 MG/DL — SIGNIFICANT CHANGE UP (ref 70–99)
HCT VFR BLD CALC: 33.3 % — LOW (ref 34.5–45)
HDLC SERPL-MCNC: 57 MG/DL — SIGNIFICANT CHANGE UP
HGB BLD-MCNC: 9.4 G/DL — LOW (ref 11.5–15.5)
LIPID PNL WITH DIRECT LDL SERPL: 685 MG/DL — HIGH
MAGNESIUM SERPL-MCNC: 2.2 MG/DL — SIGNIFICANT CHANGE UP (ref 1.6–2.6)
MCHC RBC-ENTMCNC: 21.4 PG — LOW (ref 27–34)
MCHC RBC-ENTMCNC: 28.2 GM/DL — LOW (ref 32–36)
MCV RBC AUTO: 75.9 FL — LOW (ref 80–100)
NON HDL CHOLESTEROL: 741 MG/DL — HIGH
NRBC # BLD: 0 /100 WBCS — SIGNIFICANT CHANGE UP (ref 0–0)
PHOSPHATE SERPL-MCNC: 3.6 MG/DL — SIGNIFICANT CHANGE UP (ref 2.5–4.5)
PLATELET # BLD AUTO: 496 K/UL — HIGH (ref 150–400)
POTASSIUM SERPL-MCNC: 3.8 MMOL/L — SIGNIFICANT CHANGE UP (ref 3.5–5.3)
POTASSIUM SERPL-SCNC: 3.8 MMOL/L — SIGNIFICANT CHANGE UP (ref 3.5–5.3)
PROT ?TM UR-MCNC: >2000 MG/DL — SIGNIFICANT CHANGE UP (ref 0–12)
PROT SERPL-MCNC: 3.9 G/DL — LOW (ref 6–8.3)
PROT/CREAT UR-RTO: SIGNIFICANT CHANGE UP (ref 0–0.2)
RBC # BLD: 4.39 M/UL — SIGNIFICANT CHANGE UP (ref 3.8–5.2)
RBC # FLD: 25.4 % — HIGH (ref 10.3–14.5)
SARS-COV-2 RNA SPEC QL NAA+PROBE: DETECTED
SODIUM SERPL-SCNC: 133 MMOL/L — LOW (ref 135–145)
SPECIMEN SOURCE: SIGNIFICANT CHANGE UP
TRIGL SERPL-MCNC: 280 MG/DL — HIGH
WBC # BLD: 7.97 K/UL — SIGNIFICANT CHANGE UP (ref 3.8–10.5)
WBC # FLD AUTO: 7.97 K/UL — SIGNIFICANT CHANGE UP (ref 3.8–10.5)

## 2021-12-26 PROCEDURE — 99223 1ST HOSP IP/OBS HIGH 75: CPT | Mod: GC

## 2021-12-26 PROCEDURE — 74177 CT ABD & PELVIS W/CONTRAST: CPT | Mod: 26

## 2021-12-26 PROCEDURE — 71260 CT THORAX DX C+: CPT | Mod: 26

## 2021-12-26 RX ORDER — HEPARIN SODIUM 5000 [USP'U]/ML
INJECTION INTRAVENOUS; SUBCUTANEOUS
Qty: 25000 | Refills: 0 | Status: DISCONTINUED | OUTPATIENT
Start: 2021-12-26 | End: 2021-12-27

## 2021-12-26 RX ORDER — BUMETANIDE 0.25 MG/ML
1 INJECTION INTRAMUSCULAR; INTRAVENOUS
Refills: 0 | Status: DISCONTINUED | OUTPATIENT
Start: 2021-12-26 | End: 2021-12-26

## 2021-12-26 RX ORDER — ONDANSETRON 8 MG/1
1 TABLET, FILM COATED ORAL
Qty: 12 | Refills: 0
Start: 2021-12-26 | End: 2021-12-28

## 2021-12-26 RX ORDER — ACETAMINOPHEN 500 MG
650 TABLET ORAL EVERY 6 HOURS
Refills: 0 | Status: DISCONTINUED | OUTPATIENT
Start: 2021-12-26 | End: 2021-12-29

## 2021-12-26 RX ORDER — VANCOMYCIN HCL 1 G
1 VIAL (EA) INTRAVENOUS
Qty: 56 | Refills: 0
Start: 2021-12-26 | End: 2022-01-08

## 2021-12-26 RX ORDER — ATORVASTATIN CALCIUM 80 MG/1
80 TABLET, FILM COATED ORAL AT BEDTIME
Refills: 0 | Status: DISCONTINUED | OUTPATIENT
Start: 2021-12-26 | End: 2021-12-29

## 2021-12-26 RX ORDER — LANOLIN ALCOHOL/MO/W.PET/CERES
3 CREAM (GRAM) TOPICAL AT BEDTIME
Refills: 0 | Status: DISCONTINUED | OUTPATIENT
Start: 2021-12-26 | End: 2021-12-29

## 2021-12-26 RX ORDER — BUMETANIDE 0.25 MG/ML
1 INJECTION INTRAMUSCULAR; INTRAVENOUS ONCE
Refills: 0 | Status: DISCONTINUED | OUTPATIENT
Start: 2021-12-26 | End: 2021-12-26

## 2021-12-26 RX ORDER — ONDANSETRON 8 MG/1
4 TABLET, FILM COATED ORAL ONCE
Refills: 0 | Status: COMPLETED | OUTPATIENT
Start: 2021-12-26 | End: 2021-12-26

## 2021-12-26 RX ORDER — ALBUMIN HUMAN 25 %
50 VIAL (ML) INTRAVENOUS ONCE
Refills: 0 | Status: COMPLETED | OUTPATIENT
Start: 2021-12-26 | End: 2021-12-26

## 2021-12-26 RX ORDER — CHOLECALCIFEROL (VITAMIN D3) 125 MCG
2000 CAPSULE ORAL DAILY
Refills: 0 | Status: DISCONTINUED | OUTPATIENT
Start: 2021-12-26 | End: 2021-12-29

## 2021-12-26 RX ORDER — BUMETANIDE 0.25 MG/ML
0.25 INJECTION INTRAMUSCULAR; INTRAVENOUS
Qty: 20 | Refills: 0 | Status: DISCONTINUED | OUTPATIENT
Start: 2021-12-26 | End: 2021-12-27

## 2021-12-26 RX ORDER — CALCIUM ACETATE 667 MG
667 TABLET ORAL
Refills: 0 | Status: DISCONTINUED | OUTPATIENT
Start: 2021-12-26 | End: 2021-12-27

## 2021-12-26 RX ORDER — SODIUM CHLORIDE 9 MG/ML
1000 INJECTION INTRAMUSCULAR; INTRAVENOUS; SUBCUTANEOUS
Refills: 0 | Status: DISCONTINUED | OUTPATIENT
Start: 2021-12-26 | End: 2021-12-26

## 2021-12-26 RX ORDER — ONDANSETRON 8 MG/1
4 TABLET, FILM COATED ORAL EVERY 6 HOURS
Refills: 0 | Status: DISCONTINUED | OUTPATIENT
Start: 2021-12-26 | End: 2021-12-28

## 2021-12-26 RX ORDER — ENOXAPARIN SODIUM 100 MG/ML
40 INJECTION SUBCUTANEOUS DAILY
Refills: 0 | Status: DISCONTINUED | OUTPATIENT
Start: 2021-12-26 | End: 2021-12-26

## 2021-12-26 RX ADMIN — ONDANSETRON 4 MILLIGRAM(S): 8 TABLET, FILM COATED ORAL at 03:40

## 2021-12-26 RX ADMIN — Medication 60 MILLIGRAM(S): at 17:31

## 2021-12-26 RX ADMIN — ATORVASTATIN CALCIUM 80 MILLIGRAM(S): 80 TABLET, FILM COATED ORAL at 22:38

## 2021-12-26 RX ADMIN — Medication 667 MILLIGRAM(S): at 13:54

## 2021-12-26 RX ADMIN — ENOXAPARIN SODIUM 40 MILLIGRAM(S): 100 INJECTION SUBCUTANEOUS at 12:26

## 2021-12-26 RX ADMIN — Medication 2000 UNIT(S): at 17:31

## 2021-12-26 RX ADMIN — BUMETANIDE 1 MILLIGRAM(S): 0.25 INJECTION INTRAMUSCULAR; INTRAVENOUS at 17:31

## 2021-12-26 RX ADMIN — Medication 50 MILLILITER(S): at 05:26

## 2021-12-26 RX ADMIN — Medication 667 MILLIGRAM(S): at 17:31

## 2021-12-26 RX ADMIN — HEPARIN SODIUM 1100 UNIT(S)/HR: 5000 INJECTION INTRAVENOUS; SUBCUTANEOUS at 17:25

## 2021-12-26 RX ADMIN — HEPARIN SODIUM 1100 UNIT(S)/HR: 5000 INJECTION INTRAVENOUS; SUBCUTANEOUS at 19:28

## 2021-12-26 NOTE — PATIENT PROFILE ADULT - FALL HARM RISK - UNIVERSAL INTERVENTIONS
Bed in lowest position, wheels locked, appropriate side rails in place/Call bell, personal items and telephone in reach/Instruct patient to call for assistance before getting out of bed or chair/Non-slip footwear when patient is out of bed/[place] to call system/Physically safe environment - no spills, clutter or unnecessary equipment/Purposeful Proactive Rounding/Room/bathroom lighting operational, light cord in reach

## 2021-12-26 NOTE — H&P ADULT - HISTORY OF PRESENT ILLNESS
31 year old female with PMHx asthma, GERD, iron deficiency anemia, DVT (s/p  6 yo) who presents for abdominal discomfort and lower extremity swelling.     The patient states she noticed increased abdominal distension, facial swelling, and lower extremity swelling approximately 2.5-3 weeks ago. The patient was evaluated at [facility]. She was subsequently evaluated at [facility] and followed by nephrology to evaluate for nephrotic syndrome. Urine studies were significant for proteinuria (> 600) and low albumin (1.2); treated with albumin and lasix. Ordered lower extremity doppler to r/o DVT.  At these OSH, the patient was scheduled for renal biopsy, however, pt was found to be COVID positive and biopsy was held. The patient left [facility] for "poor care" and presents to [facility] for continued treatment of her lower extremity swelling and abdominal discomfort.    31 year old female with PMHx asthma, GERD, iron deficiency anemia, DVT (s/p  6 yo) who presents for abdominal discomfort and lower extremity swelling.     The patient states she noticed increased abdominal distension, facial swelling, and lower extremity swelling approximately 2.5-3 weeks ago. The patient was evaluated at [facility]. She was subsequently evaluated at [facility] and followed by nephrology to evaluate for nephrotic syndrome. Urine studies were significant for proteinuria (> 600) and low albumin (1.2); treated with albumin and lasix. Ordered lower extremity doppler to r/o DVT.  At these OSH, the patient was scheduled for renal biopsy, however, pt was found to be COVID positive and biopsy was held. The patient left [facility] AMA for "poor care" and presents to [facility] for continued treatment of her lower extremity swelling and abdominal discomfort.     On evaluation, the patient is very nauseous and complaining of abdominal heaviness. She is unable to keep anything fluids down and also reports 1.5 day of loose stools today (6 bowel movements). The patient states her lower extremity swelling has improved. She is not short of breath and denies any chest pain, fever, chills, dizziness, LOC, or recent trauma. 31 year old female with PMHx asthma, GERD, iron deficiency anemia, DVT (s/p  6 yo) who presents for abdominal discomfort and lower extremity swelling.     The patient states she noticed increased abdominal distension, facial swelling, and lower extremity swelling approximately 2.5-3 weeks ago. The patient was evaluated at [facility]. She was subsequently evaluated at [facility] and followed by nephrology to evaluate for nephrotic syndrome. Urine studies were significant for proteinuria (> 600) and low albumin (1.2); treated with albumin and lasix. Ordered lower extremity doppler to r/o DVT.  At these OSH, the patient was scheduled for renal biopsy, however, pt was found to be COVID positive and biopsy was held. The patient left [facility] AMA for "poor care" and presents to [facility] for continued treatment of her lower extremity swelling and abdominal discomfort.     On evaluation, the patient is very nauseous and complaining of abdominal heaviness and distension, denies any camille pain. Endorsing associated vomiting and she is unable to keep fluids or solids down and also reports 1.5 day of non bloody loose stools today (6 bowel movements). The patient states her lower extremity swelling has improved. She is not short of breath and denies any chest pain, fever, chills, dizziness, LOC, or recent trauma.

## 2021-12-26 NOTE — CONSULT NOTE ADULT - PROBLEM SELECTOR RECOMMENDATION 9
Pt. admitted with anasarca in the Nephrotic syndrome. Lab and clinical criteria diagnostic of Nephrotic syndrome. Of note, pt. also diagnosed with COVID 19, however symptoms preceded infection.   as per work up from [facility] suspected underlying minimal change disease vs primary / secondary membranous nephropathy as well.   Serum albumin low at 1.2 on admission; please order urine GA/Cr ratio  Pt. recently evaluated at OSH for nephrotic syndrome and planned to undergo kidney biopsy, however procedure was cancelled due to COVID 19.   Pt. clinically hypervolemic and would recommend starting IV Bumex 1mg BID for fluid overload management   Diuretic response might be sub-optimal given hypoalbuminemia, will reassess and titrate as needed.   Renal duplex ruled out RVT.    Follow up serologic work for Nephrotic syndrome.   SHELTON( neg), HBsAg(NR), Hep C antibody(NR), HIV (Neg), Parvovirus, C3 (WNL), C4 (WNl),   PLA2R Ab, serum and urine immunofixation.   Ideally would schedule for kidney biopsy, however will wait until COVID 19 resolves since pt. will not be a candidate for immunosuppression right away. Monitor UOP. Daily weights. Pt. admitted with anasarca in the Nephrotic syndrome. Lab and clinical criteria diagnostic of Nephrotic syndrome. Of note, pt. also diagnosed with COVID 19, however symptoms preceded infection.   as per work up from [facility] suspected underlying minimal change disease vs primary / secondary membranous nephropathy as well.   Serum albumin low at 1.2 on admission; please order urine UT/Cr ratio  --- given low albumin and history of DVT she is at high risk for another; would recommend anticoagulation with heparin at this time  Pt. recently evaluated at OSH for nephrotic syndrome and planned to undergo kidney biopsy, however procedure was cancelled due to COVID 19.   Pt. clinically hypervolemic and would recommend starting IV Bumex 1mg BID for fluid overload management   Diuretic response might be sub-optimal given hypoalbuminemia, will reassess and titrate as needed.   Renal duplex ruled out RVT.    Follow up serologic work for Nephrotic syndrome.   SHELTON( neg), HBsAg(NR), Hep C antibody(NR), HIV (Neg), Parvovirus, C3 (WNL), C4 (WNl),   PLA2R Ab, serum and urine immunofixation pending   Ideally would schedule for kidney biopsy, however will wait until COVID 19 resolves   would want to start her on immunosuppressive therapy however will discuss whether the colitis will be a limiting factor for starting  Monitor UOP. Daily weights.

## 2021-12-26 NOTE — H&P ADULT - NSHPPHYSICALEXAM_GEN_ALL_CORE
General: appears uncomfortable  HEENT: NCAT, PERRL, sclera nonicteric, normal conjunctiva   Neck: No LAD  Cardio: RRR, No murmurs, minimal non-pitting LE edema BL  Resp: Lung sounds CTA BL, no wheezes or crackles on RA  GI: Abd significant distended, shifting dullness, no rebound or guarding  MSK: No cyanosis, clubbing or discoloration of extremities  Skin: Scattered tattos throughout, no new rash   Neuro: AOX3, no focal neurological deficits   Psych: Affect appropriate Vital Signs Last 24 Hrs  T(C): 37.5 (26 Dec 2021 03:03), Max: 37.5 (26 Dec 2021 03:03)  T(F): 99.5 (26 Dec 2021 03:03), Max: 99.5 (26 Dec 2021 03:03)  HR: 90 (26 Dec 2021 03:03) (90 - 109)  BP: 108/69 (26 Dec 2021 03:03) (99/66 - 112/69)  BP(mean): --  RR: 18 (26 Dec 2021 03:03) (18 - 24)  SpO2: 98% (26 Dec 2021 03:03) (97% - 100%)    General: appears uncomfortable, well developed   HEENT: NCAT, PERRL, sclera nonicteric, normal conjunctiva   Neck: No LAD, supple   Cardio: RRR, No murmurs, minimal non-pitting LE edema BL  Resp: Lung sounds CTA BL, no wheezes or crackles on RA  GI: Abd significant distended, shifting dullness, no rebound or guarding  Extremities: No cyanosis, clubbing or discoloration of extremities  Skin: Scattered tattoos throughout, no new rash   Neuro: AOX3, no focal neurological deficits   Psych: Affect appropriate, cooperative   Msk: no back pain, no joint swelling/tenderness

## 2021-12-26 NOTE — H&P ADULT - ATTENDING COMMENTS
Pt seen and examined. 31F with h/o asthma, GERD, chronic iron deficiency anemia, thrombophlebitis of R gonadal vein, DVT (was previously on Xarelto) p/w 2-3 weeks of b/l LE and abdominal swelling, noted with hypoalbuminemia and proteinuria, overall c/f nephrotic syndrome. Pt initially seen at [facility] for 2 weeks with plan for renal biopsy, which was postponed due to subsequent COVID19 diagnosis. Recent at [facility] where pt was being diuresed with bumex 1mg BID, but left AMA on 12/25/21. Now with c/o nausea/ inability to tolerate PO, +loose stools, ongoing/worsening abdominal distension and swelling, improved LE swelling.  - c/f that pt may be intravascularly depleted - will given albumin 1 dose; hold off on further diuresis or IVF pending renal consult (to be called by day team)  - check ptn/Cr ratio   - check LE DVT study   - nausea/vomiting, diarrhea may be 2/2 COVID vs gastroenteritis vs abdominal pressure - symptomatic treatment, check GI PCR, f/u CT A/P read for any acute pathology  - not hypoxic and denies any resp complaints, no indication for COVID treatment at this time, c/t monitor

## 2021-12-26 NOTE — H&P ADULT - PROBLEM SELECTOR PLAN 4
- hypovolemic, renal function stable Cr 0.67, LDH elevated 675  - nausea, non-bilious non-blood emesis, and loose stools   - now hypovolemic, was treated with Bumex 1 mg bid for fluid overload management on 12/22, hold diuretics - c/o N/V/diarrhea-like loose stools  - hypovolemic, renal function stable Cr 0.67, LDH elevated 675, thrombocytosis   - nausea, non-bilious non-blood emesis, and loose stools   - now hypovolemic, was treated with Bumex 1 mg bid for fluid overload management on 12/22  - c/w albumin, hold diuretics and maintenance fluids at this time  - f/u GI PCR stool i/s/o loose stools x1day   - encourage oral hydration   - f/u renal reccs on fluids - Ox2 saturation 98% on RA  - unvaccinated + exposure from home   - Tylenol PRN for myalgia and fever  - trending inflammatory markers q72  - monitor oxygen saturations closely, maintain O2 sat > 93%  - f/u imaging

## 2021-12-26 NOTE — H&P ADULT - NSHPREVIEWOFSYSTEMS_GEN_ALL_CORE
REVIEW OF SYSTEMS:  CONSTITUTIONAL: No weakness, fevers or chills  EYES/ENT: No visual changes;  No vertigo or throat pain   NECK: No pain or stiffness  RESPIRATORY: No cough, wheezing, hemoptysis; No shortness of breath  CARDIOVASCULAR: No chest pain or palpitations  GASTROINTESTINAL: + abdominal heaviness, + n/v/d  GENITOURINARY: No dysuria, frequency or hematuria  NEUROLOGICAL: No numbness or weakness  SKIN: No itching, rashes

## 2021-12-26 NOTE — PROGRESS NOTE ADULT - PROBLEM SELECTOR PLAN 3
- c/o N/V/diarrhea-like loose stools  - hypovolemic, renal function stable Cr 0.67, LDH elevated 675, thrombocytosis   - nausea, non-bilious non-blood emesis, and loose stools   - now hypovolemic, was treated with Bumex 1 mg bid for fluid overload management on 12/22  - c/w albumin, hold diuretics and maintenance fluids at this time  - f/u GI PCR stool i/s/o loose stools x1day   - encourage oral hydration   - f/u renal reccs on fluids - nausea and vomiting improved   - hypovolemic, renal function stable Cr 0.67, LDH elevated 675, thrombocytosis   - now hypovolemic, was treated with Bumex 1 mg bid for fluid overload management on 12/22  - titrated to Lasix 40 mg BID   - f/u GI PCR stool i/s/o loose stools x1day   - encourage oral hydration

## 2021-12-26 NOTE — H&P ADULT - PROBLEM SELECTOR PLAN 6
- no medications - hgb/hct 10.7/38; baseline hgb ~8  - hx of iron deficiency anemia   - currently menstruating (6th day)  - maintain active T+S  - daily ferrous sulfate once pt can tolerate po  - bowel regimen

## 2021-12-26 NOTE — H&P ADULT - PROBLEM SELECTOR PLAN 1
- Ox2 saturation 98% on RA  - unvaccinated + exposure from home   - Tylenol PRN for myalgia and fever  - trending inflammatory markers q72  - monitor oxygen saturations closely, maintain O2 sat > 93% - Ox2 saturation 98% on RA  - unvaccinated + exposure from home   - Tylenol PRN for myalgia and fever  - trending inflammatory markers q72  - monitor oxygen saturations closely, maintain O2 sat > 93%  - CXR? - Ox2 saturation 98% on RA  - unvaccinated + exposure from home   - Tylenol PRN for myalgia and fever  - trending inflammatory markers q72  - monitor oxygen saturations closely, maintain O2 sat > 93%  - f/u imaging - p/w x2 weeks of abdominal distension and LE swelling  - UA 12/25 w/proteinuria >600, unchanged from 12/22 and low albumin 1.2 (was 3.8-4.1 in Jan 2021)  - Renal US w/duplex negative for thrombosis   - Renal workup up:   -- ddx: MCKENNA vs. primary/secondary membranous nephropathy    -- Follow up serologic work for Nephrotic syndrome. Follow-up: SHELTON (WNL), HBsAg(NR), Hep C antibody(NR), HIV (Neg), Parvovirus, C3 (WNL), C4 (WNl), PLA2R Ab, serum and urine immunofixation.  - consult renal in AM  - pending urine protein/creatinine ratio   - c/w albumin, hold off fluids and/or diuretics at this time  - pending renal bx s/p COVID resolution  - Monitor UOP. Daily weights

## 2021-12-26 NOTE — CONSULT NOTE ADULT - SUBJECTIVE AND OBJECTIVE BOX
Vascular & Interventional Radiology Consult Note    Evaluate for Procedure: non target renal biopsy    HPI: 31y Female with PMH of asthma, prior history of DVT presents to the hospital due to abdominal distension and worsenign swelling. The pateint had initially presented to [facility] with similar complaints and at that time was being worked up for nephrotic syndrome. The plan was to attain a renal biopsy however the patient is under quarantine for testing positive for COVID. She reports she believes she was exposed on 12/18 and she  tested positive on 12/21. She denied any shortness of breath, sough, or chest pain. However, she states her symptoms of LE swelling and edema have been present for the past two and half weeks and it has been progressively getting worse. Admission labs relevant for hypoalbuminemia with serum albumin of 1.2. UA positive for significant proteinuria. She denies NSAIDs use, drugs, other medical problems.     Allergies: codeine (Hives)    Medications (Abx/Cardiac/Anticoagulation/Blood Products)    enoxaparin Injectable: 40 milliGRAM(s) SubCutaneous (12-26 @ 12:26)    Data:  154.9  58.2  T(C): 37.5  HR: 90  BP: 108/69  RR: 18  SpO2: 98%    -WBC 7.97 / HgB 9.4 / Hct 33.3 / Plt 496  -Na 133 / Cl 103 / BUN 12 / Glucose 87  -K 3.8 / CO2 22 / Cr 0.63  -ALT 13 / Alk Phos 45 / T.Bili 0.1  -INR 1.03 / PTT 30.1      
[facility] DIVISION OF KIDNEY DISEASES AND HYPERTENSION -- [phone]  -- INITIAL CONSULT NOTE  --------------------------------------------------------------------------------  HPI:  Patient is a 31 year old female with PMH of asthma, prior history of DVT presents to the hospital due to abdominal distension and worsenign swelling. The pateint had initially presented to [facility] with similar complaints and at that time was being worked up for nephrotic syndrome. The plan was to attain a renal biopsy however the patient is under quarantine for testing positive for COVID. She reports she believes she was exposed on  and she  tested positive on . She denied any shortness of breath, sough, or chest pain. However, she states her symptoms of LE swelling and edema have been present for the past two and half weeks and it has been progressively getting worse. Admission labs relevant for hypoalbuminemia with serum albumin of 1.2. UA positive for significant proteinuria. She denies NSAIDs use, drugs, other medical problems.       PAST HISTORY  --------------------------------------------------------------------------------  PAST MEDICAL & SURGICAL HISTORY:  Asthma    GERD (gastroesophageal reflux disease)    Anemia    Gastritis    Thrombophlebitis/phlebitis  thrombophlebitis of R gonadal vein    UTI (urinary tract infection)    H/O:     History of tonsillectomy      FAMILY HISTORY:  No pertinent family history in first degree relatives      PAST SOCIAL HISTORY:    ALLERGIES & MEDICATIONS  --------------------------------------------------------------------------------  Allergies    codeine (Hives)    Intolerances      Standing Inpatient Medications  atorvastatin 80 milliGRAM(s) Oral at bedtime  enoxaparin Injectable 40 milliGRAM(s) SubCutaneous daily    PRN Inpatient Medications  acetaminophen     Tablet .. 650 milliGRAM(s) Oral every 6 hours PRN  aluminum hydroxide/magnesium hydroxide/simethicone Suspension 30 milliLiter(s) Oral every 4 hours PRN  melatonin 3 milliGRAM(s) Oral at bedtime PRN  ondansetron Injectable 4 milliGRAM(s) IV Push every 6 hours PRN      REVIEW OF SYSTEMS    All other systems were reviewed and are negative, except as noted.    VITALS/PHYSICAL EXAM  --------------------------------------------------------------------------------  T(C): 37.5 (21 @ 03:03), Max: 37.5 (21 @ 03:03)  HR: 90 (21 @ 03:03) (90 - 109)  BP: 108/69 (21 @ 03:03) (99/66 - 112/69)  RR: 18 (21 @ 03:03) (18 - 24)  SpO2: 98% (21 @ 03:03) (97% - 100%)  Wt(kg): --  Height (cm): 154.9 (21 @ 18:02)      Physical Exam:  	Gen: NAD  	HEENT: MMM  	Pulm: CTA B/L  	CV: S1S2  	Abd: Soft, +BS   	Ext: ++ LE edema B/L  	Neuro: Awake  	Skin: Warm and dry  	    LABS/STUDIES  --------------------------------------------------------------------------------              10.7   9.45  >-----------<  542      [21 @ 20:35]              37.9     133  |  101  |  12  ----------------------------<  84      [21 20:35]  3.6   |  26  |  0.67        Ca     7.5     [21 20:35]    TPro  4.4  /  Alb  1.2  /  TBili  <0.1  /  DBili  x   /  AST  18  /  ALT  12  /  AlkPhos  59  [21 @ 20:35]    PT/INR: PT 12.3 , INR 1.03       [21 21:22]  PTT: 30.1       [21 21:22]    CK 54      [21 @ 11:05]    Creatinine Trend:  SCr 0.67 [ 20:35]  SCr 0.65 [ 11:05]  SCr 0.74 [ 07:26]  SCr 0.67 [ 00:57]    Urinalysis - [21 @ 21:22]      Color Yellow / Appearance Clear / SG >1.050 / pH 6.5      Gluc Negative / Ketone Negative  / Bili Negative / Urobili Negative       Blood Moderate / Protein >600 mg/dL / Leuk Est Negative / Nitrite Negative      RBC 15 / WBC 3 / Hyaline  / Gran  / Sq Epi  / Non Sq Epi 2 / Bacteria Few      Iron 23, TIBC 74, %sat 31      [21 @ 01:02]  Ferritin 90      [21 @ 01:02]  TSH 3.03      [21 @ 01:02]  Lipid: chol 789, , HDL 57, LDL --      [21 @ 03:39]    HBsAb Nonreact      [21 @ 09:45]  HCV 0.10, Nonreact      [21 @ 09:45]  HIV Nonreact      [21 @ 07:26]  HIV Nonreact      [20 @ 14:43]    SHELTON: titer Negative, pattern --      [21 @ 10:20]  C3 Complement 132      [21 @ 07:26]  C4 Complement 35      [21 @ 07:26]

## 2021-12-26 NOTE — H&P ADULT - ASSESSMENT
32 yo female presenting for evaluation of nephrotic syndrome  32 yo female presenting for evaluation of abdominal distension and COVID,  32 yo female presenting for evaluation of abdominal distension and COVID, undergoing renal workup for possible nephrotic syndrome

## 2021-12-26 NOTE — PROGRESS NOTE ADULT - PROBLEM SELECTOR PLAN 6
- hgb/hct 10.7/38; baseline hgb ~8  - hx of iron deficiency anemia   - currently menstruating (6th day)  - maintain active T+S  - daily ferrous sulfate once pt can tolerate po  - bowel regimen - hgb/hct 9.4/33; baseline hgb ~8  - hx of iron deficiency anemia   - currently menstruating (7th day)  - maintain active T+S  - daily ferrous sulfate once pt can tolerate po  - bowel regimen

## 2021-12-26 NOTE — H&P ADULT - PROBLEM SELECTOR PLAN 5
- hgb/hct __ baseline hgb ~8  - hx of iron deficiency anemia   - no s/s bleeding  - maintain active T+S  - daily ferrous sulfate   - bowel regimen - hx of DVT, thrombocytosis plt 542, uptrending  - BL leg swelling, improved. Hold off VA duplex of BL LE   - Will consider systemic anti-coagulation if there is any evidence of VTE. - hx of DVT  - BL leg swelling, improved. Hold off VA duplex of BL LE   - Will consider systemic anti-coagulation if there is any evidence of VTE. - hx of DVT  - BL leg swelling, improved. check VA duplex of BL LE   - Will consider systemic anti-coagulation if there is any evidence of VTE.

## 2021-12-26 NOTE — H&P ADULT - NSHPSOCIALHISTORY_GEN_ALL_CORE
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

## 2021-12-26 NOTE — ED ADULT NURSE REASSESSMENT NOTE - NS ED NURSE REASSESS COMMENT FT1
pt resting comfortably, denies n/v, shortness of breath chest pain at this time. pt updated on  plan of care

## 2021-12-26 NOTE — H&P ADULT - NSHPLABSRESULTS_GEN_ALL_CORE
.  LABS:                         10.7   9.45  )-----------( 542      ( 25 Dec 2021 20:35 )             37.9     12-25    133<L>  |  101  |  12  ----------------------------<  84  3.6   |  26  |  0.67    Ca    7.5<L>      25 Dec 2021 20:35    TPro  4.4<L>  /  Alb  1.2<L>  /  TBili  <0.1<L>  /  DBili  x   /  AST  18  /  ALT  12  /  AlkPhos  59  12-25    PT/INR - ( 25 Dec 2021 21:22 )   PT: 12.3 sec;   INR: 1.03 ratio         PTT - ( 25 Dec 2021 21:22 )  PTT:30.1 sec  Urinalysis Basic - ( 25 Dec 2021 21:22 )    Color: Yellow / Appearance: Clear / SG: >1.050 / pH: x  Gluc: x / Ketone: Negative  / Bili: Negative / Urobili: Negative   Blood: x / Protein: >600 mg/dL / Nitrite: Negative   Leuk Esterase: Negative / RBC: 15 /hpf / WBC 3 /HPF   Sq Epi: x / Non Sq Epi: 2 / Bacteria: Few            RADIOLOGY, EKG & ADDITIONAL TESTS: Reviewed. .  LABS:                         10.7   9.45  )-----------( 542      ( 25 Dec 2021 20:35 )             37.9     12-25    133<L>  |  101  |  12  ----------------------------<  84  3.6   |  26  |  0.67    Ca    7.5<L>      25 Dec 2021 20:35    TPro  4.4<L>  /  Alb  1.2<L>  /  TBili  <0.1<L>  /  DBili  x   /  AST  18  /  ALT  12  /  AlkPhos  59  12-25    PT/INR - ( 25 Dec 2021 21:22 )   PT: 12.3 sec;   INR: 1.03 ratio         PTT - ( 25 Dec 2021 21:22 )  PTT:30.1 sec  Urinalysis Basic - ( 25 Dec 2021 21:22 )    Color: Yellow / Appearance: Clear / SG: >1.050 / pH: x  Gluc: x / Ketone: Negative  / Bili: Negative / Urobili: Negative   Blood: x / Protein: >600 mg/dL / Nitrite: Negative   Leuk Esterase: Negative / RBC: 15 /hpf / WBC 3 /HPF   Sq Epi: x / Non Sq Epi: 2 / Bacteria: Few      EKG: rate 88, [place] 156, QTc 435 NSR    RADIOLOGY, EKG & ADDITIONAL TESTS: Reviewed. Labs, imaging and EKG personally reviewed and interpreted by me.   labs notable for normal WBC, baseline anemia, thrombocytosis, mild hypoNa, normal Cr. +protein in urine   Imaging personally reviewed and interpreted by me - CT chest - no effusions, no focal consolidations.   EKG personally reviewed and interpreted by me - sinus, no acute ischemic changes,     LABS:                         10.7   9.45  )-----------( 542      ( 25 Dec 2021 20:35 )             37.9     12-25    133<L>  |  101  |  12  ----------------------------<  84  3.6   |  26  |  0.67    Ca    7.5<L>      25 Dec 2021 20:35    TPro  4.4<L>  /  Alb  1.2<L>  /  TBili  <0.1<L>  /  DBili  x   /  AST  18  /  ALT  12  /  AlkPhos  59  12-25    PT/INR - ( 25 Dec 2021 21:22 )   PT: 12.3 sec;   INR: 1.03 ratio         PTT - ( 25 Dec 2021 21:22 )  PTT:30.1 sec  Urinalysis Basic - ( 25 Dec 2021 21:22 )    Color: Yellow / Appearance: Clear / SG: >1.050 / pH: x  Gluc: x / Ketone: Negative  / Bili: Negative / Urobili: Negative   Blood: x / Protein: >600 mg/dL / Nitrite: Negative   Leuk Esterase: Negative / RBC: 15 /hpf / WBC 3 /HPF   Sq Epi: x / Non Sq Epi: 2 / Bacteria: Few      EKG: rate 88, Ed[place]ira 156, QTc 435 NSR    RADIOLOGY, EKG & ADDITIONAL TESTS: Reviewed.

## 2021-12-26 NOTE — PROGRESS NOTE ADULT - PROBLEM SELECTOR PLAN 5
- hx of DVT  - BL leg swelling, improved. check VA duplex of BL LE   - Will consider systemic anti-coagulation if there is any evidence of VTE. - hx of DVT  - nephrotic syndrome increase risk of coagulation, started on heparin dtt  - order for PTT placed

## 2021-12-26 NOTE — PROGRESS NOTE ADULT - PROBLEM SELECTOR PLAN 2
- p/w abdominal distension  - f/u ED orders: CT abd/pelvis and CT chest   - associated with n/v/diarrhea   - EKG QTc < 500, Zofran prn, encourage oral hydration, c/w albumin and re-evaluate - p/w abdominal distension  - Chest CT: linear atelectasis of RLL   - CT abd/pelvis: showing some colitis but no fever, abdominal pain or bowel changes   - EKG QTc < 500, Zofran prn, encourage oral hydration

## 2021-12-26 NOTE — H&P ADULT - PROBLEM SELECTOR PLAN 2
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   Pt. clinically hypervolemic. Started on IV Bumex 1mg BID for fluid overload management on 12/22/21. Diuretic response might be sub-optimal given hypoalbuminemia, will reassess and titrate as needed. Renal duplex ruled out RVT. Will consider systemic anti-coagulation if there is any evidence of VTE. Follow up serologic work for Nephrotic syndrome. Check SHELTON, HBsAg(NR), Hep C antibody(NR), HIV (Neg), Parvovirus, C3 (WNL), C4 (WNl), PLA2R Ab, serum and urine immunofixation. Ideally would schedule for kidney biopsy, however will wait until COVID 19 resolves since pt. will not be a candidate for immunosuppression right away. Monitor UOP. Daily weights" - p/w x2 weeks of abdominal distension and LE swelling  - UA 12/25 w/proteinuria >600, unchanged from 12/22 and low albumin 1.2 (was 3.8-4.1 in Jan 2021)  - Renal US w/duplex negative for thrombosis   - Lasix and albumin given generalized edema and hypotension   - Renal workup up:   -- ddx: MCKENNA vs. primary/secondary membranous nephropathy    -- Follow up serologic work for Nephrotic syndrome.   -- Follow up SHELTON (WNL), HBsAg(NR), Hep C antibody(NR), HIV (Neg), Parvovirus, C3 (WNL), C4 (WNl), PLA2R Ab, serum and urine immunofixation.  - pending renal bx s/p COVID resolution  - Monitor UOP. Daily weights" - p/w x2 weeks of abdominal distension and LE swelling  - UA 12/25 w/proteinuria >600, unchanged from 12/22 and low albumin 1.2 (was 3.8-4.1 in Jan 2021)  - Renal US w/duplex negative for thrombosis   - Renal workup up:   -- ddx: MCKENNA vs. primary/secondary membranous nephropathy    -- Follow up serologic work for Nephrotic syndrome. Follow-up: SHELTON (WNL), HBsAg(NR), Hep C antibody(NR), HIV (Neg), Parvovirus, C3 (WNL), C4 (WNl), PLA2R Ab, serum and urine immunofixation.  - consult renal in AM  - pending urine protein/creatinine ratio   - c/w albumin, hold off fluids and/or diuretics at this time  - pending renal bx s/p COVID resolution  - Monitor UOP. Daily weights - p/w abdominal distension  - f/u ED orders: CT abd/pelvis and CT chest   - associated with n/v/diarrhea   - EKG QTc < 500, Zofran prn, encourage oral hydration, c/w albumin and re-evaluate

## 2021-12-26 NOTE — PROGRESS NOTE ADULT - PROBLEM SELECTOR PLAN 4
- Ox2 saturation 98% on RA  - unvaccinated + exposure from home   - Tylenol PRN for myalgia and fever  - trending inflammatory markers q72  - monitor oxygen saturations closely, maintain O2 sat > 93%  - f/u imaging - Ox2 saturation 98% on RA  - unvaccinated + exposure from home   - Tylenol PRN for myalgia and fever  - trending inflammatory markers q72  - monitor oxygen saturations closely, maintain O2 sat > 93%  - Chest CT with RLL atelectasis

## 2021-12-26 NOTE — PROGRESS NOTE ADULT - ASSESSMENT
32 yo female presenting for evaluation of abdominal distension and COVID, undergoing renal workup for possible nephrotic syndrome  30 yo female presenting for evaluation of abdominal distension and positive for COVID-19, undergoing renal workup for possible nephrotic syndrome.

## 2021-12-26 NOTE — CONSULT NOTE ADULT - ATTENDING COMMENTS
30 yo lady with sudden onset nephrotic syndrome on 12/4     the only potential etiology I was able to ascertain was NSAID use prior.     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 She does have mild COVID which was discovered as she had an exposure- would empirically start prednisone 1 mg/kg/day provided no disseminated infection  IR consult for kidney biopsy   Bumex 1 mg po BID   she needs anticoagulation for severe NS- Hopefully not for long. would start heparin drip instead of something long acting, considering she still needs a kidney biopsy and so heparin can be held.   calcium 1500 mg daily/ Vitamin d 1200 IU daily   statin     simona maradiaga  nephrology attending   Cell# [phone]   [facility]- [phone]

## 2021-12-26 NOTE — CONSULT NOTE ADULT - ASSESSMENT
Assessment: 31y Female with nephrotic syndrome and active COVID infection with IR consulted for non target renal biopsy    Plan: IR to defer biopsy at this time given active COVID infection and nephrology documentation stating no need for urgent biopsy while the patient has active COVID  - can consider biopsy once patient's COVID infection has resolved  - if patient is discharged home, she can call our outpatient office to schedule elective renal biopsy at [phone]  - discussed with primary team    Henry Luu MD  PGY-IV, Interventional Radiology  [facility]-[phone], [phone]  [facility]-[phone] ([phone]), [phone]

## 2021-12-26 NOTE — PROGRESS NOTE ADULT - PROBLEM SELECTOR PLAN 1
- p/w x2 weeks of abdominal distension and LE swelling  - UA 12/25 w/proteinuria >600, unchanged from 12/22 and low albumin 1.2 (was 3.8-4.1 in Jan 2021)  - Renal US w/duplex negative for thrombosis   - Renal workup up:   -- ddx: MCKENNA vs. primary/secondary membranous nephropathy    -- Follow up serologic work for Nephrotic syndrome. Follow-up: SHELTON (WNL), HBsAg(NR), Hep C antibody(NR), HIV (Neg), Parvovirus, C3 (WNL), C4 (WNl), PLA2R Ab, serum and urine immunofixation.  - consult renal in AM  - pending urine protein/creatinine ratio   - c/w albumin, hold off fluids and/or diuretics at this time  - pending renal bx s/p COVID resolution  - Monitor UOP. Daily weights - p/w x2 weeks of abdominal distension and LE swelling  - UA 12/25 w/proteinuria >600, unchanged from 12/22 and low albumin 1.2 (was 3.8-4.1 in Jan 2021)  - Renal US w/duplex negative for thrombosis   - Renal workup up: MCKENNA vs. primary/secondary membranous nephropathy   -Follow up serologic work for Nephrotic syndrome. Follow-up: all negative up to date, awaiting Phospholipase A serum, phospholipase A2 receptor antibody, lipoprotein A serum   - renal following   - pending urine protein/creatinine ratio   - pending renal bx s/p COVID resolution  - Monitor UOP. Daily weights  - Titration of Lasik 40 mg BID for fluid overload  - discontinue albumin

## 2021-12-26 NOTE — H&P ADULT - PROBLEM SELECTOR PLAN 3
- hx of DVT   - BL leg swelling, VA duplex of BL LE ordered to r/o DVT - p/w abdominal distension  - consider abdominal ultrasound with possible fluid removal if progressively worsens  - associated with n/v/diarrhea   - EKG QTc < 500, Zofran prn, fluid resuscitation started - p/w abdominal distension  - f/u ED orders: CT abd/pelvis and CT chest   - associated with n/v/diarrhea   - EKG QTc < 500, Zofran prn, encourage oral hydration, c/w albumin and re-evaluate - c/o N/V/diarrhea-like loose stools  - hypovolemic, renal function stable Cr 0.67, LDH elevated 675, thrombocytosis   - nausea, non-bilious non-blood emesis, and loose stools   - now hypovolemic, was treated with Bumex 1 mg bid for fluid overload management on 12/22  - c/w albumin, hold diuretics and maintenance fluids at this time  - f/u GI PCR stool i/s/o loose stools x1day   - encourage oral hydration   - f/u renal reccs on fluids

## 2021-12-26 NOTE — PROGRESS NOTE ADULT - SUBJECTIVE AND OBJECTIVE BOX
PROGRESS NOTE:   Authored by Alyssa Coon MD   Patient is a 31y old  Female who presents with a chief complaint of     SUBJECTIVE / OVERNIGHT EVENTS: Pt states she is doing better with no chest, pain     ADDITIONAL REVIEW OF SYSTEMS:  CONSTITUTIONAL: No fever, weight loss, or fatigue  EYES: No eye pain, visual disturbances, or discharge  ENMT:  No difficulty hearing, tinnitus, vertigo; No sinus or throat pain  NECK: No pain or stiffness  BREASTS: No pain, masses, or nipple discharge  RESPIRATORY: No cough, wheezing, chills or hemoptysis; No shortness of breath  CARDIOVASCULAR: No chest pain, palpitations, dizziness, or leg swelling  GASTROINTESTINAL: No abdominal or epigastric pain. No nausea, vomiting, or hematemesis; No diarrhea or constipation. No melena or hematochezia.  GENITOURINARY: No dysuria, frequency, hematuria, or incontinence  NEUROLOGICAL: No headaches, memory loss, loss of strength, numbness, or tremors  SKIN: No itching, burning, rashes, or lesions   LYMPH NODES: No enlarged glands  ENDOCRINE: No heat or cold intolerance; No hair loss  MUSCULOSKELETAL: No joint pain or swelling; No muscle, back, or extremity pain  PSYCHIATRIC: No depression, anxiety, mood swings, or difficulty sleeping  HEME/LYMPH: No easy bruising, or bleeding gums  ALLERY AND IMMUNOLOGIC: No hives or eczema    MEDICATIONS  (STANDING):  atorvastatin 80 milliGRAM(s) Oral at bedtime  calcium acetate 667 milliGRAM(s) Oral three times a day with meals  cholecalciferol 2000 Unit(s) Oral daily  enoxaparin Injectable 40 milliGRAM(s) SubCutaneous daily    MEDICATIONS  (PRN):  acetaminophen     Tablet .. 650 milliGRAM(s) Oral every 6 hours PRN Temp greater or equal to 38C (100.4F), Mild Pain (1 - 3)  aluminum hydroxide/magnesium hydroxide/simethicone Suspension 30 milliLiter(s) Oral every 4 hours PRN Dyspepsia  melatonin 3 milliGRAM(s) Oral at bedtime PRN Insomnia  ondansetron Injectable 4 milliGRAM(s) IV Push every 6 hours PRN Nausea and/or Vomiting      CAPILLARY BLOOD GLUCOSE        I&O's Summary      PHYSICAL EXAM:  Vital Signs Last 24 Hrs  T(C): 37.5 (26 Dec 2021 03:03), Max: 37.5 (26 Dec 2021 03:03)  T(F): 99.5 (26 Dec 2021 03:03), Max: 99.5 (26 Dec 2021 03:03)  HR: 90 (26 Dec 2021 03:03) (90 - 109)  BP: 108/69 (26 Dec 2021 03:03) (99/66 - 112/69)  BP(mean): --  RR: 18 (26 Dec 2021 03:03) (18 - 24)  SpO2: 98% (26 Dec 2021 03:03) (97% - 100%)    GENERAL: No acute distress, well-developed  HEAD:  Atraumatic, Normocephalic  EYES: EOMI, PERRLA, conjunctiva and sclera clear  NECK: Supple, no lymphadenopathy, no JVD  CHEST/LUNG: CTAB; No wheezes, rales, or rhonchi  HEART: Regular rate and rhythm; No murmurs, rubs, or gallops  ABDOMEN: Soft, non-tender, non-distended; normal bowel sounds, no organomegaly  EXTREMITIES:  2+ peripheral pulses b/l, No clubbing, cyanosis, or edema  NEUROLOGY: A&O x 3, no focal deficits  SKIN: No rashes or lesions    LABS:                        9.4    7.97  )-----------( 496      ( 26 Dec 2021 10:12 )             33.3     12-26    133<L>  |  103  |  12  ----------------------------<  87  3.8   |  22  |  0.63    Ca    7.5<L>      26 Dec 2021 10:12  Phos  3.6     12-26  Mg     2.2     12-26    TPro  3.9<L>  /  Alb  1.2<L>  /  TBili  0.1<L>  /  DBili  x   /  AST  18  /  ALT  13  /  AlkPhos  45  12-26    PT/INR - ( 25 Dec 2021 21:22 )   PT: 12.3 sec;   INR: 1.03 ratio         PTT - ( 25 Dec 2021 21:22 )  PTT:30.1 sec      Urinalysis Basic - ( 25 Dec 2021 21:22 )    Color: Yellow / Appearance: Clear / SG: >1.050 / pH: x  Gluc: x / Ketone: Negative  / Bili: Negative / Urobili: Negative   Blood: x / Protein: >600 mg/dL / Nitrite: Negative   Leuk Esterase: Negative / RBC: 15 /hpf / WBC 3 /HPF   Sq Epi: x / Non Sq Epi: 2 / Bacteria: Few          RADIOLOGY & ADDITIONAL TESTS:  Results Reviewed:   Imaging Personally Reviewed:  Electrocardiogram Personally Reviewed:    COORDINATION OF CARE:  Care Discussed with Consultants/Other Providers [Y/N]:  Prior or Outpatient Records Reviewed [Y/N]:   PROGRESS NOTE:   Authored by Alyssa Coon MD   Patient is a 31y old  Female who presents with a chief complaint of     SUBJECTIVE / OVERNIGHT EVENTS:   Pt states she is doing better with no chest or abdominal pain. Of note, pt is having mucous that appear white. Denies any cough or shortness of breath.     ADDITIONAL REVIEW OF SYSTEMS:  CONSTITUTIONAL: No fever, weight loss, or fatigue  EYES: No eye pain, visual disturbances, or discharge  ENMT:  No difficulty hearing, tinnitus, vertigo; No sinus or throat pain  NECK: No pain or stiffness  BREASTS: No pain, masses, or nipple discharge  RESPIRATORY: No cough, wheezing, chills or hemoptysis; No shortness of breath  CARDIOVASCULAR: No chest pain, palpitations, dizziness, or leg swelling  GASTROINTESTINAL: No abdominal or epigastric pain. No nausea, vomiting, or hematemesis; No diarrhea or constipation. No melena or hematochezia.  GENITOURINARY: No dysuria, frequency, hematuria, or incontinence  NEUROLOGICAL: No headaches, memory loss, loss of strength, numbness, or tremors  SKIN: No itching, burning, rashes, or lesions   LYMPH NODES: No enlarged glands  ENDOCRINE: No heat or cold intolerance; No hair loss  MUSCULOSKELETAL: No joint pain or swelling; No muscle, back, or extremity pain  PSYCHIATRIC: No depression, anxiety, mood swings, or difficulty sleeping  HEME/LYMPH: No easy bruising, or bleeding gums  ALLERY AND IMMUNOLOGIC: No hives or eczema    MEDICATIONS  (STANDING):  atorvastatin 80 milliGRAM(s) Oral at bedtime  calcium acetate 667 milliGRAM(s) Oral three times a day with meals  cholecalciferol 2000 Unit(s) Oral daily  enoxaparin Injectable 40 milliGRAM(s) SubCutaneous daily    MEDICATIONS  (PRN):  acetaminophen     Tablet .. 650 milliGRAM(s) Oral every 6 hours PRN Temp greater or equal to 38C (100.4F), Mild Pain (1 - 3)  aluminum hydroxide/magnesium hydroxide/simethicone Suspension 30 milliLiter(s) Oral every 4 hours PRN Dyspepsia  melatonin 3 milliGRAM(s) Oral at bedtime PRN Insomnia  ondansetron Injectable 4 milliGRAM(s) IV Push every 6 hours PRN Nausea and/or Vomiting      CAPILLARY BLOOD GLUCOSE        I&O's Summary      PHYSICAL EXAM:  Vital Signs Last 24 Hrs  T(C): 37.5 (26 Dec 2021 03:03), Max: 37.5 (26 Dec 2021 03:03)  T(F): 99.5 (26 Dec 2021 03:03), Max: 99.5 (26 Dec 2021 03:03)  HR: 90 (26 Dec 2021 03:03) (90 - 109)  BP: 108/69 (26 Dec 2021 03:03) (99/66 - 112/69)  BP(mean): --  RR: 18 (26 Dec 2021 03:03) (18 - 24)  SpO2: 98% (26 Dec 2021 03:03) (97% - 100%)    GENERAL: No acute distress, well-developed  EYES: EOMI, PERRLA, conjunctiva and sclera clear  CHEST/LUNG: CTAB; No wheezes, rales, or rhonchi  HEART: Regular rate and rhythm; No murmurs, rubs, or gallops  ABDOMEN: Soft, non-tender, non-distended; normal bowel sounds, no organomegaly  EXTREMITIES:  2+ peripheral pulses b/l, +1 pedal edema.  NEUROLOGY: A&O x 3, no focal deficits      LABS:                        9.4    7.97  )-----------( 496      ( 26 Dec 2021 10:12 )             33.3     12-26    133<L>  |  103  |  12  ----------------------------<  87  3.8   |  22  |  0.63    Ca    7.5<L>      26 Dec 2021 10:12  Phos  3.6     12-26  Mg     2.2     12-26    TPro  3.9<L>  /  Alb  1.2<L>  /  TBili  0.1<L>  /  DBili  x   /  AST  18  /  ALT  13  /  AlkPhos  45  12-26    PT/INR - ( 25 Dec 2021 21:22 )   PT: 12.3 sec;   INR: 1.03 ratio         PTT - ( 25 Dec 2021 21:22 )  PTT:30.1 sec      Urinalysis Basic - ( 25 Dec 2021 21:22 )    Color: Yellow / Appearance: Clear / SG: >1.050 / pH: x  Gluc: x / Ketone: Negative  / Bili: Negative / Urobili: Negative   Blood: x / Protein: >600 mg/dL / Nitrite: Negative   Leuk Esterase: Negative / RBC: 15 /hpf / WBC 3 /HPF   Sq Epi: x / Non Sq Epi: 2 / Bacteria: Few          RADIOLOGY & ADDITIONAL TESTS:  Results Reviewed:   Imaging Personally Reviewed:  Electrocardiogram Personally Reviewed:    COORDINATION OF CARE:  Care Discussed with Consultants/Other Providers [Y/N]:  Prior or Outpatient Records Reviewed [Y/N]:

## 2021-12-27 ENCOUNTER — TRANSCRIPTION ENCOUNTER (OUTPATIENT)
Age: 31
End: 2021-12-27

## 2021-12-27 LAB
ACE SERPL-CCNC: 86 U/L — HIGH (ref 14–82)
ANION GAP SERPL CALC-SCNC: 7 MMOL/L — SIGNIFICANT CHANGE UP (ref 5–17)
APPEARANCE UR: CLEAR — SIGNIFICANT CHANGE UP
APTT BLD: 33.8 SEC — SIGNIFICANT CHANGE UP (ref 27.5–35.5)
APTT BLD: 42 SEC — HIGH (ref 27.5–35.5)
BILIRUB UR-MCNC: NEGATIVE — SIGNIFICANT CHANGE UP
BUN SERPL-MCNC: 17 MG/DL — SIGNIFICANT CHANGE UP (ref 7–23)
CALCIUM SERPL-MCNC: 8.3 MG/DL — LOW (ref 8.4–10.5)
CHLORIDE SERPL-SCNC: 99 MMOL/L — SIGNIFICANT CHANGE UP (ref 96–108)
CO2 SERPL-SCNC: 25 MMOL/L — SIGNIFICANT CHANGE UP (ref 22–31)
COLOR SPEC: YELLOW — SIGNIFICANT CHANGE UP
CREAT SERPL-MCNC: 0.75 MG/DL — SIGNIFICANT CHANGE UP (ref 0.5–1.3)
DIFF PNL FLD: ABNORMAL
GLUCOSE SERPL-MCNC: 91 MG/DL — SIGNIFICANT CHANGE UP (ref 70–99)
GLUCOSE UR QL: NEGATIVE — SIGNIFICANT CHANGE UP
HCT VFR BLD CALC: 31.7 % — LOW (ref 34.5–45)
HCT VFR BLD CALC: 33.3 % — LOW (ref 34.5–45)
HGB BLD-MCNC: 9.1 G/DL — LOW (ref 11.5–15.5)
HGB BLD-MCNC: 9.5 G/DL — LOW (ref 11.5–15.5)
KETONES UR-MCNC: NEGATIVE — SIGNIFICANT CHANGE UP
LEUKOCYTE ESTERASE UR-ACNC: ABNORMAL
MAGNESIUM SERPL-MCNC: 2.3 MG/DL — SIGNIFICANT CHANGE UP (ref 1.6–2.6)
MCHC RBC-ENTMCNC: 21.3 PG — LOW (ref 27–34)
MCHC RBC-ENTMCNC: 21.4 PG — LOW (ref 27–34)
MCHC RBC-ENTMCNC: 28.5 GM/DL — LOW (ref 32–36)
MCHC RBC-ENTMCNC: 28.7 GM/DL — LOW (ref 32–36)
MCV RBC AUTO: 74.6 FL — LOW (ref 80–100)
MCV RBC AUTO: 74.8 FL — LOW (ref 80–100)
NITRITE UR-MCNC: NEGATIVE — SIGNIFICANT CHANGE UP
NRBC # BLD: 0 /100 WBCS — SIGNIFICANT CHANGE UP (ref 0–0)
NRBC # BLD: 0 /100 WBCS — SIGNIFICANT CHANGE UP (ref 0–0)
PH UR: 6.5 — SIGNIFICANT CHANGE UP (ref 5–8)
PHOSPHATE SERPL-MCNC: 4.4 MG/DL — SIGNIFICANT CHANGE UP (ref 2.5–4.5)
PLATELET # BLD AUTO: 634 K/UL — HIGH (ref 150–400)
PLATELET # BLD AUTO: 765 K/UL — HIGH (ref 150–400)
POTASSIUM SERPL-MCNC: 3.7 MMOL/L — SIGNIFICANT CHANGE UP (ref 3.5–5.3)
POTASSIUM SERPL-SCNC: 3.7 MMOL/L — SIGNIFICANT CHANGE UP (ref 3.5–5.3)
PROT UR-MCNC: >600
RBC # BLD: 4.25 M/UL — SIGNIFICANT CHANGE UP (ref 3.8–5.2)
RBC # BLD: 4.45 M/UL — SIGNIFICANT CHANGE UP (ref 3.8–5.2)
RBC # FLD: 25.5 % — HIGH (ref 10.3–14.5)
RBC # FLD: 25.5 % — HIGH (ref 10.3–14.5)
SODIUM SERPL-SCNC: 131 MMOL/L — LOW (ref 135–145)
SP GR SPEC: 1.02 — SIGNIFICANT CHANGE UP (ref 1.01–1.02)
UROBILINOGEN FLD QL: NEGATIVE — SIGNIFICANT CHANGE UP
WBC # BLD: 15.8 K/UL — HIGH (ref 3.8–10.5)
WBC # BLD: 18.05 K/UL — HIGH (ref 3.8–10.5)
WBC # FLD AUTO: 15.8 K/UL — HIGH (ref 3.8–10.5)
WBC # FLD AUTO: 18.05 K/UL — HIGH (ref 3.8–10.5)

## 2021-12-27 PROCEDURE — 99233 SBSQ HOSP IP/OBS HIGH 50: CPT | Mod: GC

## 2021-12-27 RX ORDER — FERROUS SULFATE 325(65) MG
325 TABLET ORAL DAILY
Refills: 0 | Status: DISCONTINUED | OUTPATIENT
Start: 2021-12-27 | End: 2021-12-29

## 2021-12-27 RX ORDER — HEPARIN SODIUM 5000 [USP'U]/ML
INJECTION INTRAVENOUS; SUBCUTANEOUS
Qty: 25000 | Refills: 0 | Status: DISCONTINUED | OUTPATIENT
Start: 2021-12-27 | End: 2021-12-29

## 2021-12-27 RX ORDER — HEPARIN SODIUM 5000 [USP'U]/ML
2000 INJECTION INTRAVENOUS; SUBCUTANEOUS EVERY 6 HOURS
Refills: 0 | Status: DISCONTINUED | OUTPATIENT
Start: 2021-12-27 | End: 2021-12-29

## 2021-12-27 RX ORDER — SIMETHICONE 80 MG/1
80 TABLET, CHEWABLE ORAL ONCE
Refills: 0 | Status: DISCONTINUED | OUTPATIENT
Start: 2021-12-27 | End: 2021-12-27

## 2021-12-27 RX ORDER — ACETAMINOPHEN 500 MG
1000 TABLET ORAL ONCE
Refills: 0 | Status: COMPLETED | OUTPATIENT
Start: 2021-12-27 | End: 2021-12-27

## 2021-12-27 RX ORDER — SIMETHICONE 80 MG/1
80 TABLET, CHEWABLE ORAL ONCE
Refills: 0 | Status: COMPLETED | OUTPATIENT
Start: 2021-12-27 | End: 2021-12-27

## 2021-12-27 RX ORDER — HEPARIN SODIUM 5000 [USP'U]/ML
4500 INJECTION INTRAVENOUS; SUBCUTANEOUS EVERY 6 HOURS
Refills: 0 | Status: DISCONTINUED | OUTPATIENT
Start: 2021-12-27 | End: 2021-12-29

## 2021-12-27 RX ADMIN — Medication 667 MILLIGRAM(S): at 11:59

## 2021-12-27 RX ADMIN — Medication 20 MILLIGRAM(S): at 14:55

## 2021-12-27 RX ADMIN — Medication 2000 UNIT(S): at 11:59

## 2021-12-27 RX ADMIN — HEPARIN SODIUM 1100 UNIT(S)/HR: 5000 INJECTION INTRAVENOUS; SUBCUTANEOUS at 14:46

## 2021-12-27 RX ADMIN — Medication 400 MILLIGRAM(S): at 11:26

## 2021-12-27 RX ADMIN — HEPARIN SODIUM 1100 UNIT(S)/HR: 5000 INJECTION INTRAVENOUS; SUBCUTANEOUS at 19:20

## 2021-12-27 RX ADMIN — SIMETHICONE 80 MILLIGRAM(S): 80 TABLET, CHEWABLE ORAL at 05:45

## 2021-12-27 RX ADMIN — ONDANSETRON 4 MILLIGRAM(S): 8 TABLET, FILM COATED ORAL at 14:54

## 2021-12-27 RX ADMIN — Medication 400 MILLIGRAM(S): at 06:52

## 2021-12-27 RX ADMIN — Medication 30 MILLILITER(S): at 02:03

## 2021-12-27 RX ADMIN — HEPARIN SODIUM 1200 UNIT(S)/HR: 5000 INJECTION INTRAVENOUS; SUBCUTANEOUS at 21:55

## 2021-12-27 RX ADMIN — Medication 20 MILLIGRAM(S): at 22:08

## 2021-12-27 RX ADMIN — Medication 60 MILLIGRAM(S): at 12:16

## 2021-12-27 RX ADMIN — ATORVASTATIN CALCIUM 80 MILLIGRAM(S): 80 TABLET, FILM COATED ORAL at 22:08

## 2021-12-27 RX ADMIN — HEPARIN SODIUM 2000 UNIT(S): 5000 INJECTION INTRAVENOUS; SUBCUTANEOUS at 22:07

## 2021-12-27 NOTE — DISCHARGE NOTE PROVIDER - NSDCFUADDAPPT_GEN_ALL_CORE_FT
If you were unable to wait for your scheduled inpatient renal biopsy, you can call the outpatient interventional radiology office at [phone] to schedule your biopsy outpatient. Please follow-up with your nephrologist regardless if you are able to make the biopsy appointment. Your nephrologist or primary care provider can help you make the appointment if you are unable to.  Please go to your scheduled kidney biopsy with interventional radiology on January 6th, 2021 at 11 AM at [facility]. Please come to your appointment 1 hour before and do not eat 6 hours prior to your biopsy. You must have a negative COVID test within 48 hours of your biopsy appointment. You can go to the drive-in testing center on [address] on January 4th. The kidney doctors will call you after your discharge from the hospital to set up an appointment within 2 weeks.

## 2021-12-27 NOTE — PROGRESS NOTE ADULT - PROBLEM SELECTOR PLAN 3
- nausea and vomiting improved   - hypovolemic, renal function stable Cr 0.67, LDH elevated 675, thrombocytosis   - now hypovolemic, was treated with Bumex 1 mg bid for fluid overload management on 12/22  - titrated to Lasix 40 mg BID   - f/u GI PCR stool i/s/o loose stools x1day   - encourage oral hydration - nausea and vomiting improved   - hypovolemic, renal function stable Cr 0.67, LDH elevated 675, thrombocytosis   - now hypovolemic, was treated with Bumex 1 mg bid for fluid overload management on 12/22  - titrated to Lasix 40 mg BID   - GI PCR neg  - encourage oral hydration

## 2021-12-27 NOTE — PROGRESS NOTE ADULT - PROBLEM SELECTOR PLAN 4
- Ox2 saturation 98% on RA  - unvaccinated + exposure from home   - Tylenol PRN for myalgia and fever  - trending inflammatory markers q72  - monitor oxygen saturations closely, maintain O2 sat > 93%  - Chest CT with RLL atelectasis

## 2021-12-27 NOTE — DISCHARGE NOTE PROVIDER - CARE PROVIDER_API CALL
Jose Roberto Yin J  CARDIOVASCULAR DISEASE  [address] [place] [id number]  Phone: [phone]  Fax: [phone]  Follow Up Time: Routine

## 2021-12-27 NOTE — PROVIDER CONTACT NOTE (OTHER) - REASON
Pt c/o 10/10 gas pain. pt refusing oral meds and blood draw
Pt refusing heparin drip and bumex
Pt refusing lambert / tawana to speak with provider

## 2021-12-27 NOTE — PROVIDER CONTACT NOTE (OTHER) - ACTION/TREATMENT ORDERED:
MD ordered IV Tylenol
MD notified and made aware, pt educated on risks of refusing treatment. will continue to monitor
provider recc. placing pt on primafit for now to get measured urine output. unable to come to bedside at  this time.

## 2021-12-27 NOTE — DISCHARGE NOTE PROVIDER - NSDCCPCAREPLAN_GEN_ALL_CORE_FT
PRINCIPAL DISCHARGE DIAGNOSIS  Diagnosis: Nephrotic syndrome  Assessment and Plan of Treatment: Nephrotic syndrome is a condition that results from damage to the glomeruli in the kidneys. Glomeruli are filters that remove toxins and waste products from the bloodstream. When they are damaged, glomeruli may also remove needed products, such as proteins. In nephrotic syndrome, the body loses too much of the proteins and other needed products.  If you have nephrotic syndrome, you may have:  •Proteinuria, which is high levels of protein in your urine.  •High blood pressure.  •Hypoalbuminemia, which is a low level of the protein albumin in your blood.  •High cholesterol.  •High blood levels of fatty substances known as triglycerides.  •Edema, or swelling, of your face, abdomen, arms, and legs.         PRINCIPAL DISCHARGE DIAGNOSIS  Diagnosis: Nephrotic syndrome  Assessment and Plan of Treatment: We are concerned that you have nephrotic syndrome. Nephrotic syndrome is a condition that results from damage to the glomeruli in the kidneys. Glomeruli are filters that remove toxins and waste products from the bloodstream. When they are damaged, glomeruli may also remove needed products, such as proteins. In nephrotic syndrome, the body loses too much of the proteins and other needed products.   If you have nephrotic syndrome, you may have:  •Proteinuria, which is high levels of protein in your urine.  •High blood pressure.  •Hypoalbuminemia, which is a low level of the protein albumin in your blood.  •High cholesterol.  •High blood levels of fatty substances known as triglycerides.  •Edema, or swelling, of your face, abdomen, arms, and legs.  Please follow up with the kidney doctors within a few days of discharge. It is very important that you establish care with them to get your kidney biopsy and start appropriate treatment. If you do not treat your underlying kidney disorder, you can lose your kidney function and require dialysis.       PRINCIPAL DISCHARGE DIAGNOSIS  Diagnosis: Nephrotic syndrome  Assessment and Plan of Treatment: We are concerned that you have nephrotic syndrome. Please go to your scheduled kidney biopsy with interventional radiology on January 6th, 2021 at 11 AM at [facility]. Please come to your appointment 1 hour before and do not eat 6 hours prior to your biopsy. You must have a negative COVID test within 48 hours of your biopsy appointment. You can go to the drive-in testing center on [address] on January 4th. The kidney doctors will call you after your discharge from the hospital to set up an appointment within 2 weeks. Please take your prescribed torsemide to reduce the build up of fluid in your body.         Also please follow up with the cardiologist listed in your discharge paperwork. Because of your underlying kidney disease, you have a very high cholesterol and may require additional lab testing and medication. Having a high cholesterol puts you at risk for stroke and heart attacks.        Please follow up with the kidney doctors. It is very important that you establish care with them to get your kidney biopsy and start appropriate treatment. If you do not treat your underlying kidney disorder, depending on the disoder, you can lose your kidney function and require dialysis.        Nephrotic syndrome is a condition that results from damage to the glomeruli in the kidneys. Glomeruli are filters that remove toxins and waste products from the bloodstream. When they are damaged, glomeruli may also remove needed products, such as proteins. In nephrotic syndrome, the body loses too much of the proteins and other needed products.   If you have nephrotic syndrome, you may have:  •Proteinuria, which is high levels of protein in your urine.  •High blood pressure.  •Hypoalbuminemia, which is a low level of the protein albumin in your blood.  •High cholesterol.  •High blood levels of fatty substances known as triglycerides.  •Edema, or swelling, of your face, abdomen, arms, and legs.

## 2021-12-27 NOTE — CHART NOTE - NSCHARTNOTEFT_GEN_A_CORE
Informed by RN that pt is refusing heparin drip and bumex drip. Spoke to patient at length. She is anxious that she is not getting better. Educated patient regarding the need for a heparin drip in her situation and pt verbalized understanding the risks of blood clots including PE's and pt still wanted to reconsider in the AM. Pt was c/o of bleeding gums however on my PE, there was no bleeding noted. Educated patient regarding the need for a bumex drip and pt stated that she has been on it previously and it has not helped her at all; pt stated she will reconsider in the AM.

## 2021-12-27 NOTE — PROGRESS NOTE ADULT - PROBLEM SELECTOR PLAN 1
- p/w x2 weeks of abdominal distension and LE swelling  - UA 12/25 w/proteinuria >600, unchanged from 12/22 and low albumin 1.2 (was 3.8-4.1 in Jan 2021)  - Renal US w/duplex negative for thrombosis   - Renal workup up: MCKENNA vs. primary/secondary membranous nephropathy   -Follow up serologic work for Nephrotic syndrome. Follow-up: all negative up to date, awaiting Phospholipase A serum, phospholipase A2 receptor antibody, lipoprotein A serum   - renal following   - pending urine protein/creatinine ratio   - pending renal bx s/p COVID resolution  - Monitor UOP. Daily weights  - Titration of Lasik 40 mg BID for fluid overload  - discontinue albumin - p/w x2 weeks of abdominal distension and LE swelling  - UA 12/25 w/proteinuria >600, unchanged from 12/22 and low albumin 1.2 (was 3.8-4.1 in Jan 2021)  - Renal US w/duplex negative for thrombosis   - Renal workup up: MCKENNA vs. primary/secondary membranous nephropathy   -Follow up serologic work for Nephrotic syndrome. Follow-up: all negative up to date, awaiting Phospholipase A serum, phospholipase A2 receptor antibody, lipoprotein A serum   - renal following   - Urine protein/Cr ratio unable to calc with urine protein >2000  - pending renal bx s/p COVID resolution  - Monitor UOP. Daily weights  - Not responsive to Lasix, poor UOP  - Refusal to try Bumex IV or drip 2/2 nausea (refusing anti-emetics). Will trial PO torsemide. If still poor UOP, consider metolazone as additive  - C/w prednisone 60 mg  -C/w heparin gtt prophylactically for hypercoagulable state - p/w x2 weeks of abdominal distension and LE swelling  - UA 12/25 w/proteinuria >600, unchanged from 12/22 and low albumin 1.2 (was 3.8-4.1 in Jan 2021)  - Renal US w/duplex negative for thrombosis   - Renal workup up: MCKENNA vs. primary/secondary membranous nephropathy   -Follow up serologic work for Nephrotic syndrome. Follow-up: all negative up to date, awaiting Phospholipase A serum, phospholipase A2 receptor antibody, lipoprotein A serum   - renal following   - Urine protein/Cr ratio unable to calc with urine protein >2000  - pending renal bx s/p COVID resolution. Per IR, unable to get biopsy until completes quarantine for 10 days from positive result on 12/22.   - Monitor UOP. Daily weights  - Not responsive to Lasix, poor UOP  - Refusal to try Bumex IV or drip 2/2 nausea (refusing anti-emetics). Will trial PO torsemide. If still poor UOP, consider metolazone as additive  - C/w prednisone 60 mg  -C/w heparin gtt prophylactically for hypercoagulable state

## 2021-12-27 NOTE — PROVIDER CONTACT NOTE (OTHER) - SITUATION
Provider contacted, pt refusing Holguin and requested to speak with team
Pt complaining of severe gas pain, received simethicone and Malox with no relief.
Pt refusing heparin drip and bumex, pt states she tastes blood in her mouth

## 2021-12-27 NOTE — PROGRESS NOTE ADULT - PROBLEM SELECTOR PLAN 2
- p/w abdominal distension  - Chest CT: linear atelectasis of RLL   - CT abd/pelvis: showing some colitis but no fever, abdominal pain or bowel changes   - EKG QTc < 500, Zofran prn, encourage oral hydration - p/w abdominal distension and pain  -DDx colitis vs worsening anasarca  - Chest CT: linear atelectasis of RLL   - CT abd/pelvis: showing some colitis but no fever, abdominal pain or bowel changes   - GI PCR negative  - Pending Stool culture and C diff  - Given IV tylenol with improvement. Willing to try PO Percocet or IV dilaudid (small doses) if pain returns  - EKG QTc < 500, Zofran prn, encourage oral hydration

## 2021-12-27 NOTE — PROGRESS NOTE ADULT - PROBLEM SELECTOR PLAN 1
Pt. admitted with anasarca in the setting of Nephrotic syndrome. Lab and clinical criteria diagnostic of Nephrotic syndrome. Of note, pt. also diagnosed with COVID 19, however symptoms preceded infection. As per work up from [facility] suspected underlying minimal change disease vs primary / secondary membranous nephropathy as well.  Serum albumin low at 1.2 on admission    Given low albumin and history of DVT she was recommended to start anticoagulation with heparin, but refused.   Pt. recently evaluated at OSH for nephrotic syndrome and planned to undergo kidney biopsy, however procedure was cancelled due to COVID 19.     Pt. clinically hypervolemic and refused Bumex. Agreeable to po diuretics.   At this time recommend torsemide 40mg po day   Follow up rest of serologic work (PLA2R Ab, serum and urine immunofixation pending)  Monitor UOP. Daily weights.  On 12/29/21 she will be 7 days isolation since first texted positive for covid.   please update IR and possible  to schedule kidney Bx for 12/29 or 12/30.

## 2021-12-27 NOTE — DISCHARGE NOTE PROVIDER - NSDCMRMEDTOKEN_GEN_ALL_CORE_FT
ibuprofen 600 mg oral tablet: 1 tab(s) orally every 6 hours, as needed for pain  Tylenol 325 mg oral capsule: 2 cap(s) orally every 6 hours, as needed for pain   atorvastatin 80 mg oral tablet: 1 tab(s) orally once a day (at bedtime)  cholecalciferol oral tablet: 2000 unit(s) orally once a day  ferrous sulfate 325 mg (65 mg elemental iron) oral tablet: 1 tab(s) orally once a day  torsemide 20 mg oral tablet: 2 tab(s) orally once a day   atorvastatin 80 mg oral tablet: 1 tab(s) orally once a day (at bedtime)  cholecalciferol oral tablet: 2000 unit(s) orally once a day  ferrous sulfate 325 mg (65 mg elemental iron) oral tablet: 1 tab(s) orally once a day  torsemide 20 mg oral tablet: 1 tab(s) orally once a day

## 2021-12-27 NOTE — DISCHARGE NOTE PROVIDER - NSFOLLOWUPCLINICS_GEN_ALL_ED_FT
[facility]  Nephrology  [address] [place] [address], [address] [place] [address]  Phone: [phone]  Fax:   Established Patient  Follow Up Time: 1-3 days

## 2021-12-27 NOTE — DISCHARGE NOTE PROVIDER - HOSPITAL COURSE
31 year old female with PMHx asthma, GERD, iron deficiency anemia, DVT (s/p  6 yo) who presents for abdominal discomfort and lower extremity swelling.     The patient states she noticed increased abdominal distension, facial swelling, and lower extremity swelling approximately 2.5-3 weeks ago. The patient was evaluated at [facility]. She was subsequently [facility] and followed by nephrology to evaluate for nephrotic syndrome. Urine studies were significant for proteinuria (> 600) and low albumin (1.2); treated with albumin and lasix. Ordered lower extremity doppler to r/o DVT.  At these OSH, the patient was scheduled for renal biopsy, however, pt was found to be COVID positive and biopsy was held. [facility] and presents to [facility] for continued treatment of her lower extremity swelling and abdominal discomfort.     On evaluation, the patient is very nauseous and complaining of abdominal heaviness and distension, denies any camille pain. Endorsing associated vomiting and she is unable to keep fluids or solids down and also reports 1.5 day of non bloody loose stools today (6 bowel movements). The patient states her lower extremity swelling has improved. She is not short of breath and denies any chest pain, fever, chills, dizziness, LOC, or recent trauma.    In the ED:   Temp (F): 98.5 Degrees F; Heart Rate (beats/min): 109 /min; BP: 99/66 mm Hg; RR: 18 /min; SpO2 (%): 100 %  Given zofran and 50 mL of albumin IV. Ordered venous duplex of LE, CT ab and pel, CT chest, Order Cr/protein ratio       General Medicine Floor:  Pt received hemodynamically stable. Pt with no pain but with b/l pitting edema. Nephro was consulted and recommended further work up for nephrotic syndrome with high suspicion of minimal change disease. Per nephro, started pt on prednisone 60 mg qd and d/c albumin IV. Given low albumin and hx of coagulation makes pt high risk, patient was started on heparin gtt. Pt hesitate about starting heparin (), but risks and benefits explained. Pt fluid overload with pitting edema on PE, given torsemide 20 mg BID. IR was also consulted but renal biopsy cannot be done until pt COVID neg. CT ab/pelvis significant for moderate to large volume ascites and diffuse abdominal wall anasarca, new from prior CT abdomen pelvis 2021 as well as diffuse colonic wall thickening, suggestive of a colitis. Pt not having any abdominal pain but GI PCR and stool study ordered. GI PCR neg. Chest CT show atelectasis of RLL but pt with no hypoxia or SOB.    31 year old female with PMHx asthma, GERD, iron deficiency anemia, DVT (s/p  4 yo) who presents for abdominal discomfort and lower extremity swelling.     The patient states she noticed increased abdominal distension, facial swelling, and lower extremity swelling approximately 2.5-3 weeks ago. The patient was evaluated at [facility]. She was subsequently evaluated at [facility] and followed by nephrology to evaluate for nephrotic syndrome. Urine studies were significant for proteinuria (> 600) and low albumin (1.2); treated with albumin and lasix. Ordered lower extremity doppler to r/o DVT.  At these OSH, the patient was scheduled for renal biopsy, however, pt was found to be COVID positive and biopsy was held. The patient left [facility] for "poor care" and presents to [facility] for continued treatment of her lower extremity swelling and abdominal discomfort.     On evaluation, the patient is very nauseous and complaining of abdominal heaviness and distension, denies any camille pain. Endorsing associated vomiting and she is unable to keep fluids or solids down and also reports 1.5 day of non bloody loose stools today (6 bowel movements). The patient states her lower extremity swelling has improved. She is not short of breath and denies any chest pain, fever, chills, dizziness, LOC, or recent trauma.    In the ED:   Temp (F): 98.5 Degrees F; Heart Rate (beats/min): 109 /min; BP: 99/66 mm Hg; RR: 18 /min; SpO2 (%): 100 %  Given zofran and 50 mL of albumin IV. Ordered venous duplex of LE, CT ab and pel, CT chest, Ordered Cr/protein ratio.      General Medicine Floor:  Pt presented to the floors hemodynamically stable. CT ab/pelvis significant for moderate to large volume ascites and diffuse abdominal wall anasarca, new from prior CT abdomen pelvis 2021 as well as diffuse colonic wall thickening, suggestive of a colitis. Chest CT show atelectasis of RLL but pt with no hypoxia or SOB. Nephrology was consulted and recommended further work up for nephrotic syndrome with high suspicion of minimal change disease vs membranous nephropathy. Per nephrology, started pt on prednisone 60 mg qd which was subsequently discontinued until diagnosis established. IV albumin also discontinued. Given low albumin and hx of coagulation makes pt high risk, patient was started on heparin gtt prophylactically. Patient with fluid overload with pitting edema on exam, started on torsemide 20 mg BID, increased to torsemide 40mg QD. GI PCR and C diff negative, and patient remained afebrile without elevation in WBC, so monitored off antibiotics.      IR/Ultrasound was also consulted but renal biopsy cannot be done until patient completes isolation on 22.    31 year old female with PMHx asthma, GERD, iron deficiency anemia, DVT (s/p  6 yo) who presents for abdominal discomfort and lower extremity swelling.     The patient states she noticed increased abdominal distension, facial swelling, and lower extremity swelling approximately 2.5-3 weeks ago. The patient was evaluated at [facility]. She was subsequently evaluated at [facility] and followed by nephrology to evaluate for nephrotic syndrome. Urine studies were significant for proteinuria (> 600) and low albumin (1.2); treated with albumin and lasix. Ordered lower extremity doppler to r/o DVT.  At these OSH, the patient was scheduled for renal biopsy, however, pt was found to be COVID positive and biopsy was held. The patient left [facility] AMA for "poor care" and presents to [facility] for continued treatment of her lower extremity swelling and abdominal discomfort.     On evaluation, the patient is very nauseous and complaining of abdominal heaviness and distension, denies any camille pain. Endorsing associated vomiting and she is unable to keep fluids or solids down and also reports 1.5 day of non bloody loose stools today (6 bowel movements). The patient states her lower extremity swelling has improved. She is not short of breath and denies any chest pain, fever, chills, dizziness, LOC, or recent trauma.    In the ED:   Temp (F): 98.5 Degrees F; Heart Rate (beats/min): 109 /min; BP: 99/66 mm Hg; RR: 18 /min; SpO2 (%): 100 %  Given zofran and 50 mL of albumin IV. Ordered venous duplex of LE, CT ab and pel, CT chest, Ordered Cr/protein ratio.      General Medicine Floor:  Pt presented to the floors hemodynamically stable. CT ab/pelvis significant for moderate to large volume ascites and diffuse abdominal wall anasarca, new from prior CT abdomen pelvis 2021 as well as diffuse colonic wall thickening, suggestive of a colitis. Chest CT show atelectasis of RLL but pt with no hypoxia or SOB. Nephrology was consulted and recommended further work up for nephrotic syndrome with high suspicion of minimal change disease vs membranous nephropathy. Per nephrology, started pt on prednisone 60 mg qd which was subsequently discontinued until diagnosis established. IV albumin also discontinued. Given low albumin and hx of coagulation makes pt high risk, patient was started on heparin gtt prophylactically. Patient with fluid overload with pitting edema on exam, started on torsemide 20 mg BID, increased to torsemide 40mg QD. GI PCR and C diff negative, and patient remained afebrile without elevation in WBC, so monitored off antibiotics.      IR/Ultrasound was also consulted but renal biopsy cannot be done until patient completes isolation on 22. After discussion with nephrology, patient to have renal biopsy performed by IR oupatient with close nephrology follow-up. Patient to be discharged on torsemide 20 mg and without anticoagulation. Patient hemodynamically stable and prepared for discharge.

## 2021-12-27 NOTE — PROGRESS NOTE ADULT - ASSESSMENT
32 yo female presenting for evaluation of abdominal distension and positive for COVID-19, undergoing renal workup for possible nephrotic syndrome.

## 2021-12-27 NOTE — PROGRESS NOTE ADULT - SUBJECTIVE AND OBJECTIVE BOX
[facility] DIVISION OF KIDNEY DISEASES AND HYPERTENSION -- FOLLOW UP NOTE  --------------------------------------------------------------------------------  If any questions, please feel free to contact me  NS pager: [phone], [facility]: [phone]  Basilio Leggett M.D.  Nephrology Fellow    (After 5 pm or on weekends please page the on-call fellow)  --------------------------------------------------------------------------------    HPI:  31 year old female with PMH of asthma, prior history of DVT presents to the hospital due to abdominal distension and worsening swelling. The patient had initially presented to [facility] with similar complaints and at that time was being worked up for nephrotic syndrome. The plan was to obtain a renal biopsy however the patient is under quarantine for testing positive for COVID. Admission labs relevant for hypoalbuminemia with serum albumin of 1.2. UA positive for significant proteinuria.     Patient seen and examined at bedside, in NAD, on room air. Cr stable. she was started on prednisone for empiric treatment of nephrotic syndrome. Vitals/labs/imaging reviewed       PAST HISTORY  --------------------------------------------------------------------------------  No significant changes to PMH, PSH, FHx, SHx, unless otherwise noted    ALLERGIES & MEDICATIONS  --------------------------------------------------------------------------------  Allergies    codeine (Hives)    Intolerances      Standing Inpatient Medications  atorvastatin 80 milliGRAM(s) Oral at bedtime  calcium acetate 667 milliGRAM(s) Oral three times a day with meals  cholecalciferol 2000 Unit(s) Oral daily  heparin  Infusion.  Unit(s)/Hr IV Continuous <Continuous>  predniSONE   Tablet 60 milliGRAM(s) Oral daily  torsemide 20 milliGRAM(s) Oral every 12 hours    PRN Inpatient Medications  acetaminophen     Tablet .. 650 milliGRAM(s) Oral every 6 hours PRN  aluminum hydroxide/magnesium hydroxide/simethicone Suspension 30 milliLiter(s) Oral every 4 hours PRN  heparin   Injectable 4500 Unit(s) IV Push every 6 hours PRN  heparin   Injectable 2000 Unit(s) IV Push every 6 hours PRN  melatonin 3 milliGRAM(s) Oral at bedtime PRN  ondansetron Injectable 4 milliGRAM(s) IV Push every 6 hours PRN      REVIEW OF SYSTEMS  --------------------------------------------------------------------------------  Gen: No fevers/chills  Skin: No rashes  Respiratory: No dyspnea, cough  CV: No chest pain  GI: +abd distension    : No dysuria, hematuria  MSK: +LE  edema    All other systems were reviewed and are negative, except as noted.    VITALS/PHYSICAL EXAM  --------------------------------------------------------------------------------  T(C): 36.7 (12-27-21 @ 07:52), Max: 37.3 (12-27-21 @ 00:18)  HR: 85 (12-27-21 @ 07:52) (83 - 94)  BP: 99/64 (12-27-21 @ 07:52) (99/64 - 105/70)  RR: 18 (12-27-21 @ 07:52) (18 - 18)  SpO2: 96% (12-27-21 @ 07:52) (96% - 98%)  Wt(kg): --  Height (cm): 154.9 (12-26-21 @ 12:33)  Weight (kg): 60 (12-27-21 @ 09:59)  BMI (kg/m2): 25 (12-27-21 @ 09:59)  BSA (m2): 1.58 (12-27-21 @ 09:59)      12-26-21 @ 07:01  -  12-27-21 @ 07:00  --------------------------------------------------------  IN: 862 mL / OUT: 300 mL / NET: 562 mL        Physical Exam:  	Gen: NAD  	Pulm: CTA B/L  	CV: S1S2  	Abd: Soft, +BS   	Ext: 2+ LE edema B/L  	Neuro: Awake  	Skin: Warm and dry      LABS/STUDIES  --------------------------------------------------------------------------------              9.1    18.05 >-----------<  765      [12-27-21 @ 12:52]              31.7     133  |  103  |  12  ----------------------------<  87      [12-26-21 @ 10:12]  3.8   |  22  |  0.63        Ca     7.5     [12-26-21 @ 10:12]      Mg     2.2     [12-26-21 @ 10:12]      Phos  3.6     [12-26-21 @ 10:12]    TPro  3.9  /  Alb  1.2  /  TBili  0.1  /  DBili  x   /  AST  18  /  ALT  13  /  AlkPhos  45  [12-26-21 @ 10:12]    PT/INR: PT 12.3 , INR 1.03       [12-25-21 @ 21:22]  PTT: 33.8       [12-27-21 @ 12:52]      Creatinine Trend:  SCr 0.63 [12-26 @ 10:12]  SCr 0.67 [12-25 @ 20:35]  SCr 0.65 [12-24 @ 11:05]  SCr 0.74 [12-23 @ 07:26]  SCr 0.67 [12-22 @ 00:57]    Urinalysis - [12-25-21 @ 21:22]      Color Yellow / Appearance Clear / SG >1.050 / pH 6.5      Gluc Negative / Ketone Negative  / Bili Negative / Urobili Negative       Blood Moderate / Protein >600 mg/dL / Leuk Est Negative / Nitrite Negative      RBC 15 / WBC 3 / Hyaline  / Gran  / Sq Epi  / Non Sq Epi 2 / Bacteria Few    Urine Creatinine 312      [12-26-21 @ 12:36]  Urine Protein >2000      [12-26-21 @ 12:36]    Iron 23, TIBC 74, %sat 31      [12-22-21 @ 01:02]  Ferritin 90      [12-22-21 @ 01:02]  TSH 3.03      [12-22-21 @ 01:02]  Lipid: chol 789, , HDL 57, LDL --      [12-26-21 @ 03:39]    HBsAb Nonreact      [12-23-21 @ 09:45]  HCV 0.10, Nonreact      [12-23-21 @ 09:45]  HIV Nonreact      [12-23-21 @ 07:26]    SHELTON: titer Negative, pattern --      [12-23-21 @ 10:20]  C3 Complement 132      [12-23-21 @ 07:26]  C4 Complement 35      [12-23-21 @ 07:26]

## 2021-12-27 NOTE — PROGRESS NOTE ADULT - SUBJECTIVE AND OBJECTIVE BOX
Arlene Gregory, PGY-2  Internal Medicine  Pager: [phone]/[facility] [phone]    PROGRESS NOTE:     Patient is a 31y old  Female who presents with a chief complaint of b/l lower extremity edema (26 Dec 2021 12:25)      SUBJECTIVE / OVERNIGHT EVENTS:    ADDITIONAL REVIEW OF SYSTEMS:    MEDICATIONS  (STANDING):  atorvastatin 80 milliGRAM(s) Oral at bedtime  buMETAnide Infusion 0.25 mG/Hr (1.25 mL/Hr) IV Continuous <Continuous>  calcium acetate 667 milliGRAM(s) Oral three times a day with meals  cholecalciferol 2000 Unit(s) Oral daily  heparin  Infusion.  Unit(s)/Hr (11 mL/Hr) IV Continuous <Continuous>  predniSONE   Tablet 60 milliGRAM(s) Oral daily    MEDICATIONS  (PRN):  acetaminophen     Tablet .. 650 milliGRAM(s) Oral every 6 hours PRN Temp greater or equal to 38C (100.4F), Mild Pain (1 - 3)  aluminum hydroxide/magnesium hydroxide/simethicone Suspension 30 milliLiter(s) Oral every 4 hours PRN Dyspepsia  melatonin 3 milliGRAM(s) Oral at bedtime PRN Insomnia  ondansetron Injectable 4 milliGRAM(s) IV Push every 6 hours PRN Nausea and/or Vomiting      CAPILLARY BLOOD GLUCOSE        I&O's Summary    26 Dec 2021 07:01  -  27 Dec 2021 07:00  --------------------------------------------------------  IN: 862 mL / OUT: 300 mL / NET: 562 mL        PHYSICAL EXAM:  Vital Signs Last 24 Hrs  T(C): 37.3 (27 Dec 2021 04:51), Max: 37.3 (27 Dec 2021 00:18)  T(F): 99.2 (27 Dec 2021 04:51), Max: 99.2 (27 Dec 2021 04:51)  HR: 87 (27 Dec 2021 04:51) (83 - 94)  BP: 101/65 (27 Dec 2021 04:51) (98/61 - 105/70)  BP(mean): --  RR: 18 (27 Dec 2021 04:51) (18 - 18)  SpO2: 96% (27 Dec 2021 04:51) (96% - 99%)    CONSTITUTIONAL: NAD, well-developed  RESPIRATORY: Normal respiratory effort; lungs are clear to auscultation bilaterally  CARDIOVASCULAR: Regular rate and rhythm, normal S1 and S2, no murmur/rub/gallop; No lower extremity edema; Peripheral pulses are 2+ bilaterally  ABDOMEN: Nontender to palpation, normoactive bowel sounds, no rebound/guarding; No hepatosplenomegaly  MUSCLOSKELETAL: no clubbing or cyanosis of digits; no joint swelling or tenderness to palpation  PSYCH: A+O to person, place, and time; affect appropriate    LABS:                        9.4    7.97  )-----------( 496      ( 26 Dec 2021 10:12 )             33.3     12-26    133<L>  |  103  |  12  ----------------------------<  87  3.8   |  22  |  0.63    Ca    7.5<L>      26 Dec 2021 10:12  Phos  3.6     12-26  Mg     2.2     12-26    TPro  3.9<L>  /  Alb  1.2<L>  /  TBili  0.1<L>  /  DBili  x   /  AST  18  /  ALT  13  /  AlkPhos  45  12-26    PT/INR - ( 25 Dec 2021 21:22 )   PT: 12.3 sec;   INR: 1.03 ratio         PTT - ( 25 Dec 2021 21:22 )  PTT:30.1 sec      Urinalysis Basic - ( 25 Dec 2021 21:22 )    Color: Yellow / Appearance: Clear / SG: >1.050 / pH: x  Gluc: x / Ketone: Negative  / Bili: Negative / Urobili: Negative   Blood: x / Protein: >600 mg/dL / Nitrite: Negative   Leuk Esterase: Negative / RBC: 15 /hpf / WBC 3 /HPF   Sq Epi: x / Non Sq Epi: 2 / Bacteria: Few        GI PCR Panel, Stool (collected 26 Dec 2021 21:51)  Source: .Stool Feces  Final Report (26 Dec 2021 23:26):    GI PCR Results: NOT detected    *******Please Note:*******    GI panel PCR evaluates for:    Campylobacter, Plesiomonas shigelloides, Salmonella,    Vibrio, Yersinia enterocolitica, Enteroaggregative    Escherichia coli (EAEC), Enteropathogenic E.coli (EPEC),    Enterotoxigenic E. coli (ETEC) lt/st, Shiga-like    toxin-producing E. coli (STEC) stx1/stx2,    Shigella/ Enteroinvasive E. coli (EIEC), Cryptosporidium,    Cyclospora cayetanensis, Entamoeba histolytica,    Giardia lamblia, Adenovirus F 40/41, Astrovirus,    Norovirus GI/GII, Rotavirus A, Sapovirus        RADIOLOGY & ADDITIONAL TESTS:  Results Reviewed:   Imaging Personally Reviewed:  Electrocardiogram Personally Reviewed:    COORDINATION OF CARE:  Care Discussed with Consultants/Other Providers [Y/N]:  Prior or Outpatient Records Reviewed [Y/N]:   Arlene Gregory, PGY-2  Internal Medicine  Pager: [phone]/[facility] [phone]    PROGRESS NOTE:     Patient is a 31y old  Female who presents with a chief complaint of b/l lower extremity edema (26 Dec 2021 12:25)      SUBJECTIVE / OVERNIGHT EVENTS: Refusing heparin and bumex gtt overnight.     ADDITIONAL REVIEW OF SYSTEMS:    MEDICATIONS  (STANDING):  atorvastatin 80 milliGRAM(s) Oral at bedtime  buMETAnide Infusion 0.25 mG/Hr (1.25 mL/Hr) IV Continuous <Continuous>  calcium acetate 667 milliGRAM(s) Oral three times a day with meals  cholecalciferol 2000 Unit(s) Oral daily  heparin  Infusion.  Unit(s)/Hr (11 mL/Hr) IV Continuous <Continuous>  predniSONE   Tablet 60 milliGRAM(s) Oral daily    MEDICATIONS  (PRN):  acetaminophen     Tablet .. 650 milliGRAM(s) Oral every 6 hours PRN Temp greater or equal to 38C (100.4F), Mild Pain (1 - 3)  aluminum hydroxide/magnesium hydroxide/simethicone Suspension 30 milliLiter(s) Oral every 4 hours PRN Dyspepsia  melatonin 3 milliGRAM(s) Oral at bedtime PRN Insomnia  ondansetron Injectable 4 milliGRAM(s) IV Push every 6 hours PRN Nausea and/or Vomiting      CAPILLARY BLOOD GLUCOSE        I&O's Summary    26 Dec 2021 07:01  -  27 Dec 2021 07:00  --------------------------------------------------------  IN: 862 mL / OUT: 300 mL / NET: 562 mL        PHYSICAL EXAM:  Vital Signs Last 24 Hrs  T(C): 37.3 (27 Dec 2021 04:51), Max: 37.3 (27 Dec 2021 00:18)  T(F): 99.2 (27 Dec 2021 04:51), Max: 99.2 (27 Dec 2021 04:51)  HR: 87 (27 Dec 2021 04:51) (83 - 94)  BP: 101/65 (27 Dec 2021 04:51) (98/61 - 105/70)  BP(mean): --  RR: 18 (27 Dec 2021 04:51) (18 - 18)  SpO2: 96% (27 Dec 2021 04:51) (96% - 99%)    CONSTITUTIONAL: Mild distress 2/2 pain, well developed female  RESPIRATORY: Normal respiratory effort; lungs are clear to auscultation bilaterally  CARDIOVASCULAR: Regular rate and rhythm, normal S1 and S2, no murmur/rub/gallop   ABDOMEN: Soft, tender in epigastrium, hypoactive bowel sounds, no rebound/guarding  MUSCLOSKELETAL: No lower extremity edema; Peripheral pulses are 2+ bilaterally   NEURO: AOx3, non focal exam, moving all extremities  LABS:                        9.4    7.97  )-----------( 496      ( 26 Dec 2021 10:12 )             33.3     12-26    133<L>  |  103  |  12  ----------------------------<  87  3.8   |  22  |  0.63    Ca    7.5<L>      26 Dec 2021 10:12  Phos  3.6     12-26  Mg     2.2     12-26    TPro  3.9<L>  /  Alb  1.2<L>  /  TBili  0.1<L>  /  DBili  x   /  AST  18  /  ALT  13  /  AlkPhos  45  12-26    PT/INR - ( 25 Dec 2021 21:22 )   PT: 12.3 sec;   INR: 1.03 ratio         PTT - ( 25 Dec 2021 21:22 )  PTT:30.1 sec      Urinalysis Basic - ( 25 Dec 2021 21:22 )    Color: Yellow / Appearance: Clear / SG: >1.050 / pH: x  Gluc: x / Ketone: Negative  / Bili: Negative / Urobili: Negative   Blood: x / Protein: >600 mg/dL / Nitrite: Negative   Leuk Esterase: Negative / RBC: 15 /hpf / WBC 3 /HPF   Sq Epi: x / Non Sq Epi: 2 / Bacteria: Few        GI PCR Panel, Stool (collected 26 Dec 2021 21:51)  Source: .Stool Feces  Final Report (26 Dec 2021 23:26):    GI PCR Results: NOT detected    *******Please Note:*******    GI panel PCR evaluates for:    Campylobacter, Plesiomonas shigelloides, Salmonella,    Vibrio, Yersinia enterocolitica, Enteroaggregative    Escherichia coli (EAEC), Enteropathogenic E.coli (EPEC),    Enterotoxigenic E. coli (ETEC) lt/st, Shiga-like    toxin-producing E. coli (STEC) stx1/stx2,    Shigella/ Enteroinvasive E. coli (EIEC), Cryptosporidium,    Cyclospora cayetanensis, Entamoeba histolytica,    Giardia lamblia, Adenovirus F 40/41, Astrovirus,    Norovirus GI/GII, Rotavirus A, Sapovirus        RADIOLOGY & ADDITIONAL TESTS:  Results Reviewed:   Imaging Personally Reviewed:  Electrocardiogram Personally Reviewed:    COORDINATION OF CARE:  Care Discussed with Consultants/Other Providers [Y/N]:  Prior or Outpatient Records Reviewed [Y/N]:   Arlene Gregory, PGY-2  Internal Medicine  Pager: [phone]/[facility] [phone]    PROGRESS NOTE:     Patient is a 31y old  Female who presents with a chief complaint of b/l lower extremity edema (26 Dec 2021 12:25)      SUBJECTIVE / OVERNIGHT EVENTS: Refusing heparin and bumex gtt overnight. At bedside, complaining of 10/10 abdominal pain. 5 loose BMs overnight. Endorses nausea without vomiting, refuses anti-emetics.     ADDITIONAL REVIEW OF SYSTEMS: Above. No CP, SOB.     MEDICATIONS  (STANDING):  atorvastatin 80 milliGRAM(s) Oral at bedtime  buMETAnide Infusion 0.25 mG/Hr (1.25 mL/Hr) IV Continuous <Continuous>  calcium acetate 667 milliGRAM(s) Oral three times a day with meals  cholecalciferol 2000 Unit(s) Oral daily  heparin  Infusion.  Unit(s)/Hr (11 mL/Hr) IV Continuous <Continuous>  predniSONE   Tablet 60 milliGRAM(s) Oral daily    MEDICATIONS  (PRN):  acetaminophen     Tablet .. 650 milliGRAM(s) Oral every 6 hours PRN Temp greater or equal to 38C (100.4F), Mild Pain (1 - 3)  aluminum hydroxide/magnesium hydroxide/simethicone Suspension 30 milliLiter(s) Oral every 4 hours PRN Dyspepsia  melatonin 3 milliGRAM(s) Oral at bedtime PRN Insomnia  ondansetron Injectable 4 milliGRAM(s) IV Push every 6 hours PRN Nausea and/or Vomiting      CAPILLARY BLOOD GLUCOSE        I&O's Summary    26 Dec 2021 07:01  -  27 Dec 2021 07:00  --------------------------------------------------------  IN: 862 mL / OUT: 300 mL / NET: 562 mL        PHYSICAL EXAM:  Vital Signs Last 24 Hrs  T(C): 37.3 (27 Dec 2021 04:51), Max: 37.3 (27 Dec 2021 00:18)  T(F): 99.2 (27 Dec 2021 04:51), Max: 99.2 (27 Dec 2021 04:51)  HR: 87 (27 Dec 2021 04:51) (83 - 94)  BP: 101/65 (27 Dec 2021 04:51) (98/61 - 105/70)  BP(mean): --  RR: 18 (27 Dec 2021 04:51) (18 - 18)  SpO2: 96% (27 Dec 2021 04:51) (96% - 99%)    CONSTITUTIONAL: Mild distress 2/2 pain, well developed female  RESPIRATORY: Normal respiratory effort; lungs are clear to auscultation bilaterally  CARDIOVASCULAR: Regular rate and rhythm, normal S1 and S2, no murmur/rub/gallop   ABDOMEN: Soft, tender in epigastrium, hypoactive bowel sounds, no rebound/guarding  MUSCLOSKELETAL: No lower extremity edema; Peripheral pulses are 2+ bilaterally   NEURO: AOx3, non focal exam, moving all extremities  LABS:                        9.4    7.97  )-----------( 496      ( 26 Dec 2021 10:12 )             33.3     12-26    133<L>  |  103  |  12  ----------------------------<  87  3.8   |  22  |  0.63    Ca    7.5<L>      26 Dec 2021 10:12  Phos  3.6     12-26  Mg     2.2     12-26    TPro  3.9<L>  /  Alb  1.2<L>  /  TBili  0.1<L>  /  DBili  x   /  AST  18  /  ALT  13  /  AlkPhos  45  12-26    PT/INR - ( 25 Dec 2021 21:22 )   PT: 12.3 sec;   INR: 1.03 ratio         PTT - ( 25 Dec 2021 21:22 )  PTT:30.1 sec      Urinalysis Basic - ( 25 Dec 2021 21:22 )    Color: Yellow / Appearance: Clear / SG: >1.050 / pH: x  Gluc: x / Ketone: Negative  / Bili: Negative / Urobili: Negative   Blood: x / Protein: >600 mg/dL / Nitrite: Negative   Leuk Esterase: Negative / RBC: 15 /hpf / WBC 3 /HPF   Sq Epi: x / Non Sq Epi: 2 / Bacteria: Few        GI PCR Panel, Stool (collected 26 Dec 2021 21:51)  Source: .Stool Feces  Final Report (26 Dec 2021 23:26):    GI PCR Results: NOT detected    *******Please Note:*******    GI panel PCR evaluates for:    Campylobacter, Plesiomonas shigelloides, Salmonella,    Vibrio, Yersinia enterocolitica, Enteroaggregative    Escherichia coli (EAEC), Enteropathogenic E.coli (EPEC),    Enterotoxigenic E. coli (ETEC) lt/st, Shiga-like    toxin-producing E. coli (STEC) stx1/stx2,    Shigella/ Enteroinvasive E. coli (EIEC), Cryptosporidium,    Cyclospora cayetanensis, Entamoeba histolytica,    Giardia lamblia, Adenovirus F 40/41, Astrovirus,    Norovirus GI/GII, Rotavirus A, Sapovirus        RADIOLOGY & ADDITIONAL TESTS:  Results Reviewed:   Imaging Personally Reviewed:  Electrocardiogram Personally Reviewed:    COORDINATION OF CARE:  Care Discussed with Consultants/Other Providers [Y/N]:  Prior or Outpatient Records Reviewed [Y/N]:

## 2021-12-27 NOTE — PROVIDER CONTACT NOTE (OTHER) - BACKGROUND
pt presents with edema. concern for nephrotic syndrome hx of UTI, DVT,
Pt came in with abdominal pain and LE edema, covid positive
Pt admitted for abdominal pain and general swelling of LE. R/O nephrotic syndrome, covid positive.

## 2021-12-27 NOTE — PROGRESS NOTE ADULT - PROBLEM SELECTOR PLAN 6
- hgb/hct 9.4/33; baseline hgb ~8  - hx of iron deficiency anemia   - currently menstruating (7th day)  - maintain active T+S  - daily ferrous sulfate once pt can tolerate po  - bowel regimen - hgb/hct 9.4/33; baseline hgb ~8  - hx of iron deficiency anemia   - completing menstruation  - maintain active T+S  - daily ferrous sulfate once pt can tolerate po  - bowel regimen

## 2021-12-27 NOTE — PROVIDER CONTACT NOTE (OTHER) - ASSESSMENT
low urine output for patient throughout shift.
Pt's abdomen distended, VS WDL, pt screaming in pain and vomiting.
Pt A&O x4. pt refused to be put back on heparin drip and bumex. Pt in no apparent distress. Denies any pain. No blood found in pt's mouth, no active bleeding anywhere

## 2021-12-27 NOTE — PROGRESS NOTE ADULT - PROBLEM SELECTOR PLAN 5
- hx of DVT  - nephrotic syndrome increase risk of coagulation, started on heparin dtt  - order for PTT placed - hx of DVT  - nephrotic syndrome increase risk of coagulation, started on heparin dtt  -Previous refusing, now willing to have heparin gtt  - order for PTT placed

## 2021-12-28 LAB
ANION GAP SERPL CALC-SCNC: 7 MMOL/L — SIGNIFICANT CHANGE UP (ref 5–17)
ANION GAP SERPL CALC-SCNC: 8 MMOL/L — SIGNIFICANT CHANGE UP (ref 5–17)
APTT BLD: 52 SEC — HIGH (ref 27.5–35.5)
APTT BLD: 56.7 SEC — HIGH (ref 27.5–35.5)
APTT BLD: 72.9 SEC — HIGH (ref 27.5–35.5)
B19V DNA FLD QL NAA+PROBE: SIGNIFICANT CHANGE UP IU/ML
BACTERIA # UR AUTO: NEGATIVE — SIGNIFICANT CHANGE UP
BUN SERPL-MCNC: 16 MG/DL — SIGNIFICANT CHANGE UP (ref 7–23)
BUN SERPL-MCNC: 17 MG/DL — SIGNIFICANT CHANGE UP (ref 7–23)
C DIFF GDH STL QL: NEGATIVE — SIGNIFICANT CHANGE UP
C DIFF GDH STL QL: SIGNIFICANT CHANGE UP
CALCIUM SERPL-MCNC: 7.1 MG/DL — LOW (ref 8.4–10.5)
CALCIUM SERPL-MCNC: 7.3 MG/DL — LOW (ref 8.4–10.5)
CHLORIDE SERPL-SCNC: 94 MMOL/L — LOW (ref 96–108)
CHLORIDE SERPL-SCNC: 97 MMOL/L — SIGNIFICANT CHANGE UP (ref 96–108)
CO2 SERPL-SCNC: 30 MMOL/L — SIGNIFICANT CHANGE UP (ref 22–31)
CO2 SERPL-SCNC: 32 MMOL/L — HIGH (ref 22–31)
CREAT SERPL-MCNC: 0.69 MG/DL — SIGNIFICANT CHANGE UP (ref 0.5–1.3)
CREAT SERPL-MCNC: 0.79 MG/DL — SIGNIFICANT CHANGE UP (ref 0.5–1.3)
EPI CELLS # UR: 6 — SIGNIFICANT CHANGE UP
GLUCOSE SERPL-MCNC: 83 MG/DL — SIGNIFICANT CHANGE UP (ref 70–99)
GLUCOSE SERPL-MCNC: 98 MG/DL — SIGNIFICANT CHANGE UP (ref 70–99)
HCT VFR BLD CALC: 28.5 % — LOW (ref 34.5–45)
HGB BLD-MCNC: 8.2 G/DL — LOW (ref 11.5–15.5)
HYALINE CASTS # UR AUTO: 12 /LPF — HIGH (ref 0–7)
INR BLD: 1.05 RATIO — SIGNIFICANT CHANGE UP (ref 0.88–1.16)
LIDOCAIN SERPL-MCNC: 684.3 NMOL/L — HIGH
MAGNESIUM SERPL-MCNC: 1.6 MG/DL — SIGNIFICANT CHANGE UP (ref 1.6–2.6)
MAGNESIUM SERPL-MCNC: 1.9 MG/DL — SIGNIFICANT CHANGE UP (ref 1.6–2.6)
MCHC RBC-ENTMCNC: 21.8 PG — LOW (ref 27–34)
MCHC RBC-ENTMCNC: 28.8 GM/DL — LOW (ref 32–36)
MCV RBC AUTO: 75.6 FL — LOW (ref 80–100)
NRBC # BLD: 0 /100 WBCS — SIGNIFICANT CHANGE UP (ref 0–0)
PHOSPHATE SERPL-MCNC: 2.7 MG/DL — SIGNIFICANT CHANGE UP (ref 2.5–4.5)
PHOSPHATE SERPL-MCNC: 3.4 MG/DL — SIGNIFICANT CHANGE UP (ref 2.5–4.5)
PHOSPHOLIPASE A2 RECEPTOR ELISA: <1.8 RU/ML — SIGNIFICANT CHANGE UP (ref 0–19.9)
PLATELET # BLD AUTO: 567 K/UL — HIGH (ref 150–400)
POTASSIUM SERPL-MCNC: 2.8 MMOL/L — CRITICAL LOW (ref 3.5–5.3)
POTASSIUM SERPL-MCNC: 3.1 MMOL/L — LOW (ref 3.5–5.3)
POTASSIUM SERPL-SCNC: 2.8 MMOL/L — CRITICAL LOW (ref 3.5–5.3)
POTASSIUM SERPL-SCNC: 3.1 MMOL/L — LOW (ref 3.5–5.3)
PROTHROM AB SERPL-ACNC: 12.6 SEC — SIGNIFICANT CHANGE UP (ref 10.6–13.6)
RBC # BLD: 3.77 M/UL — LOW (ref 3.8–5.2)
RBC # FLD: 25.2 % — HIGH (ref 10.3–14.5)
RBC CASTS # UR COMP ASSIST: 4 /HPF — SIGNIFICANT CHANGE UP (ref 0–4)
SODIUM SERPL-SCNC: 134 MMOL/L — LOW (ref 135–145)
SODIUM SERPL-SCNC: 134 MMOL/L — LOW (ref 135–145)
WBC # BLD: 12.08 K/UL — HIGH (ref 3.8–10.5)
WBC # FLD AUTO: 12.08 K/UL — HIGH (ref 3.8–10.5)
WBC UR QL: 10 /HPF — HIGH (ref 0–5)

## 2021-12-28 PROCEDURE — 99233 SBSQ HOSP IP/OBS HIGH 50: CPT | Mod: GC

## 2021-12-28 PROCEDURE — 76775 US EXAM ABDO BACK WALL LIM: CPT | Mod: 26

## 2021-12-28 RX ORDER — METOCLOPRAMIDE HCL 10 MG
5 TABLET ORAL ONCE
Refills: 0 | Status: COMPLETED | OUTPATIENT
Start: 2021-12-28 | End: 2021-12-28

## 2021-12-28 RX ORDER — METOCLOPRAMIDE HCL 10 MG
10 TABLET ORAL EVERY 6 HOURS
Refills: 0 | Status: DISCONTINUED | OUTPATIENT
Start: 2021-12-28 | End: 2021-12-29

## 2021-12-28 RX ORDER — ATORVASTATIN CALCIUM 80 MG/1
1 TABLET, FILM COATED ORAL
Qty: 0 | Refills: 0 | DISCHARGE
Start: 2021-12-28

## 2021-12-28 RX ORDER — APIXABAN 2.5 MG/1
1 TABLET, FILM COATED ORAL
Qty: 60 | Refills: 0
Start: 2021-12-28 | End: 2022-01-26

## 2021-12-28 RX ORDER — POTASSIUM CHLORIDE 20 MEQ
40 PACKET (EA) ORAL DAILY
Refills: 0 | Status: DISCONTINUED | OUTPATIENT
Start: 2021-12-28 | End: 2021-12-28

## 2021-12-28 RX ORDER — POTASSIUM CHLORIDE 20 MEQ
20 PACKET (EA) ORAL
Refills: 0 | Status: COMPLETED | OUTPATIENT
Start: 2021-12-28 | End: 2021-12-29

## 2021-12-28 RX ORDER — POTASSIUM CHLORIDE 20 MEQ
40 PACKET (EA) ORAL EVERY 4 HOURS
Refills: 0 | Status: COMPLETED | OUTPATIENT
Start: 2021-12-28 | End: 2021-12-29

## 2021-12-28 RX ORDER — CHOLECALCIFEROL (VITAMIN D3) 125 MCG
2000 CAPSULE ORAL
Qty: 0 | Refills: 0 | DISCHARGE
Start: 2021-12-28

## 2021-12-28 RX ORDER — RIVAROXABAN 15 MG-20MG
1 KIT ORAL
Qty: 30 | Refills: 0
Start: 2021-12-28 | End: 2022-01-26

## 2021-12-28 RX ORDER — FERROUS SULFATE 325(65) MG
1 TABLET ORAL
Qty: 0 | Refills: 0 | DISCHARGE
Start: 2021-12-28

## 2021-12-28 RX ORDER — POTASSIUM CHLORIDE 20 MEQ
40 PACKET (EA) ORAL EVERY 4 HOURS
Refills: 0 | Status: DISCONTINUED | OUTPATIENT
Start: 2021-12-28 | End: 2021-12-28

## 2021-12-28 RX ADMIN — HEPARIN SODIUM 2000 UNIT(S): 5000 INJECTION INTRAVENOUS; SUBCUTANEOUS at 05:51

## 2021-12-28 RX ADMIN — ATORVASTATIN CALCIUM 80 MILLIGRAM(S): 80 TABLET, FILM COATED ORAL at 21:16

## 2021-12-28 RX ADMIN — Medication 20 MILLIGRAM(S): at 12:01

## 2021-12-28 RX ADMIN — Medication 0.5 MILLIGRAM(S): at 21:51

## 2021-12-28 RX ADMIN — HEPARIN SODIUM 1300 UNIT(S)/HR: 5000 INJECTION INTRAVENOUS; SUBCUTANEOUS at 15:14

## 2021-12-28 RX ADMIN — HEPARIN SODIUM 1300 UNIT(S)/HR: 5000 INJECTION INTRAVENOUS; SUBCUTANEOUS at 05:50

## 2021-12-28 RX ADMIN — HEPARIN SODIUM 1400 UNIT(S)/HR: 5000 INJECTION INTRAVENOUS; SUBCUTANEOUS at 22:15

## 2021-12-28 RX ADMIN — Medication 20 MILLIGRAM(S): at 10:51

## 2021-12-28 RX ADMIN — HEPARIN SODIUM 1300 UNIT(S)/HR: 5000 INJECTION INTRAVENOUS; SUBCUTANEOUS at 07:19

## 2021-12-28 RX ADMIN — Medication 325 MILLIGRAM(S): at 10:51

## 2021-12-28 RX ADMIN — HEPARIN SODIUM 2000 UNIT(S): 5000 INJECTION INTRAVENOUS; SUBCUTANEOUS at 22:22

## 2021-12-28 RX ADMIN — Medication 40 MILLIEQUIVALENT(S): at 12:01

## 2021-12-28 RX ADMIN — Medication 10 MILLIGRAM(S): at 20:28

## 2021-12-28 RX ADMIN — Medication 5 MILLIGRAM(S): at 16:43

## 2021-12-28 RX ADMIN — Medication 40 MILLIEQUIVALENT(S): at 21:51

## 2021-12-28 RX ADMIN — Medication 2000 UNIT(S): at 10:51

## 2021-12-28 NOTE — PROGRESS NOTE ADULT - PROBLEM SELECTOR PLAN 8
- no wheezing on RA  - c/w home albuterol PRN, last use 6 mo ago - DVT ppx: heparin dtt  - Diet: Regular  - Dispo: to be determined

## 2021-12-28 NOTE — PROGRESS NOTE ADULT - ASSESSMENT
Assessment: 31y Female with nephrotic syndrome and active COVID infection with IR consulted for non target renal biopsy.     Plan: IR to perform non non target renal biopsy Monday, 1/3, which will be over 10 days from COVID diagnosis  -Please place order for IR Procedure, approving attending Dr. Rivas  -NPO past midnight prior to procedure  -hold AM prophylactic anticoagulants  -AM CBC, BMP, and coags  -discussed with primary team    Henry Luu MD  PGY-IV, Interventional Radiology  [facility]-[phone], [phone]  [facility]-[phone]), [phone]

## 2021-12-28 NOTE — PROGRESS NOTE ADULT - PROBLEM SELECTOR PLAN 1
- p/w x2 weeks of abdominal distension and LE swelling  - UA 12/25 w/proteinuria >600, unchanged from 12/22 and low albumin 1.2 (was 3.8-4.1 in Jan 2021)  - Renal US w/duplex negative for thrombosis   - Renal workup up: MCKENNA vs. primary/secondary membranous nephropathy   -Follow up serologic work for Nephrotic syndrome. Follow-up: all negative up to date, awaiting Phospholipase A serum, phospholipase A2 receptor antibody, lipoprotein A serum   - renal following   - Urine protein/Cr ratio unable to calc with urine protein >2000  - pending renal bx s/p COVID resolution. Per IR, unable to get biopsy until completes quarantine for 10 days from positive result on 12/22.   - Monitor UOP. Daily weights  - Not responsive to Lasix, poor UOP  - Refusal to try Bumex IV or drip 2/2 nausea (refusing anti-emetics). Will trial PO torsemide. If still poor UOP, consider metolazone as additive  - C/w prednisone 60 mg  -C/w heparin gtt prophylactically for hypercoagulable state - p/w x2 weeks of abdominal distension and LE swelling  - UA 12/25 w/proteinuria >600, unchanged from 12/22 and low albumin 1.2 (was 3.8-4.1 in Jan 2021)  - Renal US w/duplex negative for thrombosis   - Renal workup up: MCKENNA vs. primary/secondary membranous nephropathy   -Follow up serologic work for Nephrotic syndrome. Follow-up: all negative up to date, awaiting Phospholipase A serum, phospholipase A2 receptor antibody, lipoprotein A serum, serum/urine DALILA  - renal following   - Urine protein/Cr ratio unable to calc with urine protein >2000  - pending renal bx s/p COVID resolution. Per IR, unable to get biopsy until completes quarantine for 10 days from positive result on 12/22. Will follow-up with renal if inpatient or outpatient  - Monitor UOP. Daily weights  - Not responsive to Lasix, poor UOP  - Refusal to try Bumex IV or drip 2/2 nausea (refusing anti-emetics).   - C/w torsemide 40 mg PO. If still poor UOP, consider metolazone as additive  - C/w heparin gtt prophylactically for hypercoagulable state. Will price check Eliquis and Xarelto  - D/C prednisone, D/C Calcium-Vit D

## 2021-12-28 NOTE — PROGRESS NOTE ADULT - PROBLEM SELECTOR PLAN 2
- p/w abdominal distension and pain  -DDx colitis vs worsening anasarca  - Chest CT: linear atelectasis of RLL   - CT abd/pelvis: showing some colitis but no fever, abdominal pain or bowel changes   - GI PCR negative  - Pending Stool culture and C diff  - Given IV tylenol with improvement. Willing to try PO Percocet or IV dilaudid (small doses) if pain returns  - EKG QTc < 500, Zofran prn, encourage oral hydration - p/w abdominal distension and pain  -DDx colitis vs worsening anasarca  - Chest CT: linear atelectasis of RLL   - CT abd/pelvis: showing some colitis but no fever, abdominal pain or bowel changes   - GI PCR negative, C diff negative  - Given IV tylenol with improvement. Willing to try PO Percocet or IV dilaudid (small doses) if pain returns. Will monitor for reaction since endorses codeine allergy (hives, no anaphylaxis) in past  - EKG QTc < 500, Zofran prn, encourage oral hydration

## 2021-12-28 NOTE — PROGRESS NOTE ADULT - PROBLEM SELECTOR PLAN 6
- hgb/hct 9.4/33; baseline hgb ~8  - hx of iron deficiency anemia   - completing menstruation  - maintain active T+S  - daily ferrous sulfate once pt can tolerate po  - bowel regimen CTM

## 2021-12-28 NOTE — PROGRESS NOTE ADULT - PROBLEM SELECTOR PLAN 3
- nausea and vomiting improved   - hypovolemic, renal function stable Cr 0.67, LDH elevated 675, thrombocytosis   - now hypovolemic, was treated with Bumex 1 mg bid for fluid overload management on 12/22  - titrated to Lasix 40 mg BID   - GI PCR neg  - encourage oral hydration - Ox2 saturation 98% on RA  - Not hypoxic, not requiring decadron or remdesivir  - unvaccinated + exposure from home   - Tylenol PRN for myalgia and fever  - trending inflammatory markers q72  - monitor oxygen saturations closely, maintain O2 sat > 93%  - Chest CT with RLL atelectasis  -Will require isolation until 1/1/22

## 2021-12-28 NOTE — PROGRESS NOTE ADULT - SUBJECTIVE AND OBJECTIVE BOX
Vascular & Interventional Radiology Follow Up Consult Note    Evaluate for Procedure: non target renal biopsy    HPI: 31y Female with PMH of asthma, prior history of DVT presents to the hospital due to abdominal distension and worsenign swelling. The pateint had initially presented to [facility] with similar complaints and at that time was being worked up for nephrotic syndrome. The plan was to attain a renal biopsy however the patient is under quarantine for testing positive for COVID. She reports she believes she was exposed on 12/18 and she  tested positive on 12/21. She denied any shortness of breath, sough, or chest pain. However, she states her symptoms of LE swelling and edema have been present for the past two and half weeks and it has been progressively getting worse. Admission labs relevant for hypoalbuminemia with serum albumin of 1.2. UA positive for significant proteinuria. She denies NSAIDs use, drugs, other medical problems. Of note, patient remains COVID positive from 12/22. Patient needs to be 10 days out from COVID diagnosis for non urgent procedure.    Allergies: codeine (Hives)    Medications (Abx/Cardiac/Anticoagulation/Blood Products)  buMETAnide: 1 milliGRAM(s) Oral (12-26 @ 17:31)  heparin  Infusion.: 1300 Unit(s)/Hr IV Continuous (12-28 @ 07:20)  heparin  Infusion.: 1100 Unit(s)/Hr IV Continuous (12-26 @ 17:26)  torsemide: 20 milliGRAM(s) Oral (12-28 @ 10:51)  torsemide: 20 milliGRAM(s) Oral (12-28 @ 12:01)    Data:    T(C): 36.9  HR: 79  BP: 90/59  RR: 18  SpO2: 97%    -WBC 12.08 / HgB 8.2 / Hct 28.5 / Plt 567  -Na 134 / Cl 97 / BUN 17 / Glucose 83  -K 3.1 / CO2 30 / Cr 0.79  -ALT -- / Alk Phos -- / T.Bili --  -INR 1.05 / PTT 72.9

## 2021-12-28 NOTE — PROGRESS NOTE ADULT - PROBLEM SELECTOR PLAN 4
- Ox2 saturation 98% on RA  - unvaccinated + exposure from home   - Tylenol PRN for myalgia and fever  - trending inflammatory markers q72  - monitor oxygen saturations closely, maintain O2 sat > 93%  - Chest CT with RLL atelectasis - hx of DVT  - nephrotic syndrome increase risk of coagulation, started on heparin dtt  -Previous refusing, now willing to have heparin gtt

## 2021-12-28 NOTE — PROGRESS NOTE ADULT - ASSESSMENT
30 yo female presenting for evaluation of abdominal distension and positive for COVID-19, undergoing renal workup for possible nephrotic syndrome.

## 2021-12-28 NOTE — PROGRESS NOTE ADULT - SUBJECTIVE AND OBJECTIVE BOX
Arlene Gregory, PGY-2  Internal Medicine  Pager: [phone]/[facility] [phone]    PROGRESS NOTE:     Patient is a 31y old  Female who presents with a chief complaint of nephrotic syndrome  (27 Dec 2021 12:48)      SUBJECTIVE / OVERNIGHT EVENTS:    ADDITIONAL REVIEW OF SYSTEMS:    MEDICATIONS  (STANDING):  atorvastatin 80 milliGRAM(s) Oral at bedtime  cholecalciferol 2000 Unit(s) Oral daily  ferrous    sulfate 325 milliGRAM(s) Oral daily  heparin  Infusion.  Unit(s)/Hr (11 mL/Hr) IV Continuous <Continuous>  torsemide 20 milliGRAM(s) Oral every 12 hours    MEDICATIONS  (PRN):  acetaminophen     Tablet .. 650 milliGRAM(s) Oral every 6 hours PRN Temp greater or equal to 38C (100.4F), Mild Pain (1 - 3)  aluminum hydroxide/magnesium hydroxide/simethicone Suspension 30 milliLiter(s) Oral every 4 hours PRN Dyspepsia  heparin   Injectable 4500 Unit(s) IV Push every 6 hours PRN For aPTT less than 40  heparin   Injectable 2000 Unit(s) IV Push every 6 hours PRN For aPTT between 40 - 57  melatonin 3 milliGRAM(s) Oral at bedtime PRN Insomnia  ondansetron Injectable 4 milliGRAM(s) IV Push every 6 hours PRN Nausea and/or Vomiting      CAPILLARY BLOOD GLUCOSE        I&O's Summary    27 Dec 2021 07:01  -  28 Dec 2021 07:00  --------------------------------------------------------  IN: 120 mL / OUT: 1750 mL / NET: -1630 mL        PHYSICAL EXAM:  Vital Signs Last 24 Hrs  T(C): 36.8 (28 Dec 2021 04:02), Max: 37.4 (27 Dec 2021 21:35)  T(F): 98.2 (28 Dec 2021 04:02), Max: 99.4 (27 Dec 2021 21:35)  HR: 88 (28 Dec 2021 04:02) (79 - 88)  BP: 99/63 (28 Dec 2021 04:02) (99/63 - 115/71)  BP(mean): --  RR: 18 (28 Dec 2021 04:02) (18 - 18)  SpO2: 98% (28 Dec 2021 04:02) (96% - 98%)    CONSTITUTIONAL: NAD, well-developed  RESPIRATORY: Normal respiratory effort; lungs are clear to auscultation bilaterally  CARDIOVASCULAR: Regular rate and rhythm, normal S1 and S2, no murmur/rub/gallop; No lower extremity edema; Peripheral pulses are 2+ bilaterally  ABDOMEN: Nontender to palpation, normoactive bowel sounds, no rebound/guarding; No hepatosplenomegaly  MUSCLOSKELETAL: no clubbing or cyanosis of digits; no joint swelling or tenderness to palpation  PSYCH: A+O to person, place, and time; affect appropriate    LABS:                        9.5    15.80 )-----------( 634      ( 27 Dec 2021 21:09 )             33.3     12-    131<L>  |  99  |  17  ----------------------------<  91  3.7   |  25  |  0.75    Ca    8.3<L>      27 Dec 2021 12:52  Phos  4.4       Mg     2.3         TPro  3.9<L>  /  Alb  1.2<L>  /  TBili  0.1<L>  /  DBili  x   /  AST  18  /  ALT  13  /  AlkPhos  45  12-26    PTT - ( 28 Dec 2021 04:01 )  PTT:56.7 sec      Urinalysis Basic - ( 27 Dec 2021 22:46 )    Color: Yellow / Appearance: Clear / S.021 / pH: x  Gluc: x / Ketone: Negative  / Bili: Negative / Urobili: Negative   Blood: x / Protein: >600 / Nitrite: Negative   Leuk Esterase: Small / RBC: 4 /hpf / WBC 10 /HPF   Sq Epi: x / Non Sq Epi: 6 / Bacteria: Negative        GI PCR Panel, Stool (collected 26 Dec 2021 21:51)  Source: .Stool Feces  Final Report (26 Dec 2021 23:26):    GI PCR Results: NOT detected    *******Please Note:*******    GI panel PCR evaluates for:    Campylobacter, Plesiomonas shigelloides, Salmonella,    Vibrio, Yersinia enterocolitica, Enteroaggregative    Escherichia coli (EAEC), Enteropathogenic E.coli (EPEC),    Enterotoxigenic E. coli (ETEC) lt/st, Shiga-like    toxin-producing E. coli (STEC) stx1/stx2,    Shigella/ Enteroinvasive E. coli (EIEC), Cryptosporidium,    Cyclospora cayetanensis, Entamoeba histolytica,    Giardia lamblia, Adenovirus F 40/41, Astrovirus,    Norovirus GI/GII, Rotavirus A, Sapovirus        RADIOLOGY & ADDITIONAL TESTS:  Results Reviewed:   Imaging Personally Reviewed:  Electrocardiogram Personally Reviewed:    COORDINATION OF CARE:  Care Discussed with Consultants/Other Providers [Y/N]:  Prior or Outpatient Records Reviewed [Y/N]:   Arlene Gregory, PGY-2  Internal Medicine  Pager: [phone]/[facility] [phone]    PROGRESS NOTE:     Patient is a 31y old  Female who presents with a chief complaint of nephrotic syndrome  (27 Dec 2021 12:48)      SUBJECTIVE / OVERNIGHT EVENTS: No acute events overnight. Pt seen and examined at bedside. States abdominal pain is improved, not requiring pain medications. States still has some nausea but no vomiting, does not want reglan. Continuing to have loose stools but decreased in frequency today.     ADDITIONAL REVIEW OF SYSTEMS: No chest pain, SOB, palpitations.     MEDICATIONS  (STANDING):  atorvastatin 80 milliGRAM(s) Oral at bedtime  cholecalciferol 2000 Unit(s) Oral daily  ferrous    sulfate 325 milliGRAM(s) Oral daily  heparin  Infusion.  Unit(s)/Hr (11 mL/Hr) IV Continuous <Continuous>  torsemide 20 milliGRAM(s) Oral every 12 hours    MEDICATIONS  (PRN):  acetaminophen     Tablet .. 650 milliGRAM(s) Oral every 6 hours PRN Temp greater or equal to 38C (100.4F), Mild Pain (1 - 3)  aluminum hydroxide/magnesium hydroxide/simethicone Suspension 30 milliLiter(s) Oral every 4 hours PRN Dyspepsia  heparin   Injectable 4500 Unit(s) IV Push every 6 hours PRN For aPTT less than 40  heparin   Injectable 2000 Unit(s) IV Push every 6 hours PRN For aPTT between 40 - 57  melatonin 3 milliGRAM(s) Oral at bedtime PRN Insomnia  ondansetron Injectable 4 milliGRAM(s) IV Push every 6 hours PRN Nausea and/or Vomiting      CAPILLARY BLOOD GLUCOSE        I&O's Summary    27 Dec 2021 07:01  -  28 Dec 2021 07:00  --------------------------------------------------------  IN: 120 mL / OUT: 1750 mL / NET: -1630 mL        PHYSICAL EXAM:  Vital Signs Last 24 Hrs  T(C): 36.8 (28 Dec 2021 04:02), Max: 37.4 (27 Dec 2021 21:35)  T(F): 98.2 (28 Dec 2021 04:02), Max: 99.4 (27 Dec 2021 21:35)  HR: 88 (28 Dec 2021 04:02) (79 - 88)  BP: 99/63 (28 Dec 2021 04:02) (99/63 - 115/71)  BP(mean): --  RR: 18 (28 Dec 2021 04:02) (18 - 18)  SpO2: 98% (28 Dec 2021 04:02) (96% - 98%)    CONSTITUTIONAL: NAD, well-developed female  RESPIRATORY: Normal respiratory effort; lungs are clear to auscultation bilaterally  CARDIOVASCULAR: Regular rate and rhythm, normal S1 and S2, no murmur/rub/gallop  ABDOMEN: Soft, nontender to palpation, mildly distended. Normoactive bowel sounds  MUSCLOSKELETAL: ; No lower extremity edema; Palpable pulses b/l  NEURO: AOx3, LAST, nonfocal    LABS:                        9.5    15.80 )-----------( 634      ( 27 Dec 2021 21:09 )             33.3     12-    131<L>  |  99  |  17  ----------------------------<  91  3.7   |  25  |  0.75    Ca    8.3<L>      27 Dec 2021 12:52  Phos  4.4       Mg     2.3         TPro  3.9<L>  /  Alb  1.2<L>  /  TBili  0.1<L>  /  DBili  x   /  AST  18  /  ALT  13  /  AlkPhos  45  12-26    PTT - ( 28 Dec 2021 04:01 )  PTT:56.7 sec      Urinalysis Basic - ( 27 Dec 2021 22:46 )    Color: Yellow / Appearance: Clear / S.021 / pH: x  Gluc: x / Ketone: Negative  / Bili: Negative / Urobili: Negative   Blood: x / Protein: >600 / Nitrite: Negative   Leuk Esterase: Small / RBC: 4 /hpf / WBC 10 /HPF   Sq Epi: x / Non Sq Epi: 6 / Bacteria: Negative        GI PCR Panel, Stool (collected 26 Dec 2021 21:51)  Source: .Stool Feces  Final Report (26 Dec 2021 23:26):    GI PCR Results: NOT detected    *******Please Note:*******    GI panel PCR evaluates for:    Campylobacter, Plesiomonas shigelloides, Salmonella,    Vibrio, Yersinia enterocolitica, Enteroaggregative    Escherichia coli (EAEC), Enteropathogenic E.coli (EPEC),    Enterotoxigenic E. coli (ETEC) lt/st, Shiga-like    toxin-producing E. coli (STEC) stx1/stx2,    Shigella/ Enteroinvasive E. coli (EIEC), Cryptosporidium,    Cyclospora cayetanensis, Entamoeba histolytica,    Giardia lamblia, Adenovirus F 40/41, Astrovirus,    Norovirus GI/GII, Rotavirus A, Sapovirus        RADIOLOGY & ADDITIONAL TESTS:  Results Reviewed:   Imaging Personally Reviewed:  Electrocardiogram Personally Reviewed:    COORDINATION OF CARE:  Care Discussed with Consultants/Other Providers [Y/N]:  Prior or Outpatient Records Reviewed [Y/N]:

## 2021-12-28 NOTE — PROGRESS NOTE ADULT - PROBLEM SELECTOR PLAN 5
- hx of DVT  - nephrotic syndrome increase risk of coagulation, started on heparin dtt  -Previous refusing, now willing to have heparin gtt  - order for PTT placed - hgb/hct 9.4/33; baseline hgb ~8  - hx of iron deficiency anemia   - completing menstruation  - maintain active T+S  - daily ferrous sulfate once pt can tolerate po  - bowel regimen

## 2021-12-29 ENCOUNTER — TRANSCRIPTION ENCOUNTER (OUTPATIENT)
Age: 31
End: 2021-12-29

## 2021-12-29 VITALS
OXYGEN SATURATION: 97 % | HEART RATE: 87 BPM | DIASTOLIC BLOOD PRESSURE: 56 MMHG | SYSTOLIC BLOOD PRESSURE: 93 MMHG | TEMPERATURE: 100 F | RESPIRATION RATE: 18 BRPM

## 2021-12-29 LAB
ALBUMIN SERPL ELPH-MCNC: 1.3 G/DL — LOW (ref 3.3–5)
ALBUMIN SERPL ELPH-MCNC: 1.5 G/DL — LOW (ref 3.3–5)
ALP SERPL-CCNC: 44 U/L — SIGNIFICANT CHANGE UP (ref 40–120)
ALP SERPL-CCNC: 46 U/L — SIGNIFICANT CHANGE UP (ref 40–120)
ALT FLD-CCNC: 11 U/L — SIGNIFICANT CHANGE UP (ref 10–45)
ALT FLD-CCNC: 12 U/L — SIGNIFICANT CHANGE UP (ref 10–45)
ANION GAP SERPL CALC-SCNC: 8 MMOL/L — SIGNIFICANT CHANGE UP (ref 5–17)
ANION GAP SERPL CALC-SCNC: 9 MMOL/L — SIGNIFICANT CHANGE UP (ref 5–17)
APTT BLD: 43.6 SEC — HIGH (ref 27.5–35.5)
APTT BLD: 67.9 SEC — HIGH (ref 27.5–35.5)
AST SERPL-CCNC: 18 U/L — SIGNIFICANT CHANGE UP (ref 10–40)
AST SERPL-CCNC: 21 U/L — SIGNIFICANT CHANGE UP (ref 10–40)
BILIRUB DIRECT SERPL-MCNC: <0.1 MG/DL — SIGNIFICANT CHANGE UP (ref 0–0.3)
BILIRUB INDIRECT FLD-MCNC: SIGNIFICANT CHANGE UP MG/DL (ref 0.2–1)
BILIRUB SERPL-MCNC: <0.1 MG/DL — LOW (ref 0.2–1.2)
BILIRUB SERPL-MCNC: <0.1 MG/DL — LOW (ref 0.2–1.2)
BUN SERPL-MCNC: 12 MG/DL — SIGNIFICANT CHANGE UP (ref 7–23)
BUN SERPL-MCNC: 15 MG/DL — SIGNIFICANT CHANGE UP (ref 7–23)
CALCIUM SERPL-MCNC: 7.4 MG/DL — LOW (ref 8.4–10.5)
CALCIUM SERPL-MCNC: 7.6 MG/DL — LOW (ref 8.4–10.5)
CHLORIDE SERPL-SCNC: 96 MMOL/L — SIGNIFICANT CHANGE UP (ref 96–108)
CHLORIDE SERPL-SCNC: 98 MMOL/L — SIGNIFICANT CHANGE UP (ref 96–108)
CO2 SERPL-SCNC: 31 MMOL/L — SIGNIFICANT CHANGE UP (ref 22–31)
CO2 SERPL-SCNC: 31 MMOL/L — SIGNIFICANT CHANGE UP (ref 22–31)
CREAT SERPL-MCNC: 0.58 MG/DL — SIGNIFICANT CHANGE UP (ref 0.5–1.3)
CREAT SERPL-MCNC: 0.66 MG/DL — SIGNIFICANT CHANGE UP (ref 0.5–1.3)
GLUCOSE SERPL-MCNC: 74 MG/DL — SIGNIFICANT CHANGE UP (ref 70–99)
GLUCOSE SERPL-MCNC: 79 MG/DL — SIGNIFICANT CHANGE UP (ref 70–99)
HCT VFR BLD CALC: 28.2 % — LOW (ref 34.5–45)
HGB BLD-MCNC: 8.1 G/DL — LOW (ref 11.5–15.5)
INR BLD: 1.01 RATIO — SIGNIFICANT CHANGE UP (ref 0.88–1.16)
INR BLD: 1.07 RATIO — SIGNIFICANT CHANGE UP (ref 0.88–1.16)
MAGNESIUM SERPL-MCNC: 1.8 MG/DL — SIGNIFICANT CHANGE UP (ref 1.6–2.6)
MAGNESIUM SERPL-MCNC: 1.9 MG/DL — SIGNIFICANT CHANGE UP (ref 1.6–2.6)
MCHC RBC-ENTMCNC: 21.7 PG — LOW (ref 27–34)
MCHC RBC-ENTMCNC: 28.7 GM/DL — LOW (ref 32–36)
MCV RBC AUTO: 75.6 FL — LOW (ref 80–100)
NRBC # BLD: 0 /100 WBCS — SIGNIFICANT CHANGE UP (ref 0–0)
PHOSPHATE SERPL-MCNC: 2 MG/DL — LOW (ref 2.5–4.5)
PHOSPHATE SERPL-MCNC: 2.3 MG/DL — LOW (ref 2.5–4.5)
PLATELET # BLD AUTO: 567 K/UL — HIGH (ref 150–400)
POTASSIUM SERPL-MCNC: 3.3 MMOL/L — LOW (ref 3.5–5.3)
POTASSIUM SERPL-MCNC: 3.6 MMOL/L — SIGNIFICANT CHANGE UP (ref 3.5–5.3)
POTASSIUM SERPL-SCNC: 3.3 MMOL/L — LOW (ref 3.5–5.3)
POTASSIUM SERPL-SCNC: 3.6 MMOL/L — SIGNIFICANT CHANGE UP (ref 3.5–5.3)
PROT SERPL-MCNC: 3.9 G/DL — LOW (ref 6–8.3)
PROT SERPL-MCNC: 4.2 G/DL — LOW (ref 6–8.3)
PROTHROM AB SERPL-ACNC: 12.1 SEC — SIGNIFICANT CHANGE UP (ref 10.6–13.6)
PROTHROM AB SERPL-ACNC: 12.8 SEC — SIGNIFICANT CHANGE UP (ref 10.6–13.6)
RBC # BLD: 3.73 M/UL — LOW (ref 3.8–5.2)
RBC # FLD: 25.1 % — HIGH (ref 10.3–14.5)
SARS-COV-2 RNA SPEC QL NAA+PROBE: SIGNIFICANT CHANGE UP
SODIUM SERPL-SCNC: 135 MMOL/L — SIGNIFICANT CHANGE UP (ref 135–145)
SODIUM SERPL-SCNC: 138 MMOL/L — SIGNIFICANT CHANGE UP (ref 135–145)
WBC # BLD: 11.18 K/UL — HIGH (ref 3.8–10.5)
WBC # FLD AUTO: 11.18 K/UL — HIGH (ref 3.8–10.5)

## 2021-12-29 PROCEDURE — 80048 BASIC METABOLIC PNL TOTAL CA: CPT

## 2021-12-29 PROCEDURE — 84100 ASSAY OF PHOSPHORUS: CPT

## 2021-12-29 PROCEDURE — 83735 ASSAY OF MAGNESIUM: CPT

## 2021-12-29 PROCEDURE — 87324 CLOSTRIDIUM AG IA: CPT

## 2021-12-29 PROCEDURE — U0005: CPT

## 2021-12-29 PROCEDURE — 99239 HOSP IP/OBS DSCHRG MGMT >30: CPT | Mod: GC

## 2021-12-29 PROCEDURE — 86334 IMMUNOFIX E-PHORESIS SERUM: CPT

## 2021-12-29 PROCEDURE — 80076 HEPATIC FUNCTION PANEL: CPT

## 2021-12-29 PROCEDURE — 74177 CT ABD & PELVIS W/CONTRAST: CPT | Mod: MA

## 2021-12-29 PROCEDURE — 71260 CT THORAX DX C+: CPT | Mod: MA

## 2021-12-29 PROCEDURE — 99285 EMERGENCY DEPT VISIT HI MDM: CPT

## 2021-12-29 PROCEDURE — 87449 NOS EACH ORGANISM AG IA: CPT

## 2021-12-29 PROCEDURE — 80061 LIPID PANEL: CPT

## 2021-12-29 PROCEDURE — 85025 COMPLETE CBC W/AUTO DIFF WBC: CPT

## 2021-12-29 PROCEDURE — 86255 FLUORESCENT ANTIBODY SCREEN: CPT

## 2021-12-29 PROCEDURE — U0003: CPT

## 2021-12-29 PROCEDURE — 85730 THROMBOPLASTIN TIME PARTIAL: CPT

## 2021-12-29 PROCEDURE — 85610 PROTHROMBIN TIME: CPT

## 2021-12-29 PROCEDURE — 83695 ASSAY OF LIPOPROTEIN(A): CPT

## 2021-12-29 PROCEDURE — P9047: CPT

## 2021-12-29 PROCEDURE — 82164 ANGIOTENSIN I ENZYME TEST: CPT

## 2021-12-29 PROCEDURE — 81001 URINALYSIS AUTO W/SCOPE: CPT

## 2021-12-29 PROCEDURE — 76775 US EXAM ABDO BACK WALL LIM: CPT

## 2021-12-29 PROCEDURE — 84156 ASSAY OF PROTEIN URINE: CPT

## 2021-12-29 PROCEDURE — 36415 COLL VENOUS BLD VENIPUNCTURE: CPT

## 2021-12-29 PROCEDURE — 82570 ASSAY OF URINE CREATININE: CPT

## 2021-12-29 PROCEDURE — 99233 SBSQ HOSP IP/OBS HIGH 50: CPT | Mod: GC

## 2021-12-29 PROCEDURE — 87507 IADNA-DNA/RNA PROBE TQ 12-25: CPT

## 2021-12-29 PROCEDURE — 83516 IMMUNOASSAY NONANTIBODY: CPT

## 2021-12-29 PROCEDURE — 85027 COMPLETE CBC AUTOMATED: CPT

## 2021-12-29 PROCEDURE — 80053 COMPREHEN METABOLIC PANEL: CPT

## 2021-12-29 RX ORDER — SODIUM,POTASSIUM PHOSPHATES 278-250MG
1 POWDER IN PACKET (EA) ORAL ONCE
Refills: 0 | Status: COMPLETED | OUTPATIENT
Start: 2021-12-29 | End: 2021-12-29

## 2021-12-29 RX ORDER — FERROUS SULFATE 325(65) MG
1 TABLET ORAL
Qty: 30 | Refills: 0
Start: 2021-12-29 | End: 2022-01-27

## 2021-12-29 RX ORDER — POTASSIUM PHOSPHATE, MONOBASIC POTASSIUM PHOSPHATE, DIBASIC 236; 224 MG/ML; MG/ML
15 INJECTION, SOLUTION INTRAVENOUS ONCE
Refills: 0 | Status: DISCONTINUED | OUTPATIENT
Start: 2021-12-29 | End: 2021-12-29

## 2021-12-29 RX ORDER — POTASSIUM CHLORIDE 20 MEQ
40 PACKET (EA) ORAL ONCE
Refills: 0 | Status: COMPLETED | OUTPATIENT
Start: 2021-12-29 | End: 2021-12-29

## 2021-12-29 RX ORDER — CHOLECALCIFEROL (VITAMIN D3) 125 MCG
2000 CAPSULE ORAL
Qty: 30 | Refills: 0
Start: 2021-12-29 | End: 2022-01-27

## 2021-12-29 RX ADMIN — Medication 325 MILLIGRAM(S): at 08:22

## 2021-12-29 RX ADMIN — Medication 1 PACKET(S): at 17:59

## 2021-12-29 RX ADMIN — Medication 2000 UNIT(S): at 08:22

## 2021-12-29 RX ADMIN — HEPARIN SODIUM 1400 UNIT(S)/HR: 5000 INJECTION INTRAVENOUS; SUBCUTANEOUS at 08:21

## 2021-12-29 RX ADMIN — Medication 40 MILLIEQUIVALENT(S): at 06:10

## 2021-12-29 RX ADMIN — HEPARIN SODIUM 1400 UNIT(S)/HR: 5000 INJECTION INTRAVENOUS; SUBCUTANEOUS at 06:29

## 2021-12-29 RX ADMIN — Medication 1 PACKET(S): at 11:55

## 2021-12-29 RX ADMIN — Medication 20 MILLIEQUIVALENT(S): at 00:47

## 2021-12-29 RX ADMIN — Medication 40 MILLIEQUIVALENT(S): at 09:05

## 2021-12-29 NOTE — PROGRESS NOTE ADULT - SUBJECTIVE AND OBJECTIVE BOX
[facility] DIVISION OF KIDNEY DISEASES AND HYPERTENSION -- FOLLOW UP NOTE  --------------------------------------------------------------------------------  If any questions, please feel free to contact me  NS pager: [phone], [facility]: [phone]  Basilio Leggett M.D.  Nephrology Fellow    (After 5 pm or on weekends please page the on-call fellow)  --------------------------------------------------------------------------------    HPI:  31 year old female with PMH of asthma, prior history of DVT presents to the hospital due to abdominal distension and worsening swelling. The patient had initially presented to [facility] with similar complaints and at that time was being worked up for nephrotic syndrome. The plan was to obtain a renal biopsy however the patient is under quarantine for testing positive for COVID. Admission labs relevant for hypoalbuminemia with serum albumin of 1.2. UA positive for significant proteinuria.     Patient seen and examined at bedside, in NAD, on room air. Cr stable. She wants to be discharge home, agree to go for outpatient kidney bx.  Vitals/labs/imaging reviewed       PAST HISTORY  --------------------------------------------------------------------------------  No significant changes to PMH, PSH, FHx, SHx, unless otherwise noted    ALLERGIES & MEDICATIONS  --------------------------------------------------------------------------------  Allergies    codeine (Hives)    Intolerances      Standing Inpatient Medications  atorvastatin 80 milliGRAM(s) Oral at bedtime  cholecalciferol 2000 Unit(s) Oral daily  ferrous    sulfate 325 milliGRAM(s) Oral daily  heparin  Infusion.  Unit(s)/Hr IV Continuous <Continuous>  torsemide 40 milliGRAM(s) Oral daily    PRN Inpatient Medications  acetaminophen     Tablet .. 650 milliGRAM(s) Oral every 6 hours PRN  aluminum hydroxide/magnesium hydroxide/simethicone Suspension 30 milliLiter(s) Oral every 4 hours PRN  heparin   Injectable 4500 Unit(s) IV Push every 6 hours PRN  heparin   Injectable 2000 Unit(s) IV Push every 6 hours PRN  melatonin 3 milliGRAM(s) Oral at bedtime PRN  metoclopramide Injectable 10 milliGRAM(s) IV Push every 6 hours PRN      REVIEW OF SYSTEMS  --------------------------------------------------------------------------------  Gen: No fevers/chills  Skin: No rashes  Head/Eyes/Ears: Normal hearing,   Respiratory: No dyspnea, cough  CV: No chest pain  GI: No abdominal pain, diarrhea  : No dysuria, hematuria  MSK: No  edema  All other systems were reviewed and are negative, except as noted.    VITALS/PHYSICAL EXAM  --------------------------------------------------------------------------------  T(C): 37.3 (12-29-21 @ 06:16), Max: 37.3 (12-29-21 @ 06:16)  HR: 85 (12-29-21 @ 06:16) (85 - 88)  BP: 91/51 (12-29-21 @ 06:16) (91/51 - 93/55)  RR: 18 (12-29-21 @ 06:16) (18 - 18)  SpO2: 97% (12-29-21 @ 06:16) (97% - 98%)  Wt(kg): --        12-28-21 @ 07:01  -  12-29-21 @ 07:00  --------------------------------------------------------  IN: 360 mL / OUT: 402 mL / NET: -42 mL        Physical Exam:  	Gen: NAD  	HEENT: MMM  	Pulm: CTA B/L  	CV: S1S2  	Abd: Soft, +BS   	Ext: No LE edema B/L  	Neuro: Awake  	Skin: Warm and dry    LABS/STUDIES  --------------------------------------------------------------------------------              8.1    11.18 >-----------<  567      [12-29-21 @ 07:21]              28.2     135  |  96  |  15  ----------------------------<  74      [12-29-21 @ 07:19]  3.3   |  31  |  0.58        Ca     7.4     [12-29-21 @ 07:19]      Mg     1.8     [12-29-21 @ 07:19]      Phos  2.3     [12-29-21 @ 07:19]      PT/INR: PT 12.8 , INR 1.07       [12-29-21 @ 07:21]  PTT: 67.9       [12-29-21 @ 07:21]      Creatinine Trend:  SCr 0.58 [12-29 @ 07:19]  SCr 0.69 [12-28 @ 21:28]  SCr 0.79 [12-28 @ 10:40]  SCr 0.75 [12-27 @ 12:52]  SCr 0.63 [12-26 @ 10:12]    Urinalysis - [12-27-21 @ 22:46]      Color Yellow / Appearance Clear / SG 1.021 / pH 6.5      Gluc Negative / Ketone Negative  / Bili Negative / Urobili Negative       Blood Small / Protein >600 / Leuk Est Small / Nitrite Negative      RBC 4 / WBC 10 / Hyaline 12 / Gran  / Sq Epi  / Non Sq Epi 6 / Bacteria Negative    Urine Creatinine 312      [12-26-21 @ 12:36]  Urine Protein >2000      [12-26-21 @ 12:36]    Iron 23, TIBC 74, %sat 31      [12-22-21 @ 01:02]  Ferritin 90      [12-22-21 @ 01:02]  TSH 3.03      [12-22-21 @ 01:02]  Lipid: chol 789, , HDL 57, LDL --      [12-26-21 @ 03:39]    HBsAb Nonreact      [12-23-21 @ 09:45]  HCV 0.10, Nonreact      [12-23-21 @ 09:45]  HIV Nonreact      [12-23-21 @ 07:26]    SHELTON: titer Negative, pattern --      [12-23-21 @ 10:20]  C3 Complement 132      [12-23-21 @ 07:26]  C4 Complement 35      [12-23-21 @ 07:26]  PLA2R: ALESSANDRA <1.8, IFA --      [12-24-21 @ 17:00]

## 2021-12-29 NOTE — PROGRESS NOTE ADULT - PROBLEM SELECTOR PLAN 1
Pt. admitted with anasarca in the setting of Nephrotic syndrome. Lab and clinical criteria diagnostic of Nephrotic syndrome. Of note, pt. also diagnosed with COVID 19, however symptoms preceded infection. 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  Serum albumin low at 1.2 on admission. Given low albumin and history of DVT she was recommended to start anticoagulation with heparin, but refused.      She wants to go home to take care of her children, discussed at length with patient that she needs to follow with outpatient kidney bx appointment. She agrees with plan. Also will come to see us in clinic.   Send home with torsemide 20mg po day   Outpatient kidney biopsy arranged with IR department for Thursday Jan 6th at 11am. Needs to be NPO 6hr before procedure and needs a covid test within 48hrs.    COVID test arranged for Tuesday 4th at [address].     She will come to see us in Nephrology clinic within 2 weeks. We will call her for appointment.     Upon discharge please make appointment with Nephrology clinic. For scheduling please email Nephrology at [email or web address]

## 2021-12-29 NOTE — PROGRESS NOTE ADULT - PROBLEM SELECTOR PLAN 5
- hgb/hct 9.4/33; baseline hgb ~8  - hx of iron deficiency anemia   - completing menstruation  - maintain active T+S  - daily ferrous sulfate once pt can tolerate po  - bowel regimen

## 2021-12-29 NOTE — PROGRESS NOTE ADULT - PROBLEM SELECTOR PLAN 2
- p/w abdominal distension and pain  -DDx colitis vs worsening anasarca  - Chest CT: linear atelectasis of RLL   - CT abd/pelvis: showing some colitis but no fever, abdominal pain or bowel changes   - GI PCR negative, C diff negative  - Given IV tylenol with improvement. Willing to try PO Percocet or IV dilaudid (small doses) if pain returns. Will monitor for reaction since endorses codeine allergy (hives, no anaphylaxis) in past  - EKG QTc < 500, Zofran prn, encourage oral hydration - p/w abdominal distension and pain  -DDx colitis vs worsening anasarca  - Chest CT: linear atelectasis of RLL   - CT abd/pelvis: showing some colitis but no fever, abdominal pain or bowel changes   - GI PCR negative, C diff negative  - Given IV tylenol with improvement. States pain and N/V has resolved  - EKG QTc < 500, Zofran prn, encourage oral hydration

## 2021-12-29 NOTE — DISCHARGE NOTE NURSING/CASE MANAGEMENT/SOCIAL WORK - PATIENT PORTAL LINK FT
You can access the FollowMyHealth Patient Portal offered by [facility] by registering at the following website: [email or web address]. By joining [employer]’s FollowMyHealth portal, you will also be able to view your health information using other applications (apps) compatible with our system.

## 2021-12-29 NOTE — PROGRESS NOTE ADULT - PROBLEM SELECTOR PLAN 1
- p/w x2 weeks of abdominal distension and LE swelling  - UA 12/25 w/proteinuria >600, unchanged from 12/22 and low albumin 1.2 (was 3.8-4.1 in Jan 2021)  - Renal US w/duplex negative for thrombosis   - Renal workup up: MCKENNA vs. primary/secondary membranous nephropathy   -Follow up serologic work for Nephrotic syndrome. Follow-up: all negative up to date, awaiting Phospholipase A serum, phospholipase A2 receptor antibody, lipoprotein A serum, serum/urine DALILA  - renal following   - Urine protein/Cr ratio unable to calc with urine protein >2000  - pending renal bx s/p COVID resolution. Per IR, unable to get biopsy until completes quarantine for 10 days from positive result on 12/22. Will follow-up with renal if inpatient or outpatient  - Monitor UOP. Daily weights  - Not responsive to Lasix, poor UOP  - Refusal to try Bumex IV or drip 2/2 nausea (refusing anti-emetics).   - C/w torsemide 40 mg PO. If still poor UOP, consider metolazone as additive  - C/w heparin gtt prophylactically for hypercoagulable state. Will price check Eliquis and Xarelto  - D/C prednisone, D/C Calcium-Vit D - p/w x2 weeks of abdominal distension and LE swelling  - UA 12/25 w/proteinuria >600, unchanged from 12/22 and low albumin 1.2 (was 3.8-4.1 in Jan 2021)  - Renal US w/duplex negative for thrombosis, no hydronephrosis   - Renal workup up: MCKENNA vs. primary/secondary membranous nephropathy   -Follow up serologic work for Nephrotic syndrome. Follow-up: all negative up to date, awaiting Phospholipase A serum, phospholipase A2 receptor antibody, lipoprotein A serum, serum/urine DALILA  - renal following   - Urine protein/Cr ratio unable to calc with urine protein >2000  - Per IR, unable to get biopsy until completes quarantine for 10 days from positive result on 12/22.   - Will be discharged with outpatient IR renal biopsy and nephrology follow-up  - Monitor UOP. Daily weights  - Not responsive to Lasix, poor UOP  - Refusal to try Bumex IV or drip 2/2 nausea (refusing anti-emetics).   - Will discharge on torsemide 20 mg QD. Hypokalemic on 40 mg.  - C/w heparin gtt prophylactically for hypercoagulable state. Will be discharged off AC  - D/C prednisone, D/C Calcium-Vit D

## 2021-12-29 NOTE — DISCHARGE NOTE NURSING/CASE MANAGEMENT/SOCIAL WORK - NSDCFUADDAPPT_GEN_ALL_CORE_FT
Please go to your scheduled kidney biopsy with interventional radiology on January 6th, 2021 at 11 AM at [facility]. Please come to your appointment 1 hour before and do not eat 6 hours prior to your biopsy. You must have a negative COVID test within 48 hours of your biopsy appointment. You can go to the drive-in testing center on [address] on January 4th. The kidney doctors will call you after your discharge from the hospital to set up an appointment within 2 weeks.

## 2021-12-29 NOTE — PROGRESS NOTE ADULT - PROVIDER SPECIALTY LIST ADULT
Nephrology
Intervent Radiology
Nephrology
Internal Medicine

## 2021-12-29 NOTE — PROGRESS NOTE ADULT - SUBJECTIVE AND OBJECTIVE BOX
Arlene Gregory, PGY-2  Internal Medicine  Pager: [phone]/[facility] [address]    PROGRESS NOTE:     Patient is a 31y old  Female who presents with a chief complaint of nephrotic syndrome  (27 Dec 2021 12:48)      SUBJECTIVE / OVERNIGHT EVENTS:    ADDITIONAL REVIEW OF SYSTEMS:    MEDICATIONS  (STANDING):  atorvastatin 80 milliGRAM(s) Oral at bedtime  cholecalciferol 2000 Unit(s) Oral daily  ferrous    sulfate 325 milliGRAM(s) Oral daily  heparin  Infusion.  Unit(s)/Hr (11 mL/Hr) IV Continuous <Continuous>  torsemide 40 milliGRAM(s) Oral daily    MEDICATIONS  (PRN):  acetaminophen     Tablet .. 650 milliGRAM(s) Oral every 6 hours PRN Temp greater or equal to 38C (100.4F), Mild Pain (1 - 3)  aluminum hydroxide/magnesium hydroxide/simethicone Suspension 30 milliLiter(s) Oral every 4 hours PRN Dyspepsia  heparin   Injectable 4500 Unit(s) IV Push every 6 hours PRN For aPTT less than 40  heparin   Injectable 2000 Unit(s) IV Push every 6 hours PRN For aPTT between 40 - 57  melatonin 3 milliGRAM(s) Oral at bedtime PRN Insomnia  metoclopramide Injectable 10 milliGRAM(s) IV Push every 6 hours PRN Nausea/Vomiting      CAPILLARY BLOOD GLUCOSE        I&O's Summary    28 Dec 2021 07:01  -  29 Dec 2021 07:00  --------------------------------------------------------  IN: 360 mL / OUT: 402 mL / NET: -42 mL        PHYSICAL EXAM:  Vital Signs Last 24 Hrs  T(C): 37.3 (29 Dec 2021 06:16), Max: 37.3 (29 Dec 2021 06:16)  T(F): 99.2 (29 Dec 2021 06:16), Max: 99.2 (29 Dec 2021 06:16)  HR: 85 (29 Dec 2021 06:16) (79 - 88)  BP: 91/51 (29 Dec 2021 06:16) (90/59 - 93/55)  BP(mean): --  RR: 18 (29 Dec 2021 06:16) (18 - 18)  SpO2: 97% (29 Dec 2021 06:16) (97% - 98%)    CONSTITUTIONAL: NAD, well-developed  RESPIRATORY: Normal respiratory effort; lungs are clear to auscultation bilaterally  CARDIOVASCULAR: Regular rate and rhythm, normal S1 and S2, no murmur/rub/gallop; No lower extremity edema; Peripheral pulses are 2+ bilaterally  ABDOMEN: Nontender to palpation, normoactive bowel sounds, no rebound/guarding; No hepatosplenomegaly  MUSCLOSKELETAL: no clubbing or cyanosis of digits; no joint swelling or tenderness to palpation  PSYCH: A+O to person, place, and time; affect appropriate    LABS:                        8.2    12.08 )-----------( 567      ( 28 Dec 2021 10:40 )             28.5     12-28    134<L>  |  94<L>  |  16  ----------------------------<  98  2.8<LL>   |  32<H>  |  0.69    Ca    7.1<L>      28 Dec 2021 21:28  Phos  2.7     12-28  Mg     1.6     12-28      PT/INR - ( 28 Dec 2021 14:37 )   PT: 12.6 sec;   INR: 1.05 ratio         PTT - ( 28 Dec 2021 21:28 )  PTT:52.0 sec      Urinalysis Basic - ( 27 Dec 2021 22:46 )    Color: Yellow / Appearance: Clear / S.021 / pH: x  Gluc: x / Ketone: Negative  / Bili: Negative / Urobili: Negative   Blood: x / Protein: >600 / Nitrite: Negative   Leuk Esterase: Small / RBC: 4 /hpf / WBC 10 /HPF   Sq Epi: x / Non Sq Epi: 6 / Bacteria: Negative        GI PCR Panel, Stool (collected 26 Dec 2021 21:51)  Source: .Stool Feces  Final Report (26 Dec 2021 23:26):    GI PCR Results: NOT detected    *******Please Note:*******    GI panel PCR evaluates for:    Campylobacter, Plesiomonas shigelloides, Salmonella,    Vibrio, Yersinia enterocolitica, Enteroaggregative    Escherichia coli (EAEC), Enteropathogenic E.coli (EPEC),    Enterotoxigenic E. coli (ETEC) lt/st, Shiga-like    toxin-producing E. coli (STEC) stx1/stx2,    Shigella/ Enteroinvasive E. coli (EIEC), Cryptosporidium,    Cyclospora cayetanensis, Entamoeba histolytica,    Giardia lamblia, Adenovirus F 40/41, Astrovirus,    Norovirus GI/GII, Rotavirus A, Sapovirus        RADIOLOGY & ADDITIONAL TESTS:  Results Reviewed:   Imaging Personally Reviewed:  Electrocardiogram Personally Reviewed:    COORDINATION OF CARE:  Care Discussed with Consultants/Other Providers [Y/N]:  Prior or Outpatient Records Reviewed [Y/N]:   Arlene Gregory, PGY-2  Internal Medicine  Pager: [phone]/J [phone]    PROGRESS NOTE:     Patient is a 31y old  Female who presents with a chief complaint of nephrotic syndrome  (27 Dec 2021 12:48)      SUBJECTIVE / OVERNIGHT EVENTS: Hypokalemic overnight, required repletion. Refused PO torsemide. No acute events overnight. Pt seen and examined at bedside. States she needs to go home to children.     ADDITIONAL REVIEW OF SYSTEMS: Denies fever, chills, nausea, vomiting, abdominal pain, cp, or sob.     MEDICATIONS  (STANDING):  atorvastatin 80 milliGRAM(s) Oral at bedtime  cholecalciferol 2000 Unit(s) Oral daily  ferrous    sulfate 325 milliGRAM(s) Oral daily  heparin  Infusion.  Unit(s)/Hr (11 mL/Hr) IV Continuous <Continuous>  torsemide 40 milliGRAM(s) Oral daily    MEDICATIONS  (PRN):  acetaminophen     Tablet .. 650 milliGRAM(s) Oral every 6 hours PRN Temp greater or equal to 38C (100.4F), Mild Pain (1 - 3)  aluminum hydroxide/magnesium hydroxide/simethicone Suspension 30 milliLiter(s) Oral every 4 hours PRN Dyspepsia  heparin   Injectable 4500 Unit(s) IV Push every 6 hours PRN For aPTT less than 40  heparin   Injectable 2000 Unit(s) IV Push every 6 hours PRN For aPTT between 40 - 57  melatonin 3 milliGRAM(s) Oral at bedtime PRN Insomnia  metoclopramide Injectable 10 milliGRAM(s) IV Push every 6 hours PRN Nausea/Vomiting      CAPILLARY BLOOD GLUCOSE        I&O's Summary    28 Dec 2021 07:01  -  29 Dec 2021 07:00  --------------------------------------------------------  IN: 360 mL / OUT: 402 mL / NET: -42 mL        PHYSICAL EXAM:  Vital Signs Last 24 Hrs  T(C): 37.3 (29 Dec 2021 06:16), Max: 37.3 (29 Dec 2021 06:16)  T(F): 99.2 (29 Dec 2021 06:16), Max: 99.2 (29 Dec 2021 06:16)  HR: 85 (29 Dec 2021 06:16) (79 - 88)  BP: 91/51 (29 Dec 2021 06:16) (90/59 - 93/55)  BP(mean): --  RR: 18 (29 Dec 2021 06:16) (18 - 18)  SpO2: 97% (29 Dec 2021 06:16) (97% - 98%)    CONSTITUTIONAL: NAD, well-developed female  RESPIRATORY: Normal respiratory effort; lungs are clear to auscultation bilaterally  CARDIOVASCULAR: Regular rate and rhythm, normal S1 and S2, no murmur/rub/gallop  ABDOMEN: Soft, nondistended, Nontender to palpation, normoactive bowel sounds  MUSCLOSKELETAL:No lower extremity edema; Peripheral pulses are 2+ bilaterally  NEURO: AOx3, answer questions appropraitely, LAST, non focal exam    LABS:                        8.2    12.08 )-----------( 567      ( 28 Dec 2021 10:40 )             28.5     12-28    134<L>  |  94<L>  |  16  ----------------------------<  98  2.8<LL>   |  32<H>  |  0.69    Ca    7.1<L>      28 Dec 2021 21:28  Phos  2.7     12-28  Mg     1.6     12-28      PT/INR - ( 28 Dec 2021 14:37 )   PT: 12.6 sec;   INR: 1.05 ratio         PTT - ( 28 Dec 2021 21:28 )  PTT:52.0 sec      Urinalysis Basic - ( 27 Dec 2021 22:46 )    Color: Yellow / Appearance: Clear / S.021 / pH: x  Gluc: x / Ketone: Negative  / Bili: Negative / Urobili: Negative   Blood: x / Protein: >600 / Nitrite: Negative   Leuk Esterase: Small / RBC: 4 /hpf / WBC 10 /HPF   Sq Epi: x / Non Sq Epi: 6 / Bacteria: Negative        GI PCR Panel, Stool (collected 26 Dec 2021 21:51)  Source: .Stool Feces  Final Report (26 Dec 2021 23:26):    GI PCR Results: NOT detected    *******Please Note:*******    GI panel PCR evaluates for:    Campylobacter, Plesiomonas shigelloides, Salmonella,    Vibrio, Yersinia enterocolitica, Enteroaggregative    Escherichia coli (EAEC), Enteropathogenic E.coli (EPEC),    Enterotoxigenic E. coli (ETEC) lt/st, Shiga-like    toxin-producing E. coli (STEC) stx1/stx2,    Shigella/ Enteroinvasive E. coli (EIEC), Cryptosporidium,    Cyclospora cayetanensis, Entamoeba histolytica,    Giardia lamblia, Adenovirus F 40/41, Astrovirus,    Norovirus GI/GII, Rotavirus A, Sapovirus        RADIOLOGY & ADDITIONAL TESTS:  Results Reviewed:   Imaging Personally Reviewed:  Electrocardiogram Personally Reviewed:    COORDINATION OF CARE:  Care Discussed with Consultants/Other Providers [Y/N]:  Prior or Outpatient Records Reviewed [Y/N]:

## 2021-12-29 NOTE — DISCHARGE NOTE NURSING/CASE MANAGEMENT/SOCIAL WORK - NSDCPEFALRISK_GEN_ALL_CORE
For information on Fall & Injury Prevention, visit: [email or web address] OR  [email or web address] OR  https://www.cdc.gov/steadi/patient.html

## 2021-12-29 NOTE — PROGRESS NOTE ADULT - ASSESSMENT
32 yo female presenting for evaluation of abdominal distension and positive for COVID-19, undergoing renal workup for possible nephrotic syndrome.  32 yo female presenting for evaluation of abdominal distension and positive for COVID-19, undergoing renal workup for possible nephrotic syndrome. Unable to go for inpatient renal biopsy. To be discharged for outpatient renal follow-up and renal biopsy.

## 2021-12-29 NOTE — PROGRESS NOTE ADULT - PROBLEM SELECTOR PROBLEM 1
Nephrotic syndrome

## 2021-12-29 NOTE — PROGRESS NOTE ADULT - ATTENDING COMMENTS
Nephrotic syndrome  Edematous    - Kidney biopsy  - Stop calcium acetate  - Continue diuresis, oral torsemide OK  - Stop prednisone until we obtain diagnosis  - Serologies pending  - Remainder per fellow, will follow
Nephrotic syndrome  Edematous    - Kidney biopsy scheduled as outpatient  - Oral torsemide for home  - Stop prednisone until we obtain diagnosis  - Serologies pending  - Outpatient nephrology f/u  - Remainder per fellow
30 yo F with PMHx of DVT s/p AC (4 years ago after c- section) and MHx of ANTOINETTE, Asthma, GERD presenting for evaluation of abdominal distension and LE edema (recently at [facility] and [facility]) found to have nephrotic syndrome, admitted for management and further workup.       #Nephrotic Syndrome: tolerating torsemide, 40mg po qd as per nephro. On heparin drip 2/2 high risk of DVT. Per IR and infection control, patient cannot go to renal bx until 10 day quarantine is over. Off prednisone for now. Patient insisting on leaving hospital by tomorrow, as she has young children at home to care for (current caregiver is leaving by tomorrow). Will inform renal team.  #Abd pain: CT with Diffuse colonic wall thickening, C diff neg, GI PCR negative already. Pain might also be associated with swelling from ascites. Diarrhea resolved, abdominal pain improved.  #COVID19:  on RA, Airborne precautions, does not need remdesivir or decadron for covid.
30 yo F with PMHx of DVT s/p AC (4 years ago after c- section) and MHx of ANTOINETTE, Asthma, GERD presenting for evaluation of abdominal distension and LE edema (recently at [facility] and [facility]) found to have nephrotic syndrome, admitted for management and further workup.       #Nephrotic Syndrome: tolerating torsemide, change to 40mg po qd as per nephro. On heparin drip 2/2 high risk of DVT. Per IR and infection control, patient cannot go to renal bx until 10 day quarantine is over. Off prednisone for now.  #Abd pain: CT with Diffuse colonic wall thickening, C diff neg, GI PCR negative already. Pain might also be associated with swelling from ascites. Give diuretics, IV tylenol.   #COVID19:  on RA, Airborne precautions, does not need remdesivir or decadron for covid.
30 yo F with PMHx of DVT s/p AC (4 years ago after c- section) and MHx of ANTOINETTE, Asthma, GERD presenting for evaluation of abdominal distension and LE edema (recently at [facility] and [facility]) found to have nephrotic syndrome, admitted for management and further workup.     #Nephrotic Syndrome: patient dislikes bumex, says it causes nausea, I offered torsemide, she was willing to try it. She also said overnight she did not want to have heparin drip, but I told her she was at very high risk of developing blood clots and needs the heparin at this time, so we will restart it.  #Abd pain: CT with Diffuse colonic wall thickening, will r/o C diff, GI PCR negative already. Pain might also be associated with swelling from ascites. Give diuretics, IV tylenol. Has allergy to codeine (hives), has received other opioids before but does not recall what reaction she had if any. Agrees to try dilaudid in small dose IF IV tylenol is not working enough.  #COVID19:  on RA, Airborne precautions, does not need remdesivir or decadron for covid.
30 yo F with  PMHx of DVT s/p AC (4 years ago after c- section) and MHx of ANTOINETTE, Asthma, GERD presenting for evaluation of abdominal distension and LE edema (recently at [facility] and [facility]) found to have nephrotic syndrome, admitted for management and further workup.     #Nephrotic Syndrome: Bumex 1mg BID, Started on steroids today per nephrology, start heparin given high risk of thrombosis with nephrotic syndrome and low albumin. f/u renal recommended labs, Will need renal Bx per renal, IR consulted.   #ABd pain: CT with Diffuse colonic wall thickening, likely from edema in the setting of anasarca 2/2 nephrotic syndrome. Vs infectious colitis.  Will send off stool studies and hold off antibiotics for now unless patient decompensates or symptoms continue despite treatement of nephrotic syndrome. Vs other inflammatory colitis.  #COVID19:  on RA, Airborne precautions and continue to monitor.

## 2021-12-29 NOTE — PROGRESS NOTE ADULT - PROBLEM SELECTOR PLAN 4
- hx of DVT  - nephrotic syndrome increase risk of coagulation, started on heparin dtt  -Previous refusing, now willing to have heparin gtt - hx of DVT  - nephrotic syndrome increase risk of coagulation, started on heparin dtt  - Previous refusing, now willing to have heparin gtt  - Monitored off AC

## 2021-12-29 NOTE — PROGRESS NOTE ADULT - PROBLEM SELECTOR PLAN 8
- DVT ppx: heparin dtt  - Diet: Regular  - Dispo: to be determined - DVT ppx: heparin gtt  - Diet: Regular  - Dispo: Home

## 2021-12-29 NOTE — PROGRESS NOTE ADULT - PROBLEM SELECTOR PLAN 3
- Ox2 saturation 98% on RA  - Not hypoxic, not requiring decadron or remdesivir  - unvaccinated + exposure from home   - Tylenol PRN for myalgia and fever  - trending inflammatory markers q72  - monitor oxygen saturations closely, maintain O2 sat > 93%  - Chest CT with RLL atelectasis  -Will require isolation until 1/1/22 - Ox2 saturation 98% on RA  - Not hypoxic, not requiring decadron or remdesivir  - unvaccinated + exposure from home   - Tylenol PRN for myalgia and fever  - trending inflammatory markers q72  - monitor oxygen saturations closely, maintain O2 sat > 93%  - Chest CT with RLL atelectasis  -Will require isolation until 1/1/22  -Will consider vaccination outpatient upon D/C

## 2021-12-29 NOTE — DISCHARGE NOTE NURSING/CASE MANAGEMENT/SOCIAL WORK - NURSING SECTION COMPLETE
Patient/Caregiver provided printed discharge information. Split-Thickness Skin Graft Text: The defect edges were debeveled with a #15 scalpel blade.  Given the location of the defect, shape of the defect and the proximity to free margins a split thickness skin graft was deemed most appropriate.  Using a sterile surgical marker, the primary defect shape was transferred to the donor site. The split thickness graft was then harvested.  The skin graft was then placed in the primary defect and oriented appropriately.

## 2021-12-30 LAB
INTERPRETATION SERPL IFE-IMP: SIGNIFICANT CHANGE UP
PHOSPHOLIPASE A2 RECEPTOR ELISA: <1.8 RU/ML — SIGNIFICANT CHANGE UP (ref 0–19.9)
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
## 2022-01-04 LAB — INTERPRETATION SERPL IFE-IMP: SIGNIFICANT CHANGE UP

## 2022-01-06 ENCOUNTER — RESULT REVIEW (OUTPATIENT)
Age: 32
End: 2022-01-06

## 2022-01-06 ENCOUNTER — OUTPATIENT (OUTPATIENT)
Dept: OUTPATIENT SERVICES | Facility: HOSPITAL | Age: 32
LOS: 1 days | End: 2022-01-06
Payer: MEDICAID

## 2022-01-06 ENCOUNTER — APPOINTMENT (OUTPATIENT)
Dept: ULTRASOUND IMAGING | Facility: HOSPITAL | Age: 32
End: 2022-01-06
Payer: MEDICAID

## 2022-01-06 DIAGNOSIS — N04.9 NEPHROTIC SYNDROME WITH UNSPECIFIED MORPHOLOGIC CHANGES: ICD-10-CM

## 2022-01-06 DIAGNOSIS — Z98.89 OTHER SPECIFIED POSTPROCEDURAL STATES: Chronic | ICD-10-CM

## 2022-01-06 DIAGNOSIS — Z00.00 ENCOUNTER FOR GENERAL ADULT MEDICAL EXAMINATION WITHOUT ABNORMAL FINDINGS: ICD-10-CM

## 2022-01-06 DIAGNOSIS — Z90.89 ACQUIRED ABSENCE OF OTHER ORGANS: Chronic | ICD-10-CM

## 2022-01-06 PROCEDURE — 88350 IMFLUOR EA ADDL 1ANTB STN PX: CPT

## 2022-01-06 PROCEDURE — 50200 RENAL BIOPSY PERQ: CPT | Mod: LT

## 2022-01-06 PROCEDURE — 88346 IMFLUOR 1ST 1ANTB STAIN PX: CPT | Mod: 26

## 2022-01-06 PROCEDURE — 88313 SPECIAL STAINS GROUP 2: CPT | Mod: 26

## 2022-01-06 PROCEDURE — 50200 RENAL BIOPSY PERQ: CPT

## 2022-01-06 PROCEDURE — 88348 ELECTRON MICROSCOPY DX: CPT | Mod: 26

## 2022-01-06 PROCEDURE — 88350 IMFLUOR EA ADDL 1ANTB STN PX: CPT | Mod: 26

## 2022-01-06 PROCEDURE — 88346 IMFLUOR 1ST 1ANTB STAIN PX: CPT

## 2022-01-06 PROCEDURE — 88348 ELECTRON MICROSCOPY DX: CPT

## 2022-01-06 PROCEDURE — 88313 SPECIAL STAINS GROUP 2: CPT

## 2022-01-06 PROCEDURE — 76942 ECHO GUIDE FOR BIOPSY: CPT | Mod: 26

## 2022-01-06 PROCEDURE — 76942 ECHO GUIDE FOR BIOPSY: CPT

## 2022-01-06 PROCEDURE — 88305 TISSUE EXAM BY PATHOLOGIST: CPT | Mod: 26

## 2022-01-06 PROCEDURE — 88305 TISSUE EXAM BY PATHOLOGIST: CPT

## 2022-01-10 ENCOUNTER — APPOINTMENT (OUTPATIENT)
Dept: GASTROENTEROLOGY | Facility: CLINIC | Age: 32
End: 2022-01-10

## 2022-01-10 ENCOUNTER — NON-APPOINTMENT (OUTPATIENT)
Age: 32
End: 2022-01-10

## 2022-01-11 ENCOUNTER — APPOINTMENT (OUTPATIENT)
Dept: NEPHROLOGY | Facility: CLINIC | Age: 32
End: 2022-01-11
Payer: MEDICAID

## 2022-01-11 PROCEDURE — 99215 OFFICE O/P EST HI 40 MIN: CPT | Mod: 95

## 2022-01-11 RX ORDER — FAMOTIDINE 20 MG/1
20 TABLET, FILM COATED ORAL TWICE DAILY
Qty: 60 | Refills: 3 | Status: ACTIVE | COMMUNITY
Start: 2022-01-11 | End: 1900-01-01

## 2022-01-11 NOTE — ASSESSMENT
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

## 2022-01-11 NOTE — HISTORY OF PRESENT ILLNESS
[Home] : at home, [unfilled] , at the time of the visit. [Medical Office: ([facility])___] : at the medical office located in  [Verbal consent obtained from patient] : the patient, [unfilled] [FreeTextEntry1] : hospital follow up for nephrotic syndrome \par \par 31 year old with a h/o asthma, GERD, iron deficiency anemia, DVT (s/p  4 yo) admitted for lower extremity swelling and shortness of breath to [facility] on \par \par 2.5-3 weeks prior to presentation she had sudden onset leg swelling. The patient was evaluated at [facility]. She was subsequently evaluated at [facility] and followed by nephrology to evaluate for nephrotic syndrome. Urine studies were significant for proteinuria (> 600) and low albumin (1.2); treated with albumin and lasix. Ordered lower extremity doppler to r/o DVT. At these OSH, the patient was scheduled for renal biopsy, however, pt was found to be COVID positive and biopsy was held. The patient left [facility] AMA for "poor care" and presented to [facility] for continued treatment of her lower extremity swelling and abdominal discomfort. she was found to have incidental covid positivity and unable to get a kidney biopsy. she finally had a kidney biopsy on  that showed minimal change disease \par \par today she states she feels very bloated and has a lot of exercise intolerance \par \par ros \par gu- no blood in urine + foamy urine \par [facility]- no chest pain, + exertional shortness of breath \par all other systems reviewed in detail and were negative except as above \par \par

## 2022-01-14 LAB
ALBUMIN SERPL ELPH-MCNC: 1.6 G/DL
ANION GAP SERPL CALC-SCNC: 6 MMOL/L
APPEARANCE: ABNORMAL
BACTERIA: NEGATIVE
BILIRUBIN URINE: NEGATIVE
BLOOD URINE: ABNORMAL
BUN SERPL-MCNC: 8 MG/DL
CALCIUM SERPL-MCNC: 8 MG/DL
CHLORIDE SERPL-SCNC: 105 MMOL/L
CHOLEST SERPL-MCNC: 656 MG/DL
CO2 SERPL-SCNC: 26 MMOL/L
COLOR: YELLOW
CREAT SERPL-MCNC: 0.49 MG/DL
CREAT SPEC-SCNC: 305 MG/DL
CREAT/PROT UR: 13.2 RATIO
GLUCOSE QUALITATIVE U: NEGATIVE
GLUCOSE SERPL-MCNC: 84 MG/DL
HDLC SERPL-MCNC: 90 MG/DL
HYALINE CASTS: 4 /LPF
KETONES URINE: NEGATIVE
LDLC SERPL CALC-MCNC: 534 MG/DL
LEUKOCYTE ESTERASE URINE: NEGATIVE
MICROSCOPIC-UA: NORMAL
NITRITE URINE: NEGATIVE
NONHDLC SERPL-MCNC: 566 MG/DL
PH URINE: 7
POTASSIUM SERPL-SCNC: 4.1 MMOL/L
PROT UR-MCNC: 4018 MG/DL
PROTEIN URINE: ABNORMAL
RED BLOOD CELLS URINE: 17 /HPF
SODIUM SERPL-SCNC: 138 MMOL/L
SPECIFIC GRAVITY URINE: >1.05
SQUAMOUS EPITHELIAL CELLS: 15 /HPF
TRIGL SERPL-MCNC: 164 MG/DL
UROBILINOGEN URINE: ABNORMAL
WHITE BLOOD CELLS URINE: 5 /HPF

## 2022-01-14 RX ORDER — APIXABAN 2.5 MG/1
2.5 TABLET, FILM COATED ORAL
Qty: 60 | Refills: 3 | Status: ACTIVE | COMMUNITY
Start: 2022-01-14 | End: 1900-01-01

## 2022-01-18 ENCOUNTER — APPOINTMENT (OUTPATIENT)
Dept: INFECTIOUS DISEASE | Facility: CLINIC | Age: 32
End: 2022-01-18
Payer: COMMERCIAL

## 2022-01-18 VITALS
WEIGHT: 150 LBS | TEMPERATURE: 98.8 F | BODY MASS INDEX: 26.58 KG/M2 | DIASTOLIC BLOOD PRESSURE: 92 MMHG | HEART RATE: 78 BPM | HEIGHT: 63 IN | SYSTOLIC BLOOD PRESSURE: 138 MMHG | OXYGEN SATURATION: 99 %

## 2022-01-18 DIAGNOSIS — Z82.49 FAMILY HISTORY OF ISCHEMIC HEART DISEASE AND OTHER DISEASES OF THE CIRCULATORY SYSTEM: ICD-10-CM

## 2022-01-18 DIAGNOSIS — Z87.09 PERSONAL HISTORY OF OTHER DISEASES OF THE RESPIRATORY SYSTEM: ICD-10-CM

## 2022-01-18 DIAGNOSIS — Z60.2 PROBLEMS RELATED TO LIVING ALONE: ICD-10-CM

## 2022-01-18 PROCEDURE — 99203 OFFICE O/P NEW LOW 30 MIN: CPT

## 2022-01-18 SDOH — SOCIAL STABILITY - SOCIAL INSECURITY: PROBLEMS RELATED TO LIVING ALONE: Z60.2

## 2022-01-18 NOTE — ASSESSMENT
[FreeTextEntry1] : This is a 30 yo F w/ h/o asthma who presents today for ongoing and persistent diarrhea found to have recurrent c diff. Pt reports not improved after vancomycin.\par \par Favor checking repeat stool studies first.\par \par If c diff positive, will d/w GI regarding other treatment options for her.\par If negative, will have pt return to GI.  Could this be all IBS post infection?\par Agree with probiotics.\par \par Will call pt with results.\par RTC 1 month.\par \par

## 2022-01-18 NOTE — CONSULT LETTER
[Dear  ___] : Dear  [unfilled], [Consult Letter:] : I had the pleasure of evaluating your patient, [unfilled]. [Please see my note below.] : Please see my note below. [Consult Closing:] : Thank you very much for allowing me to participate in the care of this patient.  If you have any questions, please do not hesitate to contact me. [Sincerely,] : Sincerely, [DrDebbie  ___] : Dr. VASQUEZ [FreeTextEntry2] : Dr. Don Jeffery\par Fax [phone]\par Phone: [phone] [FreeTextEntry3] : \par Joceline Noe MD\par  of Medicine\par Division of Infectious Diseases\par [employer] [facility] [employer] [facility]\par [address] [place] [address] DrDebbie\[place] [address]\par Tel: [phone]\par Fax: [phone]

## 2022-01-18 NOTE — PHYSICAL EXAM
[General Appearance - Alert] : alert [General Appearance - In No Acute Distress] : in no acute distress [General Appearance - Well-Appearing] : healthy appearing [Sclera] : the sclera and conjunctiva were normal [PERRL With Normal Accommodation] : pupils were equal in size, round, reactive to light [Extraocular Movements] : extraocular movements were intact [Outer Ear] : the ears and nose were normal in appearance [Oropharynx] : the oropharynx was normal with no thrush [Neck Appearance] : the appearance of the neck was normal [Neck Cervical Mass (___cm)] : no neck mass was observed [Jugular Venous Distention Increased] : there was no jugular-venous distention [Thyroid Diffuse Enlargement] : the thyroid was not enlarged [] : no respiratory distress [Auscultation Breath Sounds / Voice Sounds] : lungs were clear to auscultation bilaterally [Heart Rate And Rhythm] : heart rate was normal and rhythm regular [Heart Sounds] : normal S1 and S2 [Heart Sounds Gallop] : no gallops [Murmurs] : no murmurs [Heart Sounds Pericardial Friction Rub] : no pericardial rub [Edema] : there was no peripheral edema [Bowel Sounds] : normal bowel sounds [Abdomen Soft] : soft [FreeTextEntry1] : non distended, mild diffuse tenderness, no rebound or guarding [Musculoskeletal - Swelling] : no joint swelling [Nail Clubbing] : no clubbing  or cyanosis of the fingernails [Motor Tone] : muscle strength and tone were normal [Skin Lesions] : no skin lesions [No Focal Deficits] : no focal deficits [Oriented To Time, Place, And Person] : oriented to person, place, and time [Affect] : the affect was normal

## 2022-01-18 NOTE — REVIEW OF SYSTEMS
[Recent Weight Loss (___ Lbs)] : recent [unfilled] ~Ulb weight loss [Abdominal Pain] : abdominal pain [Diarrhea] : diarrhea [Negative] : Neurological

## 2022-01-18 NOTE — HISTORY OF PRESENT ILLNESS
[FreeTextEntry1] : This is a 30 yo F w/ h/o asthma who presents today for ongoing and persistent diarrhea.\par \par She says her symptoms started in 5/2021.\par Admitted to [facility] 7/2021. Had EGD/colonoscopy. Found to have duodenitis and gastritis.  Diverticulosis noted. No diverticulitis and no c diff at that time. \par \par Returned to hospital 9/2021. C diff positive. Treated with vanco.\par 10/2021 recurrent c diff, attempted to give dificid but unable to get from insurance. Treated with vanco again.\par [id number] given long taper of vanco w/o relief.\par Returned to ER 12/25.\par \par Seen by GI and also treating for IBS.\par Intermittently taking probiotics but doesn't help her. \par \par She reports nausea, abd pain, diarrhea every day. Can go 3-10x/day. Always loose.\par No travel.  A lot of weight loss, ~ 50 lbs. \par \par Other info: Had covid x 2, 2020 and 2021. Did not get vaccine as of yet but plans to do so.

## 2022-01-19 NOTE — ED PROVIDER NOTE - MUSCULOSKELETAL NEGATIVE STATEMENT, MLM
[facility]10   [facility], [facility] pain, no neck pain, and no weakness.

## 2022-01-20 ENCOUNTER — APPOINTMENT (OUTPATIENT)
Dept: NEPHROLOGY | Facility: CLINIC | Age: 32
End: 2022-01-20

## 2022-01-21 ENCOUNTER — NON-APPOINTMENT (OUTPATIENT)
Age: 32
End: 2022-01-21

## 2022-01-21 DIAGNOSIS — A49.8 OTHER BACTERIAL INFECTIONS OF UNSPECIFIED SITE: ICD-10-CM

## 2022-01-21 LAB
C DIFF TOX GENS STL QL NAA+PROBE: NORMAL
CDIFF BY PCR: DETECTED
GI PCR PANEL, STOOL: NORMAL

## 2022-01-21 RX ORDER — FIDAXOMICIN 200 MG/1
200 TABLET, FILM COATED ORAL TWICE DAILY
Qty: 20 | Refills: 0 | Status: ACTIVE | COMMUNITY
Start: 2022-01-21 | End: 1900-01-01

## 2022-01-21 RX ORDER — VANCOMYCIN HYDROCHLORIDE 125 MG/1
125 CAPSULE ORAL
Qty: 56 | Refills: 0 | Status: ACTIVE | COMMUNITY
Start: 2022-01-21 | End: 1900-01-01

## 2022-01-24 ENCOUNTER — NON-APPOINTMENT (OUTPATIENT)
Age: 32
End: 2022-01-24

## 2022-01-28 LAB
DEPRECATED O AND P PREP STL: NORMAL
G LAMBLIA AG STL QL: NORMAL

## 2022-02-17 ENCOUNTER — NON-APPOINTMENT (OUTPATIENT)
Age: 32
End: 2022-02-17

## 2022-02-18 LAB
ALBUMIN SERPL ELPH-MCNC: 4.1 G/DL
ANION GAP SERPL CALC-SCNC: 10 MMOL/L
APPEARANCE: CLEAR
BACTERIA: NEGATIVE
BILIRUBIN URINE: NEGATIVE
BLOOD URINE: ABNORMAL
BUN SERPL-MCNC: 14 MG/DL
CALCIUM SERPL-MCNC: 9.5 MG/DL
CHLORIDE SERPL-SCNC: 103 MMOL/L
CO2 SERPL-SCNC: 26 MMOL/L
COLOR: COLORLESS
CREAT SERPL-MCNC: 0.62 MG/DL
CREAT SPEC-SCNC: 53 MG/DL
CREAT/PROT UR: 0.2 RATIO
GLUCOSE QUALITATIVE U: NEGATIVE
GLUCOSE SERPL-MCNC: 97 MG/DL
HYALINE CASTS: 1 /LPF
KETONES URINE: NEGATIVE
LEUKOCYTE ESTERASE URINE: NEGATIVE
MICROSCOPIC-UA: NORMAL
NITRITE URINE: NEGATIVE
PH URINE: 7.5
POTASSIUM SERPL-SCNC: 4.7 MMOL/L
PROT UR-MCNC: 12 MG/DL
PROTEIN URINE: NORMAL
RED BLOOD CELLS URINE: 41 /HPF
SODIUM SERPL-SCNC: 138 MMOL/L
SPECIFIC GRAVITY URINE: 1.02
SQUAMOUS EPITHELIAL CELLS: 3 /HPF
UROBILINOGEN URINE: NORMAL
WHITE BLOOD CELLS URINE: 6 /HPF

## 2022-02-22 NOTE — PATIENT PROFILE ADULT. - NS TRANSFER PATIENT BELONGINGS
Clothing Rotation Flap Text: The defect edges were debeveled with a #15 scalpel blade.  Given the location of the defect, shape of the defect and the proximity to free margins a rotation flap was deemed most appropriate.  Using a sterile surgical marker, an appropriate rotation flap was drawn incorporating the defect and placing the expected incisions within the relaxed skin tension lines where possible.    The area thus outlined was incised deep to adipose tissue with a #15 scalpel blade.  The skin margins were undermined to an appropriate distance in all directions utilizing iris scissors.

## 2022-03-07 ENCOUNTER — APPOINTMENT (OUTPATIENT)
Dept: INFECTIOUS DISEASE | Facility: CLINIC | Age: 32
End: 2022-03-07
Payer: MEDICAID

## 2022-03-07 VITALS
OXYGEN SATURATION: 100 % | DIASTOLIC BLOOD PRESSURE: 86 MMHG | HEART RATE: 73 BPM | BODY MASS INDEX: 28.7 KG/M2 | TEMPERATURE: 99.1 F | RESPIRATION RATE: 18 BRPM | HEIGHT: 63 IN | SYSTOLIC BLOOD PRESSURE: 128 MMHG | WEIGHT: 162 LBS

## 2022-03-07 PROCEDURE — 99213 OFFICE O/P EST LOW 20 MIN: CPT

## 2022-03-07 NOTE — CONSULT LETTER
[Dear  ___] : Dear  [unfilled], [Consult Letter:] : I had the pleasure of evaluating your patient, [unfilled]. [Please see my note below.] : Please see my note below. [Consult Closing:] : Thank you very much for allowing me to participate in the care of this patient.  If you have any questions, please do not hesitate to contact me. [Sincerely,] : Sincerely, [DrDebbie  ___] : Dr. VASQUEZ [FreeTextEntry2] : Dr. Don Jeffery\par Fax [phone]\par Phone: [phone] [FreeTextEntry3] : \par Joceline Noe MD\par  of Medicine\par Division of Infectious Diseases\par [employer] [facility] [employer] at [facility]\par [address] [place] [address] DrDebbie\[place] [address]\par Tel: [phone]\par Fax: [phone]

## 2022-03-07 NOTE — ASSESSMENT
[FreeTextEntry1] : This is a 32 yo F w/ h/o asthma who presents today for ongoing and persistent diarrhea found to have recurrent c diff. Pt reports not improved after vancomycin.  Was better after course of dificid x 10 days but diarrhea since returned.\par \par Favor checking repeat stool studies first.\par \par If c diff positive, will d/w GI regarding other treatment options for her.\par Will likey try dificid 200 mg po bid x 5 days followed by 1 pill every other day x 20 days for long course. \par Agree with probiotics.\par Advised pt to f/up with GI also in case fecal transplant is needed. \par \par Will call pt with results.\par RTC 1 month.\par \par

## 2022-03-07 NOTE — PHYSICAL EXAM
[General Appearance - Alert] : alert [General Appearance - In No Acute Distress] : in no acute distress [General Appearance - Well-Appearing] : healthy appearing [Sclera] : the sclera and conjunctiva were normal [PERRL With Normal Accommodation] : pupils were equal in size, round, reactive to light [Extraocular Movements] : extraocular movements were intact [Outer Ear] : the ears and nose were normal in appearance [Oropharynx] : the oropharynx was normal with no thrush [Neck Appearance] : the appearance of the neck was normal [Neck Cervical Mass (___cm)] : no neck mass was observed [Jugular Venous Distention Increased] : there was no jugular-venous distention [Thyroid Diffuse Enlargement] : the thyroid was not enlarged [] : no respiratory distress [Auscultation Breath Sounds / Voice Sounds] : lungs were clear to auscultation bilaterally [Heart Rate And Rhythm] : heart rate was normal and rhythm regular [Heart Sounds] : normal S1 and S2 [Heart Sounds Gallop] : no gallops [Murmurs] : no murmurs [Heart Sounds Pericardial Friction Rub] : no pericardial rub [Edema] : there was no peripheral edema [Bowel Sounds] : normal bowel sounds [Abdomen Soft] : soft [Musculoskeletal - Swelling] : no joint swelling [Nail Clubbing] : no clubbing  or cyanosis of the fingernails [Motor Tone] : muscle strength and tone were normal [Skin Lesions] : no skin lesions [No Focal Deficits] : no focal deficits [Oriented To Time, Place, And Person] : oriented to person, place, and time [Affect] : the affect was normal [Abdomen Tenderness] : non-tender [FreeTextEntry1] : non distended, no tenderness, no rebound or guarding

## 2022-03-07 NOTE — HISTORY OF PRESENT ILLNESS
[FreeTextEntry1] : This is a 30 yo F w/ h/o asthma who presents today for f/up ongoing and persistent diarrhea.\par \par First visit 1/18/22:\par She says her symptoms started in 5/2021.\par Admitted to [facility] 7/2021. Had EGD/colonoscopy. Found to have duodenitis and gastritis.  Diverticulosis noted. No diverticulitis and no c diff at that time. \par \par Returned to hospital 9/2021. C diff positive. Treated with vanco.\par 10/2021 recurrent c diff, attempted to give dificid but unable to get from insurance. Treated with vanco again.\par [id number] given long taper of vanco w/o relief.\par Returned to ER 12/25.\par \par Seen by GI and also treating for IBS.\par Intermittently taking probiotics but doesn't help her. \par \par She reports nausea, abd pain, diarrhea every day. Can go 3-10x/day. Always loose.\par No travel.  A lot of weight loss, ~ 50 lbs. \par \par Other info: Had covid x 2, 2020 and 2021. \par \par 3/7/22:\par Diagnosed with recurrent C diff at last visit.\par Treated with Dificid x 10 days\par Says she was feeling better for a while but past 2 weeks feels diarrhea is getting worse again. Had 4 loose BMs today. Also reports nausea and decreased appetite again.  Had gained some weight after last treatment course.

## 2022-03-08 NOTE — DISCHARGE NOTE PROVIDER - NSFOLLOWUPCLINICS_GEN_ALL_ED_FT
[facility]  Nephrology  [address] [place] [address], [address] [place] [address]  Phone: [phone]  Fax:

## 2022-03-08 NOTE — DISCHARGE NOTE PROVIDER - HOSPITAL COURSE
31 year old female w/ PMHx of  asthma, GERD, chronic iron deficiency, anemia, thrombophlebitis of R gonadal vein, DVT (was previously on Xarelto), UTI, recent admission to [facility]  (12/4-12/7 for proteinuria with plan for kidney biopsy,  but cancelled due to COVID-19 PCR detected on 12/21, she now presents to [facility] with worsening generalized edema and proteinuria.   Pt was admitted to medicine and renal consulted.  She was receiving albumin and bumex to help diurese while waiting for the Covid isolation period to finish.  Pt upset that biopsy was not performed and she decided to AMA.  Risks of worsening fluid overload, chest pain, shortness of breath, worsening renal failure and even death were explained to her and she understood.

## 2022-03-14 LAB
C DIFF TOX GENS STL QL NAA+PROBE: NORMAL
CDIFF BY PCR: NOT DETECTED
DEPRECATED O AND P PREP STL: NORMAL
GI PCR PANEL, STOOL: NORMAL

## 2022-04-05 ENCOUNTER — NON-APPOINTMENT (OUTPATIENT)
Age: 32
End: 2022-04-05

## 2022-04-25 ENCOUNTER — APPOINTMENT (OUTPATIENT)
Dept: GASTROENTEROLOGY | Facility: CLINIC | Age: 32
End: 2022-04-25

## 2022-05-28 ENCOUNTER — EMERGENCY (EMERGENCY)
Facility: HOSPITAL | Age: 32
LOS: 1 days | Discharge: HOME | End: 2022-05-28
Attending: EMERGENCY MEDICINE
Payer: MEDICAID

## 2022-05-28 VITALS
HEART RATE: 123 BPM | WEIGHT: 145.06 LBS | HEIGHT: 61 IN | OXYGEN SATURATION: 97 % | TEMPERATURE: 98 F | DIASTOLIC BLOOD PRESSURE: 87 MMHG | RESPIRATION RATE: 20 BRPM | SYSTOLIC BLOOD PRESSURE: 133 MMHG

## 2022-05-28 VITALS
RESPIRATION RATE: 17 BRPM | HEART RATE: 97 BPM | SYSTOLIC BLOOD PRESSURE: 111 MMHG | DIASTOLIC BLOOD PRESSURE: 96 MMHG | TEMPERATURE: 98 F | OXYGEN SATURATION: 97 %

## 2022-05-28 DIAGNOSIS — Z98.89 OTHER SPECIFIED POSTPROCEDURAL STATES: Chronic | ICD-10-CM

## 2022-05-28 DIAGNOSIS — Z90.89 ACQUIRED ABSENCE OF OTHER ORGANS: Chronic | ICD-10-CM

## 2022-05-28 LAB
ALBUMIN SERPL ELPH-MCNC: 1 G/DL — LOW (ref 3.3–5)
ALBUMIN SERPL ELPH-MCNC: 1 G/DL — LOW (ref 3.3–5)
ALP SERPL-CCNC: 53 U/L — SIGNIFICANT CHANGE UP (ref 40–120)
ALP SERPL-CCNC: 64 U/L — SIGNIFICANT CHANGE UP (ref 40–120)
ALT FLD-CCNC: 14 U/L — SIGNIFICANT CHANGE UP (ref 10–45)
ALT FLD-CCNC: 7 U/L — LOW (ref 10–45)
ANION GAP SERPL CALC-SCNC: 4 MMOL/L — LOW (ref 5–17)
ANION GAP SERPL CALC-SCNC: 8 MMOL/L — SIGNIFICANT CHANGE UP (ref 5–17)
APPEARANCE UR: ABNORMAL
AST SERPL-CCNC: 16 U/L — SIGNIFICANT CHANGE UP (ref 10–40)
AST SERPL-CCNC: 58 U/L — HIGH (ref 10–40)
BACTERIA # UR AUTO: NEGATIVE — SIGNIFICANT CHANGE UP
BASE EXCESS BLDV CALC-SCNC: -11 MMOL/L — LOW (ref -2–2)
BASE EXCESS BLDV CALC-SCNC: 1.2 MMOL/L — SIGNIFICANT CHANGE UP (ref -2–2)
BASOPHILS # BLD AUTO: 0.04 K/UL — SIGNIFICANT CHANGE UP (ref 0–0.2)
BASOPHILS NFR BLD AUTO: 0.4 % — SIGNIFICANT CHANGE UP (ref 0–2)
BILIRUB SERPL-MCNC: 0.3 MG/DL — SIGNIFICANT CHANGE UP (ref 0.2–1.2)
BILIRUB SERPL-MCNC: 0.3 MG/DL — SIGNIFICANT CHANGE UP (ref 0.2–1.2)
BILIRUB UR-MCNC: ABNORMAL
BLD GP AB SCN SERPL QL: POSITIVE — SIGNIFICANT CHANGE UP
BUN SERPL-MCNC: 11 MG/DL — SIGNIFICANT CHANGE UP (ref 7–23)
BUN SERPL-MCNC: 11 MG/DL — SIGNIFICANT CHANGE UP (ref 7–23)
CA-I SERPL-SCNC: 1.03 MMOL/L — LOW (ref 1.15–1.33)
CA-I SERPL-SCNC: 1.18 MMOL/L — SIGNIFICANT CHANGE UP (ref 1.15–1.33)
CALCIUM SERPL-MCNC: 8 MG/DL — LOW (ref 8.4–10.5)
CALCIUM SERPL-MCNC: 8.2 MG/DL — LOW (ref 8.4–10.5)
CHLORIDE BLDV-SCNC: 101 MMOL/L — SIGNIFICANT CHANGE UP (ref 96–108)
CHLORIDE BLDV-SCNC: 101 MMOL/L — SIGNIFICANT CHANGE UP (ref 96–108)
CHLORIDE SERPL-SCNC: 104 MMOL/L — SIGNIFICANT CHANGE UP (ref 96–108)
CHLORIDE SERPL-SCNC: 105 MMOL/L — SIGNIFICANT CHANGE UP (ref 96–108)
CO2 BLDV-SCNC: 15 MMOL/L — LOW (ref 22–26)
CO2 BLDV-SCNC: 30 MMOL/L — HIGH (ref 22–26)
CO2 SERPL-SCNC: 23 MMOL/L — SIGNIFICANT CHANGE UP (ref 22–31)
CO2 SERPL-SCNC: 23 MMOL/L — SIGNIFICANT CHANGE UP (ref 22–31)
COLOR SPEC: YELLOW — SIGNIFICANT CHANGE UP
COMMENT - URINE: SIGNIFICANT CHANGE UP
CREAT SERPL-MCNC: 0.72 MG/DL — SIGNIFICANT CHANGE UP (ref 0.5–1.3)
CREAT SERPL-MCNC: 0.77 MG/DL — SIGNIFICANT CHANGE UP (ref 0.5–1.3)
DIFF PNL FLD: ABNORMAL
EGFR: 106 ML/MIN/1.73M2 — SIGNIFICANT CHANGE UP
EGFR: 115 ML/MIN/1.73M2 — SIGNIFICANT CHANGE UP
EOSINOPHIL # BLD AUTO: 0.08 K/UL — SIGNIFICANT CHANGE UP (ref 0–0.5)
EOSINOPHIL NFR BLD AUTO: 0.7 % — SIGNIFICANT CHANGE UP (ref 0–6)
EPI CELLS # UR: 54 /HPF — HIGH
GAS PNL BLDV: 125 MMOL/L — LOW (ref 136–145)
GAS PNL BLDV: 131 MMOL/L — LOW (ref 136–145)
GAS PNL BLDV: SIGNIFICANT CHANGE UP
GAS PNL BLDV: SIGNIFICANT CHANGE UP
GLUCOSE BLDV-MCNC: 60 MG/DL — LOW (ref 70–99)
GLUCOSE BLDV-MCNC: 96 MG/DL — SIGNIFICANT CHANGE UP (ref 70–99)
GLUCOSE SERPL-MCNC: 95 MG/DL — SIGNIFICANT CHANGE UP (ref 70–99)
GLUCOSE SERPL-MCNC: 98 MG/DL — SIGNIFICANT CHANGE UP (ref 70–99)
GLUCOSE UR QL: NEGATIVE — SIGNIFICANT CHANGE UP
HCG SERPL-ACNC: <2 MIU/ML — SIGNIFICANT CHANGE UP
HCO3 BLDV-SCNC: 14 MMOL/L — LOW (ref 22–29)
HCO3 BLDV-SCNC: 28 MMOL/L — SIGNIFICANT CHANGE UP (ref 22–29)
HCT VFR BLD CALC: 44.7 % — SIGNIFICANT CHANGE UP (ref 34.5–45)
HCT VFR BLDA CALC: 28 % — LOW (ref 34.5–46.5)
HCT VFR BLDA CALC: 46 % — SIGNIFICANT CHANGE UP (ref 34.5–46.5)
HGB BLD CALC-MCNC: 15.3 G/DL — SIGNIFICANT CHANGE UP (ref 11.7–16.1)
HGB BLD CALC-MCNC: 9.2 G/DL — LOW (ref 11.7–16.1)
HGB BLD-MCNC: 14.3 G/DL — SIGNIFICANT CHANGE UP (ref 11.5–15.5)
HYALINE CASTS # UR AUTO: 263 /LPF — HIGH (ref 0–2)
IMM GRANULOCYTES NFR BLD AUTO: 0.2 % — SIGNIFICANT CHANGE UP (ref 0–1.5)
KETONES UR-MCNC: NEGATIVE — SIGNIFICANT CHANGE UP
LACTATE BLDV-MCNC: 0.9 MMOL/L — SIGNIFICANT CHANGE UP (ref 0.7–2)
LACTATE BLDV-MCNC: 12 MMOL/L — CRITICAL HIGH (ref 0.7–2)
LEUKOCYTE ESTERASE UR-ACNC: NEGATIVE — SIGNIFICANT CHANGE UP
LIDOCAIN IGE QN: 29 U/L — SIGNIFICANT CHANGE UP (ref 7–60)
LIDOCAIN IGE QN: 37 U/L — SIGNIFICANT CHANGE UP (ref 7–60)
LYMPHOCYTES # BLD AUTO: 2.79 K/UL — SIGNIFICANT CHANGE UP (ref 1–3.3)
LYMPHOCYTES # BLD AUTO: 24.7 % — SIGNIFICANT CHANGE UP (ref 13–44)
MCHC RBC-ENTMCNC: 29.3 PG — SIGNIFICANT CHANGE UP (ref 27–34)
MCHC RBC-ENTMCNC: 32 GM/DL — SIGNIFICANT CHANGE UP (ref 32–36)
MCV RBC AUTO: 91.6 FL — SIGNIFICANT CHANGE UP (ref 80–100)
MONOCYTES # BLD AUTO: 0.87 K/UL — SIGNIFICANT CHANGE UP (ref 0–0.9)
MONOCYTES NFR BLD AUTO: 7.7 % — SIGNIFICANT CHANGE UP (ref 2–14)
NEUTROPHILS # BLD AUTO: 7.51 K/UL — HIGH (ref 1.8–7.4)
NEUTROPHILS NFR BLD AUTO: 66.3 % — SIGNIFICANT CHANGE UP (ref 43–77)
NITRITE UR-MCNC: NEGATIVE — SIGNIFICANT CHANGE UP
NRBC # BLD: 0 /100 WBCS — SIGNIFICANT CHANGE UP (ref 0–0)
OSMOLALITY SERPL: 280 MOSMOL/KG — SIGNIFICANT CHANGE UP (ref 275–300)
OSMOLALITY UR: 808 MOS/KG — SIGNIFICANT CHANGE UP (ref 300–900)
PCO2 BLDV: 29 MMHG — LOW (ref 39–42)
PCO2 BLDV: 52 MMHG — HIGH (ref 39–42)
PH BLDV: 7.3 — LOW (ref 7.32–7.43)
PH BLDV: 7.34 — SIGNIFICANT CHANGE UP (ref 7.32–7.43)
PH UR: 7 — SIGNIFICANT CHANGE UP (ref 5–8)
PLATELET # BLD AUTO: 360 K/UL — SIGNIFICANT CHANGE UP (ref 150–400)
PO2 BLDV: 30 MMHG — SIGNIFICANT CHANGE UP (ref 25–45)
PO2 BLDV: 37 MMHG — SIGNIFICANT CHANGE UP (ref 25–45)
POTASSIUM BLDV-SCNC: 3.7 MMOL/L — SIGNIFICANT CHANGE UP (ref 3.5–5.1)
POTASSIUM BLDV-SCNC: 6.7 MMOL/L — CRITICAL HIGH (ref 3.5–5.1)
POTASSIUM SERPL-MCNC: 3.7 MMOL/L — SIGNIFICANT CHANGE UP (ref 3.5–5.3)
POTASSIUM SERPL-MCNC: 5.8 MMOL/L — HIGH (ref 3.5–5.3)
POTASSIUM SERPL-SCNC: 3.7 MMOL/L — SIGNIFICANT CHANGE UP (ref 3.5–5.3)
POTASSIUM SERPL-SCNC: 5.8 MMOL/L — HIGH (ref 3.5–5.3)
PROT SERPL-MCNC: 4.5 G/DL — LOW (ref 6–8.3)
PROT SERPL-MCNC: 4.8 G/DL — LOW (ref 6–8.3)
PROT UR-MCNC: >600
RBC # BLD: 4.88 M/UL — SIGNIFICANT CHANGE UP (ref 3.8–5.2)
RBC # FLD: 15.9 % — HIGH (ref 10.3–14.5)
RBC CASTS # UR COMP ASSIST: 5 /HPF — HIGH (ref 0–4)
RH IG SCN BLD-IMP: POSITIVE — SIGNIFICANT CHANGE UP
SAO2 % BLDV: 40.6 % — LOW (ref 67–88)
SAO2 % BLDV: 59.7 % — LOW (ref 67–88)
SODIUM SERPL-SCNC: 131 MMOL/L — LOW (ref 135–145)
SODIUM SERPL-SCNC: 136 MMOL/L — SIGNIFICANT CHANGE UP (ref 135–145)
SODIUM UR-SCNC: 7 MMOL/L — SIGNIFICANT CHANGE UP
SP GR SPEC: >1.05 (ref 1.01–1.02)
UROBILINOGEN FLD QL: NEGATIVE — SIGNIFICANT CHANGE UP
WBC # BLD: 11.31 K/UL — HIGH (ref 3.8–10.5)
WBC # FLD AUTO: 11.31 K/UL — HIGH (ref 3.8–10.5)
WBC UR QL: 20 /HPF — HIGH (ref 0–5)

## 2022-05-28 PROCEDURE — 96375 TX/PRO/DX INJ NEW DRUG ADDON: CPT | Mod: XU

## 2022-05-28 PROCEDURE — 99285 EMERGENCY DEPT VISIT HI MDM: CPT | Mod: 25

## 2022-05-28 PROCEDURE — 74174 CTA ABD&PLVS W/CONTRAST: CPT | Mod: MA

## 2022-05-28 PROCEDURE — 81001 URINALYSIS AUTO W/SCOPE: CPT

## 2022-05-28 PROCEDURE — 80053 COMPREHEN METABOLIC PANEL: CPT

## 2022-05-28 PROCEDURE — 82947 ASSAY GLUCOSE BLOOD QUANT: CPT

## 2022-05-28 PROCEDURE — 87040 BLOOD CULTURE FOR BACTERIA: CPT

## 2022-05-28 PROCEDURE — 86850 RBC ANTIBODY SCREEN: CPT

## 2022-05-28 PROCEDURE — 85025 COMPLETE CBC W/AUTO DIFF WBC: CPT

## 2022-05-28 PROCEDURE — 87086 URINE CULTURE/COLONY COUNT: CPT

## 2022-05-28 PROCEDURE — 74174 CTA ABD&PLVS W/CONTRAST: CPT | Mod: 26,MA

## 2022-05-28 PROCEDURE — 83880 ASSAY OF NATRIURETIC PEPTIDE: CPT

## 2022-05-28 PROCEDURE — 86870 RBC ANTIBODY IDENTIFICATION: CPT

## 2022-05-28 PROCEDURE — 84484 ASSAY OF TROPONIN QUANT: CPT

## 2022-05-28 PROCEDURE — 84295 ASSAY OF SERUM SODIUM: CPT

## 2022-05-28 PROCEDURE — 84100 ASSAY OF PHOSPHORUS: CPT

## 2022-05-28 PROCEDURE — 86901 BLOOD TYPING SEROLOGIC RH(D): CPT

## 2022-05-28 PROCEDURE — 71045 X-RAY EXAM CHEST 1 VIEW: CPT

## 2022-05-28 PROCEDURE — 83605 ASSAY OF LACTIC ACID: CPT

## 2022-05-28 PROCEDURE — 93005 ELECTROCARDIOGRAM TRACING: CPT

## 2022-05-28 PROCEDURE — 83690 ASSAY OF LIPASE: CPT

## 2022-05-28 PROCEDURE — 82330 ASSAY OF CALCIUM: CPT

## 2022-05-28 PROCEDURE — 86900 BLOOD TYPING SEROLOGIC ABO: CPT

## 2022-05-28 PROCEDURE — 83735 ASSAY OF MAGNESIUM: CPT

## 2022-05-28 PROCEDURE — 86880 COOMBS TEST DIRECT: CPT

## 2022-05-28 PROCEDURE — 84702 CHORIONIC GONADOTROPIN TEST: CPT

## 2022-05-28 PROCEDURE — 83930 ASSAY OF BLOOD OSMOLALITY: CPT

## 2022-05-28 PROCEDURE — 86077 PHYS BLOOD BANK SERV XMATCH: CPT

## 2022-05-28 PROCEDURE — 76705 ECHO EXAM OF ABDOMEN: CPT | Mod: 26

## 2022-05-28 PROCEDURE — 86905 BLOOD TYPING RBC ANTIGENS: CPT

## 2022-05-28 PROCEDURE — 84132 ASSAY OF SERUM POTASSIUM: CPT

## 2022-05-28 PROCEDURE — 76705 ECHO EXAM OF ABDOMEN: CPT

## 2022-05-28 PROCEDURE — 86922 COMPATIBILITY TEST ANTIGLOB: CPT

## 2022-05-28 PROCEDURE — 96374 THER/PROPH/DIAG INJ IV PUSH: CPT | Mod: XU

## 2022-05-28 PROCEDURE — 82435 ASSAY OF BLOOD CHLORIDE: CPT

## 2022-05-28 PROCEDURE — 99285 EMERGENCY DEPT VISIT HI MDM: CPT

## 2022-05-28 PROCEDURE — 85018 HEMOGLOBIN: CPT

## 2022-05-28 PROCEDURE — 85652 RBC SED RATE AUTOMATED: CPT

## 2022-05-28 PROCEDURE — 85014 HEMATOCRIT: CPT

## 2022-05-28 PROCEDURE — 71045 X-RAY EXAM CHEST 1 VIEW: CPT | Mod: 26

## 2022-05-28 PROCEDURE — 82803 BLOOD GASES ANY COMBINATION: CPT

## 2022-05-28 PROCEDURE — 36415 COLL VENOUS BLD VENIPUNCTURE: CPT

## 2022-05-28 PROCEDURE — 84300 ASSAY OF URINE SODIUM: CPT

## 2022-05-28 PROCEDURE — 84145 PROCALCITONIN (PCT): CPT

## 2022-05-28 PROCEDURE — 83935 ASSAY OF URINE OSMOLALITY: CPT

## 2022-05-28 PROCEDURE — 86140 C-REACTIVE PROTEIN: CPT

## 2022-05-28 RX ORDER — SODIUM CHLORIDE 9 MG/ML
1000 INJECTION, SOLUTION INTRAVENOUS ONCE
Refills: 0 | Status: COMPLETED | OUTPATIENT
Start: 2022-05-28 | End: 2022-05-28

## 2022-05-28 RX ORDER — METOCLOPRAMIDE HCL 10 MG
10 TABLET ORAL ONCE
Refills: 0 | Status: COMPLETED | OUTPATIENT
Start: 2022-05-28 | End: 2022-05-28

## 2022-05-28 RX ORDER — ONDANSETRON 8 MG/1
4 TABLET, FILM COATED ORAL ONCE
Refills: 0 | Status: COMPLETED | OUTPATIENT
Start: 2022-05-28 | End: 2022-05-28

## 2022-05-28 RX ORDER — ONDANSETRON 8 MG/1
1 TABLET, FILM COATED ORAL
Qty: 12 | Refills: 0
Start: 2022-05-28

## 2022-05-28 RX ADMIN — SODIUM CHLORIDE 1000 MILLILITER(S): 9 INJECTION, SOLUTION INTRAVENOUS at 20:12

## 2022-05-28 RX ADMIN — Medication 10 MILLIGRAM(S): at 20:12

## 2022-05-28 RX ADMIN — ONDANSETRON 4 MILLIGRAM(S): 8 TABLET, FILM COATED ORAL at 17:52

## 2022-05-28 RX ADMIN — SODIUM CHLORIDE 1000 MILLILITER(S): 9 INJECTION, SOLUTION INTRAVENOUS at 17:52

## 2022-05-28 NOTE — ED ADULT NURSE NOTE - OBJECTIVE STATEMENT
1804 pt 31yf aox4 recently left [facility] today, c/o abd pain vomiting for 3 days, pt c/o unable to tolerate fluids and food at this time, able to verbalize concerns pending eval/

## 2022-05-28 NOTE — ED PROVIDER NOTE - PHYSICAL EXAMINATION
GENERAL: Awake, alert, NAD  HEENT: NC/AT, moist mucous membranes,  LUNGS: CTAB, no wheezes or crackles   CARDIAC: RRR, no m/r/g  ABDOMEN: Soft, non tender, non distended, no rebound, no guarding  EXT: No edema, no calf tenderness, 2+ DP pulses bilaterally, no deformities.  NEURO: A&Ox3. Moving all extremities.  SKIN: Warm and dry. No rash.  PSYCH: Normal affect. GENERAL: Awake, alert, NAD  HEENT: NC/AT, moist mucous membranes,  LUNGS: CTAB, no wheezes or crackles   CARDIAC: RRR, no m/r/g  ABDOMEN: Soft, non tender, non distended, no rebound, no guarding  EXT: No edema, no calf tenderness, 2+ DP pulses bilaterally, no deformities.  NEURO: A&Ox3. Moving all extremities.  SKIN: Warm and dry. No rash.  PSYCH: Normal affect.  Attending Julia Oconnor: Gen: NAD, heent: atrauamtic, eomi, perrla, mmm, op pink,, neck; nttp, no nuchal rigidity, chest: nttp, no crepitus, cv: rrr, no murmurs, lungs: ctab, abd: soft, nontender, nondistended, no peritoneal signs, no guarding, ext: wwp, neg homans,no flank ttp, no midline spinal ttp skin: no rash, neuro: awake and alert, following commands, speech clear, sensation and strength intact, no focal deficits

## 2022-05-28 NOTE — ED PROVIDER NOTE - NSFOLLOWUPINSTRUCTIONS_ED_ALL_ED_FT
Many things can cause abdominal pain. Many times, abdominal pain is not caused by a disease and will improve without treatment. Your health care provider will do a physical exam to determine if there is a dangerous cause of your pain; blood tests and imaging may help determine the cause of your pain. However, in many cases, no cause may be found and you may need further testing as an outpatient. Monitor your abdominal pain for any changes.     SEEK IMMEDIATE MEDICAL CARE IF YOU HAVE ANY OF THE FOLLOWING SYMPTOMS: worsening abdominal pain, uncontrollable vomiting, profuse diarrhea, inability to have bowel movements or pass gas, black or bloody stools, fever accompanying chest pain or back pain, or fainting. These symptoms may represent a serious problem that is an emergency. Do not wait to see if the symptoms will go away. Get medical help right away. Call 911 and do not drive yourself to the hospital. Today you were seen in the ED for nausea and abdominal pain     It was found that you have signs     Please follow up with    Please return to the ED if you have any of the following:   Many things can cause abdominal pain. Many times, abdominal pain is not caused by a disease and will improve without treatment. Your health care provider will do a physical exam to determine if there is a dangerous cause of your pain; blood tests and imaging may help determine the cause of your pain. However, in many cases, no cause may be found and you may need further testing as an outpatient. Monitor your abdominal pain for any changes.     SEEK IMMEDIATE MEDICAL CARE IF YOU HAVE ANY OF THE FOLLOWING SYMPTOMS: worsening abdominal pain, uncontrollable vomiting, profuse diarrhea, inability to have bowel movements or pass gas, black or bloody stools, fever accompanying chest pain or back pain, or fainting. These symptoms may represent a serious problem that is an emergency. Do not wait to see if the symptoms will go away. Get medical help right away. Call 911 and do not drive yourself to the hospital. Today you were seen in the ED for nausea and abdominal pain   Blood cultures were sent and if they are positive you will receive a call back  Please return immediately if you have worsening pain, any fevers, unable to eat or drink or further concerns  Please follow up with your doctor in 1-2 days  Your ct showed evidence of colitis. Please follow up withe the stomach doctors for further evaluation  It was found that you have signs     Please follow up with    Please return to the ED if you have any of the following:   Many things can cause abdominal pain. Many times, abdominal pain is not caused by a disease and will improve without treatment. Your health care provider will do a physical exam to determine if there is a dangerous cause of your pain; blood tests and imaging may help determine the cause of your pain. However, in many cases, no cause may be found and you may need further testing as an outpatient. Monitor your abdominal pain for any changes.     SEEK IMMEDIATE MEDICAL CARE IF YOU HAVE ANY OF THE FOLLOWING SYMPTOMS: worsening abdominal pain, uncontrollable vomiting, profuse diarrhea, inability to have bowel movements or pass gas, black or bloody stools, fever accompanying chest pain or back pain, or fainting. These symptoms may represent a serious problem that is an emergency. Do not wait to see if the symptoms will go away. Get medical help right away. Call 911 and do not drive yourself to the hospital.

## 2022-05-28 NOTE — ED PROVIDER NOTE - CLINICAL SUMMARY MEDICAL DECISION MAKING FREE TEXT BOX
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 pt left outside hospital today after being admitted for pancreatitis. pt did not like the care she received and wanted to come to a different hospital. upon arrival pt initally tachcyardic. denies any abdominal pain. did reporteldy have pancreatitis at [facility]. did drink etoh prior to symptoms. upon arrival to [facility] abdomen soft. no epigastric ttp. pt passing gas. did have diarrhea earlier but has resolved. afebrile in the ed. with complex history blood work ordered as well as cultures and inflammatory markers. consider repeat ct scan to further evaluate as pt on steroids. no dysuria. pt denies any back pain. no joint pain or swelling. will likely need admission

## 2022-05-28 NOTE — ED ADULT NURSE REASSESSMENT NOTE - NS ED NURSE REASSESS COMMENT FT1
Pt says she feels better, denies n/v & abdominal pain. Pt laughing and talking on phone. Awaiting dispo

## 2022-05-28 NOTE — ED PROVIDER NOTE - NSFOLLOWUPCLINICS_GEN_ALL_ED_FT
Gastroenterology at [facility]  Gastroenterology  [address] [place] [id number]  Phone: [phone]  Fax:

## 2022-05-28 NOTE — ED ADULT NURSE REASSESSMENT NOTE - NS ED NURSE REASSESS COMMENT FT1
Report received from DANIKA Rodrigez. Pt a & o x 4 , able to follow commands. Breathing spontaneous & nonlabored. Abdomen soft & nondistended. IV site patent, no signs of phlebitis, flushing without difficulty. Pt provided with blankets. Call bell within reach, bed in lowest position.

## 2022-05-28 NOTE — ED PROVIDER NOTE - CARE PROVIDER_API CALL
Antoine Mccarthy (DO)  Gastroenterology; Internal Medicine  [address] [place] [address]  Phone: [phone]  Fax: [phone]  Follow Up Time:

## 2022-05-28 NOTE — ED PROVIDER NOTE - PATIENT PORTAL LINK FT
You can access the FollowMyHealth Patient Portal offered by [facility] by registering at the following website: [email or web address]. By joining [facility]’s FollowMyHealth portal, you will also be able to view your health information using other applications (apps) compatible with our system.

## 2022-05-28 NOTE — ED PROVIDER NOTE - DISCUSSED CASE WITH MULTISELECT OPTIONS
I got the pt ED discharge paperwork, I called to check up on the pt and help set up a ED follow up.  The pt was at [facility] for a fever.  I called the pt parents, but the phone number on file was not accepting any incoming calls.  I tried the emergency contact and its not working either. I will the  be aware of this, so that they can update the pt phone number, when they come in.   
Admitting MD

## 2022-05-28 NOTE — ED PROVIDER NOTE - OBJECTIVE STATEMENT
31F PMH Minimal change disease CC n/v PT notes left [facility] today, was initially endorsing abd pain, was sent to [facility], dx w/ pancreatitis, did not like the tx that she was receiving and left [facility] today to come here.  PT currently endorsing n/v unable to tolerate PO. Notes two episodes of V today, last one 40 minutes ago, pt notes it seemed red, but did not look like blood   Denies any F CP SOB    Takes mycophenolate for MCKENNA 31F PMH Minimal change disease CC n/v PT notes left [facility] today, was initially endorsing abd pain, was sent to [facility], dx w/ pancreatitis, did not like the tx that she was receiving and left [facility] today to come here.  PT currently endorsing n/v unable to tolerate PO. Notes two episodes of V today, last one 40 minutes ago, pt notes it seemed red, but did not look like blood   two prior abd surg C section,   LMP 2 weeks ago   LBM 2 days ago   Denies any F CP SOB    Takes mycophenolate for MCKENNA

## 2022-05-28 NOTE — ED PROVIDER NOTE - PROGRESS NOTE DETAILS
Geremias PGY 1 will get CT A   will get further labs possible stress does steroids Geremias PGY 1 pt lactate 12 w/ hyper K Geremias PGY 1 pt rept lactate wnl   pt tolerating po Attending Julia Oconnor: pt feeling better tolerating po. initial vbg abnormal however repeated and normalized quickly, suspect possible hemolysis. ct scan shows evidence of mild colitis. pt denies any pain currently and no diarrhea. is on mycophenolate. offered admission vs cdu for monitoring. pt feeling better and wants to go home. understands to return Immediately for any pain, or inability to eat or drink Attending Julia Oconnor: pt feeling better tolerating po. initial vbg abnormal however repeated and normalized quickly, suspect possible hemolysis. ct scan shows evidence of mild colitis. pt denies any pain currently and no diarrhea. is on mycophenolate. offered admission vs cdu for monitoring as concern with h/o steroid use . pt feeling better and wants to go home. understands to return Immediately for any pain, or inability to eat or drink. ct with possible colitis. pt denies any diarrhea, less likely infectious at this time. will give GI follow up and hold abx as pt asymptomatic. pt instruted to return immediately for any abodminal pain

## 2022-05-28 NOTE — ED PROVIDER NOTE - ATTENDING CONTRIBUTION TO CARE
Attending MD Julia Oconnor:  I personally have seen and examined this patient.  Resident note reviewed and agree on plan of care and except where noted.  See HPI, PE, and MDM for details.

## 2022-05-29 LAB
CULTURE RESULTS: NO GROWTH — SIGNIFICANT CHANGE UP
ERYTHROCYTE [SEDIMENTATION RATE] IN BLOOD: 120 MM/HR — HIGH (ref 0–15)
SPECIMEN SOURCE: SIGNIFICANT CHANGE UP

## 2022-05-29 NOTE — ED POST DISCHARGE NOTE - DETAILS
5/29: m to call back admin line - Tiffanie Leger PA-C 5/30: m to call back admin line - Tiffanie Leger PA-C 5/31/22: Spoke with pt, aware of elevated ESR. To trend at clinic. States she is feeling better. Lolita Owen PA-C

## 2022-06-09 ENCOUNTER — INPATIENT (INPATIENT)
Facility: HOSPITAL | Age: 32
LOS: 7 days | Discharge: LEFT BEFORE DISCHARGE | DRG: 392 | End: 2022-06-17
Attending: HOSPITALIST | Admitting: STUDENT IN AN ORGANIZED HEALTH CARE EDUCATION/TRAINING PROGRAM
Payer: MEDICAID

## 2022-06-09 VITALS
HEART RATE: 114 BPM | TEMPERATURE: 99 F | SYSTOLIC BLOOD PRESSURE: 107 MMHG | RESPIRATION RATE: 20 BRPM | DIASTOLIC BLOOD PRESSURE: 74 MMHG | HEIGHT: 61 IN | OXYGEN SATURATION: 98 %

## 2022-06-09 DIAGNOSIS — Z98.89 OTHER SPECIFIED POSTPROCEDURAL STATES: Chronic | ICD-10-CM

## 2022-06-09 DIAGNOSIS — Z90.89 ACQUIRED ABSENCE OF OTHER ORGANS: Chronic | ICD-10-CM

## 2022-06-09 PROCEDURE — 93010 ELECTROCARDIOGRAM REPORT: CPT

## 2022-06-09 PROCEDURE — 99285 EMERGENCY DEPT VISIT HI MDM: CPT | Mod: 25

## 2022-06-10 DIAGNOSIS — I80.9 PHLEBITIS AND THROMBOPHLEBITIS OF UNSPECIFIED SITE: ICD-10-CM

## 2022-06-10 DIAGNOSIS — K29.70 GASTRITIS, UNSPECIFIED, WITHOUT BLEEDING: ICD-10-CM

## 2022-06-10 DIAGNOSIS — N05.0 UNSPECIFIED NEPHRITIC SYNDROME WITH MINOR GLOMERULAR ABNORMALITY: ICD-10-CM

## 2022-06-10 DIAGNOSIS — K21.9 GASTRO-ESOPHAGEAL REFLUX DISEASE WITHOUT ESOPHAGITIS: ICD-10-CM

## 2022-06-10 DIAGNOSIS — Z00.00 ENCOUNTER FOR GENERAL ADULT MEDICAL EXAMINATION WITHOUT ABNORMAL FINDINGS: ICD-10-CM

## 2022-06-10 DIAGNOSIS — R11.2 NAUSEA WITH VOMITING, UNSPECIFIED: ICD-10-CM

## 2022-06-10 DIAGNOSIS — E87.6 HYPOKALEMIA: ICD-10-CM

## 2022-06-10 LAB
ALBUMIN SERPL ELPH-MCNC: 1 G/DL — LOW (ref 3.3–5)
ALBUMIN SERPL ELPH-MCNC: 1.4 G/DL — LOW (ref 3.3–5)
ALP SERPL-CCNC: 67 U/L — SIGNIFICANT CHANGE UP (ref 40–120)
ALP SERPL-CCNC: 82 U/L — SIGNIFICANT CHANGE UP (ref 40–120)
ALT FLD-CCNC: 11 U/L — SIGNIFICANT CHANGE UP (ref 10–45)
ALT FLD-CCNC: 8 U/L — LOW (ref 10–45)
ANION GAP SERPL CALC-SCNC: 6 MMOL/L — SIGNIFICANT CHANGE UP (ref 5–17)
ANION GAP SERPL CALC-SCNC: 7 MMOL/L — SIGNIFICANT CHANGE UP (ref 5–17)
ANION GAP SERPL CALC-SCNC: 8 MMOL/L — SIGNIFICANT CHANGE UP (ref 5–17)
APPEARANCE UR: CLEAR — SIGNIFICANT CHANGE UP
AST SERPL-CCNC: 15 U/L — SIGNIFICANT CHANGE UP (ref 10–40)
AST SERPL-CCNC: 17 U/L — SIGNIFICANT CHANGE UP (ref 10–40)
BACTERIA # UR AUTO: NEGATIVE — SIGNIFICANT CHANGE UP
BASE EXCESS BLDV CALC-SCNC: 8.6 MMOL/L — HIGH (ref -2–2)
BASOPHILS # BLD AUTO: 0.04 K/UL — SIGNIFICANT CHANGE UP (ref 0–0.2)
BASOPHILS NFR BLD AUTO: 0.5 % — SIGNIFICANT CHANGE UP (ref 0–2)
BILIRUB SERPL-MCNC: 0.1 MG/DL — LOW (ref 0.2–1.2)
BILIRUB SERPL-MCNC: 0.1 MG/DL — LOW (ref 0.2–1.2)
BILIRUB UR-MCNC: NEGATIVE — SIGNIFICANT CHANGE UP
BUN SERPL-MCNC: 12 MG/DL — SIGNIFICANT CHANGE UP (ref 7–23)
CA-I SERPL-SCNC: 1.08 MMOL/L — LOW (ref 1.15–1.33)
CALCIUM SERPL-MCNC: 7.2 MG/DL — LOW (ref 8.4–10.5)
CALCIUM SERPL-MCNC: 7.5 MG/DL — LOW (ref 8.4–10.5)
CALCIUM SERPL-MCNC: 7.8 MG/DL — LOW (ref 8.4–10.5)
CHLORIDE BLDV-SCNC: 101 MMOL/L — SIGNIFICANT CHANGE UP (ref 96–108)
CHLORIDE SERPL-SCNC: 104 MMOL/L — SIGNIFICANT CHANGE UP (ref 96–108)
CHLORIDE SERPL-SCNC: 107 MMOL/L — SIGNIFICANT CHANGE UP (ref 96–108)
CHLORIDE SERPL-SCNC: 108 MMOL/L — SIGNIFICANT CHANGE UP (ref 96–108)
CO2 BLDV-SCNC: 38 MMOL/L — HIGH (ref 22–26)
CO2 SERPL-SCNC: 28 MMOL/L — SIGNIFICANT CHANGE UP (ref 22–31)
CO2 SERPL-SCNC: 30 MMOL/L — SIGNIFICANT CHANGE UP (ref 22–31)
CO2 SERPL-SCNC: 32 MMOL/L — HIGH (ref 22–31)
COLOR SPEC: SIGNIFICANT CHANGE UP
CREAT SERPL-MCNC: 0.83 MG/DL — SIGNIFICANT CHANGE UP (ref 0.5–1.3)
CREAT SERPL-MCNC: 0.85 MG/DL — SIGNIFICANT CHANGE UP (ref 0.5–1.3)
CREAT SERPL-MCNC: 1.02 MG/DL — SIGNIFICANT CHANGE UP (ref 0.5–1.3)
CRP SERPL-MCNC: <3 MG/L — SIGNIFICANT CHANGE UP (ref 0–4)
DIFF PNL FLD: ABNORMAL
EGFR: 76 ML/MIN/1.73M2 — SIGNIFICANT CHANGE UP
EGFR: 94 ML/MIN/1.73M2 — SIGNIFICANT CHANGE UP
EGFR: 97 ML/MIN/1.73M2 — SIGNIFICANT CHANGE UP
EOSINOPHIL # BLD AUTO: 0.09 K/UL — SIGNIFICANT CHANGE UP (ref 0–0.5)
EOSINOPHIL NFR BLD AUTO: 1 % — SIGNIFICANT CHANGE UP (ref 0–6)
EPI CELLS # UR: 5 /HPF — SIGNIFICANT CHANGE UP
ERYTHROCYTE [SEDIMENTATION RATE] IN BLOOD: 120 MM/HR — HIGH (ref 0–15)
GAS PNL BLDV: 139 MMOL/L — SIGNIFICANT CHANGE UP (ref 136–145)
GAS PNL BLDV: SIGNIFICANT CHANGE UP
GAS PNL BLDV: SIGNIFICANT CHANGE UP
GLUCOSE BLDV-MCNC: 88 MG/DL — SIGNIFICANT CHANGE UP (ref 70–99)
GLUCOSE SERPL-MCNC: 111 MG/DL — HIGH (ref 70–99)
GLUCOSE SERPL-MCNC: 81 MG/DL — SIGNIFICANT CHANGE UP (ref 70–99)
GLUCOSE SERPL-MCNC: 89 MG/DL — SIGNIFICANT CHANGE UP (ref 70–99)
GLUCOSE UR QL: NEGATIVE — SIGNIFICANT CHANGE UP
HCG SERPL-ACNC: <2 MIU/ML — SIGNIFICANT CHANGE UP
HCO3 BLDV-SCNC: 36 MMOL/L — HIGH (ref 22–29)
HCT VFR BLD CALC: 41.1 % — SIGNIFICANT CHANGE UP (ref 34.5–45)
HCT VFR BLDA CALC: 42 % — SIGNIFICANT CHANGE UP (ref 34.5–46.5)
HGB BLD CALC-MCNC: 13.9 G/DL — SIGNIFICANT CHANGE UP (ref 11.7–16.1)
HGB BLD-MCNC: 13.3 G/DL — SIGNIFICANT CHANGE UP (ref 11.5–15.5)
HYALINE CASTS # UR AUTO: 78 /LPF — HIGH (ref 0–2)
IMM GRANULOCYTES NFR BLD AUTO: 0.1 % — SIGNIFICANT CHANGE UP (ref 0–1.5)
KETONES UR-MCNC: SIGNIFICANT CHANGE UP
LACTATE BLDV-MCNC: 0.7 MMOL/L — SIGNIFICANT CHANGE UP (ref 0.7–2)
LEUKOCYTE ESTERASE UR-ACNC: NEGATIVE — SIGNIFICANT CHANGE UP
LIDOCAIN IGE QN: 18 U/L — SIGNIFICANT CHANGE UP (ref 7–60)
LYMPHOCYTES # BLD AUTO: 3.53 K/UL — HIGH (ref 1–3.3)
LYMPHOCYTES # BLD AUTO: 40.1 % — SIGNIFICANT CHANGE UP (ref 13–44)
MCHC RBC-ENTMCNC: 29.9 PG — SIGNIFICANT CHANGE UP (ref 27–34)
MCHC RBC-ENTMCNC: 32.4 GM/DL — SIGNIFICANT CHANGE UP (ref 32–36)
MCV RBC AUTO: 92.4 FL — SIGNIFICANT CHANGE UP (ref 80–100)
MONOCYTES # BLD AUTO: 0.77 K/UL — SIGNIFICANT CHANGE UP (ref 0–0.9)
MONOCYTES NFR BLD AUTO: 8.7 % — SIGNIFICANT CHANGE UP (ref 2–14)
NEUTROPHILS # BLD AUTO: 4.37 K/UL — SIGNIFICANT CHANGE UP (ref 1.8–7.4)
NEUTROPHILS NFR BLD AUTO: 49.6 % — SIGNIFICANT CHANGE UP (ref 43–77)
NITRITE UR-MCNC: NEGATIVE — SIGNIFICANT CHANGE UP
NRBC # BLD: 0 /100 WBCS — SIGNIFICANT CHANGE UP (ref 0–0)
NT-PROBNP SERPL-SCNC: 47 PG/ML — SIGNIFICANT CHANGE UP (ref 0–300)
PCO2 BLDV: 59 MMHG — HIGH (ref 39–42)
PH BLDV: 7.39 — SIGNIFICANT CHANGE UP (ref 7.32–7.43)
PH UR: 6.5 — SIGNIFICANT CHANGE UP (ref 5–8)
PLATELET # BLD AUTO: 768 K/UL — HIGH (ref 150–400)
PO2 BLDV: 21 MMHG — LOW (ref 25–45)
POTASSIUM BLDV-SCNC: 2.5 MMOL/L — CRITICAL LOW (ref 3.5–5.1)
POTASSIUM SERPL-MCNC: 2.7 MMOL/L — CRITICAL LOW (ref 3.5–5.3)
POTASSIUM SERPL-MCNC: 3.1 MMOL/L — LOW (ref 3.5–5.3)
POTASSIUM SERPL-MCNC: 3.2 MMOL/L — LOW (ref 3.5–5.3)
POTASSIUM SERPL-SCNC: 2.7 MMOL/L — CRITICAL LOW (ref 3.5–5.3)
POTASSIUM SERPL-SCNC: 3.1 MMOL/L — LOW (ref 3.5–5.3)
POTASSIUM SERPL-SCNC: 3.2 MMOL/L — LOW (ref 3.5–5.3)
PROT SERPL-MCNC: 3.9 G/DL — LOW (ref 6–8.3)
PROT SERPL-MCNC: 4.8 G/DL — LOW (ref 6–8.3)
PROT UR-MCNC: >600
RAPID RVP RESULT: SIGNIFICANT CHANGE UP
RBC # BLD: 4.45 M/UL — SIGNIFICANT CHANGE UP (ref 3.8–5.2)
RBC # FLD: 15.8 % — HIGH (ref 10.3–14.5)
RBC CASTS # UR COMP ASSIST: 8 /HPF — HIGH (ref 0–4)
SAO2 % BLDV: 22.4 % — LOW (ref 67–88)
SARS-COV-2 RNA SPEC QL NAA+PROBE: SIGNIFICANT CHANGE UP
SODIUM SERPL-SCNC: 143 MMOL/L — SIGNIFICANT CHANGE UP (ref 135–145)
SODIUM SERPL-SCNC: 143 MMOL/L — SIGNIFICANT CHANGE UP (ref 135–145)
SODIUM SERPL-SCNC: 144 MMOL/L — SIGNIFICANT CHANGE UP (ref 135–145)
SP GR SPEC: 1.02 — SIGNIFICANT CHANGE UP (ref 1.01–1.02)
UROBILINOGEN FLD QL: NEGATIVE — SIGNIFICANT CHANGE UP
WBC # BLD: 8.81 K/UL — SIGNIFICANT CHANGE UP (ref 3.8–10.5)
WBC # FLD AUTO: 8.81 K/UL — SIGNIFICANT CHANGE UP (ref 3.8–10.5)
WBC UR QL: 7 /HPF — HIGH (ref 0–5)

## 2022-06-10 PROCEDURE — 99223 1ST HOSP IP/OBS HIGH 75: CPT | Mod: GC

## 2022-06-10 PROCEDURE — 71045 X-RAY EXAM CHEST 1 VIEW: CPT | Mod: 26

## 2022-06-10 RX ORDER — SODIUM CHLORIDE 9 MG/ML
1000 INJECTION INTRAMUSCULAR; INTRAVENOUS; SUBCUTANEOUS ONCE
Refills: 0 | Status: COMPLETED | OUTPATIENT
Start: 2022-06-10 | End: 2022-06-10

## 2022-06-10 RX ORDER — POTASSIUM CHLORIDE 20 MEQ
40 PACKET (EA) ORAL ONCE
Refills: 0 | Status: COMPLETED | OUTPATIENT
Start: 2022-06-10 | End: 2022-06-10

## 2022-06-10 RX ORDER — ACETAMINOPHEN 500 MG
650 TABLET ORAL EVERY 6 HOURS
Refills: 0 | Status: DISCONTINUED | OUTPATIENT
Start: 2022-06-10 | End: 2022-06-17

## 2022-06-10 RX ORDER — POTASSIUM CHLORIDE 20 MEQ
10 PACKET (EA) ORAL
Refills: 0 | Status: COMPLETED | OUTPATIENT
Start: 2022-06-10 | End: 2022-06-10

## 2022-06-10 RX ORDER — METOCLOPRAMIDE HCL 10 MG
10 TABLET ORAL ONCE
Refills: 0 | Status: COMPLETED | OUTPATIENT
Start: 2022-06-10 | End: 2022-06-10

## 2022-06-10 RX ORDER — ONDANSETRON 8 MG/1
4 TABLET, FILM COATED ORAL EVERY 8 HOURS
Refills: 0 | Status: DISCONTINUED | OUTPATIENT
Start: 2022-06-10 | End: 2022-06-11

## 2022-06-10 RX ORDER — SODIUM CHLORIDE 9 MG/ML
250 INJECTION, SOLUTION INTRAVENOUS
Refills: 0 | Status: DISCONTINUED | OUTPATIENT
Start: 2022-06-10 | End: 2022-06-11

## 2022-06-10 RX ORDER — POTASSIUM CHLORIDE 20 MEQ
10 PACKET (EA) ORAL ONCE
Refills: 0 | Status: COMPLETED | OUTPATIENT
Start: 2022-06-10 | End: 2022-06-10

## 2022-06-10 RX ORDER — POTASSIUM CHLORIDE 20 MEQ
10 PACKET (EA) ORAL
Refills: 0 | Status: DISCONTINUED | OUTPATIENT
Start: 2022-06-10 | End: 2022-06-10

## 2022-06-10 RX ORDER — ATORVASTATIN CALCIUM 80 MG/1
80 TABLET, FILM COATED ORAL AT BEDTIME
Refills: 0 | Status: DISCONTINUED | OUTPATIENT
Start: 2022-06-10 | End: 2022-06-15

## 2022-06-10 RX ORDER — HEPARIN SODIUM 5000 [USP'U]/ML
5000 INJECTION INTRAVENOUS; SUBCUTANEOUS EVERY 12 HOURS
Refills: 0 | Status: DISCONTINUED | OUTPATIENT
Start: 2022-06-10 | End: 2022-06-17

## 2022-06-10 RX ORDER — FAMOTIDINE 10 MG/ML
20 INJECTION INTRAVENOUS DAILY
Refills: 0 | Status: DISCONTINUED | OUTPATIENT
Start: 2022-06-10 | End: 2022-06-11

## 2022-06-10 RX ORDER — LANOLIN ALCOHOL/MO/W.PET/CERES
3 CREAM (GRAM) TOPICAL AT BEDTIME
Refills: 0 | Status: DISCONTINUED | OUTPATIENT
Start: 2022-06-10 | End: 2022-06-17

## 2022-06-10 RX ADMIN — FAMOTIDINE 20 MILLIGRAM(S): 10 INJECTION INTRAVENOUS at 20:58

## 2022-06-10 RX ADMIN — Medication 100 MILLIEQUIVALENT(S): at 04:28

## 2022-06-10 RX ADMIN — ONDANSETRON 4 MILLIGRAM(S): 8 TABLET, FILM COATED ORAL at 17:17

## 2022-06-10 RX ADMIN — Medication 40 MILLIEQUIVALENT(S): at 12:10

## 2022-06-10 RX ADMIN — ATORVASTATIN CALCIUM 80 MILLIGRAM(S): 80 TABLET, FILM COATED ORAL at 20:58

## 2022-06-10 RX ADMIN — SODIUM CHLORIDE 125 MILLILITER(S): 9 INJECTION, SOLUTION INTRAVENOUS at 20:58

## 2022-06-10 RX ADMIN — SODIUM CHLORIDE 1000 MILLILITER(S): 9 INJECTION INTRAMUSCULAR; INTRAVENOUS; SUBCUTANEOUS at 05:29

## 2022-06-10 RX ADMIN — Medication 100 MILLIEQUIVALENT(S): at 08:53

## 2022-06-10 RX ADMIN — Medication 10 MILLIGRAM(S): at 05:30

## 2022-06-10 RX ADMIN — HEPARIN SODIUM 5000 UNIT(S): 5000 INJECTION INTRAVENOUS; SUBCUTANEOUS at 17:18

## 2022-06-10 RX ADMIN — Medication 100 MILLIEQUIVALENT(S): at 05:29

## 2022-06-10 RX ADMIN — Medication 100 MILLIEQUIVALENT(S): at 06:49
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
## 2022-06-10 NOTE — H&P ADULT - PROBLEM SELECTOR PLAN 3
Pt w/ hx of thrombophlebitis, previously on Xarelto.  - pt hypercoagulable  - heparin subQ Pt w/ hx of thrombophlebitis, previously on Xarelto.  - pt hypercoagulable  - heparin subQ for VTE PPx

## 2022-06-10 NOTE — H&P ADULT - PROBLEM SELECTOR PLAN 1
Pt w/ symptoms of N/V and hx of diarrhea for 1 week. Most likely 2/2 gastroenteritis.   If symptoms do not improve with supportive care, will evaluate esophageal mechanical or motility disorders    - supportive care  - replete electrolytes  - Zofran PRN for nausea  - clear liquid diet Pt w/ symptoms of N/V and hx of diarrhea for 1 week. Most likely 2/2 gastroenteritis, with CT evidence of colitis previously.  If symptoms do not improve with supportive care, will evaluate esophageal mechanical or motility disorders    - supportive care  - replete electrolytes  - Zofran PRN for nausea  - clear liquid diet, advancing as tolerated  - serial abdominal exams  - if diarrhea recurs, check stool studies/GI PCR/O+P in setting of immunocompromised state  - GI eval if symptoms persist

## 2022-06-10 NOTE — H&P ADULT - NSHPPHYSICALEXAM_GEN_ALL_CORE
Physical Exam:  T(F): 98.4 (06-10), Max: 98.9 (06-09)  HR: 89 (06-10) (82 - 114)  BP: 100/64 (06-10) (100/64 - 109/71)  RR: 18 (06-10)  SpO2: 97% (06-10)    GENERAL: No acute distress, well-developed  HEAD:  Atraumatic, Normocephalic  ENT: EOMI, PERRLA, conjunctiva and sclera clear, Neck supple,   CHEST/LUNG: Clear to auscultation bilaterally; No wheeze, equal breath sounds bilaterally   HEART: Regular rate and rhythm; No murmurs, rubs, or gallops  ABDOMEN: Soft, Nontender, Nondistended; Bowel sounds present  EXTREMITIES:  Significant pitting edema extending up to knees, worse on LLE. No calf tenderness.  PSYCH: Nl behavior, nl affect  NEUROLOGY: AAOx3, non-focal, cranial nerves intact  SKIN: Normal color, No rashes or lesions Vital Signs Last 24 Hrs  T(C): 36.9 (10 Salvador 2022 12:26), Max: 37.2 (09 Jun 2022 23:52)  T(F): 98.4 (10 Salvador 2022 12:26), Max: 98.9 (09 Jun 2022 23:52)  HR: 89 (10 Salvador 2022 12:26) (82 - 114)  BP: 100/64 (10 Salvador 2022 12:26) (100/64 - 109/71)  BP(mean): 83 (10 Salvador 2022 05:50) (81 - 83)  RR: 18 (10 Salvador 2022 12:26) (16 - 20)  SpO2: 97% (10 Salvador 2022 12:26) (97% - 100%)    CONSTITUTIONAL: Well-groomed, in no apparent distress  EYES: No conjunctival or scleral injection, non-icteric; PERRL and symmetric  ENMT: No external nasal lesions; nasal mucosa not inflamed; no pharyngeal injection or exudates, oral mucosa with moist membranes  NECK: Trachea midline without palpable neck mass; thyroid not enlarged and non-tender  RESPIRATORY: Breathing comfortably; no dullness to percussion; lungs CTA without wheeze/rhonchi/rales  CARDIOVASCULAR: +S1S2, RRR, no M/G/R; no carotid bruits; pedal pulses full and symmetric; b/l LE edema to the knees, L>R  GASTROINTESTINAL: No palpable masses or tenderness, +BS throughout, no rebound/guarding; no hepatosplenomegaly; no hernia palpated  LYMPHATIC: No cervical LAD or tenderness; no axillary LAD or tenderness  MUSCULOSKELETAL: No digital clubbing or cyanosis; no paraspinal tenderness; examination of the RUE, LUE, RLE, LLE without misalignment, normal strength and tone of extremities  SKIN: No rashes or ulcers noted; no subcutaneous nodules or induration palpable  NEUROLOGIC: CN II-XII intact; normal reflexes in upper and lower extremities; sensation intact in LEs b/l to light touch  PSYCHIATRIC: A+O x 3; flat affect; appropriate insight and judgment

## 2022-06-10 NOTE — ED PROVIDER NOTE - PHYSICAL EXAMINATION
Gen: A&Ox4   HEENT: Atraumatic. Mucous membranes moist, no scleral icterus.  CV: RRR. 2+ bilat lower extremity edema.   Resp: Respirations unlabored. CTAB, no rales, no wheezes.  GI: Abdomen non tender to palpation, soft and non-distended.   Skin/MSK: No open wounds. No ecchymosis appreciated.  Neuro: Following commands. EOMI. Pupils ERRL.  Psych: Appropriate mood, cooperative

## 2022-06-10 NOTE — ED PROVIDER NOTE - CLINICAL SUMMARY MEDICAL DECISION MAKING FREE TEXT BOX
30 yo F PMHx of asthma, GERD, chronic iron deficiency anemia, thrombophlebitis of R gonadal vein, DVT (was previously on Xarelto), UTI, nephrotic syndrome (minimal change disease per pt), on mycophenolate, presenting to ED for worsening N/V x 1 wk w/ poor PO tolerance, now unable to tolerate anything PO. Denies abd pain, diarrhea, melena, or hematochezia. Reports frothy urine. Denies dysuria or hematuria. Also reporting bilateral leg swelling. Denies SOB or CP. Denies fevers, chills, cough, congestion. Not presently taking steroids. Exam as above. Concern for dehydration, electrolyte abnormality, TUCKER, hypoalbuminemia, worsening nephropathy. Consider GI illness, gastritis, less likely PUD. Labs, ecg, cxr, will reassess.

## 2022-06-10 NOTE — H&P ADULT - PROBLEM SELECTOR PLAN 2
Pt w/ minimal change disease confirmed with outpatient biopsy  - Pt previously on steroids, d/c 1 month ago, now on mycophenolate  - Pt w/ significant LE edema  - s/p 1L IVF  - Nephrology consulted regarding fluid management and further treatment  - c/w ergocalciferol   - c/w torsemide  - c/w mycophenolate  - c/w crestor Pt w/ minimal change disease confirmed with outpatient biopsy  - Pt previously on steroids, d/c 1 month ago, now on mycophenolate  - Pt w/ significant LE edema  - s/p 1L IVF  - Nephrology consulted regarding fluid management and further treatment  - c/w ergocalciferol   - c/w torsemide  - hold mycophenolate pending GI improvement Pt w/ minimal change disease confirmed with outpatient biopsy  - Pt previously on steroids, d/c 1 month ago, now on mycophenolate  - Pt w/ LE edema  - s/p 1L IVF  - Nephrology consulted regarding fluid management and further treatment  - c/w ergocalciferol   - hold torsemide in setting of hypokalemia, dehydration  - hold mycophenolate pending GI improvement

## 2022-06-10 NOTE — H&P ADULT - ATTENDING COMMENTS
Patient seen and examined with the team during bedside rounds this morning. Above reviewed and edited where appropriate with the following addendum:    -Prior records reviewed, including recent ED visit and prior CT scan findings suggestive of colitis.  -EKG personally reviewed - NSR at 88 bpm, TWI in III, V2, no QT prolongation  -CXR personally reviewed - clear lungs    Patient with persistent N/V in setting of recent diagnosis of colitis. Suspect persistent viral gastroenteritis. No alarm symptoms at this time to warrant urgent endoscopic evaluation, but given history of minimal change disease and immunocompromised state, differential diagnosis is broad. For now, will plan for primarily supportive care, but if symptoms persist, low threshold for further work-up and GI eval.

## 2022-06-10 NOTE — CONSULT NOTE ADULT - PROBLEM SELECTOR RECOMMENDATION 2
Hypokalemia likely in setting of GI loss from diarrhea and vomiting. Please replete. Hold torsemide at this time. Check urine lyutes as mentioned above. Monitor serum potassium.     If any questions, please feel free to contact me     Mukul Faustin  Nephrology Fellow  [facility] Pager: [phone]

## 2022-06-10 NOTE — H&P ADULT - NSHPSOCIALHISTORY_GEN_ALL_CORE
Pt lives with her mother and with her two children. She does not work as prolonged standing exacerbates her symptoms. Denies smoking, drinks socially once a month, denies drug use.

## 2022-06-10 NOTE — H&P ADULT - PROBLEM SELECTOR PLAN 4
Pt w/ chronic GERD, currently not on medications  - will start famotidine   - symptomatic management

## 2022-06-10 NOTE — H&P ADULT - NSHPREVIEWOFSYSTEMS_GEN_ALL_CORE
REVIEW OF SYSTEMS:  CONSTITUTIONAL: No weakness, fevers or chills  EYES/ENT: No visual changes;  No dysphagia  NECK: No pain or stiffness  RESPIRATORY: No cough, wheezing, hemoptysis; No shortness of breath  CARDIOVASCULAR: + chronic lower extremity edema L>R. No chest pain or palpitations;    GASTROINTESTINAL: + Nausea/Vomiting. No abdominal or epigastric pain.  No diarrhea or constipation. No melena or hematochezia.  BACK: No back pain  GENITOURINARY: No dysuria, frequency or hematuria  NEUROLOGICAL: No numbness or weakness  SKIN: No itching, burning, rashes, or lesions   All other review of systems is negative unless indicated above. REVIEW OF SYSTEMS:  CONSTITUTIONAL: No weakness, fevers or chills  EYES/ENT: No visual changes;  No dysphagia  NECK: No pain or stiffness  RESPIRATORY: No cough, wheezing, hemoptysis; No shortness of breath  CARDIOVASCULAR: + chronic lower extremity edema L>R. No chest pain or palpitations;    GASTROINTESTINAL: + Nausea/Vomiting. No abdominal or epigastric pain.  No diarrhea or constipation. No melena or hematochezia.  BACK: No back pain  GENITOURINARY: No dysuria, frequency or hematuria  NEUROLOGICAL: No numbness or weakness  SKIN: No itching, burning, rashes, or lesions

## 2022-06-10 NOTE — ED PROVIDER NOTE - RATE
"Problem: General Rehab Plan of Care  Goal: Occupational Therapy Goals  The patient and/or their representative will achieve their patient-specific goals related to the plan of care.  The patient-specific goals include:  To manage frustration better  To identify and express feelings better  To improve time management and organization  To follow directions better    Interventions to focus on helping patient to regulate impulse control, learn methods  of dealing with stressors and feelings,  learn to control negative impulses and acting out behaviors, and increase ability to express/manage  anger in appropriate and non-violent ways. Assist patient with exploring satisfying alternatives to aggressive behaviors such as physical outlets for redirection of angry feelings, hobbies, or other individual pursuits.       Pt actively participated in a structured occupational therapy group with a focus on facilitating self-esteem via self-reflection questions. Pt shared thoughtful responses regarding past achievements, positive social supports, and hobbies/skills. When prompted to explain how you can tell if someone is confident, he identified \"if they are straightforward with you.\" He appropriately chose to play with fidgets throughout group, and was able to do so with minimal disruptions. Pleasant and cooperative. Bright affect in interactions.     " 85

## 2022-06-10 NOTE — CONSULT NOTE ADULT - SUBJECTIVE AND OBJECTIVE BOX
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 She states that she has been unable to keep anything down for the past 2.5 weeks and has not been able to take her medications for the past 3 days. The patient was admitted in [facility] during 2021 at [facility] time she had presented with significant anasarca and found to have nephrotic syndrome. SHe underwent a renal biopsy and diagnosed with MCKENNA . She was treated with Steroids and was followed as outpatient by Dr. Angie Apodaca. Patient underwent urine studies in 2022 at which time she was fond to be in complete remission with only trace proteinuria on UA and TP/CR ratio of 0.2. She states more recently she has been following a nephrologist in [place] due to it being an easier commute for her. She states she was on steroids up until about one month ago and started taking Cellcept about 3 weeks ago. On arrival to the ED the patient was noted to have hypokalemia with serum potassium of 2.8 and UA significant for >600 proteinuria. She states she feels like her swelling has increased and reports she was better controlled when she was on the steroids.     PAST HISTORY  --------------------------------------------------------------------------------  PAST MEDICAL & SURGICAL HISTORY:  Asthma      GERD (gastroesophageal reflux disease)      Anemia      Gastritis      Thrombophlebitis/phlebitis  thrombophlebitis of R gonadal vein      UTI (urinary tract infection)      H/O:       History of tonsillectomy        FAMILY HISTORY:    PAST SOCIAL HISTORY:    ALLERGIES & MEDICATIONS  --------------------------------------------------------------------------------  Allergies    codeine (Hives)    Intolerances      Standing Inpatient Medications  atorvastatin 80 milliGRAM(s) Oral at bedtime  heparin   Injectable 5000 Unit(s) SubCutaneous every 12 hours  torsemide 20 milliGRAM(s) Oral daily    PRN Inpatient Medications  acetaminophen     Tablet .. 650 milliGRAM(s) Oral every 6 hours PRN  aluminum hydroxide/magnesium hydroxide/simethicone Suspension 30 milliLiter(s) Oral every 4 hours PRN  melatonin 3 milliGRAM(s) Oral at bedtime PRN  ondansetron Injectable 4 milliGRAM(s) IV Push every 8 hours PRN      REVIEW OF SYSTEMS  --------------------------------------------------------------------------------  Gen: No fevers/chills  Skin: No rashes  Head/Eyes/Ears: Normal hearing,   Respiratory: No dyspnea, cough  CV: No chest pain  GI: No abdominal pain, diarrhea  : No dysuria, hematuria  MSK: No  edema  Heme: No easy bruising or bleeding  Psych: No significant depression    All other systems were reviewed and are negative, except as noted.    VITALS/PHYSICAL EXAM  --------------------------------------------------------------------------------  T(C): 36.9 (06-10-22 @ 12:26), Max: 37.2 (22 @ 23:52)  HR: 89 (06-10-22 @ 12:26) (82 - 114)  BP: 100/64 (06-10-22 @ 12:26) (100/64 - 109/71)  RR: 18 (06-10-22 @ 12:26) (16 - 20)  SpO2: 97% (06-10-22 @ 12:26) (97% - 100%)  Wt(kg): --  Height (cm): 154.9 (22 @ 23:52)      Physical Exam:  	Gen: NAD  	HEENT: MMM  	Pulm: CTA B/L  	CV: S1S2  	Abd: Soft, +BS   	Ext: No LE edema B/L  	Neuro: Awake  	Skin: Warm and dry  	Vascular access:    LABS/STUDIES  --------------------------------------------------------------------------------              13.3   8.81  >-----------<  768      [06-10-22 @ 02:55]              41.1     143  |  108  |  12  ----------------------------<  81      [06-10-22 @ 08:35]  3.1   |  28  |  0.85        Ca     7.2     [06-10-22 @ 08:35]      Mg     1.9     [06-10-22 @ 02:55]    TPro  3.9  /  Alb  1.0  /  TBili  0.1  /  DBili  x   /  AST  15  /  ALT  8   /  AlkPhos  67  [06-10-22 @ 08:35]          Creatinine Trend:  SCr 0.85 [06-10 @ 08:35]  SCr 1.02 [06-10 @ 02:55]  SCr 0.77 [ 19:10]  SCr 0.72 [ 18:01]    Urinalysis - [06-10-22 @ 02:55]      Color Light Yellow / Appearance Clear / SG 1.018 / pH 6.5      Gluc Negative / Ketone Trace  / Bili Negative / Urobili Negative       Blood Moderate / Protein >600 / Leuk Est Negative / Nitrite Negative      RBC 8 / WBC 7 / Hyaline 78 / Gran  / Sq Epi  / Non Sq Epi 5 / Bacteria Negative      Iron 23, TIBC 74, %sat 31      [21 @ 01:02]  Ferritin 90      [21 @ 01:02]  TSH 3.03      [21 @ 01:02]  Lipid: chol 789, , HDL 57, LDL --      [21 @ 03:39]    HBsAb Nonreact      [21 @ 09:45]  HCV 0.10, Nonreact      [21 @ 09:45]  HIV Nonreact      [21 @ 07:26]  HIV Nonreact      [20 @ 14:43]    SHELTON: titer Negative, pattern --      [21 @ 10:20]  C3 Complement 132      [21 @ 07:26]  C4 Complement 35      [21 @ 07:26]  PLA2R: ALESSANDRA <1.8, IFA --      [21 @ 20:44]  Immunofixation Serum:   Weak IgM Kappa Band Identified    Reference Range: None Detected      [21 @ 15:38]   [facility] DIVISION OF KIDNEY DISEASES AND HYPERTENSION -- [phone]  -- INITIAL CONSULT NOTE  --------------------------------------------------------------------------------  HPI:    Patient is a 31 year old female with PMH of 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 She states that she has been unable to keep anything down for the past 2.5 weeks and has not been able to take her medications for the past 3 days. The patient was admitted in [facility] during 2021 at [facility] time she had presented with significant anasarca and found to have nephrotic syndrome. SHe underwent a renal biopsy and diagnosed with MCKENNA . She was treated with Steroids and was followed as outpatient by Dr. Angie Apodaca. Patient underwent urine studies in 2022 at which time she was fond to be in complete remission with only trace proteinuria on UA and TP/CR ratio of 0.2. She states more recently she has been following a nephrologist in [place] due to it being an easier commute for her. She states she was on steroids up until about one month ago and started taking Cellcept about 3 weeks ago. On arrival to the ED the patient was noted to have hypokalemia with serum potassium of 2.8 and UA significant for >600 proteinuria. She states she feels like her swelling has increased and reports she was better controlled when she was on the steroids.     PAST HISTORY  --------------------------------------------------------------------------------  PAST MEDICAL & SURGICAL HISTORY:  Asthma      GERD (gastroesophageal reflux disease)      Anemia      Gastritis      Thrombophlebitis/phlebitis  thrombophlebitis of R gonadal vein      UTI (urinary tract infection)      H/O:       History of tonsillectomy        FAMILY HISTORY:    PAST SOCIAL HISTORY:    ALLERGIES & MEDICATIONS  --------------------------------------------------------------------------------  Allergies    codeine (Hives)    Intolerances      Standing Inpatient Medications  atorvastatin 80 milliGRAM(s) Oral at bedtime  heparin   Injectable 5000 Unit(s) SubCutaneous every 12 hours  torsemide 20 milliGRAM(s) Oral daily    PRN Inpatient Medications  acetaminophen     Tablet .. 650 milliGRAM(s) Oral every 6 hours PRN  aluminum hydroxide/magnesium hydroxide/simethicone Suspension 30 milliLiter(s) Oral every 4 hours PRN  melatonin 3 milliGRAM(s) Oral at bedtime PRN  ondansetron Injectable 4 milliGRAM(s) IV Push every 8 hours PRN      REVIEW OF SYSTEMS    All other systems were reviewed and are negative, except as noted.    VITALS/PHYSICAL EXAM  --------------------------------------------------------------------------------  T(C): 36.9 (06-10-22 @ 12:26), Max: 37.2 (22 @ 23:52)  HR: 89 (06-10-22 @ 12:26) (82 - 114)  BP: 100/64 (06-10-22 @ 12:26) (100/64 - 109/71)  RR: 18 (06-10-22 @ 12:26) (16 - 20)  SpO2: 97% (06-10-22 @ 12:26) (97% - 100%)  Wt(kg): --  Height (cm): 154.9 (22 @ 23:52)      Physical Exam:  	Gen: NAD  	HEENT: MMM  	Pulm: CTA B/L  	CV: S1S2  	Abd: Soft, +BS   	Ext: ++ LE edema B/L  	Neuro: Awake  	Skin: Warm and dry    LABS/STUDIES  --------------------------------------------------------------------------------              13.3   8.81  >-----------<  768      [06-10-22 @ 02:55]              41.1     143  |  108  |  12  ----------------------------<  81      [06-10-22 @ 08:35]  3.1   |  28  |  0.85        Ca     7.2     [06-10-22 @ 08:35]      Mg     1.9     [06-10-22 @ 02:55]    TPro  3.9  /  Alb  1.0  /  TBili  0.1  /  DBili  x   /  AST  15  /  ALT  8   /  AlkPhos  67  [06-10-22 @ 08:35]          Creatinine Trend:  SCr 0.85 [06-10 @ 08:35]  SCr 1.02 [06-10 @ 02:55]  SCr 0.77 [ 19:10]  SCr 0.72 [ 18:01]    Urinalysis - [06-10-22 @ 02:55]      Color Light Yellow / Appearance Clear / SG 1.018 / pH 6.5      Gluc Negative / Ketone Trace  / Bili Negative / Urobili Negative       Blood Moderate / Protein >600 / Leuk Est Negative / Nitrite Negative      RBC 8 / WBC 7 / Hyaline 78 / Gran  / Sq Epi  / Non Sq Epi 5 / Bacteria Negative      Iron 23, TIBC 74, %sat 31      [21 @ 01:02]  Ferritin 90      [21 01:02]  TSH 3.03      [21 01:02]  Lipid: chol 789, , HDL 57, LDL --      [21 @ 03:39]    HBsAb Nonreact      [21 @ 09:45]  HCV 0.10, Nonreact      [12-23-21 @ 09:45]  HIV Nonreact      [21 @ 07:26]  HIV Nonreact      [20 @ 14:43]    SHELTON: titer Negative, pattern --      [21 @ 10:20]  C3 Complement 132      [21 @ 07:26]  C4 Complement 35      [21 @ 07:26]  PLA2R: ALESSANDRA <1.8, IFA --      [21 @ 20:44]  Immunofixation Serum:   Weak IgM Kappa Band Identified    Reference Range: None Detected      [21 @ 15:38]

## 2022-06-10 NOTE — ED ADULT NURSE NOTE - OBJECTIVE STATEMENT
31yF, A&Ox4, independent, presents to the ED c.o n/v x1 week 31yF, A&Ox4, independent, presents to the ED c.o n/v x1 week. Pt unable to tolerate PO intake. Pt endorsing mild abd pain on assessment. Pt was recently admitted for pacreatitis. Gross neuro intact, no difficulty speaking in complete sentences, pulses x 4, moving all extremities, abdomen soft round nondistended, skin intact. Pt denies fevers/chills, numbness/tingling, weakness, headache, dizziness, vision changes, cp, sob, cough, diarrhea, dysuria, hematuria, bloody stools, back pain.

## 2022-06-10 NOTE — H&P ADULT - NSHPLABSRESULTS_GEN_ALL_CORE
LABS:                        13.3   8.81  )-----------( 768      ( 10 Salvador 2022 02:55 )             41.1     06-10    143  |  107  |  12  ----------------------------<  111<H>  3.2<L>   |  30  |  0.83    Ca    7.5<L>      10 Salvador 2022 15:32  Mg     1.9     06-10    TPro  3.9<L>  /  Alb  1.0<L>  /  TBili  0.1<L>  /  DBili  x   /  AST  15  /  ALT  8<L>  /  AlkPhos  67  06-10          Urinalysis Basic - ( 10 Salvador 2022 02:55 )    Color: Light Yellow / Appearance: Clear / S.018 / pH: x  Gluc: x / Ketone: Trace  / Bili: Negative / Urobili: Negative   Blood: x / Protein: >600 / Nitrite: Negative   Leuk Esterase: Negative / RBC: 8 /hpf / WBC 7 /HPF   Sq Epi: x / Non Sq Epi: 5 /hpf / Bacteria: Negative

## 2022-06-10 NOTE — ED ADULT NURSE REASSESSMENT NOTE - NS ED NURSE REASSESS COMMENT FT1
Received awake alert and orientedx4 Resp even and nonlab CMP repeated Admitted. Denies n/v at this time.

## 2022-06-10 NOTE — ED PROVIDER NOTE - ATTENDING CONTRIBUTION TO CARE
31 F w/ hx of asthma, GERD, chronic iron deficiency, anemia, thrombophlebitis of R gonadal vein, DVT (was previously on Xarelto), UTI, recent admission to [facility]  approx 2 weeks ago for pancreatitis, due to a recent etoh binge (pt states has been off of alcohol since then) here w/ inability to tolerate PO. Pt w/ no cp, no sob. pt w/ lower leg edema. Pt denies any chest pain, palpitations, shortness of breath, abdominal pain. On exam, pt w/ Const: mild distress, Well-developed, Eyes: no conjunctival injection and no scleral icterus ENMT: dry mucus membranes, CVS: +S1/S2, radial/DP pulse 2+ bilaterally  RESP: Unlabored respiratory effort, Clear to auscultation bilaterally GI: Nontender/Nondistended soft abdomen, no CVA tenderness MSK: Extremities w/o deformity or ttp, Psych: Awake, Alert, & Orientedx3;  Appropriate mood and affect, cooperative Plan for labs and admission concern for electrolyte abnormalities, prior CTA w/ Diffuse wall thickening of ascending and transverse colon suggestive of  colitis. Improvement in degree and extent of colitis when compared with prior study of 12/26/2021.  LOw suspicion for mesenteric ischemia given pt w/ normal lactate no blood in stool 31 F w/ hx of minimal change disease on myocophenolate asthma, GERD, chronic iron deficiency, anemia, thrombophlebitis of R gonadal vein, DVT (was previously on Xarelto), UTI, recent admission to [facility]  approx 2 weeks ago for pancreatitis, due to a recent etoh binge (pt states has been off of alcohol since then) here w/ inability to tolerate PO. Pt w/ no cp, no sob. pt w/ lower leg edema. Pt denies any chest pain, palpitations, shortness of breath, abdominal pain. On exam, pt w/ Const: mild distress, Well-developed, Eyes: no conjunctival injection and no scleral icterus ENMT: dry mucus membranes, CVS: +S1/S2, radial pulse 2+ bilaterally bilateral lower leg edema, pitting RESP: Unlabored respiratory effort, Clear to auscultation bilaterally GI: Nontender soft abdomen, no CVA tenderness MSK: Extremities w/o deformity or ttp, Psych: Awake, Alert, & Orientedx3;  Appropriate mood and affect, cooperative Plan for labs and admission concern for electrolyte abnormalities, prior CTA w/ Diffuse wall thickening of ascending and transverse colon suggestive of  colitis. Improvement in degree and extent of colitis when compared with prior study of 12/26/2021.  LOw suspicion for mesenteric ischemia given pt w/ normal lactate no blood in stool  pt denies marijuana usage, pt reports she occastionally drinks but last drink was during prior admission to [facility] for pancreatitis

## 2022-06-10 NOTE — H&P ADULT - ASSESSMENT
32 yo F PMHx minimal change disease, hx thrombophlebitis of R gonadal vein, DVT (was previously on Xarelto), GERD, presented to ED for progressively worsening N/V for 2 weeks, admitted for PO intolerance.   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, hx thrombophlebitis of R gonadal vein, DVT (was previously on Xarelto), GERD, presented to ED for progressively worsening N/V for 2 weeks, admitted for intractable nausea and vomiting.

## 2022-06-10 NOTE — CONSULT NOTE ADULT - PROBLEM SELECTOR RECOMMENDATION 9
Pt. with history of MCKENNA diagnosed in 12/2021. Treated with steroids and was in complete remission in 2/2022, with only trace proteinuria and TP/CR ratio of 0.2. Steroids stopped last month and started on Cellcept 3 weeks ago. Cellcept likely contributing to her current GI symptoms. UA right now with significant proteinuria, please check urine lytes including sodium, potassium and chloride, repeat TP/CR ratio. CHeck renal US with doppler, need to rule out renal vein thrombosis. Check lipid panel. At this time please hold the MMF, we need to rule out infection prior to any immunosuppression. Suspect that she may be in relapsing MCKENNA. Monitor labs and urine output. Avoid NSAIDs, ACEI/ARBS, RCA and nephrotoxins. Dose medications as per eGFR.

## 2022-06-10 NOTE — ED PROVIDER NOTE - OBJECTIVE STATEMENT
32 yo F PMHx of asthma, GERD, chronic iron deficiency anemia, thrombophlebitis of R gonadal vein, DVT (was previously on Xarelto), UTI, nephrotic syndrome (minimal change disease per pt), on mycophenolate, presenting to ED for worsening N/V x 1 wk w/ poor PO tolerance, now unable to tolerate anything PO. Denies abd pain, diarrhea, melena, or hematochezia. Reports frothy urine. Denies dysuria or hematuria. Also reporting bilateral leg swelling. Denies SOB or CP. Denies fevers, chills, cough, congestion. Not presently taking steroids.     recent admission to [facility]  (12/4-12/7 for proteinuria with plan for kidney biopsy,  but cancelled due to COVID-19 PCR detected on 12/21, she now presents to [facility] with worsening generalized edema and proteinuria. 32 yo F PMHx of asthma, GERD, chronic iron deficiency anemia, thrombophlebitis of R gonadal vein, DVT (was previously on Xarelto), UTI, nephrotic syndrome (minimal change disease per pt), on mycophenolate, presenting to ED for worsening N/V x 1 wk w/ poor PO tolerance, now unable to tolerate anything PO. Denies abd pain, diarrhea, melena, or hematochezia. Reports frothy urine. Denies dysuria or hematuria. Also reporting bilateral leg swelling. Denies SOB or CP. Denies fevers, chills, cough, congestion. Not presently taking steroids.

## 2022-06-11 ENCOUNTER — TRANSCRIPTION ENCOUNTER (OUTPATIENT)
Age: 32
End: 2022-06-11

## 2022-06-11 LAB
ANION GAP SERPL CALC-SCNC: 7 MMOL/L — SIGNIFICANT CHANGE UP (ref 5–17)
ANION GAP SERPL CALC-SCNC: 9 MMOL/L — SIGNIFICANT CHANGE UP (ref 5–17)
APTT BLD: 34.8 SEC — SIGNIFICANT CHANGE UP (ref 27.5–35.5)
BUN SERPL-MCNC: 12 MG/DL — SIGNIFICANT CHANGE UP (ref 7–23)
BUN SERPL-MCNC: 13 MG/DL — SIGNIFICANT CHANGE UP (ref 7–23)
CALCIUM SERPL-MCNC: 8.1 MG/DL — LOW (ref 8.4–10.5)
CALCIUM SERPL-MCNC: 8.4 MG/DL — SIGNIFICANT CHANGE UP (ref 8.4–10.5)
CHLORIDE SERPL-SCNC: 107 MMOL/L — SIGNIFICANT CHANGE UP (ref 96–108)
CHLORIDE SERPL-SCNC: 107 MMOL/L — SIGNIFICANT CHANGE UP (ref 96–108)
CHLORIDE UR-SCNC: <20 MMOL/L — SIGNIFICANT CHANGE UP
CHOLEST SERPL-MCNC: 395 MG/DL — HIGH
CHOLEST SERPL-MCNC: 477 MG/DL — HIGH
CO2 SERPL-SCNC: 25 MMOL/L — SIGNIFICANT CHANGE UP (ref 22–31)
CO2 SERPL-SCNC: 27 MMOL/L — SIGNIFICANT CHANGE UP (ref 22–31)
CREAT ?TM UR-MCNC: 526 MG/DL — SIGNIFICANT CHANGE UP
CREAT SERPL-MCNC: 0.73 MG/DL — SIGNIFICANT CHANGE UP (ref 0.5–1.3)
CREAT SERPL-MCNC: 0.75 MG/DL — SIGNIFICANT CHANGE UP (ref 0.5–1.3)
CULTURE RESULTS: SIGNIFICANT CHANGE UP
EGFR: 110 ML/MIN/1.73M2 — SIGNIFICANT CHANGE UP
EGFR: 113 ML/MIN/1.73M2 — SIGNIFICANT CHANGE UP
GLUCOSE SERPL-MCNC: 75 MG/DL — SIGNIFICANT CHANGE UP (ref 70–99)
GLUCOSE SERPL-MCNC: 83 MG/DL — SIGNIFICANT CHANGE UP (ref 70–99)
HCT VFR BLD CALC: 39.1 % — SIGNIFICANT CHANGE UP (ref 34.5–45)
HDLC SERPL-MCNC: 59 MG/DL — SIGNIFICANT CHANGE UP
HDLC SERPL-MCNC: 67 MG/DL — SIGNIFICANT CHANGE UP
HGB BLD-MCNC: 12 G/DL — SIGNIFICANT CHANGE UP (ref 11.5–15.5)
INR BLD: 1.1 RATIO — SIGNIFICANT CHANGE UP (ref 0.88–1.16)
LIPID PNL WITH DIRECT LDL SERPL: 307 MG/DL — HIGH
LIPID PNL WITH DIRECT LDL SERPL: 380 MG/DL — HIGH
MAGNESIUM SERPL-MCNC: 1.9 MG/DL — SIGNIFICANT CHANGE UP (ref 1.6–2.6)
MAGNESIUM SERPL-MCNC: 1.9 MG/DL — SIGNIFICANT CHANGE UP (ref 1.6–2.6)
MCHC RBC-ENTMCNC: 28.8 PG — SIGNIFICANT CHANGE UP (ref 27–34)
MCHC RBC-ENTMCNC: 30.7 GM/DL — LOW (ref 32–36)
MCV RBC AUTO: 93.8 FL — SIGNIFICANT CHANGE UP (ref 80–100)
NON HDL CHOLESTEROL: 336 MG/DL — HIGH
NON HDL CHOLESTEROL: 410 MG/DL — HIGH
NRBC # BLD: 0 /100 WBCS — SIGNIFICANT CHANGE UP (ref 0–0)
PHOSPHATE SERPL-MCNC: 2.9 MG/DL — SIGNIFICANT CHANGE UP (ref 2.5–4.5)
PHOSPHATE SERPL-MCNC: 3 MG/DL — SIGNIFICANT CHANGE UP (ref 2.5–4.5)
PLATELET # BLD AUTO: 646 K/UL — HIGH (ref 150–400)
POTASSIUM SERPL-MCNC: 3.2 MMOL/L — LOW (ref 3.5–5.3)
POTASSIUM SERPL-MCNC: 3.6 MMOL/L — SIGNIFICANT CHANGE UP (ref 3.5–5.3)
POTASSIUM SERPL-SCNC: 3.2 MMOL/L — LOW (ref 3.5–5.3)
POTASSIUM SERPL-SCNC: 3.6 MMOL/L — SIGNIFICANT CHANGE UP (ref 3.5–5.3)
POTASSIUM UR-SCNC: 48 MMOL/L — SIGNIFICANT CHANGE UP
PROT ?TM UR-MCNC: >2000 MG/DL — HIGH (ref 0–12)
PROT/CREAT UR-RTO: >3.8 RATIO — HIGH (ref 0–0.2)
PROTHROM AB SERPL-ACNC: 12.7 SEC — SIGNIFICANT CHANGE UP (ref 10.5–13.4)
RBC # BLD: 4.17 M/UL — SIGNIFICANT CHANGE UP (ref 3.8–5.2)
RBC # FLD: 16.1 % — HIGH (ref 10.3–14.5)
SODIUM SERPL-SCNC: 141 MMOL/L — SIGNIFICANT CHANGE UP (ref 135–145)
SODIUM SERPL-SCNC: 141 MMOL/L — SIGNIFICANT CHANGE UP (ref 135–145)
SODIUM UR-SCNC: 9 MMOL/L — SIGNIFICANT CHANGE UP
SPECIMEN SOURCE: SIGNIFICANT CHANGE UP
TRIGL SERPL-MCNC: 144 MG/DL — SIGNIFICANT CHANGE UP
TRIGL SERPL-MCNC: 149 MG/DL — SIGNIFICANT CHANGE UP
WBC # BLD: 7.26 K/UL — SIGNIFICANT CHANGE UP (ref 3.8–10.5)
WBC # FLD AUTO: 7.26 K/UL — SIGNIFICANT CHANGE UP (ref 3.8–10.5)

## 2022-06-11 PROCEDURE — 99233 SBSQ HOSP IP/OBS HIGH 50: CPT | Mod: GC

## 2022-06-11 PROCEDURE — 99232 SBSQ HOSP IP/OBS MODERATE 35: CPT | Mod: GC

## 2022-06-11 RX ORDER — POTASSIUM CHLORIDE 20 MEQ
40 PACKET (EA) ORAL ONCE
Refills: 0 | Status: COMPLETED | OUTPATIENT
Start: 2022-06-11 | End: 2022-06-11

## 2022-06-11 RX ORDER — FAMOTIDINE 10 MG/ML
20 INJECTION INTRAVENOUS DAILY
Refills: 0 | Status: DISCONTINUED | OUTPATIENT
Start: 2022-06-11 | End: 2022-06-17

## 2022-06-11 RX ORDER — METOCLOPRAMIDE HCL 10 MG
10 TABLET ORAL ONCE
Refills: 0 | Status: DISCONTINUED | OUTPATIENT
Start: 2022-06-11 | End: 2022-06-11

## 2022-06-11 RX ORDER — PROCHLORPERAZINE MALEATE 5 MG
5 TABLET ORAL ONCE
Refills: 0 | Status: COMPLETED | OUTPATIENT
Start: 2022-06-11 | End: 2022-06-11

## 2022-06-11 RX ORDER — POTASSIUM CHLORIDE 20 MEQ
40 PACKET (EA) ORAL DAILY
Refills: 0 | Status: DISCONTINUED | OUTPATIENT
Start: 2022-06-11 | End: 2022-06-11

## 2022-06-11 RX ORDER — SODIUM CHLORIDE 9 MG/ML
500 INJECTION, SOLUTION INTRAVENOUS
Refills: 0 | Status: DISCONTINUED | OUTPATIENT
Start: 2022-06-11 | End: 2022-06-11

## 2022-06-11 RX ORDER — SODIUM CHLORIDE 9 MG/ML
1000 INJECTION, SOLUTION INTRAVENOUS
Refills: 0 | Status: DISCONTINUED | OUTPATIENT
Start: 2022-06-11 | End: 2022-06-12

## 2022-06-11 RX ORDER — ONDANSETRON 8 MG/1
4 TABLET, FILM COATED ORAL EVERY 8 HOURS
Refills: 0 | Status: DISCONTINUED | OUTPATIENT
Start: 2022-06-11 | End: 2022-06-17

## 2022-06-11 RX ADMIN — Medication 650 MILLIGRAM(S): at 09:30

## 2022-06-11 RX ADMIN — ONDANSETRON 4 MILLIGRAM(S): 8 TABLET, FILM COATED ORAL at 08:48

## 2022-06-11 RX ADMIN — Medication 650 MILLIGRAM(S): at 08:48

## 2022-06-11 RX ADMIN — Medication 40 MILLIEQUIVALENT(S): at 09:23

## 2022-06-11 RX ADMIN — Medication 5 MILLIGRAM(S): at 13:23

## 2022-06-11 RX ADMIN — ATORVASTATIN CALCIUM 80 MILLIGRAM(S): 80 TABLET, FILM COATED ORAL at 21:09

## 2022-06-11 RX ADMIN — SODIUM CHLORIDE 100 MILLILITER(S): 9 INJECTION, SOLUTION INTRAVENOUS at 14:32

## 2022-06-11 RX ADMIN — FAMOTIDINE 20 MILLIGRAM(S): 10 INJECTION INTRAVENOUS at 12:43

## 2022-06-11 NOTE — DISCHARGE NOTE PROVIDER - NSDCFUADDAPPT_GEN_ALL_CORE_FT
Please make an appointment within 3 weeks with JORGITO Apodaca to manage your medical conditions.     Please also see your primary care doctor within a month of discharge.   Please see JORGITO Apodcaa as soon as possible, not later than 1 week. If unable to, please see your primary care provider as soon as possible for kidney function monitoring.

## 2022-06-11 NOTE — DISCHARGE NOTE PROVIDER - CARE PROVIDER_API CALL
Angie Apodaca)  Internal Medicine; Nephrology  [phone] [place] [address]  Phone: [phone]  Fax: [phone]  Follow Up Time:

## 2022-06-11 NOTE — PROGRESS NOTE ADULT - ASSESSMENT
32 yo F PMHx minimal change disease, hx thrombophlebitis of R gonadal vein, DVT (was previously on Xarelto), GERD, presented to ED for progressively worsening N/V for 2 weeks, admitted for intractable nausea and vomiting.

## 2022-06-11 NOTE — PROGRESS NOTE ADULT - PROBLEM SELECTOR PLAN 3
Pt w/ hx of thrombophlebitis, previously on Xarelto.  - pt hypercoagulable  - heparin subQ for VTE PPx

## 2022-06-11 NOTE — PROGRESS NOTE ADULT - SUBJECTIVE AND OBJECTIVE BOX
[facility] DIVISION OF KIDNEY DISEASES AND HYPERTENSION -- FOLLOW UP NOTE  --------------------------------------------------------------------------------  Patient is a 31 year old female with PMH of biopsy proven Minimal Change Disease (diagnosed in 12/2021 treated with steroids), GERD, and asthma who presented to the hospital for nausea and vomiting for the past 2.5 weeks. The patient was admitted in [facility] during 12/2021 at [facility] time she had presented with significant anasarca and found to have nephrotic syndrome. She underwent a renal biopsy and diagnosed with MCKENNA . She was treated with Steroids and was followed as outpatient by Dr. Angie Apodaca. Patient underwent urine studies in 2/2022 at which time she was found to be in complete remission with only trace proteinuria on UA and TP/CR ratio of 0.2. She states she was on steroids up until about one month ago and started taking Cellcept about 3 weeks ago. On arrival to the ED the patient was noted to have hypokalemia with serum potassium of 2.8 and UA significant for >600 proteinuria.     Pt. seen and examined this morning reports continued symptoms of nausea but without any episodes of vomiting. She stated she feels like her swelling is improving right now.     PAST HISTORY  --------------------------------------------------------------------------------  No significant changes to PMH, PSH, FHx, SHx, unless otherwise noted    ALLERGIES & MEDICATIONS  --------------------------------------------------------------------------------  Allergies    codeine (Hives)    Intolerances      Standing Inpatient Medications  atorvastatin 80 milliGRAM(s) Oral at bedtime  famotidine    Tablet 20 milliGRAM(s) Oral daily  heparin   Injectable 5000 Unit(s) SubCutaneous every 12 hours  lactated ringers 500 milliLiter(s) IV Continuous <Continuous>  prochlorperazine   Injectable 5 milliGRAM(s) IV Push once    PRN Inpatient Medications  acetaminophen     Tablet .. 650 milliGRAM(s) Oral every 6 hours PRN  aluminum hydroxide/magnesium hydroxide/simethicone Suspension 30 milliLiter(s) Oral every 4 hours PRN  melatonin 3 milliGRAM(s) Oral at bedtime PRN  ondansetron Injectable 4 milliGRAM(s) IV Push every 8 hours PRN      REVIEW OF SYSTEMS    All other systems were reviewed and are negative, except as noted.    VITALS/PHYSICAL EXAM  --------------------------------------------------------------------------------  T(C): 36.9 (06-11-22 @ 09:11), Max: 37.2 (06-11-22 @ 01:09)  HR: 82 (06-11-22 @ 09:11) (82 - 96)  BP: 101/65 (06-11-22 @ 09:11) (95/58 - 106/74)  RR: 18 (06-11-22 @ 09:11) (18 - 18)  SpO2: 96% (06-11-22 @ 09:11) (96% - 97%)  Wt(kg): --  Height (cm): 154.9 (06-09-22 @ 23:52)        Physical Exam:  	Gen: in mild acute distress  	HEENT: MMM  	Pulm: CTA B/L  	CV: S1S2  	Abd: Soft, +BS   	Ext: + LE edema B/L  	Neuro: Awake  	Skin: Warm and dry      LABS/STUDIES  --------------------------------------------------------------------------------              12.0   7.26  >-----------<  646      [06-11-22 @ 06:41]              39.1     141  |  107  |  12  ----------------------------<  83      [06-11-22 @ 06:41]  3.2   |  27  |  0.75        Ca     8.1     [06-11-22 @ 06:41]      Mg     1.9     [06-11-22 @ 06:41]      Phos  2.9     [06-11-22 @ 06:41]    TPro  3.9  /  Alb  1.0  /  TBili  0.1  /  DBili  x   /  AST  15  /  ALT  8   /  AlkPhos  67  [06-10-22 @ 08:35]    PT/INR: PT 12.7 , INR 1.10       [06-11-22 @ 06:41]  PTT: 34.8       [06-11-22 @ 06:41]      Creatinine Trend:  SCr 0.75 [06-11 @ 06:41]  SCr 0.83 [06-10 @ 15:32]  SCr 0.85 [06-10 @ 08:35]  SCr 1.02 [06-10 @ 02:55]  SCr 0.77 [05-28 @ 19:10]    Urinalysis - [06-10-22 @ 02:55]      Color Light Yellow / Appearance Clear / SG 1.018 / pH 6.5      Gluc Negative / Ketone Trace  / Bili Negative / Urobili Negative       Blood Moderate / Protein >600 / Leuk Est Negative / Nitrite Negative      RBC 8 / WBC 7 / Hyaline 78 / Gran  / Sq Epi  / Non Sq Epi 5 / Bacteria Negative    Urine Creatinine 526      [06-11-22 @ 04:08]  Urine Protein >2000      [06-11-22 @ 04:08]  Urine Sodium 9      [06-11-22 @ 04:08]  Urine Potassium 48      [06-11-22 @ 04:08]  Urine Chloride <20      [06-11-22 @ 04:08]    Iron 23, TIBC 74, %sat 31      [12-22-21 @ 01:02]  Ferritin 90      [12-22-21 @ 01:02]  TSH 3.03      [12-22-21 @ 01:02]  Lipid: chol 395, , HDL 59, LDL --      [06-10-22 @ 08:35]

## 2022-06-11 NOTE — PROGRESS NOTE ADULT - PROBLEM SELECTOR PLAN 2
Pt w/ minimal change disease confirmed with outpatient biopsy  - Pt previously on steroids, d/c 1 month ago, now on mycophenolate  - Pt w/ LE edema  - s/p 1L IVF  - Nephrology consulted regarding fluid management and further treatment  - c/w ergocalciferol   - hold torsemide in setting of hypokalemia, dehydration  - hold mycophenolate pending GI improvement Pt w/ minimal change disease confirmed with outpatient biopsy  - Pt previously on steroids, d/c 1 month ago, now on mycophenolate  - Pt w/ LE edema  - s/p 1L IVF  - Nephrology consulted regarding fluid management and further treatment  - urine studies with significant proteinuria  - c/w ergocalciferol as tolerated   - hold torsemide in setting of hypokalemia, dehydration  - hold mycophenolate pending GI improvement  - lipid profile w/ cholesterol 395,   - ordered renal US w/ doppler to r/o RVT   - f/u TB serology

## 2022-06-11 NOTE — PROGRESS NOTE ADULT - ATTENDING COMMENTS
#relapsing minimal change  needs prednisone  repeat prot/cr urine  #needs pred however with ?colitis and some GI symptoms; please consult GI to determine if this is active colitis or not and ?etiology and if pred ok in this setting; check CMV PCR  #edema- stable, improved per patient #relapsing minimal change/nephrotic syndrome  needs prednisone  repeat prot/cr urine  #needs pred however with ?colitis and some GI symptoms; please consult GI to determine if this is active colitis or not and ?etiology and if pred ok in this setting; check CMV PCR  #edema- stable, improved per patient

## 2022-06-11 NOTE — PROGRESS NOTE ADULT - PROBLEM SELECTOR PLAN 1
Pt. with history of MCKENNA diagnosed in 12/2021. Treated with steroids and was in complete remission in 2/2022, with only trace proteinuria and TP/CR ratio of 0.2. Steroids stopped last month and started on Cellcept 3 weeks ago. Cellcept likely contributing to her current GI symptoms. UA right now with significant proteinuria, TP/CR ratio showing >3.8. so nephrotic range proteinuria. Please repeat to get appropriate quantification. Check renal US with doppler, need to rule out renal vein thrombosis. At this time please hold the MMF, we need to rule out infection prior to any immunosuppression. Please attain blood cultures as well. 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 Monitor labs and urine output. Avoid NSAIDs, ACEI/ARBS, RCA and nephrotoxins. Dose medications as per eGFR.

## 2022-06-11 NOTE — PROGRESS NOTE ADULT - SUBJECTIVE AND OBJECTIVE BOX
Nissa Pate MD  Internal Medicine, PGY1  Universal pager: [phone] / [facility]: [phone]      Patient is a 31y old  Female who presents with a chief complaint of PO intolerance (10 Salvador 2022 15:48)    OVERNIGHT EVENTS: NAEON    SUBJECTIVE:  - denies F/C, lightheadedness, HA, CP, SOB, abd pain, N/V/D/C, burning w/ urination, leg swelling    focused ROS as above    MEDICATIONS  (STANDING):  atorvastatin 80 milliGRAM(s) Oral at bedtime  famotidine    Tablet 20 milliGRAM(s) Oral daily  heparin   Injectable 5000 Unit(s) SubCutaneous every 12 hours  lactated ringers 250 milliLiter(s) (125 mL/Hr) IV Continuous <Continuous>    MEDICATIONS  (PRN):  acetaminophen     Tablet .. 650 milliGRAM(s) Oral every 6 hours PRN Temp greater or equal to 38C (100.4F), Mild Pain (1 - 3)  aluminum hydroxide/magnesium hydroxide/simethicone Suspension 30 milliLiter(s) Oral every 4 hours PRN Dyspepsia  melatonin 3 milliGRAM(s) Oral at bedtime PRN Insomnia  ondansetron Injectable 4 milliGRAM(s) IV Push every 8 hours PRN Nausea and/or Vomiting      OBJECTIVE:  Vital Signs Last 24 Hrs  T(C): 36.8 (2022 04:40), Max: 37.2 (10 Salvador 2022 10:00)  T(F): 98.3 (2022 04:40), Max: 98.9 (10 Salvador 2022 10:00)  HR: 96 (2022 04:40) (82 - 96)  BP: 100/68 (2022 04:40) (95/58 - 108/74)  BP(mean): --  RR: 18 (2022 04:40) (18 - 18)  SpO2: 96% (2022 04:40) (96% - 100%)    PHYSICAL EXAM  GENERAL: NAD   HEAD:  Atraumatic, normocephalic  EYES: EOMI, sclera clear  CHEST/LUNG: Clear to auscultation bilaterally; no wheeze  HEART: RRR; S1, S2; no M/R/G  ABDOMEN: soft, non-distended; non-tender; BS present  EXTREMITIES:  2+ Peripheral Pulses; no edema  NEURO: non-focal, AAOx  PSYCH: appropriate affect  SKIN: No rashes or lesions      LABS:                        12.0   7.26  )-----------( 646      ( 2022 06:41 )             39.1     06-10    143  |  107  |  12  ----------------------------<  111<H>  3.2<L>   |  30  |  0.83    Ca    7.5<L>      10 Salvador 2022 15:32  Mg     1.9     06-10    TPro  3.9<L>  /  Alb  1.0<L>  /  TBili  0.1<L>  /  DBili  x   /  AST  15  /  ALT  8<L>  /  AlkPhos  67  06-10    PT/INR - ( 2022 06:41 )   PT: 12.7 sec;   INR: 1.10 ratio         PTT - ( 2022 06:41 )  PTT:34.8 sec      Urinalysis Basic - ( 10 Salvador 2022 02:55 )    Color: Light Yellow / Appearance: Clear / S.018 / pH: x  Gluc: x / Ketone: Trace  / Bili: Negative / Urobili: Negative   Blood: x / Protein: >600 / Nitrite: Negative   Leuk Esterase: Negative / RBC: 8 /hpf / WBC 7 /HPF   Sq Epi: x / Non Sq Epi: 5 /hpf / Bacteria: Negative       Nissa Pate MD  Internal Medicine, PGY1  Universal pager: [phone] / [facility]: [phone]      Patient is a 31y old  Female who presents with a chief complaint of PO intolerance (10 Salvador 2022 15:48)    OVERNIGHT EVENTS:     SUBJECTIVE: Pt was able to drink water, now moving on to clear liquids. Has significant nausea. Attempted IV K and powder K+, pt unable to tolerate. Denies F/C, lightheadedness, HA, CP, SOB, abd pain, burning w/ urination, leg swelling.     focused ROS as above    MEDICATIONS  (STANDING):  atorvastatin 80 milliGRAM(s) Oral at bedtime  famotidine    Tablet 20 milliGRAM(s) Oral daily  heparin   Injectable 5000 Unit(s) SubCutaneous every 12 hours  lactated ringers 250 milliLiter(s) (125 mL/Hr) IV Continuous <Continuous>    MEDICATIONS  (PRN):  acetaminophen     Tablet .. 650 milliGRAM(s) Oral every 6 hours PRN Temp greater or equal to 38C (100.4F), Mild Pain (1 - 3)  aluminum hydroxide/magnesium hydroxide/simethicone Suspension 30 milliLiter(s) Oral every 4 hours PRN Dyspepsia  melatonin 3 milliGRAM(s) Oral at bedtime PRN Insomnia  ondansetron Injectable 4 milliGRAM(s) IV Push every 8 hours PRN Nausea and/or Vomiting      OBJECTIVE:    Vital Signs Last 24 Hrs  T(C): 36.9 (2022 09:11), Max: 37.2 (10 Salvador 2022 10:00)  T(F): 98.4 (2022 09:11), Max: 98.9 (10 Salvador 2022 10:00)  HR: 82 (2022 09:11) (82 - 96)  BP: 101/65 (2022 09:11) (95/58 - 106/74)  BP(mean): --  RR: 18 (2022 09:11) (18 - 18)  SpO2: 96% (2022 09:11) (96% - 100%)    PHYSICAL EXAM  GENERAL: NAD   HEAD:  Atraumatic, normocephalic  EYES: EOMI, sclera clear  CHEST/LUNG: Clear to auscultation bilaterally; no wheeze  HEART: RRR; S1, S2; no M/R/G  ABDOMEN: soft, non-distended; non-tender; BS present  EXTREMITIES:  2+ Peripheral Pulses; no edema  NEURO: non-focal, AAOx3  PSYCH: appropriate affect  SKIN: No rashes or lesions      LABS:                        12.0   7.26  )-----------( 646      ( 2022 06:41 )             39.1     06-10    143  |  107  |  12  ----------------------------<  111<H>  3.2<L>   |  30  |  0.83    Ca    7.5<L>      10 Salvador 2022 15:32  Mg     1.9     06-10    TPro  3.9<L>  /  Alb  1.0<L>  /  TBili  0.1<L>  /  DBili  x   /  AST  15  /  ALT  8<L>  /  AlkPhos  67  06-10    PT/INR - ( 2022 06:41 )   PT: 12.7 sec;   INR: 1.10 ratio         PTT - ( 2022 06:41 )  PTT:34.8 sec      Urinalysis Basic - ( 10 Salvador 2022 02:55 )    Color: Light Yellow / Appearance: Clear / S.018 / pH: x  Gluc: x / Ketone: Trace  / Bili: Negative / Urobili: Negative   Blood: x / Protein: >600 / Nitrite: Negative   Leuk Esterase: Negative / RBC: 8 /hpf / WBC 7 /HPF   Sq Epi: x / Non Sq Epi: 5 /hpf / Bacteria: Negative

## 2022-06-11 NOTE — DISCHARGE NOTE PROVIDER - NSDCCPCAREPLAN_GEN_ALL_CORE_FT
PRINCIPAL DISCHARGE DIAGNOSIS  Diagnosis: Nausea & vomiting  Assessment and Plan of Treatment: You were admitted to the hospital because you were not able to tolerate food due to nausea and vomiting. We believe that it was due to an infection of your gastrointestinal tract. We slowly advanced your diet and gave you medication to control your nausea.      SECONDARY DISCHARGE DIAGNOSES  Diagnosis: Minimal change disease  Assessment and Plan of Treatment: You had significant protein in your urine and we were concerned of recurrence of your minimal change diseae. We obtained an ultrasound of your kidneys, which showed................... We held your torsemind and mycophenolate while you were having an active infection. Upon discharge, please .............  Please follow up with Dr. Apodaca with nephrology within 3 weeks for further management.     PRINCIPAL DISCHARGE DIAGNOSIS  Diagnosis: Nausea & vomiting  Assessment and Plan of Treatment: You were admitted to the hospital because you were not able to tolerate food due to nausea and vomiting. We believe that it was due to inflammation of your stomach from a gastroitnestinal infection or caused from previous THC intake, which can result in prolonged nausea and vomiting. We did an endoscopy which showed a small hiatal hernia, unlikely to have contributed to your symptoms. We slowly advanced your diet and gave you medication to control your nausea.      SECONDARY DISCHARGE DIAGNOSES  Diagnosis: Minimal change disease  Assessment and Plan of Treatment: You had significant protein in your urine and we were concerned of recurrence of your minimal change diseae. We obtained an ultrasound of your kidneys, which did not show any clots or unexpected findings. We held your torsemide and mycophenolate while you were having an active infection. The nephrology team started you on steroids to control the disease.   Please follow up with Dr. Apodaca with nephrology within 3 weeks for further management.     PRINCIPAL DISCHARGE DIAGNOSIS  Diagnosis: Nausea & vomiting  Assessment and Plan of Treatment: You were admitted to the hospital because you were not able to tolerate food due to nausea and vomiting. We believe that it was due to inflammation of your stomach from a gastroitnestinal infection or caused from previous THC intake, which can result in prolonged nausea and vomiting. We did an endoscopy which showed a small hiatal hernia, unlikely to have contributed to your symptoms. We slowly advanced your diet and gave you medication to control your nausea. Please  resume regular diet and liquid intake.      SECONDARY DISCHARGE DIAGNOSES  Diagnosis: Minimal change disease  Assessment and Plan of Treatment: You had significant protein in your urine and we were concerned of recurrence of your minimal change diseae. We obtained an ultrasound of your kidneys, which did not show any clots or unexpected findings. We held your torsemide and mycophenolate while you were having an active infection. The nephrology team started you on steroids to control the disease. Please do not resume mycophenolate or torsemide until you see the nephrologist.  Please follow up with Dr. Apodaca with nephrology as soon as possible, not longer than within 1 week. If unable to see Dr. Apodaca within 1 week, please see your primary care doctor as soon as possible for kidney function monitoring.    Diagnosis: TUCKER (acute kidney injury)  Assessment and Plan of Treatment: You developed an acute kidney injury, which requires monitoring in the hospital. Upon leaving the hospital, your kidney function was not stable. If not monitored and further worked up, there is a risk of further progression of kidney failure resulting in a need for dialysis. The likely reason for the kidney injury is decreased oral intake, however, further studies need to be obtained. Please attempt to drink fluids liberally at home.   Please see Dr. Apodaca as soon as possible in the clinic. If unable to see Dr. Apodaca within 1 week, please follow up with your primary care doctor for kidney function monitoring.

## 2022-06-11 NOTE — PROGRESS NOTE ADULT - PROBLEM SELECTOR PLAN 1
Pt w/ symptoms of N/V and hx of diarrhea for 1 week. Most likely 2/2 gastroenteritis, with CT evidence of colitis previously.  If symptoms do not improve with supportive care, will evaluate esophageal mechanical or motility disorders    - supportive care  - replete electrolytes  - Zofran PRN for nausea  - clear liquid diet, advancing as tolerated  - serial abdominal exams  - if diarrhea recurs, check stool studies/GI PCR/O+P in setting of immunocompromised state  - GI eval if symptoms persist Pt w/ symptoms of N/V and hx of diarrhea for 1 week. Most likely 2/2 gastroenteritis, with CT evidence of colitis previously.  If symptoms do not improve with supportive care, will evaluate esophageal mechanical or motility disorders    - supportive care  - replete electrolytes  - Zofran PRN for nausea  - clear liquid diet, advancing as tolerated  - serial abdominal exams  - if diarrhea recurs, check stool studies/GI PCR/O+P in setting of immunocompromised state  - GI consulted, pending recs  - attempting compazine for nausea

## 2022-06-11 NOTE — PROGRESS NOTE ADULT - ATTENDING COMMENTS
here w/ refractory nausea/vomiting and c/f relapsed MCKENNA  manage symptomatically  GI pcr if diarrhea  antiemetics prn  renal recs appreciated

## 2022-06-11 NOTE — PROGRESS NOTE ADULT - PROBLEM SELECTOR PLAN 2
Hypokalemia likely in setting of GI loss from diarrhea and vomiting. Please replete vigorously. Hold torsemide at this time. Monitor serum potassium.     If any questions, please feel free to contact me     Mukul Faustin  Nephrology Fellow  [facility] Pager: [phone].

## 2022-06-11 NOTE — DISCHARGE NOTE PROVIDER - NSDCMRMEDTOKEN_GEN_ALL_CORE_FT
Crestor 40 mg oral tablet: 1 tab(s) orally once a day  ergocalciferol 1.25 mg (50,000 intl units) oral capsule: 1 cap(s) orally once a week  mycophenolate mofetil 500 mg oral tablet: 1 tab(s) orally 2 times a day  torsemide 20 mg oral tablet: 1 tab(s) orally , As Needed    NOTE: PATIENT STATES SHE ONLY TAKES AS NEEDED FOR SWELLING - LAST FILL AT LOCAL PHARMACY 12/2021  Zofran 4 mg oral tablet: 1 tab(s) orally every 8 hours    Crestor 40 mg oral tablet: 1 tab(s) orally once a day  ergocalciferol 1.25 mg (50,000 intl units) oral capsule: 1 cap(s) orally once a week  famotidine 20 mg oral tablet: 1 tab(s) orally once a day  predniSONE 20 mg oral tablet: 3 tab(s) orally once a day  Zofran 4 mg oral tablet: 1 tab(s) orally every 8 hours

## 2022-06-11 NOTE — DISCHARGE NOTE PROVIDER - HOSPITAL COURSE
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, hx thrombophlebitis of R gonadal vein, DVT (was previously on Xarelto), GERD, presented to ED for progressively worsening N/V for 2 weeks with diarrhea, admitted for PO intolerance. Pt received supportive care for treatment of gastroenteritis. Nephrology were consulted, 32 yo F PMHx minimal change disease, hx thrombophlebitis of R gonadal vein, DVT (was previously on Xarelto), GERD, presented to ED for progressively worsening N/V for 2 weeks with diarrhea, admitted for PO intolerance. Pt received supportive care for treatment of gastroenteritis. Pt w >3g Nephrology were consulted re 32 yo F PMHx minimal change disease, hx thrombophlebitis of R gonadal vein, DVT (was previously on Xarelto), GERD, presented to ED for progressively worsening N/V for 2 weeks with diarrhea, admitted for PO intolerance. Pt received supportive care for treatment of gastroenteritis. Mycophenolate and torsemide were held during hospitalization. Pt w >2g proteinuria and lower extremity edema. Nephrology were consulted regarding recurrence of MCKENNA. Renal ultrasound with doppler showed ......... and .... 32 yo F PMHx minimal change disease, hx thrombophlebitis of R gonadal vein, DVT (was previously on Xarelto), GERD, presented to ED for progressively worsening N/V for 2 weeks with diarrhea, admitted for PO intolerance. Pt received supportive care for treatment of gastroenteritis. Mycophenolate and torsemide were held during hospitalization. Pt w >2g proteinuria and lower extremity edema. Nephrology were consulted regarding recurrence of MCKENNA. Renal ultrasound with doppler showed no RVT. Utox positive with THC. Pt underwent EGD w/ findings of small hiatal hernia. 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 Attempted zofran, reglan, compazine, alcohol pads, scopolamine, ativan, capsaicin, for symptom control. Pt ___________.  Regarding MCKENNA, pt was started on steroids.     On discharge, the patient's N/V _____ 32 yo F PMHx minimal change disease, hx thrombophlebitis of R gonadal vein, DVT (was previously on Xarelto), GERD, presented to ED for progressively worsening N/V for 2 weeks with diarrhea, admitted for PO intolerance. Pt received supportive care for treatment of gastroenteritis. Mycophenolate and torsemide were held during hospitalization. Pt w >2g proteinuria and lower extremity edema. Nephrology were consulted regarding recurrence of MCKENNA. Renal ultrasound with doppler showed no RVT. Utox positive with THC. Pt underwent EGD w/ findings of small hiatal hernia. 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 Attempted zofran, reglan, compazine, alcohol pads, scopolamine, ativan, capsaicin, for symptom control..  Regarding MCKENNA, pt was started on steroids.  Over several days, nausea improved and pt started tolerating PO diet. Pt developed worsening TUCKER, attempted to treat with IVF and obtain further diagnostic studies, but pt refused and left AMA.     Discussed with pt to f/u with nephrology Dr. Apodaca as soon as possible. If unable to see nephrology within 1 week, pt instructed to f/u with PCP.    32 yo F PMHx minimal change disease, hx thrombophlebitis of R gonadal vein, DVT (was previously on Xarelto), GERD, presented to ED for progressively worsening N/V for 2 weeks with diarrhea, admitted for PO intolerance. Pt received supportive care for treatment of gastroenteritis. Mycophenolate and torsemide were held during hospitalization. Pt w >2g proteinuria and lower extremity edema. Nephrology were consulted regarding recurrence of MCKENNA. Renal ultrasound with doppler showed no RVT. Utox positive with THC. Pt underwent EGD w/ findings of small hiatal hernia. 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 Attempted zofran, reglan, compazine, alcohol pads, scopolamine, ativan, capsaicin, for symptom control..  Regarding MCKENNA, pt was started on steroids.  Over several days, nausea improved and pt started tolerating PO diet. Pt developed worsening TUCKER, attempted to treat with IVF and obtain further diagnostic studies, but pt refused and left AMA.     Discussed with pt to f/u with nephrology Dr. Apodaca as soon as possible. If unable to see nephrology within 1 week, pt instructed to f/u with PCP.     Discharge Diagnoses:  Intractable nausea and vomiting  Cannabinoid Hyperemesis Syndrome  Acute Kidney Injury  Nephrotic Syndrome  Minimal Change Disease

## 2022-06-12 LAB
ALBUMIN SERPL ELPH-MCNC: 1.3 G/DL — LOW (ref 3.3–5)
ALP SERPL-CCNC: 67 U/L — SIGNIFICANT CHANGE UP (ref 40–120)
ALT FLD-CCNC: 8 U/L — LOW (ref 10–45)
ANION GAP SERPL CALC-SCNC: 12 MMOL/L — SIGNIFICANT CHANGE UP (ref 5–17)
AST SERPL-CCNC: 13 U/L — SIGNIFICANT CHANGE UP (ref 10–40)
BILIRUB SERPL-MCNC: 0.2 MG/DL — SIGNIFICANT CHANGE UP (ref 0.2–1.2)
BUN SERPL-MCNC: 14 MG/DL — SIGNIFICANT CHANGE UP (ref 7–23)
CALCIUM SERPL-MCNC: 8.2 MG/DL — LOW (ref 8.4–10.5)
CHLORIDE SERPL-SCNC: 108 MMOL/L — SIGNIFICANT CHANGE UP (ref 96–108)
CO2 SERPL-SCNC: 22 MMOL/L — SIGNIFICANT CHANGE UP (ref 22–31)
CREAT ?TM UR-MCNC: 603 MG/DL — SIGNIFICANT CHANGE UP
CREAT SERPL-MCNC: 0.76 MG/DL — SIGNIFICANT CHANGE UP (ref 0.5–1.3)
EGFR: 108 ML/MIN/1.73M2 — SIGNIFICANT CHANGE UP
GLUCOSE SERPL-MCNC: 62 MG/DL — LOW (ref 70–99)
HCT VFR BLD CALC: 40.4 % — SIGNIFICANT CHANGE UP (ref 34.5–45)
HGB BLD-MCNC: 12.5 G/DL — SIGNIFICANT CHANGE UP (ref 11.5–15.5)
MAGNESIUM SERPL-MCNC: 1.9 MG/DL — SIGNIFICANT CHANGE UP (ref 1.6–2.6)
MCHC RBC-ENTMCNC: 29.3 PG — SIGNIFICANT CHANGE UP (ref 27–34)
MCHC RBC-ENTMCNC: 30.9 GM/DL — LOW (ref 32–36)
MCV RBC AUTO: 94.8 FL — SIGNIFICANT CHANGE UP (ref 80–100)
NRBC # BLD: 0 /100 WBCS — SIGNIFICANT CHANGE UP (ref 0–0)
PHOSPHATE SERPL-MCNC: 3.3 MG/DL — SIGNIFICANT CHANGE UP (ref 2.5–4.5)
PLATELET # BLD AUTO: 672 K/UL — HIGH (ref 150–400)
POTASSIUM SERPL-MCNC: 3.7 MMOL/L — SIGNIFICANT CHANGE UP (ref 3.5–5.3)
POTASSIUM SERPL-SCNC: 3.7 MMOL/L — SIGNIFICANT CHANGE UP (ref 3.5–5.3)
PROT ?TM UR-MCNC: 1804 MG/DL — HIGH (ref 0–12)
PROT SERPL-MCNC: 3.9 G/DL — LOW (ref 6–8.3)
PROT/CREAT UR-RTO: 3 RATIO — HIGH (ref 0–0.2)
RBC # BLD: 4.26 M/UL — SIGNIFICANT CHANGE UP (ref 3.8–5.2)
RBC # FLD: 16.1 % — HIGH (ref 10.3–14.5)
SODIUM SERPL-SCNC: 142 MMOL/L — SIGNIFICANT CHANGE UP (ref 135–145)
WBC # BLD: 8.3 K/UL — SIGNIFICANT CHANGE UP (ref 3.8–10.5)
WBC # FLD AUTO: 8.3 K/UL — SIGNIFICANT CHANGE UP (ref 3.8–10.5)

## 2022-06-12 PROCEDURE — 99222 1ST HOSP IP/OBS MODERATE 55: CPT | Mod: GC

## 2022-06-12 PROCEDURE — 99232 SBSQ HOSP IP/OBS MODERATE 35: CPT | Mod: GC

## 2022-06-12 RX ORDER — METOCLOPRAMIDE HCL 10 MG
5 TABLET ORAL EVERY 8 HOURS
Refills: 0 | Status: DISCONTINUED | OUTPATIENT
Start: 2022-06-12 | End: 2022-06-14

## 2022-06-12 RX ORDER — PANTOPRAZOLE SODIUM 20 MG/1
40 TABLET, DELAYED RELEASE ORAL
Refills: 0 | Status: DISCONTINUED | OUTPATIENT
Start: 2022-06-12 | End: 2022-06-13

## 2022-06-12 RX ADMIN — PANTOPRAZOLE SODIUM 40 MILLIGRAM(S): 20 TABLET, DELAYED RELEASE ORAL at 17:19

## 2022-06-12 RX ADMIN — FAMOTIDINE 20 MILLIGRAM(S): 10 INJECTION INTRAVENOUS at 12:58

## 2022-06-12 RX ADMIN — Medication 5 MILLIGRAM(S): at 21:34

## 2022-06-12 RX ADMIN — Medication 5 MILLIGRAM(S): at 13:29

## 2022-06-12 NOTE — PROGRESS NOTE ADULT - PROBLEM SELECTOR PLAN 2
Pt w/ minimal change disease confirmed with outpatient biopsy  - Pt previously on steroids, d/c 1 month ago, now on mycophenolate  - Pt w/ LE edema  - s/p 1L IVF  - Nephrology consulted regarding fluid management and further treatment  - urine studies with significant proteinuria  - c/w ergocalciferol as tolerated   - hold torsemide in setting of hypokalemia, dehydration  - hold mycophenolate pending GI improvement  - lipid profile w/ cholesterol 395,   - ordered renal US w/ doppler to r/o RVT   - f/u TB serology

## 2022-06-12 NOTE — CONSULT NOTE ADULT - ASSESSMENT
#Persistent N/V  Previous to the past 3 weeks, patient was okay. Patient clinical presentation concerning for infectious enterocolitis (as noticed in the CT scan) that has mostly resolved except the N/V (usually precipitated by eating but can occur even without eating). 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 Patient sxs atypical for PUD or IBS.     Recommendations:  -Schedule Reglan before meals, monitor Qtc  -Can use Zofran as breakthrough sxs  -Trial of PPI BID  -If sxs persist despite above conservative management could consider role of EGD to rule out any other organic pathology contributing     GI will continue to follow    Recommendations preliminary until signed by attending.     Rajeev Del Valle MD  Gastroenterology/Hepatology Fellow  Contact on-call GI fellow via answering service ([phone]) from 5pm-8am AND on weekends/holidays         #Persistent N/V  Previous to the past 3 weeks, patient was okay. Patient clinical presentation concerning for infectious enterocolitis (as noticed in the CT scan) that has mostly resolved except the N/V (usually precipitated by eating but can occur even without eating). 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 Patient sxs atypical for PUD or IBS.     #Colonic thickening, seen in 12/21 and also now on most recent CT scan    Recommendations:  -Schedule Reglan before meals, monitor Qtc  -Can use Zofran as breakthrough sxs  -Trial of PPI BID  -If sxs persist despite above conservative management could consider role of EGD to rule out any other organic pathology contributing   -Would consider outpatient Colonoscopy for thickening     GI will continue to follow    Recommendations preliminary until signed by attending.     Rajeev Del Valle MD  Gastroenterology/Hepatology Fellow  Contact on-call GI fellow via answering service ([phone]) from 5pm-8am AND on weekends/holidays

## 2022-06-12 NOTE — PROGRESS NOTE ADULT - ATTENDING COMMENTS
here w/ refractory nausea/vomiting and c/f relapsed MCKENNA  manage symptomatically  GI pcr if diarrhea  antiemetics prn GI consult appreciated  MCKENNA-may need prednisone. Will dw GI if okay to start

## 2022-06-12 NOTE — CONSULT NOTE ADULT - ATTENDING COMMENTS
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 The NBNB vomiting started after she ate Fast food meal two weeks ago.     Likely post infectious gastroparesis    Rec:  1.  EKG to evaluate QTc  if QTc normal, start metoclopramide     Fabaina Read MD   of Medicine, Gastroenterology  [address] [place] [address], [address]  [place] [address]  [phone]
Pt of John Joaquin, prior to that Angie Apodaca.  N&V + worsening pedal edema since steroid to MMF change  1,  Minimal change disease--ro infectious etiology for presentation.  D/C MMF, may restart steroids in near future.  Check U P/Cr, lipid profile, TB serology.  renal doppler  2,  Hypokalemia--vigorous repletion.  May be contributing to N&V by altering bowel motility.  Anti- emetics key given limited IV K .  If cannot tolerate PO may require PICC for increased ICheck Uluytes  3.  Volume overload--cautious volume optimization and would hold diuretic which likely exacerbating 2  discussed with med team

## 2022-06-12 NOTE — PROGRESS NOTE ADULT - PROBLEM SELECTOR PLAN 1
Pt w/ symptoms of N/V and hx of diarrhea for 1 week. Most likely 2/2 gastroenteritis, with CT evidence of colitis previously.  If symptoms do not improve with supportive care, will evaluate esophageal mechanical or motility disorders    - supportive care  - replete electrolytes  - Zofran PRN for nausea  - clear liquid diet, advancing as tolerated  - serial abdominal exams  - if diarrhea recurs, check stool studies/GI PCR/O+P in setting of immunocompromised state  - GI consulted, pending recs  - attempting compazine for nausea

## 2022-06-12 NOTE — PROGRESS NOTE ADULT - ASSESSMENT
31F w/ minimal change disease, GERD, hx thrombophlebitis of R gonadal vein, DVT (was previously on Xarelto), p/w worsening N/V for 2 weeks.  Admitted for gastroparesis and supportive tx.

## 2022-06-12 NOTE — PROGRESS NOTE ADULT - PROBLEM SELECTOR PLAN 4
Pt w/ chronic GERD, currently not on medications  - will start famotidine   - symptomatic management Pt w/ chronic GERD, currently not on medications  - famotidine  - symptomatic management
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
## 2022-06-12 NOTE — PROGRESS NOTE ADULT - SUBJECTIVE AND OBJECTIVE BOX
Jairo Yo MD  Internal Medicine, PGY3  Universal pager: please use directory as pts have access to these notes now  Page Night Float after 19:00    Patient is a 31y old  Female who presents with a chief complaint of PO intolerance (12 Jun 2022 07:34)    OVERNIGHT EVENTS: NAEON    SUBJECTIVE:  - still feels nauseated, says she's "trying to eat"; no BM yet  - denies F/C, lightheadedness, HA, CP, SOB, abd pain, D/C, burning w/ urination, leg swelling    focused ROS as above    MEDICATIONS  (STANDING):  atorvastatin 80 milliGRAM(s) Oral at bedtime  famotidine Injectable 20 milliGRAM(s) IV Push daily  heparin   Injectable 5000 Unit(s) SubCutaneous every 12 hours  metoclopramide Injectable 5 milliGRAM(s) IV Push every 8 hours  pantoprazole   Suspension 40 milliGRAM(s) Oral two times a day    MEDICATIONS  (PRN):  acetaminophen     Tablet .. 650 milliGRAM(s) Oral every 6 hours PRN Temp greater or equal to 38C (100.4F), Mild Pain (1 - 3)  aluminum hydroxide/magnesium hydroxide/simethicone Suspension 30 milliLiter(s) Oral every 4 hours PRN Dyspepsia  melatonin 3 milliGRAM(s) Oral at bedtime PRN Insomnia  ondansetron Injectable 4 milliGRAM(s) IV Push every 8 hours PRN Nausea and/or Vomiting      OBJECTIVE:  T(C): 36.9 (06-12-22 @ 04:18), Max: 37 (06-11-22 @ 13:38)  HR: 108 (06-12-22 @ 04:18) (93 - 108)  BP: 102/68 (06-12-22 @ 04:18) (97/61 - 105/68)  RR: 18 (06-12-22 @ 04:18) (18 - 18)  SpO2: 97% (06-12-22 @ 04:18) (96% - 98%)    PHYSICAL EXAM  GENERAL: definitely NAD   HEAD:  Atraumatic, normocephalic  EYES: EOMI, sclera clear  CHEST/LUNG: Clear to auscultation bilaterally; no wheeze  HEART: RRR; S1, S2; no M/R/G  ABDOMEN: soft, non-distended; non-tender; BS present  EXTREMITIES:  2+ Peripheral Pulses; no edema  NEURO: non-focal, AAOx3  PSYCH: appropriate affect  SKIN: No rashes or lesions    LABS:  CAPILLARY BLOOD GLUCOSE        I&O's Summary                          12.5   8.30  )-----------( 672      ( 12 Jun 2022 06:49 )             40.4     06-12    142  |  108  |  14  ----------------------------<  62<L>  3.7   |  22  |  0.76    Ca    8.2<L>      12 Jun 2022 06:50  Phos  3.3     06-12  Mg     1.9     06-12    TPro  3.9<L>  /  Alb  1.3<L>  /  TBili  0.2  /  DBili  x   /  AST  13  /  ALT  8<L>  /  AlkPhos  67  06-12    PT/INR - ( 11 Jun 2022 06:41 )   PT: 12.7 sec;   INR: 1.10 ratio     PTT - ( 11 Jun 2022 06:41 )  PTT:34.8 sec    Microbiology:    Culture - Urine (collected 10 Salvador 2022 02:55)  Source: Clean Catch Clean Catch (Midstream)  Final Report (11 Jun 2022 08:45):    <10,000 CFU/mL Normal Urogenital Yuli      RADIOLOGY & ADDITIONAL TESTS:    Imaging Personally Reviewed:  Consultant(s) Notes Reviewed:    Care Discussed with Consultants/Other Providers:

## 2022-06-13 DIAGNOSIS — R11.2 NAUSEA WITH VOMITING, UNSPECIFIED: ICD-10-CM

## 2022-06-13 LAB
ALBUMIN SERPL ELPH-MCNC: 1.1 G/DL — LOW (ref 3.3–5)
ALP SERPL-CCNC: 66 U/L — SIGNIFICANT CHANGE UP (ref 40–120)
ALT FLD-CCNC: 8 U/L — LOW (ref 10–45)
ANION GAP SERPL CALC-SCNC: 12 MMOL/L — SIGNIFICANT CHANGE UP (ref 5–17)
AST SERPL-CCNC: 11 U/L — SIGNIFICANT CHANGE UP (ref 10–40)
BILIRUB SERPL-MCNC: 0.2 MG/DL — SIGNIFICANT CHANGE UP (ref 0.2–1.2)
BUN SERPL-MCNC: 19 MG/DL — SIGNIFICANT CHANGE UP (ref 7–23)
CALCIUM SERPL-MCNC: 8.2 MG/DL — LOW (ref 8.4–10.5)
CHLORIDE SERPL-SCNC: 107 MMOL/L — SIGNIFICANT CHANGE UP (ref 96–108)
CMV DNA CSF QL NAA+PROBE: SIGNIFICANT CHANGE UP
CMV DNA SPEC NAA+PROBE-LOG#: SIGNIFICANT CHANGE UP LOG10IU/ML
CO2 SERPL-SCNC: 22 MMOL/L — SIGNIFICANT CHANGE UP (ref 22–31)
CREAT SERPL-MCNC: 0.9 MG/DL — SIGNIFICANT CHANGE UP (ref 0.5–1.3)
EGFR: 88 ML/MIN/1.73M2 — SIGNIFICANT CHANGE UP
GLUCOSE SERPL-MCNC: 63 MG/DL — LOW (ref 70–99)
HCT VFR BLD CALC: 39.8 % — SIGNIFICANT CHANGE UP (ref 34.5–45)
HGB BLD-MCNC: 12.3 G/DL — SIGNIFICANT CHANGE UP (ref 11.5–15.5)
MAGNESIUM SERPL-MCNC: 2 MG/DL — SIGNIFICANT CHANGE UP (ref 1.6–2.6)
MCHC RBC-ENTMCNC: 29.1 PG — SIGNIFICANT CHANGE UP (ref 27–34)
MCHC RBC-ENTMCNC: 30.9 GM/DL — LOW (ref 32–36)
MCV RBC AUTO: 94.3 FL — SIGNIFICANT CHANGE UP (ref 80–100)
NRBC # BLD: 0 /100 WBCS — SIGNIFICANT CHANGE UP (ref 0–0)
PHOSPHATE SERPL-MCNC: 5 MG/DL — HIGH (ref 2.5–4.5)
PLATELET # BLD AUTO: 688 K/UL — HIGH (ref 150–400)
POTASSIUM SERPL-MCNC: 3.7 MMOL/L — SIGNIFICANT CHANGE UP (ref 3.5–5.3)
POTASSIUM SERPL-SCNC: 3.7 MMOL/L — SIGNIFICANT CHANGE UP (ref 3.5–5.3)
PROT SERPL-MCNC: 4.3 G/DL — LOW (ref 6–8.3)
RBC # BLD: 4.22 M/UL — SIGNIFICANT CHANGE UP (ref 3.8–5.2)
RBC # FLD: 16.3 % — HIGH (ref 10.3–14.5)
SARS-COV-2 RNA SPEC QL NAA+PROBE: SIGNIFICANT CHANGE UP
SODIUM SERPL-SCNC: 141 MMOL/L — SIGNIFICANT CHANGE UP (ref 135–145)
WBC # BLD: 9.14 K/UL — SIGNIFICANT CHANGE UP (ref 3.8–10.5)
WBC # FLD AUTO: 9.14 K/UL — SIGNIFICANT CHANGE UP (ref 3.8–10.5)

## 2022-06-13 PROCEDURE — 99232 SBSQ HOSP IP/OBS MODERATE 35: CPT | Mod: GC

## 2022-06-13 PROCEDURE — 99232 SBSQ HOSP IP/OBS MODERATE 35: CPT

## 2022-06-13 PROCEDURE — 93010 ELECTROCARDIOGRAM REPORT: CPT

## 2022-06-13 PROCEDURE — 93976 VASCULAR STUDY: CPT | Mod: 26

## 2022-06-13 RX ORDER — PROCHLORPERAZINE MALEATE 5 MG
5 TABLET ORAL ONCE
Refills: 0 | Status: DISCONTINUED | OUTPATIENT
Start: 2022-06-13 | End: 2022-06-14

## 2022-06-13 RX ORDER — DEXTROSE MONOHYDRATE, SODIUM CHLORIDE, AND POTASSIUM CHLORIDE 50; .745; 4.5 G/1000ML; G/1000ML; G/1000ML
1000 INJECTION, SOLUTION INTRAVENOUS
Refills: 0 | Status: DISCONTINUED | OUTPATIENT
Start: 2022-06-13 | End: 2022-06-14

## 2022-06-13 RX ORDER — ACETAMINOPHEN 500 MG
1000 TABLET ORAL ONCE
Refills: 0 | Status: COMPLETED | OUTPATIENT
Start: 2022-06-13 | End: 2022-06-13

## 2022-06-13 RX ORDER — SODIUM CHLORIDE 9 MG/ML
500 INJECTION, SOLUTION INTRAVENOUS
Refills: 0 | Status: DISCONTINUED | OUTPATIENT
Start: 2022-06-13 | End: 2022-06-13

## 2022-06-13 RX ORDER — PROCHLORPERAZINE MALEATE 5 MG
5 TABLET ORAL ONCE
Refills: 0 | Status: DISCONTINUED | OUTPATIENT
Start: 2022-06-13 | End: 2022-06-13

## 2022-06-13 RX ORDER — PANTOPRAZOLE SODIUM 20 MG/1
40 TABLET, DELAYED RELEASE ORAL
Refills: 0 | Status: DISCONTINUED | OUTPATIENT
Start: 2022-06-13 | End: 2022-06-16

## 2022-06-13 RX ADMIN — ONDANSETRON 4 MILLIGRAM(S): 8 TABLET, FILM COATED ORAL at 01:49

## 2022-06-13 RX ADMIN — ONDANSETRON 4 MILLIGRAM(S): 8 TABLET, FILM COATED ORAL at 23:51

## 2022-06-13 RX ADMIN — Medication 400 MILLIGRAM(S): at 17:35

## 2022-06-13 RX ADMIN — DEXTROSE MONOHYDRATE, SODIUM CHLORIDE, AND POTASSIUM CHLORIDE 100 MILLILITER(S): 50; .745; 4.5 INJECTION, SOLUTION INTRAVENOUS at 15:08

## 2022-06-13 RX ADMIN — Medication 0.5 MILLIGRAM(S): at 17:36

## 2022-06-13 RX ADMIN — Medication 5 MILLIGRAM(S): at 13:08

## 2022-06-13 RX ADMIN — ONDANSETRON 4 MILLIGRAM(S): 8 TABLET, FILM COATED ORAL at 15:24

## 2022-06-13 RX ADMIN — PANTOPRAZOLE SODIUM 40 MILLIGRAM(S): 20 TABLET, DELAYED RELEASE ORAL at 05:48

## 2022-06-13 RX ADMIN — Medication 5 MILLIGRAM(S): at 05:50

## 2022-06-13 RX ADMIN — PANTOPRAZOLE SODIUM 40 MILLIGRAM(S): 20 TABLET, DELAYED RELEASE ORAL at 17:38

## 2022-06-13 RX ADMIN — SODIUM CHLORIDE 125 MILLILITER(S): 9 INJECTION, SOLUTION INTRAVENOUS at 03:13

## 2022-06-13 RX ADMIN — FAMOTIDINE 20 MILLIGRAM(S): 10 INJECTION INTRAVENOUS at 12:08

## 2022-06-13 NOTE — PROGRESS NOTE ADULT - SUBJECTIVE AND OBJECTIVE BOX
Interval Events:   still with ongoing N/V    Hospital Medications:  acetaminophen     Tablet .. 650 milliGRAM(s) Oral every 6 hours PRN  aluminum hydroxide/magnesium hydroxide/simethicone Suspension 30 milliLiter(s) Oral every 4 hours PRN  atorvastatin 80 milliGRAM(s) Oral at bedtime  famotidine Injectable 20 milliGRAM(s) IV Push daily  heparin   Injectable 5000 Unit(s) SubCutaneous every 12 hours  lactated ringers. 500 milliLiter(s) IV Continuous <Continuous>  melatonin 3 milliGRAM(s) Oral at bedtime PRN  metoclopramide Injectable 5 milliGRAM(s) IV Push every 8 hours  ondansetron Injectable 4 milliGRAM(s) IV Push every 8 hours PRN  pantoprazole  Injectable 40 milliGRAM(s) IV Push two times a day      ROS: All system reviewed and negative except as mentioned above.    PHYSICAL EXAM:   Vital Signs:  Vital Signs Last 24 Hrs  T(C): 36.9 (13 Jun 2022 05:22), Max: 37.2 (12 Jun 2022 12:23)  T(F): 98.4 (13 Jun 2022 05:22), Max: 99 (12 Jun 2022 12:23)  HR: 108 (13 Jun 2022 05:22) (98 - 112)  BP: 104/62 (13 Jun 2022 05:22) (100/62 - 125/79)  BP(mean): --  RR: 18 (13 Jun 2022 05:22) (16 - 18)  SpO2: 98% (13 Jun 2022 05:22) (95% - 98%)  Daily     Daily     GENERAL:  NAD, Appears stated age  HEENT:  NC/AT,  conjunctivae clear and pink, sclera -anicteric  CHEST:  Normal Effort, Breath sounds clear  HEART:  RRR, S1 + S2, no murmurs  ABDOMEN:  Soft, non-tender, non-distended, normoactive bowel sounds,  no masses  EXTREMITIES:  no cyanosis or edema  SKIN:  Warm & Dry. No rash or erythema  NEURO:  Alert, oriented, no focal deficit    LABS:                        12.5   8.30  )-----------( 672      ( 12 Jun 2022 06:49 )             40.4     Mean Cell Volume: 94.8 fl (06-12-22 @ 06:49)    06-12    142  |  108  |  14  ----------------------------<  62<L>  3.7   |  22  |  0.76    Ca    8.2<L>      12 Jun 2022 06:50  Phos  3.3     06-12  Mg     1.9     06-12    TPro  3.9<L>  /  Alb  1.3<L>  /  TBili  0.2  /  DBili  x   /  AST  13  /  ALT  8<L>  /  AlkPhos  67  06-12    LIVER FUNCTIONS - ( 12 Jun 2022 06:50 )  Alb: 1.3 g/dL / Pro: 3.9 g/dL / ALK PHOS: 67 U/L / ALT: 8 U/L / AST: 13 U/L / GGT: x           PT/INR - ( 11 Jun 2022 06:41 )   PT: 12.7 sec;   INR: 1.10 ratio         PTT - ( 11 Jun 2022 06:41 )  PTT:34.8 sec                            12.5   8.30  )-----------( 672      ( 12 Jun 2022 06:49 )             40.4                         12.0   7.26  )-----------( 646      ( 11 Jun 2022 06:41 )             39.1       Imaging: Images reviewed.

## 2022-06-13 NOTE — PROGRESS NOTE ADULT - ATTENDING COMMENTS
Patient seen and examined with team during rounds this morning. Continues to report persistent nausea and vomiting despite supportive care, which has included IV hydration, acid-suppression, and anti-emetics. At this time, there is consideration that her newly-started MMF may be contributing to her symptoms.    GI evaluation appreciated. Plan for EGD tomorrow to rule out alternative etiology of her symptoms; there may be more a chronic component based on prior imaging. Continue to hold MMF. Gentle IV hydration while NPO, monitoring for signs/symptoms of fluid overload. Nephrology f/u appreciated, await further guidance about immunosuppressive regimen moving forward.    Discharge planning in next 24-48 hours if symptoms improve.

## 2022-06-13 NOTE — PROGRESS NOTE ADULT - PROBLEM SELECTOR PLAN 1
Pt. with history of MCKENNA diagnosed in 12/2021. Treated with steroids and was in complete remission in 2/2022, with only trace proteinuria and TP/CR ratio of 0.2. Steroids stopped last month and started on Cellcept 3 weeks ago. Cellcept likely contributing to her current GI symptoms. UA right now with significant proteinuria, TP/CR ratio showing >3.8. so nephrotic range proteinuria.  At this time please hold the MMF, we need to rule out infection prior to any immunosuppression.  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 EGD today per GI.  Monitor labs and urine output. Avoid NSAIDs, ACEI/ARBS, RCA and nephrotoxins. Dose medications as per eGFR.

## 2022-06-13 NOTE — PROGRESS NOTE ADULT - ASSESSMENT
#Persistent N/V  Previous to the past 3 weeks, patient was okay. Patient clinical presentation concerning for infectious enterocolitis (as noticed in the CT scan) that has mostly resolved except the N/V (usually precipitated by eating but can occur even without eating). 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 Patient sxs atypical for PUD or IBS.     #Colonic thickening, seen in 12/21 and also now on most recent CT scan    Recommendations:  -c/w Reglan before meals, monitor Qtc  -Can use Zofran as breakthrough sxs  -Trial of PPI BID  -Given sxs not better, plan for EGD tomorrow   -NPO after midnight  -Please obtain COVID swab today  -outpatient Colonoscopy for thickening in CT scan    GI will continue to follow    Recommendations preliminary until signed by attending.     Rajeev Del Valle MD  Gastroenterology/Hepatology Fellow  Contact on-call GI fellow via answering service ([phone]) from 5pm-8am AND on weekends/holidays         #Persistent N/V  Previous to the past 3 weeks, patient was okay. Patient clinical presentation concerning for infectious enterocolitis (as noticed in the CT scan) that has mostly resolved except the N/V (usually precipitated by eating but can occur even without eating). 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 Patient sxs atypical for PUD or IBS.     #Colonic thickening, seen in 12/21 and also now on most recent CT scan    Recommendations:  -c/w Reglan before meals, monitor Qtc  -Can use Zofran as breakthrough sxs  -Trial of PPI BID  -Given sxs not better, plan for EGD tomorrow   - Check UTox  -NPO after midnight  -Please obtain COVID swab today  -outpatient Colonoscopy for thickening in CT scan as patient cannot tolerate bowel prep at this time    GI will continue to follow    Recommendations preliminary until signed by attending.     Rajeev Del Valle MD  Gastroenterology/Hepatology Fellow  Contact on-call GI fellow via answering service ([phone]) from 5pm-8am AND on weekends/holidays

## 2022-06-13 NOTE — PROGRESS NOTE ADULT - ASSESSMENT
31F w/ minimal change disease, GERD, hx thrombophlebitis of R gonadal vein, DVT (was previously on Xarelto), p/w worsening N/V for 2 weeks.  Admitted for gastroparesis and supportive tx. 31F w/ minimal change disease, GERD, hx thrombophlebitis of R gonadal vein, DVT (was previously on Xarelto), p/w worsening N/V for 2 weeks.  Admitted for evaluation and supportive tx.

## 2022-06-13 NOTE — PROGRESS NOTE ADULT - SUBJECTIVE AND OBJECTIVE BOX
[facility] Division of Kidney Diseases & Hypertension  FOLLOW UP NOTE  --------------------------------------------------------------------------------  HPI: Patient is a 31 year old female with PMH of biopsy proven Minimal Change Disease (diagnosed in 12/2021 treated with steroids), GERD, and asthma who presented to the hospital for nausea and vomiting for the past 2.5 weeks. The patient was admitted in [facility] during 12/2021 at which time she had presented with significant anasarca and found to have nephrotic syndrome. She underwent a renal biopsy and diagnosed with MCKENNA . She was treated with Steroids and was followed as outpatient by Dr. Angie Apodaca. Patient underwent urine studies in 2/2022 at which time she was found to be in complete remission with only trace proteinuria on UA and TP/CR ratio of 0.2. She states she was on steroids up until about one month ago and started taking Cellcept about 3 weeks ago at the direction of her new nephrologist Dr John Joaquin. On arrival to the ED the patient was noted to have hypokalemia with serum potassium of 2.8 and UA significant for >600 proteinuria.     Pt. seen and examined this morning reports continued symptoms of nausea but without any episodes of vomiting. She stated she feels like her swelling is resolved.  No dyspnea.    PAST HISTORY  --------------------------------------------------------------------------------  No significant changes to PMH, PSH, FHx, SHx, unless otherwise noted    ALLERGIES & MEDICATIONS  --------------------------------------------------------------------------------  Allergies    codeine (Hives)    Intolerances      Standing Inpatient Medications  atorvastatin 80 milliGRAM(s) Oral at bedtime  famotidine Injectable 20 milliGRAM(s) IV Push daily  heparin   Injectable 5000 Unit(s) SubCutaneous every 12 hours  lactated ringers. 500 milliLiter(s) IV Continuous <Continuous>  metoclopramide Injectable 5 milliGRAM(s) IV Push every 8 hours  pantoprazole  Injectable 40 milliGRAM(s) IV Push two times a day    PRN Inpatient Medications  acetaminophen     Tablet .. 650 milliGRAM(s) Oral every 6 hours PRN  aluminum hydroxide/magnesium hydroxide/simethicone Suspension 30 milliLiter(s) Oral every 4 hours PRN  melatonin 3 milliGRAM(s) Oral at bedtime PRN  ondansetron Injectable 4 milliGRAM(s) IV Push every 8 hours PRN      REVIEW OF SYSTEMS  --------------------------------------------------------------------------------  Gen: no fever  Respiratory: no sob  CV: no cp  GI: as per HPI  : urinating without issue  MSK: no pain    VITALS/PHYSICAL EXAM  --------------------------------------------------------------------------------  T(C): 36.7 (06-13-22 @ 08:53), Max: 37.2 (06-12-22 @ 12:23)  HR: 101 (06-13-22 @ 08:53) (98 - 112)  BP: 112/74 (06-13-22 @ 08:53) (100/62 - 125/79)  ABP: --  ABP(mean): --  RR: 16 (06-13-22 @ 08:53) (16 - 18)  SpO2: 97% (06-13-22 @ 08:53) (95% - 98%)  CVP(mm Hg): --    Weight (kg): 67.9 (06-12-22 @ 21:06)      06-12-22 @ 07:01  -  06-13-22 @ 07:00  --------------------------------------------------------  IN: 860 mL / OUT: 100 mL / NET: 760 mL    Physical Exam:  	Gen: in no distress  	HEENT: MMM  	Pulm: CTA B/L  	CV: S1S2  	Abd: Soft, +BS   	Ext: trace pretibial edema  	Neuro: Awake  	Skin: Warm and dry    LABS/STUDIES  --------------------------------------------------------------------------------              12.3   9.14  >-----------<  688      [06-13-22 @ 09:59]              39.8     142  |  108  |  14  ----------------------------<  62      [06-12-22 @ 06:50]  3.7   |  22  |  0.76        Ca     8.2     [06-12-22 @ 06:50]      Mg     1.9     [06-12-22 @ 06:50]      Phos  3.3     [06-12-22 @ 06:50]    TPro  3.9  /  Alb  1.3  /  TBili  0.2  /  DBili  x   /  AST  13  /  ALT  8   /  AlkPhos  67  [06-12-22 @ 06:50]          Creatinine Trend:  SCr 0.76 [06-12 @ 06:50]  SCr 0.73 [06-11 @ 21:56]  SCr 0.75 [06-11 @ 06:41]  SCr 0.83 [06-10 @ 15:32]  SCr 0.85 [06-10 @ 08:35]    Urinalysis - [06-10-22 @ 02:55]      Color Light Yellow / Appearance Clear / SG 1.018 / pH 6.5      Gluc Negative / Ketone Trace  / Bili Negative / Urobili Negative       Blood Moderate / Protein >600 / Leuk Est Negative / Nitrite Negative      RBC 8 / WBC 7 / Hyaline 78 / Gran  / Sq Epi  / Non Sq Epi 5 / Bacteria Negative    Urine Creatinine 603      [06-12-22 @ 06:51]  Urine Protein 1804      [06-12-22 @ 06:51]  Urine Sodium 9      [06-11-22 @ 04:08]  Urine Potassium 48      [06-11-22 @ 04:08]  Urine Chloride <20      [06-11-22 @ 04:08]    Lipid: chol 477, , HDL 67, LDL --      [06-11-22 @ 06:41]

## 2022-06-13 NOTE — PROGRESS NOTE ADULT - PROBLEM SELECTOR PLAN 4
Pt w/ chronic GERD, currently not on medications  - famotidine  - symptomatic management Pt w/ chronic GERD, currently not on medications  - continue with famotidine, PPI BID  - symptomatic management

## 2022-06-13 NOTE — PROGRESS NOTE ADULT - PROBLEM SELECTOR PLAN 2
Pt w/ minimal change disease confirmed with outpatient biopsy  - Pt previously on steroids, d/c 1 month ago, now on mycophenolate  - Pt w/ LE edema  - s/p 1L IVF  - Nephrology consulted regarding fluid management and further treatment  - urine studies with significant proteinuria  - c/w ergocalciferol as tolerated   - hold torsemide in setting of hypokalemia, dehydration  - hold mycophenolate pending GI improvement  - lipid profile w/ cholesterol 395,   - ordered renal US w/ doppler to r/o RVT   - f/u TB serology Pt w/ minimal change disease confirmed with outpatient biopsy  - Pt previously on steroids, d/c 1 month ago, now on mycophenolate  - Pt w/ LE edema, significant proteinuria  - Nephrology consulted regarding fluid management and further treatment  - hold torsemide in setting of hypokalemia, dehydration  - hold mycophenolate pending GI improvement  - lipid profile w/ cholesterol 395,   - renal US w/o evidence of RVT   - f/u TB serology  - f/u nephrology regarding starting steroids

## 2022-06-13 NOTE — PROGRESS NOTE ADULT - PROBLEM SELECTOR PLAN 2
Hypokalemia likely in setting of GI loss from diarrhea and vomiting. Please monitor.  Hold torsemide at this time as volume status seems stable.     If any questions, please feel free to contact me     Mukul Figueroa MD   Division of Kidney Diseases and Hypertension  Office: [phone]  Fellow Pager: [phone]

## 2022-06-13 NOTE — PROGRESS NOTE ADULT - SUBJECTIVE AND OBJECTIVE BOX
Jairo Yo MD  Internal Medicine, PGY3  Universal pager: please use directory as pts have access to these notes now  Page Night Float after 19:00    Patient is a 31y old  Female who presents with a chief complaint of PO intolerance (12 Jun 2022 07:34)    OVERNIGHT EVENTS: NAEON    SUBJECTIVE:  - still feels nauseated, says she's "trying to eat"; no BM yet  - denies F/C, lightheadedness, HA, CP, SOB, abd pain, D/C, burning w/ urination, leg swelling    focused ROS as above    MEDICATIONS  (STANDING):  atorvastatin 80 milliGRAM(s) Oral at bedtime  famotidine Injectable 20 milliGRAM(s) IV Push daily  heparin   Injectable 5000 Unit(s) SubCutaneous every 12 hours  metoclopramide Injectable 5 milliGRAM(s) IV Push every 8 hours  pantoprazole   Suspension 40 milliGRAM(s) Oral two times a day    MEDICATIONS  (PRN):  acetaminophen     Tablet .. 650 milliGRAM(s) Oral every 6 hours PRN Temp greater or equal to 38C (100.4F), Mild Pain (1 - 3)  aluminum hydroxide/magnesium hydroxide/simethicone Suspension 30 milliLiter(s) Oral every 4 hours PRN Dyspepsia  melatonin 3 milliGRAM(s) Oral at bedtime PRN Insomnia  ondansetron Injectable 4 milliGRAM(s) IV Push every 8 hours PRN Nausea and/or Vomiting      OBJECTIVE:  T(C): 36.9 (06-12-22 @ 04:18), Max: 37 (06-11-22 @ 13:38)  HR: 108 (06-12-22 @ 04:18) (93 - 108)  BP: 102/68 (06-12-22 @ 04:18) (97/61 - 105/68)  RR: 18 (06-12-22 @ 04:18) (18 - 18)  SpO2: 97% (06-12-22 @ 04:18) (96% - 98%)    PHYSICAL EXAM  GENERAL: definitely NAD   HEAD:  Atraumatic, normocephalic  EYES: EOMI, sclera clear  CHEST/LUNG: Clear to auscultation bilaterally; no wheeze  HEART: RRR; S1, S2; no M/R/G  ABDOMEN: soft, non-distended; non-tender; BS present  EXTREMITIES:  2+ Peripheral Pulses; no edema  NEURO: non-focal, AAOx3  PSYCH: appropriate affect  SKIN: No rashes or lesions    LABS:  CAPILLARY BLOOD GLUCOSE        I&O's Summary                          12.5   8.30  )-----------( 672      ( 12 Jun 2022 06:49 )             40.4     06-12    142  |  108  |  14  ----------------------------<  62<L>  3.7   |  22  |  0.76    Ca    8.2<L>      12 Jun 2022 06:50  Phos  3.3     06-12  Mg     1.9     06-12    TPro  3.9<L>  /  Alb  1.3<L>  /  TBili  0.2  /  DBili  x   /  AST  13  /  ALT  8<L>  /  AlkPhos  67  06-12    PT/INR - ( 11 Jun 2022 06:41 )   PT: 12.7 sec;   INR: 1.10 ratio     PTT - ( 11 Jun 2022 06:41 )  PTT:34.8 sec    Microbiology:    Culture - Urine (collected 10 Salvador 2022 02:55)  Source: Clean Catch Clean Catch (Midstream)  Final Report (11 Jun 2022 08:45):    <10,000 CFU/mL Normal Urogenital Yuli      RADIOLOGY & ADDITIONAL TESTS:    Imaging Personally Reviewed:  Consultant(s) Notes Reviewed:    Care Discussed with Consultants/Other Providers: Patient is a 31y old  Female who presents with a chief complaint of PO intolerance (12 Jun 2022 07:34)    OVERNIGHT EVENTS: NAEON    SUBJECTIVE:  - still feels nauseated, says she's "trying to eat"; no BM yet  - denies F/C, lightheadedness, HA, CP, SOB, abd pain, D/C, burning w/ urination, leg swelling    focused ROS as above    MEDICATIONS  (STANDING):  atorvastatin 80 milliGRAM(s) Oral at bedtime  famotidine Injectable 20 milliGRAM(s) IV Push daily  heparin   Injectable 5000 Unit(s) SubCutaneous every 12 hours  metoclopramide Injectable 5 milliGRAM(s) IV Push every 8 hours  pantoprazole   Suspension 40 milliGRAM(s) Oral two times a day    MEDICATIONS  (PRN):  acetaminophen     Tablet .. 650 milliGRAM(s) Oral every 6 hours PRN Temp greater or equal to 38C (100.4F), Mild Pain (1 - 3)  aluminum hydroxide/magnesium hydroxide/simethicone Suspension 30 milliLiter(s) Oral every 4 hours PRN Dyspepsia  melatonin 3 milliGRAM(s) Oral at bedtime PRN Insomnia  ondansetron Injectable 4 milliGRAM(s) IV Push every 8 hours PRN Nausea and/or Vomiting      OBJECTIVE:  Vital Signs Last 24 Hrs  T(C): 36.9 (13 Jun 2022 05:22), Max: 37.2 (12 Jun 2022 12:23)  T(F): 98.4 (13 Jun 2022 05:22), Max: 99 (12 Jun 2022 12:23)  HR: 108 (13 Jun 2022 05:22) (98 - 112)  BP: 104/62 (13 Jun 2022 05:22) (100/62 - 125/79)  BP(mean): --  RR: 18 (13 Jun 2022 05:22) (16 - 18)  SpO2: 98% (13 Jun 2022 05:22) (95% - 98%)    PHYSICAL EXAM  GENERAL: definitely NAD   HEAD:  Atraumatic, normocephalic  EYES: EOMI, sclera clear  CHEST/LUNG: Clear to auscultation bilaterally; no wheeze  HEART: RRR; S1, S2; no M/R/G  ABDOMEN: soft, non-distended; non-tender; BS present  EXTREMITIES:  2+ Peripheral Pulses; no edema  NEURO: non-focal, AAOx3  PSYCH: appropriate affect  SKIN: No rashes or lesions    LABS:                        12.5   8.30  )-----------( 672      ( 12 Jun 2022 06:49 )             40.4     06-12    142  |  108  |  14  ----------------------------<  62<L>  3.7   |  22  |  0.76    Ca    8.2<L>      12 Jun 2022 06:50  Phos  3.3     06-12  Mg     1.9     06-12    TPro  3.9<L>  /  Alb  1.3<L>  /  TBili  0.2  /  DBili  x   /  AST  13  /  ALT  8<L>  /  AlkPhos  67  06-12             Patient is a 31y old  Female who presents with a chief complaint of PO intolerance (12 Jun 2022 07:34)    OVERNIGHT EVENTS: NAEON    SUBJECTIVE: Pt continues to have significant nausea and vomiting after attempts to drink clear liquids. Denies F/C, CP, SOB, abdominal pain, burning with urination. Has not had a BM for several days. Discussed plan in obtaining an EGD tomorrow.       focused ROS as above    MEDICATIONS  (STANDING):  atorvastatin 80 milliGRAM(s) Oral at bedtime  famotidine Injectable 20 milliGRAM(s) IV Push daily  heparin   Injectable 5000 Unit(s) SubCutaneous every 12 hours  metoclopramide Injectable 5 milliGRAM(s) IV Push every 8 hours  pantoprazole   Suspension 40 milliGRAM(s) Oral two times a day    MEDICATIONS  (PRN):  acetaminophen     Tablet .. 650 milliGRAM(s) Oral every 6 hours PRN Temp greater or equal to 38C (100.4F), Mild Pain (1 - 3)  aluminum hydroxide/magnesium hydroxide/simethicone Suspension 30 milliLiter(s) Oral every 4 hours PRN Dyspepsia  melatonin 3 milliGRAM(s) Oral at bedtime PRN Insomnia  ondansetron Injectable 4 milliGRAM(s) IV Push every 8 hours PRN Nausea and/or Vomiting      OBJECTIVE:  Vital Signs Last 24 Hrs  T(C): 36.7 (13 Jun 2022 08:53), Max: 37.1 (12 Jun 2022 16:18)  T(F): 98.1 (13 Jun 2022 08:53), Max: 98.7 (12 Jun 2022 16:18)  HR: 101 (13 Jun 2022 08:53) (98 - 112)  BP: 112/74 (13 Jun 2022 08:53) (100/62 - 117/75)  BP(mean): --  RR: 16 (13 Jun 2022 08:53) (16 - 18)  SpO2: 97% (13 Jun 2022 08:53) (95% - 98%)    PHYSICAL EXAM  GENERAL: definitely NAD   HEAD:  Atraumatic, normocephalic  EYES: EOMI, sclera clear  CHEST/LUNG: Clear to auscultation bilaterally; no wheeze  HEART: RRR; S1, S2; no M/R/G  ABDOMEN: soft, non-distended; non-tender; BS present  EXTREMITIES:  2+ Peripheral Pulses; Pitting edema extending up to knees, L>R, overall w/ some improvement from admission.   NEURO: non-focal, AAOx3  PSYCH: appropriate affect  SKIN: No rashes or lesions    LABS:                        12.3   9.14  )-----------( 688      ( 13 Jun 2022 09:59 )             39.8     06-13    141  |  107  |  19  ----------------------------<  63<L>  3.7   |  22  |  0.90    Ca    8.2<L>      13 Jun 2022 09:59  Phos  5.0     06-13  Mg     2.0     06-13    TPro  4.3<L>  /  Alb  1.1<L>  /  TBili  0.2  /  DBili  x   /  AST  11  /  ALT  8<L>  /  AlkPhos  66  06-13              Culture - Blood (collected 12 Jun 2022 06:55)  Source: .Blood Blood-Peripheral  Preliminary Report (13 Jun 2022 11:01):    No growth to date.    Culture - Blood (collected 12 Jun 2022 06:55)  Source: .Blood Blood-Peripheral  Preliminary Report (13 Jun 2022 11:01):    No growth to date.             Patient is a 31y old  Female who presents with a chief complaint of PO intolerance (12 Jun 2022 07:34)    OVERNIGHT EVENTS: NAEON    SUBJECTIVE: Pt continues to have significant nausea and vomiting after attempts to drink clear liquids. Denies F/C, CP, SOB, abdominal pain, burning with urination. Has not had a BM for several days. Discussed plan in obtaining an EGD tomorrow.       focused ROS as above    MEDICATIONS  (STANDING):  atorvastatin 80 milliGRAM(s) Oral at bedtime  famotidine Injectable 20 milliGRAM(s) IV Push daily  heparin   Injectable 5000 Unit(s) SubCutaneous every 12 hours  metoclopramide Injectable 5 milliGRAM(s) IV Push every 8 hours  pantoprazole   Suspension 40 milliGRAM(s) Oral two times a day    MEDICATIONS  (PRN):  acetaminophen     Tablet .. 650 milliGRAM(s) Oral every 6 hours PRN Temp greater or equal to 38C (100.4F), Mild Pain (1 - 3)  aluminum hydroxide/magnesium hydroxide/simethicone Suspension 30 milliLiter(s) Oral every 4 hours PRN Dyspepsia  melatonin 3 milliGRAM(s) Oral at bedtime PRN Insomnia  ondansetron Injectable 4 milliGRAM(s) IV Push every 8 hours PRN Nausea and/or Vomiting      OBJECTIVE:  Vital Signs Last 24 Hrs  T(C): 36.7 (13 Jun 2022 08:53), Max: 37.1 (12 Jun 2022 16:18)  T(F): 98.1 (13 Jun 2022 08:53), Max: 98.7 (12 Jun 2022 16:18)  HR: 101 (13 Jun 2022 08:53) (98 - 112)  BP: 112/74 (13 Jun 2022 08:53) (100/62 - 117/75)  BP(mean): --  RR: 16 (13 Jun 2022 08:53) (16 - 18)  SpO2: 97% (13 Jun 2022 08:53) (95% - 98%)    PHYSICAL EXAM  GENERAL: NAD   EYES: EOMI, sclera clear  CHEST/LUNG: Clear to auscultation bilaterally; no wheeze  HEART: RRR; S1, S2; no M/R/G  ABDOMEN: soft, non-distended; non-tender; BS present  EXTREMITIES:  2+ Peripheral Pulses; Pitting edema extending up to knees, L>R, overall w/ some improvement from admission.   NEURO: non-focal, AAOx3  PSYCH: flat affect  SKIN: No rashes or lesions    LABS:                        12.3   9.14  )-----------( 688      ( 13 Jun 2022 09:59 )             39.8     06-13    141  |  107  |  19  ----------------------------<  63<L>  3.7   |  22  |  0.90    Ca    8.2<L>      13 Jun 2022 09:59  Phos  5.0     06-13  Mg     2.0     06-13    TPro  4.3<L>  /  Alb  1.1<L>  /  TBili  0.2  /  DBili  x   /  AST  11  /  ALT  8<L>  /  AlkPhos  66  06-13              Culture - Blood (collected 12 Jun 2022 06:55)  Source: .Blood Blood-Peripheral  Preliminary Report (13 Jun 2022 11:01):    No growth to date.    Culture - Blood (collected 12 Jun 2022 06:55)  Source: .Blood Blood-Peripheral  Preliminary Report (13 Jun 2022 11:01):    No growth to date.

## 2022-06-13 NOTE — PROVIDER CONTACT NOTE (OTHER) - SITUATION
Pt refusing pm reglan and lipitor. Pt states reglan does not work for her and she is unable to tolerate PO lipitor.

## 2022-06-13 NOTE — PROGRESS NOTE ADULT - PROBLEM SELECTOR PLAN 1
Pt w/ symptoms of N/V and hx of diarrhea for 1 week. Most likely 2/2 gastroenteritis, with CT evidence of colitis previously.  If symptoms do not improve with supportive care, will evaluate esophageal mechanical or motility disorders    - supportive care  - replete electrolytes  - Zofran PRN for nausea  - clear liquid diet, advancing as tolerated  - serial abdominal exams  - if diarrhea recurs, check stool studies/GI PCR/O+P in setting of immunocompromised state  - GI consulted, pending recs  - attempting compazine for nausea Pt w/ symptoms of N/V and hx of diarrhea for 1 week. Most likely 2/2 gastroenteritis, with CT evidence of colitis previously. Mycophenolate known to cause colitis and N/V.   As symptoms did not improve with supportive care, will evaluate esophageal mechanical or motility disorders    - supportive care  - replete electrolytes  - clear liquid diet, advancing as tolerated  - serial abdominal exams  - if diarrhea recurs, check stool studies/GI PCR/O+P in setting of immunocompromised state  - GI consulted, pending EGD tomorrow   - Zofran and Reglan PRN for nausea  - attempting compazine for nausea Pt w/ symptoms of N/V and hx of diarrhea for 1 week. Most likely 2/2 gastroenteritis, with CT evidence of colitis previously. Mycophenolate known to cause colitis and N/V.   As symptoms did not improve with supportive care, will evaluate esophageal mechanical or motility disorders    - supportive care  - replete electrolytes  - clear liquid diet, advancing as tolerated  - serial abdominal exams  - if diarrhea recurs, check stool studies/GI PCR/O+P in setting of immunocompromised state  - GI consulted, pending EGD tomorrow   - Zofran and Reglan PRN for nausea  - attempting compazine for nausea as well with monitoring for QTc prolongation  - continue to hold MMF

## 2022-06-13 NOTE — PROGRESS NOTE ADULT - ATTENDING COMMENTS
Agree with above. patient still with nausea/vomiting that she states is new, only started after she had a coffee and egg muffin at [employer]. She denies diarrhea now, despite CT showing colonic thickening. She denies THC use. Would check UTox, and plan for EGD tomorrow if still with persistent nausea/vomiting. She should eventually also have colonoscopy given the CT findings, but given  her persistent nausea/vomiting she is unable to tolerate bowel prep for colonic evaluation at this time.

## 2022-06-14 ENCOUNTER — RESULT REVIEW (OUTPATIENT)
Age: 32
End: 2022-06-14

## 2022-06-14 LAB
AMPHET UR-MCNC: NEGATIVE — SIGNIFICANT CHANGE UP
ANION GAP SERPL CALC-SCNC: 8 MMOL/L — SIGNIFICANT CHANGE UP (ref 5–17)
APTT BLD: 35.2 SEC — SIGNIFICANT CHANGE UP (ref 27.5–35.5)
BARBITURATES UR SCN-MCNC: NEGATIVE — SIGNIFICANT CHANGE UP
BENZODIAZ UR-MCNC: NEGATIVE — SIGNIFICANT CHANGE UP
BLD GP AB SCN SERPL QL: POSITIVE — SIGNIFICANT CHANGE UP
BUN SERPL-MCNC: 20 MG/DL — SIGNIFICANT CHANGE UP (ref 7–23)
CALCIUM SERPL-MCNC: 7.8 MG/DL — LOW (ref 8.4–10.5)
CHLORIDE SERPL-SCNC: 108 MMOL/L — SIGNIFICANT CHANGE UP (ref 96–108)
CO2 SERPL-SCNC: 23 MMOL/L — SIGNIFICANT CHANGE UP (ref 22–31)
COCAINE METAB.OTHER UR-MCNC: NEGATIVE — SIGNIFICANT CHANGE UP
CREAT SERPL-MCNC: 0.92 MG/DL — SIGNIFICANT CHANGE UP (ref 0.5–1.3)
EGFR: 86 ML/MIN/1.73M2 — SIGNIFICANT CHANGE UP
GLUCOSE SERPL-MCNC: 91 MG/DL — SIGNIFICANT CHANGE UP (ref 70–99)
HCT VFR BLD CALC: 38.1 % — SIGNIFICANT CHANGE UP (ref 34.5–45)
HGB BLD-MCNC: 12.3 G/DL — SIGNIFICANT CHANGE UP (ref 11.5–15.5)
INR BLD: 1.2 RATIO — HIGH (ref 0.88–1.16)
MAGNESIUM SERPL-MCNC: 2.1 MG/DL — SIGNIFICANT CHANGE UP (ref 1.6–2.6)
MCHC RBC-ENTMCNC: 29.7 PG — SIGNIFICANT CHANGE UP (ref 27–34)
MCHC RBC-ENTMCNC: 32.3 GM/DL — SIGNIFICANT CHANGE UP (ref 32–36)
MCV RBC AUTO: 92 FL — SIGNIFICANT CHANGE UP (ref 80–100)
METHADONE UR-MCNC: NEGATIVE — SIGNIFICANT CHANGE UP
NRBC # BLD: 0 /100 WBCS — SIGNIFICANT CHANGE UP (ref 0–0)
OPIATES UR-MCNC: NEGATIVE — SIGNIFICANT CHANGE UP
OXYCODONE UR-MCNC: NEGATIVE — SIGNIFICANT CHANGE UP
PCP SPEC-MCNC: SIGNIFICANT CHANGE UP
PCP UR-MCNC: NEGATIVE — SIGNIFICANT CHANGE UP
PHOSPHATE SERPL-MCNC: 4.3 MG/DL — SIGNIFICANT CHANGE UP (ref 2.5–4.5)
PLATELET # BLD AUTO: 648 K/UL — HIGH (ref 150–400)
POTASSIUM SERPL-MCNC: 3.7 MMOL/L — SIGNIFICANT CHANGE UP (ref 3.5–5.3)
POTASSIUM SERPL-SCNC: 3.7 MMOL/L — SIGNIFICANT CHANGE UP (ref 3.5–5.3)
PROTHROM AB SERPL-ACNC: 14 SEC — HIGH (ref 10.5–13.4)
RBC # BLD: 4.14 M/UL — SIGNIFICANT CHANGE UP (ref 3.8–5.2)
RBC # FLD: 16.4 % — HIGH (ref 10.3–14.5)
RH IG SCN BLD-IMP: POSITIVE — SIGNIFICANT CHANGE UP
SODIUM SERPL-SCNC: 139 MMOL/L — SIGNIFICANT CHANGE UP (ref 135–145)
THC UR QL: POSITIVE
WBC # BLD: 8.74 K/UL — SIGNIFICANT CHANGE UP (ref 3.8–10.5)
WBC # FLD AUTO: 8.74 K/UL — SIGNIFICANT CHANGE UP (ref 3.8–10.5)

## 2022-06-14 PROCEDURE — 88305 TISSUE EXAM BY PATHOLOGIST: CPT | Mod: 26

## 2022-06-14 PROCEDURE — 99233 SBSQ HOSP IP/OBS HIGH 50: CPT

## 2022-06-14 PROCEDURE — 86077 PHYS BLOOD BANK SERV XMATCH: CPT

## 2022-06-14 PROCEDURE — 99232 SBSQ HOSP IP/OBS MODERATE 35: CPT | Mod: GC

## 2022-06-14 PROCEDURE — 43239 EGD BIOPSY SINGLE/MULTIPLE: CPT | Mod: GC

## 2022-06-14 RX ORDER — SCOPALAMINE 1 MG/3D
1 PATCH, EXTENDED RELEASE TRANSDERMAL
Refills: 0 | Status: DISCONTINUED | OUTPATIENT
Start: 2022-06-14 | End: 2022-06-17

## 2022-06-14 RX ORDER — SODIUM CHLORIDE 9 MG/ML
500 INJECTION INTRAMUSCULAR; INTRAVENOUS; SUBCUTANEOUS
Refills: 0 | Status: DISCONTINUED | OUTPATIENT
Start: 2022-06-14 | End: 2022-06-14

## 2022-06-14 RX ORDER — SCOPALAMINE 1 MG/3D
1 PATCH, EXTENDED RELEASE TRANSDERMAL
Refills: 0 | Status: DISCONTINUED | OUTPATIENT
Start: 2022-06-14 | End: 2022-06-14

## 2022-06-14 RX ORDER — LIDOCAINE HCL 20 MG/ML
4 VIAL (ML) INJECTION ONCE
Refills: 0 | Status: DISCONTINUED | OUTPATIENT
Start: 2022-06-14 | End: 2022-06-17

## 2022-06-14 RX ADMIN — SCOPALAMINE 1 PATCH: 1 PATCH, EXTENDED RELEASE TRANSDERMAL at 16:26

## 2022-06-14 RX ADMIN — SCOPALAMINE 1 PATCH: 1 PATCH, EXTENDED RELEASE TRANSDERMAL at 19:25

## 2022-06-14 RX ADMIN — FAMOTIDINE 20 MILLIGRAM(S): 10 INJECTION INTRAVENOUS at 12:34

## 2022-06-14 RX ADMIN — Medication 0.5 MILLIGRAM(S): at 18:12

## 2022-06-14 RX ADMIN — PANTOPRAZOLE SODIUM 40 MILLIGRAM(S): 20 TABLET, DELAYED RELEASE ORAL at 17:33

## 2022-06-14 RX ADMIN — ONDANSETRON 4 MILLIGRAM(S): 8 TABLET, FILM COATED ORAL at 16:26

## 2022-06-14 NOTE — PROGRESS NOTE ADULT - PROBLEM SELECTOR PLAN 5
Diet: clear liquid   DVT: heparin subQ  Dispo: home Diet: clear liquid, advance as tolerated   DVT: heparin subQ  Dispo: home

## 2022-06-14 NOTE — PROGRESS NOTE ADULT - ASSESSMENT
31F w/ minimal change disease, GERD, hx thrombophlebitis of R gonadal vein, DVT (was previously on Xarelto), p/w worsening N/V for 2 weeks.  Admitted for evaluation and supportive tx.

## 2022-06-14 NOTE — PROGRESS NOTE ADULT - PROBLEM SELECTOR PLAN 1
Pt. with history of MCKENNA diagnosed in 12/2021. Treated with steroids and was in complete remission in 2/2022, with only trace proteinuria and TP/CR ratio of 0.2. Steroids stopped last month and started on Cellcept 3 weeks ago. UA right now with significant proteinuria, TP/CR ratio showing >3.8. so nephrotic range proteinuria.  At this time please hold the MMF but would re initiate steroids prednisone 60 po daily as there is low suspicion for active infection. EGD per GI.  Monitor labs and urine output. Avoid NSAIDs, ACEI/ARBS, RCA and nephrotoxins. Dose medications as per eGFR.  No diuretics for now given likely intravascular depletion would use fluids cautiously given her tendency for edema.

## 2022-06-14 NOTE — PROGRESS NOTE ADULT - PROBLEM SELECTOR PLAN 4
Pt w/ chronic GERD, currently not on medications  - continue with famotidine, PPI BID  - symptomatic management

## 2022-06-14 NOTE — PRE-ANESTHESIA EVALUATION ADULT - NSANTHOSAYNRD_GEN_A_CORE
No. NATASHA screening performed.  STOP BANG Legend: 0-2 = LOW Risk; 3-4 = INTERMEDIATE Risk; 5-8 = HIGH Risk

## 2022-06-14 NOTE — PROGRESS NOTE ADULT - ATTENDING COMMENTS
For EGD today to evaluate for upper GI tract pathology contributing to symptoms of nausea/vomiting.  Urine drug screen positive for THC - perhaps a component of cannabinoid hyperemesis syndrome contributing to symptoms. Will plan to continue supportive care, anti-emetics as able. Start trial of capsaicin ointment to upper abdomen.    Findings and plan of care d/w Dr. Figueroa from nephrology as well. Plan to start systemic corticosteroids at 1 mg/kg today for MCKENNA as we continue to hole MMF in setting of proteinuria and LE edema. Steroids may also help her GI symptoms.    Discharge planning to home when able to tolerate diet.

## 2022-06-14 NOTE — PROVIDER CONTACT NOTE (MEDICATION) - ASSESSMENT
Patient is A+Ox4, VSS. Patient was educated on importance of heparin and risk factors of refusal.
Pt is AxOx4, VSS, ambulatory and independent
Pt is AxOx4, independent and ambulatory. All VSS. Pt remains on clear liquid diet. Pt states she is too nauseous to swallow a pill and does not want it to mess up her stomach.
Pt is AxOx4, independent, denies chest pain, no SOB. The pt had 50 ml of emesis at around 2100, Reglan given per order around 2130. The pt states she is still too nauseous to swallow the pill at this time.
pt is stable. no s/s of distress.
Patient is A+Ox4, RA, VSS. Patient was educated on the importance of heparin and risk factors of medication noncompliance.
pt stable. no s/s of distress.

## 2022-06-14 NOTE — PROGRESS NOTE ADULT - PROBLEM SELECTOR PLAN 2
Hypokalemia likely in setting of GI loss from diarrhea and vomiting. Please monitor.  Hold torsemide at this time as above    If any questions, please feel free to contact me     Mukul Figueroa MD   Division of Kidney Diseases and Hypertension  Office: [phone]

## 2022-06-14 NOTE — PROVIDER CONTACT NOTE (MEDICATION) - BACKGROUND
pt came in for nausea and vomiting. pmh of gastritis and gerd.
Patient is a 30y/o F admitted for nausea and vomiting.
Pt admitted for N/V and poor PO intake
Pt admitted for N/V, has a history of gatritis and minimal change disease. Pt went for endoscopy today.
Pt admitted for N/V, has been having emesis since 6/10, followed by poor PO intake.
Patient is a 30 y/o F admitted for nausea and vomiting.
pt came in for nausea and vomiting. PMH of gastritis, gerd and asthma.

## 2022-06-14 NOTE — PROVIDER CONTACT NOTE (MEDICATION) - ACTION/TREATMENT ORDERED:
will continue to monitor
Continue to monitor, no action needed.
Continue to monitor.
Continue to monitor.
No action needed, continue to monitor.
Provider made aware. No action needed at this time.
will continue to monitor

## 2022-06-14 NOTE — PRE PROCEDURE NOTE - PRE PROCEDURE EVALUATION
Attending Physician:     Dr. Zamudio                       Procedure:   EGD    Indication for Procedure:   Nausea and Vomiting  ________________________________________________________  PAST MEDICAL & SURGICAL HISTORY:    Asthma  GERD (gastroesophageal reflux disease)  Anemia  Gastritis  Thrombophlebitis/phlebitis-thrombophlebitis of R gonadal vein  UTI (urinary tract infection)    H/O:   History of tonsillectomy    ALLERGIES:  codeine (Hives)    HOME MEDICATIONS:  Crestor 40 mg oral tablet: 1 tab(s) orally once a day  ergocalciferol 1.25 mg (50,000 intl units) oral capsule: 1 cap(s) orally once a week  mycophenolate mofetil 500 mg oral tablet: 1 tab(s) orally 2 times a day  torsemide 20 mg oral tablet: 1 tab(s) orally , As Needed    AICD/PPM: [ ] yes   [X ] no    PERTINENT LAB DATA:                        12.3   8.74  )-----------( 648      ( 2022 03:26 )             38.1     06-14    139  |  108  |  20  ----------------------------<  91  3.7   |  23  |  0.92    Ca    7.8<L>      2022 03:26  Phos  4.3     06-14  Mg     2.1     06-14  TPro  4.3<L>  /  Alb  1.1<L>  /  TBili  0.2  /  DBili  x   /  AST  11  /  ALT  8<L>  /  AlkPhos  66  06-13  PT/INR - ( 2022 03:26 )   PT: 14.0 sec;   INR: 1.20 ratio    PTT - ( 2022 03:26 )  PTT:35.2 sec    PHYSICAL EXAMINATION:    T(C): 36.7  HR: 118  BP: 113/70  RR: 18  SpO2: 96%    Constitutional: NAD    Neck:  No JVD  Respiratory: CTAB/L  Cardiovascular: S1 and S2  Gastrointestinal: BS+, soft, NT/ND  Extremities: No peripheral edema  Neurological: A/O x 4      COMMENTS:    The patient is a suitable candidate for the planned procedure unless box checked [ ]  No, explain:

## 2022-06-14 NOTE — PROGRESS NOTE ADULT - PROBLEM SELECTOR PLAN 1
Pt w/ symptoms of N/V and hx of diarrhea for 1 week. Most likely 2/2 gastroenteritis, with CT evidence of colitis previously. Mycophenolate known to cause colitis and N/V.   As symptoms did not improve with supportive care, will evaluate esophageal mechanical or motility disorders    - supportive care  - replete electrolytes  - clear liquid diet, advancing as tolerated  - serial abdominal exams  - if diarrhea recurs, check stool studies/GI PCR/O+P in setting of immunocompromised state  - GI consulted, pending EGD tomorrow   - Zofran and Reglan PRN for nausea  - attempting compazine for nausea as well with monitoring for QTc prolongation  - continue to hold MMF Pt w/ symptoms of N/V and hx of diarrhea for 1 week. Most likely 2/2 gastroparesis induced by infection vs cannabinoid hyperemesis. CT evidence of colitis previously. Less likely Mycophenolate side effect.  As symptoms did not improve with supportive care, will evaluate esophageal mechanical or motility disorders    - supportive care  - replete electrolytes  - clear liquid diet, advancing as tolerated  - serial abdominal exams  - if diarrhea recurs, check stool studies/GI PCR/O+P in setting of immunocompromised state  - Zofran PRN and low dose Ativan PRN for nausea. Pt failed through Compazine, Reglan, sniffing alcohol pads.   - starting steroids for MCKENNA, which should improve nausea symptoms   - continue to hold MMF

## 2022-06-14 NOTE — PROGRESS NOTE ADULT - SUBJECTIVE AND OBJECTIVE BOX
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 The patient was admitted in [facility] during 12/2021 at which time she had presented with significant anasarca and found to have nephrotic syndrome. She underwent a renal biopsy and diagnosed with MCKENNA . She was treated with Steroids and was followed as outpatient by Dr. Angie Apodaca. Patient underwent urine studies in 2/2022 at which time she was found to be in complete remission with only trace proteinuria on UA and TP/CR ratio of 0.2. She states she was on steroids up until about one month ago and started taking Cellcept about 3 weeks ago at the direction of her new nephrologist Dr John Joaquin. On arrival to the ED the patient was noted to have hypokalemia with serum potassium of 2.8 and UA significant for >600 proteinuria.     Pt. seen and examined this morning reports worsening nausea worsening edema.      PAST HISTORY  --------------------------------------------------------------------------------  No significant changes to PMH, PSH, FHx, SHx, unless otherwise noted    ALLERGIES & MEDICATIONS  --------------------------------------------------------------------------------  Allergies    codeine (Hives)    Intolerances      Standing Inpatient Medications  atorvastatin 80 milliGRAM(s) Oral at bedtime  famotidine Injectable 20 milliGRAM(s) IV Push daily  heparin   Injectable 5000 Unit(s) SubCutaneous every 12 hours  lidocaine 4% Injection for Nebulization 4 milliLiter(s) Nebulizer once  pantoprazole  Injectable 40 milliGRAM(s) IV Push two times a day  prochlorperazine   Injectable 5 milliGRAM(s) IntraMuscular once  sodium chloride 0.9%. 500 milliLiter(s) IV Continuous <Continuous>    PRN Inpatient Medications  acetaminophen     Tablet .. 650 milliGRAM(s) Oral every 6 hours PRN  aluminum hydroxide/magnesium hydroxide/simethicone Suspension 30 milliLiter(s) Oral every 4 hours PRN  LORazepam   Injectable 0.5 milliGRAM(s) IV Push every 6 hours PRN  melatonin 3 milliGRAM(s) Oral at bedtime PRN  ondansetron Injectable 4 milliGRAM(s) IV Push every 8 hours PRN      REVIEW OF SYSTEMS  --------------------------------------------------------------------------------  Gen: feels unwell  Respiratory: no sob  CV: no cp  GI: see hpi   : no urinary complaints  MSK: no pain + edema    VITALS/PHYSICAL EXAM  --------------------------------------------------------------------------------  T(C): 36.9 (06-14-22 @ 10:46), Max: 37.1 (06-13-22 @ 16:30)  HR: 102 (06-14-22 @ 10:46) (101 - 121)  BP: 107/73 (06-14-22 @ 10:46) (103/71 - 119/82)  ABP: --  ABP(mean): --  RR: 16 (06-14-22 @ 10:46) (16 - 18)  SpO2: 98% (06-14-22 @ 10:46) (96% - 99%)  CVP(mm Hg): --  Height (cm): 154.9 (06-14-22 @ 10:46)  Weight (kg): 67.9 (06-14-22 @ 10:46)  BMI (kg/m2): 28.3 (06-14-22 @ 10:46)  BSA (m2): 1.67 (06-14-22 @ 10:46)      06-13-22 @ 07:01  -  06-14-22 @ 07:00  --------------------------------------------------------  IN: 800 mL / OUT: 0 mL / NET: 800 mL    Physical Exam:  	Gen: appears uncomfortable  	HEENT: MMM  	Pulm: CTA B/L  	CV: S1S2 tachy  	Abd: Soft  	Ext: 2+ pretibial edema  	Neuro: Awake  	Skin: Warm and dry    LABS/STUDIES  --------------------------------------------------------------------------------              12.3   8.74  >-----------<  648      [06-14-22 @ 03:26]              38.1     139  |  108  |  20  ----------------------------<  91      [06-14-22 @ 03:26]  3.7   |  23  |  0.92        Ca     7.8     [06-14-22 @ 03:26]      Mg     2.1     [06-14-22 @ 03:26]      Phos  4.3     [06-14-22 @ 03:26]    TPro  4.3  /  Alb  1.1  /  TBili  0.2  /  DBili  x   /  AST  11  /  ALT  8   /  AlkPhos  66  [06-13-22 @ 09:59]    PT/INR: PT 14.0 , INR 1.20       [06-14-22 @ 03:26]  PTT: 35.2       [06-14-22 @ 03:26]      Creatinine Trend:  SCr 0.92 [06-14 @ 03:26]  SCr 0.90 [06-13 @ 09:59]  SCr 0.76 [06-12 @ 06:50]  SCr 0.73 [06-11 @ 21:56]  SCr 0.75 [06-11 @ 06:41]    Urinalysis - [06-10-22 @ 02:55]      Color Light Yellow / Appearance Clear / SG 1.018 / pH 6.5      Gluc Negative / Ketone Trace  / Bili Negative / Urobili Negative       Blood Moderate / Protein >600 / Leuk Est Negative / Nitrite Negative      RBC 8 / WBC 7 / Hyaline 78 / Gran  / Sq Epi  / Non Sq Epi 5 / Bacteria Negative    Urine Creatinine 603      [06-12-22 @ 06:51]  Urine Protein 1804      [06-12-22 @ 06:51]  Urine Sodium 9      [06-11-22 @ 04:08]  Urine Potassium 48      [06-11-22 @ 04:08]  Urine Chloride <20      [06-11-22 @ 04:08]    Lipid: chol 477, , HDL 67, LDL --      [06-11-22 @ 06:41]

## 2022-06-14 NOTE — PROGRESS NOTE ADULT - SUBJECTIVE AND OBJECTIVE BOX
Patient is a 31y old  Female who presents with a chief complaint of PO intolerance (12 Jun 2022 07:34)    OVERNIGHT EVENTS: NAEON    SUBJECTIVE: Pt continues to have significant nausea and vomiting after attempts to drink clear liquids. Denies F/C, CP, SOB, abdominal pain, burning with urination. Has not had a BM for several days. Discussed plan in obtaining an EGD tomorrow.       focused ROS as above    MEDICATIONS  (STANDING):  atorvastatin 80 milliGRAM(s) Oral at bedtime  famotidine Injectable 20 milliGRAM(s) IV Push daily  heparin   Injectable 5000 Unit(s) SubCutaneous every 12 hours  metoclopramide Injectable 5 milliGRAM(s) IV Push every 8 hours  pantoprazole   Suspension 40 milliGRAM(s) Oral two times a day    MEDICATIONS  (PRN):  acetaminophen     Tablet .. 650 milliGRAM(s) Oral every 6 hours PRN Temp greater or equal to 38C (100.4F), Mild Pain (1 - 3)  aluminum hydroxide/magnesium hydroxide/simethicone Suspension 30 milliLiter(s) Oral every 4 hours PRN Dyspepsia  melatonin 3 milliGRAM(s) Oral at bedtime PRN Insomnia  ondansetron Injectable 4 milliGRAM(s) IV Push every 8 hours PRN Nausea and/or Vomiting      OBJECTIVE:  Vital Signs Last 24 Hrs  T(C): 36.7 (13 Jun 2022 08:53), Max: 37.1 (12 Jun 2022 16:18)  T(F): 98.1 (13 Jun 2022 08:53), Max: 98.7 (12 Jun 2022 16:18)  HR: 101 (13 Jun 2022 08:53) (98 - 112)  BP: 112/74 (13 Jun 2022 08:53) (100/62 - 117/75)  BP(mean): --  RR: 16 (13 Jun 2022 08:53) (16 - 18)  SpO2: 97% (13 Jun 2022 08:53) (95% - 98%)    PHYSICAL EXAM  GENERAL: NAD   EYES: EOMI, sclera clear  CHEST/LUNG: Clear to auscultation bilaterally; no wheeze  HEART: RRR; S1, S2; no M/R/G  ABDOMEN: soft, non-distended; non-tender; BS present  EXTREMITIES:  2+ Peripheral Pulses; Pitting edema extending up to knees, L>R, overall w/ some improvement from admission.   NEURO: non-focal, AAOx3  PSYCH: flat affect  SKIN: No rashes or lesions    LABS:                        12.3   9.14  )-----------( 688      ( 13 Jun 2022 09:59 )             39.8     06-13    141  |  107  |  19  ----------------------------<  63<L>  3.7   |  22  |  0.90    Ca    8.2<L>      13 Jun 2022 09:59  Phos  5.0     06-13  Mg     2.0     06-13    TPro  4.3<L>  /  Alb  1.1<L>  /  TBili  0.2  /  DBili  x   /  AST  11  /  ALT  8<L>  /  AlkPhos  66  06-13              Culture - Blood (collected 12 Jun 2022 06:55)  Source: .Blood Blood-Peripheral  Preliminary Report (13 Jun 2022 11:01):    No growth to date.    Culture - Blood (collected 12 Jun 2022 06:55)  Source: .Blood Blood-Peripheral  Preliminary Report (13 Jun 2022 11:01):    No growth to date.             Patient is a 31y old  Female who presents with a chief complaint of PO intolerance (12 Jun 2022 07:34)    OVERNIGHT EVENTS: NAEON    SUBJECTIVE:       focused ROS as above    MEDICATIONS  (STANDING):  atorvastatin 80 milliGRAM(s) Oral at bedtime  famotidine Injectable 20 milliGRAM(s) IV Push daily  heparin   Injectable 5000 Unit(s) SubCutaneous every 12 hours  metoclopramide Injectable 5 milliGRAM(s) IV Push every 8 hours  pantoprazole   Suspension 40 milliGRAM(s) Oral two times a day    MEDICATIONS  (PRN):  acetaminophen     Tablet .. 650 milliGRAM(s) Oral every 6 hours PRN Temp greater or equal to 38C (100.4F), Mild Pain (1 - 3)  aluminum hydroxide/magnesium hydroxide/simethicone Suspension 30 milliLiter(s) Oral every 4 hours PRN Dyspepsia  melatonin 3 milliGRAM(s) Oral at bedtime PRN Insomnia  ondansetron Injectable 4 milliGRAM(s) IV Push every 8 hours PRN Nausea and/or Vomiting      OBJECTIVE:  Vital Signs Last 24 Hrs  T(C): 36.7 (14 Jun 2022 04:25), Max: 37.1 (13 Jun 2022 16:30)  T(F): 98.1 (14 Jun 2022 04:25), Max: 98.8 (13 Jun 2022 16:30)  HR: 105 (14 Jun 2022 04:25) (101 - 121)  BP: 103/71 (14 Jun 2022 04:25) (103/71 - 119/82)  BP(mean): --  RR: 18 (14 Jun 2022 04:25) (16 - 18)  SpO2: 96% (14 Jun 2022 04:25) (96% - 99%)    PHYSICAL EXAM  GENERAL: NAD   EYES: EOMI, sclera clear  CHEST/LUNG: Clear to auscultation bilaterally; no wheeze  HEART: RRR; S1, S2; no M/R/G  ABDOMEN: soft, non-distended; non-tender; BS present  EXTREMITIES:  2+ Peripheral Pulses; Pitting edema extending up to knees, L>R, overall w/ some improvement from admission.   NEURO: non-focal, AAOx3  PSYCH: flat affect  SKIN: No rashes or lesions      TPro  4.3<L>  /  Alb  1.1<L>  /  TBili  0.2  /  DBili  x   /  AST  11  /  ALT  8<L>  /  AlkPhos  66  06-13      LABS:                        12.3   8.74  )-----------( 648      ( 14 Jun 2022 03:26 )             38.1     06-14    139  |  108  |  20  ----------------------------<  91  3.7   |  23  |  0.92    Ca    7.8<L>      14 Jun 2022 03:26  Phos  4.3     06-14  Mg     2.1     06-14    TPro  4.3<L>  /  Alb  1.1<L>  /  TBili  0.2  /  DBili  x   /  AST  11  /  ALT  8<L>  /  AlkPhos  66  06-13    PT/INR - ( 14 Jun 2022 03:26 )   PT: 14.0 sec;   INR: 1.20 ratio         PTT - ( 14 Jun 2022 03:26 )  PTT:35.2 sec          Culture - Blood (collected 12 Jun 2022 06:55)  Source: .Blood Blood-Peripheral  Preliminary Report (13 Jun 2022 11:01):    No growth to date.    Culture - Blood (collected 12 Jun 2022 06:55)  Source: .Blood Blood-Peripheral  Preliminary Report (13 Jun 2022 11:01):    No growth to date.             Patient is a 31y old  Female who presents with a chief complaint of PO intolerance (12 Jun 2022 07:34)    OVERNIGHT EVENTS: NAEON    SUBJECTIVE: This AM, pt complained of continuing N/V with PO intolerance. Says does not recall Compazine making her feel less nauseous, but did feel improvement with ativan yesterday. Discussed with pt plan for EGD today. Pt otherwise denies SOB, CP, palpitation, diarrhea, burning with urination.      focused ROS as above    MEDICATIONS  (STANDING):  atorvastatin 80 milliGRAM(s) Oral at bedtime  famotidine Injectable 20 milliGRAM(s) IV Push daily  heparin   Injectable 5000 Unit(s) SubCutaneous every 12 hours  metoclopramide Injectable 5 milliGRAM(s) IV Push every 8 hours  pantoprazole   Suspension 40 milliGRAM(s) Oral two times a day    MEDICATIONS  (PRN):  acetaminophen     Tablet .. 650 milliGRAM(s) Oral every 6 hours PRN Temp greater or equal to 38C (100.4F), Mild Pain (1 - 3)  aluminum hydroxide/magnesium hydroxide/simethicone Suspension 30 milliLiter(s) Oral every 4 hours PRN Dyspepsia  melatonin 3 milliGRAM(s) Oral at bedtime PRN Insomnia  ondansetron Injectable 4 milliGRAM(s) IV Push every 8 hours PRN Nausea and/or Vomiting      OBJECTIVE:  Vital Signs Last 24 Hrs  T(C): 36.9 (14 Jun 2022 10:46), Max: 37.1 (13 Jun 2022 16:30)  T(F): 98.5 (14 Jun 2022 10:36), Max: 98.8 (13 Jun 2022 16:30)  HR: 107 (14 Jun 2022 11:55) (101 - 121)  BP: 118/83 (14 Jun 2022 11:55) (103/71 - 119/82)  BP(mean): --  RR: 17 (14 Jun 2022 11:55) (14 - 18)  SpO2: 96% (14 Jun 2022 11:55) (96% - 99%)    PHYSICAL EXAM  GENERAL: NAD   EYES: EOMI, sclera clear  CHEST/LUNG: Clear to auscultation bilaterally; no wheeze  HEART: RRR; S1, S2; no M/R/G  ABDOMEN: soft, non-distended; non-tender; BS present  EXTREMITIES:  2+ Peripheral Pulses; Pitting edema extending up to knees, L>R, overall w/ some improvement from admission.   NEURO: non-focal, AAOx3  PSYCH: flat affect  SKIN: No rashes or lesions    LABS:                        12.3   8.74  )-----------( 648      ( 14 Jun 2022 03:26 )             38.1     06-14    139  |  108  |  20  ----------------------------<  91  3.7   |  23  |  0.92    Ca    7.8<L>      14 Jun 2022 03:26  Phos  4.3     06-14  Mg     2.1     06-14    TPro  4.3<L>  /  Alb  1.1<L>  /  TBili  0.2  /  DBili  x   /  AST  11  /  ALT  8<L>  /  AlkPhos  66  06-13    PT/INR - ( 14 Jun 2022 03:26 )   PT: 14.0 sec;   INR: 1.20 ratio         PTT - ( 14 Jun 2022 03:26 )  PTT:35.2 sec          Culture - Blood (collected 12 Jun 2022 06:55)  Source: .Blood Blood-Peripheral  Preliminary Report (13 Jun 2022 11:01):    No growth to date.    Culture - Blood (collected 12 Jun 2022 06:55)  Source: .Blood Blood-Peripheral  Preliminary Report (13 Jun 2022 11:01):    No growth to date.

## 2022-06-14 NOTE — PROVIDER CONTACT NOTE (MEDICATION) - DATE AND TIME:
12-Jun-2022 05:40
12-Jun-2022 22:43
13-Jun-2022 06:16
13-Jun-2022 17:44
14-Jun-2022 22:30
13-Jun-2022 16:10
11-Jun-2022 05:05
none

## 2022-06-14 NOTE — PROVIDER CONTACT NOTE (MEDICATION) - RECOMMENDATIONS
Contact provider
contact provider.
Notify ACP. Continue to monitor.
Contact provider
Notify provider.

## 2022-06-14 NOTE — PROGRESS NOTE ADULT - PROBLEM SELECTOR PLAN 2
Pt w/ minimal change disease confirmed with outpatient biopsy  - Pt previously on steroids, d/c 1 month ago, now on mycophenolate  - Pt w/ LE edema, significant proteinuria  - Nephrology consulted regarding fluid management and further treatment  - hold torsemide in setting of hypokalemia, dehydration  - hold mycophenolate pending GI improvement  - lipid profile w/ cholesterol 395,   - renal US w/o evidence of RVT   - f/u TB serology  - f/u nephrology regarding starting steroids Pt w/ minimal change disease confirmed with outpatient biopsy  - Pt previously on steroids, d/c 1 month ago, now on mycophenolate  - Pt w/ LE edema, significant proteinuria  - hold torsemide in setting of hypokalemia  - hold mycophenolate   - lipid profile w/ cholesterol 395,   - renal US w/o evidence of RVT   - TB serology negative   - initiating steroids, IV today and will transition to PO once symptoms improve  - nephrology following Pt w/ minimal change disease confirmed with outpatient biopsy  - Pt previously on steroids, d/c 1 month ago, now on mycophenolate  - Pt w/ LE edema, significant proteinuria  - hold torsemide in setting of hypokalemia, dehydration  - hold mycophenolate   - lipid profile w/ cholesterol 395,   - renal US w/o evidence of RVT   - TB serology negative   - initiating steroids, IV today and will transition to PO once symptoms improve  - nephrology following

## 2022-06-15 LAB
ANION GAP SERPL CALC-SCNC: 10 MMOL/L — SIGNIFICANT CHANGE UP (ref 5–17)
BUN SERPL-MCNC: 22 MG/DL — SIGNIFICANT CHANGE UP (ref 7–23)
CALCIUM SERPL-MCNC: 7.9 MG/DL — LOW (ref 8.4–10.5)
CHLORIDE SERPL-SCNC: 106 MMOL/L — SIGNIFICANT CHANGE UP (ref 96–108)
CO2 SERPL-SCNC: 21 MMOL/L — LOW (ref 22–31)
CREAT SERPL-MCNC: 0.97 MG/DL — SIGNIFICANT CHANGE UP (ref 0.5–1.3)
EGFR: 81 ML/MIN/1.73M2 — SIGNIFICANT CHANGE UP
GLUCOSE SERPL-MCNC: 81 MG/DL — SIGNIFICANT CHANGE UP (ref 70–99)
MAGNESIUM SERPL-MCNC: 2.2 MG/DL — SIGNIFICANT CHANGE UP (ref 1.6–2.6)
PHOSPHATE SERPL-MCNC: 4.5 MG/DL — SIGNIFICANT CHANGE UP (ref 2.5–4.5)
POTASSIUM SERPL-MCNC: 3.9 MMOL/L — SIGNIFICANT CHANGE UP (ref 3.5–5.3)
POTASSIUM SERPL-SCNC: 3.9 MMOL/L — SIGNIFICANT CHANGE UP (ref 3.5–5.3)
SODIUM SERPL-SCNC: 137 MMOL/L — SIGNIFICANT CHANGE UP (ref 135–145)

## 2022-06-15 PROCEDURE — 99232 SBSQ HOSP IP/OBS MODERATE 35: CPT | Mod: GC

## 2022-06-15 PROCEDURE — 99232 SBSQ HOSP IP/OBS MODERATE 35: CPT

## 2022-06-15 RX ORDER — CAPSAICIN 0.025 %
1 CREAM (GRAM) TOPICAL EVERY 6 HOURS
Refills: 0 | Status: DISCONTINUED | OUTPATIENT
Start: 2022-06-15 | End: 2022-06-15

## 2022-06-15 RX ADMIN — Medication 0.5 MILLIGRAM(S): at 18:37

## 2022-06-15 RX ADMIN — Medication 0.5 MILLIGRAM(S): at 12:32

## 2022-06-15 RX ADMIN — PANTOPRAZOLE SODIUM 40 MILLIGRAM(S): 20 TABLET, DELAYED RELEASE ORAL at 05:49

## 2022-06-15 RX ADMIN — ONDANSETRON 4 MILLIGRAM(S): 8 TABLET, FILM COATED ORAL at 18:37

## 2022-06-15 RX ADMIN — SCOPALAMINE 1 PATCH: 1 PATCH, EXTENDED RELEASE TRANSDERMAL at 19:47

## 2022-06-15 RX ADMIN — FAMOTIDINE 20 MILLIGRAM(S): 10 INJECTION INTRAVENOUS at 11:39

## 2022-06-15 RX ADMIN — Medication 650 MILLIGRAM(S): at 14:03

## 2022-06-15 RX ADMIN — Medication 1 APPLICATION(S): at 12:55

## 2022-06-15 RX ADMIN — SCOPALAMINE 1 PATCH: 1 PATCH, EXTENDED RELEASE TRANSDERMAL at 13:00

## 2022-06-15 RX ADMIN — PANTOPRAZOLE SODIUM 40 MILLIGRAM(S): 20 TABLET, DELAYED RELEASE ORAL at 17:25

## 2022-06-15 RX ADMIN — Medication 50 MILLIGRAM(S): at 05:49

## 2022-06-15 RX ADMIN — Medication 650 MILLIGRAM(S): at 14:30

## 2022-06-15 NOTE — PROGRESS NOTE ADULT - PROBLEM SELECTOR PLAN 4
Plan: Pt w/ chronic GERD, currently not on medications  - continue with famotidine, PPI BID  - symptomatic management.

## 2022-06-15 NOTE — PROGRESS NOTE ADULT - PROBLEM SELECTOR PLAN 2
Pt w/ minimal change disease confirmed with outpatient biopsy  - Pt previously on steroids, d/c 1 month ago, now on mycophenolate  - Pt w/ LE edema  - s/p 1L IVF  - Nephrology consulted regarding fluid management and further treatment  - c/w ergocalciferol   - hold torsemide in setting of hypokalemia, dehydration  - hold mycophenolate pending GI improvement

## 2022-06-15 NOTE — PROGRESS NOTE ADULT - SUBJECTIVE AND OBJECTIVE BOX
[facility] Division of Kidney Diseases & Hypertension  FOLLOW UP NOTE  --------------------------------------------------------------------------------  HPI: Patient is a 31 year old female with PMH of biopsy proven Minimal Change Disease (diagnosed in 12/2021 treated with steroids), GERD, and asthma who presented to the hospital for nausea and vomiting for the past 2.5 weeks. The patient was admitted in [facility] during 12/2021 at which time she had presented with significant anasarca and found to have nephrotic syndrome. She underwent a renal biopsy and diagnosed with MCKENNA . She was treated with Steroids and was followed as outpatient by Dr. Angie Apodaca. Patient underwent urine studies in 2/2022 at which time she was found to be in complete remission with only trace proteinuria on UA and TP/CR ratio of 0.2. She states she was on steroids up until about one month ago and started taking Cellcept about 3 weeks ago at the direction of her new nephrologist Dr John Joaquin. On arrival to the ED the patient was noted to have hypokalemia with serum potassium of 2.8 and UA significant for >600 proteinuria.  Had EGD yesterday for persistent nausea and vomiting.    Pt. seen and examined this morning reports improved nausea stable edema.    PAST HISTORY  --------------------------------------------------------------------------------  No significant changes to PMH, PSH, FHx, SHx, unless otherwise noted    ALLERGIES & MEDICATIONS  --------------------------------------------------------------------------------  Allergies    codeine (Hives)    Intolerances      Standing Inpatient Medications  famotidine Injectable 20 milliGRAM(s) IV Push daily  heparin   Injectable 5000 Unit(s) SubCutaneous every 12 hours  lidocaine 4% Injection for Nebulization 4 milliLiter(s) Nebulizer once  methylPREDNISolone sodium succinate Injectable 50 milliGRAM(s) IV Push daily  pantoprazole  Injectable 40 milliGRAM(s) IV Push two times a day  scopolamine 1 mG/72 Hr(s) Patch 1 Patch Transdermal every 72 hours    PRN Inpatient Medications  acetaminophen     Tablet .. 650 milliGRAM(s) Oral every 6 hours PRN  aluminum hydroxide/magnesium hydroxide/simethicone Suspension 30 milliLiter(s) Oral every 4 hours PRN  LORazepam   Injectable 0.5 milliGRAM(s) IV Push every 6 hours PRN  melatonin 3 milliGRAM(s) Oral at bedtime PRN  ondansetron Injectable 4 milliGRAM(s) IV Push every 8 hours PRN      REVIEW OF SYSTEMS  --------------------------------------------------------------------------------  Gen: no fever  Respiratory: no sob  CV: no cp  GI: still nauseous but improved  : no pain  MSK: no pain    VITALS/PHYSICAL EXAM  --------------------------------------------------------------------------------  T(C): 36.7 (06-15-22 @ 04:22), Max: 37.1 (06-14-22 @ 12:30)  HR: 115 (06-15-22 @ 04:22) (100 - 122)  BP: 108/75 (06-15-22 @ 04:22) (104/71 - 118/83)  ABP: --  ABP(mean): --  RR: 18 (06-15-22 @ 04:22) (14 - 18)  SpO2: 98% (06-15-22 @ 04:22) (96% - 98%)  CVP(mm Hg): --  Height (cm): 154.9 (06-14-22 @ 10:46)  Weight (kg): 67.9 (06-14-22 @ 10:46)  BMI (kg/m2): 28.3 (06-14-22 @ 10:46)  BSA (m2): 1.67 (06-14-22 @ 10:46)    Physical Exam:  	Gen: appears more comfortable  	HEENT: MMM  	Pulm: CTA B/L  	CV: S1S2 tachy  	Abd: Soft  	Ext: edema appears improved today compared to yesterday  	Neuro: Awake  	Skin: Warm and dry    LABS/STUDIES  --------------------------------------------------------------------------------              12.3   8.74  >-----------<  648      [06-14-22 @ 03:26]              38.1     137  |  106  |  22  ----------------------------<  81      [06-15-22 @ 06:36]  3.9   |  21  |  0.97        Ca     7.9     [06-15-22 @ 06:36]      Mg     2.2     [06-15-22 @ 06:36]      Phos  4.5     [06-15-22 @ 06:36]    TPro  4.3  /  Alb  1.1  /  TBili  0.2  /  DBili  x   /  AST  11  /  ALT  8   /  AlkPhos  66  [06-13-22 @ 09:59]    PT/INR: PT 14.0 , INR 1.20       [06-14-22 @ 03:26]  PTT: 35.2       [06-14-22 @ 03:26]      Creatinine Trend:  SCr 0.97 [06-15 @ 06:36]  SCr 0.92 [06-14 @ 03:26]  SCr 0.90 [06-13 @ 09:59]  SCr 0.76 [06-12 @ 06:50]  SCr 0.73 [06-11 @ 21:56]    Urinalysis - [06-10-22 @ 02:55]      Color Light Yellow / Appearance Clear / SG 1.018 / pH 6.5      Gluc Negative / Ketone Trace  / Bili Negative / Urobili Negative       Blood Moderate / Protein >600 / Leuk Est Negative / Nitrite Negative      RBC 8 / WBC 7 / Hyaline 78 / Gran  / Sq Epi  / Non Sq Epi 5 / Bacteria Negative    Urine Creatinine 603      [06-12-22 @ 06:51]  Urine Protein 1804      [06-12-22 @ 06:51]  Urine Sodium 9      [06-11-22 @ 04:08]  Urine Potassium 48      [06-11-22 @ 04:08]  Urine Chloride <20      [06-11-22 @ 04:08]    Lipid: chol 477, , HDL 67, LDL --      [06-11-22 @ 06:41]

## 2022-06-15 NOTE — PROGRESS NOTE ADULT - SUBJECTIVE AND OBJECTIVE BOX
Nissa Pate MD  Internal Medicine, PGY1  Universal pager: [phone] / [facility]: [phone]      Patient is a 31y old  Female who presents with a chief complaint of PO intolerance (14 Jun 2022 11:12)    OVERNIGHT EVENTS: NAEON    SUBJECTIVE:  - denies F/C, lightheadedness, HA, CP, SOB, abd pain, N/V/D/C, burning w/ urination, leg swelling    focused ROS as above    MEDICATIONS  (STANDING):  atorvastatin 80 milliGRAM(s) Oral at bedtime  famotidine Injectable 20 milliGRAM(s) IV Push daily  heparin   Injectable 5000 Unit(s) SubCutaneous every 12 hours  lidocaine 4% Injection for Nebulization 4 milliLiter(s) Nebulizer once  methylPREDNISolone sodium succinate Injectable 50 milliGRAM(s) IV Push daily  pantoprazole  Injectable 40 milliGRAM(s) IV Push two times a day  scopolamine 1 mG/72 Hr(s) Patch 1 Patch Transdermal every 72 hours    MEDICATIONS  (PRN):  acetaminophen     Tablet .. 650 milliGRAM(s) Oral every 6 hours PRN Temp greater or equal to 38C (100.4F), Mild Pain (1 - 3)  aluminum hydroxide/magnesium hydroxide/simethicone Suspension 30 milliLiter(s) Oral every 4 hours PRN Dyspepsia  LORazepam   Injectable 0.5 milliGRAM(s) IV Push every 6 hours PRN Nausea and/or Vomiting  melatonin 3 milliGRAM(s) Oral at bedtime PRN Insomnia  ondansetron Injectable 4 milliGRAM(s) IV Push every 8 hours PRN Nausea and/or Vomiting      OBJECTIVE:  T(C): 36.7 (06-15-22 @ 04:22), Max: 37.1 (06-14-22 @ 12:30)  HR: 115 (06-15-22 @ 04:22) (100 - 122)  BP: 108/75 (06-15-22 @ 04:22) (104/71 - 118/83)  RR: 18 (06-15-22 @ 04:22) (14 - 18)  SpO2: 98% (06-15-22 @ 04:22) (96% - 98%)    PHYSICAL EXAM  GENERAL: NAD   HEAD:  Atraumatic, normocephalic  EYES: EOMI, sclera clear  CHEST/LUNG: Clear to auscultation bilaterally; no wheeze  HEART: RRR; S1, S2; no M/R/G  ABDOMEN: soft, non-distended; non-tender; BS present  EXTREMITIES:  2+ Peripheral Pulses; no edema  NEURO: non-focal, AAOx  PSYCH: appropriate affect  SKIN: No rashes or lesions    LABS:  CAPILLARY BLOOD GLUCOSE        I&O's Summary                          12.3   8.74  )-----------( 648      ( 14 Jun 2022 03:26 )             38.1     06-14    139  |  108  |  20  ----------------------------<  91  3.7   |  23  |  0.92    Ca    7.8<L>      14 Jun 2022 03:26  Phos  4.3     06-14  Mg     2.1     06-14    TPro  4.3<L>  /  Alb  1.1<L>  /  TBili  0.2  /  DBili  x   /  AST  11  /  ALT  8<L>  /  AlkPhos  66  06-13    PT/INR - ( 14 Jun 2022 03:26 )   PT: 14.0 sec;   INR: 1.20 ratio         PTT - ( 14 Jun 2022 03:26 )  PTT:35.2 sec          Microbiology:      RADIOLOGY & ADDITIONAL TESTS:    Imaging Personally Reviewed:  Consultant(s) Notes Reviewed:    Care Discussed with Consultants/Other Providers: Nissa Pate MD  Internal Medicine, PGY1  Universal pager: [phone] / [facility]: [phone]      Patient is a 31y old  Female who presents with a chief complaint of PO intolerance (14 Jun 2022 11:12)    OVERNIGHT EVENTS: NAEON. This AM, pt says feeling improved. Tolerated water and crackers last evening and would like to try to advance diet. Pt says did not receive scopolamine patch. Pt s/p EGD yesterday with findings of small esophageal hernia.     SUBJECTIVE:  - denies F/C, lightheadedness, HA, CP, SOB, abd pain, N/V/D/C, burning w/ urination, leg swelling    focused ROS as above    MEDICATIONS  (STANDING):  atorvastatin 80 milliGRAM(s) Oral at bedtime  famotidine Injectable 20 milliGRAM(s) IV Push daily  heparin   Injectable 5000 Unit(s) SubCutaneous every 12 hours  lidocaine 4% Injection for Nebulization 4 milliLiter(s) Nebulizer once  methylPREDNISolone sodium succinate Injectable 50 milliGRAM(s) IV Push daily  pantoprazole  Injectable 40 milliGRAM(s) IV Push two times a day  scopolamine 1 mG/72 Hr(s) Patch 1 Patch Transdermal every 72 hours    MEDICATIONS  (PRN):  acetaminophen     Tablet .. 650 milliGRAM(s) Oral every 6 hours PRN Temp greater or equal to 38C (100.4F), Mild Pain (1 - 3)  aluminum hydroxide/magnesium hydroxide/simethicone Suspension 30 milliLiter(s) Oral every 4 hours PRN Dyspepsia  LORazepam   Injectable 0.5 milliGRAM(s) IV Push every 6 hours PRN Nausea and/or Vomiting  melatonin 3 milliGRAM(s) Oral at bedtime PRN Insomnia  ondansetron Injectable 4 milliGRAM(s) IV Push every 8 hours PRN Nausea and/or Vomiting      OBJECTIVE:  T(C): 36.7 (06-15-22 @ 04:22), Max: 37.1 (06-14-22 @ 12:30)  HR: 115 (06-15-22 @ 04:22) (100 - 122)  BP: 108/75 (06-15-22 @ 04:22) (104/71 - 118/83)  RR: 18 (06-15-22 @ 04:22) (14 - 18)  SpO2: 98% (06-15-22 @ 04:22) (96% - 98%)    PHYSICAL EXAM  GENERAL: NAD   HEAD:  Atraumatic, normocephalic  EYES: EOMI, sclera clear  CHEST/LUNG: Clear to auscultation bilaterally; no wheeze  HEART: RRR; S1, S2; no M/R/G  ABDOMEN: soft, non-distended; non-tender; BS present  EXTREMITIES:  2+ Peripheral Pulses; no edema  NEURO: non-focal, AAOx  PSYCH: appropriate affect  SKIN: No rashes or lesions    LABS:  CAPILLARY BLOOD GLUCOSE        I&O's Summary                          12.3   8.74  )-----------( 648      ( 14 Jun 2022 03:26 )             38.1     06-14    139  |  108  |  20  ----------------------------<  91  3.7   |  23  |  0.92    Ca    7.8<L>      14 Jun 2022 03:26  Phos  4.3     06-14  Mg     2.1     06-14    TPro  4.3<L>  /  Alb  1.1<L>  /  TBili  0.2  /  DBili  x   /  AST  11  /  ALT  8<L>  /  AlkPhos  66  06-13    PT/INR - ( 14 Jun 2022 03:26 )   PT: 14.0 sec;   INR: 1.20 ratio         PTT - ( 14 Jun 2022 03:26 )  PTT:35.2 sec          Microbiology:      RADIOLOGY & ADDITIONAL TESTS:    Imaging Personally Reviewed:  Consultant(s) Notes Reviewed:    Care Discussed with Consultants/Other Providers:

## 2022-06-15 NOTE — PROGRESS NOTE ADULT - PROBLEM SELECTOR PLAN 3
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  - pt hypercoagulable  - heparin subQ for VTE PPx.

## 2022-06-15 NOTE — PROGRESS NOTE ADULT - SUBJECTIVE AND OBJECTIVE BOX
Interval Events:   Feeling better this am, reported great response to [facility] Medications:  acetaminophen     Tablet .. 650 milliGRAM(s) Oral every 6 hours PRN  aluminum hydroxide/magnesium hydroxide/simethicone Suspension 30 milliLiter(s) Oral every 4 hours PRN  famotidine Injectable 20 milliGRAM(s) IV Push daily  heparin   Injectable 5000 Unit(s) SubCutaneous every 12 hours  lidocaine 4% Injection for Nebulization 4 milliLiter(s) Nebulizer once  LORazepam   Injectable 0.5 milliGRAM(s) IV Push every 6 hours PRN  melatonin 3 milliGRAM(s) Oral at bedtime PRN  methylPREDNISolone sodium succinate Injectable 50 milliGRAM(s) IV Push daily  ondansetron Injectable 4 milliGRAM(s) IV Push every 8 hours PRN  pantoprazole  Injectable 40 milliGRAM(s) IV Push two times a day  scopolamine 1 mG/72 Hr(s) Patch 1 Patch Transdermal every 72 hours      ROS: All system reviewed and negative except as mentioned above.    PHYSICAL EXAM:   Vital Signs:  Vital Signs Last 24 Hrs  T(C): 36.7 (15 Salvador 2022 04:22), Max: 37.1 (14 Jun 2022 12:30)  T(F): 98.1 (15 Salvador 2022 04:22), Max: 98.7 (14 Jun 2022 12:30)  HR: 115 (15 Salvador 2022 04:22) (100 - 122)  BP: 108/75 (15 Salvador 2022 04:22) (104/71 - 118/83)  BP(mean): --  RR: 18 (15 Salvador 2022 04:22) (14 - 18)  SpO2: 98% (15 Salvador 2022 04:22) (96% - 98%)  Daily Height in cm: 154.94 (14 Jun 2022 10:46)    Daily     GENERAL:  NAD, Appears stated age  HEENT:  NC/AT,  conjunctivae clear and pink, sclera -anicteric  CHEST:  Normal Effort, Breath sounds clear  HEART:  RRR, S1 + S2, no murmurs  ABDOMEN:  Soft, non-tender, non-distended, normoactive bowel sounds,  no masses  EXTREMITIES:  no cyanosis or edema  SKIN:  Warm & Dry. No rash or erythema  NEURO:  Alert, oriented, no focal deficit    LABS:                        12.3   8.74  )-----------( 648      ( 14 Jun 2022 03:26 )             38.1       06-15    137  |  106  |  22  ----------------------------<  81  3.9   |  21<L>  |  0.97    Ca    7.9<L>      15 Salvador 2022 06:36  Phos  4.5     06-15  Mg     2.2     06-15    TPro  4.3<L>  /  Alb  1.1<L>  /  TBili  0.2  /  DBili  x   /  AST  11  /  ALT  8<L>  /  AlkPhos  66  06-13    LIVER FUNCTIONS - ( 13 Jun 2022 09:59 )  Alb: 1.1 g/dL / Pro: 4.3 g/dL / ALK PHOS: 66 U/L / ALT: 8 U/L / AST: 11 U/L / GGT: x           PT/INR - ( 14 Jun 2022 03:26 )   PT: 14.0 sec;   INR: 1.20 ratio         PTT - ( 14 Jun 2022 03:26 )  PTT:35.2 sec                            12.3   8.74  )-----------( 648      ( 14 Jun 2022 03:26 )             38.1                         12.3   9.14  )-----------( 688      ( 13 Jun 2022 09:59 )             39.8       Imaging: Images reviewed.         Interval Events:   Feeling better this am, reported great response to Ativan. tolerating clears    Hospital Medications:  acetaminophen     Tablet .. 650 milliGRAM(s) Oral every 6 hours PRN  aluminum hydroxide/magnesium hydroxide/simethicone Suspension 30 milliLiter(s) Oral every 4 hours PRN  famotidine Injectable 20 milliGRAM(s) IV Push daily  heparin   Injectable 5000 Unit(s) SubCutaneous every 12 hours  lidocaine 4% Injection for Nebulization 4 milliLiter(s) Nebulizer once  LORazepam   Injectable 0.5 milliGRAM(s) IV Push every 6 hours PRN  melatonin 3 milliGRAM(s) Oral at bedtime PRN  methylPREDNISolone sodium succinate Injectable 50 milliGRAM(s) IV Push daily  ondansetron Injectable 4 milliGRAM(s) IV Push every 8 hours PRN  pantoprazole  Injectable 40 milliGRAM(s) IV Push two times a day  scopolamine 1 mG/72 Hr(s) Patch 1 Patch Transdermal every 72 hours      ROS: All system reviewed and negative except as mentioned above.    PHYSICAL EXAM:   Vital Signs:  Vital Signs Last 24 Hrs  T(C): 36.7 (15 Salvador 2022 04:22), Max: 37.1 (14 Jun 2022 12:30)  T(F): 98.1 (15 Salvador 2022 04:22), Max: 98.7 (14 Jun 2022 12:30)  HR: 115 (15 Salvador 2022 04:22) (100 - 122)  BP: 108/75 (15 Salvador 2022 04:22) (104/71 - 118/83)  BP(mean): --  RR: 18 (15 Salvador 2022 04:22) (14 - 18)  SpO2: 98% (15 Salvador 2022 04:22) (96% - 98%)  Daily Height in cm: 154.94 (14 Jun 2022 10:46)    Daily     GENERAL:  NAD, Appears stated age  HEENT:  NC/AT,  conjunctivae clear and pink, sclera -anicteric  CHEST:  Normal Effort, Breath sounds clear  HEART:  RRR, S1 + S2, no murmurs  ABDOMEN:  Soft, non-tender, non-distended, normoactive bowel sounds,  no masses  EXTREMITIES:  no cyanosis or edema  SKIN:  Warm & Dry. No rash or erythema  NEURO:  Alert, oriented, no focal deficit    LABS:                        12.3   8.74  )-----------( 648      ( 14 Jun 2022 03:26 )             38.1       06-15    137  |  106  |  22  ----------------------------<  81  3.9   |  21<L>  |  0.97    Ca    7.9<L>      15 Salvador 2022 06:36  Phos  4.5     06-15  Mg     2.2     06-15    TPro  4.3<L>  /  Alb  1.1<L>  /  TBili  0.2  /  DBili  x   /  AST  11  /  ALT  8<L>  /  AlkPhos  66  06-13    LIVER FUNCTIONS - ( 13 Jun 2022 09:59 )  Alb: 1.1 g/dL / Pro: 4.3 g/dL / ALK PHOS: 66 U/L / ALT: 8 U/L / AST: 11 U/L / GGT: x           PT/INR - ( 14 Jun 2022 03:26 )   PT: 14.0 sec;   INR: 1.20 ratio         PTT - ( 14 Jun 2022 03:26 )  PTT:35.2 sec                            12.3   8.74  )-----------( 648      ( 14 Jun 2022 03:26 )             38.1                         12.3   9.14  )-----------( 688      ( 13 Jun 2022 09:59 )             39.8       Imaging: Images reviewed.

## 2022-06-15 NOTE — PROGRESS NOTE ADULT - PROBLEM SELECTOR PLAN 1
Pt w/ symptoms of N/V and hx of diarrhea for 1 week. Most likely 2/2 gastroenteritis, with CT evidence of colitis previously.  If symptoms do not improve with supportive care, will evaluate esophageal mechanical or motility disorders    - supportive care  - replete electrolytes  - Zofran PRN for nausea  - clear liquid diet, advancing as tolerated  - serial abdominal exams  - if diarrhea recurs, check stool studies/GI PCR/O+P in setting of immunocompromised state  - GI eval if symptoms persist

## 2022-06-15 NOTE — PROGRESS NOTE ADULT - ATTENDING COMMENTS
Agree with above. Patient feeling better, ?resolving post-infectious IBS. Would advance diet as tolerated. Hold off on additional testing. F/u pathology from EGD.

## 2022-06-15 NOTE — PROGRESS NOTE ADULT - ASSESSMENT
#Persistent N/V  Previous to the past 3 weeks, patient was okay. Patient clinical presentation concerning for infectious enterocolitis (as noticed in the CT scan) that has mostly resolved except the N/V (usually precipitated by eating but can occur even without eating). 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 Urine drug + THC but patient denies any use.     #Colonic thickening, seen in 12/21 and also now on most recent CT scan    Recommendations:  -Antiemetics per primary team, reasonable to use Ativan if its helping.   -Trial of PPI BID  -Follow up pathology results   -outpatient Colonoscopy for thickening in CT scan as patient cannot tolerate bowel prep at this time    No further work up from our perspective.     Recommendations preliminary until signed by attending.     Rajeev Del Valle MD  Gastroenterology/Hepatology Fellow  Contact on-call GI fellow via answering service ([phone]) from 5pm-8am AND on weekends/holidays

## 2022-06-15 NOTE — PROGRESS NOTE ADULT - PROBLEM SELECTOR PLAN 2
Hypokalemia likely in setting of GI loss from diarrhea and vomiting. Please monitor.  Hold torsemide at this time as above    If any questions, please feel free to contact me     Mukul Figueroa MD   Division of Kidney Diseases and Hypertension  Office: [phone]

## 2022-06-15 NOTE — PROGRESS NOTE ADULT - PROBLEM SELECTOR PLAN 1
Pt. with history of MCKENNA diagnosed in 12/2021. Treated with steroids and was in complete remission in 2/2022, with only trace proteinuria and TP/CR ratio of 0.2. Steroids stopped last month and started on Cellcept 3 weeks ago. UA right now with significant proteinuria, TP/CR ratio showing >3.8. so nephrotic range proteinuria.  At this time please hold the MMF but would re initiate steroids prednisone 60 po daily was started but cant tolerate po so on methyprenisolone.  May need PCP ppx when able to tolerate PO.  Monitor labs and urine output. Avoid NSAIDs, ACEI/ARBS, RCA and nephrotoxins. Dose medications as per eGFR.  No diuretics for now given likely intravascular depletion would use fluids cautiously given her tendency for edema.

## 2022-06-15 NOTE — PROGRESS NOTE ADULT - SUBJECTIVE AND OBJECTIVE BOX
Nissa Pate MD  Internal Medicine, PGY1  Universal pager: [phone] / [facility]: [phone]      Patient is a 31y old  Female who presents with a chief complaint of PO intolerance (15 Salvador 2022 09:57)    This AM, pt complained of continuing N/V with PO intolerance. Says does not recall Compazine making her feel less nauseous, but did feel improvement with ativan yesterday. Discussed with pt plan for EGD today. Pt otherwise denies SOB, CP, palpitation, diarrhea, burning with urination.  focused ROS as above    MEDICATIONS  (STANDING):  famotidine Injectable 20 milliGRAM(s) IV Push daily  heparin   Injectable 5000 Unit(s) SubCutaneous every 12 hours  lidocaine 4% Injection for Nebulization 4 milliLiter(s) Nebulizer once  methylPREDNISolone sodium succinate Injectable 50 milliGRAM(s) IV Push daily  pantoprazole  Injectable 40 milliGRAM(s) IV Push two times a day  scopolamine 1 mG/72 Hr(s) Patch 1 Patch Transdermal every 72 hours    MEDICATIONS  (PRN):  acetaminophen     Tablet .. 650 milliGRAM(s) Oral every 6 hours PRN Temp greater or equal to 38C (100.4F), Mild Pain (1 - 3)  aluminum hydroxide/magnesium hydroxide/simethicone Suspension 30 milliLiter(s) Oral every 4 hours PRN Dyspepsia  LORazepam   Injectable 0.5 milliGRAM(s) IV Push every 6 hours PRN Nausea and/or Vomiting  melatonin 3 milliGRAM(s) Oral at bedtime PRN Insomnia  ondansetron Injectable 4 milliGRAM(s) IV Push every 8 hours PRN Nausea and/or Vomiting      OBJECTIVE:  T(C): 36.7 (06-15-22 @ 04:22), Max: 37.1 (06-14-22 @ 12:30)  HR: 115 (06-15-22 @ 04:22) (100 - 122)  BP: 108/75 (06-15-22 @ 04:22) (104/71 - 118/83)  RR: 18 (06-15-22 @ 04:22) (14 - 18)  SpO2: 98% (06-15-22 @ 04:22) (96% - 98%)    PHYSICAL EXAM  GENERAL: NAD   HEAD:  Atraumatic, normocephalic  EYES: EOMI, sclera clear  CHEST/LUNG: Clear to auscultation bilaterally; no wheeze  HEART: RRR; S1, S2; no M/R/G  ABDOMEN: soft, non-distended; non-tender; BS present  EXTREMITIES:  2+ Peripheral Pulses; no edema  NEURO: non-focal, AAOx3   PSYCH: appropriate affect  SKIN: No rashes or lesions    LABS:  CAPILLARY BLOOD GLUCOSE        I&O's Summary                          12.3   8.74  )-----------( 648      ( 14 Jun 2022 03:26 )             38.1     06-15    137  |  106  |  22  ----------------------------<  81  3.9   |  21<L>  |  0.97    Ca    7.9<L>      15 Salvador 2022 06:36  Phos  4.5     06-15  Mg     2.2     06-15      PT/INR - ( 14 Jun 2022 03:26 )   PT: 14.0 sec;   INR: 1.20 ratio         PTT - ( 14 Jun 2022 03:26 )  PTT:35.2 sec          Microbiology:      RADIOLOGY & ADDITIONAL TESTS:    Imaging Personally Reviewed:  Consultant(s) Notes Reviewed:    Care Discussed with Consultants/Other Providers: Nissa Pate MD  Internal Medicine, PGY1  Universal pager: [phone] / [facility]: [phone]      Patient is a 31y old  Female who presents with a chief complaint of PO intolerance (15 Salvador 2022 09:57)    This AM, pt says feeling improved. Tolerated water and crackers last evening and would like to try to advance diet. Pt says did not receive scopolamine patch. Pt s/p EGD yesterday with findings of small esophageal hernia.     MEDICATIONS  (STANDING):  famotidine Injectable 20 milliGRAM(s) IV Push daily  heparin   Injectable 5000 Unit(s) SubCutaneous every 12 hours  lidocaine 4% Injection for Nebulization 4 milliLiter(s) Nebulizer once  methylPREDNISolone sodium succinate Injectable 50 milliGRAM(s) IV Push daily  pantoprazole  Injectable 40 milliGRAM(s) IV Push two times a day  scopolamine 1 mG/72 Hr(s) Patch 1 Patch Transdermal every 72 hours    MEDICATIONS  (PRN):  acetaminophen     Tablet .. 650 milliGRAM(s) Oral every 6 hours PRN Temp greater or equal to 38C (100.4F), Mild Pain (1 - 3)  aluminum hydroxide/magnesium hydroxide/simethicone Suspension 30 milliLiter(s) Oral every 4 hours PRN Dyspepsia  LORazepam   Injectable 0.5 milliGRAM(s) IV Push every 6 hours PRN Nausea and/or Vomiting  melatonin 3 milliGRAM(s) Oral at bedtime PRN Insomnia  ondansetron Injectable 4 milliGRAM(s) IV Push every 8 hours PRN Nausea and/or Vomiting      OBJECTIVE:  T(C): 36.7 (06-15-22 @ 04:22), Max: 37.1 (06-14-22 @ 12:30)  HR: 115 (06-15-22 @ 04:22) (100 - 122)  BP: 108/75 (06-15-22 @ 04:22) (104/71 - 118/83)  RR: 18 (06-15-22 @ 04:22) (14 - 18)  SpO2: 98% (06-15-22 @ 04:22) (96% - 98%)    PHYSICAL EXAM  GENERAL: NAD   HEAD:  Atraumatic, normocephalic  EYES: EOMI, sclera clear  CHEST/LUNG: Clear to auscultation bilaterally; no wheeze  HEART: RRR; S1, S2; no M/R/G  ABDOMEN: soft, non-distended; non-tender; BS present  EXTREMITIES:  2+ Peripheral Pulses; no edema  NEURO: non-focal, AAOx3   PSYCH: appropriate affect  SKIN: No rashes or lesions    LABS:  CAPILLARY BLOOD GLUCOSE        I&O's Summary                          12.3   8.74  )-----------( 648      ( 14 Jun 2022 03:26 )             38.1     06-15    137  |  106  |  22  ----------------------------<  81  3.9   |  21<L>  |  0.97    Ca    7.9<L>      15 Salvador 2022 06:36  Phos  4.5     06-15  Mg     2.2     06-15      PT/INR - ( 14 Jun 2022 03:26 )   PT: 14.0 sec;   INR: 1.20 ratio         PTT - ( 14 Jun 2022 03:26 )  PTT:35.2 sec          Microbiology:      RADIOLOGY & ADDITIONAL TESTS:    Imaging Personally Reviewed:  Consultant(s) Notes Reviewed:    Care Discussed with Consultants/Other Providers:

## 2022-06-15 NOTE — PROGRESS NOTE ADULT - PROBLEM SELECTOR PLAN 2
·  Problem: Minimal change disease.   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

## 2022-06-15 NOTE — PROGRESS NOTE ADULT - PROBLEM SELECTOR PLAN 1
Pt w/ symptoms of N/V and hx of diarrhea for 1 week. Most likely 2/2 gastroparesis induced by infection vs cannabinoid hyperemesis. CT evidence of colitis previously. Less likely Mycophenolate side effect.  As symptoms did not improve with supportive care, will evaluate esophageal mechanical or motility disorders    - supportive care  - replete electrolytes  - clear liquid diet, advancing as tolerated  - serial abdominal exams  - if diarrhea recurs, check stool studies/GI PCR/O+P in setting of immunocompromised state  - Zofran PRN and low dose Ativan PRN for nausea. Can attempt scopolamine.  Pt failed through Compazine, Reglan, sniffing alcohol pads.   - starting steroids for MCKENNA, which should improve nausea symptoms   - EGD w/ hiatal hernia, no findings that could contribute to symptoms  - continue to hold MMF.

## 2022-06-16 DIAGNOSIS — N17.9 ACUTE KIDNEY FAILURE, UNSPECIFIED: ICD-10-CM

## 2022-06-16 LAB
ANION GAP SERPL CALC-SCNC: 8 MMOL/L — SIGNIFICANT CHANGE UP (ref 5–17)
ANION GAP SERPL CALC-SCNC: 9 MMOL/L — SIGNIFICANT CHANGE UP (ref 5–17)
BUN SERPL-MCNC: 32 MG/DL — HIGH (ref 7–23)
BUN SERPL-MCNC: 37 MG/DL — HIGH (ref 7–23)
CALCIUM SERPL-MCNC: 8 MG/DL — LOW (ref 8.4–10.5)
CALCIUM SERPL-MCNC: 8.3 MG/DL — LOW (ref 8.4–10.5)
CHLORIDE SERPL-SCNC: 106 MMOL/L — SIGNIFICANT CHANGE UP (ref 96–108)
CHLORIDE SERPL-SCNC: 107 MMOL/L — SIGNIFICANT CHANGE UP (ref 96–108)
CO2 SERPL-SCNC: 23 MMOL/L — SIGNIFICANT CHANGE UP (ref 22–31)
CO2 SERPL-SCNC: 23 MMOL/L — SIGNIFICANT CHANGE UP (ref 22–31)
CREAT SERPL-MCNC: 1.48 MG/DL — HIGH (ref 0.5–1.3)
CREAT SERPL-MCNC: 1.64 MG/DL — HIGH (ref 0.5–1.3)
EGFR: 43 ML/MIN/1.73M2 — LOW
EGFR: 49 ML/MIN/1.73M2 — LOW
GLUCOSE SERPL-MCNC: 116 MG/DL — HIGH (ref 70–99)
GLUCOSE SERPL-MCNC: 88 MG/DL — SIGNIFICANT CHANGE UP (ref 70–99)
MAGNESIUM SERPL-MCNC: 2.2 MG/DL — SIGNIFICANT CHANGE UP (ref 1.6–2.6)
PHOSPHATE SERPL-MCNC: 5.3 MG/DL — HIGH (ref 2.5–4.5)
POTASSIUM SERPL-MCNC: 3.9 MMOL/L — SIGNIFICANT CHANGE UP (ref 3.5–5.3)
POTASSIUM SERPL-MCNC: 4.6 MMOL/L — SIGNIFICANT CHANGE UP (ref 3.5–5.3)
POTASSIUM SERPL-SCNC: 3.9 MMOL/L — SIGNIFICANT CHANGE UP (ref 3.5–5.3)
POTASSIUM SERPL-SCNC: 4.6 MMOL/L — SIGNIFICANT CHANGE UP (ref 3.5–5.3)
SODIUM SERPL-SCNC: 137 MMOL/L — SIGNIFICANT CHANGE UP (ref 135–145)
SODIUM SERPL-SCNC: 139 MMOL/L — SIGNIFICANT CHANGE UP (ref 135–145)
SURGICAL PATHOLOGY STUDY: SIGNIFICANT CHANGE UP

## 2022-06-16 PROCEDURE — 99232 SBSQ HOSP IP/OBS MODERATE 35: CPT | Mod: GC

## 2022-06-16 RX ORDER — SODIUM CHLORIDE 9 MG/ML
1000 INJECTION, SOLUTION INTRAVENOUS
Refills: 0 | Status: DISCONTINUED | OUTPATIENT
Start: 2022-06-16 | End: 2022-06-17

## 2022-06-16 RX ADMIN — SCOPALAMINE 1 PATCH: 1 PATCH, EXTENDED RELEASE TRANSDERMAL at 20:17

## 2022-06-16 RX ADMIN — SCOPALAMINE 1 PATCH: 1 PATCH, EXTENDED RELEASE TRANSDERMAL at 07:04

## 2022-06-16 RX ADMIN — FAMOTIDINE 20 MILLIGRAM(S): 10 INJECTION INTRAVENOUS at 12:21

## 2022-06-16 RX ADMIN — Medication 50 MILLIGRAM(S): at 06:14

## 2022-06-16 RX ADMIN — PANTOPRAZOLE SODIUM 40 MILLIGRAM(S): 20 TABLET, DELAYED RELEASE ORAL at 06:14

## 2022-06-16 RX ADMIN — SODIUM CHLORIDE 100 MILLILITER(S): 9 INJECTION, SOLUTION INTRAVENOUS at 20:11

## 2022-06-16 RX ADMIN — Medication 0.5 MILLIGRAM(S): at 16:16

## 2022-06-16 NOTE — PROGRESS NOTE ADULT - ATTENDING COMMENTS
Overall, reports feeling much better. No nausea this morning and wants to try a regular diet. No vomiting. No fever/chills.    On labs today, noted to have increase in creatinine suggestive of acute kidney injury. Etiology not entirely clear but suspect dehydration in setting of poor PO intake and perhaps PPI. Will d/c PPI as there is no evidence for gastritis on EGD and encourage PO intake. Repeat BMP later today.    Once creatinine is stabilized/downtrending and tolerating diet, will proceed with discharge planning if nephrology is in agreement. Will need close outpatient f/u and GI/PCP PPx if plan is to continue high dose steroids on discharge.

## 2022-06-16 NOTE — PROGRESS NOTE ADULT - PROBLEM SELECTOR PLAN 1
Pt w/ symptoms of N/V and hx of diarrhea for 1 week. Most likely 2/2 gastroparesis induced by infection vs cannabinoid hyperemesis. CT evidence of colitis previously. Less likely Mycophenolate side effect.  As symptoms did not improve with supportive care, will evaluate esophageal mechanical or motility disorders    - supportive care  - replete electrolytes  - clear liquid diet, advancing as tolerated  - serial abdominal exams  - if diarrhea recurs, check stool studies/GI PCR/O+P in setting of immunocompromised state  - Zofran PRN and low dose Ativan PRN for nausea. Can attempt scopolamine.  Pt failed through Compazine, Reglan, sniffing alcohol pads.   - starting steroids for MCKENNA, which should improve nausea symptoms   - EGD w/ hiatal hernia, no findings that could contribute to symptoms  - continue to hold MMF. Pt w/ symptoms of N/V and hx of diarrhea for 1 week. Most likely 2/2 gastroparesis induced by infection vs cannabinoid hyperemesis. CT evidence of colitis previously. Less likely Mycophenolate side effect.  EGD this admission unremarkable.     - supportive care  - replete electrolytes  - advance to regular diet today  - Zofran PRN and low dose Ativan PRN for nausea. Can attempt scopolamine.  Pt failed through Compazine, Reglan, sniffing alcohol pads, capsaisin ointment  - starting steroids for MCKENNA, which should improve nausea symptoms   - EGD w/ hiatal hernia, no findings that could contribute to symptoms  - continue to hold MMF. Pt w/ symptoms of N/V and hx of diarrhea for 1 week. Most likely 2/2 gastroparesis induced by infection vs cannabinoid hyperemesis. CT evidence of colitis previously. Less likely Mycophenolate side effect.  EGD this admission unremarkable.     - supportive care  - replete electrolytes  - advance to regular diet today  - Zofran PRN and low dose Ativan PRN for nausea. Can attempt scopolamine.  Pt failed through Compazine, Reglan, sniffing alcohol pads, capsaisin ointment  - starting steroids for MCKENNA, which should improve nausea symptoms   - EGD w/ hiatal hernia, no findings that could contribute to symptoms. D/C PPI in setting of TUCKER.  - continue to hold MMF.

## 2022-06-16 NOTE — PROGRESS NOTE ADULT - ASSESSMENT
31F w/ minimal change disease, GERD, hx thrombophlebitis of R gonadal vein, DVT (was previously on Xarelto), p/w worsening N/V for 2 weeks.  Admitted for evaluation and supportive tx. Course now complicated by acute kidney injury and nephrotic range proteinuria.

## 2022-06-16 NOTE — PROGRESS NOTE ADULT - SUBJECTIVE AND OBJECTIVE BOX
[facility] DIVISION OF KIDNEY DISEASES AND HYPERTENSION -- FOLLOW UP NOTE  --------------------------------------------------------------------------------  HPI: Patient is a 31 year old female with PMH of biopsy proven Minimal Change Disease (diagnosed in 12/2021 treated with steroids), GERD, and asthma who presented to the hospital for nausea and vomiting for the past 2.5 weeks. The patient was admitted in [facility] during 12/2021 at which time she had presented with significant anasarca and found to have nephrotic syndrome. She underwent a renal biopsy and diagnosed with MCKENNA . She was treated with Steroids and was followed as outpatient by Dr. Angie Apodaca. Patient underwent urine studies in 2/2022 at which time she was found to be in complete remission with only trace proteinuria on UA and TP/CR ratio of 0.2. She states she was on steroids up until about one month ago and started taking Cellcept about 3 weeks ago at the direction of her new nephrologist Dr John Joaquin. On arrival to the ED the patient was noted to have hypokalemia with serum potassium of 2.8 and UA significant for >600 proteinuria.  Had EGD yesterday for persistent nausea and vomiting.    Pt. seen and examined this morning. She reported significant improvement in her symptoms. Denied any chest pain, shortness of breath.       PAST HISTORY  --------------------------------------------------------------------------------  No significant changes to PMH, PSH, FHx, SHx, unless otherwise noted    ALLERGIES & MEDICATIONS  --------------------------------------------------------------------------------  Allergies    codeine (Hives)    Intolerances      Standing Inpatient Medications  famotidine Injectable 20 milliGRAM(s) IV Push daily  heparin   Injectable 5000 Unit(s) SubCutaneous every 12 hours  lidocaine 4% Injection for Nebulization 4 milliLiter(s) Nebulizer once  methylPREDNISolone sodium succinate Injectable 50 milliGRAM(s) IV Push daily  scopolamine 1 mG/72 Hr(s) Patch 1 Patch Transdermal every 72 hours    PRN Inpatient Medications  acetaminophen     Tablet .. 650 milliGRAM(s) Oral every 6 hours PRN  aluminum hydroxide/magnesium hydroxide/simethicone Suspension 30 milliLiter(s) Oral every 4 hours PRN  LORazepam   Injectable 0.5 milliGRAM(s) IV Push every 6 hours PRN  melatonin 3 milliGRAM(s) Oral at bedtime PRN  ondansetron Injectable 4 milliGRAM(s) IV Push every 8 hours PRN      REVIEW OF SYSTEMS      All other systems were reviewed and are negative, except as noted.    VITALS/PHYSICAL EXAM  --------------------------------------------------------------------------------  T(C): 36.6 (06-16-22 @ 11:52), Max: 37.1 (06-15-22 @ 20:37)  HR: 84 (06-16-22 @ 11:52) (84 - 92)  BP: 97/63 (06-16-22 @ 11:52) (97/63 - 104/66)  RR: 18 (06-16-22 @ 11:52) (18 - 18)  SpO2: 97% (06-16-22 @ 11:52) (96% - 97%)  Wt(kg): --        06-16-22 @ 07:01  -  06-16-22 @ 13:04  --------------------------------------------------------  IN: 240 mL / OUT: 0 mL / NET: 240 mL        Physical Exam:  	Gen: appears more comfortable  	HEENT: MMM  	Pulm: CTA B/L  	CV: S1S2   	Abd: Soft  	Ext: pitting LE edema  	Neuro: Awake  	Skin: Warm and dry    LABS/STUDIES  --------------------------------------------------------------------------------    139  |  107  |  32  ----------------------------<  88      [06-16-22 @ 06:32]  3.9   |  23  |  1.48        Ca     8.0     [06-16-22 @ 06:32]      Mg     2.2     [06-16-22 @ 06:32]      Phos  5.3     [06-16-22 @ 06:32]            Creatinine Trend:  SCr 1.48 [06-16 @ 06:32]  SCr 0.97 [06-15 @ 06:36]  SCr 0.92 [06-14 @ 03:26]  SCr 0.90 [06-13 @ 09:59]  SCr 0.76 [06-12 @ 06:50]    Urinalysis - [06-10-22 @ 02:55]      Color Light Yellow / Appearance Clear / SG 1.018 / pH 6.5      Gluc Negative / Ketone Trace  / Bili Negative / Urobili Negative       Blood Moderate / Protein >600 / Leuk Est Negative / Nitrite Negative      RBC 8 / WBC 7 / Hyaline 78 / Gran  / Sq Epi  / Non Sq Epi 5 / Bacteria Negative    Urine Creatinine 603      [06-12-22 @ 06:51]  Urine Protein 1804      [06-12-22 @ 06:51]  Urine Sodium 9      [06-11-22 @ 04:08]  Urine Potassium 48      [06-11-22 @ 04:08]  Urine Chloride <20      [06-11-22 @ 04:08]    Iron 23, TIBC 74, %sat 31      [12-22-21 @ 01:02]  Ferritin 90      [12-22-21 @ 01:02]  TSH 3.03      [12-22-21 @ 01:02]  Lipid: chol 477, , HDL 67, LDL --      [06-11-22 @ 06:41]

## 2022-06-16 NOTE — PROGRESS NOTE ADULT - PROBLEM SELECTOR PLAN 5
Plan: Diet: clear liquid, advance as tolerated   DVT: heparin subQ  Dispo: home. Plan: Pt w/ chronic GERD, currently not on medications  - continue with famotidine, PPI stopped today due to TUCKER  - symptomatic management.

## 2022-06-16 NOTE — PROGRESS NOTE ADULT - SUBJECTIVE AND OBJECTIVE BOX
Patient is a 31y old  Female who presents with a chief complaint of PO intolerance (15 Salvador 2022 10:05)      SUBJECTIVE / OVERNIGHT EVENTS:    12 point ROS negative except as noted above.     MEDICATIONS  (STANDING):  famotidine Injectable 20 milliGRAM(s) IV Push daily  heparin   Injectable 5000 Unit(s) SubCutaneous every 12 hours  lidocaine 4% Injection for Nebulization 4 milliLiter(s) Nebulizer once  methylPREDNISolone sodium succinate Injectable 50 milliGRAM(s) IV Push daily  pantoprazole  Injectable 40 milliGRAM(s) IV Push two times a day  scopolamine 1 mG/72 Hr(s) Patch 1 Patch Transdermal every 72 hours    MEDICATIONS  (PRN):  acetaminophen     Tablet .. 650 milliGRAM(s) Oral every 6 hours PRN Temp greater or equal to 38C (100.4F), Mild Pain (1 - 3)  aluminum hydroxide/magnesium hydroxide/simethicone Suspension 30 milliLiter(s) Oral every 4 hours PRN Dyspepsia  LORazepam   Injectable 0.5 milliGRAM(s) IV Push every 6 hours PRN Nausea and/or Vomiting  melatonin 3 milliGRAM(s) Oral at bedtime PRN Insomnia  ondansetron Injectable 4 milliGRAM(s) IV Push every 8 hours PRN Nausea and/or Vomiting      CAPILLARY BLOOD GLUCOSE        I&O's Summary      Vital Signs Last 24 Hrs  T(C): 36.6 (16 Jun 2022 05:51), Max: 37.1 (15 Salvador 2022 20:37)  T(F): 97.9 (16 Jun 2022 05:51), Max: 98.7 (15 Salvador 2022 20:37)  HR: 92 (16 Jun 2022 05:51) (87 - 104)  BP: 100/65 (16 Jun 2022 05:51) (100/65 - 105/73)  BP(mean): --  RR: 18 (16 Jun 2022 05:51) (18 - 18)  SpO2: 97% (16 Jun 2022 05:51) (96% - 97%)    PHYSICAL EXAM:  GENERAL: NAD, well-developed, well-nourished  HEAD: Atraumatic, Normocephalic  EYES: EOMI, PERRLA, conjunctiva and sclera clear  NECK: Supple, No JVD  CHEST/LUNG: Clear to auscultation bilaterally; No wheezes or crackles  HEART: Normal S1/S2; Regular rate and rhythm; No murmurs, rubs, or gallops  ABDOMEN: Soft, Nontender, Nondistended; Bowel sounds present  EXTREMITIES: 2+ Peripheral Pulses; No clubbing, cyanosis, or edema  PSYCH: A&Ox3  NEUROLOGY: no focal neurologic deficit  SKIN: No rashes or lesions    LABS:     06-16    139  |  107  |  32<H>  ----------------------------<  88  3.9   |  23  |  1.48<H>    Ca    8.0<L>      16 Jun 2022 06:32  Phos  5.3     06-16  Mg     2.2     06-16                RADIOLOGY & ADDITIONAL TESTS:    Imaging Personally Reviewed:    Consultant(s) Notes Reviewed:      Care Discussed with Consultants/Other Providers:   Patient is a 31y old  Female who presents with a chief complaint of PO intolerance (15 Salvador 2022 10:05)      SUBJECTIVE / OVERNIGHT EVENTS: no acute events overnight. This AM, complains of some nausea, but no vomiting. States full liquid diet is triggering nausea due to milk. Denies chest pain, dyspnea, abdominal pain, diarrhea/constipation, dysuria. LE swelling stable.     12 point ROS negative except as noted above.     MEDICATIONS  (STANDING):  famotidine Injectable 20 milliGRAM(s) IV Push daily  heparin   Injectable 5000 Unit(s) SubCutaneous every 12 hours  lidocaine 4% Injection for Nebulization 4 milliLiter(s) Nebulizer once  methylPREDNISolone sodium succinate Injectable 50 milliGRAM(s) IV Push daily  pantoprazole  Injectable 40 milliGRAM(s) IV Push two times a day  scopolamine 1 mG/72 Hr(s) Patch 1 Patch Transdermal every 72 hours    MEDICATIONS  (PRN):  acetaminophen     Tablet .. 650 milliGRAM(s) Oral every 6 hours PRN Temp greater or equal to 38C (100.4F), Mild Pain (1 - 3)  aluminum hydroxide/magnesium hydroxide/simethicone Suspension 30 milliLiter(s) Oral every 4 hours PRN Dyspepsia  LORazepam   Injectable 0.5 milliGRAM(s) IV Push every 6 hours PRN Nausea and/or Vomiting  melatonin 3 milliGRAM(s) Oral at bedtime PRN Insomnia  ondansetron Injectable 4 milliGRAM(s) IV Push every 8 hours PRN Nausea and/or Vomiting      CAPILLARY BLOOD GLUCOSE        I&O's Summary      Vital Signs Last 24 Hrs  T(C): 36.6 (16 Jun 2022 05:51), Max: 37.1 (15 Salvador 2022 20:37)  T(F): 97.9 (16 Jun 2022 05:51), Max: 98.7 (15 Salvador 2022 20:37)  HR: 92 (16 Jun 2022 05:51) (87 - 104)  BP: 100/65 (16 Jun 2022 05:51) (100/65 - 105/73)  BP(mean): --  RR: 18 (16 Jun 2022 05:51) (18 - 18)  SpO2: 97% (16 Jun 2022 05:51) (96% - 97%)    PHYSICAL EXAM:  GENERAL: NAD, well-developed, well-nourished  HEAD: Atraumatic, Normocephalic  EYES: EOMI, PERRLA, conjunctiva and sclera clear  NECK: Supple, No JVD  CHEST/LUNG: Clear to auscultation bilaterally; No wheezes or crackles  HEART: Normal S1/S2; Regular rate and rhythm; No murmurs, rubs, or gallops  ABDOMEN: Soft, Nontender, Nondistended; Bowel sounds present  EXTREMITIES: 2+ Peripheral Pulses; No clubbing, cyanosis. Pitting edema bilaterally to the knees  PSYCH: A&Ox3  NEUROLOGY: no focal neurologic deficit  SKIN: No rashes or lesions    LABS:     06-16    139  |  107  |  32<H>  ----------------------------<  88  3.9   |  23  |  1.48<H>    Ca    8.0<L>      16 Jun 2022 06:32  Phos  5.3     06-16  Mg     2.2     06-16                RADIOLOGY & ADDITIONAL TESTS:    Imaging Personally Reviewed:    Consultant(s) Notes Reviewed:      Care Discussed with Consultants/Other Providers:   Patient is a 31y old  Female who presents with a chief complaint of PO intolerance (15 Salvador 2022 10:05)      SUBJECTIVE / OVERNIGHT EVENTS: no acute events overnight. This AM, complains of some nausea, but no vomiting. States full liquid diet is triggering nausea due to milk. Denies chest pain, dyspnea, abdominal pain, diarrhea/constipation, dysuria. LE swelling stable.     12 point ROS negative except as noted above.     MEDICATIONS  (STANDING):  famotidine Injectable 20 milliGRAM(s) IV Push daily  heparin   Injectable 5000 Unit(s) SubCutaneous every 12 hours  lidocaine 4% Injection for Nebulization 4 milliLiter(s) Nebulizer once  methylPREDNISolone sodium succinate Injectable 50 milliGRAM(s) IV Push daily  pantoprazole  Injectable 40 milliGRAM(s) IV Push two times a day  scopolamine 1 mG/72 Hr(s) Patch 1 Patch Transdermal every 72 hours    MEDICATIONS  (PRN):  acetaminophen     Tablet .. 650 milliGRAM(s) Oral every 6 hours PRN Temp greater or equal to 38C (100.4F), Mild Pain (1 - 3)  aluminum hydroxide/magnesium hydroxide/simethicone Suspension 30 milliLiter(s) Oral every 4 hours PRN Dyspepsia  LORazepam   Injectable 0.5 milliGRAM(s) IV Push every 6 hours PRN Nausea and/or Vomiting  melatonin 3 milliGRAM(s) Oral at bedtime PRN Insomnia  ondansetron Injectable 4 milliGRAM(s) IV Push every 8 hours PRN Nausea and/or Vomiting      CAPILLARY BLOOD GLUCOSE        I&O's Summary      Vital Signs Last 24 Hrs  T(C): 36.6 (16 Jun 2022 05:51), Max: 37.1 (15 Salvador 2022 20:37)  T(F): 97.9 (16 Jun 2022 05:51), Max: 98.7 (15 Salvador 2022 20:37)  HR: 92 (16 Jun 2022 05:51) (87 - 104)  BP: 100/65 (16 Jun 2022 05:51) (100/65 - 105/73)  BP(mean): --  RR: 18 (16 Jun 2022 05:51) (18 - 18)  SpO2: 97% (16 Jun 2022 05:51) (96% - 97%)    PHYSICAL EXAM:  GENERAL: NAD, well-developed  EYES: EOMI, PERRLA, conjunctiva and sclera clear  NECK: Supple, No JVD  CHEST/LUNG: Clear to auscultation bilaterally; No wheezes or crackles  HEART: Normal S1/S2; Regular rate and rhythm; No murmurs, rubs, or gallops  ABDOMEN: Soft, Nontender, Nondistended; Bowel sounds present  EXTREMITIES: 2+ Peripheral Pulses; No clubbing, cyanosis. Pitting edema bilaterally to the knees  PSYCH: A&Ox3  NEUROLOGY: no focal neurologic deficit  SKIN: No rashes or lesions    LABS:     06-16    139  |  107  |  32<H>  ----------------------------<  88  3.9   |  23  |  1.48<H>    Ca    8.0<L>      16 Jun 2022 06:32  Phos  5.3     06-16  Mg     2.2     06-16

## 2022-06-16 NOTE — PROGRESS NOTE ADULT - PROBLEM SELECTOR PLAN 4
Plan: Pt w/ chronic GERD, currently not on medications  - continue with famotidine, PPI BID  - symptomatic management. Plan: Pt w/ chronic GERD, currently not on medications  - continue with famotidine, PPI stopped today due to TUCKER  - symptomatic management. 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  - pt hypercoagulable  - heparin subQ for VTE PPx.

## 2022-06-16 NOTE — PROGRESS NOTE ADULT - ATTENDING COMMENTS
Tish Elizabeth MD  Off: [phone]  contact me on teams    (After 5 pm or on weekends please page the on-call fellow/attending, can check [email or web address] for schedule. Login is jose antonio willoughby, schedule under [facility] medicine, psych, derm)

## 2022-06-16 NOTE — PROGRESS NOTE ADULT - PROBLEM SELECTOR PLAN 2
·  Problem: Minimal change disease.   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 Rise in creatinine today noted on labs. Denies urinary retention symptoms. Endorses poor PO intake.  -D/C PPI as no evidence for gastritis on EGD.  -Encourage PO intake  -Repeat BMP  -Avoid nephrotoxic medications  -Continue to hold torsemide  -Nephrology f/u

## 2022-06-16 NOTE — PROGRESS NOTE ADULT - PROBLEM SELECTOR PLAN 3
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  - pt hypercoagulable  - heparin subQ for VTE PPx. ·  Problem: Minimal change disease.   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

## 2022-06-16 NOTE — PROGRESS NOTE ADULT - PROBLEM SELECTOR PLAN 1
Pt. with history of MCKENNA diagnosed in 12/2021. Treated with steroids and was in complete remission in 2/2022, with only trace proteinuria and TP/CR ratio of 0.2. Steroids stopped last month and started on Cellcept 3 weeks ago. UA right now with significant proteinuria, TP/CR ratio showing nephrotic range proteinuria.  At this time please hold the MMF and started steroids with methylprednisolone on 6/15. Consider transitioning to PO if can tolerate. Recommend GI ppx and PCP ppx. Pt. noted to have mild increase in SCr this AM to 1.48mg/dL. Advised patient to eat and drink more, would hold IVF at this time. Continue to hold any diuretics at this time. Monitor labs and urine output. Avoid NSAIDs, ACEI/ARBS, RCA and nephrotoxins. Dose medications as per eGFR.    If any questions, please feel free to contact me     Mukul Faustin  Nephrology Fellow  [facility] Pager: [phone]

## 2022-06-17 ENCOUNTER — TRANSCRIPTION ENCOUNTER (OUTPATIENT)
Age: 32
End: 2022-06-17

## 2022-06-17 VITALS
TEMPERATURE: 98 F | OXYGEN SATURATION: 98 % | RESPIRATION RATE: 18 BRPM | SYSTOLIC BLOOD PRESSURE: 100 MMHG | DIASTOLIC BLOOD PRESSURE: 64 MMHG | HEART RATE: 72 BPM

## 2022-06-17 LAB
ANION GAP SERPL CALC-SCNC: 5 MMOL/L — SIGNIFICANT CHANGE UP (ref 5–17)
BUN SERPL-MCNC: 42 MG/DL — HIGH (ref 7–23)
CALCIUM SERPL-MCNC: 8.1 MG/DL — LOW (ref 8.4–10.5)
CHLORIDE SERPL-SCNC: 100 MMOL/L — SIGNIFICANT CHANGE UP (ref 96–108)
CO2 SERPL-SCNC: 28 MMOL/L — SIGNIFICANT CHANGE UP (ref 22–31)
CREAT SERPL-MCNC: 1.67 MG/DL — HIGH (ref 0.5–1.3)
CULTURE RESULTS: SIGNIFICANT CHANGE UP
CULTURE RESULTS: SIGNIFICANT CHANGE UP
EGFR: 42 ML/MIN/1.73M2 — LOW
GAMMA INTERFERON BACKGROUND BLD IA-ACNC: 0.02 IU/ML — SIGNIFICANT CHANGE UP
GLUCOSE SERPL-MCNC: 129 MG/DL — HIGH (ref 70–99)
M TB IFN-G BLD-IMP: NEGATIVE — SIGNIFICANT CHANGE UP
M TB IFN-G CD4+ BCKGRND COR BLD-ACNC: 0 IU/ML — SIGNIFICANT CHANGE UP
M TB IFN-G CD4+CD8+ BCKGRND COR BLD-ACNC: 0.01 IU/ML — SIGNIFICANT CHANGE UP
MAGNESIUM SERPL-MCNC: 2.5 MG/DL — SIGNIFICANT CHANGE UP (ref 1.6–2.6)
PHOSPHATE SERPL-MCNC: 4.7 MG/DL — HIGH (ref 2.5–4.5)
POTASSIUM SERPL-MCNC: 4.5 MMOL/L — SIGNIFICANT CHANGE UP (ref 3.5–5.3)
POTASSIUM SERPL-SCNC: 4.5 MMOL/L — SIGNIFICANT CHANGE UP (ref 3.5–5.3)
QUANT TB PLUS MITOGEN MINUS NIL: 0.68 IU/ML — SIGNIFICANT CHANGE UP
SODIUM SERPL-SCNC: 133 MMOL/L — LOW (ref 135–145)
SPECIMEN SOURCE: SIGNIFICANT CHANGE UP
SPECIMEN SOURCE: SIGNIFICANT CHANGE UP

## 2022-06-17 PROCEDURE — 85018 HEMOGLOBIN: CPT

## 2022-06-17 PROCEDURE — 85730 THROMBOPLASTIN TIME PARTIAL: CPT

## 2022-06-17 PROCEDURE — 84133 ASSAY OF URINE POTASSIUM: CPT

## 2022-06-17 PROCEDURE — 84132 ASSAY OF SERUM POTASSIUM: CPT

## 2022-06-17 PROCEDURE — 80307 DRUG TEST PRSMV CHEM ANLYZR: CPT

## 2022-06-17 PROCEDURE — 82436 ASSAY OF URINE CHLORIDE: CPT

## 2022-06-17 PROCEDURE — 71045 X-RAY EXAM CHEST 1 VIEW: CPT

## 2022-06-17 PROCEDURE — 83880 ASSAY OF NATRIURETIC PEPTIDE: CPT

## 2022-06-17 PROCEDURE — 84300 ASSAY OF URINE SODIUM: CPT

## 2022-06-17 PROCEDURE — U0005: CPT

## 2022-06-17 PROCEDURE — 84702 CHORIONIC GONADOTROPIN TEST: CPT

## 2022-06-17 PROCEDURE — 86922 COMPATIBILITY TEST ANTIGLOB: CPT

## 2022-06-17 PROCEDURE — 84295 ASSAY OF SERUM SODIUM: CPT

## 2022-06-17 PROCEDURE — 86900 BLOOD TYPING SEROLOGIC ABO: CPT

## 2022-06-17 PROCEDURE — 87040 BLOOD CULTURE FOR BACTERIA: CPT

## 2022-06-17 PROCEDURE — 83735 ASSAY OF MAGNESIUM: CPT

## 2022-06-17 PROCEDURE — 85014 HEMATOCRIT: CPT

## 2022-06-17 PROCEDURE — 82803 BLOOD GASES ANY COMBINATION: CPT

## 2022-06-17 PROCEDURE — 85027 COMPLETE CBC AUTOMATED: CPT

## 2022-06-17 PROCEDURE — 93975 VASCULAR STUDY: CPT

## 2022-06-17 PROCEDURE — 99285 EMERGENCY DEPT VISIT HI MDM: CPT | Mod: 25

## 2022-06-17 PROCEDURE — 82435 ASSAY OF BLOOD CHLORIDE: CPT

## 2022-06-17 PROCEDURE — 93005 ELECTROCARDIOGRAM TRACING: CPT

## 2022-06-17 PROCEDURE — 86480 TB TEST CELL IMMUN MEASURE: CPT

## 2022-06-17 PROCEDURE — 0225U NFCT DS DNA&RNA 21 SARSCOV2: CPT

## 2022-06-17 PROCEDURE — 86880 COOMBS TEST DIRECT: CPT

## 2022-06-17 PROCEDURE — 88305 TISSUE EXAM BY PATHOLOGIST: CPT

## 2022-06-17 PROCEDURE — 81001 URINALYSIS AUTO W/SCOPE: CPT

## 2022-06-17 PROCEDURE — 96374 THER/PROPH/DIAG INJ IV PUSH: CPT

## 2022-06-17 PROCEDURE — 86901 BLOOD TYPING SEROLOGIC RH(D): CPT

## 2022-06-17 PROCEDURE — 84156 ASSAY OF PROTEIN URINE: CPT

## 2022-06-17 PROCEDURE — 80061 LIPID PANEL: CPT

## 2022-06-17 PROCEDURE — 80053 COMPREHEN METABOLIC PANEL: CPT

## 2022-06-17 PROCEDURE — U0003: CPT

## 2022-06-17 PROCEDURE — 87086 URINE CULTURE/COLONY COUNT: CPT

## 2022-06-17 PROCEDURE — 96375 TX/PRO/DX INJ NEW DRUG ADDON: CPT

## 2022-06-17 PROCEDURE — 83605 ASSAY OF LACTIC ACID: CPT

## 2022-06-17 PROCEDURE — 85652 RBC SED RATE AUTOMATED: CPT

## 2022-06-17 PROCEDURE — 86870 RBC ANTIBODY IDENTIFICATION: CPT

## 2022-06-17 PROCEDURE — 99232 SBSQ HOSP IP/OBS MODERATE 35: CPT | Mod: GC

## 2022-06-17 PROCEDURE — 86850 RBC ANTIBODY SCREEN: CPT

## 2022-06-17 PROCEDURE — 82947 ASSAY GLUCOSE BLOOD QUANT: CPT

## 2022-06-17 PROCEDURE — 83690 ASSAY OF LIPASE: CPT

## 2022-06-17 PROCEDURE — 86140 C-REACTIVE PROTEIN: CPT

## 2022-06-17 PROCEDURE — 96376 TX/PRO/DX INJ SAME DRUG ADON: CPT

## 2022-06-17 PROCEDURE — 82330 ASSAY OF CALCIUM: CPT

## 2022-06-17 PROCEDURE — 84100 ASSAY OF PHOSPHORUS: CPT

## 2022-06-17 PROCEDURE — 85025 COMPLETE CBC W/AUTO DIFF WBC: CPT

## 2022-06-17 PROCEDURE — 82570 ASSAY OF URINE CREATININE: CPT

## 2022-06-17 PROCEDURE — 36415 COLL VENOUS BLD VENIPUNCTURE: CPT

## 2022-06-17 PROCEDURE — 85610 PROTHROMBIN TIME: CPT

## 2022-06-17 PROCEDURE — 80048 BASIC METABOLIC PNL TOTAL CA: CPT

## 2022-06-17 PROCEDURE — 93976 VASCULAR STUDY: CPT

## 2022-06-17 RX ORDER — SODIUM CHLORIDE 9 MG/ML
1000 INJECTION INTRAMUSCULAR; INTRAVENOUS; SUBCUTANEOUS
Refills: 0 | Status: DISCONTINUED | OUTPATIENT
Start: 2022-06-17 | End: 2022-06-17

## 2022-06-17 RX ORDER — MYCOPHENOLATE MOFETIL 250 MG/1
1 CAPSULE ORAL
Qty: 0 | Refills: 0 | DISCHARGE

## 2022-06-17 RX ORDER — FAMOTIDINE 10 MG/ML
1 INJECTION INTRAVENOUS
Qty: 0 | Refills: 0 | DISCHARGE
Start: 2022-06-17

## 2022-06-17 RX ORDER — ONDANSETRON 8 MG/1
4 TABLET, FILM COATED ORAL EVERY 8 HOURS
Refills: 0 | Status: DISCONTINUED | OUTPATIENT
Start: 2022-06-17 | End: 2022-06-17

## 2022-06-17 RX ORDER — ONDANSETRON 8 MG/1
1 TABLET, FILM COATED ORAL
Qty: 21 | Refills: 0
Start: 2022-06-17 | End: 2022-06-23

## 2022-06-17 RX ORDER — FAMOTIDINE 10 MG/ML
20 INJECTION INTRAVENOUS DAILY
Refills: 0 | Status: DISCONTINUED | OUTPATIENT
Start: 2022-06-17 | End: 2022-06-17

## 2022-06-17 RX ADMIN — SCOPALAMINE 1 PATCH: 1 PATCH, EXTENDED RELEASE TRANSDERMAL at 08:44

## 2022-06-17 RX ADMIN — Medication 50 MILLIGRAM(S): at 06:26

## 2022-06-17 NOTE — CHART NOTE - NSCHARTNOTEFT_GEN_A_CORE
Called by RN that the IV was not flushing, pt was refusing new IV placement, refusing IVF, refusing further urine studies, and was getting ready to leave AMA.     Evaluated pt bedside, pt has capacity to leave AMA.     Discussed with pt that she has a kidney injury that requires further workup and treatment, currently with IVF and urine studies. Pt understands and able to retell that she is at risk for further kidney failure leading to need for dialysis, electrolyte abnormalities, death. She understands her treatment options in the hospital and ability for daily monitoring,  but prefers to be monitored outpatient and attempt to drink more fluids at home for hydration. Pt has two children at home, both of whom had graduation today, and she wants to be home for her kids. Discussed with pt to continue taking 60 mg prednisone, hold torsemide and mycophenolate, and f/u with Dr. Apodaca as soon as possible, within a week. If not possible to see Dr. Apodaca next week, pt says will f/u with primary care doctor.     Prednisone and zofran sent to pharmacy.     Pt proceeded to leave AMA.

## 2022-06-17 NOTE — PROGRESS NOTE ADULT - ATTENDING COMMENTS
Notes that nausea and vomiting are improved. Tolerated regular diet last night and this morning with only mild nausea. Urinating infrequently, notes it is dark in color. Not drinking much water. Received 500 cc NS IV overnight.    From a nausea/vomiting perspective, she is much improved. Suspicion that this was THC-induced or gastroparesis after viral gastroenteritis. From a GI perspective, she is stable to go home.    However, she does have a new TUCKER for which her creatinine continues to trend up. Etiology is not clear, but continue to suspect intravascular depletion in the setting of poor PO intake and hypoalbuminemia causing low oncotic pressures. She continues to have LE edema but no signs of pulmonary edema or respiratory compromise.    Plan today to again provide IV hydration. Renal f/u appreciated. As her MCKENNA is being treated with steroids, this may also help bulk up her oncotic pressures and improve intravascular volume. Avoid nephrotoxic medications.    Discharge planning to home once creatinine stabilizes and tolerating PO diet. Anticipate d/c home off torsemide and bactrim until creatinine has normalized as an outpatient.

## 2022-06-17 NOTE — PROGRESS NOTE ADULT - SUBJECTIVE AND OBJECTIVE BOX
Nissa Pate MD  Internal Medicine, PGY1  Universal pager: [phone] / [facility]: [phone]      Patient is a 31y old  Female who presents with a chief complaint of PO intolerance (16 Jun 2022 13:03)    OVERNIGHT EVENTS: NAEON    SUBJECTIVE:  - denies F/C, lightheadedness, HA, CP, SOB, abd pain, N/V/D/C, burning w/ urination, leg swelling    focused ROS as above    MEDICATIONS  (STANDING):  famotidine Injectable 20 milliGRAM(s) IV Push daily  heparin   Injectable 5000 Unit(s) SubCutaneous every 12 hours  lactated ringers. 1000 milliLiter(s) (100 mL/Hr) IV Continuous <Continuous>  lidocaine 4% Injection for Nebulization 4 milliLiter(s) Nebulizer once  methylPREDNISolone sodium succinate Injectable 50 milliGRAM(s) IV Push daily  scopolamine 1 mG/72 Hr(s) Patch 1 Patch Transdermal every 72 hours    MEDICATIONS  (PRN):  acetaminophen     Tablet .. 650 milliGRAM(s) Oral every 6 hours PRN Temp greater or equal to 38C (100.4F), Mild Pain (1 - 3)  aluminum hydroxide/magnesium hydroxide/simethicone Suspension 30 milliLiter(s) Oral every 4 hours PRN Dyspepsia  LORazepam   Injectable 0.5 milliGRAM(s) IV Push every 6 hours PRN Nausea and/or Vomiting  melatonin 3 milliGRAM(s) Oral at bedtime PRN Insomnia  ondansetron Injectable 4 milliGRAM(s) IV Push every 8 hours PRN Nausea and/or Vomiting      OBJECTIVE:  T(C): 36.4 (06-17-22 @ 04:00), Max: 36.8 (06-16-22 @ 20:47)  HR: 84 (06-17-22 @ 04:00) (84 - 97)  BP: 103/72 (06-17-22 @ 04:00) (97/63 - 114/80)  RR: 18 (06-17-22 @ 04:00) (18 - 18)  SpO2: 95% (06-17-22 @ 04:00) (95% - 98%)    PHYSICAL EXAM  GENERAL: NAD   HEAD:  Atraumatic, normocephalic  EYES: EOMI, sclera clear  CHEST/LUNG: Clear to auscultation bilaterally; no wheeze  HEART: RRR; S1, S2; no M/R/G  ABDOMEN: soft, non-distended; non-tender; BS present  EXTREMITIES:  2+ Peripheral Pulses; no edema  NEURO: non-focal, AAOx  PSYCH: appropriate affect  SKIN: No rashes or lesions    LABS:  CAPILLARY BLOOD GLUCOSE        I&O's Summary    16 Jun 2022 07:01  -  17 Jun 2022 06:58  --------------------------------------------------------  IN: 240 mL / OUT: 0 mL / NET: 240 mL        06-16    137  |  106  |  37<H>  ----------------------------<  116<H>  4.6   |  23  |  1.64<H>    Ca    8.3<L>      16 Jun 2022 17:58  Phos  5.3     06-16  Mg     2.2     06-16                Microbiology:      RADIOLOGY & ADDITIONAL TESTS:    Imaging Personally Reviewed:  Consultant(s) Notes Reviewed:    Care Discussed with Consultants/Other Providers: Nissa Pate MD  Internal Medicine, PGY1  Universal pager: [phone] / [facility]: [phone]      Patient is a 31y old  Female who presents with a chief complaint of PO intolerance (16 Jun 2022 13:03)    OVERNIGHT EVENTS: NAEON. Pt tolerated solid food for dinner last evening. This AM, pt ate a bread roll with orange juice with minimal nausea. Pt upset that she missed graduation ceremonies due to hospitalization and wants to go home. She denied F/C, C/D, abdominal pain, chest pain. Initially refused labs but later agreed to obtain them.      focused ROS as above    MEDICATIONS  (STANDING):  famotidine Injectable 20 milliGRAM(s) IV Push daily  heparin   Injectable 5000 Unit(s) SubCutaneous every 12 hours  lactated ringers. 1000 milliLiter(s) (100 mL/Hr) IV Continuous <Continuous>  lidocaine 4% Injection for Nebulization 4 milliLiter(s) Nebulizer once  methylPREDNISolone sodium succinate Injectable 50 milliGRAM(s) IV Push daily  scopolamine 1 mG/72 Hr(s) Patch 1 Patch Transdermal every 72 hours    MEDICATIONS  (PRN):  acetaminophen     Tablet .. 650 milliGRAM(s) Oral every 6 hours PRN Temp greater or equal to 38C (100.4F), Mild Pain (1 - 3)  aluminum hydroxide/magnesium hydroxide/simethicone Suspension 30 milliLiter(s) Oral every 4 hours PRN Dyspepsia  LORazepam   Injectable 0.5 milliGRAM(s) IV Push every 6 hours PRN Nausea and/or Vomiting  melatonin 3 milliGRAM(s) Oral at bedtime PRN Insomnia  ondansetron Injectable 4 milliGRAM(s) IV Push every 8 hours PRN Nausea and/or Vomiting      OBJECTIVE:  T(C): 36.4 (06-17-22 @ 04:00), Max: 36.8 (06-16-22 @ 20:47)  HR: 84 (06-17-22 @ 04:00) (84 - 97)  BP: 103/72 (06-17-22 @ 04:00) (97/63 - 114/80)  RR: 18 (06-17-22 @ 04:00) (18 - 18)  SpO2: 95% (06-17-22 @ 04:00) (95% - 98%)    PHYSICAL EXAM  GENERAL: NAD   HEAD:  Atraumatic, normocephalic  EYES: EOMI, sclera clear  CHEST/LUNG: Clear to auscultation bilaterally; no wheeze  HEART: RRR; S1, S2; no M/R/G  ABDOMEN: soft, non-distended; non-tender; BS present  EXTREMITIES:  2+ Peripheral Pulses; no edema  NEURO: non-focal, AAOx 3  PSYCH: appropriate affect  SKIN: No rashes or lesions    LABS:  CAPILLARY BLOOD GLUCOSE        I&O's Summary    16 Jun 2022 07:01  -  17 Jun 2022 06:58  --------------------------------------------------------  IN: 240 mL / OUT: 0 mL / NET: 240 mL        06-16    137  |  106  |  37<H>  ----------------------------<  116<H>  4.6   |  23  |  1.64<H>    Ca    8.3<L>      16 Jun 2022 17:58  Phos  5.3     06-16  Mg     2.2     06-16                Microbiology:      RADIOLOGY & ADDITIONAL TESTS:    Imaging Personally Reviewed:  Consultant(s) Notes Reviewed:    Care Discussed with Consultants/Other Providers: Nissa Pate MD  Internal Medicine, PGY1  Universal pager: [phone] / [facility]: [phone]      Patient is a 31y old  Female who presents with a chief complaint of PO intolerance (16 Jun 2022 13:03)    OVERNIGHT EVENTS: NAEON. Pt tolerated solid food for dinner last evening. This AM, pt ate a bread roll with orange juice with minimal nausea. Pt upset that she missed graduation ceremonies due to hospitalization and wants to go home. She denied F/C, C/D, abdominal pain, chest pain. Initially refused labs but later agreed to obtain them.      focused ROS as above    MEDICATIONS  (STANDING):  famotidine Injectable 20 milliGRAM(s) IV Push daily  heparin   Injectable 5000 Unit(s) SubCutaneous every 12 hours  lactated ringers. 1000 milliLiter(s) (100 mL/Hr) IV Continuous <Continuous>  lidocaine 4% Injection for Nebulization 4 milliLiter(s) Nebulizer once  methylPREDNISolone sodium succinate Injectable 50 milliGRAM(s) IV Push daily  scopolamine 1 mG/72 Hr(s) Patch 1 Patch Transdermal every 72 hours    MEDICATIONS  (PRN):  acetaminophen     Tablet .. 650 milliGRAM(s) Oral every 6 hours PRN Temp greater or equal to 38C (100.4F), Mild Pain (1 - 3)  aluminum hydroxide/magnesium hydroxide/simethicone Suspension 30 milliLiter(s) Oral every 4 hours PRN Dyspepsia  LORazepam   Injectable 0.5 milliGRAM(s) IV Push every 6 hours PRN Nausea and/or Vomiting  melatonin 3 milliGRAM(s) Oral at bedtime PRN Insomnia  ondansetron Injectable 4 milliGRAM(s) IV Push every 8 hours PRN Nausea and/or Vomiting      OBJECTIVE:  T(C): 36.4 (06-17-22 @ 04:00), Max: 36.8 (06-16-22 @ 20:47)  HR: 84 (06-17-22 @ 04:00) (84 - 97)  BP: 103/72 (06-17-22 @ 04:00) (97/63 - 114/80)  RR: 18 (06-17-22 @ 04:00) (18 - 18)  SpO2: 95% (06-17-22 @ 04:00) (95% - 98%)    PHYSICAL EXAM  GENERAL: NAD   EYES: EOMI, sclera clear  CHEST/LUNG: Clear to auscultation bilaterally; no wheeze  HEART: RRR; S1, S2; no M/R/G  ABDOMEN: soft, non-distended; non-tender; BS present  EXTREMITIES:  2+ Peripheral Pulses; no edema  NEURO: non-focal, AAOx 3  PSYCH: appropriate affect  SKIN: No rashes or lesions    LABS:  CAPILLARY BLOOD GLUCOSE        I&O's Summary    16 Jun 2022 07:01  -  17 Jun 2022 06:58  --------------------------------------------------------  IN: 240 mL / OUT: 0 mL / NET: 240 mL        06-16    137  |  106  |  37<H>  ----------------------------<  116<H>  4.6   |  23  |  1.64<H>    Ca    8.3<L>      16 Jun 2022 17:58  Phos  5.3     06-16  Mg     2.2     06-16

## 2022-06-17 NOTE — PROVIDER CONTACT NOTE (OTHER) - RECOMMENDATIONS
Notify provider
Notify provider
Contact provider
Contact provider
Notified the provider
Notify provider

## 2022-06-17 NOTE — PROGRESS NOTE ADULT - PROBLEM SELECTOR PLAN 1
Pt. with history of MCKENNA diagnosed in 12/2021. Treated with steroids and was in complete remission in 2/2022, with only trace proteinuria and TP/CR ratio of 0.2. Steroids stopped last month and started on Cellcept 3 weeks ago. UA right now with significant proteinuria, TP/CR ratio showing nephrotic range proteinuria.  At this time please hold the MMF and started steroids with methylprednisolone on 6/15. Consider transitioning to PO if can tolerate. Recommend GI ppx and PCP ppx. Pt. noted to have increase in SCr this AM to 1.6mg/dL. Advised patient to eat and drink more, would hold IVF at this time. Continue to hold any diuretics at this time. Monitor labs and urine output. Avoid NSAIDs, ACEI/ARBS, RCA and nephrotoxins. Dose medications as per eGFR.    If any questions, please feel free to contact me     Mukul Faustin  Nephrology Fellow  [facility] Pager: [phone]

## 2022-06-17 NOTE — PROVIDER CONTACT NOTE (OTHER) - BACKGROUND
Pt admitted for N/V, has history of colitis, is currently on a clear liquid diet, however having poor PO intake.
Pt admitted for N/V, pt had endoscopy today 6/14.
Admitted with nausea and vomiting
Admitted with nausea and vomiting.
Dx Nausea and vomiting
Pt admitted with nausea and vomiting.

## 2022-06-17 NOTE — PROGRESS NOTE ADULT - ATTENDING COMMENTS
Minimal change  TUCKER  Continue prednisone  No Cellcept  Iv hydration today. Bladder scan  Keep off diuretic for now  Outpt followup with DR Apodaca upon discharge once Cr trends downward  dw medicine    Tish Elizabeth MD  Off: [phone]  contact me on teams    (After 5 pm or on weekends please page the on-call fellow/attending, can check [email or web address] for schedule. Login is jose antonio willoughby, schedule under [facility] medicine, psych, derm)

## 2022-06-17 NOTE — PROGRESS NOTE ADULT - SUBJECTIVE AND OBJECTIVE BOX
[facility] Division of Kidney Diseases & Hypertension  FOLLOW UP NOTE  --------------------------------------------------------------------------------  Chief Complaint:    24 hour events/subjective:    pt frustrated about not going home    PAST HISTORY  --------------------------------------------------------------------------------  No significant changes to PMH, PSH, FHx, SHx, unless otherwise noted    ALLERGIES & MEDICATIONS  --------------------------------------------------------------------------------  Allergies    codeine (Hives)    Intolerances      Standing Inpatient Medications  famotidine    Tablet 20 milliGRAM(s) Oral daily  heparin   Injectable 5000 Unit(s) SubCutaneous every 12 hours  lactated ringers. 1000 milliLiter(s) IV Continuous <Continuous>  lidocaine 4% Injection for Nebulization 4 milliLiter(s) Nebulizer once  scopolamine 1 mG/72 Hr(s) Patch 1 Patch Transdermal every 72 hours  sodium chloride 0.9%. 1000 milliLiter(s) IV Continuous <Continuous>    PRN Inpatient Medications  acetaminophen     Tablet .. 650 milliGRAM(s) Oral every 6 hours PRN  aluminum hydroxide/magnesium hydroxide/simethicone Suspension 30 milliLiter(s) Oral every 4 hours PRN  melatonin 3 milliGRAM(s) Oral at bedtime PRN  ondansetron   Disintegrating Tablet 4 milliGRAM(s) Oral every 8 hours PRN      REVIEW OF SYSTEMS  --------------------------------------------------------------------------------    All other systems were reviewed and are negative, except as noted.    VITALS/PHYSICAL EXAM  --------------------------------------------------------------------------------  T(C): 36.9 (06-17-22 @ 12:23), Max: 36.9 (06-17-22 @ 12:23)  HR: 72 (06-17-22 @ 12:23) (72 - 97)  BP: 100/64 (06-17-22 @ 12:23) (100/64 - 114/80)  RR: 18 (06-17-22 @ 12:23) (18 - 18)  SpO2: 98% (06-17-22 @ 12:23) (95% - 98%)  Wt(kg): --        06-16-22 @ 07:01  -  06-17-22 @ 07:00  --------------------------------------------------------  IN: 240 mL / OUT: 0 mL / NET: 240 mL      Physical Exam:  	Gen: NAD  	Pulm: CTA B/L  	CV: RRR, S1S2;   	Abd: +BS, soft, nontender/nondistended  	: No suprapubic tenderness  	UE: Warm,   	LE: Warm, FROM, 2+ edema  	Neuro: No focal deficits, intact gait  	Psych: Normal affect and mood  	Skin: Warm, without rashes  	Vascular access:    LABS/STUDIES  --------------------------------------------------------------------------------    133  |  100  |  42  ----------------------------<  129      [06-17-22 @ 11:32]  4.5   |  28  |  1.67        Ca     8.1     [06-17-22 @ 11:32]      Mg     2.5     [06-17-22 @ 11:32]      Phos  4.7     [06-17-22 @ 11:32]            Creatinine Trend:  SCr 1.67 [06-17 @ 11:32]  SCr 1.64 [06-16 @ 17:58]  SCr 1.48 [06-16 @ 06:32]  SCr 0.97 [06-15 @ 06:36]  SCr 0.92 [06-14 @ 03:26]      Urine Creatinine 603      [06-12-22 @ 06:51]  Urine Protein 1804      [06-12-22 @ 06:51]  Urine Sodium 9      [06-11-22 @ 04:08]  Urine Potassium 48      [06-11-22 @ 04:08]  Urine Chloride <20      [06-11-22 @ 04:08]    Lipid: chol 477, , HDL 67, LDL --      [06-11-22 @ 06:41]

## 2022-06-17 NOTE — PROGRESS NOTE ADULT - PROBLEM SELECTOR PLAN 3
·  Problem: Minimal change disease.   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 ·  Problem: Minimal change disease.   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 ·  Problem: Minimal change disease.   Plan: Pt w/ minimal change disease confirmed with outpatient biopsy  - Pt previously on steroids, d/c 1 month ago, on mycophenolate as outpatient  - Pt w/ LE edema, significant proteinuria  - hold torsemide in setting of hypokalemia, dehydration  - hold mycophenolate   - lipid profile w/ cholesterol 395,   - renal US w/o evidence of RVT   - TB serology negative   - transitioning IV solumedrol 50 to prednisone 60 mg PO daily  - will need GI and PCP PPx on discharge  - nephrology following.

## 2022-06-17 NOTE — DISCHARGE NOTE NURSING/CASE MANAGEMENT/SOCIAL WORK - NSDCFUADDAPPT_GEN_ALL_CORE_FT
Please see JORGITO Apodaca as soon as possible, not later than 1 week. If unable to, please see your primary care provider as soon as possible for kidney function monitoring.

## 2022-06-17 NOTE — PROGRESS NOTE ADULT - PROBLEM SELECTOR PLAN 5
Plan: Pt w/ chronic GERD, currently not on medications  - continue with famotidine, PPI stopped today due to TUCKER  - symptomatic management. Plan: Pt w/ chronic GERD, currently not on medications  - continue with famotidine, PPI stopped due to TUCKER  - symptomatic management.

## 2022-06-17 NOTE — PROGRESS NOTE ADULT - PROVIDER SPECIALTY LIST ADULT
Internal Medicine
Nephrology
Gastroenterology
Gastroenterology
Internal Medicine
Nephrology
Internal Medicine
Nephrology
Internal Medicine
Nephrology
Internal Medicine

## 2022-06-17 NOTE — PROVIDER CONTACT NOTE (OTHER) - DATE AND TIME:
17-Jun-2022 06:35
13-Jun-2022 02:45
14-Jun-2022 19:39
13-Jun-2022 21:30
14-Jun-2022 05:20
15-Salvador-2022 13:10

## 2022-06-17 NOTE — PROGRESS NOTE ADULT - PROBLEM SELECTOR PLAN 1
Pt w/ symptoms of N/V and hx of diarrhea for 1 week. Most likely 2/2 gastroparesis induced by infection vs cannabinoid hyperemesis. CT evidence of colitis previously. Less likely Mycophenolate side effect.  EGD this admission unremarkable.     - supportive care  - replete electrolytes  - advance to regular diet today  - Zofran PRN and low dose Ativan PRN for nausea. Can attempt scopolamine.  Pt failed through Compazine, Reglan, sniffing alcohol pads, capsaisin ointment  - starting steroids for MCKENNA, which should improve nausea symptoms   - EGD w/ hiatal hernia, no findings that could contribute to symptoms. D/C PPI in setting of TUCKER.  - continue to hold MMF. Pt w/ symptoms of N/V and hx of diarrhea for 1 week. Most likely 2/2 gastroparesis induced by infection vs cannabinoid hyperemesis. CT evidence of colitis previously. Less likely Mycophenolate side effect.  EGD this admission unremarkable.     - supportive care, replete electrolytes  - advanced to regular diet   - PO Zofran PRN, scopolamine.  Pt previously failed through Compazine, Reglan, sniffing alcohol pads, capsaisin ointment  - c/w steroids, now transitioning to PO  - EGD w/ hiatal hernia, no findings that could contribute to symptoms. D/C PPI in setting of TUCKER.  - continue to hold MMF.

## 2022-06-17 NOTE — PROGRESS NOTE ADULT - REASON FOR ADMISSION
PO intolerance

## 2022-06-17 NOTE — PROGRESS NOTE ADULT - PROBLEM SELECTOR PLAN 6
Plan: Diet: regular diet as tolerated today  DVT: heparin subQ  Dispo: home.
Plan: Diet: regular diet as tolerated today  DVT: heparin subQ  Dispo: home.

## 2022-06-17 NOTE — PROVIDER CONTACT NOTE (OTHER) - ASSESSMENT
Pt A&O x4, VS stable. No s/s distress.
Pt is AxOx4, all VSS, BP WNL. Pt states she feels dizzy and dehydrated, states that when she tries to drink water she feels like she will throw it up.
Pt is AxOx4, all other VSS. Pt had just walked around unit with PCA. Pt denies pain, SOB, no s/s of distress.
Pt A&O x4, able to ambulate independently. VS stable. No s/s distress.
Pt. is AOX4.
VS stable, Pt A&O x4, no s/s distress.

## 2022-06-17 NOTE — PROGRESS NOTE ADULT - PROBLEM SELECTOR PLAN 2
Rise in creatinine today noted on labs. Denies urinary retention symptoms. Endorses poor PO intake.  -D/C PPI as no evidence for gastritis on EGD.  -Encourage PO intake  -Repeat BMP  -Avoid nephrotoxic medications  -Continue to hold torsemide  -Nephrology f/u Rise in creatinine today noted on labs. Denies urinary retention symptoms. Endorses poor PO intake.  -D/C PPI as no evidence for gastritis on EGD.  -Encourage PO intake  -Avoid nephrotoxic medications  -Continue to hold torsemide  - Bladder scan to r/o obstruction from retention   - Will give another 500cc NS  -Nephrology f/u Rise in creatinine today noted on labs. Denies urinary retention symptoms. Endorses poor PO intake.  -D/C PPI as no evidence for gastritis on EGD.  -Encourage PO intake  -Avoid nephrotoxic medications  -Continue to hold torsemide  - Bladder scan to r/o obstruction from retention   - Will give another 500cc NS IV today  -Nephrology f/u

## 2022-06-17 NOTE — PROVIDER CONTACT NOTE (OTHER) - REASON
Heart rate 122
Pt refusing heparin and AM labs
Pt states she is dizzy
S/p Capsaicin cream as order. Pt. c/o on burning sensation and redness in th abdomen
Pt refused medications
Pt refusing medications

## 2022-06-17 NOTE — DISCHARGE NOTE NURSING/CASE MANAGEMENT/SOCIAL WORK - PATIENT PORTAL LINK FT
You can access the FollowMyHealth Patient Portal offered by [facility] by registering at the following website: [email or web address]. By joining [facility]’s FollowMyHealth portal, you will also be able to view your health information using other applications (apps) compatible with our system.

## 2022-06-17 NOTE — PROVIDER CONTACT NOTE (OTHER) - ACTION/TREATMENT ORDERED:
Provider made aware.
Provider made aware.
OLGA Azar made aware and cold pack applied. Safety maintained.
Provider made aware.
Provider aware, happened earlier in day as well. Continue to monitor.
Start LR @ 125ml/hr.

## 2022-07-05 ENCOUNTER — APPOINTMENT (OUTPATIENT)
Dept: INTERNAL MEDICINE | Facility: CLINIC | Age: 32
End: 2022-07-05

## 2022-08-17 NOTE — ED ADULT TRIAGE NOTE - NSTRIAGECARE_GEN_A_ER
Procedure explained and pt consented in DSU by Dr. Steele. Pt arrived via stretcher to CT for CT Guided Lung Biopsy. AAOx4,- Time out performed.     Pt received Fentanyl 37.5 mcg and Versed 1 mg IV. Vital signs monitored and remained stable for duration of procedure. Biopsies collected By Dr. Steele.   Specimens submitted to lab for Pathology. Bandage applied to pts left anterior chest . CDI- Report called to DSU  Pt transported to DSU  STAT Post- Chest CT - no pneumo  STAT Post- Chest  Xray - completed and awaiting dictation.  Timed chest Xray ordered and due at 1420 and must be cleared by radiologist prior to pts discharge  
Face Mask
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
## 2022-10-02 NOTE — SBIRT NOTE ADULT - NSSBIRTUNABLEREM_GEN_A_CORE
Never normal... Well appearing, awake, alert, oriented to person, place, time/situation and in no apparent distress.

## 2022-10-17 NOTE — PRE-ANESTHESIA EVALUATION ADULT - RESPIRATORY RATE (BREATHS/MIN)
16 Azelaic Acid Pregnancy And Lactation Text: This medication is considered safe during pregnancy and breast feeding.

## 2022-11-07 NOTE — ED PROVIDER NOTE - NS ED ROS FT
Gen: Denies fever.   HEENT: Denies headache. Denies congestion.  CV: Denies chest pain. Denies lightheadedness.  Skin: Denies rash.   Resp: Denies SOB. Denies cough.  GI: Denies abd pain. +nausea. +vomiting. Denies diarrhea. Denies melena. Denies hematochezia.  Msk: +extremity swelling. Denies extremity pain.  : Denies dysuria. Denies hematuria.  Neuro: Denies LOC. Denies dizziness. Denies new numbness/tingling. Denies new focal weakness.  Psych: Denies SI
No

## 2023-01-17 ENCOUNTER — INPATIENT (INPATIENT)
Facility: HOSPITAL | Age: 33
LOS: 8 days | Discharge: LEFT BEFORE DISCHARGE | DRG: 699 | End: 2023-01-26
Attending: STUDENT IN AN ORGANIZED HEALTH CARE EDUCATION/TRAINING PROGRAM | Admitting: HOSPITALIST
Payer: MEDICAID

## 2023-01-17 VITALS
OXYGEN SATURATION: 98 % | RESPIRATION RATE: 20 BRPM | HEART RATE: 149 BPM | DIASTOLIC BLOOD PRESSURE: 66 MMHG | WEIGHT: 149.91 LBS | SYSTOLIC BLOOD PRESSURE: 104 MMHG | TEMPERATURE: 100 F | HEIGHT: 61 IN

## 2023-01-17 DIAGNOSIS — Z98.89 OTHER SPECIFIED POSTPROCEDURAL STATES: Chronic | ICD-10-CM

## 2023-01-17 DIAGNOSIS — Z90.89 ACQUIRED ABSENCE OF OTHER ORGANS: Chronic | ICD-10-CM

## 2023-01-17 LAB
ALBUMIN SERPL ELPH-MCNC: 1.1 G/DL — LOW (ref 3.3–5)
ALP SERPL-CCNC: 76 U/L — SIGNIFICANT CHANGE UP (ref 40–120)
ALT FLD-CCNC: 10 U/L — SIGNIFICANT CHANGE UP (ref 10–45)
ANION GAP SERPL CALC-SCNC: 7 MMOL/L — SIGNIFICANT CHANGE UP (ref 5–17)
APPEARANCE UR: ABNORMAL
AST SERPL-CCNC: 16 U/L — SIGNIFICANT CHANGE UP (ref 10–40)
BACTERIA # UR AUTO: NEGATIVE — SIGNIFICANT CHANGE UP
BASE EXCESS BLDV CALC-SCNC: 2.8 MMOL/L — SIGNIFICANT CHANGE UP (ref -2–3)
BASOPHILS # BLD AUTO: 0.04 K/UL — SIGNIFICANT CHANGE UP (ref 0–0.2)
BASOPHILS NFR BLD AUTO: 0.4 % — SIGNIFICANT CHANGE UP (ref 0–2)
BILIRUB SERPL-MCNC: <0.1 MG/DL — LOW (ref 0.2–1.2)
BILIRUB UR-MCNC: NEGATIVE — SIGNIFICANT CHANGE UP
BUN SERPL-MCNC: 10 MG/DL — SIGNIFICANT CHANGE UP (ref 7–23)
CA-I SERPL-SCNC: 1.2 MMOL/L — SIGNIFICANT CHANGE UP (ref 1.15–1.33)
CALCIUM SERPL-MCNC: 8.4 MG/DL — SIGNIFICANT CHANGE UP (ref 8.4–10.5)
CHLORIDE BLDV-SCNC: 106 MMOL/L — SIGNIFICANT CHANGE UP (ref 96–108)
CHLORIDE SERPL-SCNC: 107 MMOL/L — SIGNIFICANT CHANGE UP (ref 96–108)
CO2 BLDV-SCNC: 32 MMOL/L — HIGH (ref 22–26)
CO2 SERPL-SCNC: 25 MMOL/L — SIGNIFICANT CHANGE UP (ref 22–31)
COLOR SPEC: YELLOW — SIGNIFICANT CHANGE UP
CREAT SERPL-MCNC: 0.72 MG/DL — SIGNIFICANT CHANGE UP (ref 0.5–1.3)
DIFF PNL FLD: ABNORMAL
EGFR: 115 ML/MIN/1.73M2 — SIGNIFICANT CHANGE UP
EOSINOPHIL # BLD AUTO: 0.07 K/UL — SIGNIFICANT CHANGE UP (ref 0–0.5)
EOSINOPHIL NFR BLD AUTO: 0.8 % — SIGNIFICANT CHANGE UP (ref 0–6)
EPI CELLS # UR: 9 /HPF — HIGH
GAS PNL BLDV: 135 MMOL/L — LOW (ref 136–145)
GAS PNL BLDV: SIGNIFICANT CHANGE UP
GLUCOSE BLDV-MCNC: 92 MG/DL — SIGNIFICANT CHANGE UP (ref 70–99)
GLUCOSE SERPL-MCNC: 90 MG/DL — SIGNIFICANT CHANGE UP (ref 70–99)
GLUCOSE UR QL: NEGATIVE — SIGNIFICANT CHANGE UP
HCG UR QL: NEGATIVE — SIGNIFICANT CHANGE UP
HCO3 BLDV-SCNC: 30 MMOL/L — HIGH (ref 22–29)
HCT VFR BLD CALC: 39.7 % — SIGNIFICANT CHANGE UP (ref 34.5–45)
HCT VFR BLDA CALC: 40 % — SIGNIFICANT CHANGE UP (ref 34.5–46.5)
HGB BLD CALC-MCNC: 13.2 G/DL — SIGNIFICANT CHANGE UP (ref 11.7–16.1)
HGB BLD-MCNC: 12.3 G/DL — SIGNIFICANT CHANGE UP (ref 11.5–15.5)
HYALINE CASTS # UR AUTO: 22 /LPF — HIGH (ref 0–2)
IMM GRANULOCYTES NFR BLD AUTO: 0.4 % — SIGNIFICANT CHANGE UP (ref 0–0.9)
KETONES UR-MCNC: NEGATIVE — SIGNIFICANT CHANGE UP
LACTATE BLDV-MCNC: 0.8 MMOL/L — SIGNIFICANT CHANGE UP (ref 0.5–2)
LEUKOCYTE ESTERASE UR-ACNC: NEGATIVE — SIGNIFICANT CHANGE UP
LIDOCAIN IGE QN: 29 U/L — SIGNIFICANT CHANGE UP (ref 7–60)
LYMPHOCYTES # BLD AUTO: 1.16 K/UL — SIGNIFICANT CHANGE UP (ref 1–3.3)
LYMPHOCYTES # BLD AUTO: 12.6 % — LOW (ref 13–44)
MCHC RBC-ENTMCNC: 30.2 PG — SIGNIFICANT CHANGE UP (ref 27–34)
MCHC RBC-ENTMCNC: 31 GM/DL — LOW (ref 32–36)
MCV RBC AUTO: 97.5 FL — SIGNIFICANT CHANGE UP (ref 80–100)
MONOCYTES # BLD AUTO: 0.73 K/UL — SIGNIFICANT CHANGE UP (ref 0–0.9)
MONOCYTES NFR BLD AUTO: 7.9 % — SIGNIFICANT CHANGE UP (ref 2–14)
NEUTROPHILS # BLD AUTO: 7.2 K/UL — SIGNIFICANT CHANGE UP (ref 1.8–7.4)
NEUTROPHILS NFR BLD AUTO: 77.9 % — HIGH (ref 43–77)
NITRITE UR-MCNC: NEGATIVE — SIGNIFICANT CHANGE UP
NRBC # BLD: 0 /100 WBCS — SIGNIFICANT CHANGE UP (ref 0–0)
PCO2 BLDV: 57 MMHG — HIGH (ref 39–42)
PH BLDV: 7.33 — SIGNIFICANT CHANGE UP (ref 7.32–7.43)
PH UR: 8 — SIGNIFICANT CHANGE UP (ref 5–8)
PLATELET # BLD AUTO: 481 K/UL — HIGH (ref 150–400)
PO2 BLDV: 22 MMHG — LOW (ref 25–45)
POTASSIUM BLDV-SCNC: 3.5 MMOL/L — SIGNIFICANT CHANGE UP (ref 3.5–5.1)
POTASSIUM SERPL-MCNC: 3.6 MMOL/L — SIGNIFICANT CHANGE UP (ref 3.5–5.3)
POTASSIUM SERPL-SCNC: 3.6 MMOL/L — SIGNIFICANT CHANGE UP (ref 3.5–5.3)
PROT SERPL-MCNC: 4.4 G/DL — LOW (ref 6–8.3)
PROT UR-MCNC: >600
RBC # BLD: 4.07 M/UL — SIGNIFICANT CHANGE UP (ref 3.8–5.2)
RBC # FLD: 14.4 % — SIGNIFICANT CHANGE UP (ref 10.3–14.5)
RBC CASTS # UR COMP ASSIST: 7 /HPF — HIGH (ref 0–4)
SAO2 % BLDV: 28.6 % — LOW (ref 67–88)
SODIUM SERPL-SCNC: 139 MMOL/L — SIGNIFICANT CHANGE UP (ref 135–145)
SP GR SPEC: 1.05 — HIGH (ref 1.01–1.02)
UROBILINOGEN FLD QL: NEGATIVE — SIGNIFICANT CHANGE UP
WBC # BLD: 9.24 K/UL — SIGNIFICANT CHANGE UP (ref 3.8–10.5)
WBC # FLD AUTO: 9.24 K/UL — SIGNIFICANT CHANGE UP (ref 3.8–10.5)
WBC UR QL: 33 /HPF — HIGH (ref 0–5)

## 2023-01-17 PROCEDURE — 71045 X-RAY EXAM CHEST 1 VIEW: CPT | Mod: 26

## 2023-01-17 PROCEDURE — 99285 EMERGENCY DEPT VISIT HI MDM: CPT

## 2023-01-17 RX ORDER — ONDANSETRON 8 MG/1
4 TABLET, FILM COATED ORAL ONCE
Refills: 0 | Status: COMPLETED | OUTPATIENT
Start: 2023-01-17 | End: 2023-01-17

## 2023-01-17 RX ORDER — ALBUMIN HUMAN 25 %
50 VIAL (ML) INTRAVENOUS EVERY 6 HOURS
Refills: 0 | Status: DISCONTINUED | OUTPATIENT
Start: 2023-01-17 | End: 2023-01-19

## 2023-01-17 RX ORDER — ACETAMINOPHEN 500 MG
975 TABLET ORAL ONCE
Refills: 0 | Status: COMPLETED | OUTPATIENT
Start: 2023-01-17 | End: 2023-01-17

## 2023-01-17 RX ADMIN — Medication 975 MILLIGRAM(S): at 21:40

## 2023-01-17 RX ADMIN — ONDANSETRON 4 MILLIGRAM(S): 8 TABLET, FILM COATED ORAL at 21:40

## 2023-01-17 NOTE — ED ADULT NURSE NOTE - OBJECTIVE STATEMENT
Pt is a 33 y/o female presenting to the ED c/o vomiting. Pt states nausea with nonbloody vomiting, diffuse abdominal pain, SOB and generalized body aches x1 week worsening yesterday. Pt also endorses diarrhea within the last week, but has subsided currently. Pt also states "my whole body feels more swollen." PMH nephrotic syndrome, states this is typically what happens when she has a "flare up." Pt also endorses discomfort on urination with brown coloration, denies burning and increased frequency. Pt is A&Ox3, follows commands, speaks coherently, sensory/motor function intact. Pt febrile and tachycardic other VSS. Pt denies chest pain, chills, back pain, hematuria, bloody stools, headache, dizziness at this time.

## 2023-01-17 NOTE — ED PROVIDER NOTE - OBJECTIVE STATEMENT
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  Patient states she is also experiencing peripheral edema and abdominal swelling.  Patient has had multiple episodes where she is required steroids for her MCKENNA.  Follows with house nephrology.  Denies fevers, dysuria, urinary hesitancy.    Dr. Mary James MD 32-year-old female with past medical history of minimal-change disease presents with concern of right lower quadrant pain and vomiting x1 week that has been worsening.  Patient states she is also experiencing peripheral edema and abdominal swelling.  Patient has had multiple episodes where she is required steroids for her MCKENNA.  Follows with house nephrology.  Denies fevers, dysuria, urinary hesitancy.    Dr. Mary James MD

## 2023-01-17 NOTE — ED PROVIDER NOTE - CLINICAL SUMMARY MEDICAL DECISION MAKING FREE TEXT BOX
32-year-old female with past medical history of minimal-change disease presents with concern of right lower quadrant pain and vomiting x1 week that has been worsening. Differential diagnosis includes but is not limited to MCKENNA, infection, UTI, appendicitis. Pt tachycardic to 150s, sinus tach on ecg. Given peripheral edema and MCKENNA would not give fluids. would get labs and CT to reassess. Pt febrile here, would tx with tylenol. Nephro consult.

## 2023-01-17 NOTE — ED PROVIDER NOTE - ATTENDING CONTRIBUTION TO CARE
This is a 32-year-old female who has minimal change disease and is coming in with vomiting and leg swelling.  Bilateral pitting edema  Right lower quadrant tenderness to palpation  Will discuss with the nephrology team regarding next step in the care of the patient.  Basic blood work and a urinalysis and CT scan of the abdomen.  There is high concern for acute surgical process given the substantial tachycardia.  On reexamination the patient's tachycardia is much improved.  This discussion with the nephrologist was that the patient should receive albumin.  We ordered this.  Will need to be admitted to the hospital for induction of treatment for the minimal-change disease pending CT scan report.

## 2023-01-17 NOTE — ED PROVIDER NOTE - PROGRESS NOTE DETAILS
Viviane Guzman, PGY-2, EM: s/w house nephro, will likely need to be admitted. they requested albumin 25% 100 cc Q6H Viviane Guzman, PGY-2, EM: paged hospitalist.

## 2023-01-17 NOTE — ED ADULT TRIAGE NOTE - CHIEF COMPLAINT QUOTE
patient c/o vomiting and abdominal pain x1 week. history of nephrotic syndrome, states "Im retaining a lot of water".

## 2023-01-17 NOTE — ED PROVIDER NOTE - PHYSICAL EXAMINATION
General: WN/WD NAD  Head: Atraumatic, normocephalic  Eyes: EOM grossly in tact, no scleral icterus, no discharge  ENT: dry mucous membranes  Neurology: A&Ox 3, nonfocal, LAST x 4  Respiratory: CTAB, no wheezing, normal respiratory effort  CV: RRR, good s1/s2, no S3, Extremities warm and well perfused  Abdominal: Soft, non-distended, + RLQ TTP  Extremities: 2+ pitting edema to B/L LE  Skin: warm and dry. No rashes

## 2023-01-18 DIAGNOSIS — R10.9 UNSPECIFIED ABDOMINAL PAIN: ICD-10-CM

## 2023-01-18 DIAGNOSIS — N05.0 UNSPECIFIED NEPHRITIC SYNDROME WITH MINOR GLOMERULAR ABNORMALITY: ICD-10-CM

## 2023-01-18 DIAGNOSIS — R65.10 SYSTEMIC INFLAMMATORY RESPONSE SYNDROME (SIRS) OF NON-INFECTIOUS ORIGIN WITHOUT ACUTE ORGAN DYSFUNCTION: ICD-10-CM

## 2023-01-18 DIAGNOSIS — Z29.9 ENCOUNTER FOR PROPHYLACTIC MEASURES, UNSPECIFIED: ICD-10-CM

## 2023-01-18 LAB
ALBUMIN SERPL ELPH-MCNC: 1 G/DL — LOW (ref 3.3–5)
ALP SERPL-CCNC: 54 U/L — SIGNIFICANT CHANGE UP (ref 40–120)
ALT FLD-CCNC: <5 U/L — LOW (ref 10–45)
ANION GAP SERPL CALC-SCNC: 6 MMOL/L — SIGNIFICANT CHANGE UP (ref 5–17)
AST SERPL-CCNC: 8 U/L — LOW (ref 10–40)
BASOPHILS # BLD AUTO: 0.03 K/UL — SIGNIFICANT CHANGE UP (ref 0–0.2)
BASOPHILS NFR BLD AUTO: 0.4 % — SIGNIFICANT CHANGE UP (ref 0–2)
BILIRUB SERPL-MCNC: <0.1 MG/DL — LOW (ref 0.2–1.2)
BUN SERPL-MCNC: 9 MG/DL — SIGNIFICANT CHANGE UP (ref 7–23)
C3 SERPL-MCNC: 152 MG/DL — SIGNIFICANT CHANGE UP (ref 81–157)
C4 SERPL-MCNC: 38 MG/DL — SIGNIFICANT CHANGE UP (ref 13–39)
CALCIUM SERPL-MCNC: 6.6 MG/DL — LOW (ref 8.4–10.5)
CHLORIDE SERPL-SCNC: 113 MMOL/L — HIGH (ref 96–108)
CHOLEST SERPL-MCNC: 521 MG/DL — HIGH
CO2 SERPL-SCNC: 20 MMOL/L — LOW (ref 22–31)
CREAT ?TM UR-MCNC: 103 MG/DL — SIGNIFICANT CHANGE UP
CREAT ?TM UR-MCNC: 92 MG/DL — SIGNIFICANT CHANGE UP
CREAT SERPL-MCNC: 0.64 MG/DL — SIGNIFICANT CHANGE UP (ref 0.5–1.3)
DSDNA AB SER-ACNC: <12 IU/ML — SIGNIFICANT CHANGE UP
EGFR: 121 ML/MIN/1.73M2 — SIGNIFICANT CHANGE UP
EOSINOPHIL # BLD AUTO: 0.05 K/UL — SIGNIFICANT CHANGE UP (ref 0–0.5)
EOSINOPHIL NFR BLD AUTO: 0.6 % — SIGNIFICANT CHANGE UP (ref 0–6)
GLUCOSE SERPL-MCNC: 76 MG/DL — SIGNIFICANT CHANGE UP (ref 70–99)
HAV IGM SER-ACNC: SIGNIFICANT CHANGE UP
HBV CORE AB SER-ACNC: SIGNIFICANT CHANGE UP
HBV CORE IGM SER-ACNC: SIGNIFICANT CHANGE UP
HBV SURFACE AB SER-ACNC: SIGNIFICANT CHANGE UP
HBV SURFACE AG SER-ACNC: SIGNIFICANT CHANGE UP
HCT VFR BLD CALC: 33.8 % — LOW (ref 34.5–45)
HCV AB S/CO SERPL IA: 0.07 S/CO — SIGNIFICANT CHANGE UP (ref 0–0.99)
HCV AB SERPL-IMP: SIGNIFICANT CHANGE UP
HDLC SERPL-MCNC: 48 MG/DL — LOW
HGB BLD-MCNC: 10.6 G/DL — LOW (ref 11.5–15.5)
IMM GRANULOCYTES NFR BLD AUTO: 0.3 % — SIGNIFICANT CHANGE UP (ref 0–0.9)
KAPPA LC SER QL IFE: 2.61 MG/DL — HIGH (ref 0.33–1.94)
KAPPA/LAMBDA FREE LIGHT CHAIN RATIO, SERUM: 2.49 RATIO — HIGH (ref 0.26–1.65)
LAMBDA LC SER QL IFE: 1.05 MG/DL — SIGNIFICANT CHANGE UP (ref 0.57–2.63)
LIPID PNL WITH DIRECT LDL SERPL: 419 MG/DL — HIGH
LYMPHOCYTES # BLD AUTO: 1.84 K/UL — SIGNIFICANT CHANGE UP (ref 1–3.3)
LYMPHOCYTES # BLD AUTO: 23.7 % — SIGNIFICANT CHANGE UP (ref 13–44)
MAGNESIUM SERPL-MCNC: 1.7 MG/DL — SIGNIFICANT CHANGE UP (ref 1.6–2.6)
MCHC RBC-ENTMCNC: 30.1 PG — SIGNIFICANT CHANGE UP (ref 27–34)
MCHC RBC-ENTMCNC: 31.4 GM/DL — LOW (ref 32–36)
MCV RBC AUTO: 96 FL — SIGNIFICANT CHANGE UP (ref 80–100)
MONOCYTES # BLD AUTO: 1.04 K/UL — HIGH (ref 0–0.9)
MONOCYTES NFR BLD AUTO: 13.4 % — SIGNIFICANT CHANGE UP (ref 2–14)
NEUTROPHILS # BLD AUTO: 4.8 K/UL — SIGNIFICANT CHANGE UP (ref 1.8–7.4)
NEUTROPHILS NFR BLD AUTO: 61.6 % — SIGNIFICANT CHANGE UP (ref 43–77)
NON HDL CHOLESTEROL: 474 MG/DL — HIGH
NRBC # BLD: 0 /100 WBCS — SIGNIFICANT CHANGE UP (ref 0–0)
PHOSPHATE SERPL-MCNC: 3.6 MG/DL — SIGNIFICANT CHANGE UP (ref 2.5–4.5)
PLATELET # BLD AUTO: 453 K/UL — HIGH (ref 150–400)
POTASSIUM SERPL-MCNC: 3.1 MMOL/L — LOW (ref 3.5–5.3)
POTASSIUM SERPL-SCNC: 3.1 MMOL/L — LOW (ref 3.5–5.3)
PROT ?TM UR-MCNC: >2000 MG/DL — HIGH (ref 0–12)
PROT ?TM UR-MCNC: SIGNIFICANT CHANGE UP MG/DL (ref 0–12)
PROT SERPL-MCNC: 3.4 G/DL — LOW (ref 6–8.3)
PROT SERPL-MCNC: 4 G/DL — LOW (ref 6–8.3)
PROT SERPL-MCNC: 4 G/DL — LOW (ref 6–8.3)
PROT/CREAT UR-RTO: SIGNIFICANT CHANGE UP (ref 0–0.2)
PROT/CREAT UR-RTO: SIGNIFICANT CHANGE UP (ref 0–0.2)
RAPID RVP RESULT: SIGNIFICANT CHANGE UP
RBC # BLD: 3.52 M/UL — LOW (ref 3.8–5.2)
RBC # FLD: 14.6 % — HIGH (ref 10.3–14.5)
SARS-COV-2 RNA SPEC QL NAA+PROBE: SIGNIFICANT CHANGE UP
SODIUM SERPL-SCNC: 139 MMOL/L — SIGNIFICANT CHANGE UP (ref 135–145)
TRIGL SERPL-MCNC: 274 MG/DL — HIGH
WBC # BLD: 7.78 K/UL — SIGNIFICANT CHANGE UP (ref 3.8–10.5)
WBC # FLD AUTO: 7.78 K/UL — SIGNIFICANT CHANGE UP (ref 3.8–10.5)

## 2023-01-18 PROCEDURE — 12345: CPT | Mod: NC,GC

## 2023-01-18 PROCEDURE — 99223 1ST HOSP IP/OBS HIGH 75: CPT

## 2023-01-18 PROCEDURE — 99223 1ST HOSP IP/OBS HIGH 75: CPT | Mod: GC

## 2023-01-18 PROCEDURE — 93975 VASCULAR STUDY: CPT | Mod: 26

## 2023-01-18 PROCEDURE — 74176 CT ABD & PELVIS W/O CONTRAST: CPT | Mod: 26,MA

## 2023-01-18 RX ORDER — ALBUTEROL 90 UG/1
2 AEROSOL, METERED ORAL EVERY 6 HOURS
Refills: 0 | Status: DISCONTINUED | OUTPATIENT
Start: 2023-01-18 | End: 2023-01-26

## 2023-01-18 RX ORDER — ATORVASTATIN CALCIUM 80 MG/1
80 TABLET, FILM COATED ORAL AT BEDTIME
Refills: 0 | Status: DISCONTINUED | OUTPATIENT
Start: 2023-01-18 | End: 2023-01-26

## 2023-01-18 RX ORDER — ALBUTEROL 90 UG/1
2 AEROSOL, METERED ORAL
Qty: 0 | Refills: 0 | DISCHARGE

## 2023-01-18 RX ORDER — ACETAMINOPHEN 500 MG
650 TABLET ORAL EVERY 6 HOURS
Refills: 0 | Status: DISCONTINUED | OUTPATIENT
Start: 2023-01-18 | End: 2023-01-26

## 2023-01-18 RX ORDER — ASPIRIN/CALCIUM CARB/MAGNESIUM 324 MG
81 TABLET ORAL DAILY
Refills: 0 | Status: DISCONTINUED | OUTPATIENT
Start: 2023-01-18 | End: 2023-01-26

## 2023-01-18 RX ORDER — APIXABAN 2.5 MG/1
5 TABLET, FILM COATED ORAL
Refills: 0 | Status: DISCONTINUED | OUTPATIENT
Start: 2023-01-18 | End: 2023-01-26

## 2023-01-18 RX ORDER — ONDANSETRON 8 MG/1
4 TABLET, FILM COATED ORAL EVERY 8 HOURS
Refills: 0 | Status: DISCONTINUED | OUTPATIENT
Start: 2023-01-18 | End: 2023-01-23

## 2023-01-18 RX ORDER — ENOXAPARIN SODIUM 100 MG/ML
40 INJECTION SUBCUTANEOUS EVERY 24 HOURS
Refills: 0 | Status: DISCONTINUED | OUTPATIENT
Start: 2023-01-18 | End: 2023-01-18

## 2023-01-18 RX ORDER — APIXABAN 2.5 MG/1
5 TABLET, FILM COATED ORAL EVERY 12 HOURS
Refills: 0 | Status: DISCONTINUED | OUTPATIENT
Start: 2023-01-18 | End: 2023-01-18

## 2023-01-18 RX ORDER — ASPIRIN/CALCIUM CARB/MAGNESIUM 324 MG
1 TABLET ORAL
Qty: 0 | Refills: 0 | DISCHARGE

## 2023-01-18 RX ORDER — ONDANSETRON 8 MG/1
4 TABLET, FILM COATED ORAL EVERY 8 HOURS
Refills: 0 | Status: DISCONTINUED | OUTPATIENT
Start: 2023-01-18 | End: 2023-01-18

## 2023-01-18 RX ORDER — ERGOCALCIFEROL 1.25 MG/1
1 CAPSULE ORAL
Qty: 0 | Refills: 0 | DISCHARGE

## 2023-01-18 RX ORDER — INFLUENZA VIRUS VACCINE 15; 15; 15; 15 UG/.5ML; UG/.5ML; UG/.5ML; UG/.5ML
0.5 SUSPENSION INTRAMUSCULAR ONCE
Refills: 0 | Status: DISCONTINUED | OUTPATIENT
Start: 2023-01-18 | End: 2023-01-26

## 2023-01-18 RX ORDER — ACETAMINOPHEN 500 MG
975 TABLET ORAL ONCE
Refills: 0 | Status: COMPLETED | OUTPATIENT
Start: 2023-01-18 | End: 2023-01-18

## 2023-01-18 RX ORDER — TACROLIMUS 5 MG/1
3 CAPSULE ORAL
Refills: 0 | Status: DISCONTINUED | OUTPATIENT
Start: 2023-01-18 | End: 2023-01-20

## 2023-01-18 RX ADMIN — Medication 50 MILLILITER(S): at 12:46

## 2023-01-18 RX ADMIN — Medication 50 MILLILITER(S): at 17:07

## 2023-01-18 RX ADMIN — ENOXAPARIN SODIUM 40 MILLIGRAM(S): 100 INJECTION SUBCUTANEOUS at 05:34

## 2023-01-18 RX ADMIN — Medication 50 MILLILITER(S): at 05:32

## 2023-01-18 RX ADMIN — ONDANSETRON 4 MILLIGRAM(S): 8 TABLET, FILM COATED ORAL at 17:21

## 2023-01-18 RX ADMIN — Medication 650 MILLIGRAM(S): at 16:57

## 2023-01-18 RX ADMIN — Medication 650 MILLIGRAM(S): at 18:00

## 2023-01-18 RX ADMIN — TACROLIMUS 3 MILLIGRAM(S): 5 CAPSULE ORAL at 17:21

## 2023-01-18 RX ADMIN — APIXABAN 5 MILLIGRAM(S): 2.5 TABLET, FILM COATED ORAL at 17:11

## 2023-01-18 RX ADMIN — Medication 975 MILLIGRAM(S): at 02:21

## 2023-01-18 RX ADMIN — Medication 50 MILLILITER(S): at 00:09

## 2023-01-18 RX ADMIN — ATORVASTATIN CALCIUM 80 MILLIGRAM(S): 80 TABLET, FILM COATED ORAL at 22:10

## 2023-01-18 RX ADMIN — Medication 81 MILLIGRAM(S): at 12:46

## 2023-01-18 NOTE — CONSULT NOTE ADULT - ASSESSMENT
Pt with Minimal Change Disease Hemigard Intro: Due to skin fragility and wound tension, it was decided to use HEMIGARD adhesive retention suture devices to permit a linear closure. The skin was cleaned and dried for a 6cm distance away from the wound. Excessive hair, if present, was removed to allow for adhesion.

## 2023-01-18 NOTE — H&P ADULT - ATTENDING COMMENTS
32 year old F PMH: MCKENNA PW: worsening abdominal pain and vomiting since 1 week    On arrival to ED VS: Tmax 101.3, HR: 149--> 110, BP: 104/66, saturating 98% on RA   Significant labs: Plt 481k, total protein 4.4, Alb 1.1, T bili < 0.1, UA w RBC, 33 WBC, SG 1.047, Protein >600, small blood   Imaging: CTAP No bowel obstruction. Normal appendix. No right or left hydronephrosis, obstructing urinary tract calculus,  nephrolithiasis, or asymmetric perinephric stranding. Trace bilateral pleural effusions, trace fluid in the right paracolic   gutter and pelvis, and generalized soft tissue edema.  CXR personally reviewed- b/l costophrenic angles blunted, b/l trace pleural effusions vs 2/2 to technique?   EKG personally reviewed     Patient admitted for further management of MCKENNA and sepsis     #MCKENNA   -Nephrology consulted in ER, started on IV albumin, f/u in AM, will discuss diuresis w renal.   -Check renal US w doppler   -Check protein/Cr ratio   -Check urine lytes     #Sepsis  Meets 2/4 SIRS criteria (Tmax, HR), 2/2 to diarrhea? acute GE?  -Will check BC, CXR and UA negative  -If diarrhea check GI PCR   -Monitor for signs and sx of infection 32 year old F PMH: MCKENNA PW: worsening abdominal pain and vomiting since 1 week    On arrival to ED VS: Tmax 101.3, HR: 149--> 110, BP: 104/66, saturating 98% on RA   Significant labs: Plt 481k, total protein 4.4, Alb 1.1, T bili < 0.1, UA w RBC, 33 WBC, SG 1.047, Protein >600, small blood   Imaging: CTAP No bowel obstruction. Normal appendix. No right or left hydronephrosis, obstructing urinary tract calculus,  nephrolithiasis, or asymmetric perinephric stranding. Trace bilateral pleural effusions, trace fluid in the right paracolic   gutter and pelvis, and generalized soft tissue edema.  CXR personally reviewed- b/l costophrenic angles blunted, b/l trace pleural effusions vs 2/2 to technique?   EKG personally reviewed     Patient admitted for further management of MCKENNA and sepsis     #MCKENNA   -Nephrology consulted in ER, started on IV albumin, f/u in AM, will discuss diuresis w renal.   -Check renal US w doppler   -Check protein/Cr ratio   -Check urine lytes     #Sepsis  Meets 2/4 SIRS criteria (Tmax, HR), 2/2 to diarrhea? acute GE?  -Will check BC, CXR and UA negative  -If diarrhea check GI PCR   -Monitor for signs and sx of infection    Resume home meds  Rest as above 32 year old F PMH: MCKENNA PW: worsening abdominal pain and vomiting since 1 week    On arrival to ED VS: Tmax 101.3, HR: 149--> 110, BP: 104/66, saturating 98% on RA   Significant labs: Plt 481k, total protein 4.4, Alb 1.1, T bili < 0.1, UA w RBC, 33 WBC, SG 1.047, Protein >600, small blood   Imaging: CTAP No bowel obstruction. Normal appendix. No right or left hydronephrosis, obstructing urinary tract calculus,  nephrolithiasis, or asymmetric perinephric stranding. Trace bilateral pleural effusions, trace fluid in the right paracolic   gutter and pelvis, and generalized soft tissue edema.  CXR personally reviewed- b/l costophrenic angles blunted, b/l trace pleural effusions vs 2/2 to technique?   EKG personally reviewed- not on file, will order for AM     Patient admitted for further management of MCKENNA and sepsis     #MCKENNA   -Nephrology consulted in ER, started on IV albumin, f/u in AM, will discuss diuresis w renal.   -Check renal US w doppler   -Check protein/Cr ratio   -Check urine lytes     #Sepsis  Meets 2/4 SIRS criteria (Tmax, HR), 2/2 to diarrhea? acute GE?  -Will check BC, CXR and UA negative  -If diarrhea check GI PCR   -Monitor for signs and sx of infection    Resume home meds  Rest as above

## 2023-01-18 NOTE — H&P ADULT - NSHPREVIEWOFSYSTEMS_GEN_ALL_CORE
Constitutional: no weight change, no fever, no chills, no night sweats, no fatigue  ENT/mouth: no hearing changes, no sore throat, no rhinorrhea  Eyes: no eye pain, no eye redness, no eye swelling, no vision changes  CV: no chest pain, no orthopnea, no palpitations  Resp: no shortness of breath, no cough, no wheezing  GI: +abdominal pain, +nausea, +vomiting, +diarrhea, no constipation  : no dysuria, no urinary frequency or hesitancy, no hematuria  Skin/MSK: no pain, no rashes, +lower extremity edema  Neuro: no weakness, no numbness, no loss of consciousness, no syncope, no dizziness, no headache

## 2023-01-18 NOTE — PROGRESS NOTE ADULT - PROBLEM SELECTOR PLAN 1
UA Prot > 600, alb 1.1  - obtain urine prot/cr  - obtain SHELTON, C3, C4, dsDNA, serum immunofixation, serum Ig free light chain, SPEP, phospholipase A2, hep c antibody, hep b surface antigen, lipid panel  - Nephrology consulted, f/u recs  - c/w albumin 25% q6h  - c/w statin (home Crestor 40 -> lipitor 80)  - f/u renal duplex UA Prot > 600, alb 1.1  - obtain urine prot/cr  - obtain SHELTON, C3, C4, dsDNA, serum immunofixation, serum Ig free light chain, SPEP, phospholipase A2, hep c antibody, hep b surface antigen, lipid panel  - Nephrology consulted, f/u recs  - c/w albumin 25% q6h  - c/w statin (home Crestor 40 -> lipitor 80)  - f/u renal duplex  - f/u renal recs    - start tacrolimus

## 2023-01-18 NOTE — CONSULT NOTE ADULT - SUBJECTIVE AND OBJECTIVE BOX
[facility] DIVISION OF KIDNEY DISEASES AND HYPERTENSION -- [phone]  -- INITIAL CONSULT NOTE  --------------------------------------------------------------------------------  HPI:  Patient is a 32 year old female with PMH of Minimal Change Disease who presented to the hospital with complaints of nausea, vomiitng, diarrhea and abdominal pain for the past few days. The nephrology team was consulted for her history of MCKENNA and significant proteinuria. The pt was seen and examined in the ED this morning. Of note the patient was admitted in 2022 with similar complaints at which time she was noted to have worsening proteinuria. She was on a steroid therapy at that time and sicharged with nephrology follow up. She states she was following with a nephrologist from [facility] on [facility] but does not remember the name. She states last time she saw him was on 2022 and admits she has been off steroids for the past one month. She denies being on any treatment for her MCKENNA at this time. She states even at that time her proteinuria had not resolved but she does not remember what the quantification was at that time. She admitted to some fevers and chills as well and noted to be febrile on arrival to ED. She denied any shortness of breath or chest pain.      PAST HISTORY  --------------------------------------------------------------------------------  PAST MEDICAL & SURGICAL HISTORY:  Asthma      GERD (gastroesophageal reflux disease)      Anemia      Gastritis      Thrombophlebitis/phlebitis  thrombophlebitis of R gonadal vein      UTI (urinary tract infection)      H/O:       History of tonsillectomy        FAMILY HISTORY:  No pertinent family history in first degree relatives      PAST SOCIAL HISTORY:    ALLERGIES & MEDICATIONS  --------------------------------------------------------------------------------  Allergies    codeine (Hives)    Intolerances      Standing Inpatient Medications  albumin human 25% IVPB 50 milliLiter(s) IV Intermittent every 6 hours  aspirin enteric coated 81 milliGRAM(s) Oral daily  atorvastatin 80 milliGRAM(s) Oral at bedtime  enoxaparin Injectable 40 milliGRAM(s) SubCutaneous every 24 hours    PRN Inpatient Medications  acetaminophen     Tablet .. 650 milliGRAM(s) Oral every 6 hours PRN  albuterol    90 MICROgram(s) HFA Inhaler 2 Puff(s) Inhalation every 6 hours PRN      REVIEW OF SYSTEMS  --------------------------------------------------------------------------------  Gen: No fevers/chills  Skin: No rashes  Head/Eyes/Ears: Normal hearing,   Respiratory: No dyspnea, cough  CV: No chest pain  GI: No abdominal pain, diarrhea  : No dysuria, hematuria  MSK: No  edema  Heme: No easy bruising or bleeding  Psych: No significant depression    All other systems were reviewed and are negative, except as noted.    VITALS/PHYSICAL EXAM  --------------------------------------------------------------------------------  T(C): 36.7 (23 @ 07:47), Max: 38.5 (23 @ 21:39)  HR: 87 (23 @ 07:47) (87 - 149)  BP: 96/58 (23 @ 07:47) (95/57 - 125/78)  RR: 19 (23 @ 07:47) (16 - 20)  SpO2: 96% (23 07:47) (95% - 99%)  Wt(kg): --  Height (cm): 154.9 (23 @ 20:15)  Weight (kg): 68 (23 @ 20:15)  BMI (kg/m2): 28.3 (23 20:15)  BSA (m2): 1.67 (23 @ 20:15)      Physical Exam:  	Gen: NAD  	HEENT: MMM  	Pulm: CTA B/L  	CV: S1S2  	Abd: Soft, +BS   	Ext: No LE edema B/L  	Neuro: Awake  	Skin: Warm and dry  	Vascular access:    LABS/STUDIES  --------------------------------------------------------------------------------              10.6   7.78  >-----------<  453      [23 @ 05:57]              33.8     139  |  113  |  9   ----------------------------<  76      [23 05:57]  3.1   |  20  |  0.64        Ca     6.6     [23 05:57]      Mg     1.7     [23 05:57]      Phos  3.6     [23 05:57]    TPro  3.4  /  Alb  1.0  /  TBili  <0.1  /  DBili  x   /  AST  8   /  ALT  <5  /  AlkPhos  54  [23 05:57]          Creatinine Trend:  SCr 0.64 [ 05:57]  SCr 0.72 [ 22:05]    Urinalysis - [23 22:25]      Color Yellow / Appearance Slightly Turbid / SG 1.047 / pH 8.0      Gluc Negative / Ketone Negative  / Bili Negative / Urobili Negative       Blood Small / Protein >600 / Leuk Est Negative / Nitrite Negative      RBC 7 / WBC 33 / Hyaline 22 / Gran  / Sq Epi  / Non Sq Epi 9 / Bacteria Negative      Lipid: chol 477, , HDL 67, LDL --      [22 @ 06:41]    HBsAb Nonreact      [21 @ 09:45]  HCV 0.10, Nonreact      [21 @ 09:45]  HIV Nonreact      [21 @ 07:26]  HIV Nonreact      [20 @ 14:43]    SHELTON: titer Negative, pattern --      [21 @ 10:20]  C3 Complement 132      [21 @ 07:26]  C4 Complement 35      [21 @ 07:26]  PLA2R: ALESSANDRA <1.8, IFA --      [21 @ 20:44]  Immunofixation Serum:   Weak IgM Kappa Band Identified    Reference Range: None Detected      [21 @ 15:38]   [facility] DIVISION OF KIDNEY DISEASES AND HYPERTENSION -- [phone]  -- INITIAL CONSULT NOTE  --------------------------------------------------------------------------------  HPI:  Patient is a 32 year old female with PMH of Minimal Change Disease who presented to the hospital with complaints of nausea, vomiitng, diarrhea and abdominal pain for the past few days. The nephrology team was consulted for her history of MCKENNA and significant proteinuria. The pt was seen and examined in the ED this morning. Of note the patient was admitted in 2022 with similar complaints at which time she was noted to have worsening proteinuria. She was on a steroid therapy at that time and sicharged with nephrology follow up. She states she was following with a nephrologist from [facility] on [facility] but does not remember the name. She states last time she saw him was on 2022 and admits she has been off steroids for the past one month. She denies being on any treatment for her MCKENNA at this time. She states even at that time her proteinuria had not resolved but she does not remember what the quantification was at that time. She admitted to some fevers and chills as well and noted to be febrile on arrival to ED. She denied any shortness of breath or chest pain.      PAST HISTORY  --------------------------------------------------------------------------------  PAST MEDICAL & SURGICAL HISTORY:  Asthma      GERD (gastroesophageal reflux disease)      Anemia      Gastritis      Thrombophlebitis/phlebitis  thrombophlebitis of R gonadal vein      UTI (urinary tract infection)      H/O:       History of tonsillectomy        FAMILY HISTORY:  No pertinent family history in first degree relatives      PAST SOCIAL HISTORY:    ALLERGIES & MEDICATIONS  --------------------------------------------------------------------------------  Allergies    codeine (Hives)    Intolerances      Standing Inpatient Medications  albumin human 25% IVPB 50 milliLiter(s) IV Intermittent every 6 hours  aspirin enteric coated 81 milliGRAM(s) Oral daily  atorvastatin 80 milliGRAM(s) Oral at bedtime  enoxaparin Injectable 40 milliGRAM(s) SubCutaneous every 24 hours    PRN Inpatient Medications  acetaminophen     Tablet .. 650 milliGRAM(s) Oral every 6 hours PRN  albuterol    90 MICROgram(s) HFA Inhaler 2 Puff(s) Inhalation every 6 hours PRN      REVIEW OF SYSTEMS    All other systems were reviewed and are negative, except as noted.    VITALS/PHYSICAL EXAM  --------------------------------------------------------------------------------  T(C): 36.7 (23 @ 07:47), Max: 38.5 (23 @ 21:39)  HR: 87 (23 @ 07:47) (87 - 149)  BP: 96/58 (23 @ 07:47) (95/57 - 125/78)  RR: 19 (23 @ 07:47) (16 - 20)  SpO2: 96% (23 @ 07:47) (95% - 99%)  Wt(kg): --  Height (cm): 154.9 (23 @ 20:15)  Weight (kg): 68 (23 @ 20:15)  BMI (kg/m2): 28.3 (23 @ 20:15)  BSA (m2): 1.67 (23 @ 20:15)      Physical Exam:  	Gen: ill appearing  	HEENT: MMM  	Pulm: CTA B/L  	CV: S1S2  	Abd: Soft, +BS   	Ext: ++ LE edema B/L, anasarca  	Neuro: Awake  	Skin: Warm and dry    LABS/STUDIES  --------------------------------------------------------------------------------              10.6   7.78  >-----------<  453      [23 05:57]              33.8     139  |  113  |  9   ----------------------------<  76      [23 05:57]  3.1   |  20  |  0.64        Ca     6.6     [23 05:57]      Mg     1.7     [23 05:57]      Phos  3.6     [23 05:57]    TPro  3.4  /  Alb  1.0  /  TBili  <0.1  /  DBili  x   /  AST  8   /  ALT  <5  /  AlkPhos  54  [23 05:57]          Creatinine Trend:  SCr 0.64 [ 05:57]  SCr 0.72 [ 22:05]    Urinalysis - [23 @ 22:25]      Color Yellow / Appearance Slightly Turbid / SG 1.047 / pH 8.0      Gluc Negative / Ketone Negative  / Bili Negative / Urobili Negative       Blood Small / Protein >600 / Leuk Est Negative / Nitrite Negative      RBC 7 / WBC 33 / Hyaline 22 / Gran  / Sq Epi  / Non Sq Epi 9 / Bacteria Negative      Lipid: chol 477, , HDL 67, LDL --      [22 @ 06:41]    HBsAb Nonreact      [21 @ 09:45]  HCV 0.10, Nonreact      [21 @ 09:45]  HIV Nonreact      [21 @ 07:26]  HIV Nonreact      [20 @ 14:43]    SHELTON: titer Negative, pattern --      [21 @ 10:20]  C3 Complement 132      [21 @ 07:26]  C4 Complement 35      [21 @ 07:26]  PLA2R: ALESSANDRA <1.8, IFA --      [21 @ 20:44]  Immunofixation Serum:   Weak IgM Kappa Band Identified    Reference Range: None Detected      [21 @ 15:38]

## 2023-01-18 NOTE — CONSULT NOTE ADULT - PROBLEM SELECTOR RECOMMENDATION 9
Pt with biopsy proven Minimal Change Disease presenting to the hospital with worsening proteinuria and anasarca. Pt previously admitted in 6/2022 with similar complaints noted to have TP/CR ratio of 3.8g, at that time started on steroid therapy and discharged with nephrology follow up. Now off steroids for past one month and steroids have failed for her in the past. Recommend repeating spot urine TP/CR ratio at this time. 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 Please monitor tacrolimus levels daily 30 minutes prior to AM dose (target level 4-6). Recommend starting the patient on anticoagulation with Eliquis 5mg BID (pt with severe nephrotic syndrome are at high risk for thromboembolic events). Start on PCP ppx with bactrim. Send out quantiferon, for possible need for Rituximab in the future.     If any questions, please feel free to contact me     Mukul Faustin  Nephrology Fellow  [facility] Pager: [phone]

## 2023-01-18 NOTE — H&P ADULT - PROBLEM SELECTOR PLAN 3
Likely 2/2 generalized edema  CT AP w/o other causes  - tylenol PRN for pain, zofran PRN for nausea  - MCKENNA treatment as above

## 2023-01-18 NOTE — H&P ADULT - NSHPSOCIALHISTORY_GEN_ALL_CORE
Lives at home with mother and her children  Works as a   Denies tobacco use or other substance use. Endorses occasional social drinking.

## 2023-01-18 NOTE — H&P ADULT - HISTORY OF PRESENT ILLNESS
32F with MCKENNA and asthma p/w abdominal pain, n/v, and generalized swelling x 1 week. States abdominal pain is diffuse but worse over RLQ. Also endorses diarrhea (3x/day) x 1 week as well. Denies CP, SOB, dyspnea, fevers, congestion, or dysuria. States she does not follow with an outpatient nephrologist as she is in between providers. Previously followed with provider at [facility]. Has been off prednisone for about 1 month. States she does not wish to start steroids again as she had 60lb weight gain over the past year on steroids and routinely has periods of fluid retention/swelling despite being on steroids. PMD sometimes prescribes Lasix when she has increased swelling, but has not had any this time. In the ED, T101.3 and HR 110s. Labs significant for albumin 1.1, UA prot > 600. CTAP with trace b/l pleural effusions and generalized soft tissue edema. Nephrology consulted and recommended albumin infusion. Admitted to medicine for further management.

## 2023-01-18 NOTE — H&P ADULT - NSHPPHYSICALEXAM_GEN_ALL_CORE
Vital Signs Last 24 Hrs  T(C): 36.7 (18 Jan 2023 01:27), Max: 38.5 (17 Jan 2023 21:39)  T(F): 98.1 (18 Jan 2023 01:27), Max: 101.3 (17 Jan 2023 21:39)  HR: 110 (18 Jan 2023 01:27) (110 - 149)  BP: 95/57 (18 Jan 2023 01:27) (95/57 - 125/78)  BP(mean): --  RR: 16 (18 Jan 2023 01:27) (16 - 20)  SpO2: 95% (18 Jan 2023 01:27) (95% - 98%)    Parameters below as of 18 Jan 2023 01:27  Patient On (Oxygen Delivery Method): room air      Gen: no acute distress  HEENT: atraumatic, normocephalic, extraocular muscles intact  CV: RRR no murmurs  Resp: CTAB  GI: soft, nontender, mildly distended, BS+  MSK: extremities atraumatic, no cyanosis or clubbing, 2+BLE edema, +anasarca  Skin: warm, dry, no rashes or lesions  Neuro: no focal deficits, sensation grossly intact  Psych: alert and oriented x3, appropriate mood and affect

## 2023-01-18 NOTE — PROGRESS NOTE ADULT - SUBJECTIVE AND OBJECTIVE BOX
Medicine Progress Note    Patient is a 32y old  Female who presents with a chief complaint of MCKENNA flare (2023 09:19)      SUBJECTIVE / OVERNIGHT EVENTS: No acute events since admission, reports continued LE swelling, denies SOB, cough ,CP f/c, n/v/d/c.     ADDITIONAL REVIEW OF SYSTEMS: negative    MEDICATIONS  (STANDING):  albumin human 25% IVPB 50 milliLiter(s) IV Intermittent every 6 hours  apixaban 5 milliGRAM(s) Oral two times a day  aspirin enteric coated 81 milliGRAM(s) Oral daily  atorvastatin 80 milliGRAM(s) Oral at bedtime  tacrolimus 3 milliGRAM(s) Oral two times a day  trimethoprim  160 mG/sulfamethoxazole 800 mG 1 Tablet(s) Oral daily    MEDICATIONS  (PRN):  acetaminophen     Tablet .. 650 milliGRAM(s) Oral every 6 hours PRN Temp greater or equal to 38C (100.4F), Mild Pain (1 - 3)  albuterol    90 MICROgram(s) HFA Inhaler 2 Puff(s) Inhalation every 6 hours PRN Shortness of Breath and/or Wheezing    CAPILLARY BLOOD GLUCOSE        I&O's Summary      PHYSICAL EXAM:  Vital Signs Last 24 Hrs  T(C): 36.8 (2023 11:40), Max: 38.5 (2023 21:39)  T(F): 98.3 (2023 11:40), Max: 101.3 (2023 21:39)  HR: 98 (2023 11:40) (87 - 149)  BP: 100/67 (2023 11:40) (95/57 - 125/78)  BP(mean): --  RR: 18 (2023 11:40) (16 - 20)  SpO2: 98% (2023 11:40) (95% - 99%)    Parameters below as of 2023 11:40  Patient On (Oxygen Delivery Method): room air      CONSTITUTIONAL: NAD, well-developed, well-groomed  ENMT: Moist oral mucosa, no pharyngeal injection or exudates; normal dentition  RESPIRATORY: Normal respiratory effort; lungs are clear to auscultation bilaterally  CARDIOVASCULAR: Regular rate and rhythm, normal S1 and S2, no murmur/rub/gallop;  Peripheral pulses are 2+ bilaterally. pitting edema up to upper shins   ABDOMEN: Nontender to palpation, normoactive bowel sounds, no rebound/guarding; No hepatosplenomegaly  PSYCH: A+O to person, place, and time; affect appropriate  NEUROLOGY: CN 2-12 are intact and symmetric; no gross sensory deficits   SKIN: No rashes; no palpable lesions    LABS:                        10.6   7.78  )-----------( 453      ( 2023 05:57 )             33.8         139  |  113<H>  |  9   ----------------------------<  76  3.1<L>   |  20<L>  |  0.64    Ca    6.6<L>      2023 05:57  Phos  3.6       Mg     1.7         TPro  4.0<L>  /  Alb  x   /  TBili  x   /  DBili  x   /  AST  x   /  ALT  x   /  AlkPhos  x             Urinalysis Basic - ( 2023 22:25 )    Color: Yellow / Appearance: Slightly Turbid / S.047 / pH: x  Gluc: x / Ketone: Negative  / Bili: Negative / Urobili: Negative   Blood: x / Protein: >600 / Nitrite: Negative   Leuk Esterase: Negative / RBC: 7 /hpf / WBC 33 /HPF   Sq Epi: x / Non Sq Epi: 9 /hpf / Bacteria: Negative        SARS-CoV-2: NotDetec (2023 22:04)

## 2023-01-18 NOTE — H&P ADULT - NSHPLABSRESULTS_GEN_ALL_CORE
12.3   9.24  )-----------( 481      ( 2023 22:05 )             39.7           139  |  107  |  10  ----------------------------<  90  3.6   |  25  |  0.72    Ca    8.4      2023 22:05    TPro  4.4<L>  /  Alb  1.1<L>  /  TBili  <0.1<L>  /  DBili  x   /  AST  16  /  ALT  10  /  AlkPhos  76                Urinalysis Basic - ( 2023 22:25 )    Color: Yellow / Appearance: Slightly Turbid / S.047 / pH: x  Gluc: x / Ketone: Negative  / Bili: Negative / Urobili: Negative   Blood: x / Protein: >600 / Nitrite: Negative   Leuk Esterase: Negative / RBC: 7 /hpf / WBC 33 /HPF   Sq Epi: x / Non Sq Epi: 9 /hpf / Bacteria: Negative            Lactate Trend            CAPILLARY BLOOD GLUCOSE    CTAP  FINDINGS:  LOWER CHEST: Trace bilateral pleural effusions and mild bibasilar   atelectasis.    LIVER: Within normal limits.  BILE DUCTS: Normal caliber.  GALLBLADDER: Within normal limits.  SPLEEN: Within normal limits.  PANCREAS: Within normal limits.  ADRENALS: Within normal limits.  KIDNEYS/URETERS: No right or left hydroureteronephrosis or obstructing   urinary tract calculus. No nephrolithiasis. No asymmetric perinephric   stranding.    BLADDER: Underdistended.  REPRODUCTIVE ORGANS: Unremarkable apart from a cervical nabothian cyst.    BOWEL: No bowel obstruction. Appendix is normal. Mild colonic   diverticulosis without evidence of diverticulitis.  PERITONEUM: Trace pelvic free fluid. No pneumoperitoneum. No mesenteric   lymphadenopathy.  VESSELS: Within normal limits.  RETROPERITONEUM/LYMPH NODES: Scattered nonspecific subcentimeter   retroperitoneal lymph nodes. Trace free fluid in the right paracolic   gutter.  ABDOMINAL WALL: Generalized soft tissue edema. Small fat-containing   umbilical hernia. Metallic umbilical ring and midline labial ring.  BONES: Within normal limits.    IMPRESSION:  No bowel obstruction. Normal appendix.    No right or left hydronephrosis, obstructing urinary tract calculus,   nephrolithiasis, or asymmetric perinephric stranding.    Trace bilateral pleural effusions, trace fluid in the right paracolic   gutter and pelvis, and generalized soft tissue edema.

## 2023-01-18 NOTE — CONSULT NOTE ADULT - ATTENDING COMMENTS
Minimal change biopsy proven  Refractory to steroids and pt with poor followup history with neph  Off steroids for over a month  Still nephrotic  Discussed with pt using non steroid based therapies and she is agreeable  Will start tac  Eventually will consider Rituxan but will have her also follow with Dr Apodaca from our group who had seen her in past     Tish Elizabeth MD  Off: [phone]  contact me on teams    (After 5 pm or on weekends please page the on-call fellow/attending, can check [email or web address] for schedule. Login is jose antonio willoughby, schedule under [facility] medicine, psych, derm)

## 2023-01-18 NOTE — ED ADULT NURSE REASSESSMENT NOTE - NS ED NURSE REASSESS COMMENT FT1
Received report from DANIKA Petit. Pt sleeping comfortably in stretcher. Pt A&Ox4 pt does not appear to be in any acute distress, skin is warm and dry. Pt admitted to medicine and awaiting bed assignment. Safety and comfort provided

## 2023-01-18 NOTE — H&P ADULT - ASSESSMENT
32F with MCKENNA and asthma p/w abdominal pain, n/v, and generalized swelling x 1 week, admitted for MCKENNA flare.

## 2023-01-18 NOTE — PROGRESS NOTE ADULT - ATTENDING COMMENTS
-F/u renal recs for further mgmt.   -F/u cultures in setting of fever. -F/u stool studies if has any more diarrhea.

## 2023-01-18 NOTE — PATIENT PROFILE ADULT - FALL HARM RISK - UNIVERSAL INTERVENTIONS
Bed in lowest position, wheels locked, appropriate side rails in place/Call bell, personal items and telephone in reach/Instruct patient to call for assistance before getting out of bed or chair/Non-slip footwear when patient is out of bed/[place] to call system/Physically safe environment - no spills, clutter or unnecessary equipment/Purposeful Proactive Rounding/Room/bathroom lighting operational, light cord in reach

## 2023-01-18 NOTE — H&P ADULT - PROBLEM SELECTOR PLAN 1
- Nephrology consulted, f/u recs  - c/w albumin 25% q6h  - c/w statin (home Crestor 40 -> lipitor 80) UA Prot > 600, alb 1.1  - obtain urine prot/cr  - Nephrology consulted, f/u recs  - c/w albumin 25% q6h  - c/w statin (home Crestor 40 -> lipitor 80) UA Prot > 600, alb 1.1  - obtain urine prot/cr  - obtain SHELTON, C3, C4, dsDNA, serum immunofixation, serum Ig free light chain, SPEP, phospholipase A2, hep c antibody, hep b surface antigen, lipid panel  - Nephrology consulted, f/u recs  - c/w albumin 25% q6h  - c/w statin (home Crestor 40 -> lipitor 80)  - f/u renal duplex

## 2023-01-19 LAB
ALBUMIN SERPL ELPH-MCNC: 1.5 G/DL — LOW (ref 3.3–5)
ALP SERPL-CCNC: 66 U/L — SIGNIFICANT CHANGE UP (ref 40–120)
ALT FLD-CCNC: 9 U/L — LOW (ref 10–45)
ANA TITR SER: NEGATIVE — SIGNIFICANT CHANGE UP
ANION GAP SERPL CALC-SCNC: 7 MMOL/L — SIGNIFICANT CHANGE UP (ref 5–17)
AST SERPL-CCNC: 17 U/L — SIGNIFICANT CHANGE UP (ref 10–40)
BASOPHILS # BLD AUTO: 0.02 K/UL — SIGNIFICANT CHANGE UP (ref 0–0.2)
BASOPHILS NFR BLD AUTO: 0.2 % — SIGNIFICANT CHANGE UP (ref 0–2)
BILIRUB SERPL-MCNC: <0.1 MG/DL — LOW (ref 0.2–1.2)
BUN SERPL-MCNC: 7 MG/DL — SIGNIFICANT CHANGE UP (ref 7–23)
CALCIUM SERPL-MCNC: 8.6 MG/DL — SIGNIFICANT CHANGE UP (ref 8.4–10.5)
CHLORIDE SERPL-SCNC: 107 MMOL/L — SIGNIFICANT CHANGE UP (ref 96–108)
CO2 SERPL-SCNC: 25 MMOL/L — SIGNIFICANT CHANGE UP (ref 22–31)
CREAT SERPL-MCNC: 0.73 MG/DL — SIGNIFICANT CHANGE UP (ref 0.5–1.3)
CULTURE RESULTS: SIGNIFICANT CHANGE UP
EGFR: 113 ML/MIN/1.73M2 — SIGNIFICANT CHANGE UP
EOSINOPHIL # BLD AUTO: 0.03 K/UL — SIGNIFICANT CHANGE UP (ref 0–0.5)
EOSINOPHIL NFR BLD AUTO: 0.3 % — SIGNIFICANT CHANGE UP (ref 0–6)
GLUCOSE SERPL-MCNC: 74 MG/DL — SIGNIFICANT CHANGE UP (ref 70–99)
HCT VFR BLD CALC: 38.4 % — SIGNIFICANT CHANGE UP (ref 34.5–45)
HGB BLD-MCNC: 11.9 G/DL — SIGNIFICANT CHANGE UP (ref 11.5–15.5)
IMM GRANULOCYTES NFR BLD AUTO: 0.4 % — SIGNIFICANT CHANGE UP (ref 0–0.9)
LYMPHOCYTES # BLD AUTO: 2.1 K/UL — SIGNIFICANT CHANGE UP (ref 1–3.3)
LYMPHOCYTES # BLD AUTO: 22.5 % — SIGNIFICANT CHANGE UP (ref 13–44)
MAGNESIUM SERPL-MCNC: 2 MG/DL — SIGNIFICANT CHANGE UP (ref 1.6–2.6)
MCHC RBC-ENTMCNC: 30 PG — SIGNIFICANT CHANGE UP (ref 27–34)
MCHC RBC-ENTMCNC: 31 GM/DL — LOW (ref 32–36)
MCV RBC AUTO: 96.7 FL — SIGNIFICANT CHANGE UP (ref 80–100)
MONOCYTES # BLD AUTO: 0.91 K/UL — HIGH (ref 0–0.9)
MONOCYTES NFR BLD AUTO: 9.8 % — SIGNIFICANT CHANGE UP (ref 2–14)
NEUTROPHILS # BLD AUTO: 6.22 K/UL — SIGNIFICANT CHANGE UP (ref 1.8–7.4)
NEUTROPHILS NFR BLD AUTO: 66.8 % — SIGNIFICANT CHANGE UP (ref 43–77)
NRBC # BLD: 0 /100 WBCS — SIGNIFICANT CHANGE UP (ref 0–0)
PHOSPHATE SERPL-MCNC: 3.7 MG/DL — SIGNIFICANT CHANGE UP (ref 2.5–4.5)
PLATELET # BLD AUTO: 462 K/UL — HIGH (ref 150–400)
POTASSIUM SERPL-MCNC: 3.9 MMOL/L — SIGNIFICANT CHANGE UP (ref 3.5–5.3)
POTASSIUM SERPL-SCNC: 3.9 MMOL/L — SIGNIFICANT CHANGE UP (ref 3.5–5.3)
PROT SERPL-MCNC: 4.4 G/DL — LOW (ref 6–8.3)
RBC # BLD: 3.97 M/UL — SIGNIFICANT CHANGE UP (ref 3.8–5.2)
RBC # FLD: 14.5 % — SIGNIFICANT CHANGE UP (ref 10.3–14.5)
SODIUM SERPL-SCNC: 139 MMOL/L — SIGNIFICANT CHANGE UP (ref 135–145)
SPECIMEN SOURCE: SIGNIFICANT CHANGE UP
TACROLIMUS SERPL-MCNC: 1.1 NG/ML — SIGNIFICANT CHANGE UP
WBC # BLD: 9.32 K/UL — SIGNIFICANT CHANGE UP (ref 3.8–10.5)
WBC # FLD AUTO: 9.32 K/UL — SIGNIFICANT CHANGE UP (ref 3.8–10.5)

## 2023-01-19 PROCEDURE — 99233 SBSQ HOSP IP/OBS HIGH 50: CPT | Mod: GC

## 2023-01-19 RX ORDER — BENZOCAINE AND MENTHOL 5; 1 G/100ML; G/100ML
1 LIQUID ORAL THREE TIMES A DAY
Refills: 0 | Status: DISCONTINUED | OUTPATIENT
Start: 2023-01-19 | End: 2023-01-19

## 2023-01-19 RX ORDER — BENZOCAINE AND MENTHOL 5; 1 G/100ML; G/100ML
1 LIQUID ORAL
Refills: 0 | Status: DISCONTINUED | OUTPATIENT
Start: 2023-01-19 | End: 2023-01-26

## 2023-01-19 RX ORDER — CHLORHEXIDINE GLUCONATE 213 G/1000ML
1 SOLUTION TOPICAL DAILY
Refills: 0 | Status: DISCONTINUED | OUTPATIENT
Start: 2023-01-19 | End: 2023-01-26

## 2023-01-19 RX ORDER — BENZOCAINE 10 %
2 GEL (GRAM) MUCOUS MEMBRANE THREE TIMES A DAY
Refills: 0 | Status: DISCONTINUED | OUTPATIENT
Start: 2023-01-19 | End: 2023-01-26

## 2023-01-19 RX ORDER — SODIUM CHLORIDE 0.65 %
1 AEROSOL, SPRAY (ML) NASAL
Refills: 0 | Status: DISCONTINUED | OUTPATIENT
Start: 2023-01-19 | End: 2023-01-26

## 2023-01-19 RX ADMIN — Medication 50 MILLILITER(S): at 00:12

## 2023-01-19 RX ADMIN — APIXABAN 5 MILLIGRAM(S): 2.5 TABLET, FILM COATED ORAL at 17:13

## 2023-01-19 RX ADMIN — APIXABAN 5 MILLIGRAM(S): 2.5 TABLET, FILM COATED ORAL at 05:54

## 2023-01-19 RX ADMIN — TACROLIMUS 3 MILLIGRAM(S): 5 CAPSULE ORAL at 17:13

## 2023-01-19 RX ADMIN — TACROLIMUS 3 MILLIGRAM(S): 5 CAPSULE ORAL at 06:44

## 2023-01-19 RX ADMIN — BENZOCAINE AND MENTHOL 1 LOZENGE: 5; 1 LIQUID ORAL at 06:19

## 2023-01-19 RX ADMIN — CHLORHEXIDINE GLUCONATE 1 APPLICATION(S): 213 SOLUTION TOPICAL at 17:14

## 2023-01-19 RX ADMIN — ONDANSETRON 4 MILLIGRAM(S): 8 TABLET, FILM COATED ORAL at 16:44

## 2023-01-19 RX ADMIN — Medication 50 MILLILITER(S): at 05:54

## 2023-01-19 RX ADMIN — Medication 650 MILLIGRAM(S): at 06:35

## 2023-01-19 RX ADMIN — BENZOCAINE AND MENTHOL 1 LOZENGE: 5; 1 LIQUID ORAL at 17:13

## 2023-01-19 RX ADMIN — ONDANSETRON 4 MILLIGRAM(S): 8 TABLET, FILM COATED ORAL at 01:24

## 2023-01-19 RX ADMIN — ATORVASTATIN CALCIUM 80 MILLIGRAM(S): 80 TABLET, FILM COATED ORAL at 21:32

## 2023-01-19 RX ADMIN — Medication 81 MILLIGRAM(S): at 11:57

## 2023-01-19 RX ADMIN — Medication 650 MILLIGRAM(S): at 06:05

## 2023-01-19 RX ADMIN — Medication 1 TABLET(S): at 11:56

## 2023-01-19 NOTE — PROGRESS NOTE ADULT - SUBJECTIVE AND OBJECTIVE BOX
Subjective:    INTERVAL HPI/OVERNIGHT EVENTS:   No acute events overnight  Afebrile, hemodynamically stable  - patient has extreme proteinuria, test repeated  - was started on eliquis 5 BID for AC i/s/o hypercoagulability from nephrotic syndrome  - was started on tacro 3 BID for steroid resistant nephrotic syndrome  - renal vein dopplers negative for thrombi.   - zofran PRN for nausea    Telemetry:    T(F): 99.8 (23 @ 04:21), Max: 99.8 (23 @ 04:21)  HR: 99 (23 @ 04:21) (91 - 99)  BP: 112/65 (23 @ 04:21) (98/65 - 128/81)  RR: 18 (23 @ 04:21) (18 - 20)  SpO2: 96% (23 @ 04:21) (96% - 98%)  I&O's Summary    Weight (kg): 87.6 (23 @ 11:40)    Medications:  acetaminophen     Tablet .. 650 milliGRAM(s) Oral every 6 hours PRN  albumin human 25% IVPB 50 milliLiter(s) IV Intermittent every 6 hours  albuterol    90 MICROgram(s) HFA Inhaler 2 Puff(s) Inhalation every 6 hours PRN  apixaban 5 milliGRAM(s) Oral two times a day  aspirin enteric coated 81 milliGRAM(s) Oral daily  atorvastatin 80 milliGRAM(s) Oral at bedtime  benzocaine 15 mG/menthol 3.6 mG Lozenge 1 Lozenge Oral three times a day PRN  influenza   Vaccine 0.5 milliLiter(s) IntraMuscular once  ondansetron Injectable 4 milliGRAM(s) IV Push every 8 hours PRN  tacrolimus 3 milliGRAM(s) Oral two times a day  trimethoprim  160 mG/sulfamethoxazole 800 mG 1 Tablet(s) Oral daily      PHYSICAL EXAM:  CONSTITUTIONAL: NAD, well-developed, well-groomed  ENMT: Moist oral mucosa, no pharyngeal injection or exudates; normal dentition  RESPIRATORY: Normal respiratory effort; lungs are clear to auscultation bilaterally  CARDIOVASCULAR: Regular rate and rhythm, normal S1 and S2, no murmur/rub/gallop;  Peripheral pulses are 2+ bilaterally. pitting edema up to upper shins   ABDOMEN: Nontender to palpation, normoactive bowel sounds, no rebound/guarding; No hepatosplenomegaly  PSYCH: A+O to person, place, and time; affect appropriate  NEUROLOGY: CN 2-12 are intact and symmetric; no gross sensory deficits   SKIN: No rashes; no palpable lesions    Notable Labs:    Labs:                          11.9   9.32  )-----------( 462      ( 2023 07:07 )             38.4         139  |  107  |  7   ----------------------------<  74  3.9   |  25  |  0.73    Ca    8.6      2023 07:09  Phos  3.7       Mg     2.0         TPro  4.4<L>  /  Alb  1.5<L>  /  TBili  <0.1<L>  /  DBili  x   /  AST  17  /  ALT  9<L>  /  AlkPhos  66      LIVER FUNCTIONS - ( 2023 07:09 )  Alb: 1.5 g/dL / Pro: 4.4 g/dL / ALK PHOS: 66 U/L / ALT: 9 U/L / AST: 17 U/L / GGT: x             CAPILLARY BLOOD GLUCOSE    Urinalysis Basic - ( 2023 22:25 )    Color: Yellow / Appearance: Slightly Turbid / S.047 / pH: x  Gluc: x / Ketone: Negative  / Bili: Negative / Urobili: Negative   Blood: x / Protein: >600 / Nitrite: Negative   Leuk Esterase: Negative / RBC: 7 /hpf / WBC 33 /HPF   Sq Epi: x / Non Sq Epi: 9 /hpf / Bacteria: Negative        Micro:    Culture - Urine (collected 23 @ 22:25)  Source: Clean Catch Clean Catch (Midstream)  Final Report (23 @ 07:52):    <10,000 CFU/mL Normal Urogenital Yuli      RADIOLOGY & ADDITIONAL TESTS:    NNI

## 2023-01-19 NOTE — PROGRESS NOTE ADULT - PROBLEM SELECTOR PLAN 1
UA Prot > 600, alb 1.1  U Pr/Cr not calculated since protein > 2000 -- severe proteinuria.   - f/u UPEP, immunofixation   - lipids very eelvated-- on statin  - complement wnl.   - dsdna neg  - hep panel negative, non immune to hep b   - Nephrology consulted, f/u recs  - c/w albumin 25% q6h  - c/w statin (home Crestor 40 -> lipitor 80)  - renal duplex neg for renal vein thrombi   - f/u renal recs  - tacro 3 BID with AM levels 30min prior to dose administration.  - eliquis 5 BID given hypercoagulability with low albumin marker.

## 2023-01-19 NOTE — PROGRESS NOTE ADULT - ATTENDING COMMENTS
Tish Elizabeth MD  Off: [phone]  contact me on teams    (After 5 pm or on weekends please page the on-call fellow/attending, can check [email or web address] for schedule. Login is jose antonio willoughby, schedule under [facility] medicine, psych, derm)

## 2023-01-19 NOTE — PROGRESS NOTE ADULT - PROBLEM SELECTOR PLAN 3
Likely 2/2 generalized edema  CT AP w/o other causes  - tylenol PRN for pain, zofran PRN for nausea  - MCKENNA treatment as above  - zofran PRN for nausea -- acceptable qtc

## 2023-01-19 NOTE — PROGRESS NOTE ADULT - PROBLEM SELECTOR PLAN 1
Pt with biopsy proven Minimal Change Disease presenting to the hospital with worsening proteinuria and anasarca. Pt previously admitted in 6/2022 with similar complaints noted to have TP/CR ratio of 3.8g, at that time started on steroid therapy and discharged with nephrology follow up. Now off steroids for past one month and steroids have failed for her in the past. Repeat spot urine TP/CR ratio significantly elevated (unable to quantify). Renal duplex negative for RVT. 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 Please monitor tacrolimus levels daily 30 minutes prior to AM dose (target level 4-6). Started on anticoagulation with Eliquis 5mg BID (pt with severe nephrotic syndrome are at high risk for thromboembolic events). PCP ppx with bactrim. Send out QuantiFeron, for possible need for Rituximab in the future.     If any questions, please feel free to contact me     Mukul Faustin  Nephrology Fellow  [facility] Pager: [phone].

## 2023-01-19 NOTE — PROGRESS NOTE ADULT - ATTENDING COMMENTS
-F/u renal recs.   -On AC for hypercoagulability risk. -On statin for HLD.   -F/u cultures.   -LE duplex.

## 2023-01-19 NOTE — PROGRESS NOTE ADULT - SUBJECTIVE AND OBJECTIVE BOX
[facility] DIVISION OF KIDNEY DISEASES AND HYPERTENSION -- FOLLOW UP NOTE  --------------------------------------------------------------------------------  Patient is a 32 year old female with PMH of Minimal Change Disease who presented to the hospital with complaints of nausea, vomiitng, diarrhea and abdominal pain for the past few days. The nephrology team was consulted for her history of MCKENNA and significant proteinuria. Pt with steroid resistant MCKENNA, started on tacrolimus on 1/18/23.     24 hour events/subjective:  Pt. seen and examined this morning. Reports feeling better than she presented to the hospital. Some nausea overnight without vomiting. Denied any chest pain or shortness of breath. No acute issues noted overnight.      PAST HISTORY  --------------------------------------------------------------------------------  No significant changes to PMH, PSH, FHx, SHx, unless otherwise noted    ALLERGIES & MEDICATIONS  --------------------------------------------------------------------------------  Allergies    codeine (Hives)    Intolerances      Standing Inpatient Medications  albumin human 25% IVPB 50 milliLiter(s) IV Intermittent every 6 hours  apixaban 5 milliGRAM(s) Oral two times a day  aspirin enteric coated 81 milliGRAM(s) Oral daily  atorvastatin 80 milliGRAM(s) Oral at bedtime  influenza   Vaccine 0.5 milliLiter(s) IntraMuscular once  tacrolimus 3 milliGRAM(s) Oral two times a day  trimethoprim  160 mG/sulfamethoxazole 800 mG 1 Tablet(s) Oral daily    PRN Inpatient Medications  acetaminophen     Tablet .. 650 milliGRAM(s) Oral every 6 hours PRN  albuterol    90 MICROgram(s) HFA Inhaler 2 Puff(s) Inhalation every 6 hours PRN  benzocaine 15 mG/menthol 3.6 mG Lozenge 1 Lozenge Oral three times a day PRN  ondansetron Injectable 4 milliGRAM(s) IV Push every 8 hours PRN      REVIEW OF SYSTEMS      All other systems were reviewed and are negative, except as noted.    VITALS/PHYSICAL EXAM  --------------------------------------------------------------------------------  T(C): 37.7 (01-19-23 @ 04:21), Max: 37.7 (01-19-23 @ 04:21)  HR: 99 (01-19-23 @ 04:21) (87 - 99)  BP: 112/65 (01-19-23 @ 04:21) (96/58 - 128/81)  RR: 18 (01-19-23 @ 04:21) (18 - 20)  SpO2: 96% (01-19-23 @ 04:21) (96% - 98%)  Wt(kg): --  Height (cm): 154.9 (01-17-23 @ 20:15)  Weight (kg): 87.6 (01-18-23 @ 11:40)  BMI (kg/m2): 36.5 (01-18-23 @ 11:40)  BSA (m2): 1.86 (01-18-23 @ 11:40)        Physical Exam:  	Gen: ill appearing  	HEENT: MMM  	Pulm: CTA B/L  	CV: S1S2  	Abd: Soft, +BS   	Ext: ++ LE edema B/L, anasarca  	Neuro: Awake  	Skin: Warm and dry      LABS/STUDIES  --------------------------------------------------------------------------------              10.6   7.78  >-----------<  453      [01-18-23 @ 05:57]              33.8     139  |  113  |  9   ----------------------------<  76      [01-18-23 @ 05:57]  3.1   |  20  |  0.64        Ca     6.6     [01-18-23 @ 05:57]      Mg     1.7     [01-18-23 @ 05:57]      Phos  3.6     [01-18-23 @ 05:57]    TPro  4.0  /  Alb  x   /  TBili  x   /  DBili  x   /  AST  x   /  ALT  x   /  AlkPhos  x   [01-18-23 @ 05:58]          Creatinine Trend:  SCr 0.64 [01-18 @ 05:57]  SCr 0.72 [01-17 @ 22:05]    Urinalysis - [01-17-23 @ 22:25]      Color Yellow / Appearance Slightly Turbid / SG 1.047 / pH 8.0      Gluc Negative / Ketone Negative  / Bili Negative / Urobili Negative       Blood Small / Protein >600 / Leuk Est Negative / Nitrite Negative      RBC 7 / WBC 33 / Hyaline 22 / Gran  / Sq Epi  / Non Sq Epi 9 / Bacteria Negative    Urine Creatinine 92      [01-18-23 @ 19:26]  Urine Protein >2000      [01-18-23 @ 19:26]    Lipid: chol 521, , HDL 48, LDL --      [01-18-23 @ 05:58]    HBsAb Nonreact      [01-18-23 @ 05:58]  HBsAg Nonreact      [01-18-23 @ 05:58]  HBcAb Nonreact      [01-18-23 @ 05:58]  HCV 0.07, Nonreact      [01-18-23 @ 05:58]    dsDNA <12      [01-18-23 @ 05:58]  C3 Complement 152      [01-18-23 @ 05:58]  C4 Complement 38      [01-18-23 @ 05:58]  Free Light Chains: kappa 2.61, lambda 1.05, ratio = 2.49      [01-18 @ 05:58]

## 2023-01-20 LAB
ALBUMIN SERPL ELPH-MCNC: 1 G/DL — LOW (ref 3.3–5)
ALP SERPL-CCNC: 71 U/L — SIGNIFICANT CHANGE UP (ref 40–120)
ALT FLD-CCNC: 6 U/L — LOW (ref 10–45)
ANION GAP SERPL CALC-SCNC: 6 MMOL/L — SIGNIFICANT CHANGE UP (ref 5–17)
APPEARANCE UR: CLEAR — SIGNIFICANT CHANGE UP
APTT BLD: 41.3 SEC — HIGH (ref 27.5–35.5)
AST SERPL-CCNC: 12 U/L — SIGNIFICANT CHANGE UP (ref 10–40)
BACTERIA # UR AUTO: NEGATIVE — SIGNIFICANT CHANGE UP
BASE EXCESS BLDV CALC-SCNC: -0.6 MMOL/L — SIGNIFICANT CHANGE UP (ref -2–3)
BILIRUB SERPL-MCNC: <0.1 MG/DL — LOW (ref 0.2–1.2)
BILIRUB UR-MCNC: ABNORMAL
BLOOD GAS VENOUS - CREATININE: SIGNIFICANT CHANGE UP MG/DL (ref 0.5–1.3)
BUN SERPL-MCNC: 8 MG/DL — SIGNIFICANT CHANGE UP (ref 7–23)
CA-I SERPL-SCNC: 1.15 MMOL/L — SIGNIFICANT CHANGE UP (ref 1.15–1.33)
CALCIUM SERPL-MCNC: 8.2 MG/DL — LOW (ref 8.4–10.5)
CHLORIDE BLDV-SCNC: 105 MMOL/L — SIGNIFICANT CHANGE UP (ref 96–108)
CHLORIDE SERPL-SCNC: 106 MMOL/L — SIGNIFICANT CHANGE UP (ref 96–108)
CO2 BLDV-SCNC: 26 MMOL/L — SIGNIFICANT CHANGE UP (ref 22–26)
CO2 SERPL-SCNC: 24 MMOL/L — SIGNIFICANT CHANGE UP (ref 22–31)
COLOR SPEC: YELLOW — SIGNIFICANT CHANGE UP
CREAT SERPL-MCNC: 0.79 MG/DL — SIGNIFICANT CHANGE UP (ref 0.5–1.3)
CRP SERPL-MCNC: 10 MG/L — HIGH (ref 0–4)
D DIMER BLD IA.RAPID-MCNC: 917 NG/ML DDU — HIGH
DIFF PNL FLD: ABNORMAL
EGFR: 102 ML/MIN/1.73M2 — SIGNIFICANT CHANGE UP
EPI CELLS # UR: 11 /HPF — HIGH
ERYTHROCYTE [SEDIMENTATION RATE] IN BLOOD: 120 MM/HR — HIGH (ref 0–15)
GAS PNL BLDV: 133 MMOL/L — LOW (ref 136–145)
GAS PNL BLDV: SIGNIFICANT CHANGE UP
GAS PNL BLDV: SIGNIFICANT CHANGE UP
GLUCOSE BLDV-MCNC: 108 MG/DL — HIGH (ref 70–99)
GLUCOSE SERPL-MCNC: 87 MG/DL — SIGNIFICANT CHANGE UP (ref 70–99)
GLUCOSE UR QL: NEGATIVE — SIGNIFICANT CHANGE UP
HCO3 BLDV-SCNC: 25 MMOL/L — SIGNIFICANT CHANGE UP (ref 22–29)
HCT VFR BLD CALC: 41.7 % — SIGNIFICANT CHANGE UP (ref 34.5–45)
HCT VFR BLDA CALC: 41 % — SIGNIFICANT CHANGE UP (ref 34.5–46.5)
HGB BLD CALC-MCNC: 13.8 G/DL — SIGNIFICANT CHANGE UP (ref 11.7–16.1)
HGB BLD-MCNC: 13 G/DL — SIGNIFICANT CHANGE UP (ref 11.5–15.5)
HYALINE CASTS # UR AUTO: 0 /LPF — SIGNIFICANT CHANGE UP (ref 0–7)
INR BLD: 1.14 RATIO — SIGNIFICANT CHANGE UP (ref 0.88–1.16)
KETONES UR-MCNC: ABNORMAL
LACTATE BLDV-MCNC: 1 MMOL/L — SIGNIFICANT CHANGE UP (ref 0.5–2)
LACTATE BLDV-MCNC: 1.1 MMOL/L — SIGNIFICANT CHANGE UP (ref 0.5–2)
LEUKOCYTE ESTERASE UR-ACNC: NEGATIVE — SIGNIFICANT CHANGE UP
MAGNESIUM SERPL-MCNC: 2.2 MG/DL — SIGNIFICANT CHANGE UP (ref 1.6–2.6)
MCHC RBC-ENTMCNC: 30.2 PG — SIGNIFICANT CHANGE UP (ref 27–34)
MCHC RBC-ENTMCNC: 31.2 GM/DL — LOW (ref 32–36)
MCV RBC AUTO: 96.8 FL — SIGNIFICANT CHANGE UP (ref 80–100)
MRSA PCR RESULT.: SIGNIFICANT CHANGE UP
NITRITE UR-MCNC: NEGATIVE — SIGNIFICANT CHANGE UP
NRBC # BLD: 0 /100 WBCS — SIGNIFICANT CHANGE UP (ref 0–0)
PCO2 BLDV: 43 MMHG — HIGH (ref 39–42)
PH BLDV: 7.37 — SIGNIFICANT CHANGE UP (ref 7.32–7.43)
PH UR: 6.5 — SIGNIFICANT CHANGE UP (ref 5–8)
PHOSPHATE SERPL-MCNC: 4.1 MG/DL — SIGNIFICANT CHANGE UP (ref 2.5–4.5)
PHOSPHOLIPASE A2 RECEPTOR ELISA: <1.8 RU/ML — SIGNIFICANT CHANGE UP (ref 0–19.9)
PLATELET # BLD AUTO: 458 K/UL — HIGH (ref 150–400)
PO2 BLDV: 47 MMHG — HIGH (ref 25–45)
POTASSIUM BLDV-SCNC: 4.1 MMOL/L — SIGNIFICANT CHANGE UP (ref 3.5–5.1)
POTASSIUM SERPL-MCNC: 3.9 MMOL/L — SIGNIFICANT CHANGE UP (ref 3.5–5.3)
POTASSIUM SERPL-SCNC: 3.9 MMOL/L — SIGNIFICANT CHANGE UP (ref 3.5–5.3)
PROCALCITONIN SERPL-MCNC: 0.08 NG/ML — SIGNIFICANT CHANGE UP (ref 0.02–0.1)
PROT SERPL-MCNC: 4.2 G/DL — LOW (ref 6–8.3)
PROT UR-MCNC: >600
PROTHROM AB SERPL-ACNC: 13.1 SEC — SIGNIFICANT CHANGE UP (ref 10.5–13.4)
RBC # BLD: 4.31 M/UL — SIGNIFICANT CHANGE UP (ref 3.8–5.2)
RBC # FLD: 14.6 % — HIGH (ref 10.3–14.5)
RBC CASTS # UR COMP ASSIST: 4 /HPF — SIGNIFICANT CHANGE UP (ref 0–4)
S AUREUS DNA NOSE QL NAA+PROBE: SIGNIFICANT CHANGE UP
SAO2 % BLDV: 75.9 % — SIGNIFICANT CHANGE UP (ref 67–88)
SODIUM SERPL-SCNC: 136 MMOL/L — SIGNIFICANT CHANGE UP (ref 135–145)
SP GR SPEC: >1.05 (ref 1.01–1.02)
TACROLIMUS SERPL-MCNC: 3 NG/ML — SIGNIFICANT CHANGE UP
UROBILINOGEN FLD QL: ABNORMAL
WBC # BLD: 7.67 K/UL — SIGNIFICANT CHANGE UP (ref 3.8–10.5)
WBC # FLD AUTO: 7.67 K/UL — SIGNIFICANT CHANGE UP (ref 3.8–10.5)
WBC UR QL: 55 /HPF — HIGH (ref 0–5)

## 2023-01-20 PROCEDURE — 99233 SBSQ HOSP IP/OBS HIGH 50: CPT | Mod: GC

## 2023-01-20 PROCEDURE — 93970 EXTREMITY STUDY: CPT | Mod: 26

## 2023-01-20 RX ORDER — TACROLIMUS 5 MG/1
4 CAPSULE ORAL
Refills: 0 | Status: DISCONTINUED | OUTPATIENT
Start: 2023-01-20 | End: 2023-01-21

## 2023-01-20 RX ORDER — METOCLOPRAMIDE HCL 10 MG
10 TABLET ORAL ONCE
Refills: 0 | Status: COMPLETED | OUTPATIENT
Start: 2023-01-20 | End: 2023-01-20

## 2023-01-20 RX ORDER — MAGNESIUM HYDROXIDE 400 MG/1
30 TABLET, CHEWABLE ORAL ONCE
Refills: 0 | Status: COMPLETED | OUTPATIENT
Start: 2023-01-20 | End: 2023-01-20

## 2023-01-20 RX ORDER — LANOLIN ALCOHOL/MO/W.PET/CERES
3 CREAM (GRAM) TOPICAL AT BEDTIME
Refills: 0 | Status: DISCONTINUED | OUTPATIENT
Start: 2023-01-20 | End: 2023-01-26

## 2023-01-20 RX ORDER — ALBUMIN HUMAN 25 %
100 VIAL (ML) INTRAVENOUS EVERY 6 HOURS
Refills: 0 | Status: COMPLETED | OUTPATIENT
Start: 2023-01-20 | End: 2023-01-21

## 2023-01-20 RX ADMIN — Medication 50 MILLILITER(S): at 20:45

## 2023-01-20 RX ADMIN — TACROLIMUS 4 MILLIGRAM(S): 5 CAPSULE ORAL at 19:48

## 2023-01-20 RX ADMIN — Medication 0.5 MILLIGRAM(S): at 20:45

## 2023-01-20 RX ADMIN — Medication 650 MILLIGRAM(S): at 00:06

## 2023-01-20 RX ADMIN — APIXABAN 5 MILLIGRAM(S): 2.5 TABLET, FILM COATED ORAL at 19:47

## 2023-01-20 RX ADMIN — APIXABAN 5 MILLIGRAM(S): 2.5 TABLET, FILM COATED ORAL at 06:26

## 2023-01-20 RX ADMIN — Medication 3 MILLIGRAM(S): at 00:51

## 2023-01-20 RX ADMIN — ONDANSETRON 4 MILLIGRAM(S): 8 TABLET, FILM COATED ORAL at 15:35

## 2023-01-20 RX ADMIN — Medication 50 MILLILITER(S): at 13:32

## 2023-01-20 RX ADMIN — CHLORHEXIDINE GLUCONATE 1 APPLICATION(S): 213 SOLUTION TOPICAL at 12:03

## 2023-01-20 RX ADMIN — MAGNESIUM HYDROXIDE 30 MILLILITER(S): 400 TABLET, CHEWABLE ORAL at 01:05

## 2023-01-20 RX ADMIN — Medication 81 MILLIGRAM(S): at 15:05

## 2023-01-20 RX ADMIN — Medication 10 MILLIGRAM(S): at 20:29

## 2023-01-20 RX ADMIN — TACROLIMUS 3 MILLIGRAM(S): 5 CAPSULE ORAL at 06:26

## 2023-01-20 RX ADMIN — ATORVASTATIN CALCIUM 80 MILLIGRAM(S): 80 TABLET, FILM COATED ORAL at 21:20

## 2023-01-20 RX ADMIN — Medication 650 MILLIGRAM(S): at 00:02

## 2023-01-20 RX ADMIN — ONDANSETRON 4 MILLIGRAM(S): 8 TABLET, FILM COATED ORAL at 06:25

## 2023-01-20 RX ADMIN — Medication 1 TABLET(S): at 15:05

## 2023-01-20 NOTE — PROGRESS NOTE ADULT - PROBLEM SELECTOR PLAN 3
Likely 2/2 generalized edema  CT AP w/o other causes  - tylenol PRN for pain, zofran PRN for nausea  - MCKENNA treatment as above  - zofran PRN for nausea -- acceptable qtc Likely 2/2 generalized edema  CT AP w/o contrast without other causes  - tylenol PRN for pain, zofran PRN for nausea  - MCKENNA treatment as above  - zofran PRN for nausea -- acceptable qtc  - worsening of abd pain overnight, no diarrhea but with N/V  - pending repeat CTAP WITH contrast, pt aware of possible risk of FLORESITA and consents

## 2023-01-20 NOTE — PROGRESS NOTE ADULT - SUBJECTIVE AND OBJECTIVE BOX
Medicine Progress Note    Patient is a 32y old  Female who presents with a chief complaint of MCKENNA flare (20 Jan 2023 08:16)      SUBJECTIVE / OVERNIGHT EVENTS: No acute events overnight, the patient was seen and examined at bedside. The patient reports nausea and NBNB vomiting overnight, and abdominal pain that is worsening her nausea. The patient denies F/C, diarrhea, constipation, SOB, cough, or headache. The patient is breathing comfortably on RA.     ADDITIONAL REVIEW OF SYSTEMS: Negative     MEDICATIONS  (STANDING):  albumin human 25% IVPB 100 milliLiter(s) IV Intermittent every 6 hours  apixaban 5 milliGRAM(s) Oral two times a day  aspirin enteric coated 81 milliGRAM(s) Oral daily  atorvastatin 80 milliGRAM(s) Oral at bedtime  chlorhexidine 4% Liquid 1 Application(s) Topical daily  influenza   Vaccine 0.5 milliLiter(s) IntraMuscular once  metoclopramide Injectable 10 milliGRAM(s) IV Push once  tacrolimus 4 milliGRAM(s) Oral two times a day  trimethoprim  160 mG/sulfamethoxazole 800 mG 1 Tablet(s) Oral daily    MEDICATIONS  (PRN):  acetaminophen     Tablet .. 650 milliGRAM(s) Oral every 6 hours PRN Temp greater or equal to 38C (100.4F), Mild Pain (1 - 3)  albuterol    90 MICROgram(s) HFA Inhaler 2 Puff(s) Inhalation every 6 hours PRN Shortness of Breath and/or Wheezing  aluminum hydroxide/magnesium hydroxide/simethicone Suspension 30 milliLiter(s) Oral every 6 hours PRN Dyspepsia  benzocaine 15 mG/menthol 3.6 mG Lozenge 1 Lozenge Oral five times a day PRN Sore Throat  benzocaine 20% Spray 2 Spray(s) Mucosal three times a day PRN sore throat  melatonin 3 milliGRAM(s) Oral at bedtime PRN Insomnia  ondansetron Injectable 4 milliGRAM(s) IV Push every 8 hours PRN Nausea and/or Vomiting  sodium chloride 0.65% Nasal 1 Spray(s) Both Nostrils two times a day PRN Nasal Congestion    CAPILLARY BLOOD GLUCOSE        I&O's Summary      PHYSICAL EXAM:  Vital Signs Last 24 Hrs  T(C): 36.6 (20 Jan 2023 11:24), Max: 37.6 (19 Jan 2023 20:00)  T(F): 97.9 (20 Jan 2023 11:24), Max: 99.6 (19 Jan 2023 20:00)  HR: 119 (20 Jan 2023 11:24) (104 - 119)  BP: 87/54 (20 Jan 2023 11:24) (87/54 - 119/74)  BP(mean): --  RR: 18 (20 Jan 2023 11:24) (18 - 20)  SpO2: 95% (20 Jan 2023 11:24) (95% - 97%)    Parameters below as of 20 Jan 2023 11:24  Patient On (Oxygen Delivery Method): room air      CONSTITUTIONAL: NAD, well-developed, well-groomed  ENMT: Moist oral mucosa, no pharyngeal injection or exudates; normal dentition  RESPIRATORY: Normal respiratory effort; lungs are clear to auscultation bilaterally  CARDIOVASCULAR: Regular rate and rhythm, normal S1 and S2, no murmur/rub/gallop; Peripheral pulses are 2+ bilaterally; 1+ pitting edema on b/l LE up to mid shins   ABDOMEN: Nontender to palpation, normoactive bowel sounds, no rebound/guarding; No hepatosplenomegaly  PSYCH: A+O to person, place, and time; affect appropriate  NEUROLOGY: CN 2-12 are intact and symmetric; no gross sensory deficits   SKIN: No rashes; no palpable lesions    LABS:                        13.0   7.67  )-----------( 458      ( 20 Jan 2023 07:13 )             41.7     01-20    136  |  106  |  8   ----------------------------<  87  3.9   |  24  |  0.79    Ca    8.2<L>      20 Jan 2023 07:15  Phos  4.1     01-20  Mg     2.2     01-20    TPro  4.2<L>  /  Alb  1.0<L>  /  TBili  <0.1<L>  /  DBili  x   /  AST  12  /  ALT  6<L>  /  AlkPhos  71  01-20    PT/INR - ( 20 Jan 2023 07:13 )   PT: 13.1 sec;   INR: 1.14 ratio         PTT - ( 20 Jan 2023 07:13 )  PTT:41.3 sec          Culture - Blood (collected 18 Jan 2023 05:35)  Source: .Blood Blood  Preliminary Report (19 Jan 2023 09:01):    No growth to date.    Culture - Blood (collected 18 Jan 2023 05:20)  Source: .Blood Blood  Preliminary Report (19 Jan 2023 09:01):    No growth to date.    Culture - Urine (collected 17 Jan 2023 22:25)  Source: Clean Catch Clean Catch (Midstream)  Final Report (19 Jan 2023 07:52):    <10,000 CFU/mL Normal Urogenital Yuli      SARS-CoV-2: NotDetec (17 Jan 2023 22:04)

## 2023-01-20 NOTE — PROGRESS NOTE ADULT - ATTENDING COMMENTS
-Patient with some tachycardia and hypotension associated with N/V/D and abdominal discomfort and tired. -Will give albumin 100mL 25% q6h x 4 doses. -F/u cultures and VBG and ESR/CRP and PCT. -CT abd/pelvis with IV contrast. CT abd/pelvis on admission was limited by lack of IV contrast. -Patient also had fever on admission. -F/u cultures. -RVP on admission was negative.   -Consider adding PPI for abd discomfort given PMH of gastritis/GERD.   -F/u renal recs. -Patient with some tachycardia and hypotension associated with N/V/D and abdominal discomfort and tired. -Will give albumin 100mL 25% q6h x 4 doses. -F/u cultures and VBG and ESR/CRP and PCT. -CT abd/pelvis with IV contrast. CT abd/pelvis on admission was limited by lack of IV contrast. -Patient also had fever on admission. -F/u cultures. -RVP on admission was negative. -CXR on admission clear.   -Consider adding PPI for abd discomfort given PMH of gastritis/GERD.   -F/u renal recs.

## 2023-01-20 NOTE — PROGRESS NOTE ADULT - PROBLEM SELECTOR PLAN 1
Pt with biopsy proven Minimal Change Disease presenting to the hospital with worsening proteinuria and anasarca. Pt previously admitted in 6/2022 with similar complaints noted to have TP/CR ratio of 3.8g, at that time started on steroid therapy and discharged with nephrology follow up. Now off steroids for past one month and steroids have failed for her in the past. Repeat spot urine TP/CR ratio significantly elevated (unable to quantify). Renal duplex negative for RVT. 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 Please monitor tacrolimus levels daily 30 minutes prior to AM dose (target level 4-6); if level today remains below goal then will consider increasing her dose. On anticoagulation with Eliquis 5mg BID (pt with severe nephrotic syndrome are at high risk for thromboembolic events). PCP ppx with bactrim. QuantiFeron pending, for possible need for Rituximab in the future.     Pt will need nephrology follow up following discharge. Has seen Dr. Angie Apodaca in the past.   Can call office at [phone] for an appointment.     If any questions, please feel free to contact me     Mukul Faustin  Nephrology Fellow  [facility] Pager: [phone].

## 2023-01-20 NOTE — PROGRESS NOTE ADULT - PROBLEM SELECTOR PLAN 4
Diet: DASH  DVT: on eliquis full AC  Dispo: pending clinical course Diet: DASH  DVT: on eliquis full AC  Dispo: pending clinical course    Full Code.

## 2023-01-20 NOTE — PROGRESS NOTE ADULT - ATTENDING COMMENTS
Tish Elizabeth MD  Off: [phone]  contact me on teams    (After 5 pm or on weekends please page the on-call fellow/attending, can check [email or web address] for schedule. Login is jose antonio willoughby, schedule under [facility] medicine, psych, derm)

## 2023-01-20 NOTE — PROGRESS NOTE ADULT - SUBJECTIVE AND OBJECTIVE BOX
[facility] DIVISION OF KIDNEY DISEASES AND HYPERTENSION -- FOLLOW UP NOTE  --------------------------------------------------------------------------------  Patient is a 32 year old female with PMH of Minimal Change Disease who presented to the hospital with complaints of nausea, vomiitng, diarrhea and abdominal pain for the past few days. The nephrology team was consulted for her history of MCKENNA and significant proteinuria. Pt with steroid resistant MCKENNA, started on tacrolimus on 1/18/23.     24 hour events/subjective:  Pt. seen and examined this morning. Reports feeling better than she presented to the hospital. Some nausea overnight without vomiting. Denied any chest pain or shortness of breath. No acute issues noted overnight.      PAST HISTORY  --------------------------------------------------------------------------------  No significant changes to PMH, PSH, FHx, SHx, unless otherwise noted    ALLERGIES & MEDICATIONS  --------------------------------------------------------------------------------  Allergies    codeine (Hives)    Intolerances      Standing Inpatient Medications  apixaban 5 milliGRAM(s) Oral two times a day  aspirin enteric coated 81 milliGRAM(s) Oral daily  atorvastatin 80 milliGRAM(s) Oral at bedtime  chlorhexidine 4% Liquid 1 Application(s) Topical daily  influenza   Vaccine 0.5 milliLiter(s) IntraMuscular once  tacrolimus 3 milliGRAM(s) Oral two times a day  trimethoprim  160 mG/sulfamethoxazole 800 mG 1 Tablet(s) Oral daily    PRN Inpatient Medications  acetaminophen     Tablet .. 650 milliGRAM(s) Oral every 6 hours PRN  albuterol    90 MICROgram(s) HFA Inhaler 2 Puff(s) Inhalation every 6 hours PRN  benzocaine 15 mG/menthol 3.6 mG Lozenge 1 Lozenge Oral five times a day PRN  benzocaine 20% Spray 2 Spray(s) Mucosal three times a day PRN  melatonin 3 milliGRAM(s) Oral at bedtime PRN  ondansetron Injectable 4 milliGRAM(s) IV Push every 8 hours PRN  sodium chloride 0.65% Nasal 1 Spray(s) Both Nostrils two times a day PRN      REVIEW OF SYSTEMS      All other systems were reviewed and are negative, except as noted.    VITALS/PHYSICAL EXAM  --------------------------------------------------------------------------------  T(C): 37.1 (01-20-23 @ 04:41), Max: 37.6 (01-19-23 @ 20:00)  HR: 105 (01-20-23 @ 04:41) (99 - 105)  BP: 117/68 (01-20-23 @ 04:41) (117/68 - 124/72)  RR: 18 (01-20-23 @ 04:41) (18 - 20)  SpO2: 95% (01-20-23 @ 04:41) (95% - 97%)  Wt(kg): --    Weight (kg): 87.6 (01-18-23 @ 11:40)        Physical Exam:  	Gen: ill appearing  	HEENT: MMM  	Pulm: CTA B/L  	CV: S1S2  	Abd: Soft, +BS   	Ext: ++ LE edema B/L, anasarca  	Neuro: Awake  	Skin: Warm and dry      LABS/STUDIES  --------------------------------------------------------------------------------              13.0   7.67  >-----------<  458      [01-20-23 @ 07:13]              41.7     136  |  106  |  8   ----------------------------<  87      [01-20-23 @ 07:15]  3.9   |  24  |  0.79        Ca     8.2     [01-20-23 @ 07:15]      Mg     2.2     [01-20-23 @ 07:15]      Phos  4.1     [01-20-23 @ 07:15]    TPro  4.2  /  Alb  1.0  /  TBili  <0.1  /  DBili  x   /  AST  12  /  ALT  6   /  AlkPhos  71  [01-20-23 @ 07:15]    PT/INR: PT 13.1 , INR 1.14       [01-20-23 @ 07:13]  PTT: 41.3       [01-20-23 @ 07:13]      Creatinine Trend:  SCr 0.79 [01-20 @ 07:15]  SCr 0.73 [01-19 @ 07:09]  SCr 0.64 [01-18 @ 05:57]  SCr 0.72 [01-17 @ 22:05]    Urinalysis - [01-17-23 @ 22:25]      Color Yellow / Appearance Slightly Turbid / SG 1.047 / pH 8.0      Gluc Negative / Ketone Negative  / Bili Negative / Urobili Negative       Blood Small / Protein >600 / Leuk Est Negative / Nitrite Negative      RBC 7 / WBC 33 / Hyaline 22 / Gran  / Sq Epi  / Non Sq Epi 9 / Bacteria Negative    Urine Creatinine 92      [01-18-23 @ 19:26]  Urine Protein >2000      [01-18-23 @ 19:26]    Lipid: chol 521, , HDL 48, LDL --      [01-18-23 @ 05:58]    HBsAb Nonreact      [01-18-23 @ 05:58]  HBsAg Nonreact      [01-18-23 @ 05:58]  HBcAb Nonreact      [01-18-23 @ 05:58]  HCV 0.07, Nonreact      [01-18-23 @ 05:58]    SHELTON: titer Negative, pattern --      [01-18-23 @ 05:58]  dsDNA <12      [01-18-23 @ 05:58]  C3 Complement 152      [01-18-23 @ 05:58]  C4 Complement 38      [01-18-23 @ 05:58]  Free Light Chains: kappa 2.61, lambda 1.05, ratio = 2.49      [01-18 @ 05:58]

## 2023-01-21 LAB
ALBUMIN SERPL ELPH-MCNC: 1.6 G/DL — LOW (ref 3.3–5)
ALP SERPL-CCNC: 54 U/L — SIGNIFICANT CHANGE UP (ref 40–120)
ALT FLD-CCNC: 7 U/L — LOW (ref 10–45)
ANION GAP SERPL CALC-SCNC: 11 MMOL/L — SIGNIFICANT CHANGE UP (ref 5–17)
AST SERPL-CCNC: 12 U/L — SIGNIFICANT CHANGE UP (ref 10–40)
BASOPHILS # BLD AUTO: 0.02 K/UL — SIGNIFICANT CHANGE UP (ref 0–0.2)
BASOPHILS NFR BLD AUTO: 0.3 % — SIGNIFICANT CHANGE UP (ref 0–2)
BILIRUB SERPL-MCNC: 0.3 MG/DL — SIGNIFICANT CHANGE UP (ref 0.2–1.2)
BLD GP AB SCN SERPL QL: POSITIVE — SIGNIFICANT CHANGE UP
BUN SERPL-MCNC: 7 MG/DL — SIGNIFICANT CHANGE UP (ref 7–23)
CALCIUM SERPL-MCNC: 8 MG/DL — LOW (ref 8.4–10.5)
CHLORIDE SERPL-SCNC: 108 MMOL/L — SIGNIFICANT CHANGE UP (ref 96–108)
CO2 SERPL-SCNC: 18 MMOL/L — LOW (ref 22–31)
CREAT SERPL-MCNC: 0.79 MG/DL — SIGNIFICANT CHANGE UP (ref 0.5–1.3)
CULTURE RESULTS: SIGNIFICANT CHANGE UP
EGFR: 102 ML/MIN/1.73M2 — SIGNIFICANT CHANGE UP
EOSINOPHIL # BLD AUTO: 0.04 K/UL — SIGNIFICANT CHANGE UP (ref 0–0.5)
EOSINOPHIL NFR BLD AUTO: 0.6 % — SIGNIFICANT CHANGE UP (ref 0–6)
GLUCOSE SERPL-MCNC: 80 MG/DL — SIGNIFICANT CHANGE UP (ref 70–99)
HCG SERPL-ACNC: <2 MIU/ML — SIGNIFICANT CHANGE UP
HCT VFR BLD CALC: 29.1 % — LOW (ref 34.5–45)
HCT VFR BLD CALC: 34.9 % — SIGNIFICANT CHANGE UP (ref 34.5–45)
HGB BLD-MCNC: 11.1 G/DL — LOW (ref 11.5–15.5)
HGB BLD-MCNC: 8.9 G/DL — LOW (ref 11.5–15.5)
HIV 1+2 AB+HIV1 P24 AG SERPL QL IA: SIGNIFICANT CHANGE UP
IMM GRANULOCYTES NFR BLD AUTO: 0.5 % — SIGNIFICANT CHANGE UP (ref 0–0.9)
LYMPHOCYTES # BLD AUTO: 1.53 K/UL — SIGNIFICANT CHANGE UP (ref 1–3.3)
LYMPHOCYTES # BLD AUTO: 24.3 % — SIGNIFICANT CHANGE UP (ref 13–44)
MAGNESIUM SERPL-MCNC: 2.1 MG/DL — SIGNIFICANT CHANGE UP (ref 1.6–2.6)
MCHC RBC-ENTMCNC: 30.2 PG — SIGNIFICANT CHANGE UP (ref 27–34)
MCHC RBC-ENTMCNC: 30.3 PG — SIGNIFICANT CHANGE UP (ref 27–34)
MCHC RBC-ENTMCNC: 30.6 GM/DL — LOW (ref 32–36)
MCHC RBC-ENTMCNC: 31.8 GM/DL — LOW (ref 32–36)
MCV RBC AUTO: 94.8 FL — SIGNIFICANT CHANGE UP (ref 80–100)
MCV RBC AUTO: 99 FL — SIGNIFICANT CHANGE UP (ref 80–100)
MONOCYTES # BLD AUTO: 0.65 K/UL — SIGNIFICANT CHANGE UP (ref 0–0.9)
MONOCYTES NFR BLD AUTO: 10.3 % — SIGNIFICANT CHANGE UP (ref 2–14)
NEUTROPHILS # BLD AUTO: 4.02 K/UL — SIGNIFICANT CHANGE UP (ref 1.8–7.4)
NEUTROPHILS NFR BLD AUTO: 64 % — SIGNIFICANT CHANGE UP (ref 43–77)
NRBC # BLD: 0 /100 WBCS — SIGNIFICANT CHANGE UP (ref 0–0)
NRBC # BLD: 0 /100 WBCS — SIGNIFICANT CHANGE UP (ref 0–0)
PHOSPHATE SERPL-MCNC: 3.2 MG/DL — SIGNIFICANT CHANGE UP (ref 2.5–4.5)
PLATELET # BLD AUTO: 308 K/UL — SIGNIFICANT CHANGE UP (ref 150–400)
PLATELET # BLD AUTO: 393 K/UL — SIGNIFICANT CHANGE UP (ref 150–400)
POTASSIUM SERPL-MCNC: 4.1 MMOL/L — SIGNIFICANT CHANGE UP (ref 3.5–5.3)
POTASSIUM SERPL-SCNC: 4.1 MMOL/L — SIGNIFICANT CHANGE UP (ref 3.5–5.3)
PROT SERPL-MCNC: 4.5 G/DL — LOW (ref 6–8.3)
RBC # BLD: 2.94 M/UL — LOW (ref 3.8–5.2)
RBC # BLD: 3.68 M/UL — LOW (ref 3.8–5.2)
RBC # FLD: 14.4 % — SIGNIFICANT CHANGE UP (ref 10.3–14.5)
RBC # FLD: 14.6 % — HIGH (ref 10.3–14.5)
RH IG SCN BLD-IMP: POSITIVE — SIGNIFICANT CHANGE UP
SODIUM SERPL-SCNC: 137 MMOL/L — SIGNIFICANT CHANGE UP (ref 135–145)
SPECIMEN SOURCE: SIGNIFICANT CHANGE UP
TACROLIMUS SERPL-MCNC: 2.4 NG/ML — SIGNIFICANT CHANGE UP
WBC # BLD: 6.29 K/UL — SIGNIFICANT CHANGE UP (ref 3.8–10.5)
WBC # BLD: 6.35 K/UL — SIGNIFICANT CHANGE UP (ref 3.8–10.5)
WBC # FLD AUTO: 6.29 K/UL — SIGNIFICANT CHANGE UP (ref 3.8–10.5)
WBC # FLD AUTO: 6.35 K/UL — SIGNIFICANT CHANGE UP (ref 3.8–10.5)

## 2023-01-21 PROCEDURE — 99233 SBSQ HOSP IP/OBS HIGH 50: CPT | Mod: GC

## 2023-01-21 PROCEDURE — 86077 PHYS BLOOD BANK SERV XMATCH: CPT

## 2023-01-21 PROCEDURE — 74178 CT ABD&PLV WO CNTR FLWD CNTR: CPT | Mod: 26

## 2023-01-21 RX ORDER — TACROLIMUS 5 MG/1
5 CAPSULE ORAL EVERY 12 HOURS
Refills: 0 | Status: DISCONTINUED | OUTPATIENT
Start: 2023-01-21 | End: 2023-01-24

## 2023-01-21 RX ADMIN — Medication 50 MILLILITER(S): at 08:35

## 2023-01-21 RX ADMIN — Medication 0.5 MILLIGRAM(S): at 05:45

## 2023-01-21 RX ADMIN — APIXABAN 5 MILLIGRAM(S): 2.5 TABLET, FILM COATED ORAL at 06:41

## 2023-01-21 RX ADMIN — Medication 0.5 MILLIGRAM(S): at 10:03

## 2023-01-21 RX ADMIN — Medication 0.5 MILLIGRAM(S): at 20:20

## 2023-01-21 RX ADMIN — Medication 81 MILLIGRAM(S): at 11:37

## 2023-01-21 RX ADMIN — ONDANSETRON 4 MILLIGRAM(S): 8 TABLET, FILM COATED ORAL at 01:08

## 2023-01-21 RX ADMIN — Medication 50 MILLILITER(S): at 02:13

## 2023-01-21 RX ADMIN — ATORVASTATIN CALCIUM 80 MILLIGRAM(S): 80 TABLET, FILM COATED ORAL at 21:12

## 2023-01-21 RX ADMIN — TACROLIMUS 4 MILLIGRAM(S): 5 CAPSULE ORAL at 06:41

## 2023-01-21 RX ADMIN — TACROLIMUS 5 MILLIGRAM(S): 5 CAPSULE ORAL at 17:20

## 2023-01-21 RX ADMIN — APIXABAN 5 MILLIGRAM(S): 2.5 TABLET, FILM COATED ORAL at 17:21

## 2023-01-21 RX ADMIN — Medication 1 TABLET(S): at 11:37

## 2023-01-21 RX ADMIN — CHLORHEXIDINE GLUCONATE 1 APPLICATION(S): 213 SOLUTION TOPICAL at 11:38

## 2023-01-21 RX ADMIN — Medication 650 MILLIGRAM(S): at 13:38

## 2023-01-21 RX ADMIN — Medication 650 MILLIGRAM(S): at 12:38

## 2023-01-21 NOTE — PROGRESS NOTE ADULT - SUBJECTIVE AND OBJECTIVE BOX
Medicine Progress Note    Patient is a 32y old  Female who presents with a chief complaint of MCKENNA flare (21 Jan 2023 07:28)      SUBJECTIVE / OVERNIGHT EVENTS: The patient remained nauseous throughout night, zofran, reglan and low dose ativan attempted with minimal relief. The patient was see and examined at bedside  - continued nausea with less vomiting this morning, denies abd pain today (had 9/10 abd pain yesterday)  - denies f/c, diarrhea, constipation, bleeding, CP, or cough    ADDITIONAL REVIEW OF SYSTEMS: Neg    MEDICATIONS  (STANDING):  apixaban 5 milliGRAM(s) Oral two times a day  aspirin enteric coated 81 milliGRAM(s) Oral daily  atorvastatin 80 milliGRAM(s) Oral at bedtime  chlorhexidine 4% Liquid 1 Application(s) Topical daily  influenza   Vaccine 0.5 milliLiter(s) IntraMuscular once  LORazepam   Injectable 0.5 milliGRAM(s) IV Push once  tacrolimus 4 milliGRAM(s) Oral two times a day  trimethoprim  160 mG/sulfamethoxazole 800 mG 1 Tablet(s) Oral daily    MEDICATIONS  (PRN):  acetaminophen     Tablet .. 650 milliGRAM(s) Oral every 6 hours PRN Temp greater or equal to 38C (100.4F), Mild Pain (1 - 3)  albuterol    90 MICROgram(s) HFA Inhaler 2 Puff(s) Inhalation every 6 hours PRN Shortness of Breath and/or Wheezing  aluminum hydroxide/magnesium hydroxide/simethicone Suspension 30 milliLiter(s) Oral every 6 hours PRN Dyspepsia  benzocaine 15 mG/menthol 3.6 mG Lozenge 1 Lozenge Oral five times a day PRN Sore Throat  benzocaine 20% Spray 2 Spray(s) Mucosal three times a day PRN sore throat  melatonin 3 milliGRAM(s) Oral at bedtime PRN Insomnia  ondansetron Injectable 4 milliGRAM(s) IV Push every 8 hours PRN Nausea and/or Vomiting  sodium chloride 0.65% Nasal 1 Spray(s) Both Nostrils two times a day PRN Nasal Congestion    CAPILLARY BLOOD GLUCOSE        I&O's Summary    20 Jan 2023 07:01  -  21 Jan 2023 07:00  --------------------------------------------------------  IN: 200 mL / OUT: 0 mL / NET: 200 mL        PHYSICAL EXAM:  Vital Signs Last 24 Hrs  T(C): 37.3 (21 Jan 2023 05:25), Max: 37.5 (21 Jan 2023 02:01)  T(F): 99.2 (21 Jan 2023 05:25), Max: 99.5 (21 Jan 2023 02:01)  HR: 109 (21 Jan 2023 05:25) (101 - 119)  BP: 111/86 (21 Jan 2023 05:25) (87/54 - 128/77)  BP(mean): --  RR: 18 (21 Jan 2023 05:25) (18 - 18)  SpO2: 96% (21 Jan 2023 05:25) (95% - 98%)    Parameters below as of 21 Jan 2023 05:25  Patient On (Oxygen Delivery Method): room air      CONSTITUTIONAL: NAD, well-developed, well-groomed  ENMT: Moist oral mucosa, no pharyngeal injection or exudates; normal dentition  RESPIRATORY: Normal respiratory effort; lungs are clear to auscultation bilaterally  CARDIOVASCULAR: Regular rate and rhythm, normal S1 and S2, no murmur/rub/gallop; 1+ pitting lower extremity edema up to mid shins; Peripheral pulses are 2+ bilaterally  ABDOMEN: Nontender to palpation, normoactive bowel sounds, no rebound/guarding; No hepatosplenomegaly  PSYCH: A+O to person, place, and time; affect appropriate  NEUROLOGY: CN 2-12 are intact and symmetric; no gross sensory deficits   SKIN: No rashes; no palpable lesions    LABS:                        8.9    6.29  )-----------( 308      ( 21 Jan 2023 07:25 )             29.1     01-21    137  |  108  |  7   ----------------------------<  80  4.1   |  18<L>  |  0.79    Ca    8.0<L>      21 Jan 2023 07:27  Phos  3.2     01-21  Mg     2.1     01-21    TPro  4.5<L>  /  Alb  1.6<L>  /  TBili  0.3  /  DBili  x   /  AST  12  /  ALT  7<L>  /  AlkPhos  54  01-21    PT/INR - ( 20 Jan 2023 07:13 )   PT: 13.1 sec;   INR: 1.14 ratio         PTT - ( 20 Jan 2023 07:13 )  PTT:41.3 sec      Urinalysis Basic - ( 20 Jan 2023 17:27 )    Color: Yellow / Appearance: Clear / SG: >1.050 / pH: x  Gluc: x / Ketone: Moderate  / Bili: Small / Urobili: 2 mg/dL   Blood: x / Protein: >600 / Nitrite: Negative   Leuk Esterase: Negative / RBC: 4 /hpf / WBC 55 /HPF   Sq Epi: x / Non Sq Epi: 11 /hpf / Bacteria: Negative        SARS-CoV-2: NotDetec (17 Jan 2023 22:04)     Medicine Progress Note    Patient is a 32y old  Female who presents with a chief complaint of MCKENNA flare (21 Jan 2023 07:28)      SUBJECTIVE / OVERNIGHT EVENTS: The patient remained nauseous throughout night, zofran, reglan and low dose ativan attempted with minimal relief. The patient was see and examined at bedside  - continued nausea with less vomiting this morning, denies abd pain today (had 9/10 abd pain yesterday)  - denies f/c, diarrhea, constipation, bleeding, CP, or cough  - tacro levels have been subtherapeutic, possibly 2/2 vomiting    ADDITIONAL REVIEW OF SYSTEMS: Neg    MEDICATIONS  (STANDING):  apixaban 5 milliGRAM(s) Oral two times a day  aspirin enteric coated 81 milliGRAM(s) Oral daily  atorvastatin 80 milliGRAM(s) Oral at bedtime  chlorhexidine 4% Liquid 1 Application(s) Topical daily  influenza   Vaccine 0.5 milliLiter(s) IntraMuscular once  LORazepam   Injectable 0.5 milliGRAM(s) IV Push once  tacrolimus 4 milliGRAM(s) Oral two times a day  trimethoprim  160 mG/sulfamethoxazole 800 mG 1 Tablet(s) Oral daily    MEDICATIONS  (PRN):  acetaminophen     Tablet .. 650 milliGRAM(s) Oral every 6 hours PRN Temp greater or equal to 38C (100.4F), Mild Pain (1 - 3)  albuterol    90 MICROgram(s) HFA Inhaler 2 Puff(s) Inhalation every 6 hours PRN Shortness of Breath and/or Wheezing  aluminum hydroxide/magnesium hydroxide/simethicone Suspension 30 milliLiter(s) Oral every 6 hours PRN Dyspepsia  benzocaine 15 mG/menthol 3.6 mG Lozenge 1 Lozenge Oral five times a day PRN Sore Throat  benzocaine 20% Spray 2 Spray(s) Mucosal three times a day PRN sore throat  melatonin 3 milliGRAM(s) Oral at bedtime PRN Insomnia  ondansetron Injectable 4 milliGRAM(s) IV Push every 8 hours PRN Nausea and/or Vomiting  sodium chloride 0.65% Nasal 1 Spray(s) Both Nostrils two times a day PRN Nasal Congestion    CAPILLARY BLOOD GLUCOSE        I&O's Summary    20 Jan 2023 07:01  -  21 Jan 2023 07:00  --------------------------------------------------------  IN: 200 mL / OUT: 0 mL / NET: 200 mL        PHYSICAL EXAM:  Vital Signs Last 24 Hrs  T(C): 37.3 (21 Jan 2023 05:25), Max: 37.5 (21 Jan 2023 02:01)  T(F): 99.2 (21 Jan 2023 05:25), Max: 99.5 (21 Jan 2023 02:01)  HR: 109 (21 Jan 2023 05:25) (101 - 119)  BP: 111/86 (21 Jan 2023 05:25) (87/54 - 128/77)  BP(mean): --  RR: 18 (21 Jan 2023 05:25) (18 - 18)  SpO2: 96% (21 Jan 2023 05:25) (95% - 98%)    Parameters below as of 21 Jan 2023 05:25  Patient On (Oxygen Delivery Method): room air      CONSTITUTIONAL: NAD, well-developed, well-groomed  ENMT: Moist oral mucosa, no pharyngeal injection or exudates; normal dentition  RESPIRATORY: Normal respiratory effort; lungs are clear to auscultation bilaterally  CARDIOVASCULAR: Regular rate and rhythm, normal S1 and S2, no murmur/rub/gallop; 1+ pitting lower extremity edema up to mid shins; Peripheral pulses are 2+ bilaterally  ABDOMEN: Nontender to palpation, normoactive bowel sounds, no rebound/guarding; No hepatosplenomegaly  PSYCH: A+O to person, place, and time; affect appropriate  NEUROLOGY: CN 2-12 are intact and symmetric; no gross sensory deficits   SKIN: No rashes; no palpable lesions    LABS:                        8.9    6.29  )-----------( 308      ( 21 Jan 2023 07:25 )             29.1     01-21    137  |  108  |  7   ----------------------------<  80  4.1   |  18<L>  |  0.79    Ca    8.0<L>      21 Jan 2023 07:27  Phos  3.2     01-21  Mg     2.1     01-21    TPro  4.5<L>  /  Alb  1.6<L>  /  TBili  0.3  /  DBili  x   /  AST  12  /  ALT  7<L>  /  AlkPhos  54  01-21    PT/INR - ( 20 Jan 2023 07:13 )   PT: 13.1 sec;   INR: 1.14 ratio         PTT - ( 20 Jan 2023 07:13 )  PTT:41.3 sec      Urinalysis Basic - ( 20 Jan 2023 17:27 )    Color: Yellow / Appearance: Clear / SG: >1.050 / pH: x  Gluc: x / Ketone: Moderate  / Bili: Small / Urobili: 2 mg/dL   Blood: x / Protein: >600 / Nitrite: Negative   Leuk Esterase: Negative / RBC: 4 /hpf / WBC 55 /HPF   Sq Epi: x / Non Sq Epi: 11 /hpf / Bacteria: Negative        SARS-CoV-2: NotDetec (17 Jan 2023 22:04)

## 2023-01-21 NOTE — PROGRESS NOTE ADULT - ATTENDING COMMENTS
32F with MCKENNA and asthma p/w abdominal pain, n/v, and generalized swelling x 1 week, admitted for MCKENNA flare.    -Patient with some tachycardia and hypotension associated with N/V/D and abdominal discomfort and tired. -s/p albumin 100mL 25% q6h x 4 doses. -blood cx and urine cx neg. f/u cultures currently testing. -CT abd/pelvis with IV contrast done, f/u read. CT abd/pelvis on admission was limited by lack of IV contrast. -Patient also had fever on admission. -RVP on admission was negative. -CXR on admission clear.   -Consider adding PPI for abd discomfort given PMH of gastritis/GERD.   -F/u renal recs.  - Repeat CBC today due to drop in Hgb; no noted signs of bleeding     Rest as above. Discussed with HS. 32F with MCKENNA and asthma p/w abdominal pain, n/v, and generalized swelling x 1 week, admitted for MCKENNA flare.    -Patient with some tachycardia and hypotension associated with N/V/D and abdominal discomfort and tired. -s/p albumin 100mL 25% q6h x 4 doses. -blood cx and urine cx neg. f/u cultures currently testing. -CT abd/pelvis with IV contrast done, f/u read. CT abd/pelvis on admission was limited by lack of IV contrast. -Patient also had fever on admission. -RVP on admission was negative. -CXR on admission clear.   -Consider adding PPI for abd discomfort given PMH of gastritis/GERD.   -F/u renal recs.  - Repeat CBC today due to drop in Hgb; no noted signs of bleeding   - Tacro dosing increased per nephro recs; monitor tacro levels     Rest as above. Discussed with HS.

## 2023-01-21 NOTE — PROGRESS NOTE ADULT - PROBLEM SELECTOR PLAN 1
UA Prot > 600, alb 1.1  U Pr/Cr not calculated since protein > 2000 -- severe proteinuria.   - f/u UPEP, immunofixation   - lipids very eelvated-- on statin  - complement wnl.   - dsdna neg  - hep panel negative, non immune to hep b   - Nephrology consulted, f/u recs  - c/w albumin 25% q6h  - c/w statin (home Crestor 40 -> lipitor 80)  - renal duplex neg for renal vein thrombi   - f/u renal recs  - tacro 3 BID with AM levels 30min prior to dose administration.  - eliquis 5 BID given hypercoagulability with low albumin marker. UA Prot > 600, alb 1.1  U Pr/Cr not calculated since protein > 2000 -- severe proteinuria.   - f/u UPEP, immunofixation   - lipids very eelvated-- on statin  - complement wnl.   - dsdna neg  - hep panel negative, non immune to hep b   - Nephrology consulted, f/u recs  - s/p albumin 25% q6h  - c/w statin (home Crestor 40 -> lipitor 80)  - renal duplex neg for renal vein thrombi   - f/u renal recs  - tacro 4 BID with AM levels 30min prior to dose administration.  - eliquis 5 BID given hypercoagulability with low albumin marker.  - pending quantiferon (in lab) incase pt needs rituximab in the future UA Prot > 600, alb 1.1  U Pr/Cr not calculated since protein > 2000 -- severe proteinuria.   - f/u UPEP, immunofixation   - lipids very eelvated-- on statin  - complement wnl.   - dsdna neg  - hep panel negative, non immune to hep b   - Nephrology consulted, f/u recs  - s/p albumin 25% q6h  - c/w statin (home Crestor 40 -> lipitor 80)  - renal duplex neg for renal vein thrombi   - f/u renal recs  - tacro 5 BID with AM levels 30min prior to dose administration. (increased to 5mg BID on 1/21)    - levels have been subtherapeutic, possibly i.s.o. vomiting  - eliquis 5 BID given hypercoagulability with low albumin marker.  - pending quantiferon (in lab) incase pt needs rituximab in the future

## 2023-01-21 NOTE — PROGRESS NOTE ADULT - PROBLEM SELECTOR PLAN 1
Pt with biopsy proven Minimal Change Disease presenting to the hospital with worsening proteinuria and anasarca. Pt previously admitted in 6/2022 with similar complaints noted to have TP/CR ratio of 3.8g, at that time started on steroid therapy and discharged with nephrology follow up. Now off steroids for past one month and steroids have failed for her in the past. Repeat spot urine TP/CR ratio significantly elevated (unable to quantify). Renal duplex negative for RVT. 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 Increase Tacrolimus to 4mg BID. Please monitor tacrolimus levels daily 30 minutes prior to AM dose (target level 4-6); if level today remains below goal then will consider increasing her dose. On anticoagulation with Eliquis 5mg BID (pt with severe nephrotic syndrome are at high risk for thromboembolic events). PCP Ppx with bactrim. QuantiFeron pending, for possible need for Rituximab in the future.     Pt will need nephrology follow up following discharge. Has seen Dr. Angie Apodaca in the past.   Can call office at [phone] for an appointment.     If you have any questions, please feel free to contact me  Jose Raul Patterson  Nephrology Fellow  [phone]; Prefer [employer] TEAMS  (After 5pm or on weekends please page the on-call fellow) Pt with biopsy proven Minimal Change Disease presenting to the hospital with worsening proteinuria and anasarca. Pt previously admitted in 6/2022 with similar complaints noted to have TP/CR ratio of 3.8g, at that time started on steroid therapy and discharged with nephrology follow up. Now off steroids for past one month and steroids have failed for her in the past. Repeat spot urine TP/CR ratio significantly elevated (unable to quantify). Renal duplex negative for RVT. 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 Increase Tacrolimus to 5mg BID. Please monitor tacrolimus levels daily 30 minutes prior to AM dose (target level 4-6); if level today remains below goal then will consider increasing her dose. On anticoagulation with Eliquis 5mg BID (pt with severe nephrotic syndrome are at high risk for thromboembolic events). PCP Ppx with bactrim. QuantiFeron pending, for possible need for Rituximab in the future.     Pt will need nephrology follow up following discharge. Has seen Dr. Angie Apodaca in the past.   Can call office at [phone] for an appointment.     If you have any questions, please feel free to contact me  Jose Raul Patterson  Nephrology Fellow  [phone]; Prefer [employer] TEAMS  (After 5pm or on weekends please page the on-call fellow)

## 2023-01-21 NOTE — PROGRESS NOTE ADULT - PROBLEM SELECTOR PLAN 3
Likely 2/2 generalized edema  CT AP w/o contrast without other causes  - tylenol PRN for pain, zofran PRN for nausea  - MCKENNA treatment as above  - zofran PRN for nausea -- acceptable qtc  - worsening of abd pain overnight, no diarrhea but with N/V  - pending repeat CTAP WITH contrast, pt aware of possible risk of FLORESITA and consents Likely 2/2 generalized edema  CT AP w/o contrast without other causes  - tylenol PRN for pain, zofran PRN for nausea  - MCKENNA treatment as above  - zofran PRN for nausea -- acceptable qtc  - abd pain resolved 1/21 AM, however n/v persists  - Hb drop 1/20->1/21 (13-> 8.9)    - pending repeat CTAP WITH contrast, pt aware of possible risk of FLORESITA and consents

## 2023-01-21 NOTE — PROGRESS NOTE ADULT - SUBJECTIVE AND OBJECTIVE BOX
[facility] DIVISION OF KIDNEY DISEASES AND HYPERTENSION -- FOLLOW UP NOTE  --------------------------------------------------------------------------------  Patient is a 32 year old female with PMH of Minimal Change Disease who presented to the hospital with complaints of nausea, vomiitng, diarrhea and abdominal pain for the past few days. The nephrology team was consulted for her history of MCKENNA and significant proteinuria. Pt with steroid resistant MCKENNA, started on tacrolimus on 1/18/23.     24 hour events/subjective:  Pt. seen and examined this morning. C/o stuffy nose with mild cough. Overall better than when presented to hospital. All questions answered       PAST HISTORY  --------------------------------------------------------------------------------  No significant changes to PMH, PSH, FHx, SHx, unless otherwise noted    ALLERGIES & MEDICATIONS  --------------------------------------------------------------------------------  Allergies    codeine (Hives)    Intolerances      Standing Inpatient Medications  apixaban 5 milliGRAM(s) Oral two times a day  aspirin enteric coated 81 milliGRAM(s) Oral daily  atorvastatin 80 milliGRAM(s) Oral at bedtime  chlorhexidine 4% Liquid 1 Application(s) Topical daily  influenza   Vaccine 0.5 milliLiter(s) IntraMuscular once  tacrolimus 4 milliGRAM(s) Oral two times a day  trimethoprim  160 mG/sulfamethoxazole 800 mG 1 Tablet(s) Oral daily    PRN Inpatient Medications  acetaminophen     Tablet .. 650 milliGRAM(s) Oral every 6 hours PRN  albuterol    90 MICROgram(s) HFA Inhaler 2 Puff(s) Inhalation every 6 hours PRN  aluminum hydroxide/magnesium hydroxide/simethicone Suspension 30 milliLiter(s) Oral every 6 hours PRN  benzocaine 15 mG/menthol 3.6 mG Lozenge 1 Lozenge Oral five times a day PRN  benzocaine 20% Spray 2 Spray(s) Mucosal three times a day PRN  melatonin 3 milliGRAM(s) Oral at bedtime PRN  ondansetron Injectable 4 milliGRAM(s) IV Push every 8 hours PRN  sodium chloride 0.65% Nasal 1 Spray(s) Both Nostrils two times a day PRN      REVIEW OF SYSTEMS  --------------------------------------------------------------------------------  As per HPI    VITALS/PHYSICAL EXAM  --------------------------------------------------------------------------------  T(C): 37.3 (01-21-23 @ 05:25), Max: 37.5 (01-21-23 @ 02:01)  HR: 109 (01-21-23 @ 05:25) (101 - 119)  BP: 111/86 (01-21-23 @ 05:25) (87/54 - 128/77)  RR: 18 (01-21-23 @ 05:25) (18 - 18)  SpO2: 96% (01-21-23 @ 05:25) (95% - 98%)  Wt(kg): --        01-20-23 @ 07:01  -  01-21-23 @ 07:00  --------------------------------------------------------  IN: 200 mL / OUT: 0 mL / NET: 200 mL      Physical Exam:  	Gen: ill appearing  	HEENT: MMM  	Pulm: CTA B/L  	CV: S1S2  	Abd: Soft, +BS   	Ext: ++ LE edema B/L, anasarca  	Neuro: Awake  	Skin: Warm and dry      LABS/STUDIES  --------------------------------------------------------------------------------              8.9    6.29  >-----------<  308      [01-21-23 @ 07:25]              29.1     137  |  108  |  7   ----------------------------<  80      [01-21-23 @ 07:27]  4.1   |  18  |  0.79        Ca     8.0     [01-21-23 @ 07:27]      Mg     2.1     [01-21-23 @ 07:27]      Phos  3.2     [01-21-23 @ 07:27]    TPro  4.5  /  Alb  1.6  /  TBili  0.3  /  DBili  x   /  AST  12  /  ALT  7   /  AlkPhos  54  [01-21-23 @ 07:27]    PT/INR: PT 13.1 , INR 1.14       [01-20-23 @ 07:13]  PTT: 41.3       [01-20-23 @ 07:13]      Creatinine Trend:  SCr 0.79 [01-21 @ 07:27]  SCr 0.79 [01-20 @ 07:15]  SCr 0.73 [01-19 @ 07:09]  SCr 0.64 [01-18 @ 05:57]  SCr 0.72 [01-17 @ 22:05]    Urinalysis - [01-20-23 @ 17:27]      Color Yellow / Appearance Clear / SG >1.050 / pH 6.5      Gluc Negative / Ketone Moderate  / Bili Small / Urobili 2 mg/dL       Blood Moderate / Protein >600 / Leuk Est Negative / Nitrite Negative      RBC 4 / WBC 55 / Hyaline 0 / Gran  / Sq Epi  / Non Sq Epi 11 / Bacteria Negative    Urine Creatinine 92      [01-18-23 @ 19:26]  Urine Protein >2000      [01-18-23 @ 19:26]    Lipid: chol 521, , HDL 48, LDL --      [01-18-23 @ 05:58]    HBsAb Nonreact      [01-18-23 @ 05:58]  HBsAg Nonreact      [01-18-23 @ 05:58]  HBcAb Nonreact      [01-18-23 @ 05:58]  HCV 0.07, Nonreact      [01-18-23 @ 05:58]    SHELTON: titer Negative, pattern --      [01-18-23 @ 05:58]  dsDNA <12      [01-18-23 @ 05:58]  C3 Complement 152      [01-18-23 @ 05:58]  C4 Complement 38      [01-18-23 @ 05:58]  PLA2R: ALESSANDRA <1.8, IFA --      [01-18-23 @ 05:58]  Free Light Chains: kappa 2.61, lambda 1.05, ratio = 2.49      [01-18 @ 05:58]

## 2023-01-21 NOTE — CHART NOTE - NSCHARTNOTEFT_GEN_A_CORE
Discussion held with patient regarding risks and benefits of IV contrast. Pt developed acute abdominal pain (without rebound or guarding) a/w nausea/vomiting. Risks and benefits of IV contrast for CT studies was discussed wit hthe patient, including acute allergic reaction, contrast-induced nephropathy, among other potential reactions. Benefits, including better visualization of abdominal pathology, discussed with the patient. Added risk with the patient's current minimal change disease discussed with the patient. At this time, the patient consents to receiving IV contrast for a CT scan.

## 2023-01-22 ENCOUNTER — TRANSCRIPTION ENCOUNTER (OUTPATIENT)
Age: 33
End: 2023-01-22

## 2023-01-22 LAB
ALBUMIN SERPL ELPH-MCNC: 0.8 G/DL — LOW (ref 3.3–5)
ALP SERPL-CCNC: 67 U/L — SIGNIFICANT CHANGE UP (ref 40–120)
ALT FLD-CCNC: 7 U/L — LOW (ref 10–45)
ANION GAP SERPL CALC-SCNC: 9 MMOL/L — SIGNIFICANT CHANGE UP (ref 5–17)
AST SERPL-CCNC: 16 U/L — SIGNIFICANT CHANGE UP (ref 10–40)
BILIRUB SERPL-MCNC: 0.1 MG/DL — LOW (ref 0.2–1.2)
BUN SERPL-MCNC: 5 MG/DL — LOW (ref 7–23)
CALCIUM SERPL-MCNC: 8.2 MG/DL — LOW (ref 8.4–10.5)
CHLORIDE SERPL-SCNC: 107 MMOL/L — SIGNIFICANT CHANGE UP (ref 96–108)
CO2 SERPL-SCNC: 18 MMOL/L — LOW (ref 22–31)
CREAT SERPL-MCNC: 0.82 MG/DL — SIGNIFICANT CHANGE UP (ref 0.5–1.3)
EGFR: 98 ML/MIN/1.73M2 — SIGNIFICANT CHANGE UP
GLUCOSE SERPL-MCNC: 79 MG/DL — SIGNIFICANT CHANGE UP (ref 70–99)
HCT VFR BLD CALC: 42.6 % — SIGNIFICANT CHANGE UP (ref 34.5–45)
HGB BLD-MCNC: 13.4 G/DL — SIGNIFICANT CHANGE UP (ref 11.5–15.5)
MAGNESIUM SERPL-MCNC: 1.8 MG/DL — SIGNIFICANT CHANGE UP (ref 1.6–2.6)
MCHC RBC-ENTMCNC: 30.3 PG — SIGNIFICANT CHANGE UP (ref 27–34)
MCHC RBC-ENTMCNC: 31.5 GM/DL — LOW (ref 32–36)
MCV RBC AUTO: 96.4 FL — SIGNIFICANT CHANGE UP (ref 80–100)
NRBC # BLD: 0 /100 WBCS — SIGNIFICANT CHANGE UP (ref 0–0)
PHOSPHATE SERPL-MCNC: 3.5 MG/DL — SIGNIFICANT CHANGE UP (ref 2.5–4.5)
PLATELET # BLD AUTO: 412 K/UL — HIGH (ref 150–400)
POTASSIUM SERPL-MCNC: 4.2 MMOL/L — SIGNIFICANT CHANGE UP (ref 3.5–5.3)
POTASSIUM SERPL-SCNC: 4.2 MMOL/L — SIGNIFICANT CHANGE UP (ref 3.5–5.3)
PROT SERPL-MCNC: 4.6 G/DL — LOW (ref 6–8.3)
RBC # BLD: 4.42 M/UL — SIGNIFICANT CHANGE UP (ref 3.8–5.2)
RBC # FLD: 14.7 % — HIGH (ref 10.3–14.5)
SODIUM SERPL-SCNC: 134 MMOL/L — LOW (ref 135–145)
TACROLIMUS SERPL-MCNC: 5.5 NG/ML — SIGNIFICANT CHANGE UP
WBC # BLD: 6.28 K/UL — SIGNIFICANT CHANGE UP (ref 3.8–10.5)
WBC # FLD AUTO: 6.28 K/UL — SIGNIFICANT CHANGE UP (ref 3.8–10.5)

## 2023-01-22 PROCEDURE — 99233 SBSQ HOSP IP/OBS HIGH 50: CPT | Mod: GC

## 2023-01-22 PROCEDURE — 93010 ELECTROCARDIOGRAM REPORT: CPT

## 2023-01-22 RX ORDER — SODIUM CHLORIDE 9 MG/ML
1000 INJECTION, SOLUTION INTRAVENOUS ONCE
Refills: 0 | Status: COMPLETED | OUTPATIENT
Start: 2023-01-22 | End: 2023-01-22

## 2023-01-22 RX ORDER — ALBUMIN HUMAN 25 %
100 VIAL (ML) INTRAVENOUS EVERY 6 HOURS
Refills: 0 | Status: DISCONTINUED | OUTPATIENT
Start: 2023-01-22 | End: 2023-01-22

## 2023-01-22 RX ORDER — SCOPALAMINE 1 MG/3D
1 PATCH, EXTENDED RELEASE TRANSDERMAL
Refills: 0 | Status: DISCONTINUED | OUTPATIENT
Start: 2023-01-22 | End: 2023-01-26

## 2023-01-22 RX ADMIN — CHLORHEXIDINE GLUCONATE 1 APPLICATION(S): 213 SOLUTION TOPICAL at 11:32

## 2023-01-22 RX ADMIN — Medication 1 TABLET(S): at 11:47

## 2023-01-22 RX ADMIN — APIXABAN 5 MILLIGRAM(S): 2.5 TABLET, FILM COATED ORAL at 06:07

## 2023-01-22 RX ADMIN — TACROLIMUS 5 MILLIGRAM(S): 5 CAPSULE ORAL at 17:09

## 2023-01-22 RX ADMIN — SCOPALAMINE 1 PATCH: 1 PATCH, EXTENDED RELEASE TRANSDERMAL at 12:34

## 2023-01-22 RX ADMIN — TACROLIMUS 5 MILLIGRAM(S): 5 CAPSULE ORAL at 06:07

## 2023-01-22 RX ADMIN — SCOPALAMINE 1 PATCH: 1 PATCH, EXTENDED RELEASE TRANSDERMAL at 19:45

## 2023-01-22 RX ADMIN — SODIUM CHLORIDE 1000 MILLILITER(S): 9 INJECTION, SOLUTION INTRAVENOUS at 03:50

## 2023-01-22 RX ADMIN — Medication 0.5 MILLIGRAM(S): at 17:14

## 2023-01-22 RX ADMIN — Medication 30 MILLILITER(S): at 06:09

## 2023-01-22 RX ADMIN — Medication 81 MILLIGRAM(S): at 11:47

## 2023-01-22 RX ADMIN — Medication 30 MILLILITER(S): at 17:09

## 2023-01-22 RX ADMIN — APIXABAN 5 MILLIGRAM(S): 2.5 TABLET, FILM COATED ORAL at 17:14

## 2023-01-22 NOTE — PROGRESS NOTE ADULT - PROBLEM SELECTOR PLAN 1
UA Prot > 600, alb 1.1  U Pr/Cr not calculated since protein > 2000 -- severe proteinuria.   - f/u UPEP, immunofixation   - lipids very eelvated-- on statin  - complement wnl.   - dsdna neg  - hep panel negative, non immune to hep b   - Nephrology consulted, f/u recs  - s/p albumin 25% q6h  - c/w statin (home Crestor 40 -> lipitor 80)  - renal duplex neg for renal vein thrombi   - f/u renal recs  - tacro 5 BID with AM levels 30min prior to dose administration. (increased to 5mg BID on 1/21)    - levels have been subtherapeutic, possibly i.s.o. vomiting  - eliquis 5 BID given hypercoagulability with low albumin marker.  - pending quantiferon (in lab) incase pt needs rituximab in the future UA Prot > 600, alb 1.1  U Pr/Cr not calculated since protein > 2000 -- severe proteinuria.   - f/u UPEP, immunofixation   - lipids very elvated-- on statin  - complement wnl, dsdna neg, hep panel negative, non immune to hep b   - Nephrology consulted, f/u recs  - s/p albumin 25% q6h  - c/w statin (home Crestor 40 -> lipitor 80)  - renal duplex neg for renal vein thrombi   - f/u renal recs  - tacro 5 BID with AM levels 30min prior to dose administration. (increased to 5mg BID on 1/21)    - Tacro level therapeutic 1/22 5.5  - eliquis 5 BID given hypercoagulability with low albumin marker.  - pending quantiferon (in lab) in case pt needs rituximab in the future

## 2023-01-22 NOTE — DISCHARGE NOTE PROVIDER - ATTENDING DISCHARGE PHYSICAL EXAMINATION:
Patient seen and examined earlier today. Still with nausea. Did not tolerate gastric emptying study. Patient left AMA later in the evening.   Vital Signs Last 24 Hrs  T(C): 36.5 (26 Jan 2023 12:56), Max: 36.7 (25 Jan 2023 22:48)  T(F): 97.7 (26 Jan 2023 12:56), Max: 98 (25 Jan 2023 22:48)  HR: 104 (26 Jan 2023 12:56) (100 - 108)  BP: 98/55 (26 Jan 2023 12:56) (95/58 - 117/63)  RR: 18 (26 Jan 2023 12:56) (18 - 18)  SpO2: 96% (26 Jan 2023 12:56) (96% - 97%)    Parameters below as of 26 Jan 2023 12:56  Patient On (Oxygen Delivery Method): room air    CONSTITUTIONAL: mild distress due to nausea, well-developed  EYES: PERRLA; conjunctiva and sclera clear  ENMT: Moist oral mucosa, no pharyngeal injection or exudates   NECK: Supple   RESPIRATORY: Normal respiratory effort; lungs are clear to auscultation bilaterally  CARDIOVASCULAR: Regular rate and rhythm, normal S1 and S2, no murmur   ABDOMEN: tender to palpation, normoactive bowel sounds   MUSCULOSKELETAL: no clubbing or cyanosis of digits; no joint swelling or tenderness to palpation  PSYCH: A+O to person, place, and time; affect appropriate  NEUROLOGY: no gross sensory deficits   SKIN: No rashes

## 2023-01-22 NOTE — DISCHARGE NOTE PROVIDER - NSDCCPCAREPLAN_GEN_ALL_CORE_FT
PRINCIPAL DISCHARGE DIAGNOSIS  Diagnosis: Minimal change disease  Assessment and Plan of Treatment: Patient being treated for MCKENNA flare. Patient left AMA. Risks and benefits were explained to patient. Patient understood and still left AMA.

## 2023-01-22 NOTE — PROGRESS NOTE ADULT - SUBJECTIVE AND OBJECTIVE BOX
Medicine Progress Note    Patient is a 32y old  Female who presents with a chief complaint of MCKENNA flare (21 Jan 2023 10:21)      SUBJECTIVE / OVERNIGHT EVENTS:    ADDITIONAL REVIEW OF SYSTEMS:    MEDICATIONS  (STANDING):  apixaban 5 milliGRAM(s) Oral two times a day  aspirin enteric coated 81 milliGRAM(s) Oral daily  atorvastatin 80 milliGRAM(s) Oral at bedtime  chlorhexidine 4% Liquid 1 Application(s) Topical daily  influenza   Vaccine 0.5 milliLiter(s) IntraMuscular once  tacrolimus 5 milliGRAM(s) Oral every 12 hours  trimethoprim  160 mG/sulfamethoxazole 800 mG 1 Tablet(s) Oral daily    MEDICATIONS  (PRN):  acetaminophen     Tablet .. 650 milliGRAM(s) Oral every 6 hours PRN Temp greater or equal to 38C (100.4F), Mild Pain (1 - 3)  albuterol    90 MICROgram(s) HFA Inhaler 2 Puff(s) Inhalation every 6 hours PRN Shortness of Breath and/or Wheezing  aluminum hydroxide/magnesium hydroxide/simethicone Suspension 30 milliLiter(s) Oral every 6 hours PRN Dyspepsia  benzocaine 15 mG/menthol 3.6 mG Lozenge 1 Lozenge Oral five times a day PRN Sore Throat  benzocaine 20% Spray 2 Spray(s) Mucosal three times a day PRN sore throat  melatonin 3 milliGRAM(s) Oral at bedtime PRN Insomnia  ondansetron Injectable 4 milliGRAM(s) IV Push every 8 hours PRN Nausea and/or Vomiting  sodium chloride 0.65% Nasal 1 Spray(s) Both Nostrils two times a day PRN Nasal Congestion    CAPILLARY BLOOD GLUCOSE        I&O's Summary      PHYSICAL EXAM:  Vital Signs Last 24 Hrs  T(C): 37.3 (22 Jan 2023 05:08), Max: 37.9 (21 Jan 2023 12:45)  T(F): 99.2 (22 Jan 2023 05:08), Max: 100.3 (21 Jan 2023 12:45)  HR: 114 (22 Jan 2023 05:08) (114 - 121)  BP: 126/81 (22 Jan 2023 05:08) (125/72 - 126/81)  BP(mean): --  RR: 16 (22 Jan 2023 05:08) (16 - 18)  SpO2: 96% (22 Jan 2023 05:08) (96% - 98%)    Parameters below as of 22 Jan 2023 05:08  Patient On (Oxygen Delivery Method): room air      CONSTITUTIONAL: NAD, well-developed, well-groomed  ENMT: Moist oral mucosa, no pharyngeal injection or exudates; normal dentition  RESPIRATORY: Normal respiratory effort; lungs are clear to auscultation bilaterally  CARDIOVASCULAR: Regular rate and rhythm, normal S1 and S2, no murmur/rub/gallop; No lower extremity edema; Peripheral pulses are 2+ bilaterally  ABDOMEN: Nontender to palpation, normoactive bowel sounds, no rebound/guarding; No hepatosplenomegaly  PSYCH: A+O to person, place, and time; affect appropriate  NEUROLOGY: CN 2-12 are intact and symmetric; no gross sensory deficits   SKIN: No rashes; no palpable lesions    LABS:                        11.1   6.35  )-----------( 393      ( 21 Jan 2023 14:16 )             34.9     01-21    137  |  108  |  7   ----------------------------<  80  4.1   |  18<L>  |  0.79    Ca    8.0<L>      21 Jan 2023 07:27  Phos  3.2     01-21  Mg     2.1     01-21    TPro  4.5<L>  /  Alb  1.6<L>  /  TBili  0.3  /  DBili  x   /  AST  12  /  ALT  7<L>  /  AlkPhos  54  01-21          Urinalysis Basic - ( 20 Jan 2023 17:27 )    Color: Yellow / Appearance: Clear / SG: >1.050 / pH: x  Gluc: x / Ketone: Moderate  / Bili: Small / Urobili: 2 mg/dL   Blood: x / Protein: >600 / Nitrite: Negative   Leuk Esterase: Negative / RBC: 4 /hpf / WBC 55 /HPF   Sq Epi: x / Non Sq Epi: 11 /hpf / Bacteria: Negative        Culture - Urine (collected 20 Jan 2023 17:27)  Source: Clean Catch Clean Catch (Midstream)  Final Report (21 Jan 2023 21:17):    <10,000 CFU/mL Normal Urogenital Yuli    Culture - Blood (collected 20 Jan 2023 13:46)  Source: .Blood Blood-Peripheral  Preliminary Report (21 Jan 2023 18:02):    No growth to date.    Culture - Blood (collected 20 Jan 2023 13:07)  Source: .Blood Blood-Peripheral  Preliminary Report (21 Jan 2023 18:02):    No growth to date.      SARS-CoV-2: NotDetec (17 Jan 2023 22:04)      RADIOLOGY & ADDITIONAL TESTS:  Imaging from Last 24 Hours:    Electrocardiogram/QTc Interval:    COORDINATION OF CARE:  Care Discussed with Consultants/Other Providers:   Medicine Progress Note    Patient is a 32y old  Female who presents with a chief complaint of MCKENNA flare (21 Jan 2023 10:21)      SUBJECTIVE / OVERNIGHT EVENTS: The patient was seen and examined at bedside.  - Overnight, pt has positive orthostatic BP and received 1L LR bolus  - Reports continued nausea and vomiting, no diarrhea and no abd pain at this time, vomitus is NBNB  - Denies F/C, CP, SOB, HA or palpitations  - reports improvement in swelling in legs      ADDITIONAL REVIEW OF SYSTEMS:    MEDICATIONS  (STANDING):  apixaban 5 milliGRAM(s) Oral two times a day  aspirin enteric coated 81 milliGRAM(s) Oral daily  atorvastatin 80 milliGRAM(s) Oral at bedtime  chlorhexidine 4% Liquid 1 Application(s) Topical daily  influenza   Vaccine 0.5 milliLiter(s) IntraMuscular once  tacrolimus 5 milliGRAM(s) Oral every 12 hours  trimethoprim  160 mG/sulfamethoxazole 800 mG 1 Tablet(s) Oral daily    MEDICATIONS  (PRN):  acetaminophen     Tablet .. 650 milliGRAM(s) Oral every 6 hours PRN Temp greater or equal to 38C (100.4F), Mild Pain (1 - 3)  albuterol    90 MICROgram(s) HFA Inhaler 2 Puff(s) Inhalation every 6 hours PRN Shortness of Breath and/or Wheezing  aluminum hydroxide/magnesium hydroxide/simethicone Suspension 30 milliLiter(s) Oral every 6 hours PRN Dyspepsia  benzocaine 15 mG/menthol 3.6 mG Lozenge 1 Lozenge Oral five times a day PRN Sore Throat  benzocaine 20% Spray 2 Spray(s) Mucosal three times a day PRN sore throat  melatonin 3 milliGRAM(s) Oral at bedtime PRN Insomnia  ondansetron Injectable 4 milliGRAM(s) IV Push every 8 hours PRN Nausea and/or Vomiting  sodium chloride 0.65% Nasal 1 Spray(s) Both Nostrils two times a day PRN Nasal Congestion    CAPILLARY BLOOD GLUCOSE        I&O's Summary      PHYSICAL EXAM:  Vital Signs Last 24 Hrs  T(C): 37.3 (22 Jan 2023 05:08), Max: 37.9 (21 Jan 2023 12:45)  T(F): 99.2 (22 Jan 2023 05:08), Max: 100.3 (21 Jan 2023 12:45)  HR: 114 (22 Jan 2023 05:08) (114 - 121)  BP: 126/81 (22 Jan 2023 05:08) (125/72 - 126/81)  BP(mean): --  RR: 16 (22 Jan 2023 05:08) (16 - 18)  SpO2: 96% (22 Jan 2023 05:08) (96% - 98%)    Parameters below as of 22 Jan 2023 05:08  Patient On (Oxygen Delivery Method): room air      CONSTITUTIONAL: NAD, well-developed, well-groomed  ENMT: Moist oral mucosa, no pharyngeal injection or exudates; normal dentition  RESPIRATORY: Normal respiratory effort; lungs are clear to auscultation bilaterally  CARDIOVASCULAR: Regular rate and rhythm, normal S1 and S2, no murmur/rub/gallop; Peripheral pulses are 2+ bilaterally, 1+pitting edema on thighs while sitting reclined, no pitting edema on b/l LE  ABDOMEN: Nontender to palpation, normoactive bowel sounds, no rebound/guarding; No hepatosplenomegaly  PSYCH: A+O to person, place, and time; affect appropriate  NEUROLOGY: CN 2-12 are intact and symmetric; no gross sensory deficits   SKIN: No rashes; no palpable lesions    LABS:                        11.1   6.35  )-----------( 393      ( 21 Jan 2023 14:16 )             34.9     01-21    137  |  108  |  7   ----------------------------<  80  4.1   |  18<L>  |  0.79    Ca    8.0<L>      21 Jan 2023 07:27  Phos  3.2     01-21  Mg     2.1     01-21    TPro  4.5<L>  /  Alb  1.6<L>  /  TBili  0.3  /  DBili  x   /  AST  12  /  ALT  7<L>  /  AlkPhos  54  01-21          Urinalysis Basic - ( 20 Jan 2023 17:27 )    Color: Yellow / Appearance: Clear / SG: >1.050 / pH: x  Gluc: x / Ketone: Moderate  / Bili: Small / Urobili: 2 mg/dL   Blood: x / Protein: >600 / Nitrite: Negative   Leuk Esterase: Negative / RBC: 4 /hpf / WBC 55 /HPF   Sq Epi: x / Non Sq Epi: 11 /hpf / Bacteria: Negative        Culture - Urine (collected 20 Jan 2023 17:27)  Source: Clean Catch Clean Catch (Midstream)  Final Report (21 Jan 2023 21:17):    <10,000 CFU/mL Normal Urogenital Yuli    Culture - Blood (collected 20 Jan 2023 13:46)  Source: .Blood Blood-Peripheral  Preliminary Report (21 Jan 2023 18:02):    No growth to date.    Culture - Blood (collected 20 Jan 2023 13:07)  Source: .Blood Blood-Peripheral  Preliminary Report (21 Jan 2023 18:02):    No growth to date.      SARS-CoV-2: NotDetec (17 Jan 2023 22:04)

## 2023-01-22 NOTE — DISCHARGE NOTE PROVIDER - HOSPITAL COURSE
HPI:  32F with MCKENNA and asthma p/w abdominal pain, n/v, and generalized swelling x 1 week. States abdominal pain is diffuse but worse over RLQ. Also endorses diarrhea (3x/day) x 1 week as well. Denies CP, SOB, dyspnea, fevers, congestion, or dysuria. States she does not follow with an outpatient nephrologist as she is in between providers. Previously followed with provider at [facility]. Has been off prednisone for about 1 month. States she does not wish to start steroids again as she had 60lb weight gain over the past year on steroids and routinely has periods of fluid retention/swelling despite being on steroids. PMD sometimes prescribes Lasix when she has increased swelling, but has not had any this time. In the ED, T101.3 and HR 110s. Labs significant for albumin 1.1, UA prot > 600. CTAP with trace b/l pleural effusions and generalized soft tissue edema. Nephrology consulted and recommended albumin infusion. Admitted to medicine for further management. (18 Jan 2023 04:09)    Hospital Course: The patient was admitted for MCKENNA not responsive to steroids in the past. Nephrology consulted    HPI:  32F with MCKENNA and asthma p/w abdominal pain, n/v, and generalized swelling x 1 week. States abdominal pain is diffuse but worse over RLQ. Also endorses diarrhea (3x/day) x 1 week as well. Denies CP, SOB, dyspnea, fevers, congestion, or dysuria. States she does not follow with an outpatient nephrologist as she is in between providers. Previously followed with provider at [facility]. Has been off prednisone for about 1 month. States she does not wish to start steroids again as she had 60lb weight gain over the past year on steroids and routinely has periods of fluid retention/swelling despite being on steroids. PMD sometimes prescribes Lasix when she has increased swelling, but has not had any this time. In the ED, T101.3 and HR 110s. Labs significant for albumin 1.1, UA prot > 600. CTAP with trace b/l pleural effusions and generalized soft tissue edema. Nephrology consulted and recommended albumin infusion. Admitted to medicine for further management. (18 Jan 2023 04:09)    Hospital Course: The patient was admitted for MCKENNA not responsive to steroids in the past. Nephrology consulted for management, patient was started in tacrolimus which was uptitrated to 5mg BID. The patient initially presented with anasarca which gradually improved while treating with tacrolimus. The patient was also given albumin 25% 100cc as needed for suspected intravascular depletion due to nausea/vomiting. Course c/b intractable N/V not responsive to zofran, reglan and initially responsive to low dose IV ativan. Patient given scopolomine patch. course c/b acute epigastric abdominal pain and intermittent Hb drop (13->9), CTAP with contrast unrevealing and then subsequent recovery of Hb and relief of abdominal pain without intervention.    HPI:  32F with MCKENNA and asthma p/w abdominal pain, n/v, and generalized swelling x 1 week. States abdominal pain is diffuse but worse over RLQ. Also endorses diarrhea (3x/day) x 1 week as well. Denies CP, SOB, dyspnea, fevers, congestion, or dysuria. States she does not follow with an outpatient nephrologist as she is in between providers. Previously followed with provider at [facility]. Has been off prednisone for about 1 month. States she does not wish to start steroids again as she had 60lb weight gain over the past year on steroids and routinely has periods of fluid retention/swelling despite being on steroids. PMD sometimes prescribes Lasix when she has increased swelling, but has not had any this time. In the ED, T101.3 and HR 110s. Labs significant for albumin 1.1, UA prot > 600. CTAP with trace b/l pleural effusions and generalized soft tissue edema. Nephrology consulted and recommended albumin infusion. Admitted to medicine for further management. (18 Jan 2023 04:09)    Hospital Course: The patient was admitted for MCKENNA not responsive to steroids in the past. Nephrology consulted for management, patient was started in tacrolimus which was uptitrated to 5mg BID. The patient initially presented with anasarca which gradually improved while treating with tacrolimus. The patient was also given albumin 25% 100cc as needed for suspected intravascular depletion due to nausea/vomiting. Course c/b intractable N/V not responsive to zofran, reglan and initially responsive to low dose IV ativan. Patient given scopolomine patch. course c/b acute epigastric abdominal pain and intermittent Hb drop (13->9), CTAP with contrast unrevealing and then subsequent recovery of Hb and relief of abdominal pain without intervention. Patient went for gastric empty study on 1/26 but did not finish the whole test. On 1/26: she left AMA after risks and benefits were explained to her.

## 2023-01-22 NOTE — CHART NOTE - NSCHARTNOTEFT_GEN_A_CORE
Patient with N/V bilious, nonbloody.    Patient refusing zofran, ordered ativan IV 0.5 mg with improvement.    Called to bedside by RN @ 3:30AM for dizziness when standing.    BP: 120/72, HR : 129      incomplete Patient with N/V bilious, nonbloody.    Patient refusing zofran, ordered ativan IV 0.5 mg with improvement.    Called to bedside by RN @ 3:30AM for dizziness when standing.    Lying down: BP: 120/72, HR : 129  Sitting: BP: 90/56 HR: 131  Standing: BP: 110/74, HR: 130    EKG: Sinus tachycardia    1L Bolus ordered for + orthostatic hypotension, and sinus tachycardia likely secondary to hypovolemia    Patient noting abnormal vaginal discharge, not urine.  F/u urine chlamydia, gonorrhea, trichomonas, BV.  f/u repeat BP    Stevan Waddell MD  PGY-1 Patient with N/V bilious, nonbloody.    Patient refusing zofran, ordered ativan IV 0.5 mg with improvement.    Called to bedside by RN @ 3:30AM for dizziness when standing.    Lying down: BP: 120/72, HR : 129  Sitting: BP: 90/56 HR: 131  Standing: BP: 110/74, HR: 130  O2 Sat 96%    EKG: Sinus tachycardia 120    1L Bolus ordered for + orthostatic hypotension, and sinus tachycardia likely secondary to hypovolemia iso recent nausea/vomiting. Low concern for PE given 96% oxygen saturation, no chest pain, difficulty breathing. However hypercoagulable state.    Patient noting abnormal vaginal discharge, not urine.  F/u urine chlamydia, gonorrhea, trichomonas, BV.  f/u repeat BP/HR        Stevan Waddell MD  PGY-1 Patient with N/V bilious, nonbloody.    Patient refusing zofran, ordered ativan IV 0.5 mg with improvement.    Called to bedside by RN @ 3:30AM for dizziness when standing.    Lying down: BP: 120/72, HR : 129  Sitting: BP: 90/56 HR: 131  Standing: BP: 110/74, HR: 130  O2 Sat 96%    EKG: Sinus tachycardia 120    1L Bolus ordered for + orthostatic hypotension, and sinus tachycardia likely secondary to hypovolemia iso recent nausea/vomiting. Low concern for PE given 96% oxygen saturation, no chest pain or difficulty breathing. However hypercoagulable state.    Patient noting abnormal vaginal discharge, not urine.  F/u urine chlamydia, gonorrhea, trichomonas, BV.  f/u repeat BP/HR        Stevan Waddell MD  PGY-1

## 2023-01-22 NOTE — PROGRESS NOTE ADULT - PROBLEM SELECTOR PLAN 3
Likely 2/2 generalized edema  CT AP w/o contrast without other causes  - tylenol PRN for pain, zofran PRN for nausea  - MCKENNA treatment as above  - zofran PRN for nausea -- acceptable qtc  - abd pain resolved 1/21 AM, however n/v persists  - Hb drop 1/20->1/21 (13-> 8.9)    - pending repeat CTAP WITH contrast, pt aware of possible risk of FLORESITA and consents Likely 2/2 generalized edema  CT AP w/o contrast without other causes  - tylenol PRN for pain, zofran PRN for nausea  - MCKENNA treatment as above  - zofran PRN for nausea -- acceptable qtc  - abd pain resolved 1/21 AM, however n/v persists  - Hb drop 1/20->1/21 (13-> 8.9)    - pending repeat CTAP WITH contrast, pt aware of possible risk of FLORESITA and consents  - Hb now wnl 1/22, abd pain resolved

## 2023-01-22 NOTE — PROGRESS NOTE ADULT - PROBLEM SELECTOR PLAN 2
Febrile and tachycardic  RVP negative  - f/u blood cx, GI PCR  - monitor off abx Febrile and tachycardic  RVP negative  - Blood cxneg 1/20  - monitor off abx

## 2023-01-22 NOTE — PROGRESS NOTE ADULT - ATTENDING COMMENTS
32F with MCKENNA and asthma p/w abdominal pain, n/v, and generalized swelling x 1 week, admitted for MCKENNA flare.    - patient had some nausea/vomiting overnight; noted to be orthostatic positive and given IV bolus overnight with improvement   -Patient with some tachycardia and hypotension associated with N/V/D and abdominal discomfort and tired. -s/p albumin 100mL 25% q6h x 4 doses. -blood cx and urine cx neg. -CT abd/pelvis with IV contrast done, negative. -Patient also had fever on admission. -RVP on admission was negative. -CXR on admission clear.   -Consider adding PPI for abd discomfort given PMH of gastritis/GERD.   -F/u renal recs.  - Hgb stable; no noted signs of bleeding   - Tacro dosing increased per nephro recs; tacro level within range; continue to monitor tacro levels   - will try scopolamine patch for continued nausea/vomiting   - f/u workup for vaginal discharge; r/o STI     Rest as above. Discussed with HS.

## 2023-01-22 NOTE — DISCHARGE NOTE PROVIDER - NSDCMRMEDTOKEN_GEN_ALL_CORE_FT
albuterol 90 mcg/inh inhalation aerosol: 2 puff(s) inhaled every 6 hours, As Needed  aspirin 81 mg oral tablet: 1 tab(s) orally once a day  Crestor 40 mg oral tablet: 1 tab(s) orally once a day   albuterol 90 mcg/inh inhalation aerosol: 2 puff(s) inhaled every 6 hours, As Needed  aspirin 81 mg oral tablet: 1 tab(s) orally once a day  Crestor 40 mg oral tablet: 1 tab(s) orally once a day  Envarsus XR 4 mg oral tablet, extended release: 1 tab(s) orally 2 times a day   metroNIDAZOLE 500 mg oral tablet: 1 tab(s) orally 2 times a day   prochlorperazine 5 mg oral tablet: 1 tab(s) orally every 4 hours, As Needed -for nausea   scopolamine 1 mg/72 hr transdermal film, extended release: Apply topically to affected area every 72 hours, As Needed -for nausea   sulfamethoxazole-trimethoprim 800 mg-160 mg oral tablet: 1 tab(s) orally once a day

## 2023-01-23 DIAGNOSIS — A59.9 TRICHOMONIASIS, UNSPECIFIED: ICD-10-CM

## 2023-01-23 LAB
ALBUMIN SERPL ELPH-MCNC: 0.8 G/DL — LOW (ref 3.3–5)
ALP SERPL-CCNC: 79 U/L — SIGNIFICANT CHANGE UP (ref 40–120)
ALT FLD-CCNC: 11 U/L — SIGNIFICANT CHANGE UP (ref 10–45)
ANION GAP SERPL CALC-SCNC: 14 MMOL/L — SIGNIFICANT CHANGE UP (ref 5–17)
AST SERPL-CCNC: 19 U/L — SIGNIFICANT CHANGE UP (ref 10–40)
BILIRUB SERPL-MCNC: <0.1 MG/DL — LOW (ref 0.2–1.2)
BUN SERPL-MCNC: 11 MG/DL — SIGNIFICANT CHANGE UP (ref 7–23)
C TRACH RRNA SPEC QL NAA+PROBE: SIGNIFICANT CHANGE UP
CALCIUM SERPL-MCNC: 8.5 MG/DL — SIGNIFICANT CHANGE UP (ref 8.4–10.5)
CANDIDA AB TITR SER: SIGNIFICANT CHANGE UP
CHLORIDE SERPL-SCNC: 106 MMOL/L — SIGNIFICANT CHANGE UP (ref 96–108)
CO2 SERPL-SCNC: 16 MMOL/L — LOW (ref 22–31)
CREAT SERPL-MCNC: 1.04 MG/DL — SIGNIFICANT CHANGE UP (ref 0.5–1.3)
CULTURE RESULTS: SIGNIFICANT CHANGE UP
CULTURE RESULTS: SIGNIFICANT CHANGE UP
EGFR: 74 ML/MIN/1.73M2 — SIGNIFICANT CHANGE UP
G VAGINALIS DNA SPEC QL NAA+PROBE: SIGNIFICANT CHANGE UP
GLUCOSE SERPL-MCNC: 72 MG/DL — SIGNIFICANT CHANGE UP (ref 70–99)
MAGNESIUM SERPL-MCNC: 2.4 MG/DL — SIGNIFICANT CHANGE UP (ref 1.6–2.6)
N GONORRHOEA RRNA SPEC QL NAA+PROBE: SIGNIFICANT CHANGE UP
PHOSPHATE SERPL-MCNC: 4.2 MG/DL — SIGNIFICANT CHANGE UP (ref 2.5–4.5)
POTASSIUM SERPL-MCNC: 4.3 MMOL/L — SIGNIFICANT CHANGE UP (ref 3.5–5.3)
POTASSIUM SERPL-SCNC: 4.3 MMOL/L — SIGNIFICANT CHANGE UP (ref 3.5–5.3)
PROT SERPL-MCNC: 4.9 G/DL — LOW (ref 6–8.3)
SODIUM SERPL-SCNC: 136 MMOL/L — SIGNIFICANT CHANGE UP (ref 135–145)
SPECIMEN SOURCE: SIGNIFICANT CHANGE UP
T VAGINALIS RRNA SPEC QL NAA+PROBE: DETECTED
T VAGINALIS SPEC QL WET PREP: SIGNIFICANT CHANGE UP
TACROLIMUS SERPL-MCNC: 8.8 NG/ML — SIGNIFICANT CHANGE UP

## 2023-01-23 PROCEDURE — 99233 SBSQ HOSP IP/OBS HIGH 50: CPT | Mod: GC

## 2023-01-23 PROCEDURE — 99222 1ST HOSP IP/OBS MODERATE 55: CPT | Mod: GC

## 2023-01-23 RX ORDER — METRONIDAZOLE 500 MG
500 TABLET ORAL
Refills: 0 | Status: DISCONTINUED | OUTPATIENT
Start: 2023-01-23 | End: 2023-01-26

## 2023-01-23 RX ORDER — PROCHLORPERAZINE MALEATE 5 MG
5 TABLET ORAL EVERY 4 HOURS
Refills: 0 | Status: DISCONTINUED | OUTPATIENT
Start: 2023-01-23 | End: 2023-01-26

## 2023-01-23 RX ORDER — ONDANSETRON 8 MG/1
8 TABLET, FILM COATED ORAL EVERY 8 HOURS
Refills: 0 | Status: DISCONTINUED | OUTPATIENT
Start: 2023-01-23 | End: 2023-01-26

## 2023-01-23 RX ADMIN — Medication 500 MILLIGRAM(S): at 17:14

## 2023-01-23 RX ADMIN — APIXABAN 5 MILLIGRAM(S): 2.5 TABLET, FILM COATED ORAL at 17:14

## 2023-01-23 RX ADMIN — CHLORHEXIDINE GLUCONATE 1 APPLICATION(S): 213 SOLUTION TOPICAL at 11:36

## 2023-01-23 RX ADMIN — TACROLIMUS 5 MILLIGRAM(S): 5 CAPSULE ORAL at 17:14

## 2023-01-23 RX ADMIN — Medication 5 MILLIGRAM(S): at 15:19

## 2023-01-23 RX ADMIN — Medication 0.5 MILLIGRAM(S): at 01:26

## 2023-01-23 RX ADMIN — TACROLIMUS 5 MILLIGRAM(S): 5 CAPSULE ORAL at 06:51

## 2023-01-23 RX ADMIN — Medication 5 MILLIGRAM(S): at 21:17

## 2023-01-23 RX ADMIN — SCOPALAMINE 1 PATCH: 1 PATCH, EXTENDED RELEASE TRANSDERMAL at 06:52

## 2023-01-23 RX ADMIN — ATORVASTATIN CALCIUM 80 MILLIGRAM(S): 80 TABLET, FILM COATED ORAL at 21:21

## 2023-01-23 RX ADMIN — Medication 30 MILLILITER(S): at 21:17

## 2023-01-23 RX ADMIN — SCOPALAMINE 1 PATCH: 1 PATCH, EXTENDED RELEASE TRANSDERMAL at 19:00

## 2023-01-23 NOTE — CONSULT NOTE ADULT - SUBJECTIVE AND OBJECTIVE BOX
Date of Admission: 23    CHIEF COMPLAINT: N/V w/ abdominal pain     HISTORY OF PRESENT ILLNESS:   32F with MCKENNA, minimal change disease, hx thrombophlebitis of R gonadal vein, DVT (was previously on Xarelto), GERD, asthma originally presented w/ right sided abdominal pain, n/v, diarrhea and generalized swelling x 1 week. Prior to admission the patient was not taking prednisone for her MCKENNA for 1 months, she did not like the steroids bc they were associated with weight gain and she still had fluid retention. Nephrology evaluated the patient and initiated tacrolimus, pt now on 5 BID, with improvement in her anasarca and abdominal pain. Per patient her nausea/vomitting was going on for two weeks prior to presentation and progressively worsening. She has 10-15 episodes of emesis daily, brown/yellow in color. She notes no blood with vomiting. She has had a decreased appetite, and her symptoms disrupt her sleep. She endorses she was previously able to keep food down, however how she feels like she throws up with any food. She had diarrhea prior to arrival, which resolved however now she is having yellow watery stools 3x a day. She denies bloody or dark stools. She states that sometimes she will start vomiting while having the diarrhea, or while taking showers, making her very uncomfortable. The patient states her abdominal pain has overall improved with resolution of her anasarca, however the nausea and vomiting persists. She has tried reglan and zofran which do not help. Ativan makes her fall asleep, however she still wakes up with nausea. She initiated a scopolamine patch on , but still has no relief of symptoms. The patient is noted to have a Hb drop from 13 --> 8.9 on , however repeat labs the same day show a hg of 11.1. She denies any history of marijuana use. The patient was previously admitted in  for similar findings. During that hospitalization she underwent EGD w/ findings of small hiatal hernia. 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 The patient stated she had N/V for 1 month then and it eventually resolved on its own. At this time the patient is denying hematemesis, SOB, Chest pain, dysuria, hematuria, melena or BRBPR.      PAST MEDICAL & SURGICAL HISTORY:  Asthma  GERD (gastroesophageal reflux disease)  Anemia  Gastritis  Thrombophlebitis/phlebitis  thrombophlebitis of R gonadal vein  UTI (urinary tract infection)    H/O:   History of tonsillectomy    FAMILY HISTORY:  No pertinent family history in first degree relatives. No pertinent family history of: irritable bowel syndrome.    SOCIAL HISTORY:    Non smoker, rare alcohol use, no recreational drug use    Allergies  codeine (Hives)  Intolerances    	    REVIEW OF SYSTEMS:  CONSTITUTIONAL:  No weight loss, chills, weakness or fatigue. +fever on admission  HEENT:  Eyes:  No visual loss, blurred vision, double vision or yellow sclerae. Ears, Nose, Throat:  No hearing loss, sneezing, congestion, runny nose or sore throat.  CARDIOVASCULAR:  No chest pain, chest pressure or chest discomfort. No palpitations.  RESPIRATORY:  No shortness of breath, cough or sputum.  GASTROINTESTINAL:  decreased appetite, +nausea, +vomitting +RUQ abdominal pain   GENITOURINARY:  Denies hematuria, dysuria.   NEUROLOGICAL:  No headache, dizziness, syncope, paralysis, ataxia, numbness or tingling in the extremities. No change in bowel or bladder control.  MUSCULOSKELETAL:  No muscle, back pain, joint pain or stiffness.  HEMATOLOGIC:  No anemia, bleeding or bruising.  LYMPHATICS:  No enlarged nodes.   ENDOCRINOLOGIC:  No reports of sweating, cold or heat intolerance. No polyuria or polydipsia.  ALLERGIES:  Nohives, eczema or rhinitis.      PHYSICAL EXAM:  T(C): 36.8 (23 @ 13:23), Max: 36.8 (23 @ 19:57)  HR: 86 (23 @ 13:23) (86 - 118)  BP: 119/69 (23 @ 13:23) (94/52 - 128/74)  RR: 18 (23 @ 13:23) (18 - 18)  SpO2: 100% (23 @ 13:23) (95% - 100%)  Wt(kg): --  I&O's Summary      GENERAL APPEARANCE: Well developed, uncomfortable appearing female resting in bed  HEENT:  PERRL, EOMI. hearing grossly intact.  CARDIAC: Normal S1 and S2. no mrg. RRR  LUNGS: Clear to auscultation B/L, no rales, rhonchi, or wheezing  ABDOMEN: Soft. +right sided abdominal pain on palpation   MUSCULOSKELETAL: ROM intact.  No joint erythema or tenderness.   EXTREMITIES: No edema. Peripheral pulses intact.   NEUROLOGICAL: Non focal. Strength and sensation symmetric and intact throughout.   SKIN: Warm and dry , Well perfused  PSYCHIATRIC: AOx3 , Normal mood and affect      LABS:	 	                          13.4   6.28  )-----------( 412      ( 2023 07:05 )             42.6         136  |  106  |  11  ----------------------------<  72  4.3   |  16<L>  |  1.04    Ca    8.5      2023 07:33  Phos  4.2       Mg     2.4         TPro  4.9<L>  /  Alb  0.8<L>  /  TBili  <0.1<L>  /  DBili  x   /  AST  19  /  ALT  11  /  AlkPhos  79                  RADIOLOGY:    < from: CT Abdomen and Pelvis w/wo IV Cont (23 @ 10:07) >  FINDINGS:  LOWER CHEST: Linear atelectasis and/or scarring bilateral lung bases.    LIVER: Hypoattenuating area adjacent to the falciform ligament measuring   1.8 x 1.6 cm likely focal fatty infiltration.  BILE DUCTS: Normal caliber.  GALLBLADDER: Within normal limits.  SPLEEN: Within normal limits.  PANCREAS: Within normal limits.  ADRENALS: Within normal limits.  KIDNEYS/URETERS: Within normal limits.    BLADDER: Within normal limits.  REPRODUCTIVE ORGANS: The uterus is unremarkable. Left nabothian cyst   measuring up to 1.4 cm. The bilateral adnexa are unremarkable.    BOWEL: No bowel obstruction. Appendix is normal.  PERITONEUM: No ascites.  VESSELS: Within normal limits.  RETROPERITONEUM/LYMPH NODES: No hematoma.  ABDOMINAL WALL: Bilateral flank edema.  BONES: Within normal limits.    IMPRESSION:    No evidence of active bleeding or hematoma.      < end of copied text >      PREVIOUS DIAGNOSTIC TESTING:    < from: Upper Endoscopy (22 @ 10:35) >  Findings:       A small hiatal hernia was present.       The examined esophagus was normal.       The entire examined stomach was normal. Biopsieswere taken with a cold forceps for        Helicobacter pylori testing.       The first portion of the duodenum and second portion of the duodenum were normal.             Impression:          - Small hiatal hernia.                       - Normal esophagus.                       - Normal stomach. Biopsied.                       - Normal first portion of the duodenum and second portion of the duodenum.  Recommendation:      - [facility]                       - Advance diet as tolerated today.                       - Follow up biopsy results                       - Trial of topical Capsaicin ointment to abdomen PRN given THC+ on urine                                                                                                                Home Medications:  albuterol 90 mcg/inh inhalation aerosol: 2 puff(s) inhaled every 6 hours, As Needed (2023 04:12)  aspirin 81 mg oral tablet: 1 tab(s) orally once a day (2023 04:12)  Crestor 40 mg oral tablet: 1 tab(s) orally once a day (2023 04:12)    MEDICATIONS  (STANDING):  apixaban 5 milliGRAM(s) Oral two times a day  aspirin enteric coated 81 milliGRAM(s) Oral daily  atorvastatin 80 milliGRAM(s) Oral at bedtime  chlorhexidine 4% Liquid 1 Application(s) Topical daily  influenza   Vaccine 0.5 milliLiter(s) IntraMuscular once  LORazepam   Injectable 0.5 milliGRAM(s) IV Push once  tacrolimus 5 milliGRAM(s) Oral every 12 hours  trimethoprim  160 mG/sulfamethoxazole 800 mG 1 Tablet(s) Oral daily    MEDICATIONS  (PRN):  acetaminophen     Tablet .. 650 milliGRAM(s) Oral every 6 hours PRN Temp greater or equal to 38C (100.4F), Mild Pain (1 - 3)  albuterol    90 MICROgram(s) HFA Inhaler 2 Puff(s) Inhalation every 6 hours PRN Shortness of Breath and/or Wheezing  aluminum hydroxide/magnesium hydroxide/simethicone Suspension 30 milliLiter(s) Oral every 6 hours PRN Dyspepsia  benzocaine 15 mG/menthol 3.6 mG Lozenge 1 Lozenge Oral five times a day PRN Sore Throat  benzocaine 20% Spray 2 Spray(s) Mucosal three times a day PRN sore throat  melatonin 3 milliGRAM(s) Oral at bedtime PRN Insomnia  ondansetron Injectable 4 milliGRAM(s) IV Push every 8 hours PRN Nausea and/or Vomiting  prochlorperazine   Injectable 5 milliGRAM(s) IV Push every 4 hours PRN Nausea  scopolamine 1 mG/72 Hr(s) Patch 1 Patch Transdermal every 72 hours PRN Nausea and/or Vomiting  sodium chloride 0.65% Nasal 1 Spray(s) Both Nostrils two times a day PRN Nasal Congestion         Date of Admission: 23    CHIEF COMPLAINT: N/V w/ abdominal pain     HISTORY OF PRESENT ILLNESS:   32F with MCKENNA, minimal change disease, hx thrombophlebitis of R gonadal vein, DVT (was previously on Xarelto), GERD, asthma originally presented w/ right sided abdominal pain, n/v, diarrhea and generalized swelling x 1 week. Prior to admission the patient was not taking prednisone for her MCKENNA for 1 months, she did not like the steroids bc they were associated with weight gain and she still had fluid retention. Nephrology evaluated the patient and initiated tacrolimus, pt now on 5 BID, with improvement in her anasarca and abdominal pain. Per patient her nausea/vomitting was going on for two weeks prior to presentation and progressively worsening. She has 10-15 episodes of emesis daily, brown/yellow in color. She notes no blood with vomiting. She has had a decreased appetite, and her symptoms disrupt her sleep. She endorses she was previously able to keep food down, however how she feels like she throws up with any food. She had diarrhea prior to arrival, which resolved however now she is having yellow watery stools 3x a day. She denies bloody or dark stools. She states that sometimes she will start vomiting while having the diarrhea, or while taking showers, making her very uncomfortable. The patient states her abdominal pain has overall improved with resolution of her anasarca, however the nausea and vomiting persists. She has tried reglan and zofran which do not help. Ativan makes her fall asleep, however she still wakes up with nausea. She initiated a scopolamine patch on , but still has no relief of symptoms. The patient is noted to have a Hb drop from 13 --> 8.9 on , however repeat labs the same day show a hg of 11.1. She denies any history of marijuana use. The patient was previously admitted in  for similar findings. During that hospitalization she underwent EGD w/ findings of small hiatal hernia. 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 The patient stated she had N/V for 1 month then and it eventually resolved on its own. At this time the patient is denying hematemesis, SOB, Chest pain, dysuria, hematuria, melena or BRBPR. Currently denies any marijuana use. Reports having had tapering of steroids before she stopped it last month. Hot shows do make the pain/nausea better.      PAST MEDICAL & SURGICAL HISTORY:  Asthma  GERD (gastroesophageal reflux disease)  Anemia  Gastritis  Thrombophlebitis/phlebitis  thrombophlebitis of R gonadal vein  UTI (urinary tract infection)    H/O:   History of tonsillectomy    FAMILY HISTORY:  No pertinent family history in first degree relatives. No pertinent family history of: irritable bowel syndrome.    SOCIAL HISTORY:    Non smoker, rare alcohol use, no recreational drug use; DENIES marijuana    Allergies  codeine (Hives)  Intolerances    	    REVIEW OF SYSTEMS:  CONSTITUTIONAL:  No weight loss, chills, weakness or fatigue. +fever on admission  HEENT:  Eyes:  No visual loss, blurred vision, double vision or yellow sclerae. Ears, Nose, Throat:  No hearing loss, sneezing, congestion, runny nose or sore throat.  CARDIOVASCULAR:  No chest pain, chest pressure or chest discomfort. No palpitations.  RESPIRATORY:  No shortness of breath, cough or sputum.  GASTROINTESTINAL:  decreased appetite, +nausea, +vomitting +RUQ abdominal pain   GENITOURINARY:  Denies hematuria, dysuria.   NEUROLOGICAL:  No headache, dizziness, syncope, paralysis, ataxia, numbness or tingling in the extremities. No change in bowel or bladder control.  MUSCULOSKELETAL:  No muscle, back pain, joint pain or stiffness.  HEMATOLOGIC:  No anemia, bleeding or bruising.  LYMPHATICS:  No enlarged nodes.   ENDOCRINOLOGIC:  No reports of sweating, cold or heat intolerance. No polyuria or polydipsia.  ALLERGIES:  Nohives, eczema or rhinitis.      PHYSICAL EXAM:  T(C): 36.8 (23 @ 13:23), Max: 36.8 (23 @ 19:57)  HR: 86 (23 @ 13:23) (86 - 118)  BP: 119/69 (23 @ 13:23) (94/52 - 128/74)  RR: 18 (23 @ 13:23) (18 - 18)  SpO2: 100% (23 @ 13:23) (95% - 100%)  Wt(kg): --  I&O's Summary      GENERAL APPEARANCE: Well developed, uncomfortable appearing female resting in bed  HEENT:  PERRL, EOMI. hearing grossly intact.  CARDIAC: Normal S1 and S2. no mrg. RRR  LUNGS: Clear to auscultation B/L, no rales, rhonchi, or wheezing  ABDOMEN: Soft. +right sided abdominal pain on palpation   MUSCULOSKELETAL: ROM intact.  No joint erythema or tenderness.   EXTREMITIES: No edema. Peripheral pulses intact.   NEUROLOGICAL: Non focal. Strength and sensation symmetric and intact throughout.   SKIN: Warm and dry , Well perfused  PSYCHIATRIC: AOx3 , Normal mood and affect      LABS:	 	                          13.4   6.28  )-----------( 412      ( 2023 07:05 )             42.6         136  |  106  |  11  ----------------------------<  72  4.3   |  16<L>  |  1.04    Ca    8.5      2023 07:33  Phos  4.2       Mg     2.4         TPro  4.9<L>  /  Alb  0.8<L>  /  TBili  <0.1<L>  /  DBili  x   /  AST  19  /  ALT  11  /  AlkPhos  79                  RADIOLOGY:    < from: CT Abdomen and Pelvis w/wo IV Cont (23 @ 10:07) >  FINDINGS:  LOWER CHEST: Linear atelectasis and/or scarring bilateral lung bases.    LIVER: Hypoattenuating area adjacent to the falciform ligament measuring   1.8 x 1.6 cm likely focal fatty infiltration.  BILE DUCTS: Normal caliber.  GALLBLADDER: Within normal limits.  SPLEEN: Within normal limits.  PANCREAS: Within normal limits.  ADRENALS: Within normal limits.  KIDNEYS/URETERS: Within normal limits.    BLADDER: Within normal limits.  REPRODUCTIVE ORGANS: The uterus is unremarkable. Left nabothian cyst   measuring up to 1.4 cm. The bilateral adnexa are unremarkable.    BOWEL: No bowel obstruction. Appendix is normal.  PERITONEUM: No ascites.  VESSELS: Within normal limits.  RETROPERITONEUM/LYMPH NODES: No hematoma.  ABDOMINAL WALL: Bilateral flank edema.  BONES: Within normal limits.    IMPRESSION:    No evidence of active bleeding or hematoma.      < end of copied text >      PREVIOUS DIAGNOSTIC TESTING:    < from: Upper Endoscopy (22 @ 10:35) >  Findings:       A small hiatal hernia was present.       The examined esophagus was normal.       The entire examined stomach was normal. Biopsieswere taken with a cold forceps for        Helicobacter pylori testing.       The first portion of the duodenum and second portion of the duodenum were normal.             Impression:          - Small hiatal hernia.                       - Normal esophagus.                       - Normal stomach. Biopsied.                       - Normal first portion of the duodenum and second portion of the duodenum.  Recommendation:      - [facility].                       - Advance diet as tolerated today.                       - Follow up biopsy results                       - Trial of topical Capsaicin ointment to abdomen PRN given THC+ on urine                                                                                                                Home Medications:  albuterol 90 mcg/inh inhalation aerosol: 2 puff(s) inhaled every 6 hours, As Needed (2023 04:12)  aspirin 81 mg oral tablet: 1 tab(s) orally once a day (2023 04:12)  Crestor 40 mg oral tablet: 1 tab(s) orally once a day (2023 04:12)    MEDICATIONS  (STANDING):  apixaban 5 milliGRAM(s) Oral two times a day  aspirin enteric coated 81 milliGRAM(s) Oral daily  atorvastatin 80 milliGRAM(s) Oral at bedtime  chlorhexidine 4% Liquid 1 Application(s) Topical daily  influenza   Vaccine 0.5 milliLiter(s) IntraMuscular once  LORazepam   Injectable 0.5 milliGRAM(s) IV Push once  tacrolimus 5 milliGRAM(s) Oral every 12 hours  trimethoprim  160 mG/sulfamethoxazole 800 mG 1 Tablet(s) Oral daily    MEDICATIONS  (PRN):  acetaminophen     Tablet .. 650 milliGRAM(s) Oral every 6 hours PRN Temp greater or equal to 38C (100.4F), Mild Pain (1 - 3)  albuterol    90 MICROgram(s) HFA Inhaler 2 Puff(s) Inhalation every 6 hours PRN Shortness of Breath and/or Wheezing  aluminum hydroxide/magnesium hydroxide/simethicone Suspension 30 milliLiter(s) Oral every 6 hours PRN Dyspepsia  benzocaine 15 mG/menthol 3.6 mG Lozenge 1 Lozenge Oral five times a day PRN Sore Throat  benzocaine 20% Spray 2 Spray(s) Mucosal three times a day PRN sore throat  melatonin 3 milliGRAM(s) Oral at bedtime PRN Insomnia  ondansetron Injectable 4 milliGRAM(s) IV Push every 8 hours PRN Nausea and/or Vomiting  prochlorperazine   Injectable 5 milliGRAM(s) IV Push every 4 hours PRN Nausea  scopolamine 1 mG/72 Hr(s) Patch 1 Patch Transdermal every 72 hours PRN Nausea and/or Vomiting  sodium chloride 0.65% Nasal 1 Spray(s) Both Nostrils two times a day PRN Nasal Congestion    Imaging:  < from: CT Abdomen and Pelvis w/wo IV Cont (23 @ 10:07) >    FINDINGS:  LOWER CHEST: Linear atelectasis and/or scarring bilateral lung bases.    LIVER: Hypoattenuating area adjacent to the falciform ligament measuring   1.8 x 1.6 cm likely focal fatty infiltration.  BILE DUCTS: Normal caliber.  GALLBLADDER: Within normal limits.  SPLEEN: Within normal limits.  PANCREAS: Within normal limits.  ADRENALS: Within normal limits.  KIDNEYS/URETERS: Within normal limits.    BLADDER: Within normal limits.  REPRODUCTIVE ORGANS: The uterus is unremarkable. Left nabothian cyst   measuring up to 1.4 cm. The bilateral adnexa are unremarkable.    BOWEL: No bowel obstruction. Appendix is normal.  PERITONEUM: No ascites.  VESSELS: Within normal limits.  RETROPERITONEUM/LYMPH NODES: No hematoma.  ABDOMINAL WALL: Bilateral flank edema.  BONES: Within normal limits.    IMPRESSION:    No evidence of active bleeding or hematoma.    --- End of Report ---    < end of copied text >

## 2023-01-23 NOTE — PROGRESS NOTE ADULT - PROBLEM SELECTOR PLAN 3
Likely 2/2 generalized edema  CT AP w/o contrast without other causes  - tylenol PRN for pain, zofran PRN for nausea  - MCKENNA treatment as above  - zofran PRN for nausea -- acceptable qtc  - abd pain resolved 1/21 AM, however n/v persists  - Hb drop 1/20->1/21 (13-> 8.9)    - pending repeat CTAP WITH contrast, pt aware of possible risk of FLORESITA and consents  - Hb now wnl 1/22, abd pain resolved #nausea   Likely 2/2 generalized edema  CT AP w/o contrast without other causes  - tylenol PRN for pain, zofran PRN for nausea  - MCKENNA treatment as above  - zofran PRN for nausea -- acceptable qtc  - abd pain resolved 1/21 AM, however n/v persists  - GI consulted, appreciate recs

## 2023-01-23 NOTE — CONSULT NOTE ADULT - ASSESSMENT
***note not complete unless signed by attending****    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, hx thrombophlebitis of R gonadal vein, DVT (was previously on Xarelto), GERD, presented to ED for progressively worsening N/V for 2 weeks w/ intermittent diarrhea    #Gastroenteritis/post-infectious gastroparesis  #anasarca induced abdominal pain  #cannabinoid hyperemesis   #Medication induced nausea  Pt denying history of marijuana use, however THC + in previous admission  Pt states abdominal pain improved w/ tacrolimus as fluid retention improved  Imaging shows no gallbladder thickening/low concern for cholecystitis    Recommendations:  Please obtain GI PCR, Stool culture ova and parasites   Please obtain urine drug screen   Can c/w zofran, increase to 8mg q8   Can c/w scopolamine patch      ***note not complete unless signed by attending****    32 yo F PMHx minimal change disease, hx thrombophlebitis of R gonadal vein, DVT (was previously on Xarelto), GERD, presented to ED for progressively worsening N/V for 2 weeks w/ intermittent diarrhea    #Gastroenteritis/post-infectious gastroparesis  #anasarca induced abdominal pain  #cannabinoid hyperemesis   #Medication induced nausea  Pt denying history of marijuana use, however THC + in previous admission  Pt states abdominal pain improved w/ tacrolimus as fluid retention improved  Unlikely having GI bleed given drop in hemoglobin notes on 1/21 CBC was w/ all cell lines down and hgb back to baseline on same day labs, pt reports no melena/bloody stools or emesis  Imaging shows no gallbladder thickening/low concern for cholecystitis    Recommendations:  Please obtain GI PCR, Stool culture ova and parasites   Please obtain urine drug screen   Can c/w zofran, increase to 8mg q8   Can c/w scopolamine patch      ***note not complete unless signed by attending****    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, hx thrombophlebitis of R gonadal vein, DVT (was previously on Xarelto), GERD, presented to ED for progressively worsening N/V for 2 weeks w/ intermittent diarrhea    #Gastroenteritis  #anasarca induced abdominal pain  #cannabinoid hyperemesis   #Medication induced nausea  Pt denying history of marijuana use, however THC + in previous admission  Pt states abdominal pain improved w/ tacrolimus as fluid retention improved  Unlikely having GI bleed given drop in hemoglobin notes on 1/21 CBC was w/ all cell lines down and hgb back to baseline on same day labs, pt reports no melena/bloody stools or emesis  Imaging shows no gallbladder thickening/low concern for cholecystitis  Unlikely medication induced as pt has long standing history, no new medications prior to admission    Recommendations:  Please obtain GI PCR, Stool culture ova and parasites   Please obtain urine drug screen   Can c/w Zofran increase to 8mg q8  Can c/w scopolamine patch   Pt previously tried capsaicin cream, refusing at this time as it caused burning  Can consider aprepitant for nausea         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, hx thrombophlebitis of R gonadal vein, DVT (was previously on Xarelto), GERD, presented to ED for progressively worsening N/V for 2 weeks w/ intermittent diarrhea    #Gastroenteritis  #anasarca induced abdominal pain  #cannabinoid hyperemesis   #Medication induced nausea  Pt denying history of marijuana use, however THC + in previous admission  Pt states abdominal pain improved w/ tacrolimus as fluid retention improved  Unlikely having GI bleed given drop in hemoglobin notes on 1/21 CBC was w/ all cell lines down and hgb back to baseline on same day labs, pt reports no melena/bloody stools or emesis  Imaging shows no gallbladder thickening/low concern for cholecystitis  Unlikely medication induced as pt has long standing history, no new medications prior to admission    Recommendations:  Please obtain GI PCR, Stool culture ova and parasites   Please obtain urine drug screen   Can c/w Zofran increase to 8mg q8  If UTOx is negative, would obtain gastric emptying study if patient can tolerate it  Can c/w scopolamine patch   Pt previously tried capsaicin cream, refusing at this time as it caused burning  Can consider aprepitant for nausea          GI iphone: [phone]  GI e-mail: [email or web address]

## 2023-01-23 NOTE — PROGRESS NOTE ADULT - ATTENDING COMMENTS
Minimal change  On tac  please have see evaluate her for persistent nausea; unable to tolerate po    Tish Elizabeth MD  Off: [phone]  contact me on teams    (After 5 pm or on weekends please page the on-call fellow/attending, can check [email or web address] for schedule. Login is jose antonio willoughby, schedule under [facility] medicine, psych, derm)

## 2023-01-23 NOTE — PROGRESS NOTE ADULT - ATTENDING COMMENTS
32F with MCKENNA and asthma p/w abdominal pain, n/v, and generalized swelling x 1 week, admitted for MCKENNA flare.    -patient still with nausea; denies any vomiting this morning   -Patient with some tachycardia and hypotension associated with N/V/D and abdominal discomfort and tired. -s/p albumin 100mL 25% q6h x 4 doses. -blood cx and urine cx neg. -CT abd/pelvis with IV contrast done, negative. -Patient also had fever on admission. -RVP on admission was negative. -CXR on admission clear. Currently afebrile.   -Consider adding PPI for abd discomfort given PMH of gastritis/GERD.   -F/u renal recs; adjust tacro dosing per nephro recs; continue to monitor tacro levels    - Hgb stable; no noted signs of bleeding    - scopolamine patch not working for patient; can try ativan again if patient agrees (patient has been refusing meds recently) and/or compazine for continued nausea/vomiting   - f/u workup for vaginal discharge; r/o STI     Rest as above. Discussed with HS. 32F with MCKENNA and asthma p/w abdominal pain, n/v, and generalized swelling x 1 week, admitted for MCKENNA flare.    -patient still with nausea; denies any vomiting this morning   -Patient with some tachycardia and hypotension associated with N/V/D and abdominal discomfort and tired. -s/p albumin 100mL 25% q6h x 4 doses. -blood cx and urine cx neg. -CT abd/pelvis with IV contrast done, negative. -Patient also had fever on admission. -RVP on admission was negative. -CXR on admission clear. Currently afebrile.   -Consider adding PPI for abd discomfort given PMH of gastritis/GERD.   -F/u renal recs; adjust tacro dosing per nephro recs; continue to monitor tacro levels    - Hgb stable; no noted signs of bleeding    - scopolamine patch not working for patient; can try ativan again if patient agrees (patient has been refusing meds recently) and/or compazine for continued nausea/vomiting  - f/u workup for vaginal discharge; r/o STI     Rest as above. Discussed with HS.

## 2023-01-23 NOTE — PROGRESS NOTE ADULT - PROBLEM SELECTOR PLAN 1
Pt with biopsy proven Minimal Change Disease presenting to the hospital with worsening proteinuria and anasarca. Pt previously admitted in 6/2022 with similar complaints noted to have TP/CR ratio of 3.8g, at that time started on steroid therapy and discharged with nephrology follow up. Now off steroids for past one month and steroids have failed for her in the past. Repeat spot urine TP/CR ratio significantly elevated (unable to quantify). Renal duplex negative for RVT. 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 Currently on Tacrolimus 5mg BID, with most recent level at goal. Please monitor tacrolimus levels daily 30 minutes prior to AM dose (target level 4-6). On anticoagulation with Eliquis 5mg BID (pt with severe nephrotic syndrome are at high risk for thromboembolic events). PCP Ppx with bactrim. QuantiFeron pending, for possible need for Rituximab in the future.     Pt will need nephrology follow up following discharge. Has seen Dr. Angie Apodaca in the past.   Can call office at [phone] for an appointment.

## 2023-01-23 NOTE — PROGRESS NOTE ADULT - ASSESSMENT
32F with MCKENNA and asthma p/w abdominal pain, n/v, and generalized swelling x 1 week, admitted for MCKENNA flare. 32F with MCKENNA and asthma p/w abdominal pain, n/v, and generalized swelling x 1 week, admitted for MCKENNA flare. Course complicated by intractable nausea.

## 2023-01-23 NOTE — PROGRESS NOTE ADULT - SUBJECTIVE AND OBJECTIVE BOX
[facility] DIVISION OF KIDNEY DISEASES AND HYPERTENSION -- FOLLOW UP NOTE  --------------------------------------------------------------------------------  Patient is a 32 year old female with PMH of Minimal Change Disease who presented to the hospital with complaints of nausea, vomiitng, diarrhea and abdominal pain for the past few days. The nephrology team was consulted for her history of MCKENNA and significant proteinuria. Pt with steroid resistant MCKENNA, started on tacrolimus on 1/18/23.     24 hour events/subjective:  Pt. seen and examined this morning. Reports nausea and vomiting, States she has been unable to eat anything at all. Denied any chest pain or shortness of breath.       PAST HISTORY  --------------------------------------------------------------------------------  No significant changes to PMH, PSH, FHx, SHx, unless otherwise noted    ALLERGIES & MEDICATIONS  --------------------------------------------------------------------------------  Allergies    codeine (Hives)    Intolerances      Standing Inpatient Medications  apixaban 5 milliGRAM(s) Oral two times a day  aspirin enteric coated 81 milliGRAM(s) Oral daily  atorvastatin 80 milliGRAM(s) Oral at bedtime  chlorhexidine 4% Liquid 1 Application(s) Topical daily  influenza   Vaccine 0.5 milliLiter(s) IntraMuscular once  tacrolimus 5 milliGRAM(s) Oral every 12 hours  trimethoprim  160 mG/sulfamethoxazole 800 mG 1 Tablet(s) Oral daily    PRN Inpatient Medications  acetaminophen     Tablet .. 650 milliGRAM(s) Oral every 6 hours PRN  albuterol    90 MICROgram(s) HFA Inhaler 2 Puff(s) Inhalation every 6 hours PRN  aluminum hydroxide/magnesium hydroxide/simethicone Suspension 30 milliLiter(s) Oral every 6 hours PRN  benzocaine 15 mG/menthol 3.6 mG Lozenge 1 Lozenge Oral five times a day PRN  benzocaine 20% Spray 2 Spray(s) Mucosal three times a day PRN  melatonin 3 milliGRAM(s) Oral at bedtime PRN  ondansetron Injectable 4 milliGRAM(s) IV Push every 8 hours PRN  scopolamine 1 mG/72 Hr(s) Patch 1 Patch Transdermal every 72 hours PRN  sodium chloride 0.65% Nasal 1 Spray(s) Both Nostrils two times a day PRN      REVIEW OF SYSTEMS      All other systems were reviewed and are negative, except as noted.    VITALS/PHYSICAL EXAM  --------------------------------------------------------------------------------  T(C): 36.8 (01-23-23 @ 04:25), Max: 36.8 (01-22-23 @ 19:57)  HR: 110 (01-23-23 @ 04:25) (110 - 118)  BP: 123/73 (01-23-23 @ 04:25) (94/52 - 128/74)  RR: 18 (01-23-23 @ 04:25) (18 - 18)  SpO2: 95% (01-23-23 @ 04:25) (95% - 96%)  Wt(kg): --          Physical Exam:  	Gen: mild distress  	HEENT: MMM  	Pulm: CTA B/L  	CV: S1S2  	Abd: Soft, +BS   	Ext: ++ LE edema B/L, anasarca  	Neuro: Awake  	Skin: Warm and dry      LABS/STUDIES  --------------------------------------------------------------------------------              13.4   6.28  >-----------<  412      [01-22-23 @ 07:05]              42.6     134  |  107  |  5   ----------------------------<  79      [01-22-23 @ 07:05]  4.2   |  18  |  0.82        Ca     8.2     [01-22-23 @ 07:05]      Mg     1.8     [01-22-23 @ 07:05]      Phos  3.5     [01-22-23 @ 07:05]    TPro  4.6  /  Alb  0.8  /  TBili  0.1  /  DBili  x   /  AST  16  /  ALT  7   /  AlkPhos  67  [01-22-23 @ 07:05]          Creatinine Trend:  SCr 0.82 [01-22 @ 07:05]  SCr 0.79 [01-21 @ 07:27]  SCr 0.79 [01-20 @ 07:15]  SCr 0.73 [01-19 @ 07:09]  SCr 0.64 [01-18 @ 05:57]    Urinalysis - [01-20-23 @ 17:27]      Color Yellow / Appearance Clear / SG >1.050 / pH 6.5      Gluc Negative / Ketone Moderate  / Bili Small / Urobili 2 mg/dL       Blood Moderate / Protein >600 / Leuk Est Negative / Nitrite Negative      RBC 4 / WBC 55 / Hyaline 0 / Gran  / Sq Epi  / Non Sq Epi 11 / Bacteria Negative    Urine Creatinine 92      [01-18-23 @ 19:26]  Urine Protein >2000      [01-18-23 @ 19:26]    Lipid: chol 521, , HDL 48, LDL --      [01-18-23 @ 05:58]    HBsAb Nonreact      [01-18-23 @ 05:58]  HBsAg Nonreact      [01-18-23 @ 05:58]  HBcAb Nonreact      [01-18-23 @ 05:58]  HCV 0.07, Nonreact      [01-18-23 @ 05:58]  HIV Nonreact      [01-21-23 @ 07:41]    SHELTON: titer Negative, pattern --      [01-18-23 @ 05:58]  dsDNA <12      [01-18-23 @ 05:58]  C3 Complement 152      [01-18-23 @ 05:58]  C4 Complement 38      [01-18-23 @ 05:58]  PLA2R: ALESSANDRA <1.8, IFA --      [01-18-23 @ 05:58]  Free Light Chains: kappa 2.61, lambda 1.05, ratio = 2.49      [01-18 @ 05:58]

## 2023-01-23 NOTE — CONSULT NOTE ADULT - ATTENDING COMMENTS
Agree with above. Pt with prior negative EGD and multiple CTs which did not show a cause for her nausea. She has had prior marijuana use on UTox, and appears to have improved last admission after she was off marijuana. Would obtain UTox, continue supportive care, anti-emetics as above. If UTox is negative, would try to obtain gastric emptying study to r/o gastroparesis. Agree with above. Pt with prior negative EGD and multiple CTs which did not show a cause for her nausea. She has had prior marijuana use on UTox, and appears to have improved last admission after she was off marijuana. Would obtain UTox, continue supportive care, anti-emetics as above. If UTox is negative, would try to obtain gastric emptying study to r/o gastroparesis. Check GI PCR for diarrhea. She also needs eventual colonoscopy as outpt once acute nausea/vomiting is resolved, given prior colonic thickening.

## 2023-01-23 NOTE — PROGRESS NOTE ADULT - SUBJECTIVE AND OBJECTIVE BOX
PROGRESS NOTE:     Patient is a 32y old  Female who presents with a chief complaint of MCKENNA flare (22 Jan 2023 13:08)      SUBJECTIVE / OVERNIGHT EVENTS:    ADDITIONAL REVIEW OF SYSTEMS:    MEDICATIONS  (STANDING):  apixaban 5 milliGRAM(s) Oral two times a day  aspirin enteric coated 81 milliGRAM(s) Oral daily  atorvastatin 80 milliGRAM(s) Oral at bedtime  chlorhexidine 4% Liquid 1 Application(s) Topical daily  influenza   Vaccine 0.5 milliLiter(s) IntraMuscular once  tacrolimus 5 milliGRAM(s) Oral every 12 hours  trimethoprim  160 mG/sulfamethoxazole 800 mG 1 Tablet(s) Oral daily    MEDICATIONS  (PRN):  acetaminophen     Tablet .. 650 milliGRAM(s) Oral every 6 hours PRN Temp greater or equal to 38C (100.4F), Mild Pain (1 - 3)  albuterol    90 MICROgram(s) HFA Inhaler 2 Puff(s) Inhalation every 6 hours PRN Shortness of Breath and/or Wheezing  aluminum hydroxide/magnesium hydroxide/simethicone Suspension 30 milliLiter(s) Oral every 6 hours PRN Dyspepsia  benzocaine 15 mG/menthol 3.6 mG Lozenge 1 Lozenge Oral five times a day PRN Sore Throat  benzocaine 20% Spray 2 Spray(s) Mucosal three times a day PRN sore throat  melatonin 3 milliGRAM(s) Oral at bedtime PRN Insomnia  ondansetron Injectable 4 milliGRAM(s) IV Push every 8 hours PRN Nausea and/or Vomiting  scopolamine 1 mG/72 Hr(s) Patch 1 Patch Transdermal every 72 hours PRN Nausea and/or Vomiting  sodium chloride 0.65% Nasal 1 Spray(s) Both Nostrils two times a day PRN Nasal Congestion      CAPILLARY BLOOD GLUCOSE        I&O's Summary      PHYSICAL EXAM:  Vital Signs Last 24 Hrs  T(C): 36.8 (23 Jan 2023 04:25), Max: 36.8 (22 Jan 2023 19:57)  T(F): 98.3 (23 Jan 2023 04:25), Max: 98.3 (22 Jan 2023 19:57)  HR: 110 (23 Jan 2023 04:25) (110 - 118)  BP: 123/73 (23 Jan 2023 04:25) (94/52 - 128/74)  BP(mean): --  RR: 18 (23 Jan 2023 04:25) (18 - 18)  SpO2: 95% (23 Jan 2023 04:25) (95% - 96%)    Parameters below as of 23 Jan 2023 04:25  Patient On (Oxygen Delivery Method): room air        GENERAL: No acute distress, well-developed  HEAD:  Atraumatic, Normocephalic  EYES: EOMI, PERRLA, conjunctiva and sclera clear  NECK: Supple, no lymphadenopathy, no JVD  CHEST/LUNG: CTAB; No wheezes, rales, or rhonchi  HEART: Regular rate and rhythm; No murmurs, rubs, or gallops  ABDOMEN: Soft, non-tender, non-distended; normal bowel sounds, no organomegaly  EXTREMITIES:  2+ peripheral pulses b/l, No clubbing, cyanosis, or edema  NEUROLOGY: A&O x 3, no focal deficits  SKIN: No rashes or lesions    LABS:                        13.4   6.28  )-----------( 412      ( 22 Jan 2023 07:05 )             42.6     01-22    134<L>  |  107  |  5<L>  ----------------------------<  79  4.2   |  18<L>  |  0.82    Ca    8.2<L>      22 Jan 2023 07:05  Phos  3.5     01-22  Mg     1.8     01-22    TPro  4.6<L>  /  Alb  0.8<L>  /  TBili  0.1<L>  /  DBili  x   /  AST  16  /  ALT  7<L>  /  AlkPhos  67  01-22              Culture - Urine (collected 20 Jan 2023 17:27)  Source: Clean Catch Clean Catch (Midstream)  Final Report (21 Jan 2023 21:17):    <10,000 CFU/mL Normal Urogenital Yuli    Culture - Blood (collected 20 Jan 2023 13:46)  Source: .Blood Blood-Peripheral  Preliminary Report (21 Jan 2023 18:02):    No growth to date.    Culture - Blood (collected 20 Jan 2023 13:07)  Source: .Blood Blood-Peripheral  Preliminary Report (21 Jan 2023 18:02):    No growth to date.        RADIOLOGY & ADDITIONAL TESTS:  Results Reviewed:   Imaging Personally Reviewed:  Electrocardiogram Personally Reviewed:    COORDINATION OF CARE:  Care Discussed with Consultants/Other Providers [Y/N]:  Prior or Outpatient Records Reviewed [Y/N]:   PROGRESS NOTE:     Patient is a 32y old  Female who presents with a chief complaint of MCKENNA flare (22 Jan 2023 13:08)      SUBJECTIVE / OVERNIGHT EVENTS:  No events overnight. Patient examined at bedside- reports nausea (no vomiting). Reports R> L edema of hand, unchanged from prior. All other ROS negative.     ADDITIONAL REVIEW OF SYSTEMS:    MEDICATIONS  (STANDING):  apixaban 5 milliGRAM(s) Oral two times a day  aspirin enteric coated 81 milliGRAM(s) Oral daily  atorvastatin 80 milliGRAM(s) Oral at bedtime  chlorhexidine 4% Liquid 1 Application(s) Topical daily  influenza   Vaccine 0.5 milliLiter(s) IntraMuscular once  tacrolimus 5 milliGRAM(s) Oral every 12 hours  trimethoprim  160 mG/sulfamethoxazole 800 mG 1 Tablet(s) Oral daily    MEDICATIONS  (PRN):  acetaminophen     Tablet .. 650 milliGRAM(s) Oral every 6 hours PRN Temp greater or equal to 38C (100.4F), Mild Pain (1 - 3)  albuterol    90 MICROgram(s) HFA Inhaler 2 Puff(s) Inhalation every 6 hours PRN Shortness of Breath and/or Wheezing  aluminum hydroxide/magnesium hydroxide/simethicone Suspension 30 milliLiter(s) Oral every 6 hours PRN Dyspepsia  benzocaine 15 mG/menthol 3.6 mG Lozenge 1 Lozenge Oral five times a day PRN Sore Throat  benzocaine 20% Spray 2 Spray(s) Mucosal three times a day PRN sore throat  melatonin 3 milliGRAM(s) Oral at bedtime PRN Insomnia  ondansetron Injectable 4 milliGRAM(s) IV Push every 8 hours PRN Nausea and/or Vomiting  scopolamine 1 mG/72 Hr(s) Patch 1 Patch Transdermal every 72 hours PRN Nausea and/or Vomiting  sodium chloride 0.65% Nasal 1 Spray(s) Both Nostrils two times a day PRN Nasal Congestion      CAPILLARY BLOOD GLUCOSE        I&O's Summary      LABS:                        13.4   6.28  )-----------( 412      ( 22 Jan 2023 07:05 )             42.6     01-22    134<L>  |  107  |  5<L>  ----------------------------<  79  4.2   |  18<L>  |  0.82    Ca    8.2<L>      22 Jan 2023 07:05  Phos  3.5     01-22  Mg     1.8     01-22    TPro  4.6<L>  /  Alb  0.8<L>  /  TBili  0.1<L>  /  DBili  x   /  AST  16  /  ALT  7<L>  /  AlkPhos  67  01-22        Culture - Urine (collected 20 Jan 2023 17:27)  Source: Clean Catch Clean Catch (Midstream)  Final Report (21 Jan 2023 21:17):    <10,000 CFU/mL Normal Urogenital Yuli    Culture - Blood (collected 20 Jan 2023 13:46)  Source: .Blood Blood-Peripheral  Preliminary Report (21 Jan 2023 18:02):    No growth to date.    Culture - Blood (collected 20 Jan 2023 13:07)  Source: .Blood Blood-Peripheral  Preliminary Report (21 Jan 2023 18:02):    No growth to date.      RADIOLOGY & ADDITIONAL TESTS:  Results Reviewed:   Imaging Personally Reviewed:  Electrocardiogram Personally Reviewed:    COORDINATION OF CARE:  Care Discussed with Consultants/Other Providers [Y/N]:  Prior or Outpatient Records Reviewed [Y/N]:

## 2023-01-23 NOTE — PROGRESS NOTE ADULT - PROBLEM SELECTOR PLAN 4
Diet: DASH  DVT: on eliquis full AC  Dispo: pending clinical course    Full Code. - c/o vaginal discharge   - trich +   - start on Metronidazole 500 BID for 7 day course  - patient informed to notify sexual partners to get treated

## 2023-01-24 LAB
AMPHET UR-MCNC: NEGATIVE — SIGNIFICANT CHANGE UP
ANION GAP SERPL CALC-SCNC: 9 MMOL/L — SIGNIFICANT CHANGE UP (ref 5–17)
BARBITURATES UR SCN-MCNC: NEGATIVE — SIGNIFICANT CHANGE UP
BENZODIAZ UR-MCNC: NEGATIVE — SIGNIFICANT CHANGE UP
BUN SERPL-MCNC: 15 MG/DL — SIGNIFICANT CHANGE UP (ref 7–23)
CALCIUM SERPL-MCNC: 8 MG/DL — LOW (ref 8.4–10.5)
CHLORIDE SERPL-SCNC: 104 MMOL/L — SIGNIFICANT CHANGE UP (ref 96–108)
CO2 SERPL-SCNC: 21 MMOL/L — LOW (ref 22–31)
COCAINE METAB.OTHER UR-MCNC: NEGATIVE — SIGNIFICANT CHANGE UP
CREAT SERPL-MCNC: 1.09 MG/DL — SIGNIFICANT CHANGE UP (ref 0.5–1.3)
EGFR: 70 ML/MIN/1.73M2 — SIGNIFICANT CHANGE UP
GI PCR PANEL: SIGNIFICANT CHANGE UP
GLUCOSE SERPL-MCNC: 112 MG/DL — HIGH (ref 70–99)
HCT VFR BLD CALC: 42.1 % — SIGNIFICANT CHANGE UP (ref 34.5–45)
HGB BLD-MCNC: 13.4 G/DL — SIGNIFICANT CHANGE UP (ref 11.5–15.5)
MAGNESIUM SERPL-MCNC: 2.8 MG/DL — HIGH (ref 1.6–2.6)
MCHC RBC-ENTMCNC: 30 PG — SIGNIFICANT CHANGE UP (ref 27–34)
MCHC RBC-ENTMCNC: 31.8 GM/DL — LOW (ref 32–36)
MCV RBC AUTO: 94.2 FL — SIGNIFICANT CHANGE UP (ref 80–100)
METHADONE UR-MCNC: NEGATIVE — SIGNIFICANT CHANGE UP
NRBC # BLD: 0 /100 WBCS — SIGNIFICANT CHANGE UP (ref 0–0)
OPIATES UR-MCNC: NEGATIVE — SIGNIFICANT CHANGE UP
OXYCODONE UR-MCNC: NEGATIVE — SIGNIFICANT CHANGE UP
PCP SPEC-MCNC: SIGNIFICANT CHANGE UP
PCP UR-MCNC: NEGATIVE — SIGNIFICANT CHANGE UP
PHOSPHATE SERPL-MCNC: 4.3 MG/DL — SIGNIFICANT CHANGE UP (ref 2.5–4.5)
PLATELET # BLD AUTO: 484 K/UL — HIGH (ref 150–400)
POTASSIUM SERPL-MCNC: 3.8 MMOL/L — SIGNIFICANT CHANGE UP (ref 3.5–5.3)
POTASSIUM SERPL-SCNC: 3.8 MMOL/L — SIGNIFICANT CHANGE UP (ref 3.5–5.3)
RBC # BLD: 4.47 M/UL — SIGNIFICANT CHANGE UP (ref 3.8–5.2)
RBC # FLD: 14.9 % — HIGH (ref 10.3–14.5)
SODIUM SERPL-SCNC: 134 MMOL/L — LOW (ref 135–145)
TACROLIMUS SERPL-MCNC: 10 NG/ML — SIGNIFICANT CHANGE UP
THC UR QL: NEGATIVE — SIGNIFICANT CHANGE UP
TROPONIN T, HIGH SENSITIVITY RESULT: 9 NG/L — SIGNIFICANT CHANGE UP (ref 0–51)
WBC # BLD: 8.28 K/UL — SIGNIFICANT CHANGE UP (ref 3.8–10.5)
WBC # FLD AUTO: 8.28 K/UL — SIGNIFICANT CHANGE UP (ref 3.8–10.5)

## 2023-01-24 PROCEDURE — 99232 SBSQ HOSP IP/OBS MODERATE 35: CPT | Mod: GC

## 2023-01-24 PROCEDURE — 99233 SBSQ HOSP IP/OBS HIGH 50: CPT | Mod: GC

## 2023-01-24 PROCEDURE — 93010 ELECTROCARDIOGRAM REPORT: CPT

## 2023-01-24 RX ORDER — TACROLIMUS 5 MG/1
4 CAPSULE ORAL EVERY 12 HOURS
Refills: 0 | Status: DISCONTINUED | OUTPATIENT
Start: 2023-01-24 | End: 2023-01-26

## 2023-01-24 RX ADMIN — SCOPALAMINE 1 PATCH: 1 PATCH, EXTENDED RELEASE TRANSDERMAL at 19:00

## 2023-01-24 RX ADMIN — Medication 5 MILLIGRAM(S): at 01:24

## 2023-01-24 RX ADMIN — SCOPALAMINE 1 PATCH: 1 PATCH, EXTENDED RELEASE TRANSDERMAL at 07:00

## 2023-01-24 RX ADMIN — ONDANSETRON 8 MILLIGRAM(S): 8 TABLET, FILM COATED ORAL at 09:10

## 2023-01-24 NOTE — PROGRESS NOTE ADULT - PROBLEM SELECTOR PLAN 2
Febrile and tachycardic  RVP negative  - Blood cxneg 1/20  - monitor off abx Febrile and tachycardic  RVP negative  ecg 1/24: sinus tachycardia   - Blood cxneg 1/20  - monitor off abx  - f/u trop

## 2023-01-24 NOTE — PROGRESS NOTE ADULT - ASSESSMENT
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, hx thrombophlebitis of R gonadal vein, DVT (was previously on Xarelto), GERD, presented to ED for progressively worsening N/V for 2 weeks w/ intermittent diarrhea    #Gastroenteritis  #anasarca induced abdominal pain  #cannabinoid hyperemesis   #Medication induced nausea  Pt denying history of marijuana use, however THC + in previous admission  Pt states abdominal pain improved w/ tacrolimus as fluid retention improved  Unlikely having GI bleed given drop in hemoglobin notes on 1/21 CBC was w/ all cell lines down and hgb back to baseline on same day labs, pt reports no melena/bloody stools or emesis  Imaging shows no gallbladder thickening/low concern for cholecystitis  Unlikely medication induced as pt has long standing history, no new medications prior to admission    Recommendations:  Please obtain GI PCR, Stool culture ova and parasites   Please obtain urine drug screen   Can c/w Zofran increase to 8mg q8  If UTOx is negative, would obtain gastric emptying study if patient can tolerate it  Can c/w scopolamine patch   Pt previously tried capsaicin cream, refusing at this time as it caused burning  Can consider aprepitant for nausea          GI iphone: [phone]  GI e-mail: [email or web address]     32 yo F PMHx minimal change disease, hx thrombophlebitis of R gonadal vein, DVT (was previously on Xarelto), GERD, presented to ED for progressively worsening N/V for 2 weeks w/ intermittent diarrhea    #Gastroenteritis  #anasarca induced abdominal pain  #cannabinoid hyperemesis   #Medication induced nausea  Pt states abdominal pain improved w/ tacrolimus as fluid retention improved  Unlikely having GI bleed given drop in hemoglobin notes on 1/21 CBC was w/ all cell lines down and hgb back to baseline on same day labs, pt reports no melena/bloody stools or emesis  Imaging shows no gallbladder thickening/low concern for cholecystitis  Pt denying history of marijuana use, THC negative on urine drug screen   Unlikely medication induced as pt has long standing history, no new medications prior to admission    Recommendations:  Please obtain GI PCR, Stool culture ova and parasites   Can c/w Zofran 8mg q8  Can c/w scopolamine patch   Obtain gastric emptying study if patient can tolerate it  Pt previously tried capsaicin cream, refusing at this time as it caused burning  Can consider aprepitant for nausea          GI iphone: [phone]  GI e-mail: [email or web address]     32 yo F PMHx minimal change disease, hx thrombophlebitis of R gonadal vein, DVT (was previously on Xarelto), GERD, presented to ED for progressively worsening N/V for 2 weeks w/ intermittent diarrhea    #Gastroenteritis  #anasarca induced abdominal pain  #cannabinoid hyperemesis   #Medication induced nausea  Pt states abdominal pain improved w/ tacrolimus as fluid retention improved  Unlikely having GI bleed given drop in hemoglobin notes on 1/21 CBC was w/ all cell lines down and hgb back to baseline on same day labs, pt reports no melena/bloody stools or emesis  Imaging shows no gallbladder thickening/low concern for cholecystitis  Pt denying history of marijuana use, THC negative on urine drug screen   Unlikely medication induced as pt has long standing history, no new medications prior to admission    Recommendations:  Please obtain GI PCR, Stool culture ova and parasites if she has diarrhea  Can c/w Zofran 8mg q8  Can c/w scopolamine patch   Obtain gastric emptying study if patient can tolerate it  Pt previously tried capsaicin cream, refusing at this time as it caused burning  Can consider aprepitant for nausea          GI iphone: [phone]  GI e-mail: [email or web address]

## 2023-01-24 NOTE — PROGRESS NOTE ADULT - ATTENDING COMMENTS
Agree with above. Still nausea/vomiting, UTox is negative for THC. Would continue antiemetics and obtain gastric emptying to r/o gastroparesis.

## 2023-01-24 NOTE — PROGRESS NOTE ADULT - SUBJECTIVE AND OBJECTIVE BOX
PROGRESS NOTE:     Patient is a 32y old  Female who presents with a chief complaint of MCKENNA flare (24 Jan 2023 07:14)      SUBJECTIVE / OVERNIGHT EVENTS:    ADDITIONAL REVIEW OF SYSTEMS:    MEDICATIONS  (STANDING):  apixaban 5 milliGRAM(s) Oral two times a day  aspirin enteric coated 81 milliGRAM(s) Oral daily  atorvastatin 80 milliGRAM(s) Oral at bedtime  chlorhexidine 4% Liquid 1 Application(s) Topical daily  influenza   Vaccine 0.5 milliLiter(s) IntraMuscular once  LORazepam   Injectable 0.5 milliGRAM(s) IV Push once  metroNIDAZOLE    Tablet 500 milliGRAM(s) Oral two times a day  tacrolimus 4 milliGRAM(s) Oral every 12 hours  trimethoprim  160 mG/sulfamethoxazole 800 mG 1 Tablet(s) Oral daily    MEDICATIONS  (PRN):  acetaminophen     Tablet .. 650 milliGRAM(s) Oral every 6 hours PRN Temp greater or equal to 38C (100.4F), Mild Pain (1 - 3)  albuterol    90 MICROgram(s) HFA Inhaler 2 Puff(s) Inhalation every 6 hours PRN Shortness of Breath and/or Wheezing  aluminum hydroxide/magnesium hydroxide/simethicone Suspension 30 milliLiter(s) Oral every 6 hours PRN Dyspepsia  benzocaine 15 mG/menthol 3.6 mG Lozenge 1 Lozenge Oral five times a day PRN Sore Throat  benzocaine 20% Spray 2 Spray(s) Mucosal three times a day PRN sore throat  melatonin 3 milliGRAM(s) Oral at bedtime PRN Insomnia  ondansetron Injectable 8 milliGRAM(s) IV Push every 8 hours PRN Nausea and/or Vomiting  prochlorperazine   Injectable 5 milliGRAM(s) IV Push every 4 hours PRN Nausea  scopolamine 1 mG/72 Hr(s) Patch 1 Patch Transdermal every 72 hours PRN Nausea and/or Vomiting  sodium chloride 0.65% Nasal 1 Spray(s) Both Nostrils two times a day PRN Nasal Congestion      CAPILLARY BLOOD GLUCOSE        I&O's Summary    23 Jan 2023 07:01  -  24 Jan 2023 07:00  --------------------------------------------------------  IN: 240 mL / OUT: 0 mL / NET: 240 mL        PHYSICAL EXAM:  Vital Signs Last 24 Hrs  T(C): 36.6 (24 Jan 2023 05:14), Max: 36.9 (23 Jan 2023 20:34)  T(F): 97.8 (24 Jan 2023 05:14), Max: 98.5 (23 Jan 2023 20:34)  HR: 113 (24 Jan 2023 05:14) (86 - 113)  BP: 93/54 (24 Jan 2023 05:14) (93/54 - 119/69)  BP(mean): --  RR: 18 (24 Jan 2023 05:14) (18 - 18)  SpO2: 95% (24 Jan 2023 05:14) (95% - 100%)    Parameters below as of 24 Jan 2023 05:14  Patient On (Oxygen Delivery Method): room air        GENERAL: No acute distress, well-developed  HEAD:  Atraumatic, Normocephalic  EYES: EOMI, PERRLA, conjunctiva and sclera clear  NECK: Supple, no lymphadenopathy, no JVD  CHEST/LUNG: CTAB; No wheezes, rales, or rhonchi  HEART: Regular rate and rhythm; No murmurs, rubs, or gallops  ABDOMEN: Soft, non-tender, non-distended; normal bowel sounds, no organomegaly  EXTREMITIES:  2+ peripheral pulses b/l, No clubbing, cyanosis, or edema  NEUROLOGY: A&O x 3, no focal deficits  SKIN: No rashes or lesions    LABS:    01-23    136  |  106  |  11  ----------------------------<  72  4.3   |  16<L>  |  1.04    Ca    8.5      23 Jan 2023 07:33  Phos  4.2     01-23  Mg     2.4     01-23    TPro  4.9<L>  /  Alb  0.8<L>  /  TBili  <0.1<L>  /  DBili  x   /  AST  19  /  ALT  11  /  AlkPhos  79  01-23                RADIOLOGY & ADDITIONAL TESTS:  Results Reviewed:   Imaging Personally Reviewed:  Electrocardiogram Personally Reviewed:    COORDINATION OF CARE:  Care Discussed with Consultants/Other Providers [Y/N]:  Prior or Outpatient Records Reviewed [Y/N]:   PROGRESS NOTE:     Patient is a 32y old  Female who presents with a chief complaint of MCKENNA flare (24 Jan 2023 07:14)      SUBJECTIVE / OVERNIGHT EVENTS:  No events overnight. C/o n/v. Reports non-pleuritic intermittent CP. All other ROS negative.     ADDITIONAL REVIEW OF SYSTEMS:    MEDICATIONS  (STANDING):  apixaban 5 milliGRAM(s) Oral two times a day  aspirin enteric coated 81 milliGRAM(s) Oral daily  atorvastatin 80 milliGRAM(s) Oral at bedtime  chlorhexidine 4% Liquid 1 Application(s) Topical daily  influenza   Vaccine 0.5 milliLiter(s) IntraMuscular once  LORazepam   Injectable 0.5 milliGRAM(s) IV Push once  metroNIDAZOLE    Tablet 500 milliGRAM(s) Oral two times a day  tacrolimus 4 milliGRAM(s) Oral every 12 hours  trimethoprim  160 mG/sulfamethoxazole 800 mG 1 Tablet(s) Oral daily    MEDICATIONS  (PRN):  acetaminophen     Tablet .. 650 milliGRAM(s) Oral every 6 hours PRN Temp greater or equal to 38C (100.4F), Mild Pain (1 - 3)  albuterol    90 MICROgram(s) HFA Inhaler 2 Puff(s) Inhalation every 6 hours PRN Shortness of Breath and/or Wheezing  aluminum hydroxide/magnesium hydroxide/simethicone Suspension 30 milliLiter(s) Oral every 6 hours PRN Dyspepsia  benzocaine 15 mG/menthol 3.6 mG Lozenge 1 Lozenge Oral five times a day PRN Sore Throat  benzocaine 20% Spray 2 Spray(s) Mucosal three times a day PRN sore throat  melatonin 3 milliGRAM(s) Oral at bedtime PRN Insomnia  ondansetron Injectable 8 milliGRAM(s) IV Push every 8 hours PRN Nausea and/or Vomiting  prochlorperazine   Injectable 5 milliGRAM(s) IV Push every 4 hours PRN Nausea  scopolamine 1 mG/72 Hr(s) Patch 1 Patch Transdermal every 72 hours PRN Nausea and/or Vomiting  sodium chloride 0.65% Nasal 1 Spray(s) Both Nostrils two times a day PRN Nasal Congestion      CAPILLARY BLOOD GLUCOSE        I&O's Summary    23 Jan 2023 07:01  -  24 Jan 2023 07:00  --------------------------------------------------------  IN: 240 mL / OUT: 0 mL / NET: 240 mL        PHYSICAL EXAM:  Vital Signs Last 24 Hrs  T(C): 36.6 (24 Jan 2023 05:14), Max: 36.9 (23 Jan 2023 20:34)  T(F): 97.8 (24 Jan 2023 05:14), Max: 98.5 (23 Jan 2023 20:34)  HR: 113 (24 Jan 2023 05:14) (86 - 113)  BP: 93/54 (24 Jan 2023 05:14) (93/54 - 119/69)  BP(mean): --  RR: 18 (24 Jan 2023 05:14) (18 - 18)  SpO2: 95% (24 Jan 2023 05:14) (95% - 100%)    Parameters below as of 24 Jan 2023 05:14  Patient On (Oxygen Delivery Method): room air        GENERAL: No acute distress, well-developed  HEAD:  Atraumatic, Normocephalic  EYES: EOMI, PERRLA, conjunctiva and sclera clear  NECK: Supple, no lymphadenopathy, no JVD  CHEST/LUNG: CTAB; No wheezes, rales, or rhonchi  HEART: Regular rate and rhythm; No murmurs, rubs, or gallops  ABDOMEN: Soft, non-tender, non-distended; normal bowel sounds, no organomegaly  EXTREMITIES:  2+ peripheral pulses b/l, No clubbing, cyanosis, or edema  NEUROLOGY: A&O x 3, no focal deficits  SKIN: No rashes or lesions    LABS:    01-23    136  |  106  |  11  ----------------------------<  72  4.3   |  16<L>  |  1.04    Ca    8.5      23 Jan 2023 07:33  Phos  4.2     01-23  Mg     2.4     01-23    TPro  4.9<L>  /  Alb  0.8<L>  /  TBili  <0.1<L>  /  DBili  x   /  AST  19  /  ALT  11  /  AlkPhos  79  01-23                RADIOLOGY & ADDITIONAL TESTS:  Results Reviewed:   Imaging Personally Reviewed:  Electrocardiogram Personally Reviewed:    COORDINATION OF CARE:  Care Discussed with Consultants/Other Providers [Y/N]:  Prior or Outpatient Records Reviewed [Y/N]:

## 2023-01-24 NOTE — PROGRESS NOTE ADULT - PROBLEM SELECTOR PLAN 4
- c/o vaginal discharge   - trich +   - start on Metronidazole 500 BID for 7 day course  - patient informed to notify sexual partners to get treated

## 2023-01-24 NOTE — PROGRESS NOTE ADULT - PROBLEM SELECTOR PLAN 1
UA Prot > 600, alb 1.1  U Pr/Cr not calculated since protein > 2000 -- severe proteinuria.   - f/u UPEP, immunofixation   - lipids very elvated-- on statin  - complement wnl, dsdna neg, hep panel negative, non immune to hep b   - Nephrology consulted, f/u recs  - s/p albumin 25% q6h  - c/w statin (home Crestor 40 -> lipitor 80)  - renal duplex neg for renal vein thrombi   - f/u renal recs  - tacro 4 BID with AM levels 30min prior to dose administration. (decreased to 4mg BID on 1/24)    - Tacro level therapeutic 1/23 8.8  - eliquis 5 BID given hypercoagulability with low albumin marker.  -  quantiferon negative-- in case pt needs rituximab in the future UA Prot > 600, alb 1.1  U Pr/Cr not calculated since protein > 2000 -- severe proteinuria.   - f/u UPEP, immunofixation   - lipids very elvated-- on statin  - complement wnl, dsdna neg, hep panel negative, non immune to hep b   - Nephrology consulted, f/u recs  - s/p albumin 25% q6h  - c/w statin (home Crestor 40 -> lipitor 80)  - renal duplex neg for renal vein thrombi   - f/u renal recs  - tacro 4 BID with AM levels 30min prior to dose administration. (decreased to 4mg BID on 1/24)    - Tacro level therapeutic 1/24 pending  - eliquis 5 BID given hypercoagulability with low albumin marker.  -  quantiferon negative-- in case pt needs rituximab in the future

## 2023-01-24 NOTE — PROGRESS NOTE ADULT - SUBJECTIVE AND OBJECTIVE BOX
[facility] DIVISION OF KIDNEY DISEASES AND HYPERTENSION -- FOLLOW UP NOTE  --------------------------------------------------------------------------------  Patient is a 32 year old female with PMH of Minimal Change Disease who presented to the hospital with complaints of nausea, vomiitng, diarrhea and abdominal pain for the past few days. The nephrology team was consulted for her history of MCKENNA and significant proteinuria. Pt with steroid resistant MCKENNA, started on tacrolimus on 1/18/23.     24 hour events/subjective:  Pt. seen and examined this morning. Reports nausea and vomiting but states this morning it is improved. Denied any chest pain or shortness of breath.         PAST HISTORY  --------------------------------------------------------------------------------  No significant changes to PMH, PSH, FHx, SHx, unless otherwise noted    ALLERGIES & MEDICATIONS  --------------------------------------------------------------------------------  Allergies    codeine (Hives)    Intolerances      Standing Inpatient Medications  apixaban 5 milliGRAM(s) Oral two times a day  aspirin enteric coated 81 milliGRAM(s) Oral daily  atorvastatin 80 milliGRAM(s) Oral at bedtime  chlorhexidine 4% Liquid 1 Application(s) Topical daily  influenza   Vaccine 0.5 milliLiter(s) IntraMuscular once  LORazepam   Injectable 0.5 milliGRAM(s) IV Push once  metroNIDAZOLE    Tablet 500 milliGRAM(s) Oral two times a day  tacrolimus 4 milliGRAM(s) Oral every 12 hours  trimethoprim  160 mG/sulfamethoxazole 800 mG 1 Tablet(s) Oral daily    PRN Inpatient Medications  acetaminophen     Tablet .. 650 milliGRAM(s) Oral every 6 hours PRN  albuterol    90 MICROgram(s) HFA Inhaler 2 Puff(s) Inhalation every 6 hours PRN  aluminum hydroxide/magnesium hydroxide/simethicone Suspension 30 milliLiter(s) Oral every 6 hours PRN  benzocaine 15 mG/menthol 3.6 mG Lozenge 1 Lozenge Oral five times a day PRN  benzocaine 20% Spray 2 Spray(s) Mucosal three times a day PRN  melatonin 3 milliGRAM(s) Oral at bedtime PRN  ondansetron Injectable 8 milliGRAM(s) IV Push every 8 hours PRN  prochlorperazine   Injectable 5 milliGRAM(s) IV Push every 4 hours PRN  scopolamine 1 mG/72 Hr(s) Patch 1 Patch Transdermal every 72 hours PRN  sodium chloride 0.65% Nasal 1 Spray(s) Both Nostrils two times a day PRN      REVIEW OF SYSTEMS    All other systems were reviewed and are negative, except as noted.    VITALS/PHYSICAL EXAM  --------------------------------------------------------------------------------  T(C): 36.6 (01-24-23 @ 05:14), Max: 36.9 (01-23-23 @ 20:34)  HR: 113 (01-24-23 @ 05:14) (86 - 113)  BP: 93/54 (01-24-23 @ 05:14) (93/54 - 119/69)  RR: 18 (01-24-23 @ 05:14) (18 - 18)  SpO2: 95% (01-24-23 @ 05:14) (95% - 100%)  Wt(kg): --        01-23-23 @ 07:01  -  01-24-23 @ 07:00  --------------------------------------------------------  IN: 240 mL / OUT: 0 mL / NET: 240 mL        Physical Exam:  	Gen: mild distress  	HEENT: MMM  	Pulm: CTA B/L  	CV: S1S2  	Abd: Soft, +BS   	Ext: ++ LE edema B/L, anasarca  	Neuro: Awake  	Skin: Warm and dry      LABS/STUDIES  --------------------------------------------------------------------------------    136  |  106  |  11  ----------------------------<  72      [01-23-23 @ 07:33]  4.3   |  16  |  1.04        Ca     8.5     [01-23-23 @ 07:33]      Mg     2.4     [01-23-23 @ 07:33]      Phos  4.2     [01-23-23 @ 07:33]    TPro  4.9  /  Alb  0.8  /  TBili  <0.1  /  DBili  x   /  AST  19  /  ALT  11  /  AlkPhos  79  [01-23-23 @ 07:33]          Creatinine Trend:  SCr 1.04 [01-23 @ 07:33]  SCr 0.82 [01-22 @ 07:05]  SCr 0.79 [01-21 @ 07:27]  SCr 0.79 [01-20 @ 07:15]  SCr 0.73 [01-19 @ 07:09]    Urinalysis - [01-20-23 @ 17:27]      Color Yellow / Appearance Clear / SG >1.050 / pH 6.5      Gluc Negative / Ketone Moderate  / Bili Small / Urobili 2 mg/dL       Blood Moderate / Protein >600 / Leuk Est Negative / Nitrite Negative      RBC 4 / WBC 55 / Hyaline 0 / Gran  / Sq Epi  / Non Sq Epi 11 / Bacteria Negative    Urine Creatinine 92      [01-18-23 @ 19:26]  Urine Protein >2000      [01-18-23 @ 19:26]    Lipid: chol 521, , HDL 48, LDL --      [01-18-23 @ 05:58]    HBsAb Nonreact      [01-18-23 @ 05:58]  HBsAg Nonreact      [01-18-23 @ 05:58]  HBcAb Nonreact      [01-18-23 @ 05:58]  HCV 0.07, Nonreact      [01-18-23 @ 05:58]  HIV Nonreact      [01-21-23 @ 07:41]    SHELTON: titer Negative, pattern --      [01-18-23 @ 05:58]  dsDNA <12      [01-18-23 @ 05:58]  C3 Complement 152      [01-18-23 @ 05:58]  C4 Complement 38      [01-18-23 @ 05:58]  PLA2R: ALESSANDRA <1.8, IFA --      [01-18-23 @ 05:58]  Free Light Chains: kappa 2.61, lambda 1.05, ratio = 2.49      [01-18 @ 05:58]

## 2023-01-24 NOTE — PROGRESS NOTE ADULT - ATTENDING COMMENTS
Tish Elizabeth MD  Off: [phone]  contact me on teams    (After 5 pm or on weekends please page the on-call fellow/attending, can check [email or web address] for schedule. Login is jose antonio willoughby, schedule under [facility] medicine, psych, derm)

## 2023-01-24 NOTE — PROGRESS NOTE ADULT - PROBLEM SELECTOR PLAN 3
#nausea   Likely 2/2 generalized edema  CT AP w/o contrast without other causes  - tylenol PRN for pain, zofran PRN for nausea  - MCKENNA treatment as above  - zofran PRN for nausea -- acceptable qtc  - abd pain resolved 1/21 AM, however n/v persists  - GI consulted, appreciate recs #nausea   Likely 2/2 generalized edema  CT AP w/o contrast without other causes  - tylenol PRN for pain, zofran PRN for nausea  - MCKENNA treatment as above  - zofran PRN for nausea -- acceptable qtc  - abd pain resolved 1/21 AM, however n/v persists  - GI consulted, appreciate recs    Plan:  [] f/u utox, GI PCR, O&P  [] c/w prn #nausea   Likely 2/2 generalized edema  CT AP w/o contrast without other causes  - tylenol PRN for pain, zofran PRN for nausea  - MCKENNA treatment as above  - zofran PRN for nausea -- acceptable qtc  - abd pain resolved 1/21 AM, however n/v persists  - GI consulted, appreciate recs    Plan:  [] f/u utox, GI PCR, O&P  [] c/w prn antiemetics   [] c/w scopolamine patch   [] GI following, appreciate recs - If UTOx is negative, would obtain gastric emptying study if patient can tolerate it  [] Can consider aprepitant for nausea

## 2023-01-24 NOTE — PROGRESS NOTE ADULT - SUBJECTIVE AND OBJECTIVE BOX
Patient is a 32y old  Female who presents with a chief complaint of MCKENNA flare (24 Jan 2023 07:17)      SUBJECTIVE / OVERNIGHT EVENTS: Patient seen and examined at bedside. States she had 6-7 episodes of spitting up since yesterday and is still unable to tolerate food. No more episodes of diarrhea. Stomach pain has overall reduced    MEDICATIONS  (STANDING):  apixaban 5 milliGRAM(s) Oral two times a day  aspirin enteric coated 81 milliGRAM(s) Oral daily  atorvastatin 80 milliGRAM(s) Oral at bedtime  chlorhexidine 4% Liquid 1 Application(s) Topical daily  influenza   Vaccine 0.5 milliLiter(s) IntraMuscular once  LORazepam   Injectable 0.5 milliGRAM(s) IV Push once  metroNIDAZOLE    Tablet 500 milliGRAM(s) Oral two times a day  tacrolimus 4 milliGRAM(s) Oral every 12 hours  trimethoprim  160 mG/sulfamethoxazole 800 mG 1 Tablet(s) Oral daily    MEDICATIONS  (PRN):  acetaminophen     Tablet .. 650 milliGRAM(s) Oral every 6 hours PRN Temp greater or equal to 38C (100.4F), Mild Pain (1 - 3)  albuterol    90 MICROgram(s) HFA Inhaler 2 Puff(s) Inhalation every 6 hours PRN Shortness of Breath and/or Wheezing  aluminum hydroxide/magnesium hydroxide/simethicone Suspension 30 milliLiter(s) Oral every 6 hours PRN Dyspepsia  benzocaine 15 mG/menthol 3.6 mG Lozenge 1 Lozenge Oral five times a day PRN Sore Throat  benzocaine 20% Spray 2 Spray(s) Mucosal three times a day PRN sore throat  melatonin 3 milliGRAM(s) Oral at bedtime PRN Insomnia  ondansetron Injectable 8 milliGRAM(s) IV Push every 8 hours PRN Nausea and/or Vomiting  prochlorperazine   Injectable 5 milliGRAM(s) IV Push every 4 hours PRN Nausea  scopolamine 1 mG/72 Hr(s) Patch 1 Patch Transdermal every 72 hours PRN Nausea and/or Vomiting  sodium chloride 0.65% Nasal 1 Spray(s) Both Nostrils two times a day PRN Nasal Congestion      Vital Signs Last 24 Hrs  T(C): 36.6 (24 Jan 2023 05:14), Max: 36.9 (23 Jan 2023 20:34)  T(F): 97.8 (24 Jan 2023 05:14), Max: 98.5 (23 Jan 2023 20:34)  HR: 113 (24 Jan 2023 05:14) (86 - 113)  BP: 93/54 (24 Jan 2023 05:14) (93/54 - 119/69)  BP(mean): --  RR: 18 (24 Jan 2023 05:14) (18 - 18)  SpO2: 95% (24 Jan 2023 05:14) (95% - 100%)    Parameters below as of 24 Jan 2023 05:14  Patient On (Oxygen Delivery Method): room air      CAPILLARY BLOOD GLUCOSE        I&O's Summary    23 Jan 2023 07:01  -  24 Jan 2023 07:00  --------------------------------------------------------  IN: 240 mL / OUT: 0 mL / NET: 240 mL        PHYSICAL EXAM:  GENERAL APPEARANCE: Well developed, NAD, resting in bed  HEENT:  PERRL, EOMI. hearing grossly intact.  CARDIAC: Normal S1 and S2. no mrg. RRR  LUNGS: Clear to auscultation B/L, no rales, rhonchi, or wheezing  ABDOMEN: Soft , NTND, bowel sounds normal. No guarding or rebound.   MUSCULOSKELETAL: ROM intact.  No joint erythema or tenderness.   EXTREMITIES: No edema. Peripheral pulses intact.   NEUROLOGICAL: Non focal. Strength and sensation symmetric and intact throughout.   SKIN: Warm and dry , Well perfused  PSYCHIATRIC: AOx3 , Normal mood and affect      LABS:    01-23    136  |  106  |  11  ----------------------------<  72  4.3   |  16<L>  |  1.04    Ca    8.5      23 Jan 2023 07:33  Phos  4.2     01-23  Mg     2.4     01-23    TPro  4.9<L>  /  Alb  0.8<L>  /  TBili  <0.1<L>  /  DBili  x   /  AST  19  /  ALT  11  /  AlkPhos  79  01-23              RADIOLOGY & ADDITIONAL TESTS:    Imaging Personally Reviewed:    Consultant(s) Notes Reviewed:      Care Discussed with Consultants/Other Providers:    Soumya Tuttle PGY-2; [facility] Pager: [phone]; [facility] Pager: [phone]   Patient is a 32y old  Female who presents with a chief complaint of MCKENNA flare (24 Jan 2023 07:17)      SUBJECTIVE / OVERNIGHT EVENTS: Patient seen and examined at bedside. States she had 6-7 episodes of spitting up since yesterday and is still unable to tolerate food. No more episodes of diarrhea. Stomach pain has overall reduced    MEDICATIONS  (STANDING):  apixaban 5 milliGRAM(s) Oral two times a day  aspirin enteric coated 81 milliGRAM(s) Oral daily  atorvastatin 80 milliGRAM(s) Oral at bedtime  chlorhexidine 4% Liquid 1 Application(s) Topical daily  influenza   Vaccine 0.5 milliLiter(s) IntraMuscular once  LORazepam   Injectable 0.5 milliGRAM(s) IV Push once  metroNIDAZOLE    Tablet 500 milliGRAM(s) Oral two times a day  tacrolimus 4 milliGRAM(s) Oral every 12 hours  trimethoprim  160 mG/sulfamethoxazole 800 mG 1 Tablet(s) Oral daily    MEDICATIONS  (PRN):  acetaminophen     Tablet .. 650 milliGRAM(s) Oral every 6 hours PRN Temp greater or equal to 38C (100.4F), Mild Pain (1 - 3)  albuterol    90 MICROgram(s) HFA Inhaler 2 Puff(s) Inhalation every 6 hours PRN Shortness of Breath and/or Wheezing  aluminum hydroxide/magnesium hydroxide/simethicone Suspension 30 milliLiter(s) Oral every 6 hours PRN Dyspepsia  benzocaine 15 mG/menthol 3.6 mG Lozenge 1 Lozenge Oral five times a day PRN Sore Throat  benzocaine 20% Spray 2 Spray(s) Mucosal three times a day PRN sore throat  melatonin 3 milliGRAM(s) Oral at bedtime PRN Insomnia  ondansetron Injectable 8 milliGRAM(s) IV Push every 8 hours PRN Nausea and/or Vomiting  prochlorperazine   Injectable 5 milliGRAM(s) IV Push every 4 hours PRN Nausea  scopolamine 1 mG/72 Hr(s) Patch 1 Patch Transdermal every 72 hours PRN Nausea and/or Vomiting  sodium chloride 0.65% Nasal 1 Spray(s) Both Nostrils two times a day PRN Nasal Congestion      Vital Signs Last 24 Hrs  T(C): 36.6 (24 Jan 2023 05:14), Max: 36.9 (23 Jan 2023 20:34)  T(F): 97.8 (24 Jan 2023 05:14), Max: 98.5 (23 Jan 2023 20:34)  HR: 113 (24 Jan 2023 05:14) (86 - 113)  BP: 93/54 (24 Jan 2023 05:14) (93/54 - 119/69)  BP(mean): --  RR: 18 (24 Jan 2023 05:14) (18 - 18)  SpO2: 95% (24 Jan 2023 05:14) (95% - 100%)    Parameters below as of 24 Jan 2023 05:14  Patient On (Oxygen Delivery Method): room air      CAPILLARY BLOOD GLUCOSE        I&O's Summary    23 Jan 2023 07:01  -  24 Jan 2023 07:00  --------------------------------------------------------  IN: 240 mL / OUT: 0 mL / NET: 240 mL        PHYSICAL EXAM:  GENERAL APPEARANCE: Well developed, NAD, resting in bed  HEENT:  PERRL, EOMI. hearing grossly intact.  CARDIAC: Normal S1 and S2. no mrg. RRR  LUNGS: Clear to auscultation B/L, no rales, rhonchi, or wheezing  ABDOMEN: Soft , NTND, bowel sounds normal. No guarding or rebound.   MUSCULOSKELETAL: ROM intact.  No joint erythema or tenderness.   EXTREMITIES: No edema. Peripheral pulses intact.   NEUROLOGICAL: Non focal. Strength and sensation symmetric and intact throughout.   SKIN: Warm and dry , Well perfused  PSYCHIATRIC: AOx3 , Normal mood and affect      LABS:    01-23    136  |  106  |  11  ----------------------------<  72  4.3   |  16<L>  |  1.04    Ca    8.5      23 Jan 2023 07:33  Phos  4.2     01-23  Mg     2.4     01-23    TPro  4.9<L>  /  Alb  0.8<L>  /  TBili  <0.1<L>  /  DBili  x   /  AST  19  /  ALT  11  /  AlkPhos  79  01-23        Soumya Tuttle, PGY-2; [facility] Pager: [phone]; [facility] Pager: [phone]

## 2023-01-24 NOTE — PROGRESS NOTE ADULT - PROBLEM SELECTOR PLAN 1
Pt with biopsy proven Minimal Change Disease presenting to the hospital with worsening proteinuria and anasarca. Pt previously admitted in 6/2022 with similar complaints noted to have TP/CR ratio of 3.8g, at that time started on steroid therapy and discharged with nephrology follow up. Now off steroids for past one month and steroids have failed for her in the past. Repeat spot urine TP/CR ratio significantly elevated (unable to quantify). Renal duplex negative for RVT. 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 Currently on Tacrolimus 5mg BID, with most recent level above goal; please reduce to 4mg BID. Please monitor tacrolimus levels daily 30 minutes prior to AM dose (target level 4-6). On anticoagulation with Eliquis 5mg BID (pt with severe nephrotic syndrome are at high risk for thromboembolic events). PCP Ppx with bactrim. QuantiFeron pending, for possible need for Rituximab in the future. Labs reviewed, serum CO2 noted to be low at 16, can be in setting of vomiting. Monitor serum CO2.     Pt will need nephrology follow up following discharge. Has seen Dr. Angie Apodaca in the past.   Can call office at [phone] for an appointment.    If any questions, please feel free to contact me     Mukul Faustin  Nephrology Fellow  [facility] Pager: [phone]

## 2023-01-24 NOTE — PROGRESS NOTE ADULT - ASSESSMENT
32F with MCKENNA and asthma p/w abdominal pain, n/v, and generalized swelling x 1 week, admitted for MCKENNA flare. Course complicated by intractable nausea.

## 2023-01-24 NOTE — PROGRESS NOTE ADULT - ATTENDING COMMENTS
32F with MCKENNA and asthma p/w abdominal pain, n/v, and generalized swelling x 1 week, admitted for MCKENNA flare.    -patient still with nausea and vomiting  - GI recs appreciated; Utox sent; if neg, may undergo gastric emptying study if can tolerate for further evaluation   -Patient with some tachycardia and hypotension associated with N/V/D and abdominal discomfort and tired. -s/p albumin 100mL 25% q6h x 4 doses. -blood cx and urine cx neg. -CT abd/pelvis with IV contrast done, negative. -Patient also had fever on admission. -RVP on admission was negative. -CXR on admission clear. Currently afebrile.   -Consider adding PPI for abd discomfort given PMH of gastritis/GERD.   -F/u renal recs; adjust tacro dosing per nephro recs; continue to monitor tacro levels    - Hgb stable; no noted signs of bleeding    - scopolamine patch not working for patient; can try ativan again if patient agrees (patient has been refusing meds recently) and/or compazine for continued nausea/vomiting  - trichomonas noted to be positive in urine; started on flagyl. G/C neg  - patient was having episodes of diarrhea before; currently no episodes, possibly due to not eating much; will send GI PCR if continues to have diarrhea     Rest as above. Discussed with HS.

## 2023-01-25 PROCEDURE — 99233 SBSQ HOSP IP/OBS HIGH 50: CPT | Mod: GC

## 2023-01-25 PROCEDURE — 99232 SBSQ HOSP IP/OBS MODERATE 35: CPT | Mod: GC

## 2023-01-25 RX ORDER — SODIUM CHLORIDE 9 MG/ML
1000 INJECTION, SOLUTION INTRAVENOUS ONCE
Refills: 0 | Status: COMPLETED | OUTPATIENT
Start: 2023-01-25 | End: 2023-01-25

## 2023-01-25 RX ADMIN — Medication 3 MILLIGRAM(S): at 22:45

## 2023-01-25 RX ADMIN — SCOPALAMINE 1 PATCH: 1 PATCH, EXTENDED RELEASE TRANSDERMAL at 17:30

## 2023-01-25 RX ADMIN — ONDANSETRON 8 MILLIGRAM(S): 8 TABLET, FILM COATED ORAL at 05:10

## 2023-01-25 RX ADMIN — SCOPALAMINE 1 PATCH: 1 PATCH, EXTENDED RELEASE TRANSDERMAL at 17:36

## 2023-01-25 RX ADMIN — SCOPALAMINE 1 PATCH: 1 PATCH, EXTENDED RELEASE TRANSDERMAL at 17:32

## 2023-01-25 RX ADMIN — SODIUM CHLORIDE 1000 MILLILITER(S): 9 INJECTION, SOLUTION INTRAVENOUS at 18:02

## 2023-01-25 RX ADMIN — ONDANSETRON 8 MILLIGRAM(S): 8 TABLET, FILM COATED ORAL at 13:26

## 2023-01-25 RX ADMIN — ONDANSETRON 8 MILLIGRAM(S): 8 TABLET, FILM COATED ORAL at 21:54

## 2023-01-25 NOTE — PROGRESS NOTE ADULT - PROBLEM SELECTOR PLAN 3
#nausea   Likely 2/2 generalized edema  CT AP w/o contrast without other causes  - tylenol PRN for pain, zofran PRN for nausea  - MCKENNA treatment as above  - zofran PRN for nausea -- acceptable qtc  - abd pain resolved 1/21 AM, however n/v persists  - GI consulted, appreciate recs    Plan:  [] f/u utox, GI PCR, O&P  [] c/w prn antiemetics   [] c/w scopolamine patch   [] GI following, appreciate recs - If UTOx is negative, would obtain gastric emptying study if patient can tolerate it  [] Can consider aprepitant for nausea #nausea   Likely 2/2 generalized edema  CT AP w/o contrast without other causes  - tylenol PRN for pain, zofran PRN for nausea  - MCKENNA treatment as above  - zofran PRN for nausea -- acceptable qtc  - abd pain resolved 1/21 AM, however n/v persists  - GI consulted, appreciate recs  - u tox negative   - GI PCR negative     Plan:  [] c/w prn antiemetics   [] c/w scopolamine patch   [] GI following, appreciate recs -  obtain gastric emptying study (scheduled for 1/26)- if negative, consider MRI head to r/o central causes of n/v  [] Can consider aprepitant for nausea

## 2023-01-25 NOTE — PROGRESS NOTE ADULT - PROBLEM SELECTOR PLAN 1
UA Prot > 600, alb 1.1  U Pr/Cr not calculated since protein > 2000 -- severe proteinuria.   - f/u UPEP, immunofixation   - lipids very elvated-- on statin  - complement wnl, dsdna neg, hep panel negative, non immune to hep b   - Nephrology consulted, f/u recs  - s/p albumin 25% q6h  - c/w statin (home Crestor 40 -> lipitor 80)  - renal duplex neg for renal vein thrombi   - f/u renal recs  - tacro 4 BID with AM levels 30min prior to dose administration. (decreased to 4mg BID on 1/24)    - Tacro level therapeutic 1/24 pending  - eliquis 5 BID given hypercoagulability with low albumin marker.  -  quantiferon negative-- in case pt needs rituximab in the future UA Prot > 600, alb 1.1  U Pr/Cr not calculated since protein > 2000 -- severe proteinuria.   - f/u UPEP, immunofixation   - lipids very elvated-- on statin  - complement wnl, dsdna neg, hep panel negative, non immune to hep b   - Nephrology consulted, f/u recs  - s/p albumin 25% q6h  - c/w statin (home Crestor 40 -> lipitor 80)  - renal duplex neg for renal vein thrombi   - f/u renal recs  - tacro 4 BID with AM levels 30min prior to dose administration. (decreased to 4mg BID on 1/24)    - Tacro level therapeutic 1/25 pending-- patient refused labs   - eliquis 5 BID given hypercoagulability with low albumin marker.  -  quantiferon negative-- in case pt needs rituximab in the future

## 2023-01-25 NOTE — PROGRESS NOTE ADULT - SUBJECTIVE AND OBJECTIVE BOX
Patient is a 32y old  Female who presents with a chief complaint of MCKENNA flare (25 Jan 2023 07:48)      SUBJECTIVE / OVERNIGHT EVENTS: Patient seen and examined at bedside. Pt still feels nauseous and is unable to tolerate PO intake. States she has had 15-20 episodes of dry heaving. No longer has associated abdominal pain. Has not had any episodes of diarrhea as she has not had any PO intake.     MEDICATIONS  (STANDING):  apixaban 5 milliGRAM(s) Oral two times a day  aspirin enteric coated 81 milliGRAM(s) Oral daily  atorvastatin 80 milliGRAM(s) Oral at bedtime  chlorhexidine 4% Liquid 1 Application(s) Topical daily  influenza   Vaccine 0.5 milliLiter(s) IntraMuscular once  LORazepam   Injectable 0.5 milliGRAM(s) IV Push once  metroNIDAZOLE    Tablet 500 milliGRAM(s) Oral two times a day  tacrolimus 4 milliGRAM(s) Oral every 12 hours  trimethoprim  160 mG/sulfamethoxazole 800 mG 1 Tablet(s) Oral daily    MEDICATIONS  (PRN):  acetaminophen     Tablet .. 650 milliGRAM(s) Oral every 6 hours PRN Temp greater or equal to 38C (100.4F), Mild Pain (1 - 3)  albuterol    90 MICROgram(s) HFA Inhaler 2 Puff(s) Inhalation every 6 hours PRN Shortness of Breath and/or Wheezing  aluminum hydroxide/magnesium hydroxide/simethicone Suspension 30 milliLiter(s) Oral every 6 hours PRN Dyspepsia  benzocaine 15 mG/menthol 3.6 mG Lozenge 1 Lozenge Oral five times a day PRN Sore Throat  benzocaine 20% Spray 2 Spray(s) Mucosal three times a day PRN sore throat  melatonin 3 milliGRAM(s) Oral at bedtime PRN Insomnia  ondansetron Injectable 8 milliGRAM(s) IV Push every 8 hours PRN Nausea and/or Vomiting  prochlorperazine   Injectable 5 milliGRAM(s) IV Push every 4 hours PRN Nausea  scopolamine 1 mG/72 Hr(s) Patch 1 Patch Transdermal every 72 hours PRN Nausea and/or Vomiting  sodium chloride 0.65% Nasal 1 Spray(s) Both Nostrils two times a day PRN Nasal Congestion      Vital Signs Last 24 Hrs  T(C): 36.9 (25 Jan 2023 05:10), Max: 36.9 (25 Jan 2023 05:10)  T(F): 98.5 (25 Jan 2023 05:10), Max: 98.5 (25 Jan 2023 05:10)  HR: 109 (25 Jan 2023 05:10) (107 - 113)  BP: 102/57 (25 Jan 2023 05:10) (95/58 - 102/57)  BP(mean): --  RR: 18 (25 Jan 2023 05:10) (18 - 18)  SpO2: 97% (25 Jan 2023 05:10) (97% - 98%)    Parameters below as of 25 Jan 2023 05:10  Patient On (Oxygen Delivery Method): room air      CAPILLARY BLOOD GLUCOSE        I&O's Summary      PHYSICAL EXAM:  GENERAL APPEARANCE: Uncomfortable appearing female resting in bed   HEENT:  PERRL, EOMI. hearing grossly intact.  CARDIAC: Normal S1 and S2. no mrg. RRR  LUNGS: Clear to auscultation B/L, no rales, rhonchi, or wheezing  ABDOMEN: Soft , NTND, bowel sounds normal. No guarding or rebound.   MUSCULOSKELETAL: ROM intact.  No joint erythema or tenderness.   EXTREMITIES: No edema. Peripheral pulses intact.   NEUROLOGICAL: Non focal. Strength and sensation symmetric and intact throughout.   SKIN: Warm and dry , Well perfused  PSYCHIATRIC: AOx3 , Normal mood and affect      LABS:                        13.4   8.28  )-----------( 484      ( 24 Jan 2023 11:29 )             42.1     01-24    134<L>  |  104  |  15  ----------------------------<  112<H>  3.8   |  21<L>  |  1.09    Ca    8.0<L>      24 Jan 2023 11:26  Phos  4.3     01-24  Mg     2.8     01-24                RADIOLOGY & ADDITIONAL TESTS:    Imaging Personally Reviewed:    Consultant(s) Notes Reviewed:      Care Discussed with Consultants/Other Providers:    Soumya Tuttle, PGY-2; [facility] Pager: [phone]; [facility] Pager: [phone]   Patient is a 32y old  Female who presents with a chief complaint of MCKENNA flare (25 Jan 2023 07:48)      SUBJECTIVE / OVERNIGHT EVENTS: Patient seen and examined at bedside. Pt still feels nauseous and is unable to tolerate PO intake. States she has had 15-20 episodes of dry heaving. No longer has associated abdominal pain. Has not had any episodes of diarrhea as she has not had any PO intake.  Reports headaches which she gets frequently. No changes in vision.    MEDICATIONS  (STANDING):  apixaban 5 milliGRAM(s) Oral two times a day  aspirin enteric coated 81 milliGRAM(s) Oral daily  atorvastatin 80 milliGRAM(s) Oral at bedtime  chlorhexidine 4% Liquid 1 Application(s) Topical daily  influenza   Vaccine 0.5 milliLiter(s) IntraMuscular once  LORazepam   Injectable 0.5 milliGRAM(s) IV Push once  metroNIDAZOLE    Tablet 500 milliGRAM(s) Oral two times a day  tacrolimus 4 milliGRAM(s) Oral every 12 hours  trimethoprim  160 mG/sulfamethoxazole 800 mG 1 Tablet(s) Oral daily    MEDICATIONS  (PRN):  acetaminophen     Tablet .. 650 milliGRAM(s) Oral every 6 hours PRN Temp greater or equal to 38C (100.4F), Mild Pain (1 - 3)  albuterol    90 MICROgram(s) HFA Inhaler 2 Puff(s) Inhalation every 6 hours PRN Shortness of Breath and/or Wheezing  aluminum hydroxide/magnesium hydroxide/simethicone Suspension 30 milliLiter(s) Oral every 6 hours PRN Dyspepsia  benzocaine 15 mG/menthol 3.6 mG Lozenge 1 Lozenge Oral five times a day PRN Sore Throat  benzocaine 20% Spray 2 Spray(s) Mucosal three times a day PRN sore throat  melatonin 3 milliGRAM(s) Oral at bedtime PRN Insomnia  ondansetron Injectable 8 milliGRAM(s) IV Push every 8 hours PRN Nausea and/or Vomiting  prochlorperazine   Injectable 5 milliGRAM(s) IV Push every 4 hours PRN Nausea  scopolamine 1 mG/72 Hr(s) Patch 1 Patch Transdermal every 72 hours PRN Nausea and/or Vomiting  sodium chloride 0.65% Nasal 1 Spray(s) Both Nostrils two times a day PRN Nasal Congestion      Vital Signs Last 24 Hrs  T(C): 36.9 (25 Jan 2023 05:10), Max: 36.9 (25 Jan 2023 05:10)  T(F): 98.5 (25 Jan 2023 05:10), Max: 98.5 (25 Jan 2023 05:10)  HR: 109 (25 Jan 2023 05:10) (107 - 113)  BP: 102/57 (25 Jan 2023 05:10) (95/58 - 102/57)  BP(mean): --  RR: 18 (25 Jan 2023 05:10) (18 - 18)  SpO2: 97% (25 Jan 2023 05:10) (97% - 98%)    Parameters below as of 25 Jan 2023 05:10  Patient On (Oxygen Delivery Method): room air      CAPILLARY BLOOD GLUCOSE        I&O's Summary      PHYSICAL EXAM:  GENERAL APPEARANCE: Uncomfortable appearing female resting in bed   HEENT:  PERRL, EOMI. hearing grossly intact.  CARDIAC: Normal S1 and S2. no mrg. RRR  LUNGS: Clear to auscultation B/L, no rales, rhonchi, or wheezing  ABDOMEN: Soft , NTND, bowel sounds normal. No guarding or rebound.   MUSCULOSKELETAL: ROM intact.  No joint erythema or tenderness.   EXTREMITIES: No edema. Peripheral pulses intact.   NEUROLOGICAL: Non focal. Strength and sensation symmetric and intact throughout.   SKIN: Warm and dry , Well perfused  PSYCHIATRIC: AOx3 , Normal mood and affect      LABS:                        13.4   8.28  )-----------( 484      ( 24 Jan 2023 11:29 )             42.1     01-24    134<L>  |  104  |  15  ----------------------------<  112<H>  3.8   |  21<L>  |  1.09    Ca    8.0<L>      24 Jan 2023 11:26  Phos  4.3     01-24  Mg     2.8     01-24                RADIOLOGY & ADDITIONAL TESTS:    Imaging Personally Reviewed:    Consultant(s) Notes Reviewed:      Care Discussed with Consultants/Other Providers:    Soumya Tuttle, PGY-2; [facility] Pager: [phone]; [facility] Pager: [phone]

## 2023-01-25 NOTE — PROGRESS NOTE ADULT - ATTENDING COMMENTS
Agree with above. Awaiting gastric emptying to r/o gastroparesis. She reports headaches also, but no visual changes. Can also consider MRI brain if gastric emptying is negative to r/o central causes of nausea as part of comprehensive workup for the nausea/vomiting.

## 2023-01-25 NOTE — PROGRESS NOTE ADULT - ASSESSMENT
32F with MCKENNA and asthma p/w abdominal pain, n/v, and generalized swelling x 1 week, admitted for MCKENNA flare. Course complicated by intractable nausea.  32F with MCKENNA and asthma p/w abdominal pain, n/v, and generalized swelling x 1 week, admitted for MCKENNA flare. Course complicated by intractable nausea. Pending gastric emptying study.

## 2023-01-25 NOTE — PROGRESS NOTE ADULT - ATTENDING COMMENTS
32F with MCKENNA and asthma p/w abdominal pain, n/v, and generalized swelling x 1 week, admitted for MCKENNA flare.    -patient still with nausea and vomiting  -GI recs appreciated; Utox neg, plan to undergo gastric emptying study for further evaluation   -Patient with some tachycardia and hypotension associated with N/V/D and abdominal discomfort and tired. -s/p albumin 100mL 25% q6h x 4 doses. -blood cx and urine cx neg. -CT abd/pelvis with IV contrast done, negative. -Patient also had fever on admission. -RVP on admission was negative. -CXR on admission clear. Currently afebrile.   -Consider adding PPI for abd discomfort given PMH of gastritis/GERD.   -F/u renal recs; adjust tacro dosing per nephro recs; continue to monitor tacro levels    - Hgb stable; no noted signs of bleeding    - scopolamine patch not working for patient; can try ativan again if patient agrees (patient has been refusing meds recently) and/or compazine for continued nausea/vomiting  - trichomonas noted to be positive in urine; started on flagyl. G/C neg  - patient was having episodes of diarrhea before; GI PCR sent negative   - patient refusing most meds due to nausea and also refusing labs at times; encourage importance of continuing meds especially tacro and eliquis     Rest as above. Discussed with HS. 32F with MCKENNA and asthma p/w abdominal pain, n/v, and generalized swelling x 1 week, admitted for MCKENNA flare.    -patient still with nausea and vomiting  -GI recs appreciated; Utox neg, plan to undergo gastric emptying study for further evaluation   -Patient with some tachycardia and hypotension associated with N/V/D and abdominal discomfort and tired. -s/p albumin 100mL 25% q6h x 4 doses. -blood cx and urine cx neg. -CT abd/pelvis with IV contrast done, negative. -Patient also had fever on admission. -RVP on admission was negative. -CXR on admission clear. Currently afebrile.   -Consider adding PPI for abd discomfort given PMH of gastritis/GERD.   -F/u renal recs; adjust tacro dosing per nephro recs; continue to monitor tacro levels    - Hgb stable; no noted signs of bleeding    - scopolamine patch not working for patient; can try ativan again if patient agrees (patient has been refusing meds recently) and/or compazine for continued nausea/vomiting  - trichomonas noted to be positive in urine; started on flagyl. G/C neg  - patient was having episodes of diarrhea before; GI PCR sent negative.  - patient refusing most meds due to nausea and also refusing labs at times; encourage importance of continuing meds especially tacro and eliquis     Rest as above. Discussed with HS.

## 2023-01-25 NOTE — PROGRESS NOTE ADULT - PROBLEM SELECTOR PLAN 2
Febrile and tachycardic  RVP negative  ecg 1/24: sinus tachycardia   - Blood cxneg 1/20  - monitor off abx  - f/u trop Febrile and tachycardic  RVP negative  ecg 1/24: sinus tachycardia, trop 9  - Blood cxneg 1/20  - monitor off abx

## 2023-01-25 NOTE — PROGRESS NOTE ADULT - SUBJECTIVE AND OBJECTIVE BOX
PROGRESS NOTE:     Patient is a 32y old  Female who presents with a chief complaint of MCKENNA flare (24 Jan 2023 08:46)      SUBJECTIVE / OVERNIGHT EVENTS:    ADDITIONAL REVIEW OF SYSTEMS:    MEDICATIONS  (STANDING):  apixaban 5 milliGRAM(s) Oral two times a day  aspirin enteric coated 81 milliGRAM(s) Oral daily  atorvastatin 80 milliGRAM(s) Oral at bedtime  chlorhexidine 4% Liquid 1 Application(s) Topical daily  influenza   Vaccine 0.5 milliLiter(s) IntraMuscular once  LORazepam   Injectable 0.5 milliGRAM(s) IV Push once  metroNIDAZOLE    Tablet 500 milliGRAM(s) Oral two times a day  tacrolimus 4 milliGRAM(s) Oral every 12 hours  trimethoprim  160 mG/sulfamethoxazole 800 mG 1 Tablet(s) Oral daily    MEDICATIONS  (PRN):  acetaminophen     Tablet .. 650 milliGRAM(s) Oral every 6 hours PRN Temp greater or equal to 38C (100.4F), Mild Pain (1 - 3)  albuterol    90 MICROgram(s) HFA Inhaler 2 Puff(s) Inhalation every 6 hours PRN Shortness of Breath and/or Wheezing  aluminum hydroxide/magnesium hydroxide/simethicone Suspension 30 milliLiter(s) Oral every 6 hours PRN Dyspepsia  benzocaine 15 mG/menthol 3.6 mG Lozenge 1 Lozenge Oral five times a day PRN Sore Throat  benzocaine 20% Spray 2 Spray(s) Mucosal three times a day PRN sore throat  melatonin 3 milliGRAM(s) Oral at bedtime PRN Insomnia  ondansetron Injectable 8 milliGRAM(s) IV Push every 8 hours PRN Nausea and/or Vomiting  prochlorperazine   Injectable 5 milliGRAM(s) IV Push every 4 hours PRN Nausea  scopolamine 1 mG/72 Hr(s) Patch 1 Patch Transdermal every 72 hours PRN Nausea and/or Vomiting  sodium chloride 0.65% Nasal 1 Spray(s) Both Nostrils two times a day PRN Nasal Congestion      CAPILLARY BLOOD GLUCOSE        I&O's Summary      PHYSICAL EXAM:  Vital Signs Last 24 Hrs  T(C): 36.9 (25 Jan 2023 05:10), Max: 36.9 (25 Jan 2023 05:10)  T(F): 98.5 (25 Jan 2023 05:10), Max: 98.5 (25 Jan 2023 05:10)  HR: 109 (25 Jan 2023 05:10) (107 - 113)  BP: 102/57 (25 Jan 2023 05:10) (95/58 - 102/57)  BP(mean): --  RR: 18 (25 Jan 2023 05:10) (18 - 18)  SpO2: 97% (25 Jan 2023 05:10) (97% - 98%)    Parameters below as of 25 Jan 2023 05:10  Patient On (Oxygen Delivery Method): room air        GENERAL: No acute distress, well-developed  HEAD:  Atraumatic, Normocephalic  EYES: EOMI, PERRLA, conjunctiva and sclera clear  NECK: Supple, no lymphadenopathy, no JVD  CHEST/LUNG: CTAB; No wheezes, rales, or rhonchi  HEART: Regular rate and rhythm; No murmurs, rubs, or gallops  ABDOMEN: Soft, non-tender, non-distended; normal bowel sounds, no organomegaly  EXTREMITIES:  2+ peripheral pulses b/l, No clubbing, cyanosis, or edema  NEUROLOGY: A&O x 3, no focal deficits  SKIN: No rashes or lesions    LABS:                        13.4   8.28  )-----------( 484      ( 24 Jan 2023 11:29 )             42.1     01-24    134<L>  |  104  |  15  ----------------------------<  112<H>  3.8   |  21<L>  |  1.09    Ca    8.0<L>      24 Jan 2023 11:26  Phos  4.3     01-24  Mg     2.8     01-24                  RADIOLOGY & ADDITIONAL TESTS:  Results Reviewed:   Imaging Personally Reviewed:  Electrocardiogram Personally Reviewed:    COORDINATION OF CARE:  Care Discussed with Consultants/Other Providers [Y/N]:  Prior or Outpatient Records Reviewed [Y/N]:   PROGRESS NOTE:     Patient is a 32y old  Female who presents with a chief complaint of MCKENNA flare (24 Jan 2023 08:46)      SUBJECTIVE / OVERNIGHT EVENTS:  No events overnight. Patient refusing labs and meds. Examined at bedside this AM. Reports persistent n/v that is slightly improved with higher dose of zofran. All other ROS negative.     ADDITIONAL REVIEW OF SYSTEMS:    MEDICATIONS  (STANDING):  apixaban 5 milliGRAM(s) Oral two times a day  aspirin enteric coated 81 milliGRAM(s) Oral daily  atorvastatin 80 milliGRAM(s) Oral at bedtime  chlorhexidine 4% Liquid 1 Application(s) Topical daily  influenza   Vaccine 0.5 milliLiter(s) IntraMuscular once  LORazepam   Injectable 0.5 milliGRAM(s) IV Push once  metroNIDAZOLE    Tablet 500 milliGRAM(s) Oral two times a day  tacrolimus 4 milliGRAM(s) Oral every 12 hours  trimethoprim  160 mG/sulfamethoxazole 800 mG 1 Tablet(s) Oral daily    MEDICATIONS  (PRN):  acetaminophen     Tablet .. 650 milliGRAM(s) Oral every 6 hours PRN Temp greater or equal to 38C (100.4F), Mild Pain (1 - 3)  albuterol    90 MICROgram(s) HFA Inhaler 2 Puff(s) Inhalation every 6 hours PRN Shortness of Breath and/or Wheezing  aluminum hydroxide/magnesium hydroxide/simethicone Suspension 30 milliLiter(s) Oral every 6 hours PRN Dyspepsia  benzocaine 15 mG/menthol 3.6 mG Lozenge 1 Lozenge Oral five times a day PRN Sore Throat  benzocaine 20% Spray 2 Spray(s) Mucosal three times a day PRN sore throat  melatonin 3 milliGRAM(s) Oral at bedtime PRN Insomnia  ondansetron Injectable 8 milliGRAM(s) IV Push every 8 hours PRN Nausea and/or Vomiting  prochlorperazine   Injectable 5 milliGRAM(s) IV Push every 4 hours PRN Nausea  scopolamine 1 mG/72 Hr(s) Patch 1 Patch Transdermal every 72 hours PRN Nausea and/or Vomiting  sodium chloride 0.65% Nasal 1 Spray(s) Both Nostrils two times a day PRN Nasal Congestion      CAPILLARY BLOOD GLUCOSE        I&O's Summary      PHYSICAL EXAM:  Vital Signs Last 24 Hrs  T(C): 36.9 (25 Jan 2023 05:10), Max: 36.9 (25 Jan 2023 05:10)  T(F): 98.5 (25 Jan 2023 05:10), Max: 98.5 (25 Jan 2023 05:10)  HR: 109 (25 Jan 2023 05:10) (107 - 113)  BP: 102/57 (25 Jan 2023 05:10) (95/58 - 102/57)  BP(mean): --  RR: 18 (25 Jan 2023 05:10) (18 - 18)  SpO2: 97% (25 Jan 2023 05:10) (97% - 98%)    Parameters below as of 25 Jan 2023 05:10  Patient On (Oxygen Delivery Method): room air        GENERAL: No acute distress, well-developed  HEAD:  Atraumatic, Normocephalic  EYES: EOMI, PERRLA, conjunctiva and sclera clear  NECK: Supple, no lymphadenopathy, no JVD  CHEST/LUNG: CTAB; No wheezes, rales, or rhonchi  HEART: Regular rate and rhythm; No murmurs, rubs, or gallops  ABDOMEN: Soft, non-tender, non-distended; normal bowel sounds, no organomegaly  EXTREMITIES:  2+ peripheral pulses b/l, No clubbing, cyanosis, or edema  NEUROLOGY: A&O x 3, no focal deficits  SKIN: No rashes or lesions    LABS:                        13.4   8.28  )-----------( 484      ( 24 Jan 2023 11:29 )             42.1     01-24    134<L>  |  104  |  15  ----------------------------<  112<H>  3.8   |  21<L>  |  1.09    Ca    8.0<L>      24 Jan 2023 11:26  Phos  4.3     01-24  Mg     2.8     01-24        RADIOLOGY & ADDITIONAL TESTS:  Results Reviewed:   Imaging Personally Reviewed:  Electrocardiogram Personally Reviewed:    COORDINATION OF CARE:  Care Discussed with Consultants/Other Providers [Y/N]:  Prior or Outpatient Records Reviewed [Y/N]:

## 2023-01-25 NOTE — PROGRESS NOTE ADULT - PROBLEM SELECTOR PLAN 5
Diet: DASH  DVT: on eliquis full AC  Dispo: pending clinical course    Full Code. Diet: DASH  DVT: on eliquis full AC- patient refusing medications  Dispo: pending clinical course    Full Code.

## 2023-01-25 NOTE — PROGRESS NOTE ADULT - ASSESSMENT
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, hx thrombophlebitis of R gonadal vein, DVT (was previously on Xarelto), GERD, presented to ED for progressively worsening N/V for 2 weeks w/ intermittent diarrhea     #Gastroenteritis  #Gastroparesis   #anasarca induced abdominal pain   #Medication induced nausea  Pt states abdominal pain improved w/ tacrolimus as fluid retention improved  Unlikely having GI bleed given drop in hemoglobin notes on 1/21 CBC was w/ all cell lines down and hgb back to baseline on same day labs, pt reports no melena/bloody stools or emesis  Imaging shows no gallbladder thickening/low concern for cholecystitis  Pt denying history of marijuana use, THC negative on urine drug screen   Unlikely medication induced as pt has long standing history, no new medications prior to admission    Recommendations:  Please obtain GI PCR, Stool culture ova and parasites if she has diarrhea  Obtain gastric emptying study for further work up  Can c/w Zofran 8mg q8  Can c/w scopolamine patch   Pt previously tried capsaicin cream, refusing at this time as it caused burning  Can consider aprepitant for nausea          GI iphone: [phone]  GI e-mail: [email or web address]   32 yo F PMHx minimal change disease, hx thrombophlebitis of R gonadal vein, DVT (was previously on Xarelto), GERD, presented to ED for progressively worsening N/V for 2 weeks w/ intermittent diarrhea     #Gastroenteritis  #Gastroparesis   #anasarca induced abdominal pain   Imaging shows no gallbladder thickening/low concern for cholecystitis  Pt denying history of marijuana use, THC negative on urine drug screen   Unlikely medication induced as pt has long standing history, no new medications prior to admission; r/o gastroparesis, central causes of nausea    Recommendations:  Please obtain GI PCR, Stool culture ova and parasites if she has diarrhea  Obtain gastric emptying study for further work up  Can c/w Zofran 8mg q8  Can c/w scopolamine patch   Pt previously tried capsaicin cream, refusing at this time as it caused burning  Can consider aprepitant for nausea    If gastric emptying study is negative, the last workup recommended would be MRI brain to r/o central causes of nausea/vomiting        GI iphone: [phone]  GI e-mail: [email or web address]

## 2023-01-26 ENCOUNTER — TRANSCRIPTION ENCOUNTER (OUTPATIENT)
Age: 33
End: 2023-01-26

## 2023-01-26 VITALS
OXYGEN SATURATION: 96 % | HEART RATE: 104 BPM | TEMPERATURE: 98 F | SYSTOLIC BLOOD PRESSURE: 98 MMHG | RESPIRATION RATE: 18 BRPM | DIASTOLIC BLOOD PRESSURE: 55 MMHG

## 2023-01-26 LAB
% ALBUMIN: 51.5 % — SIGNIFICANT CHANGE UP
% ALPHA 1: 5 % — SIGNIFICANT CHANGE UP
% ALPHA 2: 20.9 % — SIGNIFICANT CHANGE UP
% BETA: 14.7 % — SIGNIFICANT CHANGE UP
% GAMMA: 7.9 % — SIGNIFICANT CHANGE UP
% M SPIKE: SIGNIFICANT CHANGE UP
ALBUMIN SERPL ELPH-MCNC: 2.1 G/DL — LOW (ref 3.6–5.5)
ALBUMIN/GLOB SERPL ELPH: 1.1 RATIO — SIGNIFICANT CHANGE UP
ALPHA1 GLOB SERPL ELPH-MCNC: 0.2 G/DL — SIGNIFICANT CHANGE UP (ref 0.1–0.4)
ALPHA2 GLOB SERPL ELPH-MCNC: 0.8 G/DL — SIGNIFICANT CHANGE UP (ref 0.5–1)
ANION GAP SERPL CALC-SCNC: 11 MMOL/L — SIGNIFICANT CHANGE UP (ref 5–17)
B-GLOBULIN SERPL ELPH-MCNC: 0.6 G/DL — SIGNIFICANT CHANGE UP (ref 0.5–1)
BUN SERPL-MCNC: 16 MG/DL — SIGNIFICANT CHANGE UP (ref 7–23)
CALCIUM SERPL-MCNC: 8.7 MG/DL — SIGNIFICANT CHANGE UP (ref 8.4–10.5)
CHLORIDE SERPL-SCNC: 106 MMOL/L — SIGNIFICANT CHANGE UP (ref 96–108)
CO2 SERPL-SCNC: 23 MMOL/L — SIGNIFICANT CHANGE UP (ref 22–31)
CREAT SERPL-MCNC: 1.01 MG/DL — SIGNIFICANT CHANGE UP (ref 0.5–1.3)
EGFR: 76 ML/MIN/1.73M2 — SIGNIFICANT CHANGE UP
GAMMA GLOBULIN: 0.3 G/DL — LOW (ref 0.6–1.6)
GLUCOSE BLDC GLUCOMTR-MCNC: 88 MG/DL — SIGNIFICANT CHANGE UP (ref 70–99)
GLUCOSE SERPL-MCNC: 88 MG/DL — SIGNIFICANT CHANGE UP (ref 70–99)
HCT VFR BLD CALC: 46.6 % — HIGH (ref 34.5–45)
HGB BLD-MCNC: 14.4 G/DL — SIGNIFICANT CHANGE UP (ref 11.5–15.5)
INTERPRETATION SERPL IFE-IMP: SIGNIFICANT CHANGE UP
M-SPIKE: SIGNIFICANT CHANGE UP (ref 0–0)
MAGNESIUM SERPL-MCNC: 2.8 MG/DL — HIGH (ref 1.6–2.6)
MCHC RBC-ENTMCNC: 29.9 PG — SIGNIFICANT CHANGE UP (ref 27–34)
MCHC RBC-ENTMCNC: 30.9 GM/DL — LOW (ref 32–36)
MCV RBC AUTO: 96.9 FL — SIGNIFICANT CHANGE UP (ref 80–100)
NRBC # BLD: 0 /100 WBCS — SIGNIFICANT CHANGE UP (ref 0–0)
PHOSPHATE SERPL-MCNC: 4.7 MG/DL — HIGH (ref 2.5–4.5)
PLATELET # BLD AUTO: 640 K/UL — HIGH (ref 150–400)
POTASSIUM SERPL-MCNC: 3.9 MMOL/L — SIGNIFICANT CHANGE UP (ref 3.5–5.3)
POTASSIUM SERPL-SCNC: 3.9 MMOL/L — SIGNIFICANT CHANGE UP (ref 3.5–5.3)
PROT PATTERN SERPL ELPH-IMP: SIGNIFICANT CHANGE UP
RBC # BLD: 4.81 M/UL — SIGNIFICANT CHANGE UP (ref 3.8–5.2)
RBC # FLD: 14.8 % — HIGH (ref 10.3–14.5)
SODIUM SERPL-SCNC: 140 MMOL/L — SIGNIFICANT CHANGE UP (ref 135–145)
TACROLIMUS SERPL-MCNC: 3 NG/ML — SIGNIFICANT CHANGE UP
WBC # BLD: 9.23 K/UL — SIGNIFICANT CHANGE UP (ref 3.8–10.5)
WBC # FLD AUTO: 9.23 K/UL — SIGNIFICANT CHANGE UP (ref 3.8–10.5)

## 2023-01-26 PROCEDURE — 86901 BLOOD TYPING SEROLOGIC RH(D): CPT

## 2023-01-26 PROCEDURE — 84702 CHORIONIC GONADOTROPIN TEST: CPT

## 2023-01-26 PROCEDURE — 84100 ASSAY OF PHOSPHORUS: CPT

## 2023-01-26 PROCEDURE — 78264 GASTRIC EMPTYING IMG STUDY: CPT | Mod: 26,GC

## 2023-01-26 PROCEDURE — 93005 ELECTROCARDIOGRAM TRACING: CPT

## 2023-01-26 PROCEDURE — 87086 URINE CULTURE/COLONY COUNT: CPT

## 2023-01-26 PROCEDURE — 86480 TB TEST CELL IMMUN MEASURE: CPT

## 2023-01-26 PROCEDURE — 86255 FLUORESCENT ANTIBODY SCREEN: CPT

## 2023-01-26 PROCEDURE — 82803 BLOOD GASES ANY COMBINATION: CPT

## 2023-01-26 PROCEDURE — 80307 DRUG TEST PRSMV CHEM ANLYZR: CPT

## 2023-01-26 PROCEDURE — 74178 CT ABD&PLV WO CNTR FLWD CNTR: CPT

## 2023-01-26 PROCEDURE — 83516 IMMUNOASSAY NONANTIBODY: CPT

## 2023-01-26 PROCEDURE — 74176 CT ABD & PELVIS W/O CONTRAST: CPT | Mod: MA

## 2023-01-26 PROCEDURE — 85027 COMPLETE CBC AUTOMATED: CPT

## 2023-01-26 PROCEDURE — 82947 ASSAY GLUCOSE BLOOD QUANT: CPT

## 2023-01-26 PROCEDURE — 86334 IMMUNOFIX E-PHORESIS SERUM: CPT

## 2023-01-26 PROCEDURE — 87800 DETECT AGNT MULT DNA DIREC: CPT

## 2023-01-26 PROCEDURE — 87641 MR-STAPH DNA AMP PROBE: CPT

## 2023-01-26 PROCEDURE — 87389 HIV-1 AG W/HIV-1&-2 AB AG IA: CPT

## 2023-01-26 PROCEDURE — 87591 N.GONORRHOEAE DNA AMP PROB: CPT

## 2023-01-26 PROCEDURE — 84295 ASSAY OF SERUM SODIUM: CPT

## 2023-01-26 PROCEDURE — 84484 ASSAY OF TROPONIN QUANT: CPT

## 2023-01-26 PROCEDURE — 86038 ANTINUCLEAR ANTIBODIES: CPT

## 2023-01-26 PROCEDURE — 86160 COMPLEMENT ANTIGEN: CPT

## 2023-01-26 PROCEDURE — 86900 BLOOD TYPING SEROLOGIC ABO: CPT

## 2023-01-26 PROCEDURE — 83690 ASSAY OF LIPASE: CPT

## 2023-01-26 PROCEDURE — 86225 DNA ANTIBODY NATIVE: CPT

## 2023-01-26 PROCEDURE — 85652 RBC SED RATE AUTOMATED: CPT

## 2023-01-26 PROCEDURE — 87661 TRICHOMONAS VAGINALIS AMPLIF: CPT

## 2023-01-26 PROCEDURE — 84165 PROTEIN E-PHORESIS SERUM: CPT

## 2023-01-26 PROCEDURE — 36415 COLL VENOUS BLD VENIPUNCTURE: CPT

## 2023-01-26 PROCEDURE — 85025 COMPLETE CBC W/AUTO DIFF WBC: CPT

## 2023-01-26 PROCEDURE — 86850 RBC ANTIBODY SCREEN: CPT

## 2023-01-26 PROCEDURE — 86880 COOMBS TEST DIRECT: CPT

## 2023-01-26 PROCEDURE — 0225U NFCT DS DNA&RNA 21 SARSCOV2: CPT

## 2023-01-26 PROCEDURE — 82570 ASSAY OF URINE CREATININE: CPT

## 2023-01-26 PROCEDURE — 86140 C-REACTIVE PROTEIN: CPT

## 2023-01-26 PROCEDURE — 80053 COMPREHEN METABOLIC PANEL: CPT

## 2023-01-26 PROCEDURE — 87507 IADNA-DNA/RNA PROBE TQ 12-25: CPT

## 2023-01-26 PROCEDURE — A9541: CPT

## 2023-01-26 PROCEDURE — 82330 ASSAY OF CALCIUM: CPT

## 2023-01-26 PROCEDURE — 80197 ASSAY OF TACROLIMUS: CPT

## 2023-01-26 PROCEDURE — 85018 HEMOGLOBIN: CPT

## 2023-01-26 PROCEDURE — 84156 ASSAY OF PROTEIN URINE: CPT

## 2023-01-26 PROCEDURE — 85014 HEMATOCRIT: CPT

## 2023-01-26 PROCEDURE — 86704 HEP B CORE ANTIBODY TOTAL: CPT

## 2023-01-26 PROCEDURE — 83735 ASSAY OF MAGNESIUM: CPT

## 2023-01-26 PROCEDURE — 86706 HEP B SURFACE ANTIBODY: CPT

## 2023-01-26 PROCEDURE — 71045 X-RAY EXAM CHEST 1 VIEW: CPT

## 2023-01-26 PROCEDURE — 82435 ASSAY OF BLOOD CHLORIDE: CPT

## 2023-01-26 PROCEDURE — 87640 STAPH A DNA AMP PROBE: CPT

## 2023-01-26 PROCEDURE — 85379 FIBRIN DEGRADATION QUANT: CPT

## 2023-01-26 PROCEDURE — 82962 GLUCOSE BLOOD TEST: CPT

## 2023-01-26 PROCEDURE — 99285 EMERGENCY DEPT VISIT HI MDM: CPT

## 2023-01-26 PROCEDURE — 83605 ASSAY OF LACTIC ACID: CPT

## 2023-01-26 PROCEDURE — 81001 URINALYSIS AUTO W/SCOPE: CPT

## 2023-01-26 PROCEDURE — 84145 PROCALCITONIN (PCT): CPT

## 2023-01-26 PROCEDURE — 99239 HOSP IP/OBS DSCHRG MGMT >30: CPT | Mod: GC

## 2023-01-26 PROCEDURE — 80061 LIPID PANEL: CPT

## 2023-01-26 PROCEDURE — 78264 GASTRIC EMPTYING IMG STUDY: CPT

## 2023-01-26 PROCEDURE — 82565 ASSAY OF CREATININE: CPT

## 2023-01-26 PROCEDURE — 87040 BLOOD CULTURE FOR BACTERIA: CPT

## 2023-01-26 PROCEDURE — 85730 THROMBOPLASTIN TIME PARTIAL: CPT

## 2023-01-26 PROCEDURE — 86870 RBC ANTIBODY IDENTIFICATION: CPT

## 2023-01-26 PROCEDURE — 80074 ACUTE HEPATITIS PANEL: CPT

## 2023-01-26 PROCEDURE — 81025 URINE PREGNANCY TEST: CPT

## 2023-01-26 PROCEDURE — 86922 COMPATIBILITY TEST ANTIGLOB: CPT

## 2023-01-26 PROCEDURE — 84132 ASSAY OF SERUM POTASSIUM: CPT

## 2023-01-26 PROCEDURE — A9548: CPT

## 2023-01-26 PROCEDURE — 83521 IG LIGHT CHAINS FREE EACH: CPT

## 2023-01-26 PROCEDURE — 85610 PROTHROMBIN TIME: CPT

## 2023-01-26 PROCEDURE — 84155 ASSAY OF PROTEIN SERUM: CPT

## 2023-01-26 PROCEDURE — 96374 THER/PROPH/DIAG INJ IV PUSH: CPT

## 2023-01-26 PROCEDURE — 93970 EXTREMITY STUDY: CPT

## 2023-01-26 PROCEDURE — 80048 BASIC METABOLIC PNL TOTAL CA: CPT

## 2023-01-26 PROCEDURE — 87491 CHLMYD TRACH DNA AMP PROBE: CPT

## 2023-01-26 PROCEDURE — P9047: CPT

## 2023-01-26 PROCEDURE — 93975 VASCULAR STUDY: CPT

## 2023-01-26 RX ORDER — METRONIDAZOLE 500 MG
1 TABLET ORAL
Qty: 14 | Refills: 0
Start: 2023-01-26 | End: 2023-02-01

## 2023-01-26 RX ORDER — APREPITANT 80 MG/1
80 CAPSULE ORAL ONCE
Refills: 0 | Status: COMPLETED | OUTPATIENT
Start: 2023-01-26 | End: 2023-01-26

## 2023-01-26 RX ORDER — TACROLIMUS 5 MG/1
1 CAPSULE ORAL
Qty: 60 | Refills: 0
Start: 2023-01-26 | End: 2023-02-24

## 2023-01-26 RX ORDER — SCOPALAMINE 1 MG/3D
1 PATCH, EXTENDED RELEASE TRANSDERMAL
Qty: 2 | Refills: 0
Start: 2023-01-26 | End: 2023-01-31

## 2023-01-26 RX ORDER — ENOXAPARIN SODIUM 100 MG/ML
90 INJECTION SUBCUTANEOUS EVERY 12 HOURS
Refills: 0 | Status: DISCONTINUED | OUTPATIENT
Start: 2023-01-26 | End: 2023-01-26

## 2023-01-26 RX ORDER — METRONIDAZOLE 500 MG
500 TABLET ORAL EVERY 12 HOURS
Refills: 0 | Status: DISCONTINUED | OUTPATIENT
Start: 2023-01-26 | End: 2023-01-26

## 2023-01-26 RX ORDER — SODIUM CHLORIDE 9 MG/ML
1000 INJECTION INTRAMUSCULAR; INTRAVENOUS; SUBCUTANEOUS
Refills: 0 | Status: DISCONTINUED | OUTPATIENT
Start: 2023-01-26 | End: 2023-01-26

## 2023-01-26 RX ORDER — ENOXAPARIN SODIUM 100 MG/ML
80 INJECTION SUBCUTANEOUS EVERY 12 HOURS
Refills: 0 | Status: DISCONTINUED | OUTPATIENT
Start: 2023-01-26 | End: 2023-01-26

## 2023-01-26 RX ORDER — PROCHLORPERAZINE MALEATE 5 MG
1 TABLET ORAL
Qty: 84 | Refills: 0
Start: 2023-01-26 | End: 2023-02-08

## 2023-01-26 RX ORDER — SCOPALAMINE 1 MG/3D
1 PATCH, EXTENDED RELEASE TRANSDERMAL
Qty: 1 | Refills: 0
Start: 2023-01-26 | End: 2023-01-30

## 2023-01-26 RX ADMIN — APREPITANT 80 MILLIGRAM(S): 80 CAPSULE ORAL at 13:59

## 2023-01-26 RX ADMIN — Medication 100 MILLIGRAM(S): at 17:16

## 2023-01-26 RX ADMIN — Medication 5 MILLIGRAM(S): at 17:25

## 2023-01-26 RX ADMIN — SODIUM CHLORIDE 100 MILLILITER(S): 9 INJECTION INTRAMUSCULAR; INTRAVENOUS; SUBCUTANEOUS at 12:16

## 2023-01-26 NOTE — PROGRESS NOTE ADULT - ATTENDING SUPERVISION STATEMENT
Fellow
Resident

## 2023-01-26 NOTE — PROVIDER CONTACT NOTE (OTHER) - DATE AND TIME:
20-Jan-2023 11:24
26-Jan-2023 12:21
25-Jan-2023 10:08
25-Jan-2023 17:36
21-Jan-2023 01:14
25-Jan-2023 11:54
25-Jan-2023 13:48

## 2023-01-26 NOTE — PROGRESS NOTE ADULT - PROVIDER SPECIALTY LIST ADULT
Internal Medicine
Nephrology
Gastroenterology
Gastroenterology
Nephrology
Nephrology
Gastroenterology
Internal Medicine
Internal Medicine
Nephrology
Internal Medicine
Nephrology
Internal Medicine

## 2023-01-26 NOTE — PROGRESS NOTE ADULT - SUBJECTIVE AND OBJECTIVE BOX
PROGRESS NOTE:     Patient is a 32y old  Female who presents with a chief complaint of MCKENNA flare (25 Jan 2023 10:40)      SUBJECTIVE / OVERNIGHT EVENTS:    ADDITIONAL REVIEW OF SYSTEMS:    MEDICATIONS  (STANDING):  apixaban 5 milliGRAM(s) Oral two times a day  aspirin enteric coated 81 milliGRAM(s) Oral daily  atorvastatin 80 milliGRAM(s) Oral at bedtime  chlorhexidine 4% Liquid 1 Application(s) Topical daily  influenza   Vaccine 0.5 milliLiter(s) IntraMuscular once  LORazepam   Injectable 0.5 milliGRAM(s) IV Push once  metroNIDAZOLE    Tablet 500 milliGRAM(s) Oral two times a day  tacrolimus 4 milliGRAM(s) Oral every 12 hours  trimethoprim  160 mG/sulfamethoxazole 800 mG 1 Tablet(s) Oral daily    MEDICATIONS  (PRN):  acetaminophen     Tablet .. 650 milliGRAM(s) Oral every 6 hours PRN Temp greater or equal to 38C (100.4F), Mild Pain (1 - 3)  albuterol    90 MICROgram(s) HFA Inhaler 2 Puff(s) Inhalation every 6 hours PRN Shortness of Breath and/or Wheezing  aluminum hydroxide/magnesium hydroxide/simethicone Suspension 30 milliLiter(s) Oral every 6 hours PRN Dyspepsia  benzocaine 15 mG/menthol 3.6 mG Lozenge 1 Lozenge Oral five times a day PRN Sore Throat  benzocaine 20% Spray 2 Spray(s) Mucosal three times a day PRN sore throat  melatonin 3 milliGRAM(s) Oral at bedtime PRN Insomnia  ondansetron Injectable 8 milliGRAM(s) IV Push every 8 hours PRN Nausea and/or Vomiting  prochlorperazine   Injectable 5 milliGRAM(s) IV Push every 4 hours PRN Nausea  scopolamine 1 mG/72 Hr(s) Patch 1 Patch Transdermal every 72 hours PRN Nausea and/or Vomiting  sodium chloride 0.65% Nasal 1 Spray(s) Both Nostrils two times a day PRN Nasal Congestion      CAPILLARY BLOOD GLUCOSE        I&O's Summary      PHYSICAL EXAM:  Vital Signs Last 24 Hrs  T(C): 36.4 (26 Jan 2023 04:32), Max: 36.7 (25 Jan 2023 12:48)  T(F): 97.6 (26 Jan 2023 04:32), Max: 98 (25 Jan 2023 12:48)  HR: 100 (26 Jan 2023 04:32) (100 - 109)  BP: 95/58 (26 Jan 2023 04:32) (95/58 - 117/63)  BP(mean): --  RR: 18 (26 Jan 2023 04:32) (18 - 18)  SpO2: 96% (26 Jan 2023 04:32) (95% - 97%)    Parameters below as of 26 Jan 2023 04:32  Patient On (Oxygen Delivery Method): room air        GENERAL: No acute distress, well-developed  HEAD:  Atraumatic, Normocephalic  EYES: EOMI, PERRLA, conjunctiva and sclera clear  NECK: Supple, no lymphadenopathy, no JVD  CHEST/LUNG: CTAB; No wheezes, rales, or rhonchi  HEART: Regular rate and rhythm; No murmurs, rubs, or gallops  ABDOMEN: Soft, non-tender, non-distended; normal bowel sounds, no organomegaly  EXTREMITIES:  2+ peripheral pulses b/l, No clubbing, cyanosis, or edema  NEUROLOGY: A&O x 3, no focal deficits  SKIN: No rashes or lesions    LABS:                        13.4   8.28  )-----------( 484      ( 24 Jan 2023 11:29 )             42.1     01-24    134<L>  |  104  |  15  ----------------------------<  112<H>  3.8   |  21<L>  |  1.09    Ca    8.0<L>      24 Jan 2023 11:26  Phos  4.3     01-24  Mg     2.8     01-24                  RADIOLOGY & ADDITIONAL TESTS:  Results Reviewed:   Imaging Personally Reviewed:  Electrocardiogram Personally Reviewed:    COORDINATION OF CARE:  Care Discussed with Consultants/Other Providers [Y/N]:  Prior or Outpatient Records Reviewed [Y/N]:   PROGRESS NOTE:     Patient is a 32y old  Female who presents with a chief complaint of MCKENNA flare (25 Jan 2023 10:40)      SUBJECTIVE / OVERNIGHT EVENTS:  No events overnight. Patient unable to tolerate complete gastric emptying study this AM. Reports persistent n/v- unable to tolerate food or liquids. All other ROS negative.     ADDITIONAL REVIEW OF SYSTEMS:    MEDICATIONS  (STANDING):  apixaban 5 milliGRAM(s) Oral two times a day  aspirin enteric coated 81 milliGRAM(s) Oral daily  atorvastatin 80 milliGRAM(s) Oral at bedtime  chlorhexidine 4% Liquid 1 Application(s) Topical daily  influenza   Vaccine 0.5 milliLiter(s) IntraMuscular once  LORazepam   Injectable 0.5 milliGRAM(s) IV Push once  metroNIDAZOLE    Tablet 500 milliGRAM(s) Oral two times a day  tacrolimus 4 milliGRAM(s) Oral every 12 hours  trimethoprim  160 mG/sulfamethoxazole 800 mG 1 Tablet(s) Oral daily    MEDICATIONS  (PRN):  acetaminophen     Tablet .. 650 milliGRAM(s) Oral every 6 hours PRN Temp greater or equal to 38C (100.4F), Mild Pain (1 - 3)  albuterol    90 MICROgram(s) HFA Inhaler 2 Puff(s) Inhalation every 6 hours PRN Shortness of Breath and/or Wheezing  aluminum hydroxide/magnesium hydroxide/simethicone Suspension 30 milliLiter(s) Oral every 6 hours PRN Dyspepsia  benzocaine 15 mG/menthol 3.6 mG Lozenge 1 Lozenge Oral five times a day PRN Sore Throat  benzocaine 20% Spray 2 Spray(s) Mucosal three times a day PRN sore throat  melatonin 3 milliGRAM(s) Oral at bedtime PRN Insomnia  ondansetron Injectable 8 milliGRAM(s) IV Push every 8 hours PRN Nausea and/or Vomiting  prochlorperazine   Injectable 5 milliGRAM(s) IV Push every 4 hours PRN Nausea  scopolamine 1 mG/72 Hr(s) Patch 1 Patch Transdermal every 72 hours PRN Nausea and/or Vomiting  sodium chloride 0.65% Nasal 1 Spray(s) Both Nostrils two times a day PRN Nasal Congestion      CAPILLARY BLOOD GLUCOSE        I&O's Summary    LABS:                        13.4   8.28  )-----------( 484      ( 24 Jan 2023 11:29 )             42.1     01-24    134<L>  |  104  |  15  ----------------------------<  112<H>  3.8   |  21<L>  |  1.09    Ca    8.0<L>      24 Jan 2023 11:26  Phos  4.3     01-24  Mg     2.8     01-24      RADIOLOGY & ADDITIONAL TESTS:  Results Reviewed:   Imaging Personally Reviewed:  Electrocardiogram Personally Reviewed:    COORDINATION OF CARE:  Care Discussed with Consultants/Other Providers [Y/N]:  Prior or Outpatient Records Reviewed [Y/N]:

## 2023-01-26 NOTE — PROVIDER CONTACT NOTE (OTHER) - NAME OF MD/NP/PA/DO NOTIFIED:
Mauricio Ardon MD
Cassandra Ardon MD
Davonte Gustafson MD
MD Martha Curran

## 2023-01-26 NOTE — PROVIDER CONTACT NOTE (OTHER) - REASON
Persistent Vomiting
Pt refused aspirin and Bactrim
patient with episode of emesis
Pt refused medications
Pt refused morning labwork
BP 87/54, dizzy,m lightheaded, pain 9/10
Pt refused aspirin, chlorhexidine liquid and bactrim

## 2023-01-26 NOTE — PROGRESS NOTE ADULT - PROBLEM SELECTOR PLAN 4
- c/o vaginal discharge   - trich +   - start on Metronidazole 500 BID for 7 day course  - patient informed to notify sexual partners to get treated - c/o vaginal discharge   - trich +   - start on IV Metronidazole for 7 day course (1/26-)  - patient informed to notify sexual partners to get treated

## 2023-01-26 NOTE — PROGRESS NOTE ADULT - PROBLEM SELECTOR PLAN 3
#nausea   Likely 2/2 generalized edema  CT AP w/o contrast without other causes  - tylenol PRN for pain, zofran PRN for nausea  - MCKENNA treatment as above  - zofran PRN for nausea -- acceptable qtc  - abd pain resolved 1/21 AM, however n/v persists  - GI consulted, appreciate recs  - u tox negative   - GI PCR negative     Plan:  [] c/w prn antiemetics   [] c/w scopolamine patch   [] GI following, appreciate recs -  obtain gastric emptying study (scheduled for 1/26)- if negative, consider MRI head to r/o central causes of n/v  [] Can consider aprepitant for nausea #nausea   Likely 2/2 generalized edema  CT AP w/o contrast without other causes  - tylenol PRN for pain, zofran PRN for nausea  - MCKENNA treatment as above  - zofran PRN for nausea -- acceptable qtc  - abd pain resolved 1/21 AM, however n/v persists  - GI consulted, appreciate recs  - u tox negative   - GI PCR negative     Plan:  [] c/w prn antiemetics   [] c/w scopolamine patch   [] GI following, appreciate recs -  f/u gastric emptying study report- if negative, consider MRI head to r/o central causes of n/v  [] trial aprepitant for nausea

## 2023-01-26 NOTE — PROGRESS NOTE ADULT - ATTENDING COMMENTS
32F with MCKENNA and asthma p/w abdominal pain, n/v, and generalized swelling x 1 week, admitted for MCKENNA flare.    -patient still with nausea and vomiting  -GI recs appreciated; Utox neg, patient underwent gastric emptying study today but was not able to tolerate full test; will f/u results; based on results may need to obtain MRI to r/o central causes of N/V   -Patient with some tachycardia and hypotension associated with N/V/D and abdominal discomfort and tired. -s/p albumin 100mL 25% q6h x 4 doses. -blood cx and urine cx neg. -CT abd/pelvis with IV contrast done, negative. -Patient also had fever on admission. -RVP on admission was negative. -CXR on admission clear. Currently afebrile.   -F/u renal recs; adjust tacro dosing per nephro recs; continue to monitor tacro levels    - Hgb stable; no noted signs of bleeding    - scopolamine patch not working for patient; can try ativan again if patient agrees (patient has been refusing meds recently) and/or compazine for continued nausea/vomiting  - trichomonas noted to be positive in urine; started on flagyl. G/C neg  - patient was having episodes of diarrhea before; GI PCR sent negative.  - patient refusing most meds due to nausea and also refusing labs at times; encourage importance of continuing meds especially tacro and eliquis; will plan to transition eliquis to FD lovenox for now since not able to tolerate PO; patient also refusing labs most of the time so unable to monitor tacro levels at this time although she has been refusing tacro as well     Rest as above. Discussed with HS. 32F with MCKENNA and asthma p/w abdominal pain, n/v, and generalized swelling x 1 week, admitted for MCKENNA flare.    -patient still with nausea and vomiting  -GI recs appreciated; Utox neg, patient underwent gastric emptying study today but was not able to tolerate full test; will f/u results; based on results may need to obtain MRI to r/o central causes of N/V   -Patient with some tachycardia and hypotension associated with N/V/D and abdominal discomfort and tired. -s/p albumin 100mL 25% q6h x 4 doses. -blood cx and urine cx neg. -CT abd/pelvis with IV contrast done, negative. -Patient also had fever on admission. -RVP on admission was negative. -CXR on admission clear. Currently afebrile.   -F/u renal recs; adjust tacro dosing per nephro recs; continue to monitor tacro levels    - Hgb stable; no noted signs of bleeding    - scopolamine patch not working for patient; can try ativan again if patient agrees (patient has been refusing meds recently) and/or compazine for continued nausea/vomiting  - trichomonas noted to be positive in urine; started on flagyl. G/C neg  - patient was having episodes of diarrhea before; GI PCR sent negative.  - patient refusing most meds due to nausea and also refusing labs at times; encourage importance of continuing meds especially tacro and eliquis; will plan to transition eliquis to FD lovenox for now since not able to tolerate PO; patient also refusing labs most of the time so unable to monitor tacro levels at this time although she has been refusing tacro as well.    Rest as above. Discussed with HS.

## 2023-01-26 NOTE — DISCHARGE NOTE NURSING/CASE MANAGEMENT/SOCIAL WORK - NSDCPEFALRISK_GEN_ALL_CORE
For information on Fall & Injury Prevention, visit: [email or web address] OR  [email or web address] OR  https://www.cdc.gov/steadi/patient.html

## 2023-01-26 NOTE — PROVIDER CONTACT NOTE (OTHER) - ACTION/TREATMENT ORDERED:
None ordered at this time
Cultures ordered, Albumin ordered
None ordered at this time
None ordered at this time

## 2023-01-26 NOTE — PROGRESS NOTE ADULT - PROBLEM SELECTOR PLAN 1
UA Prot > 600, alb 1.1  U Pr/Cr not calculated since protein > 2000 -- severe proteinuria.   - f/u UPEP, immunofixation   - lipids very elvated-- on statin  - complement wnl, dsdna neg, hep panel negative, non immune to hep b   - Nephrology consulted, f/u recs  - s/p albumin 25% q6h  - c/w statin (home Crestor 40 -> lipitor 80)  - renal duplex neg for renal vein thrombi   - f/u renal recs  - tacro 4 BID with AM levels 30min prior to dose administration. (decreased to 4mg BID on 1/24)    - Tacro level therapeutic 1/25 pending-- patient refused labs   - eliquis 5 BID given hypercoagulability with low albumin marker.  -  quantiferon negative-- in case pt needs rituximab in the future - f/u renal recs  - tacro 4 BID with AM levels 30min prior to dose administration. (decreased to 4mg BID on 1/24)- per nephro, can consider transition to IV tacro pending gastric emptying study report since patient unable to tolerate PO     - Tacro level therapeutic 1/25 pending-- patient refused labs   - eliquis 5 BID given hypercoagulability with low albumin marker-- transitioned to full dose lovenox because unable to tolerate PO  -  quantiferon negative-- in case pt needs rituximab in the future

## 2023-01-26 NOTE — PROGRESS NOTE ADULT - PROBLEM SELECTOR PROBLEM 1
Minimal change disease

## 2023-01-26 NOTE — PROGRESS NOTE ADULT - SUBJECTIVE AND OBJECTIVE BOX
Patient is a 32y old  Female who presents with a chief complaint of MCKENNA flare (26 Jan 2023 07:18)      SUBJECTIVE / OVERNIGHT EVENTS: Patient seen and examined at bedside after receiving gastric emptying study. Pt endorses continued nausea, NBNB emesis and was unable to tolerate completing the study. She denies any abdominal pain at this time.     MEDICATIONS  (STANDING):  aprepitant 80 milliGRAM(s) Oral once  aspirin enteric coated 81 milliGRAM(s) Oral daily  atorvastatin 80 milliGRAM(s) Oral at bedtime  chlorhexidine 4% Liquid 1 Application(s) Topical daily  enoxaparin Injectable 80 milliGRAM(s) SubCutaneous every 12 hours  influenza   Vaccine 0.5 milliLiter(s) IntraMuscular once  LORazepam   Injectable 0.5 milliGRAM(s) IV Push once  metroNIDAZOLE  IVPB 500 milliGRAM(s) IV Intermittent every 12 hours  sodium chloride 0.9%. 1000 milliLiter(s) (100 mL/Hr) IV Continuous <Continuous>  tacrolimus 4 milliGRAM(s) Oral every 12 hours  trimethoprim  160 mG/sulfamethoxazole 800 mG 1 Tablet(s) Oral daily    MEDICATIONS  (PRN):  acetaminophen     Tablet .. 650 milliGRAM(s) Oral every 6 hours PRN Temp greater or equal to 38C (100.4F), Mild Pain (1 - 3)  albuterol    90 MICROgram(s) HFA Inhaler 2 Puff(s) Inhalation every 6 hours PRN Shortness of Breath and/or Wheezing  aluminum hydroxide/magnesium hydroxide/simethicone Suspension 30 milliLiter(s) Oral every 6 hours PRN Dyspepsia  benzocaine 15 mG/menthol 3.6 mG Lozenge 1 Lozenge Oral five times a day PRN Sore Throat  benzocaine 20% Spray 2 Spray(s) Mucosal three times a day PRN sore throat  melatonin 3 milliGRAM(s) Oral at bedtime PRN Insomnia  ondansetron Injectable 8 milliGRAM(s) IV Push every 8 hours PRN Nausea and/or Vomiting  prochlorperazine   Injectable 5 milliGRAM(s) IV Push every 4 hours PRN Nausea  scopolamine 1 mG/72 Hr(s) Patch 1 Patch Transdermal every 72 hours PRN Nausea and/or Vomiting  sodium chloride 0.65% Nasal 1 Spray(s) Both Nostrils two times a day PRN Nasal Congestion      Vital Signs Last 24 Hrs  T(C): 36.4 (26 Jan 2023 04:32), Max: 36.7 (25 Jan 2023 12:48)  T(F): 97.6 (26 Jan 2023 04:32), Max: 98 (25 Jan 2023 12:48)  HR: 100 (26 Jan 2023 04:32) (100 - 109)  BP: 95/58 (26 Jan 2023 04:32) (95/58 - 117/63)  BP(mean): --  RR: 18 (26 Jan 2023 04:32) (18 - 18)  SpO2: 96% (26 Jan 2023 04:32) (95% - 97%)    Parameters below as of 26 Jan 2023 04:32  Patient On (Oxygen Delivery Method): room air      CAPILLARY BLOOD GLUCOSE      POCT Blood Glucose.: 88 mg/dL (26 Jan 2023 07:52)    I&O's Summary      PHYSICAL EXAM:  GENERAL APPEARANCE: Uncomfortable appearing female resting in bed  HEENT:  PERRL, EOMI. hearing grossly intact.  CARDIAC: Normal S1 and S2. no mrg. RRR  LUNGS: Clear to auscultation B/L, no rales, rhonchi, or wheezing  ABDOMEN: Soft , NTND, bowel sounds normal. No guarding or rebound.   EXTREMITIES: No edema. Peripheral pulses intact.   NEUROLOGICAL: Non focal. Strength and sensation symmetric and intact throughout.   SKIN: Warm and dry , Well perfused  PSYCHIATRIC: AOx3       LABS:                        14.4   9.23  )-----------( 640      ( 26 Jan 2023 10:18 )             46.6     01-26    140  |  106  |  16  ----------------------------<  88  3.9   |  23  |  1.01    Ca    8.7      26 Jan 2023 10:18  Phos  4.7     01-26  Mg     2.8     01-26                RADIOLOGY & ADDITIONAL TESTS:    Imaging Personally Reviewed:    Consultant(s) Notes Reviewed:      Care Discussed with Consultants/Other Providers:    Soumya Tuttle, PGY-2; [facility] Pager: [phone]; [facility] Pager: [phone]

## 2023-01-26 NOTE — PROVIDER CONTACT NOTE (OTHER) - BACKGROUND
nephritic syndrome
admitted dx: nephritic syndrome
Pt admitted with nephritic syndrome

## 2023-01-26 NOTE — PROGRESS NOTE ADULT - PROBLEM SELECTOR PROBLEM 2
SIRS (systemic inflammatory response syndrome)

## 2023-01-26 NOTE — CHART NOTE - NSCHARTNOTEFT_GEN_A_CORE
Explained benefits of staying for treatment and risks of leaving AMA. Patient demonstrated capacity, and understanding risks and benefits. She persisted on leaving AMA.

## 2023-01-26 NOTE — PROGRESS NOTE ADULT - ASSESSMENT
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, hx thrombophlebitis of R gonadal vein, DVT (was previously on Xarelto), GERD, presented to ED for progressively worsening N/V for 2 weeks w/ intermittent diarrhea     #Gastroenteritis  #Gastroparesis   #anasarca induced abdominal pain   Imaging shows no gallbladder thickening/low concern for cholecystitis  Pt denying history of marijuana use, THC negative on urine drug screen   Unlikely medication induced as pt has long standing history, no new medications prior to admission; r/o gastroparesis, central causes of nausea  S/P Gastric emptying study, per pt unable to complete    Recommendations:  Please obtain GI PCR, Stool culture ova and parasites if she has diarrhea  F/up Gastric emptying study report  Can c/w Zofran 8mg q8  Can c/w scopolamine patch   Pt previously tried capsaicin cream, refusing at this time as it caused burning  Can consider aprepitant for nausea    If gastric emptying study is negative, the last workup recommended would be MRI brain to r/o central causes of nausea/vomiting        GI iphone: [phone]  GI e-mail: [email or web address]  ***NOTE NOT COMPLETE UNLESS SIGNED BY ATTENDING ***

## 2023-01-26 NOTE — PROVIDER CONTACT NOTE (OTHER) - ASSESSMENT
Nauseous, faint feeling
Pt A +O x4. Pt states she is too nauseous to take medications
Pt A+ Ox 4. Pt in no distress
patient A&O4 requested anti nausea med
Pt ate a few pieces of food. Immediately threw up.
Pt A + O x4. Pt in no distress

## 2023-01-26 NOTE — PROGRESS NOTE ADULT - PROBLEM SELECTOR PLAN 5
Diet: DASH  DVT: on eliquis full AC- patient refusing medications  Dispo: pending clinical course    Full Code.

## 2023-01-26 NOTE — PROGRESS NOTE ADULT - PROBLEM SELECTOR PLAN 2
Febrile and tachycardic  RVP negative  ecg 1/24: sinus tachycardia, trop 9  - Blood cxneg 1/20  - monitor off abx

## 2023-01-26 NOTE — PROGRESS NOTE ADULT - ASSESSMENT
32F with MCKENNA and asthma p/w abdominal pain, n/v, and generalized swelling x 1 week, admitted for MCKENNA flare. Course complicated by intractable nausea. Pending gastric emptying study.  32F with MCKENNA and asthma p/w abdominal pain, n/v, and generalized swelling x 1 week, admitted for MCKENNA flare. Course complicated by intractable nausea. Pending gastric emptying study report.

## 2023-01-26 NOTE — PROGRESS NOTE ADULT - REASON FOR ADMISSION
MCKENNA flare

## 2023-01-26 NOTE — PROVIDER CONTACT NOTE (OTHER) - SITUATION
Pt refused aspirin and Bactrim
Pt refused standing 12 medications. Pt states she is too nauseous to take oral medications
Pt refused morning labs. Pt refused morning labs with phlebetomy
BP 87/54, dizzy,m lightheaded, pain 9/10
patient with episode of emesis
Pt refused 1800 medications.
Pt continued to vomit after zofran administration. Pt states it does not help. MD made aware. Other medications are conflicting due to nuclear medications recommendation for pending procedure, 1/26

## 2023-01-27 ENCOUNTER — INPATIENT (INPATIENT)
Facility: HOSPITAL | Age: 33
LOS: 6 days | Discharge: HOME | DRG: 698 | End: 2023-02-03
Attending: HOSPITALIST | Admitting: STUDENT IN AN ORGANIZED HEALTH CARE EDUCATION/TRAINING PROGRAM
Payer: MEDICAID

## 2023-01-27 VITALS
OXYGEN SATURATION: 96 % | HEIGHT: 61 IN | TEMPERATURE: 99 F | DIASTOLIC BLOOD PRESSURE: 76 MMHG | HEART RATE: 128 BPM | RESPIRATION RATE: 19 BRPM | SYSTOLIC BLOOD PRESSURE: 114 MMHG | WEIGHT: 175.05 LBS

## 2023-01-27 DIAGNOSIS — Z98.89 OTHER SPECIFIED POSTPROCEDURAL STATES: Chronic | ICD-10-CM

## 2023-01-27 DIAGNOSIS — Z90.89 ACQUIRED ABSENCE OF OTHER ORGANS: Chronic | ICD-10-CM

## 2023-01-27 PROCEDURE — 36000 PLACE NEEDLE IN VEIN: CPT

## 2023-01-27 PROCEDURE — 99285 EMERGENCY DEPT VISIT HI MDM: CPT | Mod: 25

## 2023-01-27 RX ORDER — TACROLIMUS 5 MG/1
1 CAPSULE ORAL
Qty: 60 | Refills: 0
Start: 2023-01-27 | End: 2023-02-25

## 2023-01-27 NOTE — ED ADULT TRIAGE NOTE - CHIEF COMPLAINT QUOTE
C/o NV x 14 days. States was here yesterday and left AMA. C/o epigastric pain. Endorses feeling weak and dehydrated. Writing on paper to communicate in triage as pt states "speaking makes me nauseous"

## 2023-01-28 DIAGNOSIS — N05.0 UNSPECIFIED NEPHRITIC SYNDROME WITH MINOR GLOMERULAR ABNORMALITY: ICD-10-CM

## 2023-01-28 DIAGNOSIS — R65.10 SYSTEMIC INFLAMMATORY RESPONSE SYNDROME (SIRS) OF NON-INFECTIOUS ORIGIN WITHOUT ACUTE ORGAN DYSFUNCTION: ICD-10-CM

## 2023-01-28 DIAGNOSIS — Z29.9 ENCOUNTER FOR PROPHYLACTIC MEASURES, UNSPECIFIED: ICD-10-CM

## 2023-01-28 DIAGNOSIS — R10.9 UNSPECIFIED ABDOMINAL PAIN: ICD-10-CM

## 2023-01-28 DIAGNOSIS — A59.9 TRICHOMONIASIS, UNSPECIFIED: ICD-10-CM

## 2023-01-28 DIAGNOSIS — R07.9 CHEST PAIN, UNSPECIFIED: ICD-10-CM

## 2023-01-28 LAB
ALBUMIN SERPL ELPH-MCNC: 1.2 G/DL — LOW (ref 3.3–5)
ALP SERPL-CCNC: 67 U/L — SIGNIFICANT CHANGE UP (ref 40–120)
ALT FLD-CCNC: 5 U/L — LOW (ref 10–45)
ANION GAP SERPL CALC-SCNC: 12 MMOL/L — SIGNIFICANT CHANGE UP (ref 5–17)
APPEARANCE UR: ABNORMAL
APTT BLD: 33 SEC — SIGNIFICANT CHANGE UP (ref 27.5–35.5)
AST SERPL-CCNC: 17 U/L — SIGNIFICANT CHANGE UP (ref 10–40)
BACTERIA # UR AUTO: ABNORMAL
BASE EXCESS BLDV CALC-SCNC: -0.2 MMOL/L — SIGNIFICANT CHANGE UP (ref -2–3)
BASOPHILS # BLD AUTO: 0.06 K/UL — SIGNIFICANT CHANGE UP (ref 0–0.2)
BASOPHILS NFR BLD AUTO: 0.5 % — SIGNIFICANT CHANGE UP (ref 0–2)
BILIRUB SERPL-MCNC: 0.1 MG/DL — LOW (ref 0.2–1.2)
BILIRUB UR-MCNC: ABNORMAL
BUN SERPL-MCNC: 18 MG/DL — SIGNIFICANT CHANGE UP (ref 7–23)
CA-I SERPL-SCNC: 1.11 MMOL/L — LOW (ref 1.15–1.33)
CALCIUM SERPL-MCNC: 8.3 MG/DL — LOW (ref 8.4–10.5)
CHLORIDE BLDV-SCNC: 108 MMOL/L — SIGNIFICANT CHANGE UP (ref 96–108)
CHLORIDE SERPL-SCNC: 107 MMOL/L — SIGNIFICANT CHANGE UP (ref 96–108)
CO2 BLDV-SCNC: 27 MMOL/L — HIGH (ref 22–26)
CO2 SERPL-SCNC: 24 MMOL/L — SIGNIFICANT CHANGE UP (ref 22–31)
COLOR SPEC: ABNORMAL
CREAT SERPL-MCNC: 0.98 MG/DL — SIGNIFICANT CHANGE UP (ref 0.5–1.3)
DIFF PNL FLD: ABNORMAL
EGFR: 79 ML/MIN/1.73M2 — SIGNIFICANT CHANGE UP
EOSINOPHIL # BLD AUTO: 0.03 K/UL — SIGNIFICANT CHANGE UP (ref 0–0.5)
EOSINOPHIL NFR BLD AUTO: 0.2 % — SIGNIFICANT CHANGE UP (ref 0–6)
EPI CELLS # UR: 24 /HPF — HIGH
GAS PNL BLDV: 139 MMOL/L — SIGNIFICANT CHANGE UP (ref 136–145)
GAS PNL BLDV: SIGNIFICANT CHANGE UP
GAS PNL BLDV: SIGNIFICANT CHANGE UP
GLUCOSE BLDV-MCNC: 87 MG/DL — SIGNIFICANT CHANGE UP (ref 70–99)
GLUCOSE SERPL-MCNC: 89 MG/DL — SIGNIFICANT CHANGE UP (ref 70–99)
GLUCOSE UR QL: NEGATIVE — SIGNIFICANT CHANGE UP
HCG SERPL-ACNC: <2 MIU/ML — SIGNIFICANT CHANGE UP
HCO3 BLDV-SCNC: 26 MMOL/L — SIGNIFICANT CHANGE UP (ref 22–29)
HCT VFR BLD CALC: 43.3 % — SIGNIFICANT CHANGE UP (ref 34.5–45)
HCT VFR BLDA CALC: 42 % — SIGNIFICANT CHANGE UP (ref 34.5–46.5)
HGB BLD CALC-MCNC: 14 G/DL — SIGNIFICANT CHANGE UP (ref 11.7–16.1)
HGB BLD-MCNC: 13.4 G/DL — SIGNIFICANT CHANGE UP (ref 11.5–15.5)
HYALINE CASTS # UR AUTO: 134 /LPF — HIGH (ref 0–2)
IMM GRANULOCYTES NFR BLD AUTO: 0.4 % — SIGNIFICANT CHANGE UP (ref 0–0.9)
INR BLD: 1.28 RATIO — HIGH (ref 0.88–1.16)
KETONES UR-MCNC: ABNORMAL
LACTATE BLDV-MCNC: 1.4 MMOL/L — SIGNIFICANT CHANGE UP (ref 0.5–2)
LEUKOCYTE ESTERASE UR-ACNC: NEGATIVE — SIGNIFICANT CHANGE UP
LIDOCAIN IGE QN: 22 U/L — SIGNIFICANT CHANGE UP (ref 7–60)
LYMPHOCYTES # BLD AUTO: 25.5 % — SIGNIFICANT CHANGE UP (ref 13–44)
LYMPHOCYTES # BLD AUTO: 3.11 K/UL — SIGNIFICANT CHANGE UP (ref 1–3.3)
MAGNESIUM SERPL-MCNC: 2.8 MG/DL — HIGH (ref 1.6–2.6)
MCHC RBC-ENTMCNC: 30.2 PG — SIGNIFICANT CHANGE UP (ref 27–34)
MCHC RBC-ENTMCNC: 30.9 GM/DL — LOW (ref 32–36)
MCV RBC AUTO: 97.7 FL — SIGNIFICANT CHANGE UP (ref 80–100)
MONOCYTES # BLD AUTO: 1.06 K/UL — HIGH (ref 0–0.9)
MONOCYTES NFR BLD AUTO: 8.7 % — SIGNIFICANT CHANGE UP (ref 2–14)
NEUTROPHILS # BLD AUTO: 7.88 K/UL — HIGH (ref 1.8–7.4)
NEUTROPHILS NFR BLD AUTO: 64.7 % — SIGNIFICANT CHANGE UP (ref 43–77)
NITRITE UR-MCNC: NEGATIVE — SIGNIFICANT CHANGE UP
NRBC # BLD: 0 /100 WBCS — SIGNIFICANT CHANGE UP (ref 0–0)
NT-PROBNP SERPL-SCNC: <36 PG/ML — SIGNIFICANT CHANGE UP (ref 0–300)
PCO2 BLDV: 47 MMHG — HIGH (ref 39–42)
PH BLDV: 7.35 — SIGNIFICANT CHANGE UP (ref 7.32–7.43)
PH UR: 6.5 — SIGNIFICANT CHANGE UP (ref 5–8)
PLATELET # BLD AUTO: 713 K/UL — HIGH (ref 150–400)
PO2 BLDV: 31 MMHG — SIGNIFICANT CHANGE UP (ref 25–45)
POTASSIUM BLDV-SCNC: 4.6 MMOL/L — SIGNIFICANT CHANGE UP (ref 3.5–5.1)
POTASSIUM SERPL-MCNC: 4.5 MMOL/L — SIGNIFICANT CHANGE UP (ref 3.5–5.3)
POTASSIUM SERPL-SCNC: 4.5 MMOL/L — SIGNIFICANT CHANGE UP (ref 3.5–5.3)
PROT SERPL-MCNC: 5.1 G/DL — LOW (ref 6–8.3)
PROT UR-MCNC: >600
PROTHROM AB SERPL-ACNC: 14.9 SEC — HIGH (ref 10.5–13.4)
RAPID RVP RESULT: SIGNIFICANT CHANGE UP
RBC # BLD: 4.43 M/UL — SIGNIFICANT CHANGE UP (ref 3.8–5.2)
RBC # FLD: 15 % — HIGH (ref 10.3–14.5)
RBC CASTS # UR COMP ASSIST: 136 /HPF — HIGH (ref 0–4)
SAO2 % BLDV: 45.1 % — LOW (ref 67–88)
SARS-COV-2 RNA SPEC QL NAA+PROBE: SIGNIFICANT CHANGE UP
SODIUM SERPL-SCNC: 143 MMOL/L — SIGNIFICANT CHANGE UP (ref 135–145)
SP GR SPEC: >1.05 (ref 1.01–1.02)
TROPONIN T, HIGH SENSITIVITY RESULT: 7 NG/L — SIGNIFICANT CHANGE UP (ref 0–51)
TROPONIN T, HIGH SENSITIVITY RESULT: 7 NG/L — SIGNIFICANT CHANGE UP (ref 0–51)
UROBILINOGEN FLD QL: NEGATIVE — SIGNIFICANT CHANGE UP
WBC # BLD: 12.19 K/UL — HIGH (ref 3.8–10.5)
WBC # FLD AUTO: 12.19 K/UL — HIGH (ref 3.8–10.5)
WBC UR QL: 18 /HPF — HIGH (ref 0–5)

## 2023-01-28 PROCEDURE — 71275 CT ANGIOGRAPHY CHEST: CPT | Mod: 26,MA

## 2023-01-28 PROCEDURE — 71045 X-RAY EXAM CHEST 1 VIEW: CPT | Mod: 26

## 2023-01-28 PROCEDURE — 76937 US GUIDE VASCULAR ACCESS: CPT | Mod: 26

## 2023-01-28 PROCEDURE — 74177 CT ABD & PELVIS W/CONTRAST: CPT | Mod: 26,MA

## 2023-01-28 PROCEDURE — 70551 MRI BRAIN STEM W/O DYE: CPT | Mod: 26

## 2023-01-28 PROCEDURE — 99222 1ST HOSP IP/OBS MODERATE 55: CPT

## 2023-01-28 RX ORDER — ACETAMINOPHEN 500 MG
1000 TABLET ORAL ONCE
Refills: 0 | Status: COMPLETED | OUTPATIENT
Start: 2023-01-28 | End: 2023-01-28

## 2023-01-28 RX ORDER — SODIUM CHLORIDE 9 MG/ML
1000 INJECTION, SOLUTION INTRAVENOUS ONCE
Refills: 0 | Status: COMPLETED | OUTPATIENT
Start: 2023-01-28 | End: 2023-01-28

## 2023-01-28 RX ORDER — ONDANSETRON 8 MG/1
8 TABLET, FILM COATED ORAL ONCE
Refills: 0 | Status: COMPLETED | OUTPATIENT
Start: 2023-01-28 | End: 2023-01-28

## 2023-01-28 RX ORDER — INFLUENZA VIRUS VACCINE 15; 15; 15; 15 UG/.5ML; UG/.5ML; UG/.5ML; UG/.5ML
0.5 SUSPENSION INTRAMUSCULAR ONCE
Refills: 0 | Status: COMPLETED | OUTPATIENT
Start: 2023-01-28 | End: 2023-01-28

## 2023-01-28 RX ORDER — PROCHLORPERAZINE MALEATE 5 MG
10 TABLET ORAL EVERY 12 HOURS
Refills: 0 | Status: DISCONTINUED | OUTPATIENT
Start: 2023-01-28 | End: 2023-01-30

## 2023-01-28 RX ORDER — FAMOTIDINE 10 MG/ML
20 INJECTION INTRAVENOUS ONCE
Refills: 0 | Status: COMPLETED | OUTPATIENT
Start: 2023-01-28 | End: 2023-01-28

## 2023-01-28 RX ORDER — METOCLOPRAMIDE HCL 10 MG
5 TABLET ORAL EVERY 8 HOURS
Refills: 0 | Status: DISCONTINUED | OUTPATIENT
Start: 2023-01-28 | End: 2023-01-29

## 2023-01-28 RX ORDER — METOCLOPRAMIDE HCL 10 MG
5 TABLET ORAL THREE TIMES A DAY
Refills: 0 | Status: DISCONTINUED | OUTPATIENT
Start: 2023-01-28 | End: 2023-01-28

## 2023-01-28 RX ORDER — METRONIDAZOLE 500 MG
500 TABLET ORAL EVERY 12 HOURS
Refills: 0 | Status: DISCONTINUED | OUTPATIENT
Start: 2023-01-28 | End: 2023-02-03

## 2023-01-28 RX ORDER — PANTOPRAZOLE SODIUM 20 MG/1
40 TABLET, DELAYED RELEASE ORAL
Refills: 0 | Status: DISCONTINUED | OUTPATIENT
Start: 2023-01-28 | End: 2023-02-03

## 2023-01-28 RX ORDER — SCOPALAMINE 1 MG/3D
1 PATCH, EXTENDED RELEASE TRANSDERMAL
Refills: 0 | Status: DISCONTINUED | OUTPATIENT
Start: 2023-01-28 | End: 2023-01-30

## 2023-01-28 RX ORDER — ACETAMINOPHEN 500 MG
650 TABLET ORAL EVERY 6 HOURS
Refills: 0 | Status: DISCONTINUED | OUTPATIENT
Start: 2023-01-28 | End: 2023-02-03

## 2023-01-28 RX ORDER — LANOLIN ALCOHOL/MO/W.PET/CERES
3 CREAM (GRAM) TOPICAL AT BEDTIME
Refills: 0 | Status: DISCONTINUED | OUTPATIENT
Start: 2023-01-28 | End: 2023-02-03

## 2023-01-28 RX ORDER — METOCLOPRAMIDE HCL 10 MG
10 TABLET ORAL ONCE
Refills: 0 | Status: COMPLETED | OUTPATIENT
Start: 2023-01-28 | End: 2023-01-28

## 2023-01-28 RX ORDER — ENOXAPARIN SODIUM 100 MG/ML
80 INJECTION SUBCUTANEOUS EVERY 12 HOURS
Refills: 0 | Status: DISCONTINUED | OUTPATIENT
Start: 2023-01-28 | End: 2023-02-02

## 2023-01-28 RX ORDER — METRONIDAZOLE 500 MG
500 TABLET ORAL
Refills: 0 | Status: DISCONTINUED | OUTPATIENT
Start: 2023-01-28 | End: 2023-01-28

## 2023-01-28 RX ORDER — FAMOTIDINE 10 MG/ML
20 INJECTION INTRAVENOUS DAILY
Refills: 0 | Status: DISCONTINUED | OUTPATIENT
Start: 2023-01-28 | End: 2023-01-28

## 2023-01-28 RX ORDER — ALBUTEROL 90 UG/1
2 AEROSOL, METERED ORAL EVERY 6 HOURS
Refills: 0 | Status: DISCONTINUED | OUTPATIENT
Start: 2023-01-28 | End: 2023-02-03

## 2023-01-28 RX ADMIN — ENOXAPARIN SODIUM 80 MILLIGRAM(S): 100 INJECTION SUBCUTANEOUS at 17:52

## 2023-01-28 RX ADMIN — Medication 1000 MILLIGRAM(S): at 06:00

## 2023-01-28 RX ADMIN — Medication 1000 MILLIGRAM(S): at 04:45

## 2023-01-28 RX ADMIN — Medication 100 MILLIGRAM(S): at 17:52

## 2023-01-28 RX ADMIN — FAMOTIDINE 20 MILLIGRAM(S): 10 INJECTION INTRAVENOUS at 13:24

## 2023-01-28 RX ADMIN — SODIUM CHLORIDE 1000 MILLILITER(S): 9 INJECTION, SOLUTION INTRAVENOUS at 05:45

## 2023-01-28 RX ADMIN — ONDANSETRON 8 MILLIGRAM(S): 8 TABLET, FILM COATED ORAL at 04:30

## 2023-01-28 RX ADMIN — SODIUM CHLORIDE 1000 MILLILITER(S): 9 INJECTION, SOLUTION INTRAVENOUS at 06:55

## 2023-01-28 RX ADMIN — FAMOTIDINE 20 MILLIGRAM(S): 10 INJECTION INTRAVENOUS at 04:30

## 2023-01-28 RX ADMIN — ONDANSETRON 8 MILLIGRAM(S): 8 TABLET, FILM COATED ORAL at 09:51

## 2023-01-28 RX ADMIN — SODIUM CHLORIDE 1000 MILLILITER(S): 9 INJECTION, SOLUTION INTRAVENOUS at 04:45

## 2023-01-28 RX ADMIN — Medication 1 MILLIGRAM(S): at 20:09

## 2023-01-28 RX ADMIN — Medication 400 MILLIGRAM(S): at 04:30

## 2023-01-28 RX ADMIN — Medication 10 MILLIGRAM(S): at 06:57

## 2023-01-28 NOTE — CONSULT NOTE ADULT - PROBLEM SELECTOR RECOMMENDATION 9
Pt with biopsy proven Minimal Change Disease presenting to the hospital with worsening proteinuria and anasarca and N/V. Pt previously admitted in 6/2022 with similar complaints noted to have TP/CR ratio of 3.8g, at that time started on steroid therapy and discharged with nephrology follow up. Now off steroids for past one month. Had poor nephrology outpatient follow up. Recommend starting the patient on anticoagulation with Eliquis 5mg BID (pt with severe nephrotic syndrome are at high risk for thromboembolic events). Start on PCP ppx with bactrim. Patietn can either be continued with Tacrolimus 3mg BID for now as started during recent admission for nephrotic disease or she can be switched to 80mg of prednisone daily.  If starting Tacro, please monitor tacrolimus levels daily 30 minutes prior to AM dose (target level 4-6). Please send Quantiferon  for possible need for Rituximab in the future.     If you have any questions, please feel free to contact me  Mark Anthony Tellez  Nephrology Fellow  [phone]; Prefer [employer] TEAMS  (After 5pm or on weekends please page the on-call fellow) Pt with biopsy proven Minimal Change Disease presenting to the hospital with worsening proteinuria and anasarca and N/V. Pt previously admitted in 6/2022 with similar complaints noted to have TP/CR ratio of 3.8g, at that time started on steroid therapy and discharged with nephrology follow up. Now off steroids for past one month. Had poor nephrology outpatient follow up. Recommend starting the patient on anticoagulation with Eliquis 5mg BID (pt with severe nephrotic syndrome are at high risk for thromboembolic events). Start on PCP ppx with bactrim. Patient can either be continued with Tacrolimus 3mg BID for now as started during recent admission for nephrotic disease or she can be switched to 80mg of prednisone daily.  If starting Tacro, please monitor tacrolimus levels daily 30 minutes prior to AM dose (target level 4-6). Will make final decision tomorrow, she has reservations about steroids due to weight gain. Please send Quantiferon  for possible need for Rituximab in the future.     If you have any questions, please feel free to contact me  Mark Anthony Tellez  Nephrology Fellow  [phone]; Prefer [employer] TEAMS  (After 5pm or on weekends please page the on-call fellow)

## 2023-01-28 NOTE — H&P ADULT - NSHPLABSRESULTS_GEN_ALL_CORE
13.4   12.19 )-----------( 713      ( 28 Jan 2023 03:12 )             43.3     01-28    143  |  107  |  18  ----------------------------<  89  4.5   |  24  |  0.98    Ca    8.3<L>      28 Jan 2023 03:12  Phos  4.7     01-26  Mg     2.8     01-28    TPro  5.1<L>  /  Alb  1.2<L>  /  TBili  0.1<L>  /  DBili  x   /  AST  17  /  ALT  5<L>  /  AlkPhos  67  01-28      Culture - Urine (collected 01-20-23 @ 17:27)  Source: Clean Catch Clean Catch (Midstream)  Final Report (01-21-23 @ 21:17):    <10,000 CFU/mL Normal Urogenital Yuli    Culture - Blood (collected 01-20-23 @ 13:46)  Source: .Blood Blood-Peripheral  Final Report (01-25-23 @ 18:00):    No Growth Final    Culture - Blood (collected 01-20-23 @ 13:07)  Source: .Blood Blood-Peripheral  Final Report (01-25-23 @ 18:00):    No Growth Final    Culture - Blood (collected 01-18-23 @ 05:35)  Source: .Blood Blood  Final Report (01-23-23 @ 09:00):    No Growth Final    Culture - Blood (collected 01-18-23 @ 05:20)  Source: .Blood Blood  Final Report (01-23-23 @ 09:00):    No Growth Final    Culture - Urine (collected 01-17-23 @ 22:25)  Source: Clean Catch Clean Catch (Midstream)  Final Report (01-19-23 @ 07:52):    <10,000 CFU/mL Normal Urogenital Yuli      Creatinine, Serum: 0.98 mg/dL (01-28-23 @ 03:12)  Creatinine, Serum: 1.01 mg/dL (01-26-23 @ 10:18)  Creatinine, Serum: 1.09 mg/dL (01-24-23 @ 11:26)    Procalcitonin, Serum: 0.08 ng/mL (01-20-23 @ 13:06)    D-Dimer Assay, Quantitative: 917 ng/mL DDU (01-20-23 @ 13:06)      C-Reactive Protein, Serum: 10 mg/L (01-20-23 @ 13:06)    WBC Count: 12.19 K/uL (01-28-23 @ 03:12)  WBC Count: 9.23 K/uL (01-26-23 @ 10:18)  WBC Count: 8.28 K/uL (01-24-23 @ 11:29)    SARS-CoV-2: NotDetec (01-28-23 @ 03:10)  SARS-CoV-2: NotDetec (01-17-23 @ 22:04)      Alkaline Phosphatase, Serum: 67 U/L (01-28-23 @ 03:12)  Alanine Aminotransferase (ALT/SGPT): 5 U/L (01-28-23 @ 03:12)  Aspartate Aminotransferase (AST/SGOT): 17 U/L (01-28-23 @ 03:12)  Bilirubin Total, Serum: 0.1 mg/dL (01-28-23 @ 03:12)

## 2023-01-28 NOTE — CONSULT NOTE ADULT - ATTENDING COMMENTS
32-year-old female with minimal-change disease/history of thrombophlebitis/DVT/GERD/asthma presented for recurrent nausea/vomiting/abdominal pain, was seen previously during the week before leaving AGAINST MEDICAL ADVICE.  Patient complains of 10-15 episodes of emesis daily, decreased appetite, diarrhea.  Work-up has included CT abdomen and pelvis on prior admission.  U tox negative.  Patient was given Zofran/Reglan/Ativan and scopolamine without relief in symptoms.  Patient unable to tolerate nuclear medicine gastric emptying scan.  MR head negative.    CT abdomen pelvis 1/28 negative for biliary ductal dilatation, positive hepatic steatosis, negative for gallstones.  Colonic diverticulosis without diverticulitis.  Normal appendix.    Recommendation  1.  Screen for C1 esterase deficiency, AIP work-up  2.  Mesenteric duplex to rule out celiac artery stenosis or MALS  3.  Continue with antiemetics
31 yo lady with minimal change disease diagnosed on a kidney biopsy 1/6/22- received prednisone and went into complete remission by March 2022 and was lost to follow up. states she recurred in august, went to [facility] - received steroids again and responded but gained 60 pounds. stopped prednisone sometime in December. 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 repeat Quantiferon ( but was negative in June 2022 and no repeat exposure so should not hold up Rituxan)  stop steroids  start Tacrolimus 3 mg po BID   start Eliquis 5 mg PO BID - her albumin is 1.2   needs statin - pravastatin/ atorvastatin   once tacrolimus dose stabilized, will add ARB    simona maradiaga  nephrology attending   please contact me on TEAMS   Office- [phone]

## 2023-01-28 NOTE — ED PROVIDER NOTE - CARE PLAN
1 Principal Discharge DX:	Abdominal pain with vomiting  Secondary Diagnosis:	Minimal change disease

## 2023-01-28 NOTE — ED ADULT NURSE REASSESSMENT NOTE - NS ED NURSE REASSESS COMMENT FT1
Pt still endorsing nausea and is actively vomiting. MD Vale on the admitting team made aware, awaiting further instructions.

## 2023-01-28 NOTE — PATIENT PROFILE ADULT - FALL HARM RISK - UNIVERSAL INTERVENTIONS
Bed in lowest position, wheels locked, appropriate side rails in place/Call bell, personal items and telephone in reach/Instruct patient to call for assistance before getting out of bed or chair/Non-slip footwear when patient is out of bed/[place] to call system/Physically safe environment - no spills, clutter or unnecessary equipment/Purposeful Proactive Rounding/Room/bathroom lighting operational, light cord in reach

## 2023-01-28 NOTE — CONSULT NOTE ADULT - ASSESSMENT
32F with MCKENNA, minimal change disease, hx thrombophlebitis of R gonadal vein, DVT (was previously on Xarelto), GERD, asthma representing to the hospital with n/v/abd pain. GI consulted for further w/u and management of the above     #Nausea/Vomiting/Abdominal Pain   Presenting with 2 week onset of n/v/abdominal pain and inability to trolerate PO in the background of similar episode a couple months prior with workup at the time including EGD with small HH and otherwise normal gastric mucosa. Pt underwent multiple CTs which did not show a cause for her nausea. Infectious workup notable for +Trichmonas and U/A with pyuria. Etiology of ongoing symptoms unclear although would pursue further diagnostics and treat supportively as outlined below.     Recommendations:  -Repeat GI PCR, Stool CX, Stool O/O and fecal calpo   -Repeat UTox screen   -MRI brain if gastric emptying is negative to r/o central causes of nausea as part of comprehensive workup for the nausea/vomiting. -Mesenteric Doppler   -Send CI esterase, porphyria work up (ALA and urinary porphobilinogen)  -Send celiac panel  -Send lead and mercury level  -Send Tacrolimus level   -Zofran QID PRN, Compazine+ Scopolamine PRN   -PPI BID, Maalox QID PRN      All recommendations are tentative until note is attested by attending.     Shad Salinas, PGY-4  Gastroenterology/Hepatology Fellow  Available on [employer] Teams   [phone] (Long Range Pager)  [phone] (Short Range Pager [facility])    After 5pm, please contact the on-call GI fellow. [phone]       32F with MCKENNA, minimal change disease, hx thrombophlebitis of R gonadal vein, DVT (was previously on Xarelto), GERD, asthma representing to the hospital with n/v/abd pain. GI consulted for further w/u and management of the above     #Nausea/Vomiting/Abdominal Pain   Presenting with 2 week onset of n/v/abdominal pain and inability to trolerate PO in the background of similar episode a couple months prior with workup at the time including EGD with small HH and otherwise normal gastric mucosa. Pt underwent multiple CTs which did not show a cause for her nausea. Infectious workup notable for +Trichmonas and U/A with pyuria. Etiology of ongoing symptoms unclear although would pursue further diagnostics and treat supportively as outlined below.       Recommendations:  -Repeat GI PCR, Stool CX, Stool O/O and fecal calpo   -Repeat UTox screen   -MRI brain if gastric emptying is negative to r/o central causes of nausea as part of comprehensive workup for the nausea/vomiting. -Mesenteric Doppler   -Send CI esterase, porphyria work up (ALA and urinary porphobilinogen)  -Send celiac panel  -Send lead and mercury level  -Send Tacrolimus level   -Zofran QID PRN, Compazine+ Scopolamine PRN   -PPI BID, Maalox QID PRN      All recommendations are tentative until note is attested by attending.     Shad Salinas, PGY-4  Gastroenterology/Hepatology Fellow  Available on [employer] Teams   [phone] (Long Range Pager)  [phone] (Short Range Pager [facility])    After 5pm, please contact the on-call GI fellow. [phone]

## 2023-01-28 NOTE — ED PROVIDER NOTE - CLINICAL SUMMARY MEDICAL DECISION MAKING FREE TEXT BOX
Adult female with complex medical history including minimal-change disease and DVT currently off her AC, presenting with acute on chronic exacerbation of chest pain and abdominal pain which she has presented for in the past, managed recently inpatient and left AMA about 5 days ago.  Currently tachycardic, appears dry on exam.  distressed by chest pain.  Abdominal tenderness with associated nausea vomiting  constipation.  I feel that the patient's presentation is most likely an acute  exacerbation of her chronic symptoms.  however it is difficult to ascertain whether there is an acute pathology inducing this.  We will rule out PE given history of DVT as well as heart rate in a patient reporting chest pain and blood per mouth, CT PE.  Rule out acute cardiac injury, troponin and EKG.  Continuous cardiac monitoring.  Will assess for major intra-abdominal process a patient with a positive surgical history in the history of gastroparesis reporting nausea vomiting and abdominal pain with associated constipation obstipation, CT abdomen pelvis.  Will assess for major metabolic derangements and renal insufficiency in a patient with known minimal-change disease currently off her tacrolimus.  Assess for acid-base disturbance. CMP and VBG.  Fluid resuscitation in a patient with is clinically dehydrated.  pain and nausea control.

## 2023-01-28 NOTE — ED ADULT NURSE REASSESSMENT NOTE - NS ED NURSE REASSESS COMMENT FT1
Pt educated on obtaining urine for 24 hour urine collection, pt verbalizes understanding and reports she has done this in the past

## 2023-01-28 NOTE — ED PROVIDER NOTE - ATTENDING CONTRIBUTION TO CARE
Attending Statement (MARITZA Gonzalez MD):    HPI: 31y/o F with h/o minimal change disease (c/b anasarca), prior DVT (not on AC due to inability to tolerate po), and recent admission for minimal change disease related flare and inability to tolerate PO (was undergoing GI w/u for possible gastroparesis but did not complete emptying study); who now presents with abdominal and chest pain, nausea, and vomiting - states these are same /similar symptoms as to recent admission but that she had left AMA on 23 as she "wanted to see my kids."  She states she is feeling worse, prompting her to come to ED. No allergies to medications.    x 2 in the past, no other surgical history.  has been off all medications for the past 5 days since discharge.    Review of Systems:  -General: no fever or chills  -ENT: no congestion, no difficulty swallowing  -Pulmonary: no cough, +shortness of breath  -Cardiac: +chest pain, no palpitations  -Gastrointestinal: +abdominal pain, +nausea, +vomiting, and no diarrhea.  -Genitourinary: no blood or pain with urination  -Musculoskeletal: no back or neck pain  -Skin: no rashes  -Endocrine: No h/o diabetes or thyroid disease  -Neurologic: No new weakness or numbness in extremities    All else negative unless otherwise specified elsewhere in this note.    PSH/PMH as noted above    On Physical Exam:  General: well appearing, in NAD, speaking clearly in full sentences and without difficulty; cooperative with exam  HEENT: PERRL, MMM  Neck: no neck tenderness, no nuchal rigidity  Cardiac: normal s1, s2; RRR; no MGR  Lungs: CTABL  Abdomen: diffuse abdominal tenderness  : no bladder tenderness or distension  Skin: intact, no rash  Extremities: no peripheral edema, no gross deformities; diffuse scarring visible in upper extremities fili antecubital fossa b/l; no active bleeding, no fluctuance/crepitance  Neuro: no gross neurologic deficits    MDM: 31y/o F with h/o minimal change disease (c/b anasarca), prior DVT (not on AC due to inability to tolerate po), and recent admission for minimal change disease related flare and inability to tolerate PO (was undergoing GI w/u for possible gastroparesis but did not complete emptying study); who now presents with abdominal and chest pain, nausea, and vomiting   1. Abdominal pain, nausea/vomiting: suspect GI related, possible gastroparesis, less likely obstruction; will obtain screening labs: cbc (to evaluate for leukocytosis or anemia), CMP (to evaluate for electrolyte abnormalities or renal/liver dysfunction) and lipase (to evaluate for pancreatitis); obtain vbg/lactate (screening for acidosis or elevated lactate level); obtain CT A/P with iv contrast to further evaluate.  2. Chest pain: suspect esophagitis; less likely Boerhaave's; much less likely ACS; not consistent with aortic dissection; no cough to suggest respiratory infection; will check CTA however to exclude PE given recent admission, h/o prior DVT and not taking AC.  -Anticipate need for admission for ongoing evaluation and management pending results of CTs, labs  [Please see above progress notes above for updates to medical decision making and the patient's clinical course. ]

## 2023-01-28 NOTE — H&P ADULT - PROBLEM SELECTOR PLAN 5
found to be + during prior admission, flagyl started 1/26 however patient AMA 1/27. Did not continue treatment at home due to inability to tolerate PO intake  - can defer treatment until able to tolerate PO vs consider IV flagyl   - educated about informing sexual partners found to be + during prior admission, flagyl started 1/26 however patient AMA 1/27. Did not continue treatment at home due to inability to tolerate PO intake  - IV flagyl since patient cannot tolerate PO intake   - educated about informing sexual partners

## 2023-01-28 NOTE — H&P ADULT - PROBLEM SELECTOR PLAN 1
+tachycardia to 126 and leukocytosis to 12.19 on admission. Afebrile. At this time, most likely 2/2 to pain/vomiting  - will monitor off abx at this time  - Trend WBC daily +tachycardia to 126 and leukocytosis to 12.19 on admission. Afebrile. At this time, most likely 2/2 to pain/vomiting. Possible 2/2 to incomplete treatment of UTI but less likely given lack of urinary symptoms.  - will monitor off abx at this time  - Trend WBC daily

## 2023-01-28 NOTE — H&P ADULT - NSHPREVIEWOFSYSTEMS_GEN_ALL_CORE
CONSTITUTIONAL: No weakness, fevers or chills  EYES/ENT: No visual changes;  No vertigo or throat pain   NECK: No pain or stiffness  RESPIRATORY: No cough, wheezing; + hemoptysis (?); +shortness of breath  CARDIOVASCULAR: +chest pain  GASTROINTESTINAL: +abdominal pain, n/v and constipation  GENITOURINARY: No dysuria, frequency or hematuria  NEUROLOGICAL: No numbness or weakness  SKIN: No itching, rashes

## 2023-01-28 NOTE — ED PROVIDER NOTE - PROGRESS NOTE DETAILS
Attending note (Carlos): patient resting comfortably in stretcher, appears more comfortable at this time. IV placed under US guidance (difficult access); CT pending.  Labs reviewed, mild leukocytosis (12), no significant anemia, bun/cr wnl, electrolytes not significantly deranged; low bili/albumin noted (appears consistent with prior labs); vbg shows normal pH and lactate WNL. Attending note (Carlos): patient's HR decreased from 120-130 to 90s after 1L iv fluid bolus. CTs pending.

## 2023-01-28 NOTE — ED ADULT NURSE REASSESSMENT NOTE - NS ED NURSE REASSESS COMMENT FT1
Report received from DANIKA Garza. Pt a&ox3, breathing spontaneously and unlabored, speaking in full sentences, and moving extremities easily. Pt denies any pain or nausea at this time, denies Zofran. Pending bed.

## 2023-01-28 NOTE — H&P ADULT - PROBLEM SELECTOR PLAN 4
- - will reconsult nephro  - last admission:  tacro 4 BID with AM levels 30min prior to dose administration. (decreased to 4mg BID on 1/24)- per nephro, can consider transition to IV tacro pending gastric emptying study report since patient unable to tolerate PO   - full dose lovenox (80mg BID) until pt above to tolerate PO, then eliquis 5mg PO BID   -  quantiferon negative-- in case pt needs rituximab in the future.

## 2023-01-28 NOTE — H&P ADULT - HISTORY OF PRESENT ILLNESS
32F with MCKENNA and asthma p/w chest pain, abdominal pain, n/v and + hemoptysis as a readmission after leaving AMA on 1/26 because she "wanted to see her kids". Prompted to return as she was feeling "worse."    HPI for that admission (1/18) is as follows:   32F with MCKENNA and asthma p/w abdominal pain, n/v, and generalized swelling x 1 week. States abdominal pain is diffuse but worse over RLQ. Also endorses diarrhea (3x/day) x 1 week as well. Denies CP, SOB, dyspnea, fevers, congestion, or dysuria. States she does not follow with an outpatient nephrologist as she is in between providers. Previously followed with provider at [facility]. Has been off prednisone for about 1 month. States she does not wish to start steroids again as she had 60lb weight gain over the past year on steroids and routinely has periods of fluid retention/swelling despite being on steroids. PMD sometimes prescribes Lasix when she has increased swelling, but has not had any this time. In the ED, T101.3 and HR 110s. Labs significant for albumin 1.1, UA prot > 600. CTAP with trace b/l pleural effusions and generalized soft tissue edema. Nephrology consulted and recommended albumin infusion.      Hospital course:            - MCKENNA: neprhology was following, being treated with tacrolimus but refused level monitoring and some doses of tacro 2/2 nausea           - UA + for trichomonas, patient started on flagyl 1/26.            - n/v: underwent workup for gastroperesis--  CTAP with contrast unremarkable. patient underwent gastric emptying study for n/v but was unable to tolerate test-- MRI considered for f/u but not done. Scopolamine patch did not help patient (considered ativan or compazine but not given)      Today, patient states that she continued to have nausea and vomiting with some visible blood over the last several days along with constipation. Patient also complains of chest pain which she localizes to the center of her chest and characterizes as "sharp," associated with shortness of breath. Patient notes these symptoms are similar to her prior admission but have just not resolved.     ED  course: On arrival to ED, patient tachycardic 100-120s with soft BPs SBP 90s-100s. Afebrile. Labs remarkable for: leukocytosis to 12.19, albumin 1.2; otherwise unremarkable.  CTA without evidence of PE. CTAP pending. s/p famotidine 20mg IV x1, reglan 10mg IV x1, zofran 8mg IV x2 and 2L LR.  32F with MCKENNA and asthma p/w chest pain, abdominal pain, n/v and + hemoptysis as a readmission after leaving AMA on 1/26 because she "wanted to see her kids". Prompted to return as she was feeling "worse."    HPI for that admission (1/18) is as follows:   32F with MCKENNA and asthma p/w abdominal pain, n/v, and generalized swelling x 1 week. States abdominal pain is diffuse but worse over RLQ. Also endorses diarrhea (3x/day) x 1 week as well. Denies CP, SOB, dyspnea, fevers, congestion, or dysuria. States she does not follow with an outpatient nephrologist as she is in between providers. Previously followed with provider at [facility]. Has been off prednisone for about 1 month. States she does not wish to start steroids again as she had 60lb weight gain over the past year on steroids and routinely has periods of fluid retention/swelling despite being on steroids. PMD sometimes prescribes Lasix when she has increased swelling, but has not had any this time. In the ED, T101.3 and HR 110s. Labs significant for albumin 1.1, UA prot > 600. CTAP with trace b/l pleural effusions and generalized soft tissue edema. Nephrology consulted and recommended albumin infusion.      Hospital course:            - MCKENNA: neprhology was following, being treated with tacrolimus but refused level monitoring and some doses of tacro 2/2 nausea           - UA + for UTI and  trichomonas, patient started on bactrim and flagyl 1/26, did not continue taking upon dc            - n/v: underwent workup for gastroperesis--  CTAP with contrast unremarkable. patient underwent gastric emptying study for n/v but was unable to tolerate test-- MRI considered for f/u but not done. Scopolamine patch did not help patient (considered ativan or compazine but not given)      Today, patient states that she continued to have nausea and vomiting with some visible blood over the last several days along with constipation. Patient also complained of chest pain upon arrival to the ED which she localized to the center of her chest and characterized as "sharp"; however, at the time of history taking this pain resolved. She attributes it to "reflux". Patient notes that all of these symptoms are similar to her prior admission but have just not resolved. Patient with vomiting during history.     ED  course: On arrival to ED, patient tachycardic 100-120s with soft BPs SBP 90s-100s. Afebrile. Labs remarkable for: leukocytosis to 12.19, albumin 1.2; otherwise unremarkable.  CTA without evidence of PE. CTAP without acute abdominal pathology. s/p famotidine 20mg IV x1, reglan 10mg IV x1, zofran 8mg IV x2 and 2L LR.

## 2023-01-28 NOTE — ED PROVIDER NOTE - PHYSICAL EXAMINATION
Gen:   Acutely distressed, citing her chest pain.  Head: normal appearing  HEENT: normal conjunctiva,   Dry mucous membranes, otherwise clear oral cavity and oropharynx.  Lung: no respiratory distress, speaking in full sentences,   Clear to auscultation bilaterally.     CV:   Rapid  rate and regular rhythm, no abnormal heart sounds.  Abd: soft, non distended,   Diffuse tenderness  MSK: no visible deformities,  no calf tenderness or peripheral edema.  Neuro: alert and grossly oriented, no gross motor deficits  Skin: No camille rashes

## 2023-01-28 NOTE — H&P ADULT - ASSESSMENT
32F with MCKENNA and asthma p/w chest pain, abdominal pain, n/v and + hemoptysis as a readmission after leaving AMA on 1/26.     DDx for n/v: most likely gastroperesis, less likely obstruction or biliary in origin.   DDx for chest pain: most likely esophagitis, less likely Boerhaaves, ACS, aortic dissection, infectious, PE.  32F with MCKENNA and asthma p/w chest pain, abdominal pain, n/v and + hemoptysis as a readmission after leaving AMA on 1/26.     DDx for n/v: most likely gastroperesis, less likely obstruction or biliary in origin.   DDx for chest pain: most likely gerd vs esophagitis, less likely Boerhaaves, ACS, aortic dissection, infectious, PE.

## 2023-01-28 NOTE — ED ADULT NURSE NOTE - OBJECTIVE STATEMENT
Pt from triage with c/o epigastric burning/pain and severe N/V intermittently for days. Pt seen here yesterday but left AMA, now returning with worsening symptoms. Pt AAOx4, VSS, connected to bedside cardioresp monitor, resp labored due to pain and nausea, GCS 15, NAD noted.

## 2023-01-28 NOTE — H&P ADULT - PROBLEM SELECTOR PLAN 3
diffusely sharp localized to the center of the chest. CTA without evidence of PE. EKG showing sinus tachycardia. Troponins negative x2. More likely esophagitis 2/2 vomiting, less likely Boorhaavs or aortic dissection i/s/o stable HgB WNL. Less likely ACS i/s/o negative workup.  - antiemetics medication as above  - pantoprazole QD, can increased to BID if necessary  - Tylenol PRN diffusely sharp localized to the center of the chest. CTA without evidence of PE. EKG showing sinus tachycardia. Troponins negative x2. More likely esophagitis 2/2 vomiting, less likely Boorhaavs or aortic dissection i/s/o stable HgB WNL. Less likely ACS i/s/o negative workup.  - antiemetics medication as above  - famotidine IV QD, can increased to BID if necessary  - Tylenol PRN

## 2023-01-28 NOTE — CONSULT NOTE ADULT - SUBJECTIVE AND OBJECTIVE BOX
[facility] DIVISION OF KIDNEY DISEASES AND HYPERTENSION -- [phone]  -- INITIAL CONSULT NOTE  --------------------------------------------------------------------------------  HPI: 32 year old female with PMH of Minimal Change Disease who presented to the hospital with complaints of nausea, vomiting diarrhea and abdominal pain for the past few days. The nephrology team was consulted for her history of MCKENNA and significant proteinuria.         PAST HISTORY  --------------------------------------------------------------------------------  PAST MEDICAL & SURGICAL HISTORY:  Asthma      GERD (gastroesophageal reflux disease)      Anemia      Gastritis      Thrombophlebitis/phlebitis  thrombophlebitis of R gonadal vein      UTI (urinary tract infection)      H/O:       History of tonsillectomy        FAMILY HISTORY:  No pertinent family history in first degree relatives      PAST SOCIAL HISTORY:    ALLERGIES & MEDICATIONS  --------------------------------------------------------------------------------  Allergies    codeine (Hives)    Intolerances      Standing Inpatient Medications  enoxaparin Injectable 80 milliGRAM(s) SubCutaneous every 12 hours  famotidine Injectable 20 milliGRAM(s) IV Push daily  metoclopramide Injectable 5 milliGRAM(s) IV Push every 8 hours  metroNIDAZOLE  IVPB 500 milliGRAM(s) IV Intermittent every 12 hours    PRN Inpatient Medications  acetaminophen     Tablet .. 650 milliGRAM(s) Oral every 6 hours PRN  albuterol    90 MICROgram(s) HFA Inhaler 2 Puff(s) Inhalation every 6 hours PRN  aluminum hydroxide/magnesium hydroxide/simethicone Suspension 30 milliLiter(s) Oral every 4 hours PRN  bisacodyl Suppository 10 milliGRAM(s) Rectal daily PRN  LORazepam   Injectable 1 milliGRAM(s) IV Push every 8 hours PRN  melatonin 3 milliGRAM(s) Oral at bedtime PRN      REVIEW OF SYSTEMS  --------------------------------------------------------------------------------  Gen: No fevers/chills  Skin: No rashes  Head/Eyes/Ears: Normal hearing,   Respiratory: No dyspnea, cough  CV: No chest pain  GI: No abdominal pain, diarrhea  : No dysuria, hematuria  MSK: No  edema  Heme: No easy bruising or bleeding  Psych: No significant depression    All other systems were reviewed and are negative, except as noted.    VITALS/PHYSICAL EXAM  --------------------------------------------------------------------------------  T(C): 37 (23 @ 15:04), Max: 37.7 (23 @ 03:00)  HR: 104 (23 @ 15:04) (84 - 128)  BP: 101/68 (23 @ 15:04) (92/63 - 114/76)  RR: 18 (23 @ 15:04) (14 - 19)  SpO2: 98% (23 @ 15:04) (96% - 98%)  Wt(kg): --  Height (cm): 154.9 (23 @ 23:00)  Weight (kg): 79.4 (23 @ 23:00)  BMI (kg/m2): 33.1 (23 @ 23:00)  BSA (m2): 1.78 (23 @ 23:00)      Physical Exam:  	Gen: NAD  	HEENT: MMM  	Pulm: CTA B/L  	CV: S1S2  	Abd: Soft, +BS   	Ext: No LE edema B/L  	Neuro: Awake  	Skin: Warm and dry  	Vascular access:    LABS/STUDIES  --------------------------------------------------------------------------------              13.4   12.19 >-----------<  713      [23 03:12]              43.3     143  |  107  |  18  ----------------------------<  89      [23 03:12]  4.5   |  24  |  0.98        Ca     8.3     [23 03:12]      Mg     2.8     [23 03:12]    TPro  5.1  /  Alb  1.2  /  TBili  0.1  /  DBili  x   /  AST  17  /  ALT  5   /  AlkPhos  67  [23 03:12]    PT/INR: PT 14.9 , INR 1.28       [23 03:12]  PTT: 33.0       [23 03:12]      Creatinine Trend:  SCr 0.98 [ 03:12]  SCr 1.01 [ 10:18]  SCr 1.09 [ 11:26]  SCr 1.04 [ 07:33]  SCr 0.82 [ 07:05]    Urinalysis - [23 08:18]      Color Dark Yellow / Appearance Slightly Turbid / SG >1.050 / pH 6.5      Gluc Negative / Ketone Small  / Bili Small / Urobili Negative       Blood Large / Protein >600 / Leuk Est Negative / Nitrite Negative       / WBC 18 / Hyaline 134 / Gran  / Sq Epi  / Non Sq Epi 24 / Bacteria Few      Lipid: chol 521, , HDL 48, LDL --      [23 @ 05:58]    HBsAb Nonreact      [23 05:58]  HBsAg Nonreact      [23:58]  HBcAb Nonreact      [23 05:58]  HCV 0.07, Nonreact      [23 05:58]  HIV Nonreact      [23 @ 07:41]  HIV Nonreact      [20 @ 14:43]    SHELTON: titer Negative, pattern --      [23 05:58]  dsDNA <12      [23 05:58]  C3 Complement 152      [23 05:58]  C4 Complement 38      [23:58]  PLA2R: ALESSANDRA <1.8, IFA --      [23 05:58]  Free Light Chains: kappa 2.61, lambda 1.05, ratio = 2.49      [:58]  Immunofixation Serum:   Weak IgM Kappa Band Identified    Reference Range: None Detected      [23 05:58]  SPEP Interpretation: Weak Gamma-Migrating Paraprotein Identified      [23 05:58]   [facility] DIVISION OF KIDNEY DISEASES AND HYPERTENSION -- [phone]  -- INITIAL CONSULT NOTE  --------------------------------------------------------------------------------  HPI: 32 year old female with PMH of Minimal Change Disease who presented to the hospital with complaints of nausea, vomiting diarrhea and abdominal pain for the past few days. Patient left AMA 2 days prior after she was admitted for the same N/V symptoms. Seen by nephrology last admission, and was placed on tacrolimus as she had not been taking steroids for more than a month. Nephrology team again consulted for her history of MCKENNA and significant proteinuria.     On presentation, patient with creatinine of 0.98, relatively stable from when she left. UA with still >600 protein dark color with bilirubin, Large blood with 18 RBC. Uprot/creat not done. Patient endorsing inability to swallow pills due to N/V. +LE swelling and foamy urine    Of note the patient was admitted in 2022 with similar complaints at which time she was noted to have worsening proteinuria. She was on a steroid therapy at that time and discharged with nephrology follow up. She states she was following with a nephrologist Dr. John Anguiano from [facility]. Last visit was in 2022. labs in Oct with Uprot/creat of 1.39 and Creatinine of 0.6.         PAST HISTORY  --------------------------------------------------------------------------------  PAST MEDICAL & SURGICAL HISTORY:  Asthma      GERD (gastroesophageal reflux disease)      Anemia      Gastritis      Thrombophlebitis/phlebitis  thrombophlebitis of R gonadal vein      UTI (urinary tract infection)      H/O:       History of tonsillectomy        FAMILY HISTORY:  No pertinent family history in first degree relatives      PAST SOCIAL HISTORY:    ALLERGIES & MEDICATIONS  --------------------------------------------------------------------------------  Allergies    codeine (Hives)    Intolerances      Standing Inpatient Medications  enoxaparin Injectable 80 milliGRAM(s) SubCutaneous every 12 hours  famotidine Injectable 20 milliGRAM(s) IV Push daily  metoclopramide Injectable 5 milliGRAM(s) IV Push every 8 hours  metroNIDAZOLE  IVPB 500 milliGRAM(s) IV Intermittent every 12 hours    PRN Inpatient Medications  acetaminophen     Tablet .. 650 milliGRAM(s) Oral every 6 hours PRN  albuterol    90 MICROgram(s) HFA Inhaler 2 Puff(s) Inhalation every 6 hours PRN  aluminum hydroxide/magnesium hydroxide/simethicone Suspension 30 milliLiter(s) Oral every 4 hours PRN  bisacodyl Suppository 10 milliGRAM(s) Rectal daily PRN  LORazepam   Injectable 1 milliGRAM(s) IV Push every 8 hours PRN  melatonin 3 milliGRAM(s) Oral at bedtime PRN      REVIEW OF SYSTEMS  --------------------------------------------------------------------------------  All other systems were reviewed and are negative, except as noted.    VITALS/PHYSICAL EXAM  --------------------------------------------------------------------------------  T(C): 37 (23 @ 15:04), Max: 37.7 (23 @ 03:00)  HR: 104 (23 @ 15:04) (84 - 128)  BP: 101/68 (23 @ 15:04) (92/63 - 114/76)  RR: 18 (23 @ 15:04) (14 - 19)  SpO2: 98% (23 @ 15:04) (96% - 98%)  Wt(kg): --  Height (cm): 154.9 (23 23:00)  Weight (kg): 79.4 (23 23:00)  BMI (kg/m2): 33.1 (23 23:00)  BSA (m2): 1.78 (23 23:00)      Physical Exam:  	Gen: NAD  	HEENT: MMM  	Pulm: CTA B/L  	CV: S1S2  	Abd: Soft, +BS   	Ext:+ LE edema B/L  	Neuro: Awake  	Skin: Warm and dry  	Vascular access:    LABS/STUDIES  --------------------------------------------------------------------------------              13.4   12.19 >-----------<  713      [23 03:12]              43.3     143  |  107  |  18  ----------------------------<  89      [23 03:12]  4.5   |  24  |  0.98        Ca     8.3     [23 03:12]      Mg     2.8     [23 03:12]    TPro  5.1  /  Alb  1.2  /  TBili  0.1  /  DBili  x   /  AST  17  /  ALT  5   /  AlkPhos  67  [23 @ 03:12]    PT/INR: PT 14.9 , INR 1.28       [23 03:12]  PTT: 33.0       [23 03:12]      Creatinine Trend:  SCr 0.98 [01-28 @ 03:12]  SCr 1.01 [ @ 10:18]  SCr 1.09 [ @ 11:26]  SCr 1.04 [ @ 07:33]  SCr 0.82 [ @ 07:05]    Urinalysis - [23 @ 08:18]      Color Dark Yellow / Appearance Slightly Turbid / SG >1.050 / pH 6.5      Gluc Negative / Ketone Small  / Bili Small / Urobili Negative       Blood Large / Protein >600 / Leuk Est Negative / Nitrite Negative       / WBC 18 / Hyaline 134 / Gran  / Sq Epi  / Non Sq Epi 24 / Bacteria Few      Lipid: chol 521, , HDL 48, LDL --      [23 @ 05:58]    HBsAb Nonreact      [23:58]  HBsAg Nonreact      [23:58]  HBcAb Nonreact      [23 05:58]  HCV 0.07, Nonreact      [23 05:58]  HIV Nonreact      [23 @ 07:41]  HIV Nonreact      [20 @ 14:43]    SHELTON: titer Negative, pattern --      [23:58]  dsDNA <12      [23 05:58]  C3 Complement 152      [23:58]  C4 Complement 38      [23:58]  PLA2R: ALESSANDRA <1.8, IFA --      [23 05:58]  Free Light Chains: kappa 2.61, lambda 1.05, ratio = 2.49      [:58]  Immunofixation Serum:   Weak IgM Kappa Band Identified    Reference Range: None Detected      [23 05:58]  SPEP Interpretation: Weak Gamma-Migrating Paraprotein Identified      [23 05:58]

## 2023-01-28 NOTE — H&P ADULT - NSHPSOCIALHISTORY_GEN_ALL_CORE
Lives at home with mother and her children  Works as a   Denies tobacco or other substance use  endorses occasional social drinking

## 2023-01-28 NOTE — H&P ADULT - NSHPPHYSICALEXAM_GEN_ALL_CORE
GENERAL: NAD  HEAD:  Normocephalic  EYES: Sclera clear  ENT: Moist mucous membranes  NECK: Supple, No JVD  CHEST/LUNG: Clear to auscultation bilaterally; No rales, rhonchi, wheezing, or rubs. Unlabored respirations  HEART: Regular rate and rhythm; No murmurs, rubs, or gallops  ABDOMEN: BSx4; Soft, nondistended. Diffuse tenderness to palpation  EXTREMITIES:  2+ Peripheral Pulses, brisk capillary refill. No clubbing, cyanosis, or edema  NERVOUS SYSTEM:  A&Ox3, no focal deficits   SKIN: No rashes or lesions

## 2023-01-28 NOTE — H&P ADULT - ATTENDING COMMENTS
38 yo F with Minimal Change Disease with recent admission 1/18-1/26 for same presentation but left AMA returns with intractable N/V and inability to tolerate PO    - concern was for gastroparesis but had an incomplete study  - prior workup was neg for THC on drug screen  - UTOX  - GI c/s  - MRI brain IV con  - ativan prn for N/V if reglan does not work  - renal c/s as patient has not taken her tacrolimus  - IVF  - CLD  - PPI IV BID; zantac IV     rest of plan as above 36 yo F with Minimal Change Disease with recent admission 1/18-1/26 for same presentation but left AMA returns with intractable N/V and inability to tolerate PO    - concern was for gastroparesis but had an incomplete study  - prior workup was neg for THC on drug screen  - UTOX  - GI c/s  - MRI brain IV con  - ativan prn for N/V if reglan does not work  - renal c/s as patient has not taken her tacrolimus  - IVF  - CLD  - PPI IV BID; zantac IV     #Trichomonas vaginalis  - Flagyl x 7 days    rest of plan as above 38 yo F with Minimal Change Disease with recent admission 1/18-1/26 for same presentation but left AMA returns with intractable N/V and inability to tolerate PO    - concern was for gastroparesis but had an incomplete study  - prior workup was neg for THC on drug screen  - CT abd pelvis Iv con 1/28 no acute pathology   - UTOX  - GI c/s  - MRI brain IV con  - ativan prn for N/V if reglan does not work  - renal c/s as patient has not taken her tacrolimus  - IVF  - CLD  - PPI IV BID; zantac IV     #Trichomonas vaginalis  - Flagyl x 7 days    rest of plan as above
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
## 2023-01-28 NOTE — ED PROVIDER NOTE - OBJECTIVE STATEMENT
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  She describes this as a severe sharp mid chest sensation.  She has associated diffuse abdominal pain with this and associated nausea and vomiting with constipation and obstipation.  She reports associated shortness of breath.  She also reports bringing up blood from her oral cavity, unsure if she is coughing or vomiting this.  Patient was recently in the hospital for management of her minimal-change disease, managed with tacrolimus.  She left AMA about  5 days ago.  No allergies to medications.  X2  in the past, no other surgical history.  has been off all medications for the past 5 days since discharge.

## 2023-01-28 NOTE — H&P ADULT - PROBLEM SELECTOR PLAN 2
Associated with nausea, vomiting, constipation. s/p reglan x1, zofran x2 in ED  - Antiemetics as needed          - metoclopramide 5mg TID before meals, can titrate upt o 10mg TID if necessary         - zofran 8mg IV Q6 PRN  - GI following during prior admission, will reconsult         - did not tolerate gastric emptying study (vomited during exam)- can repeat study once pt able to tolerate PO         - CTAP unremarkable at time, pending repeat for this admission  - constipation        - unable to tolerate PO bowel regimen at this time        - dulcolax suppository QD PRN Associated with nausea, vomiting, constipation. s/p reglan x1, zofran x2 in ED  - Antiemetics as needed          - metoclopramide 5mg TID before meals, can titrate upt o 10mg TID if necessary         - zofran 8mg IV Q6 PRN  - GI following during prior admission, will reconsult         - did not tolerate gastric emptying study (vomited during exam)- can repeat study once pt able to tolerate PO         - CTAP unremarkable  - constipation        - unable to tolerate PO bowel regimen at this time        - dulcolax suppository QD PRN Associated with nausea, vomiting, constipation. s/p reglan x1, zofran x2 in ED  - Antiemetics as needed          - metoclopramide 5mg TID before meals, can titrate upt o 10mg TID if necessary         - zofran 8mg IV Q6 PRN         - can retry ativan if necessary   - GI following during prior admission, will reconsult         - did not tolerate gastric emptying study (vomited during exam)- can repeat study once pt able to tolerate PO         - CTAP unremarkable  - constipation        - unable to tolerate PO bowel regimen at this time        - dulcolax suppository QD PRN  - MR brain pending to evaluate for central causes of hyperemesis

## 2023-01-29 LAB
A1C WITH ESTIMATED AVERAGE GLUCOSE RESULT: 6.2 % — HIGH (ref 4–5.6)
ALBUMIN SERPL ELPH-MCNC: 1.2 G/DL — LOW (ref 3.3–5)
ALP SERPL-CCNC: 60 U/L — SIGNIFICANT CHANGE UP (ref 40–120)
ALT FLD-CCNC: <5 U/L — LOW (ref 10–45)
ANION GAP SERPL CALC-SCNC: 7 MMOL/L — SIGNIFICANT CHANGE UP (ref 5–17)
AST SERPL-CCNC: 10 U/L — SIGNIFICANT CHANGE UP (ref 10–40)
BASOPHILS # BLD AUTO: 0.06 K/UL — SIGNIFICANT CHANGE UP (ref 0–0.2)
BASOPHILS NFR BLD AUTO: 0.5 % — SIGNIFICANT CHANGE UP (ref 0–2)
BILIRUB SERPL-MCNC: <0.1 MG/DL — LOW (ref 0.2–1.2)
BUN SERPL-MCNC: 22 MG/DL — SIGNIFICANT CHANGE UP (ref 7–23)
CALCIUM SERPL-MCNC: 8.3 MG/DL — LOW (ref 8.4–10.5)
CHLORIDE SERPL-SCNC: 109 MMOL/L — HIGH (ref 96–108)
CO2 SERPL-SCNC: 26 MMOL/L — SIGNIFICANT CHANGE UP (ref 22–31)
CREAT SERPL-MCNC: 1.15 MG/DL — SIGNIFICANT CHANGE UP (ref 0.5–1.3)
EGFR: 65 ML/MIN/1.73M2 — SIGNIFICANT CHANGE UP
EOSINOPHIL # BLD AUTO: 0.03 K/UL — SIGNIFICANT CHANGE UP (ref 0–0.5)
EOSINOPHIL NFR BLD AUTO: 0.2 % — SIGNIFICANT CHANGE UP (ref 0–6)
ESTIMATED AVERAGE GLUCOSE: 131 MG/DL — HIGH (ref 68–114)
GLUCOSE SERPL-MCNC: 92 MG/DL — SIGNIFICANT CHANGE UP (ref 70–99)
HCT VFR BLD CALC: 38.1 % — SIGNIFICANT CHANGE UP (ref 34.5–45)
HGB BLD-MCNC: 11.6 G/DL — SIGNIFICANT CHANGE UP (ref 11.5–15.5)
IMM GRANULOCYTES NFR BLD AUTO: 0.6 % — SIGNIFICANT CHANGE UP (ref 0–0.9)
LYMPHOCYTES # BLD AUTO: 16.9 % — SIGNIFICANT CHANGE UP (ref 13–44)
LYMPHOCYTES # BLD AUTO: 2.05 K/UL — SIGNIFICANT CHANGE UP (ref 1–3.3)
MAGNESIUM SERPL-MCNC: 2.9 MG/DL — HIGH (ref 1.6–2.6)
MCHC RBC-ENTMCNC: 30.1 PG — SIGNIFICANT CHANGE UP (ref 27–34)
MCHC RBC-ENTMCNC: 30.4 GM/DL — LOW (ref 32–36)
MCV RBC AUTO: 98.7 FL — SIGNIFICANT CHANGE UP (ref 80–100)
MONOCYTES # BLD AUTO: 0.3 K/UL — SIGNIFICANT CHANGE UP (ref 0–0.9)
MONOCYTES NFR BLD AUTO: 2.5 % — SIGNIFICANT CHANGE UP (ref 2–14)
NEUTROPHILS # BLD AUTO: 9.63 K/UL — HIGH (ref 1.8–7.4)
NEUTROPHILS NFR BLD AUTO: 79.3 % — HIGH (ref 43–77)
NRBC # BLD: 0 /100 WBCS — SIGNIFICANT CHANGE UP (ref 0–0)
PHOSPHATE SERPL-MCNC: 5.1 MG/DL — HIGH (ref 2.5–4.5)
PLATELET # BLD AUTO: 734 K/UL — HIGH (ref 150–400)
POTASSIUM SERPL-MCNC: 4.5 MMOL/L — SIGNIFICANT CHANGE UP (ref 3.5–5.3)
POTASSIUM SERPL-SCNC: 4.5 MMOL/L — SIGNIFICANT CHANGE UP (ref 3.5–5.3)
PROT SERPL-MCNC: 4.7 G/DL — LOW (ref 6–8.3)
RBC # BLD: 3.86 M/UL — SIGNIFICANT CHANGE UP (ref 3.8–5.2)
RBC # FLD: 15.3 % — HIGH (ref 10.3–14.5)
SODIUM SERPL-SCNC: 142 MMOL/L — SIGNIFICANT CHANGE UP (ref 135–145)
WBC # BLD: 12.14 K/UL — HIGH (ref 3.8–10.5)
WBC # FLD AUTO: 12.14 K/UL — HIGH (ref 3.8–10.5)

## 2023-01-29 PROCEDURE — 99232 SBSQ HOSP IP/OBS MODERATE 35: CPT | Mod: GC

## 2023-01-29 PROCEDURE — 99223 1ST HOSP IP/OBS HIGH 75: CPT | Mod: GC

## 2023-01-29 RX ORDER — FAMOTIDINE 10 MG/ML
20 INJECTION INTRAVENOUS DAILY
Refills: 0 | Status: DISCONTINUED | OUTPATIENT
Start: 2023-01-29 | End: 2023-02-03

## 2023-01-29 RX ADMIN — PANTOPRAZOLE SODIUM 40 MILLIGRAM(S): 20 TABLET, DELAYED RELEASE ORAL at 17:06

## 2023-01-29 RX ADMIN — Medication 1 MILLIGRAM(S): at 18:14

## 2023-01-29 RX ADMIN — Medication 100 MILLIGRAM(S): at 17:06

## 2023-01-29 RX ADMIN — ENOXAPARIN SODIUM 80 MILLIGRAM(S): 100 INJECTION SUBCUTANEOUS at 17:06

## 2023-01-29 RX ADMIN — Medication 100 MILLIGRAM(S): at 06:45

## 2023-01-29 RX ADMIN — ENOXAPARIN SODIUM 80 MILLIGRAM(S): 100 INJECTION SUBCUTANEOUS at 06:44

## 2023-01-29 RX ADMIN — FAMOTIDINE 20 MILLIGRAM(S): 10 INJECTION INTRAVENOUS at 17:06

## 2023-01-29 RX ADMIN — PANTOPRAZOLE SODIUM 40 MILLIGRAM(S): 20 TABLET, DELAYED RELEASE ORAL at 06:44

## 2023-01-29 RX ADMIN — Medication 64 MILLIGRAM(S): at 06:45

## 2023-01-29 RX ADMIN — Medication 1 MILLIGRAM(S): at 05:03

## 2023-01-29 NOTE — PROGRESS NOTE ADULT - PROBLEM SELECTOR PLAN 4
- will reconsult nephro  - last admission:  tacro 4 BID with AM levels 30min prior to dose administration. (decreased to 4mg BID on 1/24)- per nephro, can consider transition to IV tacro pending gastric emptying study report since patient unable to tolerate PO   - full dose lovenox (80mg BID) until pt above to tolerate PO, then eliquis 5mg PO BID   -  quantiferon negative-- in case pt needs rituximab in the future. - will reconsult nephro  - last admission:  tacro 4 BID with AM levels 30min prior to dose administration. (decreased to 4mg BID on 1/24)- per nephro, can consider continuing tacro at 3 mg BID or transition to pred 80 daily- pending discussion with patient today as patient has reservations about starting prednisone (nausea on prednisone in past, weight gain)   - full dose lovenox (80mg BID) until pt above to tolerate PO, then eliquis 5mg PO BID   - quantiferon negative-- in case pt needs rituximab in the future.

## 2023-01-29 NOTE — PROGRESS NOTE ADULT - PROBLEM SELECTOR PLAN 2
Associated with nausea, vomiting, constipation. s/p reglan x1, zofran x2 in ED  - Antiemetics as needed          - metoclopramide 5mg TID before meals, can titrate upt o 10mg TID if necessary         - zofran 8mg IV Q6 PRN         - can retry ativan if necessary   - GI following during prior admission, will reconsult         - did not tolerate gastric emptying study (vomited during exam)- can repeat study once pt able to tolerate PO         - CTAP unremarkable  - constipation        - unable to tolerate PO bowel regimen at this time        - dulcolax suppository QD PRN  - MR brain pending to evaluate for central causes of hyperemesis Associated with nausea, vomiting, constipation. s/p reglan x1, zofran x2 in ED  - Antiemetics as needed          - metoclopramide 5mg TID before meals, can titrate upt o 10mg TID if necessary         - zofran 8mg IV Q6 PRN         - can retry ativan if necessary   - GI following during prior admission, will reconsult         - did not tolerate gastric emptying study (vomited during exam)- can repeat study once pt able to tolerate PO         - CTAP unremarkable  - constipation        - dulcolax suppository QD PRN  - MR brain negative

## 2023-01-29 NOTE — PROGRESS NOTE ADULT - PROBLEM SELECTOR PLAN 3
diffusely sharp localized to the center of the chest. CTA without evidence of PE. EKG showing sinus tachycardia. Troponins negative x2. More likely esophagitis 2/2 vomiting, less likely Boorhaavs or aortic dissection i/s/o stable HgB WNL. Less likely ACS i/s/o negative workup.  - antiemetics medication as above  - famotidine IV QD, can increased to BID if necessary  - Tylenol PRN

## 2023-01-29 NOTE — PROGRESS NOTE ADULT - SUBJECTIVE AND OBJECTIVE BOX
PROGRESS NOTE:     Patient is a 32y old  Female who presents with a chief complaint of n/v + hemoptysis (28 Jan 2023 16:22)      SUBJECTIVE / OVERNIGHT EVENTS:    ADDITIONAL REVIEW OF SYSTEMS:    MEDICATIONS  (STANDING):  enoxaparin Injectable 80 milliGRAM(s) SubCutaneous every 12 hours  influenza   Vaccine 0.5 milliLiter(s) IntraMuscular once  methylPREDNISolone sodium succinate Injectable 64 milliGRAM(s) IV Push daily  metoclopramide Injectable 5 milliGRAM(s) IV Push every 8 hours  metroNIDAZOLE  IVPB 500 milliGRAM(s) IV Intermittent every 12 hours  pantoprazole  Injectable 40 milliGRAM(s) IV Push two times a day    MEDICATIONS  (PRN):  acetaminophen     Tablet .. 650 milliGRAM(s) Oral every 6 hours PRN Temp greater or equal to 38C (100.4F), Mild Pain (1 - 3)  albuterol    90 MICROgram(s) HFA Inhaler 2 Puff(s) Inhalation every 6 hours PRN Shortness of Breath and/or Wheezing  aluminum hydroxide/magnesium hydroxide/simethicone Suspension 30 milliLiter(s) Oral every 4 hours PRN Dyspepsia  bisacodyl Suppository 10 milliGRAM(s) Rectal daily PRN Constipation  LORazepam   Injectable 1 milliGRAM(s) IV Push every 8 hours PRN breakthrough nausea/vomiting  melatonin 3 milliGRAM(s) Oral at bedtime PRN Insomnia  prochlorperazine   Injectable 10 milliGRAM(s) IV Push every 12 hours PRN nause/vomiting  scopolamine 1 mG/72 Hr(s) Patch 1 Patch Transdermal every 72 hours PRN nausea/vomiting      CAPILLARY BLOOD GLUCOSE        I&O's Summary      PHYSICAL EXAM:  Vital Signs Last 24 Hrs  T(C): 37.1 (29 Jan 2023 04:29), Max: 37.1 (29 Jan 2023 04:29)  T(F): 98.7 (29 Jan 2023 04:29), Max: 98.7 (29 Jan 2023 04:29)  HR: 115 (29 Jan 2023 04:29) (84 - 115)  BP: 103/59 (29 Jan 2023 04:29) (101/66 - 104/74)  BP(mean): --  RR: 16 (29 Jan 2023 04:29) (16 - 18)  SpO2: 98% (29 Jan 2023 04:29) (96% - 99%)    Parameters below as of 29 Jan 2023 04:29  Patient On (Oxygen Delivery Method): room air        GENERAL: No acute distress, well-developed  HEAD:  Atraumatic, Normocephalic  EYES: EOMI, PERRLA, conjunctiva and sclera clear  NECK: Supple, no lymphadenopathy, no JVD  CHEST/LUNG: CTAB; No wheezes, rales, or rhonchi  HEART: Regular rate and rhythm; No murmurs, rubs, or gallops  ABDOMEN: Soft, non-tender, non-distended; normal bowel sounds, no organomegaly  EXTREMITIES:  2+ peripheral pulses b/l, No clubbing, cyanosis, or edema  NEUROLOGY: A&O x 3, no focal deficits  SKIN: No rashes or lesions    LABS:                        13.4   12.19 )-----------( 713      ( 28 Jan 2023 03:12 )             43.3     01-28    143  |  107  |  18  ----------------------------<  89  4.5   |  24  |  0.98    Ca    8.3<L>      28 Jan 2023 03:12  Mg     2.8     01-28    TPro  5.1<L>  /  Alb  1.2<L>  /  TBili  0.1<L>  /  DBili  x   /  AST  17  /  ALT  5<L>  /  AlkPhos  67  01-28    PT/INR - ( 28 Jan 2023 03:12 )   PT: 14.9 sec;   INR: 1.28 ratio         PTT - ( 28 Jan 2023 03:12 )  PTT:33.0 sec      Urinalysis Basic - ( 28 Jan 2023 08:18 )    Color: Dark Yellow / Appearance: Slightly Turbid / SG: >1.050 / pH: x  Gluc: x / Ketone: Small  / Bili: Small / Urobili: Negative   Blood: x / Protein: >600 / Nitrite: Negative   Leuk Esterase: Negative / RBC: 136 /hpf / WBC 18 /HPF   Sq Epi: x / Non Sq Epi: 24 /hpf / Bacteria: Few          RADIOLOGY & ADDITIONAL TESTS:  Results Reviewed:   Imaging Personally Reviewed:  Electrocardiogram Personally Reviewed:    COORDINATION OF CARE:  Care Discussed with Consultants/Other Providers [Y/N]:  Prior or Outpatient Records Reviewed [Y/N]:   PROGRESS NOTE:     Patient is a 32y old  Female who presents with a chief complaint of n/v + hemoptysis (28 Jan 2023 16:22)      SUBJECTIVE / OVERNIGHT EVENTS:  No events overnight. Patient examined this AM. No n/v- was able to tolerate breakfast. All other ROS negative.     ADDITIONAL REVIEW OF SYSTEMS:    MEDICATIONS  (STANDING):  enoxaparin Injectable 80 milliGRAM(s) SubCutaneous every 12 hours  influenza   Vaccine 0.5 milliLiter(s) IntraMuscular once  methylPREDNISolone sodium succinate Injectable 64 milliGRAM(s) IV Push daily  metoclopramide Injectable 5 milliGRAM(s) IV Push every 8 hours  metroNIDAZOLE  IVPB 500 milliGRAM(s) IV Intermittent every 12 hours  pantoprazole  Injectable 40 milliGRAM(s) IV Push two times a day    MEDICATIONS  (PRN):  acetaminophen     Tablet .. 650 milliGRAM(s) Oral every 6 hours PRN Temp greater or equal to 38C (100.4F), Mild Pain (1 - 3)  albuterol    90 MICROgram(s) HFA Inhaler 2 Puff(s) Inhalation every 6 hours PRN Shortness of Breath and/or Wheezing  aluminum hydroxide/magnesium hydroxide/simethicone Suspension 30 milliLiter(s) Oral every 4 hours PRN Dyspepsia  bisacodyl Suppository 10 milliGRAM(s) Rectal daily PRN Constipation  LORazepam   Injectable 1 milliGRAM(s) IV Push every 8 hours PRN breakthrough nausea/vomiting  melatonin 3 milliGRAM(s) Oral at bedtime PRN Insomnia  prochlorperazine   Injectable 10 milliGRAM(s) IV Push every 12 hours PRN nause/vomiting  scopolamine 1 mG/72 Hr(s) Patch 1 Patch Transdermal every 72 hours PRN nausea/vomiting      CAPILLARY BLOOD GLUCOSE        I&O's Summary      PHYSICAL EXAM:  Vital Signs Last 24 Hrs  T(C): 37.1 (29 Jan 2023 04:29), Max: 37.1 (29 Jan 2023 04:29)  T(F): 98.7 (29 Jan 2023 04:29), Max: 98.7 (29 Jan 2023 04:29)  HR: 115 (29 Jan 2023 04:29) (84 - 115)  BP: 103/59 (29 Jan 2023 04:29) (101/66 - 104/74)  BP(mean): --  RR: 16 (29 Jan 2023 04:29) (16 - 18)  SpO2: 98% (29 Jan 2023 04:29) (96% - 99%)    Parameters below as of 29 Jan 2023 04:29  Patient On (Oxygen Delivery Method): room air        GENERAL: No acute distress, well-developed  HEAD:  Atraumatic, Normocephalic  EYES: EOMI, PERRLA, conjunctiva and sclera clear  NECK: Supple, no lymphadenopathy, no JVD  CHEST/LUNG: CTAB; No wheezes, rales, or rhonchi  HEART: Regular rate and rhythm; No murmurs, rubs, or gallops  ABDOMEN: Soft, non-tender, non-distended; normal bowel sounds, no organomegaly  EXTREMITIES:  2+ peripheral pulses b/l, No clubbing, cyanosis, or edema  NEUROLOGY: A&O x 3, no focal deficits  SKIN: No rashes or lesions    LABS:                        13.4   12.19 )-----------( 713      ( 28 Jan 2023 03:12 )             43.3     01-28    143  |  107  |  18  ----------------------------<  89  4.5   |  24  |  0.98    Ca    8.3<L>      28 Jan 2023 03:12  Mg     2.8     01-28    TPro  5.1<L>  /  Alb  1.2<L>  /  TBili  0.1<L>  /  DBili  x   /  AST  17  /  ALT  5<L>  /  AlkPhos  67  01-28    PT/INR - ( 28 Jan 2023 03:12 )   PT: 14.9 sec;   INR: 1.28 ratio         PTT - ( 28 Jan 2023 03:12 )  PTT:33.0 sec      Urinalysis Basic - ( 28 Jan 2023 08:18 )    Color: Dark Yellow / Appearance: Slightly Turbid / SG: >1.050 / pH: x  Gluc: x / Ketone: Small  / Bili: Small / Urobili: Negative   Blood: x / Protein: >600 / Nitrite: Negative   Leuk Esterase: Negative / RBC: 136 /hpf / WBC 18 /HPF   Sq Epi: x / Non Sq Epi: 24 /hpf / Bacteria: Few          RADIOLOGY & ADDITIONAL TESTS:  Results Reviewed:   Imaging Personally Reviewed:  Electrocardiogram Personally Reviewed:    COORDINATION OF CARE:  Care Discussed with Consultants/Other Providers [Y/N]:  Prior or Outpatient Records Reviewed [Y/N]:   PROGRESS NOTE:     Patient is a 32y old  Female who presents with a chief complaint of n/v + hemoptysis (28 Jan 2023 16:22)      SUBJECTIVE / OVERNIGHT EVENTS:  No events overnight. Patient examined this AM. No n/v- was able to tolerate some snacks this AM. All other ROS negative.     ADDITIONAL REVIEW OF SYSTEMS:    MEDICATIONS  (STANDING):  enoxaparin Injectable 80 milliGRAM(s) SubCutaneous every 12 hours  influenza   Vaccine 0.5 milliLiter(s) IntraMuscular once  methylPREDNISolone sodium succinate Injectable 64 milliGRAM(s) IV Push daily  metoclopramide Injectable 5 milliGRAM(s) IV Push every 8 hours  metroNIDAZOLE  IVPB 500 milliGRAM(s) IV Intermittent every 12 hours  pantoprazole  Injectable 40 milliGRAM(s) IV Push two times a day    MEDICATIONS  (PRN):  acetaminophen     Tablet .. 650 milliGRAM(s) Oral every 6 hours PRN Temp greater or equal to 38C (100.4F), Mild Pain (1 - 3)  albuterol    90 MICROgram(s) HFA Inhaler 2 Puff(s) Inhalation every 6 hours PRN Shortness of Breath and/or Wheezing  aluminum hydroxide/magnesium hydroxide/simethicone Suspension 30 milliLiter(s) Oral every 4 hours PRN Dyspepsia  bisacodyl Suppository 10 milliGRAM(s) Rectal daily PRN Constipation  LORazepam   Injectable 1 milliGRAM(s) IV Push every 8 hours PRN breakthrough nausea/vomiting  melatonin 3 milliGRAM(s) Oral at bedtime PRN Insomnia  prochlorperazine   Injectable 10 milliGRAM(s) IV Push every 12 hours PRN nause/vomiting  scopolamine 1 mG/72 Hr(s) Patch 1 Patch Transdermal every 72 hours PRN nausea/vomiting      CAPILLARY BLOOD GLUCOSE        I&O's Summary      PHYSICAL EXAM:  Vital Signs Last 24 Hrs  T(C): 37.1 (29 Jan 2023 04:29), Max: 37.1 (29 Jan 2023 04:29)  T(F): 98.7 (29 Jan 2023 04:29), Max: 98.7 (29 Jan 2023 04:29)  HR: 115 (29 Jan 2023 04:29) (84 - 115)  BP: 103/59 (29 Jan 2023 04:29) (101/66 - 104/74)  BP(mean): --  RR: 16 (29 Jan 2023 04:29) (16 - 18)  SpO2: 98% (29 Jan 2023 04:29) (96% - 99%)    Parameters below as of 29 Jan 2023 04:29  Patient On (Oxygen Delivery Method): room air        GENERAL: No acute distress, well-developed  HEAD:  Atraumatic, Normocephalic  EYES: EOMI, PERRLA, conjunctiva and sclera clear  NECK: Supple, no lymphadenopathy, no JVD  CHEST/LUNG: CTAB; No wheezes, rales, or rhonchi  HEART: Regular rate and rhythm; No murmurs, rubs, or gallops  ABDOMEN: Soft, non-tender, non-distended; normal bowel sounds, no organomegaly  EXTREMITIES:  2+ peripheral pulses b/l, No clubbing, cyanosis, or edema  NEUROLOGY: A&O x 3, no focal deficits  SKIN: No rashes or lesions    LABS:                        13.4   12.19 )-----------( 713      ( 28 Jan 2023 03:12 )             43.3     01-28    143  |  107  |  18  ----------------------------<  89  4.5   |  24  |  0.98    Ca    8.3<L>      28 Jan 2023 03:12  Mg     2.8     01-28    TPro  5.1<L>  /  Alb  1.2<L>  /  TBili  0.1<L>  /  DBili  x   /  AST  17  /  ALT  5<L>  /  AlkPhos  67  01-28    PT/INR - ( 28 Jan 2023 03:12 )   PT: 14.9 sec;   INR: 1.28 ratio         PTT - ( 28 Jan 2023 03:12 )  PTT:33.0 sec      Urinalysis Basic - ( 28 Jan 2023 08:18 )    Color: Dark Yellow / Appearance: Slightly Turbid / SG: >1.050 / pH: x  Gluc: x / Ketone: Small  / Bili: Small / Urobili: Negative   Blood: x / Protein: >600 / Nitrite: Negative   Leuk Esterase: Negative / RBC: 136 /hpf / WBC 18 /HPF   Sq Epi: x / Non Sq Epi: 24 /hpf / Bacteria: Few          RADIOLOGY & ADDITIONAL TESTS:  Results Reviewed:   Imaging Personally Reviewed:  Electrocardiogram Personally Reviewed:    COORDINATION OF CARE:  Care Discussed with Consultants/Other Providers [Y/N]:  Prior or Outpatient Records Reviewed [Y/N]:

## 2023-01-29 NOTE — PROGRESS NOTE ADULT - PROBLEM SELECTOR PLAN 5
found to be + during prior admission, flagyl started 1/26 however patient AMA 1/27. Did not continue treatment at home due to inability to tolerate PO intake  - IV flagyl since patient cannot tolerate PO intake   - educated about informing sexual partners

## 2023-01-29 NOTE — PROGRESS NOTE ADULT - PROBLEM SELECTOR PLAN 1
+tachycardia to 126 and leukocytosis to 12.19 on admission. Afebrile. At this time, most likely 2/2 to pain/vomiting. Possible 2/2 to incomplete treatment of UTI but less likely given lack of urinary symptoms.  - will monitor off abx at this time  - Trend WBC daily

## 2023-01-29 NOTE — PROGRESS NOTE ADULT - PROBLEM SELECTOR PLAN 6
- DVT ppx: lovenox as above  - Diet: NPO as unable to tolerate PO at the moment  - Dispo: pending - DVT ppx: lovenox as above  - Diet: advance as tolerated   - Dispo: pending

## 2023-01-29 NOTE — PROGRESS NOTE ADULT - ASSESSMENT
32F with MCKENNA and asthma p/w chest pain, abdominal pain, n/v and + hemoptysis as a readmission after leaving AMA on 1/26.     DDx for n/v: most likely gastroperesis, less likely obstruction or biliary in origin.   DDx for chest pain: most likely gerd vs esophagitis, less likely Boerhaaves, ACS, aortic dissection, infectious, PE.

## 2023-01-30 LAB
ANION GAP SERPL CALC-SCNC: 6 MMOL/L — SIGNIFICANT CHANGE UP (ref 5–17)
BUN SERPL-MCNC: 31 MG/DL — HIGH (ref 7–23)
CALCIUM SERPL-MCNC: 8.1 MG/DL — LOW (ref 8.4–10.5)
CHLORIDE SERPL-SCNC: 104 MMOL/L — SIGNIFICANT CHANGE UP (ref 96–108)
CO2 SERPL-SCNC: 28 MMOL/L — SIGNIFICANT CHANGE UP (ref 22–31)
CREAT SERPL-MCNC: 1.16 MG/DL — SIGNIFICANT CHANGE UP (ref 0.5–1.3)
CULTURE RESULTS: SIGNIFICANT CHANGE UP
EGFR: 65 ML/MIN/1.73M2 — SIGNIFICANT CHANGE UP
GLIADIN PEPTIDE IGA SER-ACNC: 10.8 UNITS — SIGNIFICANT CHANGE UP
GLIADIN PEPTIDE IGA SER-ACNC: NEGATIVE — SIGNIFICANT CHANGE UP
GLIADIN PEPTIDE IGG SER-ACNC: <5 UNITS — SIGNIFICANT CHANGE UP
GLIADIN PEPTIDE IGG SER-ACNC: NEGATIVE — SIGNIFICANT CHANGE UP
GLUCOSE SERPL-MCNC: 87 MG/DL — SIGNIFICANT CHANGE UP (ref 70–99)
HCT VFR BLD CALC: 39.3 % — SIGNIFICANT CHANGE UP (ref 34.5–45)
HGB BLD-MCNC: 12.2 G/DL — SIGNIFICANT CHANGE UP (ref 11.5–15.5)
LEAD BLD-MCNC: <1 UG/DL — SIGNIFICANT CHANGE UP (ref 0–3.4)
MAGNESIUM SERPL-MCNC: 2.8 MG/DL — HIGH (ref 1.6–2.6)
MCHC RBC-ENTMCNC: 30.2 PG — SIGNIFICANT CHANGE UP (ref 27–34)
MCHC RBC-ENTMCNC: 31 GM/DL — LOW (ref 32–36)
MCV RBC AUTO: 97.3 FL — SIGNIFICANT CHANGE UP (ref 80–100)
NRBC # BLD: 0 /100 WBCS — SIGNIFICANT CHANGE UP (ref 0–0)
PHOSPHATE SERPL-MCNC: 4.5 MG/DL — SIGNIFICANT CHANGE UP (ref 2.5–4.5)
PLATELET # BLD AUTO: 693 K/UL — HIGH (ref 150–400)
POTASSIUM SERPL-MCNC: 4.1 MMOL/L — SIGNIFICANT CHANGE UP (ref 3.5–5.3)
POTASSIUM SERPL-SCNC: 4.1 MMOL/L — SIGNIFICANT CHANGE UP (ref 3.5–5.3)
RBC # BLD: 4.04 M/UL — SIGNIFICANT CHANGE UP (ref 3.8–5.2)
RBC # FLD: 15.1 % — HIGH (ref 10.3–14.5)
SODIUM SERPL-SCNC: 138 MMOL/L — SIGNIFICANT CHANGE UP (ref 135–145)
SPECIMEN SOURCE: SIGNIFICANT CHANGE UP
TACROLIMUS SERPL-MCNC: <0.8 NG/ML — SIGNIFICANT CHANGE UP
TTG IGA SER-ACNC: <1.2 U/ML — SIGNIFICANT CHANGE UP
TTG IGA SER-ACNC: NEGATIVE — SIGNIFICANT CHANGE UP
TTG IGG SER IA-ACNC: NEGATIVE — SIGNIFICANT CHANGE UP
TTG IGG SER-ACNC: <1.2 U/ML — SIGNIFICANT CHANGE UP
WBC # BLD: 12.22 K/UL — HIGH (ref 3.8–10.5)
WBC # FLD AUTO: 12.22 K/UL — HIGH (ref 3.8–10.5)

## 2023-01-30 PROCEDURE — 99232 SBSQ HOSP IP/OBS MODERATE 35: CPT | Mod: GC

## 2023-01-30 PROCEDURE — 99233 SBSQ HOSP IP/OBS HIGH 50: CPT | Mod: GC

## 2023-01-30 PROCEDURE — 93975 VASCULAR STUDY: CPT | Mod: 26

## 2023-01-30 RX ORDER — ONDANSETRON 8 MG/1
8 TABLET, FILM COATED ORAL EVERY 8 HOURS
Refills: 0 | Status: DISCONTINUED | OUTPATIENT
Start: 2023-01-30 | End: 2023-02-03

## 2023-01-30 RX ORDER — TACROLIMUS 5 MG/1
3 CAPSULE ORAL
Refills: 0 | Status: DISCONTINUED | OUTPATIENT
Start: 2023-01-30 | End: 2023-02-03

## 2023-01-30 RX ADMIN — Medication 1 MILLIGRAM(S): at 20:27

## 2023-01-30 RX ADMIN — Medication 100 MILLIGRAM(S): at 17:45

## 2023-01-30 RX ADMIN — PANTOPRAZOLE SODIUM 40 MILLIGRAM(S): 20 TABLET, DELAYED RELEASE ORAL at 05:43

## 2023-01-30 RX ADMIN — ENOXAPARIN SODIUM 80 MILLIGRAM(S): 100 INJECTION SUBCUTANEOUS at 17:32

## 2023-01-30 RX ADMIN — Medication 100 MILLIGRAM(S): at 05:34

## 2023-01-30 RX ADMIN — PANTOPRAZOLE SODIUM 40 MILLIGRAM(S): 20 TABLET, DELAYED RELEASE ORAL at 17:29

## 2023-01-30 RX ADMIN — Medication 64 MILLIGRAM(S): at 05:43

## 2023-01-30 RX ADMIN — ENOXAPARIN SODIUM 80 MILLIGRAM(S): 100 INJECTION SUBCUTANEOUS at 05:34

## 2023-01-30 RX ADMIN — FAMOTIDINE 20 MILLIGRAM(S): 10 INJECTION INTRAVENOUS at 12:34

## 2023-01-30 RX ADMIN — Medication 1 MILLIGRAM(S): at 12:24

## 2023-01-30 NOTE — PROGRESS NOTE ADULT - PROBLEM SELECTOR PLAN 1
+tachycardia to 126 and leukocytosis to 12.19 on admission. Afebrile. At this time, most likely 2/2 to pain/vomiting. Possible 2/2 to incomplete treatment of UTI but less likely given lack of urinary symptoms.  - will monitor off abx at this time  - Trend WBC daily - Associated with nausea, vomiting, constipation  - etiology unknown- possibly gastroparesis (autoimmune?) vs. inflammation vs. medication induced (possibly due to tacro?)  - U tox negative  - CT A/P unremarkable, MRI head unremarkable   - Unable to tolerate gastric emptying study - nondiagnostic     Plan:  [] Antiemetics as needed: zofran 8mg IV Q8 PRN, ativan 1 mg Q8 PRN if necessary   [] GI following, appreciate recs         - repeat study once pt able to tolerate PO         - f/u mesenteric duplex   [] famotidine IV QD, can increased to BID if necessary  [] dulcolax suppository QD PRN - Associated with nausea, vomiting, constipation  - etiology unknown- possibly gastroparesis (autoimmune?) vs. inflammation vs. medication induced (possibly due to tacro?)  - U tox negative  - CT A/P unremarkable, MRI head unremarkable   - Unable to tolerate gastric emptying study - nondiagnostic   - celiac workout negative     Plan:  [] Antiemetics as needed: zofran 8mg IV Q8 PRN, ativan 1 mg Q8 PRN if necessary   [] GI following, appreciate recs         - repeat study once pt able to tolerate PO         - f/u mesenteric duplex   [] famotidine IV QD, can increased to BID if necessary  [] dulcolax suppository QD PRN - Associated with nausea, vomiting, constipation  - etiology unknown- possibly gastroparesis (autoimmune?) vs. inflammation vs. medication induced (possibly due to tacro?)  - U tox negative  - CT A/P unremarkable, MRI head unremarkable   - Unable to tolerate gastric emptying study - nondiagnostic   - celiac workout negative   - mesenteric duplex negative    Plan:  [] Antiemetics as needed: zofran 8mg IV Q8 PRN, ativan 1 mg Q8 PRN if necessary    [] GI following, appreciate recs         - repeat study once pt able to tolerate PO         - f/u GI PCR, Stool CX, Stool O/O and fecal calpo (if diarrhea) ,CI esterase, porphyria work up (ALA and urinary porphobilinogen), lead and mercury   [] famotidine IV QD, can increased to BID if necessary  [] dulcolax suppository QD PRN

## 2023-01-30 NOTE — PROGRESS NOTE ADULT - PROBLEM SELECTOR PLAN 3
diffusely sharp localized to the center of the chest. CTA without evidence of PE. EKG showing sinus tachycardia. Troponins negative x2. More likely esophagitis 2/2 vomiting, less likely Boorhaavs or aortic dissection i/s/o stable HgB WNL. Less likely ACS i/s/o negative workup.  - antiemetics medication as above  - famotidine IV QD, can increased to BID if necessary  - Tylenol PRN +tachycardia to 126 and leukocytosis to 12.19 on admission. Afebrile. At this time, most likely 2/2 to pain/vomiting. Possible 2/2 to incomplete treatment of UTI but less likely given lack of urinary symptoms.    Plan:  [] will monitor off abx at this time  [] Trend WBC daily

## 2023-01-30 NOTE — PROGRESS NOTE ADULT - ASSESSMENT
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 GI consulted for further w/u and management of the above     #Nausea/Vomiting/Abdominal Pain   Presenting with 2 week onset of n/v/abdominal pain and inability to trolerate PO in the background of similar episode a couple months prior with workup at the time including EGD with small HH and otherwise normal gastric mucosa. Pt underwent multiple CTs which did not show a cause for her nausea. Infectious workup notable for +Trichmonas and U/A with pyuria. Etiology of ongoing symptoms unclear although would pursue further diagnostics and treat supportively as outlined below. Symptoms seem to be slowly improving with supprotive care       Recommendations:  -Repeat GI PCR, Stool CX, Stool O/O and fecal calpo   -Repeat UTox screen   -Mesenteric Doppler   -F/u CI esterase, porphyria work up (ALA and urinary porphobilinogen)  -F/Uceliac panel  -Send lead and mercury level  -Zofran QID PRN, Compazine+ Scopolamine PRN   -PPI BID, Maalox QID PRN      All recommendations are tentative until note is attested by attending.     Shad Salinas, PGY-4  Gastroenterology/Hepatology Fellow  Available on [employer] Teams   [phone] (Long Range Pager)  [phone] (Short Range Pager [facility])    After 5pm, please contact the on-call GI fellow. [phone]

## 2023-01-30 NOTE — PROGRESS NOTE ADULT - PROBLEM SELECTOR PLAN 5
found to be + during prior admission, flagyl started 1/26 however patient AMA 1/27. Did not continue treatment at home due to inability to tolerate PO intake  - IV flagyl since patient cannot tolerate PO intake   - educated about informing sexual partners - DVT ppx: lovenox as above  - Diet: advance as tolerated   - Dispo: pending

## 2023-01-30 NOTE — PROGRESS NOTE ADULT - PROBLEM SELECTOR PLAN 2
Associated with nausea, vomiting, constipation. s/p reglan x1, zofran x2 in ED  - Antiemetics as needed          - metoclopramide 5mg TID before meals, can titrate upt o 10mg TID if necessary         - zofran 8mg IV Q6 PRN         - can retry ativan if necessary   - GI following during prior admission, will reconsult         - did not tolerate gastric emptying study (vomited during exam)- can repeat study once pt able to tolerate PO         - CTAP unremarkable  - constipation        - dulcolax suppository QD PRN  - MR brain negative Associated with nausea, vomiting, constipation. s/p reglan x1, zofran x2 in ED  - Antiemetics as needed          - metoclopramide 5mg TID before meals, can titrate upt o 10mg TID if necessary         - zofran 8mg IV Q6 PRN         - can retry ativan if necessary   - GI following, appreciate recs         - did not tolerate gastric emptying study (vomited during exam)- can repeat study once pt able to tolerate PO         - CTAP unremarkable        - f/u mesenteric duplex   - constipation        - dulcolax suppository QD PRN  - MR brain negative - last admission:  tacro 4 BID with AM levels 30min prior to dose administration. (decreased to 4mg BID on 1/24)- per nephro, can consider continuing tacro at 3 mg BID or transition to pred 80 daily- discussed with patient aid wants tacrolimus (patient has reservations about starting prednisone -nausea on prednisone in past, weight gain)  - quantiferon negative-- in case pt needs rituximab in the future.    Plan:  [] nephro following, appreciate recs - f/u with nephro for dose of tacrolimus (dose 10am/10pm once started)  [] full dose lovenox (80mg BID) until pt above to tolerate PO, then eliquis 5mg PO BID  [] f/u tacro level daily - last admission:  tacro 4 BID with AM levels 30min prior to dose administration. (decreased to 4mg BID on 1/24)- per nephro, can consider continuing tacro at 3 mg BID or transition to pred 80 daily- discussed with patient aid wants tacrolimus (patient has reservations about starting prednisone -nausea on prednisone in past, weight gain)  - quantiferon negative-- in case pt needs rituximab in the future.    Plan:  [] nephro following, appreciate recs   [] d/c steroids  [] start tacrolimus 3mg BID (dose 10am/10pm once started)  [] full dose lovenox (80mg BID) until pt above to tolerate PO, then eliquis 5mg PO BID  [] f/u tacro level daily

## 2023-01-30 NOTE — PROGRESS NOTE ADULT - SUBJECTIVE AND OBJECTIVE BOX
PROGRESS NOTE:     Patient is a 32y old  Female who presents with a chief complaint of n/v + hemoptysis (29 Jan 2023 09:55)      SUBJECTIVE / OVERNIGHT EVENTS:    ADDITIONAL REVIEW OF SYSTEMS:    MEDICATIONS  (STANDING):  enoxaparin Injectable 80 milliGRAM(s) SubCutaneous every 12 hours  famotidine Injectable 20 milliGRAM(s) IV Push daily  influenza   Vaccine 0.5 milliLiter(s) IntraMuscular once  methylPREDNISolone sodium succinate Injectable 64 milliGRAM(s) IV Push daily  metroNIDAZOLE  IVPB 500 milliGRAM(s) IV Intermittent every 12 hours  pantoprazole  Injectable 40 milliGRAM(s) IV Push two times a day    MEDICATIONS  (PRN):  acetaminophen     Tablet .. 650 milliGRAM(s) Oral every 6 hours PRN Temp greater or equal to 38C (100.4F), Mild Pain (1 - 3)  albuterol    90 MICROgram(s) HFA Inhaler 2 Puff(s) Inhalation every 6 hours PRN Shortness of Breath and/or Wheezing  aluminum hydroxide/magnesium hydroxide/simethicone Suspension 30 milliLiter(s) Oral every 4 hours PRN Dyspepsia  bisacodyl Suppository 10 milliGRAM(s) Rectal daily PRN Constipation  LORazepam   Injectable 1 milliGRAM(s) IV Push every 8 hours PRN breakthrough nausea/vomiting  melatonin 3 milliGRAM(s) Oral at bedtime PRN Insomnia  prochlorperazine   Injectable 10 milliGRAM(s) IV Push every 12 hours PRN nause/vomiting  scopolamine 1 mG/72 Hr(s) Patch 1 Patch Transdermal every 72 hours PRN nausea/vomiting      CAPILLARY BLOOD GLUCOSE        I&O's Summary    29 Jan 2023 07:01  -  30 Jan 2023 07:00  --------------------------------------------------------  IN: 600 mL / OUT: 0 mL / NET: 600 mL        PHYSICAL EXAM:  Vital Signs Last 24 Hrs  T(C): 36.9 (30 Jan 2023 05:45), Max: 37 (29 Jan 2023 20:13)  T(F): 98.4 (30 Jan 2023 05:45), Max: 98.6 (29 Jan 2023 20:13)  HR: 103 (30 Jan 2023 05:45) (103 - 110)  BP: 98/70 (30 Jan 2023 05:45) (98/70 - 104/75)  BP(mean): --  RR: 18 (30 Jan 2023 05:45) (18 - 19)  SpO2: 98% (30 Jan 2023 05:45) (96% - 98%)    Parameters below as of 30 Jan 2023 05:45  Patient On (Oxygen Delivery Method): room air        GENERAL: No acute distress, well-developed  HEAD:  Atraumatic, Normocephalic  EYES: EOMI, PERRLA, conjunctiva and sclera clear  NECK: Supple, no lymphadenopathy, no JVD  CHEST/LUNG: CTAB; No wheezes, rales, or rhonchi  HEART: Regular rate and rhythm; No murmurs, rubs, or gallops  ABDOMEN: Soft, non-tender, non-distended; normal bowel sounds, no organomegaly  EXTREMITIES:  2+ peripheral pulses b/l, No clubbing, cyanosis, or edema  NEUROLOGY: A&O x 3, no focal deficits  SKIN: No rashes or lesions    LABS:                        11.6   12.14 )-----------( 734      ( 29 Jan 2023 09:52 )             38.1     01-29    142  |  109<H>  |  22  ----------------------------<  92  4.5   |  26  |  1.15    Ca    8.3<L>      29 Jan 2023 09:52  Phos  5.1     01-29  Mg     2.9     01-29    TPro  4.7<L>  /  Alb  1.2<L>  /  TBili  <0.1<L>  /  DBili  x   /  AST  10  /  ALT  <5<L>  /  AlkPhos  60  01-29          Urinalysis Basic - ( 28 Jan 2023 08:18 )    Color: Dark Yellow / Appearance: Slightly Turbid / SG: >1.050 / pH: x  Gluc: x / Ketone: Small  / Bili: Small / Urobili: Negative   Blood: x / Protein: >600 / Nitrite: Negative   Leuk Esterase: Negative / RBC: 136 /hpf / WBC 18 /HPF   Sq Epi: x / Non Sq Epi: 24 /hpf / Bacteria: Few        Culture - Urine (collected 28 Jan 2023 08:18)  Source: Clean Catch Clean Catch (Midstream)  Final Report (30 Jan 2023 00:11):    >=3 organisms. Probable collection contamination.        RADIOLOGY & ADDITIONAL TESTS:  Results Reviewed:   Imaging Personally Reviewed:  Electrocardiogram Personally Reviewed:    COORDINATION OF CARE:  Care Discussed with Consultants/Other Providers [Y/N]:  Prior or Outpatient Records Reviewed [Y/N]:   PROGRESS NOTE:     Patient is a 32y old  Female who presents with a chief complaint of n/v + hemoptysis (29 Jan 2023 09:55)      SUBJECTIVE / OVERNIGHT EVENTS:  No events overnight. Patient examined at bedside. PO tolerance slightly increased but did have an episode of vomiting overnight. Report mildly improved nausea this morning. Reports HA because hungry. All other ROS negative.     ADDITIONAL REVIEW OF SYSTEMS:    MEDICATIONS  (STANDING):  enoxaparin Injectable 80 milliGRAM(s) SubCutaneous every 12 hours  famotidine Injectable 20 milliGRAM(s) IV Push daily  influenza   Vaccine 0.5 milliLiter(s) IntraMuscular once  methylPREDNISolone sodium succinate Injectable 64 milliGRAM(s) IV Push daily  metroNIDAZOLE  IVPB 500 milliGRAM(s) IV Intermittent every 12 hours  pantoprazole  Injectable 40 milliGRAM(s) IV Push two times a day    MEDICATIONS  (PRN):  acetaminophen     Tablet .. 650 milliGRAM(s) Oral every 6 hours PRN Temp greater or equal to 38C (100.4F), Mild Pain (1 - 3)  albuterol    90 MICROgram(s) HFA Inhaler 2 Puff(s) Inhalation every 6 hours PRN Shortness of Breath and/or Wheezing  aluminum hydroxide/magnesium hydroxide/simethicone Suspension 30 milliLiter(s) Oral every 4 hours PRN Dyspepsia  bisacodyl Suppository 10 milliGRAM(s) Rectal daily PRN Constipation  LORazepam   Injectable 1 milliGRAM(s) IV Push every 8 hours PRN breakthrough nausea/vomiting  melatonin 3 milliGRAM(s) Oral at bedtime PRN Insomnia  prochlorperazine   Injectable 10 milliGRAM(s) IV Push every 12 hours PRN nause/vomiting  scopolamine 1 mG/72 Hr(s) Patch 1 Patch Transdermal every 72 hours PRN nausea/vomiting      CAPILLARY BLOOD GLUCOSE        I&O's Summary    29 Jan 2023 07:01  -  30 Jan 2023 07:00  --------------------------------------------------------  IN: 600 mL / OUT: 0 mL / NET: 600 mL        PHYSICAL EXAM:  Vital Signs Last 24 Hrs  T(C): 36.9 (30 Jan 2023 05:45), Max: 37 (29 Jan 2023 20:13)  T(F): 98.4 (30 Jan 2023 05:45), Max: 98.6 (29 Jan 2023 20:13)  HR: 103 (30 Jan 2023 05:45) (103 - 110)  BP: 98/70 (30 Jan 2023 05:45) (98/70 - 104/75)  BP(mean): --  RR: 18 (30 Jan 2023 05:45) (18 - 19)  SpO2: 98% (30 Jan 2023 05:45) (96% - 98%)    Parameters below as of 30 Jan 2023 05:45  Patient On (Oxygen Delivery Method): room air        GENERAL: No acute distress, well-developed  HEAD:  Atraumatic, Normocephalic  EYES: EOMI, PERRLA, conjunctiva and sclera clear  NECK: Supple, no lymphadenopathy, no JVD  CHEST/LUNG: CTAB; No wheezes, rales, or rhonchi  HEART: Regular rate and rhythm; No murmurs, rubs, or gallops  ABDOMEN: Soft, non-tender, non-distended; normal bowel sounds, no organomegaly  EXTREMITIES:  2+ peripheral pulses b/l, No clubbing, cyanosis, or edema  NEUROLOGY: A&O x 3, no focal deficits  SKIN: No rashes or lesions    LABS:                        11.6   12.14 )-----------( 734      ( 29 Jan 2023 09:52 )             38.1     01-29    142  |  109<H>  |  22  ----------------------------<  92  4.5   |  26  |  1.15    Ca    8.3<L>      29 Jan 2023 09:52  Phos  5.1     01-29  Mg     2.9     01-29    TPro  4.7<L>  /  Alb  1.2<L>  /  TBili  <0.1<L>  /  DBili  x   /  AST  10  /  ALT  <5<L>  /  AlkPhos  60  01-29          Urinalysis Basic - ( 28 Jan 2023 08:18 )    Color: Dark Yellow / Appearance: Slightly Turbid / SG: >1.050 / pH: x  Gluc: x / Ketone: Small  / Bili: Small / Urobili: Negative   Blood: x / Protein: >600 / Nitrite: Negative   Leuk Esterase: Negative / RBC: 136 /hpf / WBC 18 /HPF   Sq Epi: x / Non Sq Epi: 24 /hpf / Bacteria: Few        Culture - Urine (collected 28 Jan 2023 08:18)  Source: Clean Catch Clean Catch (Midstream)  Final Report (30 Jan 2023 00:11):    >=3 organisms. Probable collection contamination.        RADIOLOGY & ADDITIONAL TESTS:  Results Reviewed:   Imaging Personally Reviewed:  Electrocardiogram Personally Reviewed:    COORDINATION OF CARE:  Care Discussed with Consultants/Other Providers [Y/N]:  Prior or Outpatient Records Reviewed [Y/N]:

## 2023-01-30 NOTE — PROGRESS NOTE ADULT - PROBLEM SELECTOR PLAN 4
- will reconsult nephro  - last admission:  tacro 4 BID with AM levels 30min prior to dose administration. (decreased to 4mg BID on 1/24)- per nephro, can consider continuing tacro at 3 mg BID or transition to pred 80 daily- pending discussion with patient today as patient has reservations about starting prednisone (nausea on prednisone in past, weight gain)   - full dose lovenox (80mg BID) until pt above to tolerate PO, then eliquis 5mg PO BID   - quantiferon negative-- in case pt needs rituximab in the future. - nephro following, appreciate recs   - last admission:  tacro 4 BID with AM levels 30min prior to dose administration. (decreased to 4mg BID on 1/24)- per nephro, can consider continuing tacro at 3 mg BID or transition to pred 80 daily- pending discussion with patient today as patient has reservations about starting prednisone (nausea on prednisone in past, weight gain)   - full dose lovenox (80mg BID) until pt above to tolerate PO, then eliquis 5mg PO BID   - quantiferon negative-- in case pt needs rituximab in the future. found to be + during prior admission, flagyl started 1/28 however patient AMA 1/27. Did not continue treatment at home due to inability to tolerate PO intake    Plan:  [] c/w IV flagyl (1/28-) for 7d course (ending on 2/3)  [] educated about informing sexual partners

## 2023-01-30 NOTE — PROGRESS NOTE ADULT - SUBJECTIVE AND OBJECTIVE BOX
Gastroenterology Progress Note    Interval Events:   Still with bouts of nausea but able to eat fruit yesterday and tolerate liquids.    Allergies:  codeine (Hives)      Hospital Medications:  acetaminophen     Tablet .. 650 milliGRAM(s) Oral every 6 hours PRN  albuterol    90 MICROgram(s) HFA Inhaler 2 Puff(s) Inhalation every 6 hours PRN  aluminum hydroxide/magnesium hydroxide/simethicone Suspension 30 milliLiter(s) Oral every 4 hours PRN  bisacodyl Suppository 10 milliGRAM(s) Rectal daily PRN  enoxaparin Injectable 80 milliGRAM(s) SubCutaneous every 12 hours  famotidine Injectable 20 milliGRAM(s) IV Push daily  influenza   Vaccine 0.5 milliLiter(s) IntraMuscular once  LORazepam   Injectable 1 milliGRAM(s) IV Push every 8 hours PRN  melatonin 3 milliGRAM(s) Oral at bedtime PRN  methylPREDNISolone sodium succinate Injectable 64 milliGRAM(s) IV Push daily  metroNIDAZOLE  IVPB 500 milliGRAM(s) IV Intermittent every 12 hours  pantoprazole  Injectable 40 milliGRAM(s) IV Push two times a day  prochlorperazine   Injectable 10 milliGRAM(s) IV Push every 12 hours PRN  scopolamine 1 mG/72 Hr(s) Patch 1 Patch Transdermal every 72 hours PRN      ROS: 14 point ROS negative unless otherwise state in subjective    PHYSICAL EXAM:   Vital Signs:  Vital Signs Last 24 Hrs  T(C): 36.9 (30 Jan 2023 05:45), Max: 37 (29 Jan 2023 20:13)  T(F): 98.4 (30 Jan 2023 05:45), Max: 98.6 (29 Jan 2023 20:13)  HR: 103 (30 Jan 2023 05:45) (103 - 110)  BP: 98/70 (30 Jan 2023 05:45) (98/70 - 104/75)  BP(mean): --  RR: 18 (30 Jan 2023 05:45) (18 - 19)  SpO2: 98% (30 Jan 2023 05:45) (96% - 98%)    Parameters below as of 30 Jan 2023 05:45  Patient On (Oxygen Delivery Method): room air    GENERAL:  No acute distress  HEENT:  NCAT, no scleral icterus  CHEST: no resp distress  HEART:  RRR  ABDOMEN:  Soft, non-tender,distended, normoactive bowel sounds  NEURO:  Alert and oriented x 3    LABS:                        12.2   12.22 )-----------( 693      ( 30 Jan 2023 07:48 )             39.3     Mean Cell Volume: 97.3 fl (01-30-23 @ 07:48)    01-30    138  |  104  |  31<H>  ----------------------------<  87  4.1   |  28  |  1.16    Ca    8.1<L>      30 Jan 2023 07:48  Phos  4.5     01-30  Mg     2.8     01-30    TPro  4.7<L>  /  Alb  1.2<L>  /  TBili  <0.1<L>  /  DBili  x   /  AST  10  /  ALT  <5<L>  /  AlkPhos  60  01-29    LIVER FUNCTIONS - ( 29 Jan 2023 09:52 )  Alb: 1.2 g/dL / Pro: 4.7 g/dL / ALK PHOS: 60 U/L / ALT: <5 U/L / AST: 10 U/L / GGT: x           Imaging: Reviewed in [place]

## 2023-01-31 LAB
C1INH FUNCTIONAL/C1INH TOTAL MFR SERPL: 44 MG/DL — HIGH (ref 21–39)
GLUCOSE BLDC GLUCOMTR-MCNC: 73 MG/DL — SIGNIFICANT CHANGE UP (ref 70–99)

## 2023-01-31 PROCEDURE — 99233 SBSQ HOSP IP/OBS HIGH 50: CPT | Mod: GC

## 2023-01-31 PROCEDURE — 78264 GASTRIC EMPTYING IMG STUDY: CPT | Mod: 26

## 2023-01-31 RX ORDER — ACETAMINOPHEN 500 MG
650 TABLET ORAL ONCE
Refills: 0 | Status: COMPLETED | OUTPATIENT
Start: 2023-02-01 | End: 2023-02-01

## 2023-01-31 RX ORDER — RITUXIMAB 10 MG/ML
1000 INJECTION, SOLUTION INTRAVENOUS ONCE
Refills: 0 | Status: COMPLETED | OUTPATIENT
Start: 2023-02-01 | End: 2023-02-01

## 2023-01-31 RX ORDER — DIPHENHYDRAMINE HCL 50 MG
25 CAPSULE ORAL ONCE
Refills: 0 | Status: COMPLETED | OUTPATIENT
Start: 2023-02-01 | End: 2023-02-01

## 2023-01-31 RX ADMIN — FAMOTIDINE 20 MILLIGRAM(S): 10 INJECTION INTRAVENOUS at 15:26

## 2023-01-31 RX ADMIN — ENOXAPARIN SODIUM 80 MILLIGRAM(S): 100 INJECTION SUBCUTANEOUS at 05:31

## 2023-01-31 RX ADMIN — Medication 100 MILLIGRAM(S): at 17:39

## 2023-01-31 RX ADMIN — PANTOPRAZOLE SODIUM 40 MILLIGRAM(S): 20 TABLET, DELAYED RELEASE ORAL at 17:38

## 2023-01-31 RX ADMIN — Medication 30 MILLILITER(S): at 04:22

## 2023-01-31 RX ADMIN — Medication 1 MILLIGRAM(S): at 08:07

## 2023-01-31 RX ADMIN — Medication 100 MILLIGRAM(S): at 05:32

## 2023-01-31 RX ADMIN — PANTOPRAZOLE SODIUM 40 MILLIGRAM(S): 20 TABLET, DELAYED RELEASE ORAL at 05:31

## 2023-01-31 RX ADMIN — ENOXAPARIN SODIUM 80 MILLIGRAM(S): 100 INJECTION SUBCUTANEOUS at 17:39

## 2023-01-31 RX ADMIN — Medication 1 MILLIGRAM(S): at 17:32

## 2023-01-31 NOTE — PROGRESS NOTE ADULT - PROBLEM SELECTOR PLAN 4
found to be + during prior admission, flagyl started 1/28 however patient AMA 1/27. Did not continue treatment at home due to inability to tolerate PO intake    Plan:  [] c/w IV flagyl (1/28-) for 7d course (ending on 2/3)  [] educated about informing sexual partners found to be + during prior admission, flagyl started 1/28 however patient AMA 1/27. Did not continue treatment at home due to inability to tolerate PO intake    Plan:  [] c/w IV flagyl (1/28-) for 7d course (ending on 2/3). Due to possibility of IV Flagyl causing GI side effects, will discuss with pharmacy the feasibility of intravaginal flagyl for remainder of course.  [] educated about informing sexual partners

## 2023-01-31 NOTE — PROGRESS NOTE ADULT - PROBLEM SELECTOR PLAN 1
Pt with biopsy proven Minimal Change Disease presenting to the hospital with worsening proteinuria and anasarca and N/V. Pt previously admitted in 6/2022 with similar complaints noted to have TP/CR ratio of 3.8g, at that time started on steroid therapy and discharged with nephrology follow up. Now off steroids for past one month. Had poor nephrology outpatient follow up. C/w with Eliquis 5mg BID (pt with severe nephrotic syndrome are at high risk for thromboembolic events). START PCP ppx with bactrim. C/w Tacrolimus 3mg BID for now as started during recent admission for nephrotic disease or she can be switched to 64mg of IV Solumedrol daily, if unable to tolerate PO. Prefer steroid sparing regimen.  If starting Tacro, please monitor tacrolimus levels daily 30 minutes prior to AM dose (target level 4-6).     Pt. for Rituximab 2/1/23.     If you have any questions, please feel free to contact me  Jose Raul Patterson  Nephrology Fellow  [phone]; Prefer [employer] TEAMS  (After 5pm or on weekends please page the on-call fellow)

## 2023-01-31 NOTE — PROGRESS NOTE ADULT - PROBLEM SELECTOR PLAN 1
- Associated with nausea, vomiting, constipation  - etiology unknown- possibly gastroparesis (autoimmune?) vs. inflammation vs. medication induced (possibly due to tacro?)  - U tox negative  - CT A/P unremarkable, MRI head unremarkable   - Unable to tolerate gastric emptying study - nondiagnostic   - celiac workout negative   - mesenteric duplex negative    Plan:  [] Antiemetics as needed: zofran 8mg IV Q8 PRN, ativan 1 mg Q8 PRN if necessary    [] GI following, appreciate recs         - repeat study once pt able to tolerate PO         - f/u GI PCR, Stool CX, Stool O/O and fecal calpo (if diarrhea) ,CI esterase, porphyria work up (ALA and urinary porphobilinogen), lead and mercury   [] famotidine IV QD, can increased to BID if necessary  [] dulcolax suppository QD PRN

## 2023-01-31 NOTE — PROGRESS NOTE ADULT - PROBLEM SELECTOR PLAN 2
- last admission:  tacro 4 BID with AM levels 30min prior to dose administration. (decreased to 4mg BID on 1/24)- per nephro, can consider continuing tacro at 3 mg BID or transition to pred 80 daily- discussed with patient aid wants tacrolimus (patient has reservations about starting prednisone -nausea on prednisone in past, weight gain)  - quantiferon negative-- in case pt needs rituximab in the future.    Plan:  [] nephro following, appreciate recs   [] d/c steroids  [] start tacrolimus 3mg BID (dose 10am/10pm once started)  [] full dose lovenox (80mg BID) until pt above to tolerate PO, then eliquis 5mg PO BID  [] f/u tacro level daily - last admission:  tacro 4 BID with AM levels 30min prior to dose administration. (decreased to 4mg BID on 1/24)- per nephro, can consider continuing tacro at 3 mg BID or transition to pred 80 daily- discussed with patient aid wants tacrolimus (patient has reservations about starting prednisone -nausea on prednisone in past, weight gain)  - quantiferon negative-- in case pt needs rituximab in the future.    Plan:  [] nephro following, appreciate recs   [] c/w Tacro 3mg BID (dose 10am/10pm once started), per nephro planning to start rituxan on 2/1.  [] full dose lovenox (80mg BID) until pt above to tolerate PO, then eliquis 5mg PO BID  [] f/u tacro level daily

## 2023-01-31 NOTE — PROGRESS NOTE ADULT - SUBJECTIVE AND OBJECTIVE BOX
Interval hx:    MEDICATIONS  (STANDING):  enoxaparin Injectable 80 milliGRAM(s) SubCutaneous every 12 hours  famotidine Injectable 20 milliGRAM(s) IV Push daily  influenza   Vaccine 0.5 milliLiter(s) IntraMuscular once  metroNIDAZOLE  IVPB 500 milliGRAM(s) IV Intermittent every 12 hours  pantoprazole  Injectable 40 milliGRAM(s) IV Push two times a day  tacrolimus 3 milliGRAM(s) Oral <User Schedule>    MEDICATIONS  (PRN):  acetaminophen     Tablet .. 650 milliGRAM(s) Oral every 6 hours PRN Temp greater or equal to 38C (100.4F), Mild Pain (1 - 3)  albuterol    90 MICROgram(s) HFA Inhaler 2 Puff(s) Inhalation every 6 hours PRN Shortness of Breath and/or Wheezing  aluminum hydroxide/magnesium hydroxide/simethicone Suspension 30 milliLiter(s) Oral every 4 hours PRN Dyspepsia  bisacodyl Suppository 10 milliGRAM(s) Rectal daily PRN Constipation  LORazepam   Injectable 1 milliGRAM(s) IV Push every 8 hours PRN breakthrough nausea/vomiting  melatonin 3 milliGRAM(s) Oral at bedtime PRN Insomnia  ondansetron Injectable 8 milliGRAM(s) IV Push every 8 hours PRN Nausea and/or Vomiting    Vital Signs (24 Hrs):  T(C): 36.9 (01-31-23 @ 04:37), Max: 37.4 (01-30-23 @ 19:44)  HR: 88 (01-31-23 @ 04:37) (88 - 108)  BP: 103/69 (01-31-23 @ 04:37) (102/69 - 111/74)  RR: 18 (01-31-23 @ 04:37) (18 - 19)  SpO2: 98% (01-31-23 @ 04:37) (96% - 98%)  Wt(kg): --  Daily     Daily     I&O's Summary    30 Jan 2023 07:01  -  31 Jan 2023 07:00  --------------------------------------------------------  IN: 640 mL / OUT: 0 mL / NET: 640 mL    Physical Exam:     Interval hx: Pt refused Tacro in labs in AM.    MEDICATIONS  (STANDING):  enoxaparin Injectable 80 milliGRAM(s) SubCutaneous every 12 hours  famotidine Injectable 20 milliGRAM(s) IV Push daily  influenza   Vaccine 0.5 milliLiter(s) IntraMuscular once  metroNIDAZOLE  IVPB 500 milliGRAM(s) IV Intermittent every 12 hours  pantoprazole  Injectable 40 milliGRAM(s) IV Push two times a day  tacrolimus 3 milliGRAM(s) Oral <User Schedule>    MEDICATIONS  (PRN):  acetaminophen     Tablet .. 650 milliGRAM(s) Oral every 6 hours PRN Temp greater or equal to 38C (100.4F), Mild Pain (1 - 3)  albuterol    90 MICROgram(s) HFA Inhaler 2 Puff(s) Inhalation every 6 hours PRN Shortness of Breath and/or Wheezing  aluminum hydroxide/magnesium hydroxide/simethicone Suspension 30 milliLiter(s) Oral every 4 hours PRN Dyspepsia  bisacodyl Suppository 10 milliGRAM(s) Rectal daily PRN Constipation  LORazepam   Injectable 1 milliGRAM(s) IV Push every 8 hours PRN breakthrough nausea/vomiting  melatonin 3 milliGRAM(s) Oral at bedtime PRN Insomnia  ondansetron Injectable 8 milliGRAM(s) IV Push every 8 hours PRN Nausea and/or Vomiting    Vital Signs (24 Hrs):  T(C): 36.9 (01-31-23 @ 04:37), Max: 37.4 (01-30-23 @ 19:44)  HR: 88 (01-31-23 @ 04:37) (88 - 108)  BP: 103/69 (01-31-23 @ 04:37) (102/69 - 111/74)  RR: 18 (01-31-23 @ 04:37) (18 - 19)  SpO2: 98% (01-31-23 @ 04:37) (96% - 98%)  Wt(kg): --  Daily     Daily     I&O's Summary    30 Jan 2023 07:01  -  31 Jan 2023 07:00  --------------------------------------------------------  IN: 640 mL / OUT: 0 mL / NET: 640 mL    Physical Exam:  GENERAL: No acute distress, well-developed  HEAD:  Atraumatic, Normocephalic  EYES: EOMI, PERRLA, conjunctiva and sclera clear  NECK: Supple, no lymphadenopathy, no JVD  CHEST/LUNG: CTAB; No wheezes, rales, or rhonchi  HEART: Regular rate and rhythm; No murmurs, rubs, or gallops  ABDOMEN: Soft, non-tender, non-distended; normal bowel sounds, no organomegaly  EXTREMITIES:  2+ peripheral pulses b/l, No clubbing, cyanosis, or edema  NEUROLOGY: A&O x 3, no focal deficits  SKIN: No rashes or lesions    LABS:  cret                        12.2   12.22 )-----------( 693      ( 30 Jan 2023 07:48 )             39.3     01-30    138  |  104  |  31<H>  ----------------------------<  87  4.1   |  28  |  1.16    Ca    8.1<L>      30 Jan 2023 07:48  Phos  4.5     01-30  Mg     2.8     01-30

## 2023-01-31 NOTE — PROGRESS NOTE ADULT - SUBJECTIVE AND OBJECTIVE BOX
[facility] DIVISION OF KIDNEY DISEASES AND HYPERTENSION -- FOLLOW UP NOTE  --------------------------------------------------------------------------------  HPI: Pt. is a 32 year old female with PMH of Minimal Change Disease who presented to the hospital with complaints of nausea, vomiting diarrhea and abdominal pain for the past few days. Patient left AMA 2 days prior after she was admitted for the same N/V symptoms. Seen by nephrology last admission, and was placed on tacrolimus as she had not been taking steroids for more than a month. Nephrology team again consulted for her history of MCKENNA and significant proteinuria. On presentation, patient with creatinine of 0.98, relatively stable from when she left. UA with still >600 protein dark color with bilirubin, Large blood with 18 RBC. Uprot/creat not done. Patient endorsing inability to swallow pills due to N/V. +LE swelling and foamy urine States she was following with a nephrologist Dr. John Anguiano from [facility]. Last visit was in October 2022. labs in Oct with Uprot/creat of 1.39 and Creatinine of 0.6.     Pt. seen this AM. was refusing Tacrolimus, advised her to resume. States she was nauseous and unable to tolerate. Breathing is ok, still with some edema.         PAST HISTORY  --------------------------------------------------------------------------------  No significant changes to PMH, PSH, FHx, SHx, unless otherwise noted    ALLERGIES & MEDICATIONS  --------------------------------------------------------------------------------  Allergies    codeine (Hives)    Intolerances      Standing Inpatient Medications  enoxaparin Injectable 80 milliGRAM(s) SubCutaneous every 12 hours  famotidine Injectable 20 milliGRAM(s) IV Push daily  influenza   Vaccine 0.5 milliLiter(s) IntraMuscular once  metroNIDAZOLE  IVPB 500 milliGRAM(s) IV Intermittent every 12 hours  pantoprazole  Injectable 40 milliGRAM(s) IV Push two times a day  tacrolimus 3 milliGRAM(s) Oral <User Schedule>    PRN Inpatient Medications  acetaminophen     Tablet .. 650 milliGRAM(s) Oral every 6 hours PRN  albuterol    90 MICROgram(s) HFA Inhaler 2 Puff(s) Inhalation every 6 hours PRN  aluminum hydroxide/magnesium hydroxide/simethicone Suspension 30 milliLiter(s) Oral every 4 hours PRN  bisacodyl Suppository 10 milliGRAM(s) Rectal daily PRN  LORazepam   Injectable 1 milliGRAM(s) IV Push every 8 hours PRN  melatonin 3 milliGRAM(s) Oral at bedtime PRN  ondansetron Injectable 8 milliGRAM(s) IV Push every 8 hours PRN      REVIEW OF SYSTEMS  --------------------------------------------------------------------------------  As per HPI    VITALS/PHYSICAL EXAM  --------------------------------------------------------------------------------  T(C): 36.9 (01-31-23 @ 04:37), Max: 37.4 (01-30-23 @ 19:44)  HR: 88 (01-31-23 @ 04:37) (88 - 105)  BP: 103/69 (01-31-23 @ 04:37) (103/69 - 111/74)  RR: 18 (01-31-23 @ 04:37) (18 - 18)  SpO2: 98% (01-31-23 @ 04:37) (96% - 98%)  Wt(kg): --        01-30-23 @ 07:01  -  01-31-23 @ 07:00  --------------------------------------------------------  IN: 640 mL / OUT: 0 mL / NET: 640 mL      Physical Exam:  	Gen: NAD  	HEENT: MMM  	Pulm: CTA B/L  	CV: S1S2  	Abd: Soft, +BS   	Ext:+ LE edema B/L  	Neuro: Awake  	Skin: Warm and dry  	Vascular access:      LABS/STUDIES  --------------------------------------------------------------------------------              12.2   12.22 >-----------<  693      [01-30-23 @ 07:48]              39.3     138  |  104  |  31  ----------------------------<  87      [01-30-23 @ 07:48]  4.1   |  28  |  1.16        Ca     8.1     [01-30-23 @ 07:48]      Mg     2.8     [01-30-23 @ 07:48]      Phos  4.5     [01-30-23 @ 07:48]            Creatinine Trend:  SCr 1.16 [01-30 @ 07:48]  SCr 1.15 [01-29 @ 09:52]  SCr 0.98 [01-28 @ 03:12]  SCr 1.01 [01-26 @ 10:18]  SCr 1.09 [01-24 @ 11:26]    Urinalysis - [01-28-23 @ 08:18]      Color Dark Yellow / Appearance Slightly Turbid / SG >1.050 / pH 6.5      Gluc Negative / Ketone Small  / Bili Small / Urobili Negative       Blood Large / Protein >600 / Leuk Est Negative / Nitrite Negative       / WBC 18 / Hyaline 134 / Gran  / Sq Epi  / Non Sq Epi 24 / Bacteria Few      Lipid: chol 521, , HDL 48, LDL --      [01-18-23 @ 05:58]    HBsAb Nonreact      [01-18-23 @ 05:58]  HBsAg Nonreact      [01-18-23 @ 05:58]  HBcAb Nonreact      [01-18-23 @ 05:58]  HCV 0.07, Nonreact      [01-18-23 @ 05:58]  HIV Nonreact      [01-21-23 @ 07:41]    SHELTON: titer Negative, pattern --      [01-18-23 @ 05:58]  dsDNA <12      [01-18-23 @ 05:58]  C3 Complement 152      [01-18-23 @ 05:58]  C4 Complement 38      [01-18-23 @ 05:58]  PLA2R: ALESSANDRA <1.8, IFA --      [01-18-23 @ 05:58]  Free Light Chains: kappa 2.61, lambda 1.05, ratio = 2.49      [01-18 @ 05:58]  Immunofixation Serum:   Weak IgM Kappa Band Identified    Reference Range: None Detected      [01-18-23 @ 05:58]  SPEP Interpretation: Weak Gamma-Migrating Paraprotein Identified      [01-18-23 @ 05:58]

## 2023-01-31 NOTE — PROVIDER CONTACT NOTE (OTHER) - ASSESSMENT
patient A&Ox4, vitals stable, no s/s of distress, no chest pain, shortness of breath, resting in bed.

## 2023-01-31 NOTE — PROVIDER CONTACT NOTE (OTHER) - ACTION/TREATMENT ORDERED:
provider acknowledge patient refusal, will adjust dose with primary team. continue with plan of care.

## 2023-01-31 NOTE — PROVIDER CONTACT NOTE (OTHER) - SITUATION
patient refused tacrolimus, stated "Doctor came and said they will change the order and it will not be given anymore"

## 2023-01-31 NOTE — PROGRESS NOTE ADULT - PROBLEM SELECTOR PLAN 3
+tachycardia to 126 and leukocytosis to 12.19 on admission. Afebrile. At this time, most likely 2/2 to pain/vomiting. Possible 2/2 to incomplete treatment of UTI but less likely given lack of urinary symptoms.    Plan:  [] will monitor off abx at this time  [] Trend WBC daily

## 2023-01-31 NOTE — PROGRESS NOTE ADULT - ASSESSMENT
32F with MCKENNA and asthma p/w chest pain, abdominal pain, n/v and + hemoptysis as a readmission after leaving AMA on 1/26.     DDx for n/v: most likely gastroperesis, less likely obstruction or biliary in origin.   DDx for chest pain: most likely gerd vs esophagitis, less likely Boerhaaves, ACS, aortic dissection, infectious, PE.  32F with MCKENNA and asthma p/w chest pain, abdominal pain, n/v and + hemoptysis as a readmission after leaving AMA on 1/26.     DDx for n/v: etiology unclear, emptying study negative for gastroparesis.  DDx for chest pain: most likely gerd vs esophagitis, less likely Boerhaaves, ACS, aortic dissection, infectious, PE.

## 2023-02-01 LAB
ALBUMIN SERPL ELPH-MCNC: 1.3 G/DL — LOW (ref 3.3–5)
ALP SERPL-CCNC: 50 U/L — SIGNIFICANT CHANGE UP (ref 40–120)
ALT FLD-CCNC: 24 U/L — SIGNIFICANT CHANGE UP (ref 10–45)
ANION GAP SERPL CALC-SCNC: 6 MMOL/L — SIGNIFICANT CHANGE UP (ref 5–17)
AST SERPL-CCNC: 35 U/L — SIGNIFICANT CHANGE UP (ref 10–40)
BASOPHILS # BLD AUTO: 0.04 K/UL — SIGNIFICANT CHANGE UP (ref 0–0.2)
BASOPHILS NFR BLD AUTO: 0.5 % — SIGNIFICANT CHANGE UP (ref 0–2)
BILIRUB SERPL-MCNC: <0.1 MG/DL — LOW (ref 0.2–1.2)
BUN SERPL-MCNC: 22 MG/DL — SIGNIFICANT CHANGE UP (ref 7–23)
C1INH FUNCTIONAL FLD-MCNC: >93 — SIGNIFICANT CHANGE UP
CALCIUM SERPL-MCNC: 7.8 MG/DL — LOW (ref 8.4–10.5)
CHLORIDE SERPL-SCNC: 106 MMOL/L — SIGNIFICANT CHANGE UP (ref 96–108)
CO2 SERPL-SCNC: 27 MMOL/L — SIGNIFICANT CHANGE UP (ref 22–31)
CREAT SERPL-MCNC: 0.84 MG/DL — SIGNIFICANT CHANGE UP (ref 0.5–1.3)
EGFR: 95 ML/MIN/1.73M2 — SIGNIFICANT CHANGE UP
EOSINOPHIL # BLD AUTO: 0.12 K/UL — SIGNIFICANT CHANGE UP (ref 0–0.5)
EOSINOPHIL NFR BLD AUTO: 1.6 % — SIGNIFICANT CHANGE UP (ref 0–6)
GLUCOSE SERPL-MCNC: 81 MG/DL — SIGNIFICANT CHANGE UP (ref 70–99)
HCT VFR BLD CALC: 37.3 % — SIGNIFICANT CHANGE UP (ref 34.5–45)
HGB BLD-MCNC: 11.7 G/DL — SIGNIFICANT CHANGE UP (ref 11.5–15.5)
IMM GRANULOCYTES NFR BLD AUTO: 0.3 % — SIGNIFICANT CHANGE UP (ref 0–0.9)
LYMPHOCYTES # BLD AUTO: 3.24 K/UL — SIGNIFICANT CHANGE UP (ref 1–3.3)
LYMPHOCYTES # BLD AUTO: 44 % — SIGNIFICANT CHANGE UP (ref 13–44)
MAGNESIUM SERPL-MCNC: 2.6 MG/DL — SIGNIFICANT CHANGE UP (ref 1.6–2.6)
MCHC RBC-ENTMCNC: 30.6 PG — SIGNIFICANT CHANGE UP (ref 27–34)
MCHC RBC-ENTMCNC: 31.4 GM/DL — LOW (ref 32–36)
MCV RBC AUTO: 97.6 FL — SIGNIFICANT CHANGE UP (ref 80–100)
MERCURY SERPL-MCNC: 1.6 UG/L — SIGNIFICANT CHANGE UP (ref 0–14.9)
MONOCYTES # BLD AUTO: 0.81 K/UL — SIGNIFICANT CHANGE UP (ref 0–0.9)
MONOCYTES NFR BLD AUTO: 11 % — SIGNIFICANT CHANGE UP (ref 2–14)
NEUTROPHILS # BLD AUTO: 3.14 K/UL — SIGNIFICANT CHANGE UP (ref 1.8–7.4)
NEUTROPHILS NFR BLD AUTO: 42.6 % — LOW (ref 43–77)
NRBC # BLD: 0 /100 WBCS — SIGNIFICANT CHANGE UP (ref 0–0)
PHOSPHATE SERPL-MCNC: 3.3 MG/DL — SIGNIFICANT CHANGE UP (ref 2.5–4.5)
PLATELET # BLD AUTO: 558 K/UL — HIGH (ref 150–400)
POTASSIUM SERPL-MCNC: 3.9 MMOL/L — SIGNIFICANT CHANGE UP (ref 3.5–5.3)
POTASSIUM SERPL-SCNC: 3.9 MMOL/L — SIGNIFICANT CHANGE UP (ref 3.5–5.3)
PROT SERPL-MCNC: 4 G/DL — LOW (ref 6–8.3)
RBC # BLD: 3.82 M/UL — SIGNIFICANT CHANGE UP (ref 3.8–5.2)
RBC # FLD: 14.6 % — HIGH (ref 10.3–14.5)
SODIUM SERPL-SCNC: 139 MMOL/L — SIGNIFICANT CHANGE UP (ref 135–145)
TACROLIMUS SERPL-MCNC: <2 NG/ML — SIGNIFICANT CHANGE UP
WBC # BLD: 7.37 K/UL — SIGNIFICANT CHANGE UP (ref 3.8–10.5)
WBC # FLD AUTO: 7.37 K/UL — SIGNIFICANT CHANGE UP (ref 3.8–10.5)

## 2023-02-01 PROCEDURE — 99233 SBSQ HOSP IP/OBS HIGH 50: CPT

## 2023-02-01 PROCEDURE — 99233 SBSQ HOSP IP/OBS HIGH 50: CPT | Mod: GC

## 2023-02-01 PROCEDURE — 99232 SBSQ HOSP IP/OBS MODERATE 35: CPT | Mod: GC

## 2023-02-01 RX ORDER — SENNA PLUS 8.6 MG/1
1 TABLET ORAL AT BEDTIME
Refills: 0 | Status: DISCONTINUED | OUTPATIENT
Start: 2023-02-01 | End: 2023-02-03

## 2023-02-01 RX ORDER — FAMOTIDINE 10 MG/ML
20 INJECTION INTRAVENOUS ONCE
Refills: 0 | Status: COMPLETED | OUTPATIENT
Start: 2023-02-01 | End: 2023-02-01

## 2023-02-01 RX ORDER — POLYETHYLENE GLYCOL 3350 17 G/17G
17 POWDER, FOR SOLUTION ORAL DAILY
Refills: 0 | Status: DISCONTINUED | OUTPATIENT
Start: 2023-02-01 | End: 2023-02-03

## 2023-02-01 RX ADMIN — TACROLIMUS 3 MILLIGRAM(S): 5 CAPSULE ORAL at 21:01

## 2023-02-01 RX ADMIN — FAMOTIDINE 20 MILLIGRAM(S): 10 INJECTION INTRAVENOUS at 13:31

## 2023-02-01 RX ADMIN — ENOXAPARIN SODIUM 80 MILLIGRAM(S): 100 INJECTION SUBCUTANEOUS at 05:35

## 2023-02-01 RX ADMIN — TACROLIMUS 3 MILLIGRAM(S): 5 CAPSULE ORAL at 10:11

## 2023-02-01 RX ADMIN — RITUXIMAB 1000 MILLIGRAM(S): 10 INJECTION, SOLUTION INTRAVENOUS at 13:24

## 2023-02-01 RX ADMIN — PANTOPRAZOLE SODIUM 40 MILLIGRAM(S): 20 TABLET, DELAYED RELEASE ORAL at 05:37

## 2023-02-01 RX ADMIN — Medication 30 MILLILITER(S): at 01:44

## 2023-02-01 RX ADMIN — Medication 25 MILLIGRAM(S): at 12:09

## 2023-02-01 RX ADMIN — Medication 1 MILLIGRAM(S): at 10:06

## 2023-02-01 RX ADMIN — POLYETHYLENE GLYCOL 3350 17 GRAM(S): 17 POWDER, FOR SOLUTION ORAL at 12:25

## 2023-02-01 RX ADMIN — Medication 125 MILLIGRAM(S): at 12:12

## 2023-02-01 RX ADMIN — PANTOPRAZOLE SODIUM 40 MILLIGRAM(S): 20 TABLET, DELAYED RELEASE ORAL at 18:51

## 2023-02-01 RX ADMIN — Medication 100 MILLIGRAM(S): at 18:48

## 2023-02-01 RX ADMIN — FAMOTIDINE 20 MILLIGRAM(S): 10 INJECTION INTRAVENOUS at 03:12

## 2023-02-01 RX ADMIN — Medication 1 MILLIGRAM(S): at 20:50

## 2023-02-01 RX ADMIN — ENOXAPARIN SODIUM 80 MILLIGRAM(S): 100 INJECTION SUBCUTANEOUS at 18:48

## 2023-02-01 RX ADMIN — Medication 100 MILLIGRAM(S): at 05:35

## 2023-02-01 RX ADMIN — Medication 650 MILLIGRAM(S): at 12:08

## 2023-02-01 NOTE — PROGRESS NOTE ADULT - PROBLEM SELECTOR PLAN 1
- Associated with nausea, vomiting, constipation  - etiology unknown- possibly gastroparesis (autoimmune?) vs. inflammation vs. medication induced (possibly due to tacro?)  - U tox negative  - CT A/P unremarkable, MRI head unremarkable   - Unable to tolerate gastric emptying study - nondiagnostic   - celiac workout negative   - mesenteric duplex negative    Plan:  [] Antiemetics as needed: zofran 8mg IV Q8 PRN, ativan 1 mg Q8 PRN if necessary    [] GI following, appreciate recs         - repeat study once pt able to tolerate PO         - f/u GI PCR, Stool CX, Stool O/O and fecal calpo (if diarrhea) ,CI esterase, porphyria work up (ALA and urinary porphobilinogen), lead and mercury   [] famotidine IV QD, can increased to BID if necessary  [] dulcolax suppository QD PRN - Associated with nausea, vomiting, constipation  - etiology unknown- possibly gastroparesis (autoimmune?) vs. inflammation vs. medication induced (possibly due to tacro?)  - U tox negative  - CT A/P unremarkable, MRI head unremarkable   - repeat gastric emptying study negative   - celiac workout negative   - mesenteric duplex negative    Plan:  [] Antiemetics as needed: zofran 8mg IV Q8 PRN, ativan 1 mg Q8 PRN if necessary    [] GI following, appreciate recs         - f/u GI PCR, Stool CX, Stool O/O and fecal calpo (if diarrhea)         - recommend Soft bite sized diet as tolerated, Zofran QID PRN, Compazine+ Scopolamine PRN, PPI BID, Maalox         QID PRN, GI signing off   [] famotidine IV QD, can increased to BID if necessary  [] dulcolax suppository QD PRN

## 2023-02-01 NOTE — PROGRESS NOTE ADULT - PROBLEM SELECTOR PLAN 2
- last admission:  tacro 4 BID with AM levels 30min prior to dose administration. (decreased to 4mg BID on 1/24)- per nephro, can consider continuing tacro at 3 mg BID or transition to pred 80 daily- discussed with patient aid wants tacrolimus (patient has reservations about starting prednisone -nausea on prednisone in past, weight gain)  - quantiferon negative-- in case pt needs rituximab in the future.    Plan:  [] nephro following, appreciate recs   [] c/w Tacro 3mg BID (dose 10am/10pm once started), per nephro planning to start rituxan on 2/1.  [] full dose lovenox (80mg BID) until pt above to tolerate PO, then eliquis 5mg PO BID  [] f/u tacro level daily

## 2023-02-01 NOTE — PROGRESS NOTE ADULT - ASSESSMENT
32F with MCKENNA and asthma p/w chest pain, abdominal pain, n/v and + hemoptysis as a readmission after leaving AMA on 1/26.     DDx for n/v: etiology unclear, emptying study negative for gastroparesis.  DDx for chest pain: most likely gerd vs esophagitis, less likely Boerhaaves, ACS, aortic dissection, infectious, PE.

## 2023-02-01 NOTE — PROGRESS NOTE ADULT - SUBJECTIVE AND OBJECTIVE BOX
PROGRESS NOTE:     Patient is a 32y old  Female who presents with a chief complaint of n/v + hemoptysis (31 Jan 2023 15:36)      SUBJECTIVE / OVERNIGHT EVENTS:    ADDITIONAL REVIEW OF SYSTEMS:    MEDICATIONS  (STANDING):  acetaminophen     Tablet .. 650 milliGRAM(s) Oral once  diphenhydrAMINE Injectable 25 milliGRAM(s) IV Push once  enoxaparin Injectable 80 milliGRAM(s) SubCutaneous every 12 hours  famotidine Injectable 20 milliGRAM(s) IV Push daily  influenza   Vaccine 0.5 milliLiter(s) IntraMuscular once  methylPREDNISolone sodium succinate Injectable 125 milliGRAM(s) IV Push once  metroNIDAZOLE  IVPB 500 milliGRAM(s) IV Intermittent every 12 hours  pantoprazole  Injectable 40 milliGRAM(s) IV Push two times a day  riTUXimab-pvvr (RUXIENCE) IVPB (Non - oncologic) 1000 milliGRAM(s) IV Intermittent once  tacrolimus 3 milliGRAM(s) Oral <User Schedule>    MEDICATIONS  (PRN):  acetaminophen     Tablet .. 650 milliGRAM(s) Oral every 6 hours PRN Temp greater or equal to 38C (100.4F), Mild Pain (1 - 3)  albuterol    90 MICROgram(s) HFA Inhaler 2 Puff(s) Inhalation every 6 hours PRN Shortness of Breath and/or Wheezing  aluminum hydroxide/magnesium hydroxide/simethicone Suspension 30 milliLiter(s) Oral every 4 hours PRN Dyspepsia  bisacodyl Suppository 10 milliGRAM(s) Rectal daily PRN Constipation  LORazepam   Injectable 1 milliGRAM(s) IV Push every 8 hours PRN breakthrough nausea/vomiting  melatonin 3 milliGRAM(s) Oral at bedtime PRN Insomnia  ondansetron Injectable 8 milliGRAM(s) IV Push every 8 hours PRN Nausea and/or Vomiting      CAPILLARY BLOOD GLUCOSE      POCT Blood Glucose.: 73 mg/dL (31 Jan 2023 07:56)    I&O's Summary    31 Jan 2023 07:01  -  01 Feb 2023 07:00  --------------------------------------------------------  IN: 340 mL / OUT: 0 mL / NET: 340 mL        PHYSICAL EXAM:  Vital Signs Last 24 Hrs  T(C): 37 (01 Feb 2023 04:43), Max: 37.2 (31 Jan 2023 20:02)  T(F): 98.6 (01 Feb 2023 04:43), Max: 99 (31 Jan 2023 20:02)  HR: 95 (01 Feb 2023 04:43) (95 - 100)  BP: 95/53 (01 Feb 2023 04:43) (95/53 - 97/63)  BP(mean): --  RR: 18 (01 Feb 2023 04:43) (18 - 18)  SpO2: 96% (01 Feb 2023 04:43) (96% - 97%)    Parameters below as of 01 Feb 2023 04:43  Patient On (Oxygen Delivery Method): room air        GENERAL: No acute distress, well-developed  HEAD:  Atraumatic, Normocephalic  EYES: EOMI, PERRLA, conjunctiva and sclera clear  NECK: Supple, no lymphadenopathy, no JVD  CHEST/LUNG: CTAB; No wheezes, rales, or rhonchi  HEART: Regular rate and rhythm; No murmurs, rubs, or gallops  ABDOMEN: Soft, non-tender, non-distended; normal bowel sounds, no organomegaly  EXTREMITIES:  2+ peripheral pulses b/l, No clubbing, cyanosis, or edema  NEUROLOGY: A&O x 3, no focal deficits  SKIN: No rashes or lesions    LABS:                        12.2   12.22 )-----------( 693      ( 30 Jan 2023 07:48 )             39.3     01-30    138  |  104  |  31<H>  ----------------------------<  87  4.1   |  28  |  1.16    Ca    8.1<L>      30 Jan 2023 07:48  Phos  4.5     01-30  Mg     2.8     01-30                  RADIOLOGY & ADDITIONAL TESTS:  Results Reviewed:   Imaging Personally Reviewed:  Electrocardiogram Personally Reviewed:    COORDINATION OF CARE:  Care Discussed with Consultants/Other Providers [Y/N]:  Prior or Outpatient Records Reviewed [Y/N]:   PROGRESS NOTE:     Patient is a 32y old  Female who presents with a chief complaint of n/v + hemoptysis (31 Jan 2023 15:36)      SUBJECTIVE / OVERNIGHT EVENTS:  No events overnight. Patient examined at bedside. Reports mild nausea but improving. Reports constipation- last bowel movement 4 days ago. All other ROS negative.     ADDITIONAL REVIEW OF SYSTEMS:    MEDICATIONS  (STANDING):  acetaminophen     Tablet .. 650 milliGRAM(s) Oral once  diphenhydrAMINE Injectable 25 milliGRAM(s) IV Push once  enoxaparin Injectable 80 milliGRAM(s) SubCutaneous every 12 hours  famotidine Injectable 20 milliGRAM(s) IV Push daily  influenza   Vaccine 0.5 milliLiter(s) IntraMuscular once  methylPREDNISolone sodium succinate Injectable 125 milliGRAM(s) IV Push once  metroNIDAZOLE  IVPB 500 milliGRAM(s) IV Intermittent every 12 hours  pantoprazole  Injectable 40 milliGRAM(s) IV Push two times a day  riTUXimab-pvvr (RUXIENCE) IVPB (Non - oncologic) 1000 milliGRAM(s) IV Intermittent once  tacrolimus 3 milliGRAM(s) Oral <User Schedule>    MEDICATIONS  (PRN):  acetaminophen     Tablet .. 650 milliGRAM(s) Oral every 6 hours PRN Temp greater or equal to 38C (100.4F), Mild Pain (1 - 3)  albuterol    90 MICROgram(s) HFA Inhaler 2 Puff(s) Inhalation every 6 hours PRN Shortness of Breath and/or Wheezing  aluminum hydroxide/magnesium hydroxide/simethicone Suspension 30 milliLiter(s) Oral every 4 hours PRN Dyspepsia  bisacodyl Suppository 10 milliGRAM(s) Rectal daily PRN Constipation  LORazepam   Injectable 1 milliGRAM(s) IV Push every 8 hours PRN breakthrough nausea/vomiting  melatonin 3 milliGRAM(s) Oral at bedtime PRN Insomnia  ondansetron Injectable 8 milliGRAM(s) IV Push every 8 hours PRN Nausea and/or Vomiting      CAPILLARY BLOOD GLUCOSE      POCT Blood Glucose.: 73 mg/dL (31 Jan 2023 07:56)    I&O's Summary    31 Jan 2023 07:01  -  01 Feb 2023 07:00  --------------------------------------------------------  IN: 340 mL / OUT: 0 mL / NET: 340 mL        PHYSICAL EXAM:  Vital Signs Last 24 Hrs  T(C): 37 (01 Feb 2023 04:43), Max: 37.2 (31 Jan 2023 20:02)  T(F): 98.6 (01 Feb 2023 04:43), Max: 99 (31 Jan 2023 20:02)  HR: 95 (01 Feb 2023 04:43) (95 - 100)  BP: 95/53 (01 Feb 2023 04:43) (95/53 - 97/63)  BP(mean): --  RR: 18 (01 Feb 2023 04:43) (18 - 18)  SpO2: 96% (01 Feb 2023 04:43) (96% - 97%)    Parameters below as of 01 Feb 2023 04:43  Patient On (Oxygen Delivery Method): room air        GENERAL: No acute distress, well-developed  HEAD:  Atraumatic, Normocephalic  EYES: EOMI, PERRLA, conjunctiva and sclera clear  NECK: Supple, no lymphadenopathy, no JVD  CHEST/LUNG: CTAB; No wheezes, rales, or rhonchi  HEART: Regular rate and rhythm; No murmurs, rubs, or gallops  ABDOMEN: Soft, non-tender, non-distended; normal bowel sounds, no organomegaly  EXTREMITIES:  2+ peripheral pulses b/l, No clubbing, cyanosis, or edema  NEUROLOGY: A&O x 3, no focal deficits  SKIN: No rashes or lesions    LABS:                        12.2   12.22 )-----------( 693      ( 30 Jan 2023 07:48 )             39.3     01-30    138  |  104  |  31<H>  ----------------------------<  87  4.1   |  28  |  1.16    Ca    8.1<L>      30 Jan 2023 07:48  Phos  4.5     01-30  Mg     2.8     01-30                  RADIOLOGY & ADDITIONAL TESTS:  Results Reviewed:   Imaging Personally Reviewed:  Electrocardiogram Personally Reviewed:    COORDINATION OF CARE:  Care Discussed with Consultants/Other Providers [Y/N]:  Prior or Outpatient Records Reviewed [Y/N]:

## 2023-02-01 NOTE — PROGRESS NOTE ADULT - PROBLEM SELECTOR PLAN 1
Pt with biopsy proven Minimal Change Disease presenting to the hospital with worsening proteinuria and anasarca and N/V. Pt previously admitted in 6/2022 with similar complaints noted to have TP/CR ratio of 3.8g, at that time started on steroid therapy and discharged with nephrology follow up. Now off steroids for past one month. Had poor nephrology outpatient follow up. C/w with Eliquis 5mg BID (pt with severe nephrotic syndrome are at high risk for thromboembolic events). START PCP ppx with bactrim. C/w Tacrolimus 3mg BID for now as started during recent admission for nephrotic disease. Prefer steroid sparing regimen. If starting Tacro, please monitor tacrolimus levels daily 30 minutes prior to AM dose (target level 4-6). Will discuss starting Envarsus to decrease pill burden.    Pt. for Rituximab 2/1/23.     If you have any questions, please feel free to contact me  Jose Raul Patterson  Nephrology Fellow  [phone]; Prefer [employer] TEAMS  (After 5pm or on weekends please page the on-call fellow) Pt with biopsy proven Minimal Change Disease presenting to the hospital with worsening proteinuria and anasarca and N/V. Pt previously admitted in 6/2022 with similar complaints noted to have TP/CR ratio of 3.8g, at that time started on steroid therapy and discharged with nephrology follow up. Now off steroids for past one month. Had poor nephrology outpatient follow up. C/w with Eliquis 5mg BID (pt with severe nephrotic syndrome are at high risk for thromboembolic events). START PCP ppx with bactrim.     C/w Tacrolimus 3mg BID for now as started during recent admission for nephrotic disease. Prefer steroid sparing regimen. If starting Tacro, please monitor tacrolimus levels daily 30 minutes prior to AM dose (target level 4-6). Will discuss starting Envarsus to decrease pill burden.    Pt. for Rituximab 2/1/23.     If you have any questions, please feel free to contact me  Jose Raul Patterson  Nephrology Fellow  [phone]; Prefer [employer] TEAMS  (After 5pm or on weekends please page the on-call fellow)

## 2023-02-01 NOTE — PROGRESS NOTE ADULT - ASSESSMENT
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 GI consulted for further w/u and management of the above     #Nausea/Vomiting/Abdominal Pain (improving)  #?CVS/DGBI   Presenting with 2 week onset of n/v/abdominal pain and inability to tolerate PO in the background of similar episode a couple months prior with workup at the time including EGD with small HH and otherwise normal gastric mucosa. Pt underwent multiple CTs which did not show a cause for her nausea. Infectious workup notable for +Trichmonas and U/A with pyuria. Broad workup this admission including mesenteric doppler, gastric emptying test, ead and mercury level, celiac panel and CI esterase all within normal limits. Suspect symptoms likely functional with possible component of cycclic vomiting syndrome vs. DGBI. Reassuringly, symptoms improving with supportive care. No further workup planned at this time.     Recommendations:  -Soft bite sized diet as tolerated   -Zofran QID PRN, Compazine+ Scopolamine PRN   -PPI BID, Maalox QID PRN      GI will plan to sign off at this time. Please feel free to reach out to our team with any follow up questions. Please provide patient with Gastroenterology Clinic number to confirm/arrange appointment; [phone] ([facility] at [address] [place] [address]) or [phone] ([facility] at [address] [place] [address]) or [phone] ([facility] at [address] [place] [address]).    All recommendations are tentative until note is attested by attending.     Shad Salinas, PGY-4  Gastroenterology/Hepatology Fellow  Available on [employer] Teams   [phone] (Long Range Pager)  [phone] (Short Range Pager [facility])    After 5pm, please contact the on-call GI fellow. [phone]

## 2023-02-01 NOTE — PROGRESS NOTE ADULT - PROBLEM SELECTOR PLAN 4
found to be + during prior admission, flagyl started 1/28 however patient AMA 1/27. Did not continue treatment at home due to inability to tolerate PO intake    Plan:  [] c/w IV flagyl (1/28-) for 7d course (ending on 2/3). Due to possibility of IV Flagyl causing GI side effects, will discuss with pharmacy the feasibility of intravaginal flagyl for remainder of course.  [] educated about informing sexual partners

## 2023-02-01 NOTE — ADVANCED PRACTICE NURSE CONSULT - ASSESSMENT
Patient received in bed, alert and oriented x 3, capable of expressing all needs to staff. Cycle 1, day 1/1.  Lab results as per sunrise, DtCorbin aware of same-continue as ordered; Vital signs stable prior to chemotherapy and  within acceptable parameters, see sunrise. Pt educated on the importance of saving urine, verbalizes good understanding. Instructed pt on  chemo regimen, drug  and potential side effects, written materials provided, pt states understanding of all education.  Pt with right A/C PIV #18 ga  - site free from signs and symptoms of infection, good blood return obtained. Pre- medicated with benadryl 25mg IV x1 Tylenol 650mg PO x1 and Solumedrol 125mg IV x1; Ruxience 1000mg infused via pump as per protocol-started @ 50cc/hr increased by 50cc/hr until max rate of 400cc/hr-remained with pt throughout infusion-v/s monitored pre and Q 30min prior to each increase[see flow sheet]     Patient received in bed, alert and oriented x 3, capable of expressing all needs to staff. Cycle 1, day 1/1.  Lab results as per sunrise, DtCorbin aware of same-continue as ordered; Vital signs stable prior to chemotherapy and  within acceptable parameters, see sunrise. Pt educated on the importance of saving urine, verbalizes good understanding. Instructed pt on  chemo regimen, drug  and potential side effects, written materials provided, pt states understanding of all education.  Pt with right A/C PIV #18 ga  - site free from signs and symptoms of infection, good blood return obtained. Pre- medicated with benadryl 25mg IV x1 Tylenol 650mg PO x1 and Solumedrol 125mg IV x1; Ruxience 1000mg infused via pump as per protocol-started @ 50cc/hr increased by 50cc/hr until max rate of 400cc/hr-remained with pt throughout infusion-v/s monitored pre and Q 30min prior to each increase[see flow sheet] Pt tolerated well-no adverse reactions noted. Area nurse given full report

## 2023-02-01 NOTE — PROGRESS NOTE ADULT - SUBJECTIVE AND OBJECTIVE BOX
Gastroenterology Progress Note    Interval Events:   Symptoms slowly improving with decreased nausea and abdominal discomfort. Tolerating an increased diet.     Allergies:  codeine (Hives)      Hospital Medications:  acetaminophen     Tablet .. 650 milliGRAM(s) Oral every 6 hours PRN  acetaminophen     Tablet .. 650 milliGRAM(s) Oral once  albuterol    90 MICROgram(s) HFA Inhaler 2 Puff(s) Inhalation every 6 hours PRN  aluminum hydroxide/magnesium hydroxide/simethicone Suspension 30 milliLiter(s) Oral every 4 hours PRN  bisacodyl Suppository 10 milliGRAM(s) Rectal daily PRN  diphenhydrAMINE Injectable 25 milliGRAM(s) IV Push once  enoxaparin Injectable 80 milliGRAM(s) SubCutaneous every 12 hours  famotidine Injectable 20 milliGRAM(s) IV Push daily  influenza   Vaccine 0.5 milliLiter(s) IntraMuscular once  LORazepam   Injectable 1 milliGRAM(s) IV Push every 8 hours PRN  melatonin 3 milliGRAM(s) Oral at bedtime PRN  methylPREDNISolone sodium succinate Injectable 125 milliGRAM(s) IV Push once  metroNIDAZOLE  IVPB 500 milliGRAM(s) IV Intermittent every 12 hours  ondansetron Injectable 8 milliGRAM(s) IV Push every 8 hours PRN  pantoprazole  Injectable 40 milliGRAM(s) IV Push two times a day  [id number] (RUXIENCE) IVPB (Non - oncologic) 1000 milliGRAM(s) IV Intermittent once  tacrolimus 3 milliGRAM(s) Oral <User Schedule>      ROS: 14 point ROS negative unless otherwise state in subjective    PHYSICAL EXAM:   Vital Signs:  Vital Signs Last 24 Hrs  T(C): 37 (01 Feb 2023 04:43), Max: 37.2 (31 Jan 2023 20:02)  T(F): 98.6 (01 Feb 2023 04:43), Max: 99 (31 Jan 2023 20:02)  HR: 95 (01 Feb 2023 04:43) (95 - 100)  BP: 95/53 (01 Feb 2023 04:43) (95/53 - 97/63)  BP(mean): --  RR: 18 (01 Feb 2023 04:43) (18 - 18)  SpO2: 96% (01 Feb 2023 04:43) (96% - 97%)    Parameters below as of 01 Feb 2023 04:43  Patient On (Oxygen Delivery Method): room air    GENERAL:  No acute distress  HEENT:  NCAT, no scleral icterus  CHEST: no resp distress  HEART:  RRR  ABDOMEN:  Soft, non-tender,distended, normoactive bowel sound  EXTREMITIES:  No edema  NEURO:  Alert and oriented x 3    LABS: Reviewed.     Imaging:  < from: NM Gastric Empty Solid/Liquid (01.31.23 @ 13:08) >    IMPRESSION:    Solid and liquid gastric emptying study is within normal limits.    No radionuclide evidence of gastroparesis.      < end of copied text >  < from: US Doppler Mesenteric (01.30.23 @ 11:23) >    IMPRESSION: No elevated velocities within the celiac artery, superior and   inferior mesenteric arteries. No mesenteric stenosis.    < end of copied text >  < from: MR Head No Cont (01.28.23 @ 18:51) >  IMPRESSION:    No acute intracranial hemorrhage, acute infarction, extra-axial fluid   collection or hydrocephalus.    If clinicians have any questions/need for clarification regarding this   report, Dr. Suhas Coffey can be best reached via the patient secure   hospital email system    < end of copied text >

## 2023-02-02 LAB
ANION GAP SERPL CALC-SCNC: 5 MMOL/L — SIGNIFICANT CHANGE UP (ref 5–17)
BUN SERPL-MCNC: 18 MG/DL — SIGNIFICANT CHANGE UP (ref 7–23)
CALCIUM SERPL-MCNC: 7.8 MG/DL — LOW (ref 8.4–10.5)
CHLORIDE SERPL-SCNC: 105 MMOL/L — SIGNIFICANT CHANGE UP (ref 96–108)
CO2 SERPL-SCNC: 27 MMOL/L — SIGNIFICANT CHANGE UP (ref 22–31)
CREAT SERPL-MCNC: 0.82 MG/DL — SIGNIFICANT CHANGE UP (ref 0.5–1.3)
EGFR: 98 ML/MIN/1.73M2 — SIGNIFICANT CHANGE UP
GAMMA INTERFERON BACKGROUND BLD IA-ACNC: 0.02 IU/ML — SIGNIFICANT CHANGE UP
GI PCR PANEL: SIGNIFICANT CHANGE UP
GLUCOSE SERPL-MCNC: 97 MG/DL — SIGNIFICANT CHANGE UP (ref 70–99)
HCT VFR BLD CALC: 38.7 % — SIGNIFICANT CHANGE UP (ref 34.5–45)
HGB BLD-MCNC: 12 G/DL — SIGNIFICANT CHANGE UP (ref 11.5–15.5)
M TB IFN-G BLD-IMP: ABNORMAL
M TB IFN-G CD4+ BCKGRND COR BLD-ACNC: 0 IU/ML — SIGNIFICANT CHANGE UP
M TB IFN-G CD4+CD8+ BCKGRND COR BLD-ACNC: 0 IU/ML — SIGNIFICANT CHANGE UP
MAGNESIUM SERPL-MCNC: 2.5 MG/DL — SIGNIFICANT CHANGE UP (ref 1.6–2.6)
MCHC RBC-ENTMCNC: 30.2 PG — SIGNIFICANT CHANGE UP (ref 27–34)
MCHC RBC-ENTMCNC: 31 GM/DL — LOW (ref 32–36)
MCV RBC AUTO: 97.5 FL — SIGNIFICANT CHANGE UP (ref 80–100)
NRBC # BLD: 0 /100 WBCS — SIGNIFICANT CHANGE UP (ref 0–0)
PHOSPHATE SERPL-MCNC: 3.4 MG/DL — SIGNIFICANT CHANGE UP (ref 2.5–4.5)
PLATELET # BLD AUTO: 532 K/UL — HIGH (ref 150–400)
POTASSIUM SERPL-MCNC: 3.8 MMOL/L — SIGNIFICANT CHANGE UP (ref 3.5–5.3)
POTASSIUM SERPL-SCNC: 3.8 MMOL/L — SIGNIFICANT CHANGE UP (ref 3.5–5.3)
QUANT TB PLUS MITOGEN MINUS NIL: 0.01 IU/ML — SIGNIFICANT CHANGE UP
RBC # BLD: 3.97 M/UL — SIGNIFICANT CHANGE UP (ref 3.8–5.2)
RBC # FLD: 14.3 % — SIGNIFICANT CHANGE UP (ref 10.3–14.5)
SODIUM SERPL-SCNC: 137 MMOL/L — SIGNIFICANT CHANGE UP (ref 135–145)
TACROLIMUS SERPL-MCNC: 2.6 NG/ML — SIGNIFICANT CHANGE UP
WBC # BLD: 6.72 K/UL — SIGNIFICANT CHANGE UP (ref 3.8–10.5)
WBC # FLD AUTO: 6.72 K/UL — SIGNIFICANT CHANGE UP (ref 3.8–10.5)

## 2023-02-02 PROCEDURE — 99232 SBSQ HOSP IP/OBS MODERATE 35: CPT | Mod: GC

## 2023-02-02 RX ORDER — TACROLIMUS 5 MG/1
3 CAPSULE ORAL
Qty: 180 | Refills: 0
Start: 2023-02-02 | End: 2023-03-03

## 2023-02-02 RX ORDER — APIXABAN 2.5 MG/1
5 TABLET, FILM COATED ORAL
Refills: 0 | Status: DISCONTINUED | OUTPATIENT
Start: 2023-02-02 | End: 2023-02-03

## 2023-02-02 RX ADMIN — Medication 100 MILLIGRAM(S): at 18:00

## 2023-02-02 RX ADMIN — APIXABAN 5 MILLIGRAM(S): 2.5 TABLET, FILM COATED ORAL at 18:02

## 2023-02-02 RX ADMIN — PANTOPRAZOLE SODIUM 40 MILLIGRAM(S): 20 TABLET, DELAYED RELEASE ORAL at 06:28

## 2023-02-02 RX ADMIN — TACROLIMUS 3 MILLIGRAM(S): 5 CAPSULE ORAL at 10:25

## 2023-02-02 RX ADMIN — ENOXAPARIN SODIUM 80 MILLIGRAM(S): 100 INJECTION SUBCUTANEOUS at 06:28

## 2023-02-02 RX ADMIN — Medication 100 MILLIGRAM(S): at 06:29

## 2023-02-02 RX ADMIN — Medication 650 MILLIGRAM(S): at 13:08

## 2023-02-02 RX ADMIN — Medication 1 MILLIGRAM(S): at 20:36

## 2023-02-02 RX ADMIN — PANTOPRAZOLE SODIUM 40 MILLIGRAM(S): 20 TABLET, DELAYED RELEASE ORAL at 18:01

## 2023-02-02 RX ADMIN — Medication 1 MILLIGRAM(S): at 09:13

## 2023-02-02 RX ADMIN — ONDANSETRON 8 MILLIGRAM(S): 8 TABLET, FILM COATED ORAL at 18:06

## 2023-02-02 RX ADMIN — TACROLIMUS 3 MILLIGRAM(S): 5 CAPSULE ORAL at 20:55

## 2023-02-02 RX ADMIN — Medication 3 MILLIGRAM(S): at 01:29

## 2023-02-02 RX ADMIN — ONDANSETRON 8 MILLIGRAM(S): 8 TABLET, FILM COATED ORAL at 01:29

## 2023-02-02 NOTE — PROGRESS NOTE ADULT - SUBJECTIVE AND OBJECTIVE BOX
[facility] DIVISION OF KIDNEY DISEASES AND HYPERTENSION -- FOLLOW UP NOTE  --------------------------------------------------------------------------------  HPI: Pt. is a 32 year old female with PMH of Minimal Change Disease who presented to the hospital with complaints of nausea, vomiting diarrhea and abdominal pain for the past few days. Patient left AMA 2 days prior after she was admitted for the same N/V symptoms. Seen by nephrology last admission, and was placed on tacrolimus as she had not been taking steroids for more than a month. Nephrology team again consulted for her history of MCKENNA and significant proteinuria. On presentation, patient with creatinine of 0.98, relatively stable from when she left. UA with still >600 protein dark color with bilirubin, Large blood with 18 RBC. Uprot/creat not done. Patient endorsing inability to swallow pills due to N/V. +LE swelling and foamy urine States she was following with a nephrologist Dr. John Anguiano from [facility]. Last visit was in October 2022. labs in Oct with Uprot/creat of 1.39 and Creatinine of 0.6.     Pt. seen this AM. Able to tolerate Tacrolimus. N/V improved. Tolerated Rituximab 2/1. States that Ativan works for nausea.        PAST HISTORY  --------------------------------------------------------------------------------  No significant changes to PMH, PSH, FHx, SHx, unless otherwise noted    ALLERGIES & MEDICATIONS  --------------------------------------------------------------------------------  Allergies    codeine (Hives)    Intolerances      Standing Inpatient Medications  apixaban 5 milliGRAM(s) Oral two times a day  famotidine Injectable 20 milliGRAM(s) IV Push daily  influenza   Vaccine 0.5 milliLiter(s) IntraMuscular once  metroNIDAZOLE  IVPB 500 milliGRAM(s) IV Intermittent every 12 hours  pantoprazole  Injectable 40 milliGRAM(s) IV Push two times a day  polyethylene glycol 3350 17 Gram(s) Oral daily  senna 1 Tablet(s) Oral at bedtime  tacrolimus 3 milliGRAM(s) Oral <User Schedule>  trimethoprim  160 mG/sulfamethoxazole 800 mG 1 Tablet(s) Oral <User Schedule>    PRN Inpatient Medications  acetaminophen     Tablet .. 650 milliGRAM(s) Oral every 6 hours PRN  albuterol    90 MICROgram(s) HFA Inhaler 2 Puff(s) Inhalation every 6 hours PRN  aluminum hydroxide/magnesium hydroxide/simethicone Suspension 30 milliLiter(s) Oral every 4 hours PRN  bisacodyl Suppository 10 milliGRAM(s) Rectal daily PRN  LORazepam   Injectable 1 milliGRAM(s) IV Push every 8 hours PRN  melatonin 3 milliGRAM(s) Oral at bedtime PRN  ondansetron Injectable 8 milliGRAM(s) IV Push every 8 hours PRN      REVIEW OF SYSTEMS  --------------------------------------------------------------------------------  AS per HPI    VITALS/PHYSICAL EXAM  --------------------------------------------------------------------------------  T(C): 36.6 (02-02-23 @ 12:22), Max: 37 (02-02-23 @ 04:30)  HR: 85 (02-02-23 @ 12:22) (78 - 96)  BP: 90/58 (02-02-23 @ 12:22) (90/58 - 106/71)  RR: 18 (02-02-23 @ 12:22) (18 - 18)  SpO2: 96% (02-02-23 @ 12:22) (95% - 98%)  Wt(kg): --        02-01-23 @ 07:01  -  02-02-23 @ 07:00  --------------------------------------------------------  IN: 2030 mL / OUT: 500 mL / NET: 1530 mL    02-02-23 @ 07:01  -  02-02-23 @ 12:58  --------------------------------------------------------  IN: 240 mL / OUT: 0 mL / NET: 240 mL      Physical Exam:  	Gen: NAD  	HEENT: MMM  	Pulm: CTA B/L  	CV: S1S2  	Abd: Soft, +BS   	Ext:+ LE edema B/L  	Neuro: Awake  	Skin: Warm and dry  	Vascular access: peripheral    LABS/STUDIES  --------------------------------------------------------------------------------              12.0   6.72  >-----------<  532      [02-02-23 @ 10:15]              38.7     137  |  105  |  18  ----------------------------<  97      [02-02-23 @ 10:15]  3.8   |  27  |  0.82        Ca     7.8     [02-02-23 @ 10:15]      Mg     2.5     [02-02-23 @ 10:15]      Phos  3.4     [02-02-23 @ 10:15]    TPro  4.0  /  Alb  1.3  /  TBili  <0.1  /  DBili  x   /  AST  35  /  ALT  24  /  AlkPhos  50  [02-01-23 @ 09:51]          Creatinine Trend:  SCr 0.82 [02-02 @ 10:15]  SCr 0.84 [02-01 @ 09:51]  SCr 1.16 [01-30 @ 07:48]  SCr 1.15 [01-29 @ 09:52]  SCr 0.98 [01-28 @ 03:12]    Urinalysis - [01-28-23 @ 08:18]      Color Dark Yellow / Appearance Slightly Turbid / SG >1.050 / pH 6.5      Gluc Negative / Ketone Small  / Bili Small / Urobili Negative       Blood Large / Protein >600 / Leuk Est Negative / Nitrite Negative       / WBC 18 / Hyaline 134 / Gran  / Sq Epi  / Non Sq Epi 24 / Bacteria Few      Lipid: chol 521, , HDL 48, LDL --      [01-18-23 @ 05:58]    HBsAb Nonreact      [01-18-23 @ 05:58]  HBsAg Nonreact      [01-18-23 @ 05:58]  HBcAb Nonreact      [01-18-23 @ 05:58]  HCV 0.07, Nonreact      [01-18-23 @ 05:58]  HIV Nonreact      [01-21-23 @ 07:41]    SHELTON: titer Negative, pattern --      [01-18-23 @ 05:58]  dsDNA <12      [01-18-23 @ 05:58]  C3 Complement 152      [01-18-23 @ 05:58]  C4 Complement 38      [01-18-23 @ 05:58]  PLA2R: ALESSANDRA <1.8, IFA --      [01-18-23 @ 05:58]  Free Light Chains: kappa 2.61, lambda 1.05, ratio = 2.49      [01-18 @ 05:58]  Immunofixation Serum:   Weak IgM Kappa Band Identified    Reference Range: None Detected      [01-18-23 @ 05:58]  SPEP Interpretation: Weak Gamma-Migrating Paraprotein Identified      [01-18-23 @ 05:58]

## 2023-02-02 NOTE — PROGRESS NOTE ADULT - SUBJECTIVE AND OBJECTIVE BOX
PROGRESS NOTE:     Patient is a 32y old  Female who presents with a chief complaint of n/v + hemoptysis (01 Feb 2023 12:55)      SUBJECTIVE / OVERNIGHT EVENTS:    ADDITIONAL REVIEW OF SYSTEMS:    MEDICATIONS  (STANDING):  enoxaparin Injectable 80 milliGRAM(s) SubCutaneous every 12 hours  famotidine Injectable 20 milliGRAM(s) IV Push daily  influenza   Vaccine 0.5 milliLiter(s) IntraMuscular once  metroNIDAZOLE  IVPB 500 milliGRAM(s) IV Intermittent every 12 hours  pantoprazole  Injectable 40 milliGRAM(s) IV Push two times a day  polyethylene glycol 3350 17 Gram(s) Oral daily  senna 1 Tablet(s) Oral at bedtime  tacrolimus 3 milliGRAM(s) Oral <User Schedule>  trimethoprim  160 mG/sulfamethoxazole 800 mG 1 Tablet(s) Oral <User Schedule>    MEDICATIONS  (PRN):  acetaminophen     Tablet .. 650 milliGRAM(s) Oral every 6 hours PRN Temp greater or equal to 38C (100.4F), Mild Pain (1 - 3)  albuterol    90 MICROgram(s) HFA Inhaler 2 Puff(s) Inhalation every 6 hours PRN Shortness of Breath and/or Wheezing  aluminum hydroxide/magnesium hydroxide/simethicone Suspension 30 milliLiter(s) Oral every 4 hours PRN Dyspepsia  bisacodyl Suppository 10 milliGRAM(s) Rectal daily PRN Constipation  LORazepam   Injectable 1 milliGRAM(s) IV Push every 8 hours PRN breakthrough nausea/vomiting  melatonin 3 milliGRAM(s) Oral at bedtime PRN Insomnia  ondansetron Injectable 8 milliGRAM(s) IV Push every 8 hours PRN Nausea and/or Vomiting      CAPILLARY BLOOD GLUCOSE        I&O's Summary    01 Feb 2023 07:01  -  02 Feb 2023 07:00  --------------------------------------------------------  IN: 2030 mL / OUT: 500 mL / NET: 1530 mL        PHYSICAL EXAM:  Vital Signs Last 24 Hrs  T(C): 37 (02 Feb 2023 04:30), Max: 37.1 (01 Feb 2023 09:48)  T(F): 98.6 (02 Feb 2023 04:30), Max: 98.8 (01 Feb 2023 09:48)  HR: 79 (02 Feb 2023 04:30) (78 - 96)  BP: 106/71 (02 Feb 2023 04:30) (93/50 - 106/71)  BP(mean): --  RR: 18 (02 Feb 2023 04:30) (18 - 18)  SpO2: 98% (02 Feb 2023 04:30) (95% - 98%)    Parameters below as of 02 Feb 2023 04:30  Patient On (Oxygen Delivery Method): room air        GENERAL: No acute distress, well-developed  HEAD:  Atraumatic, Normocephalic  EYES: EOMI, PERRLA, conjunctiva and sclera clear  NECK: Supple, no lymphadenopathy, no JVD  CHEST/LUNG: CTAB; No wheezes, rales, or rhonchi  HEART: Regular rate and rhythm; No murmurs, rubs, or gallops  ABDOMEN: Soft, non-tender, non-distended; normal bowel sounds, no organomegaly  EXTREMITIES:  2+ peripheral pulses b/l, No clubbing, cyanosis, or edema  NEUROLOGY: A&O x 3, no focal deficits  SKIN: No rashes or lesions    LABS:                        11.7   7.37  )-----------( 558      ( 01 Feb 2023 09:51 )             37.3     02-01    139  |  106  |  22  ----------------------------<  81  3.9   |  27  |  0.84    Ca    7.8<L>      01 Feb 2023 09:51  Phos  3.3     02-01  Mg     2.6     02-01    TPro  4.0<L>  /  Alb  1.3<L>  /  TBili  <0.1<L>  /  DBili  x   /  AST  35  /  ALT  24  /  AlkPhos  50  02-01        RADIOLOGY & ADDITIONAL TESTS:  Results Reviewed:   Imaging Personally Reviewed:  Electrocardiogram Personally Reviewed:    COORDINATION OF CARE:  Care Discussed with Consultants/Other Providers [Y/N]:  Prior or Outpatient Records Reviewed [Y/N]:   PROGRESS NOTE:     Patient is a 32y old  Female who presents with a chief complaint of n/v + hemoptysis (01 Feb 2023 12:55)      SUBJECTIVE / OVERNIGHT EVENTS:  No events overnight. Feels well this AM. Reports 3 episodes of loose stool after receiving rituxan. Reports improved nausea with no more episodes of vomiting. All other ROS negative.     ADDITIONAL REVIEW OF SYSTEMS:    MEDICATIONS  (STANDING):  enoxaparin Injectable 80 milliGRAM(s) SubCutaneous every 12 hours  famotidine Injectable 20 milliGRAM(s) IV Push daily  influenza   Vaccine 0.5 milliLiter(s) IntraMuscular once  metroNIDAZOLE  IVPB 500 milliGRAM(s) IV Intermittent every 12 hours  pantoprazole  Injectable 40 milliGRAM(s) IV Push two times a day  polyethylene glycol 3350 17 Gram(s) Oral daily  senna 1 Tablet(s) Oral at bedtime  tacrolimus 3 milliGRAM(s) Oral <User Schedule>  trimethoprim  160 mG/sulfamethoxazole 800 mG 1 Tablet(s) Oral <User Schedule>    MEDICATIONS  (PRN):  acetaminophen     Tablet .. 650 milliGRAM(s) Oral every 6 hours PRN Temp greater or equal to 38C (100.4F), Mild Pain (1 - 3)  albuterol    90 MICROgram(s) HFA Inhaler 2 Puff(s) Inhalation every 6 hours PRN Shortness of Breath and/or Wheezing  aluminum hydroxide/magnesium hydroxide/simethicone Suspension 30 milliLiter(s) Oral every 4 hours PRN Dyspepsia  bisacodyl Suppository 10 milliGRAM(s) Rectal daily PRN Constipation  LORazepam   Injectable 1 milliGRAM(s) IV Push every 8 hours PRN breakthrough nausea/vomiting  melatonin 3 milliGRAM(s) Oral at bedtime PRN Insomnia  ondansetron Injectable 8 milliGRAM(s) IV Push every 8 hours PRN Nausea and/or Vomiting      CAPILLARY BLOOD GLUCOSE        I&O's Summary    01 Feb 2023 07:01  -  02 Feb 2023 07:00  --------------------------------------------------------  IN: 2030 mL / OUT: 500 mL / NET: 1530 mL        PHYSICAL EXAM:  Vital Signs Last 24 Hrs  T(C): 37 (02 Feb 2023 04:30), Max: 37.1 (01 Feb 2023 09:48)  T(F): 98.6 (02 Feb 2023 04:30), Max: 98.8 (01 Feb 2023 09:48)  HR: 79 (02 Feb 2023 04:30) (78 - 96)  BP: 106/71 (02 Feb 2023 04:30) (93/50 - 106/71)  BP(mean): --  RR: 18 (02 Feb 2023 04:30) (18 - 18)  SpO2: 98% (02 Feb 2023 04:30) (95% - 98%)    Parameters below as of 02 Feb 2023 04:30  Patient On (Oxygen Delivery Method): room air        GENERAL: No acute distress, well-developed  HEAD:  Atraumatic, Normocephalic  EYES: EOMI, PERRLA, conjunctiva and sclera clear  NECK: Supple, no lymphadenopathy, no JVD  CHEST/LUNG: CTAB; No wheezes, rales, or rhonchi  HEART: Regular rate and rhythm; No murmurs, rubs, or gallops  ABDOMEN: Soft, non-tender, non-distended; normal bowel sounds, no organomegaly  EXTREMITIES:  2+ peripheral pulses b/l, No clubbing, cyanosis, or edema  NEUROLOGY: A&O x 3, no focal deficits  SKIN: No rashes or lesions    LABS:                        11.7   7.37  )-----------( 558      ( 01 Feb 2023 09:51 )             37.3     02-01    139  |  106  |  22  ----------------------------<  81  3.9   |  27  |  0.84    Ca    7.8<L>      01 Feb 2023 09:51  Phos  3.3     02-01  Mg     2.6     02-01    TPro  4.0<L>  /  Alb  1.3<L>  /  TBili  <0.1<L>  /  DBili  x   /  AST  35  /  ALT  24  /  AlkPhos  50  02-01        RADIOLOGY & ADDITIONAL TESTS:  Results Reviewed:   Imaging Personally Reviewed:  Electrocardiogram Personally Reviewed:    COORDINATION OF CARE:  Care Discussed with Consultants/Other Providers [Y/N]:  Prior or Outpatient Records Reviewed [Y/N]:

## 2023-02-02 NOTE — PROGRESS NOTE ADULT - PROBLEM SELECTOR PLAN 2
- last admission:  tacro 4 BID with AM levels 30min prior to dose administration. (decreased to 4mg BID on 1/24)- per nephro, can consider continuing tacro at 3 mg BID or transition to pred 80 daily- discussed with patient aid wants tacrolimus (patient has reservations about starting prednisone -nausea on prednisone in past, weight gain)  - quantiferon negative-- in case pt needs rituximab in the future.    Plan:  [] nephro following, appreciate recs   [] c/w Tacro 3mg BID (dose 10am/10pm once started), per nephro planning to start rituxan on 2/1.  [] full dose lovenox (80mg BID) until pt above to tolerate PO, then eliquis 5mg PO BID  [] f/u tacro level daily - last admission:  tacro 4 BID with AM levels 30min prior to dose administration. (decreased to 4mg BID on 1/24)- per nephro, can consider continuing tacro at 3 mg BID or transition to pred 80 daily- discussed with patient aid wants tacrolimus (patient has reservations about starting prednisone -nausea on prednisone in past, weight gain)  - s/p rituxan on 2/1: tolerated well     Plan:  [] nephro following, appreciate recs   [] c/w Tacro 3mg BID (dose 10am/10pm)  [] continue with bactrim for PJP prophylaxis   [] full dose lovenox (80mg BID) until pt above to tolerate PO, then eliquis 5mg PO BID  [] f/u tacro level daily

## 2023-02-02 NOTE — PROGRESS NOTE ADULT - PROBLEM SELECTOR PLAN 1
- Associated with nausea, vomiting, constipation  - etiology unknown- possibly gastroparesis (autoimmune?) vs. inflammation vs. medication induced (possibly due to tacro?)  - U tox negative  - CT A/P unremarkable, MRI head unremarkable   - repeat gastric emptying study negative   - celiac workout negative   - mesenteric duplex negative    Plan:  [] Antiemetics as needed: zofran 8mg IV Q8 PRN, ativan 1 mg Q8 PRN if necessary    [] GI following, appreciate recs         - f/u GI PCR, Stool CX, Stool O/O and fecal calpo (if diarrhea)         - recommend Soft bite sized diet as tolerated, Zofran QID PRN, Compazine+ Scopolamine PRN, PPI BID, Maalox         QID PRN, GI signing off   [] famotidine IV QD, can increased to BID if necessary  [] dulcolax suppository QD PRN - Associated with nausea, vomiting, constipation  - etiology unknown- possibly gastroparesis (autoimmune?) vs. inflammation vs. medication induced (possibly due to tacro?)  - U tox negative  - CT A/P unremarkable, MRI head unremarkable   - repeat gastric emptying study negative   - celiac workout negative   - mesenteric duplex negative  - clinically improving     Plan:  [] Antiemetics as needed: zofran 8mg IV Q8 PRN, ativan 1 mg Q8 PRN if necessary    [] GI following, appreciate recs         - f/u GI PCR, Stool CX, Stool O/O and fecal calpo (if diarrhea)         - recommend Soft bite sized diet as tolerated, Zofran QID PRN, Compazine+ Scopolamine PRN, PPI BID, Maalox         QID PRN, GI signing off   [] famotidine IV QD, can increased to BID if necessary  [] dulcolax suppository QD PRN

## 2023-02-02 NOTE — PROGRESS NOTE ADULT - PROBLEM SELECTOR PLAN 1
Pt with biopsy proven Minimal Change Disease presenting to the hospital with worsening proteinuria and anasarca and N/V. Pt previously admitted in 6/2022 with similar complaints noted to have TP/CR ratio of 3.8g, at that time started on steroid therapy and discharged with nephrology follow up. Now off steroids for past one month. Had poor nephrology outpatient follow up. C/w with Eliquis 5mg BID (pt with severe nephrotic syndrome are at high risk for thromboembolic events). C/w PCP ppx with bactrim.     C/w Tacrolimus 3mg BID for now as started during recent admission for nephrotic disease. Prefer steroid sparing regimen. Please monitor tacrolimus levels daily 30 minutes prior to AM dose (target level 4-6). Will discuss starting Envarsus to decrease pill burden.    S/p Rituximab 2/1/23.     If you have any questions, please feel free to contact me  Jose Raul Patterson  Nephrology Fellow  [phone]; Prefer [employer] TEAMS  (After 5pm or on weekends please page the on-call fellow) Pt with biopsy proven Minimal Change Disease presenting to the hospital with worsening proteinuria and anasarca and N/V. Pt previously admitted in 6/2022 with similar complaints noted to have TP/CR ratio of 3.8g, at that time started on steroid therapy and discharged with nephrology follow up. Now off steroids for past one month. Had poor nephrology outpatient follow up. C/w with Eliquis 5mg BID (pt with severe nephrotic syndrome are at high risk for thromboembolic events). C/w PCP ppx with bactrim.     C/w Tacrolimus 3mg BID for now as started during recent admission for nephrotic disease. Prefer steroid sparing regimen. Please monitor tacrolimus levels daily 30 minutes prior to AM dose (target level 4-6). Can consider Switching to cyclosporine if unable to tolerate Tacrolimus. Envarsus not covered.     S/p Rituximab 2/1/23.     If you have any questions, please feel free to contact me  Jose Raul Patterson  Nephrology Fellow  [phone]; Prefer [employer] TEAMS  (After 5pm or on weekends please page the on-call fellow)

## 2023-02-02 NOTE — PROGRESS NOTE ADULT - PROBLEM SELECTOR PLAN 5
- DVT ppx: lovenox as above  - Diet: advance as tolerated   - Dispo: pending - DVT ppx: eliquis as above   - Diet: advance as tolerated   - Dispo: pending, possibly 2/3

## 2023-02-03 ENCOUNTER — TRANSCRIPTION ENCOUNTER (OUTPATIENT)
Age: 33
End: 2023-02-03

## 2023-02-03 VITALS
RESPIRATION RATE: 18 BRPM | DIASTOLIC BLOOD PRESSURE: 72 MMHG | SYSTOLIC BLOOD PRESSURE: 103 MMHG | TEMPERATURE: 98 F | OXYGEN SATURATION: 97 % | HEART RATE: 89 BPM

## 2023-02-03 DIAGNOSIS — R74.8 ABNORMAL LEVELS OF OTHER SERUM ENZYMES: ICD-10-CM

## 2023-02-03 LAB
ALBUMIN SERPL ELPH-MCNC: 1 G/DL — LOW (ref 3.3–5)
ALP SERPL-CCNC: 57 U/L — SIGNIFICANT CHANGE UP (ref 40–120)
ALT FLD-CCNC: 47 U/L — HIGH (ref 10–45)
ANION GAP SERPL CALC-SCNC: 5 MMOL/L — SIGNIFICANT CHANGE UP (ref 5–17)
AST SERPL-CCNC: 61 U/L — HIGH (ref 10–40)
BILIRUB SERPL-MCNC: 0.1 MG/DL — LOW (ref 0.2–1.2)
BUN SERPL-MCNC: 16 MG/DL — SIGNIFICANT CHANGE UP (ref 7–23)
CALCIUM SERPL-MCNC: 7.6 MG/DL — LOW (ref 8.4–10.5)
CHLORIDE SERPL-SCNC: 106 MMOL/L — SIGNIFICANT CHANGE UP (ref 96–108)
CO2 SERPL-SCNC: 25 MMOL/L — SIGNIFICANT CHANGE UP (ref 22–31)
CREAT SERPL-MCNC: 0.72 MG/DL — SIGNIFICANT CHANGE UP (ref 0.5–1.3)
CULTURE RESULTS: SIGNIFICANT CHANGE UP
EGFR: 115 ML/MIN/1.73M2 — SIGNIFICANT CHANGE UP
GAMMA INTERFERON BACKGROUND BLD IA-ACNC: 0.02 IU/ML — SIGNIFICANT CHANGE UP
GLUCOSE SERPL-MCNC: 100 MG/DL — HIGH (ref 70–99)
HCT VFR BLD CALC: 36.4 % — SIGNIFICANT CHANGE UP (ref 34.5–45)
HGB BLD-MCNC: 11.2 G/DL — LOW (ref 11.5–15.5)
M TB IFN-G BLD-IMP: ABNORMAL
M TB IFN-G CD4+ BCKGRND COR BLD-ACNC: -0.01 IU/ML — SIGNIFICANT CHANGE UP
M TB IFN-G CD4+CD8+ BCKGRND COR BLD-ACNC: -0.01 IU/ML — SIGNIFICANT CHANGE UP
MAGNESIUM SERPL-MCNC: 2.2 MG/DL — SIGNIFICANT CHANGE UP (ref 1.6–2.6)
MCHC RBC-ENTMCNC: 30.4 PG — SIGNIFICANT CHANGE UP (ref 27–34)
MCHC RBC-ENTMCNC: 30.8 GM/DL — LOW (ref 32–36)
MCV RBC AUTO: 98.9 FL — SIGNIFICANT CHANGE UP (ref 80–100)
NRBC # BLD: 0 /100 WBCS — SIGNIFICANT CHANGE UP (ref 0–0)
PHOSPHATE SERPL-MCNC: 2.6 MG/DL — SIGNIFICANT CHANGE UP (ref 2.5–4.5)
PLATELET # BLD AUTO: 446 K/UL — HIGH (ref 150–400)
POTASSIUM SERPL-MCNC: 3.6 MMOL/L — SIGNIFICANT CHANGE UP (ref 3.5–5.3)
POTASSIUM SERPL-SCNC: 3.6 MMOL/L — SIGNIFICANT CHANGE UP (ref 3.5–5.3)
PROT SERPL-MCNC: 4 G/DL — LOW (ref 6–8.3)
QUANT TB PLUS MITOGEN MINUS NIL: 0 IU/ML — SIGNIFICANT CHANGE UP
RBC # BLD: 3.68 M/UL — LOW (ref 3.8–5.2)
RBC # FLD: 14.4 % — SIGNIFICANT CHANGE UP (ref 10.3–14.5)
SODIUM SERPL-SCNC: 136 MMOL/L — SIGNIFICANT CHANGE UP (ref 135–145)
SPECIMEN SOURCE: SIGNIFICANT CHANGE UP
TACROLIMUS SERPL-MCNC: 2.2 NG/ML — SIGNIFICANT CHANGE UP
WBC # BLD: 4.46 K/UL — SIGNIFICANT CHANGE UP (ref 3.8–10.5)
WBC # FLD AUTO: 4.46 K/UL — SIGNIFICANT CHANGE UP (ref 3.8–10.5)

## 2023-02-03 PROCEDURE — A9541: CPT

## 2023-02-03 PROCEDURE — 84295 ASSAY OF SERUM SODIUM: CPT

## 2023-02-03 PROCEDURE — 85610 PROTHROMBIN TIME: CPT

## 2023-02-03 PROCEDURE — 83036 HEMOGLOBIN GLYCOSYLATED A1C: CPT

## 2023-02-03 PROCEDURE — 83690 ASSAY OF LIPASE: CPT

## 2023-02-03 PROCEDURE — 84100 ASSAY OF PHOSPHORUS: CPT

## 2023-02-03 PROCEDURE — 82330 ASSAY OF CALCIUM: CPT

## 2023-02-03 PROCEDURE — 71045 X-RAY EXAM CHEST 1 VIEW: CPT

## 2023-02-03 PROCEDURE — 78264 GASTRIC EMPTYING IMG STUDY: CPT

## 2023-02-03 PROCEDURE — 96374 THER/PROPH/DIAG INJ IV PUSH: CPT

## 2023-02-03 PROCEDURE — 82803 BLOOD GASES ANY COMBINATION: CPT

## 2023-02-03 PROCEDURE — 81001 URINALYSIS AUTO W/SCOPE: CPT

## 2023-02-03 PROCEDURE — 82947 ASSAY GLUCOSE BLOOD QUANT: CPT

## 2023-02-03 PROCEDURE — 84702 CHORIONIC GONADOTROPIN TEST: CPT

## 2023-02-03 PROCEDURE — 83825 ASSAY OF MERCURY: CPT

## 2023-02-03 PROCEDURE — 83735 ASSAY OF MAGNESIUM: CPT

## 2023-02-03 PROCEDURE — 96361 HYDRATE IV INFUSION ADD-ON: CPT

## 2023-02-03 PROCEDURE — A9548: CPT

## 2023-02-03 PROCEDURE — 74177 CT ABD & PELVIS W/CONTRAST: CPT | Mod: MA

## 2023-02-03 PROCEDURE — 87507 IADNA-DNA/RNA PROBE TQ 12-25: CPT

## 2023-02-03 PROCEDURE — 93975 VASCULAR STUDY: CPT

## 2023-02-03 PROCEDURE — 87046 STOOL CULTR AEROBIC BACT EA: CPT

## 2023-02-03 PROCEDURE — 86480 TB TEST CELL IMMUN MEASURE: CPT

## 2023-02-03 PROCEDURE — 83655 ASSAY OF LEAD: CPT

## 2023-02-03 PROCEDURE — 82962 GLUCOSE BLOOD TEST: CPT

## 2023-02-03 PROCEDURE — 99231 SBSQ HOSP IP/OBS SF/LOW 25: CPT

## 2023-02-03 PROCEDURE — 85014 HEMATOCRIT: CPT

## 2023-02-03 PROCEDURE — 87045 FECES CULTURE AEROBIC BACT: CPT

## 2023-02-03 PROCEDURE — 83605 ASSAY OF LACTIC ACID: CPT

## 2023-02-03 PROCEDURE — 86160 COMPLEMENT ANTIGEN: CPT

## 2023-02-03 PROCEDURE — 85027 COMPLETE CBC AUTOMATED: CPT

## 2023-02-03 PROCEDURE — 87177 OVA AND PARASITES SMEARS: CPT

## 2023-02-03 PROCEDURE — 83993 ASSAY FOR CALPROTECTIN FECAL: CPT

## 2023-02-03 PROCEDURE — 83520 IMMUNOASSAY QUANT NOS NONAB: CPT

## 2023-02-03 PROCEDURE — 86161 COMPLEMENT/FUNCTION ACTIVITY: CPT

## 2023-02-03 PROCEDURE — 99285 EMERGENCY DEPT VISIT HI MDM: CPT

## 2023-02-03 PROCEDURE — 70551 MRI BRAIN STEM W/O DYE: CPT

## 2023-02-03 PROCEDURE — 87077 CULTURE AEROBIC IDENTIFY: CPT

## 2023-02-03 PROCEDURE — 0225U NFCT DS DNA&RNA 21 SARSCOV2: CPT

## 2023-02-03 PROCEDURE — 82435 ASSAY OF BLOOD CHLORIDE: CPT

## 2023-02-03 PROCEDURE — 87086 URINE CULTURE/COLONY COUNT: CPT

## 2023-02-03 PROCEDURE — 86258 DGP ANTIBODY EACH IG CLASS: CPT

## 2023-02-03 PROCEDURE — 99239 HOSP IP/OBS DSCHRG MGMT >30: CPT | Mod: GC

## 2023-02-03 PROCEDURE — 80053 COMPREHEN METABOLIC PANEL: CPT

## 2023-02-03 PROCEDURE — 76937 US GUIDE VASCULAR ACCESS: CPT

## 2023-02-03 PROCEDURE — 85730 THROMBOPLASTIN TIME PARTIAL: CPT

## 2023-02-03 PROCEDURE — 85025 COMPLETE CBC W/AUTO DIFF WBC: CPT

## 2023-02-03 PROCEDURE — 36415 COLL VENOUS BLD VENIPUNCTURE: CPT

## 2023-02-03 PROCEDURE — 80048 BASIC METABOLIC PNL TOTAL CA: CPT

## 2023-02-03 PROCEDURE — 96375 TX/PRO/DX INJ NEW DRUG ADDON: CPT

## 2023-02-03 PROCEDURE — 83880 ASSAY OF NATRIURETIC PEPTIDE: CPT

## 2023-02-03 PROCEDURE — 80197 ASSAY OF TACROLIMUS: CPT

## 2023-02-03 PROCEDURE — 71275 CT ANGIOGRAPHY CHEST: CPT | Mod: MA

## 2023-02-03 PROCEDURE — 84132 ASSAY OF SERUM POTASSIUM: CPT

## 2023-02-03 PROCEDURE — 86364 TISS TRNSGLTMNASE EA IG CLAS: CPT

## 2023-02-03 PROCEDURE — 85018 HEMOGLOBIN: CPT

## 2023-02-03 PROCEDURE — 84484 ASSAY OF TROPONIN QUANT: CPT

## 2023-02-03 RX ORDER — TACROLIMUS 5 MG/1
3 CAPSULE ORAL
Qty: 180 | Refills: 0
Start: 2023-02-03 | End: 2023-03-04

## 2023-02-03 RX ORDER — ROSUVASTATIN CALCIUM 5 MG/1
1 TABLET ORAL
Qty: 0 | Refills: 0 | DISCHARGE

## 2023-02-03 RX ORDER — PANTOPRAZOLE SODIUM 20 MG/1
1 TABLET, DELAYED RELEASE ORAL
Qty: 30 | Refills: 0
Start: 2023-02-03 | End: 2023-03-04

## 2023-02-03 RX ORDER — PROCHLORPERAZINE MALEATE 5 MG
1 TABLET ORAL
Qty: 28 | Refills: 0
Start: 2023-02-03 | End: 2023-02-09

## 2023-02-03 RX ORDER — APIXABAN 2.5 MG/1
1 TABLET, FILM COATED ORAL
Qty: 60 | Refills: 0
Start: 2023-02-03 | End: 2023-03-04

## 2023-02-03 RX ORDER — METRONIDAZOLE 500 MG
1 TABLET ORAL
Qty: 1 | Refills: 0
Start: 2023-02-03 | End: 2023-02-03

## 2023-02-03 RX ADMIN — PANTOPRAZOLE SODIUM 40 MILLIGRAM(S): 20 TABLET, DELAYED RELEASE ORAL at 05:07

## 2023-02-03 RX ADMIN — TACROLIMUS 3 MILLIGRAM(S): 5 CAPSULE ORAL at 14:02

## 2023-02-03 RX ADMIN — Medication 100 MILLIGRAM(S): at 05:08

## 2023-02-03 RX ADMIN — ONDANSETRON 8 MILLIGRAM(S): 8 TABLET, FILM COATED ORAL at 02:52

## 2023-02-03 RX ADMIN — Medication 1 MILLIGRAM(S): at 05:07

## 2023-02-03 RX ADMIN — Medication 1 MILLIGRAM(S): at 13:40

## 2023-02-03 RX ADMIN — FAMOTIDINE 20 MILLIGRAM(S): 10 INJECTION INTRAVENOUS at 14:02

## 2023-02-03 RX ADMIN — Medication 100 MILLIGRAM(S): at 14:03

## 2023-02-03 RX ADMIN — APIXABAN 5 MILLIGRAM(S): 2.5 TABLET, FILM COATED ORAL at 05:08

## 2023-02-03 RX ADMIN — Medication 1 TABLET(S): at 05:24

## 2023-02-03 NOTE — PROGRESS NOTE ADULT - PROVIDER SPECIALTY LIST ADULT
Gastroenterology
Nephrology
Gastroenterology
Nephrology
Internal Medicine

## 2023-02-03 NOTE — PROGRESS NOTE ADULT - PROBLEM SELECTOR PLAN 1
- Associated with nausea, vomiting, constipation  - etiology unknown- possibly gastroparesis (autoimmune?) vs. inflammation vs. medication induced (possibly due to tacro?)  - U tox negative  - CT A/P unremarkable, MRI head unremarkable   - repeat gastric emptying study negative   - celiac workout negative   - mesenteric duplex negative  - clinically improving     Plan:  [] Antiemetics as needed: zofran 8mg IV Q8 PRN, ativan 1 mg Q8 PRN if necessary    [] GI following, appreciate recs         - f/u GI PCR, Stool CX, Stool O/O and fecal calpo (if diarrhea)         - recommend Soft bite sized diet as tolerated, Zofran QID PRN, Compazine+ Scopolamine PRN, PPI BID, Maalox         QID PRN, GI signing off   [] famotidine IV QD, can increased to BID if necessary  [] dulcolax suppository QD PRN

## 2023-02-03 NOTE — DISCHARGE NOTE PROVIDER - NSDCACTIVITY_GEN_ALL_CORE
Quality 130: Documentation Of Current Medications In The Medical Record: Current Medications Documented Detail Level: Detailed Quality 137: Melanoma: Continuity Of Care - Recall System: Patient information entered into a recall system that includes: target date for the next exam specified AND a process to follow up with patients regarding missed or unscheduled appointments Quality 226: Preventive Care And Screening: Tobacco Use: Screening And Cessation Intervention: Patient screened for tobacco use and is an ex/non-smoker Quality 431: Preventive Care And Screening: Unhealthy Alcohol Use - Screening: Patient screened for unhealthy alcohol use using a single question and scores less than 2 times per year Quality 47: Advance Care Plan: Advance care planning not documented, reason not otherwise specified. No restrictions

## 2023-02-03 NOTE — PROGRESS NOTE ADULT - PROBLEM SELECTOR PLAN 1
Pt with biopsy proven Minimal Change Disease presenting to the hospital with worsening proteinuria and anasarca and N/V. Pt previously admitted in 6/2022 with similar complaints noted to have TP/CR ratio of 3.8g, at that time started on steroid therapy and discharged with nephrology follow up. Now off steroids for past one month. Had poor nephrology outpatient follow up. C/w with Eliquis 5mg BID (pt with severe nephrotic syndrome are at high risk for thromboembolic events). C/w PCP ppx with bactrim.     Increase tacro to 4mg BID for now as started during recent admission for nephrotic disease. Prefer steroid sparing regimen. Please monitor tacrolimus levels daily 30 minutes prior to AM dose (target level 4-6).     S/p Rituximab 2/1/23.   Next dose in 2-3 weeks outpt  pt to f.u with Dr Angie Apodaca as outpatient    For the weekend coverage, please call Dr Mukul Faustin( fellow) and Dr Lu Glasgow( Attending)

## 2023-02-03 NOTE — PROGRESS NOTE ADULT - ATTENDING COMMENTS
Agree w above. 2 weeks of abdominal pain, N,V, diarrhea. Now tolerating solids but with nausea. EGD few months ago for similar episodes w gastritis otherwise normal exam. Abdominal imaging unremarkable. If w continued diarrhea, GI PCR. Supportive care.
Agree w above. Tolerating advancement in diet with improving symptoms. Continue supportive care. Please reconsult as needed.
Minimal change  Refractory to steroids  Signed out AMA last week  Continue Tac and plan for rituxan tomorrow  Dw pt and she is agreeable    Tish Elizabeth MD  Off: [phone]  contact me on teams    (After 5 pm or on weekends please page the on-call fellow/attending, can check [email or web address] for schedule. Login is jose antonio willoughby, schedule under [facility] medicine, psych, derm)
I have seen this patient with the fellow and agree with their assessment and plan. I was physically present for significant portions of the evaluation and management (E/M) service provided.  I agree with the above history, physical, and plan which I have reviewed and edited where appropriate.    Plan for rituximab today  Can give torsemide as well starting today for edema  envarsus vs tacro for the bridge therapy    Merlin Leavitt MD  Pager   Office     Contact me directly via Microsoft Teams     (After 5 pm or on weekends please page the on-call fellow/attending, can check [email or web address] for schedule. Login is jose antonio willoughby, schedule under [facility] medicine, psych, derm)
I have seen this patient with the fellow and agree with their assessment and plan. I was physically present for significant portions of the evaluation and management (E/M) service provided.  I agree with the above history, physical, and plan which I have reviewed and edited where appropriate.    steroid dependent MCKENNA  now s/p first dose of rituxan  next dose as outpatient in 2 -3 weeks  cont tacro at this dose on dc as she can tolerate it with apple sauce  pt will f/u with Dr Angie Apodaca as outpatient- she is aware and d/w with Dr Apodaca  dc planning next 24 hours    Merlin Leavitt MD  Pager   Office     Contact me directly via Microsoft Teams     (After 5 pm or on weekends please page the on-call fellow/attending, can check [email or web address] for schedule. Login is jose antonio willoughby, schedule under [facility] medicine, psych, derm)
-Gastric emptying study and mesenteric doppler were normal. -F/u any further GI recs. -N/V likely ?medication side effect. -Will look into alternatives for flagyl for trichomoniasis.   -F/u renal recs for further mgmt of MCKENNA.
-F/u GI and renal recs.   -Monitor on rituxan today.   -Antiemetics for n/v. can only give short course of benzos on dc. can try antihistamines and other agents too.   -patient reportedly took tacro today.
-Patient reports that the N/V overall better. She is able to take PO and the tacro. -Will send test script to ensure her insurance will cover the tacro. -Discussed with renal. Ready for DC with outpatient follow up.
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 C/w ativan prn, zofran, compazine prn. F/u EKG for qtc   - MRI brain to evaluate for central etiology - No acute intracranial hemorrhage, acute infarction, extra-axial fluid collection or hydrocephalus.  - renal c/s as patient has not taken her tacrolimus - f/u re: tacro/steroids  - PPI IV BID; zantac IV     #Trichomonas vaginalis  - Flagyl x 7 days
-Stable for DC with outpatient renal follow up. -F/u renal recs.   -Flagyl to complete trichomoniasis treatment.   -PRN antiemetics. -Tolerating PO.   -34 minutes spent on the DC process.
-F/u GI recs. -F/u mesenteric doppler. -F/u gastric emptying study.   -F/u renal recs.

## 2023-02-03 NOTE — DISCHARGE NOTE PROVIDER - NSDCCPTREATMENT_GEN_ALL_CORE_FT
PRINCIPAL PROCEDURE  Procedure: MRI brain  Findings and Treatment:   < end of copied text >  FINDINGS:  Ventricles are normal in size and configuration for patient's age. No   hydrocephalus or extra-axial fluid collection.  No abnormal restricted diffusion identified to suggest acute territorial   infarction. No acute intracranial hemorrhage. Supratentorial white matter   is unremarkable in signal. No significant midline shift, mass effect or   herniation. Susceptibility weighted sequences are within normal limits   without evidence for chronic blood products.  Visualized proximal arterial and dural venous sinus flow voids preserved   on spin-echo sequences. Paranasal sinuses and mastoid air cells are well   aerated.  No suspicious expansile or destructive calvarial lesion identified.  Sella and suprasellar region are unremarkable.  IMPRESSION:  No acute intracranial hemorrhage, acute infarction, extra-axial fluid   collection or hydrocephalus.  If clinicians have any questions/need for clarification regarding this   report, Dr. Suhas Coffey can be best reached via the patient secure   hospital email system< from: MR Head No Cont (01.28.23 @ 18:51) >

## 2023-02-03 NOTE — DISCHARGE NOTE PROVIDER - CARE PROVIDER_API CALL
Angie Apodaca)  Internal Medicine; Nephrology  [phone] [place] [address]  Phone: [phone]  Fax: [phone]  Follow Up Time: 2 weeks

## 2023-02-03 NOTE — DISCHARGE NOTE PROVIDER - NSDCCPCAREPLAN_GEN_ALL_CORE_FT
PRINCIPAL DISCHARGE DIAGNOSIS  Diagnosis: Minimal change disease  Assessment and Plan of Treatment: You came to the hospital because of a flare of your minimal change disease. The kidney doctors assessed you. You were given TACROLIMUS to help treat your disease and your symptoms improved. You were also given a dose of a medication called RITUXAN to help with your disease. Your next dose of RITUXAN will be scheduled for the next 2-3 weeks. Please follow up with your nephrologist in 2 weeks.      SECONDARY DISCHARGE DIAGNOSES  Diagnosis: Abdominal pain  Assessment and Plan of Treatment: While you were in the hospital, you had severe nausea and vomiting. The GI doctors did various labs, imaging and testing and did not find a cause of your nausea. It is possible that your nausea is due to your underlying kidney disease and should improve as your disease continues to receive treatment. At discharge, you will perscibed medications to help with your nausea and vomiting that are similar to the medications that you received while in the hospital. Please follow up with your primary care provider in one week.

## 2023-02-03 NOTE — PROGRESS NOTE ADULT - PROBLEM SELECTOR PLAN 2
- last admission:  tacro 4 BID with AM levels 30min prior to dose administration. (decreased to 4mg BID on 1/24)- per nephro, can consider continuing tacro at 3 mg BID or transition to pred 80 daily- discussed with patient aid wants tacrolimus (patient has reservations about starting prednisone -nausea on prednisone in past, weight gain)  - s/p rituxan on 2/1: tolerated well     Plan:  [] nephro following, appreciate recs   [] c/w Tacro 3mg BID (dose 10am/10pm)  [] continue with bactrim for PJP prophylaxis   [] full dose lovenox (80mg BID) until pt above to tolerate PO, then eliquis 5mg PO BID  [] f/u tacro level daily - last admission:  tacro 4 BID with AM levels 30min prior to dose administration. (decreased to 4mg BID on 1/24)- per nephro, can consider continuing tacro at 3 mg BID or transition to pred 80 daily- discussed with patient aid wants tacrolimus (patient has reservations about starting prednisone -nausea on prednisone in past, weight gain)  - s/p rituxan on 2/1: tolerated well   - QuantiFeron 1/29 indeterminant - likely anergy in setting steroid use      Plan:  [] nephro following, appreciate recs   [] c/w Tacro 3mg BID (dose 10am/10pm)  [] continue with bactrim for PJP prophylaxis   [] c/w  eliquis 5mg PO BID  [] f/u tacro level daily  [] f/u repeat quant gold

## 2023-02-03 NOTE — PROGRESS NOTE ADULT - PROBLEM SELECTOR PLAN 4
found to be + during prior admission, flagyl started 1/28 however patient AMA 1/27. Did not continue treatment at home due to inability to tolerate PO intake    Plan:  [] c/w IV flagyl (1/28-) for 7d course (ending on 2/3). Due to possibility of IV Flagyl causing GI side effects, will discuss with pharmacy the feasibility of intravaginal flagyl for remainder of course.  [] educated about informing sexual partners found to be + during prior admission, flagyl started 1/28 however patient AMA 1/27. Did not continue treatment at home due to inability to tolerate PO intake    Plan:  [] c/w IV flagyl (1/28-) for 7d course (ending on 2/3)  [] educated about informing sexual partners +tachycardia to 126 and leukocytosis to 12.19 on admission. Afebrile. At this time, most likely 2/2 to pain/vomiting. Possible 2/2 to incomplete treatment of UTI but less likely given lack of urinary symptoms.    Plan:  [] will monitor off abx at this time  [] Trend WBC daily

## 2023-02-03 NOTE — PROGRESS NOTE ADULT - PROBLEM SELECTOR PLAN 3
+tachycardia to 126 and leukocytosis to 12.19 on admission. Afebrile. At this time, most likely 2/2 to pain/vomiting. Possible 2/2 to incomplete treatment of UTI but less likely given lack of urinary symptoms.    Plan:  [] will monitor off abx at this time  [] Trend WBC daily AST/ALT 61/47  s/p rituxan 2/1>> possibly elevated in setting of rituxan     Plan:  [] CTM  [] outpatient follow up with nephro- next rituxan dose in 2-3 weeks

## 2023-02-03 NOTE — PROGRESS NOTE ADULT - PROBLEM SELECTOR PLAN 5
- DVT ppx: eliquis as above   - Diet: advance as tolerated   - Dispo: pending, possibly 2/3 found to be + during prior admission, flagyl started 1/28 however patient AMA 1/27. Did not continue treatment at home due to inability to tolerate PO intake    Plan:  [] c/w IV flagyl (1/28-) for 7d course (ending on 2/3)  [] educated about informing sexual partners

## 2023-02-03 NOTE — PROVIDER CONTACT NOTE (MEDICATION) - ASSESSMENT
RN offered PRN zofran, pt refused and told RN it is not working, after multiple offer of this medication, patient respond :" are you forcing me to take this med?"  RN provide education of this medication and offered emotional support with patient verbalized understanding.

## 2023-02-03 NOTE — DISCHARGE NOTE PROVIDER - NSFOLLOWUPCLINICS_GEN_ALL_ED_FT
[facility] General Internal Medicine  General Internal Medicine  [address] [id number]  Phone: [phone]  Fax:   Follow Up Time: 1 week

## 2023-02-03 NOTE — DISCHARGE NOTE PROVIDER - HOSPITAL COURSE
32F with MCKENNA and asthma p/w chest pain, abdominal pain, n/v and + hemoptysis as a readmission after leaving AMA on 1/26 because she "wanted to see her kids". Prompted to return as she was feeling "worse."    HPI for that admission (1/18) is as follows:   32F with MCKENNA and asthma p/w abdominal pain, n/v, and generalized swelling x 1 week. States abdominal pain is diffuse but worse over RLQ. Also endorses diarrhea (3x/day) x 1 week as well. Denies CP, SOB, dyspnea, fevers, congestion, or dysuria. States she does not follow with an outpatient nephrologist as she is in between providers. Previously followed with provider at [facility]. Has been off prednisone for about 1 month. States she does not wish to start steroids again as she had 60lb weight gain over the past year on steroids and routinely has periods of fluid retention/swelling despite being on steroids. PMD sometimes prescribes Lasix when she has increased swelling, but has not had any this time. In the ED, T101.3 and HR 110s. Labs significant for albumin 1.1, UA prot > 600. CTAP with trace b/l pleural effusions and generalized soft tissue edema. Nephrology consulted and recommended albumin infusion.      Hospital course:            - MCKENNA: neprhology was following, being treated with tacrolimus but refused level monitoring and some doses of tacro 2/2 nausea           - UA + for UTI and  trichomonas, patient started on bactrim and flagyl 1/26, did not continue taking upon dc            - n/v: underwent workup for gastroperesis--  CTAP with contrast unremarkable. patient underwent gastric emptying study for n/v but was unable to tolerate test-- MRI considered for f/u but not done. Scopolamine patch did not help patient.     Today, patient states that she continued to have nausea and vomiting with some visible blood over the last several days along with constipation. Patient also complained of chest pain upon arrival to the ED which she localized to the center of her chest and characterized as "sharp"; however, at the time of history taking this pain resolved. She attributes it to "reflux". Patient notes that all of these symptoms are similar to her prior admission but have just not resolved. Patient with vomiting during history.     ED  course: On arrival to ED, patient tachycardic 100-120s with soft BPs SBP 90s-100s. Afebrile. Labs remarkable for: leukocytosis to 12.19, albumin 1.2; otherwise unremarkable.  CTA without evidence of PE. CTAP without acute abdominal pathology. s/p famotidine 20mg IV x1, reglan 10mg IV x1, zofran 8mg IV x2 and 2L LR.     Nephro consulted- started on steroids and transitioned to tacrolimus 3 mg BID once better able to tolerate PO. QuantiFeron indeterminant- likely due to anergy in setting of steroid treatment. Repeat QuantiFeron ** * and CXR clear- low clinical suspicion for TB based on history, exam and imaging. Given one dose of Rituxan on 2/1 with plan for next dose in 2-3 weeks outpatient. Started on bactrim prophylaxis. Continued on eliquis 5 mg BID. Repeat gastric emptying study unremarkable. N/V improved with Prn ativan and antiemetics. Completed course of flagyl. Patient clinically stable and ready for discharge.       MEDICATIONS:  Continue with Tacrolimus 3 mg BID   Continue with Eliquis 5 mg BID   Continue with Bactrim DS Monday/Wednesday/Friday   Continue with Pantoprazole 40 mg BID   Continue with Pepcid 20 mg daily   Continue with Zofran prn for nausea    32F with MCKENNA and asthma p/w chest pain, abdominal pain, n/v and + hemoptysis as a readmission after leaving AMA on 1/26 because she "wanted to see her kids". Prompted to return as she was feeling "worse."    HPI for that admission (1/18) is as follows:   32F with MCKENNA and asthma p/w abdominal pain, n/v, and generalized swelling x 1 week. States abdominal pain is diffuse but worse over RLQ. Also endorses diarrhea (3x/day) x 1 week as well. Denies CP, SOB, dyspnea, fevers, congestion, or dysuria. States she does not follow with an outpatient nephrologist as she is in between providers. Previously followed with provider at [facility]. Has been off prednisone for about 1 month. States she does not wish to start steroids again as she had 60lb weight gain over the past year on steroids and routinely has periods of fluid retention/swelling despite being on steroids. PMD sometimes prescribes Lasix when she has increased swelling, but has not had any this time. In the ED, T101.3 and HR 110s. Labs significant for albumin 1.1, UA prot > 600. CTAP with trace b/l pleural effusions and generalized soft tissue edema. Nephrology consulted and recommended albumin infusion.      Hospital course:            - MCKENNA: neprhology was following, being treated with tacrolimus but refused level monitoring and some doses of tacro 2/2 nausea           - UA + for UTI and  trichomonas, patient started on bactrim and flagyl 1/26, did not continue taking upon dc            - n/v: underwent workup for gastroperesis--  CTAP with contrast unremarkable. patient underwent gastric emptying study for n/v but was unable to tolerate test-- MRI considered for f/u but not done. Scopolamine patch did not help patient.     Today, patient states that she continued to have nausea and vomiting with some visible blood over the last several days along with constipation. Patient also complained of chest pain upon arrival to the ED which she localized to the center of her chest and characterized as "sharp"; however, at the time of history taking this pain resolved. She attributes it to "reflux". Patient notes that all of these symptoms are similar to her prior admission but have just not resolved. Patient with vomiting during history.     ED  course: On arrival to ED, patient tachycardic 100-120s with soft BPs SBP 90s-100s. Afebrile. Labs remarkable for: leukocytosis to 12.19, albumin 1.2; otherwise unremarkable.  CTA without evidence of PE. CTAP without acute abdominal pathology. s/p famotidine 20mg IV x1, reglan 10mg IV x1, zofran 8mg IV x2 and 2L LR.     Nephro consulted- started on steroids and transitioned to tacrolimus 3 mg BID once better able to tolerate PO. QuantiFeron indeterminant- likely due to anergy in setting of steroid treatment. Repeat QuantiFeron ** * and CXR clear- low clinical suspicion for TB based on history, exam and imaging. Given one dose of Rituxan on 2/1 with plan for next dose in 2-3 weeks outpatient. Started on bactrim prophylaxis. Continued on eliquis 5 mg BID. Repeat gastric emptying study unremarkable. N/V improved with Prn ativan and antiemetics. Completed course of flagyl. Held crestor in setting of elevated liver enzymes at discharge, likely in setting of  rituxan- will follow up outpatient with nephrology and can restart accordingly. Patient clinically stable and ready for discharge.       MEDICATIONS:  Continue with Tacrolimus 3 mg BID   Continue with Eliquis 5 mg BID   Continue with Bactrim DS Monday/Wednesday/Friday   Continue with Pantoprazole 40 mg BID   Continue with Pepcid 20 mg daily   Continue with Zofran prn for nausea

## 2023-02-03 NOTE — PROGRESS NOTE ADULT - SUBJECTIVE AND OBJECTIVE BOX
PROGRESS NOTE:     Patient is a 32y old  Female who presents with a chief complaint of n/v + hemoptysis (02 Feb 2023 12:57)      SUBJECTIVE / OVERNIGHT EVENTS:    ADDITIONAL REVIEW OF SYSTEMS:    MEDICATIONS  (STANDING):  apixaban 5 milliGRAM(s) Oral two times a day  famotidine Injectable 20 milliGRAM(s) IV Push daily  influenza   Vaccine 0.5 milliLiter(s) IntraMuscular once  metroNIDAZOLE  IVPB 500 milliGRAM(s) IV Intermittent every 12 hours  pantoprazole  Injectable 40 milliGRAM(s) IV Push two times a day  polyethylene glycol 3350 17 Gram(s) Oral daily  senna 1 Tablet(s) Oral at bedtime  tacrolimus 3 milliGRAM(s) Oral <User Schedule>  trimethoprim  160 mG/sulfamethoxazole 800 mG 1 Tablet(s) Oral <User Schedule>    MEDICATIONS  (PRN):  acetaminophen     Tablet .. 650 milliGRAM(s) Oral every 6 hours PRN Temp greater or equal to 38C (100.4F), Mild Pain (1 - 3)  albuterol    90 MICROgram(s) HFA Inhaler 2 Puff(s) Inhalation every 6 hours PRN Shortness of Breath and/or Wheezing  aluminum hydroxide/magnesium hydroxide/simethicone Suspension 30 milliLiter(s) Oral every 4 hours PRN Dyspepsia  bisacodyl Suppository 10 milliGRAM(s) Rectal daily PRN Constipation  LORazepam   Injectable 1 milliGRAM(s) IV Push every 8 hours PRN breakthrough nausea/vomiting  melatonin 3 milliGRAM(s) Oral at bedtime PRN Insomnia  ondansetron Injectable 8 milliGRAM(s) IV Push every 8 hours PRN Nausea and/or Vomiting      CAPILLARY BLOOD GLUCOSE        I&O's Summary    02 Feb 2023 07:01  -  03 Feb 2023 07:00  --------------------------------------------------------  IN: 680 mL / OUT: 0 mL / NET: 680 mL        PHYSICAL EXAM:  Vital Signs Last 24 Hrs  T(C): 37.3 (03 Feb 2023 04:37), Max: 37.3 (02 Feb 2023 21:00)  T(F): 99.1 (03 Feb 2023 04:37), Max: 99.2 (02 Feb 2023 21:00)  HR: 100 (03 Feb 2023 04:37) (83 - 104)  BP: 110/76 (03 Feb 2023 04:37) (85/58 - 110/76)  BP(mean): --  RR: 18 (03 Feb 2023 04:37) (18 - 18)  SpO2: 97% (03 Feb 2023 04:37) (95% - 98%)    Parameters below as of 03 Feb 2023 04:37  Patient On (Oxygen Delivery Method): room air        GENERAL: No acute distress, well-developed  HEAD:  Atraumatic, Normocephalic  EYES: EOMI, PERRLA, conjunctiva and sclera clear  NECK: Supple, no lymphadenopathy, no JVD  CHEST/LUNG: CTAB; No wheezes, rales, or rhonchi  HEART: Regular rate and rhythm; No murmurs, rubs, or gallops  ABDOMEN: Soft, non-tender, non-distended; normal bowel sounds, no organomegaly  EXTREMITIES:  2+ peripheral pulses b/l, No clubbing, cyanosis, or edema  NEUROLOGY: A&O x 3, no focal deficits  SKIN: No rashes or lesions    LABS:                        12.0   6.72  )-----------( 532      ( 02 Feb 2023 10:15 )             38.7     02-02    137  |  105  |  18  ----------------------------<  97  3.8   |  27  |  0.82    Ca    7.8<L>      02 Feb 2023 10:15  Phos  3.4     02-02  Mg     2.5     02-02    TPro  4.0<L>  /  Alb  1.3<L>  /  TBili  <0.1<L>  /  DBili  x   /  AST  35  /  ALT  24  /  AlkPhos  50  02-01                RADIOLOGY & ADDITIONAL TESTS:  Results Reviewed:   Imaging Personally Reviewed:  Electrocardiogram Personally Reviewed:    COORDINATION OF CARE:  Care Discussed with Consultants/Other Providers [Y/N]:  Prior or Outpatient Records Reviewed [Y/N]:   PROGRESS NOTE:     Patient is a 32y old  Female who presents with a chief complaint of n/v + hemoptysis (02 Feb 2023 12:57)      SUBJECTIVE / OVERNIGHT EVENTS:  1 episode of vomiting overnight- n/v improved with ativan. Examined at bedside this AM. Feels well- reports was better able to tolerate PO yesterday. All other ROS negative. Ready to go home.     ADDITIONAL REVIEW OF SYSTEMS:    MEDICATIONS  (STANDING):  apixaban 5 milliGRAM(s) Oral two times a day  famotidine Injectable 20 milliGRAM(s) IV Push daily  influenza   Vaccine 0.5 milliLiter(s) IntraMuscular once  metroNIDAZOLE  IVPB 500 milliGRAM(s) IV Intermittent every 12 hours  pantoprazole  Injectable 40 milliGRAM(s) IV Push two times a day  polyethylene glycol 3350 17 Gram(s) Oral daily  senna 1 Tablet(s) Oral at bedtime  tacrolimus 3 milliGRAM(s) Oral <User Schedule>  trimethoprim  160 mG/sulfamethoxazole 800 mG 1 Tablet(s) Oral <User Schedule>    MEDICATIONS  (PRN):  acetaminophen     Tablet .. 650 milliGRAM(s) Oral every 6 hours PRN Temp greater or equal to 38C (100.4F), Mild Pain (1 - 3)  albuterol    90 MICROgram(s) HFA Inhaler 2 Puff(s) Inhalation every 6 hours PRN Shortness of Breath and/or Wheezing  aluminum hydroxide/magnesium hydroxide/simethicone Suspension 30 milliLiter(s) Oral every 4 hours PRN Dyspepsia  bisacodyl Suppository 10 milliGRAM(s) Rectal daily PRN Constipation  LORazepam   Injectable 1 milliGRAM(s) IV Push every 8 hours PRN breakthrough nausea/vomiting  melatonin 3 milliGRAM(s) Oral at bedtime PRN Insomnia  ondansetron Injectable 8 milliGRAM(s) IV Push every 8 hours PRN Nausea and/or Vomiting      CAPILLARY BLOOD GLUCOSE        I&O's Summary    02 Feb 2023 07:01  -  03 Feb 2023 07:00  --------------------------------------------------------  IN: 680 mL / OUT: 0 mL / NET: 680 mL        PHYSICAL EXAM:  Vital Signs Last 24 Hrs  T(C): 37.3 (03 Feb 2023 04:37), Max: 37.3 (02 Feb 2023 21:00)  T(F): 99.1 (03 Feb 2023 04:37), Max: 99.2 (02 Feb 2023 21:00)  HR: 100 (03 Feb 2023 04:37) (83 - 104)  BP: 110/76 (03 Feb 2023 04:37) (85/58 - 110/76)  BP(mean): --  RR: 18 (03 Feb 2023 04:37) (18 - 18)  SpO2: 97% (03 Feb 2023 04:37) (95% - 98%)    Parameters below as of 03 Feb 2023 04:37  Patient On (Oxygen Delivery Method): room air        GENERAL: No acute distress, well-developed  HEAD:  Atraumatic, Normocephalic  EYES: EOMI, PERRLA, conjunctiva and sclera clear  NECK: Supple, no lymphadenopathy, no JVD  CHEST/LUNG: CTAB; No wheezes, rales, or rhonchi  HEART: Regular rate and rhythm; No murmurs, rubs, or gallops  ABDOMEN: Soft, non-tender, non-distended; normal bowel sounds, no organomegaly  EXTREMITIES:  2+ peripheral pulses b/l, No clubbing, cyanosis, or edema  NEUROLOGY: A&O x 3, no focal deficits  SKIN: No rashes or lesions    LABS:                        12.0   6.72  )-----------( 532      ( 02 Feb 2023 10:15 )             38.7     02-02    137  |  105  |  18  ----------------------------<  97  3.8   |  27  |  0.82    Ca    7.8<L>      02 Feb 2023 10:15  Phos  3.4     02-02  Mg     2.5     02-02    TPro  4.0<L>  /  Alb  1.3<L>  /  TBili  <0.1<L>  /  DBili  x   /  AST  35  /  ALT  24  /  AlkPhos  50  02-01                RADIOLOGY & ADDITIONAL TESTS:  Results Reviewed:   Imaging Personally Reviewed:  Electrocardiogram Personally Reviewed:    COORDINATION OF CARE:  Care Discussed with Consultants/Other Providers [Y/N]:  Prior or Outpatient Records Reviewed [Y/N]:

## 2023-02-03 NOTE — DISCHARGE NOTE NURSING/CASE MANAGEMENT/SOCIAL WORK - PATIENT PORTAL LINK FT
You can access the FollowMyHealth Patient Portal offered by [facility] by registering at the following website: [email or web address]. By joining [employer]’s FollowMyHealth portal, you will also be able to view your health information using other applications (apps) compatible with our system.

## 2023-02-03 NOTE — PROGRESS NOTE ADULT - REASON FOR ADMISSION
n/v + hemoptysis

## 2023-02-03 NOTE — PROGRESS NOTE ADULT - SUBJECTIVE AND OBJECTIVE BOX
[facility] DIVISION OF KIDNEY DISEASES AND HYPERTENSION -- FOLLOW UP NOTE  --------------------------------------------------------------------------------  Chief Complaint:    24 hour events/subjective:        PAST HISTORY  --------------------------------------------------------------------------------  No significant changes to PMH, PSH, FHx, SHx, unless otherwise noted    ALLERGIES & MEDICATIONS  --------------------------------------------------------------------------------  Allergies    codeine (Hives)    Intolerances      Standing Inpatient Medications  apixaban 5 milliGRAM(s) Oral two times a day  famotidine Injectable 20 milliGRAM(s) IV Push daily  metroNIDAZOLE  IVPB 500 milliGRAM(s) IV Intermittent every 12 hours  pantoprazole  Injectable 40 milliGRAM(s) IV Push two times a day  polyethylene glycol 3350 17 Gram(s) Oral daily  senna 1 Tablet(s) Oral at bedtime  tacrolimus 3 milliGRAM(s) Oral <User Schedule>  trimethoprim  160 mG/sulfamethoxazole 800 mG 1 Tablet(s) Oral <User Schedule>    PRN Inpatient Medications  acetaminophen     Tablet .. 650 milliGRAM(s) Oral every 6 hours PRN  albuterol    90 MICROgram(s) HFA Inhaler 2 Puff(s) Inhalation every 6 hours PRN  aluminum hydroxide/magnesium hydroxide/simethicone Suspension 30 milliLiter(s) Oral every 4 hours PRN  bisacodyl Suppository 10 milliGRAM(s) Rectal daily PRN  LORazepam   Injectable 1 milliGRAM(s) IV Push every 8 hours PRN  melatonin 3 milliGRAM(s) Oral at bedtime PRN  ondansetron Injectable 8 milliGRAM(s) IV Push every 8 hours PRN      REVIEW OF SYSTEMS  --------------------------------------------------------------------------------  Gen: No weight changes, fatigue, fevers/chills, weakness  Skin: No rashes  Head/Eyes/Ears/Mouth: No headache; Normal hearing; Normal vision w/o blurriness; No sinus pain/discomfort, sore throat  Respiratory: No dyspnea, cough, wheezing, hemoptysis  CV: No chest pain, PND, orthopnea  GI: No abdominal pain, diarrhea, constipation, nausea, vomiting, melena, hematochezia  : No increased frequency, dysuria, hematuria, nocturia  MSK: No joint pain/swelling; no back pain; no edema  Neuro: No dizziness/lightheadedness, weakness, seizures, numbness, tingling  Heme: No easy bruising or bleeding  Endo: No heat/cold intolerance  Psych: No significant nervousness, anxiety, stress, depression    All other systems were reviewed and are negative, except as noted.    VITALS/PHYSICAL EXAM  --------------------------------------------------------------------------------  T(C): 36.8 (02-03-23 @ 13:44), Max: 37.3 (02-02-23 @ 21:00)  HR: 89 (02-03-23 @ 13:44) (87 - 104)  BP: 103/72 (02-03-23 @ 13:44) (85/58 - 110/76)  RR: 18 (02-03-23 @ 13:44) (18 - 18)  SpO2: 97% (02-03-23 @ 13:44) (95% - 98%)  Wt(kg): --        02-02-23 @ 07:01  -  02-03-23 @ 07:00  --------------------------------------------------------  IN: 680 mL / OUT: 0 mL / NET: 680 mL    02-03-23 @ 07:01  -  02-03-23 @ 15:17  --------------------------------------------------------  IN: 240 mL / OUT: 0 mL / NET: 240 mL        LABS/STUDIES  --------------------------------------------------------------------------------              11.2   4.46  >-----------<  446      [02-03-23 @ 07:18]              36.4     136  |  106  |  16  ----------------------------<  100      [02-03-23 @ 07:19]  3.6   |  25  |  0.72        Ca     7.6     [02-03-23 @ 07:19]      Mg     2.2     [02-03-23 @ 07:19]      Phos  2.6     [02-03-23 @ 07:19]    TPro  4.0  /  Alb  1.0  /  TBili  0.1  /  DBili  x   /  AST  61  /  ALT  47  /  AlkPhos  57  [02-03-23 @ 07:19]          Creatinine Trend:  SCr 0.72 [02-03 @ 07:19]  SCr 0.82 [02-02 @ 10:15]  SCr 0.84 [02-01 @ 09:51]  SCr 1.16 [01-30 @ 07:48]  SCr 1.15 [01-29 @ 09:52]    Urinalysis - [01-28-23 @ 08:18]      Color Dark Yellow / Appearance Slightly Turbid / SG >1.050 / pH 6.5      Gluc Negative / Ketone Small  / Bili Small / Urobili Negative       Blood Large / Protein >600 / Leuk Est Negative / Nitrite Negative       / WBC 18 / Hyaline 134 / Gran  / Sq Epi  / Non Sq Epi 24 / Bacteria Few      Lipid: chol 521, , HDL 48, LDL --      [01-18-23 @ 05:58]    HBsAb Nonreact      [01-18-23 @ 05:58]  HBsAg Nonreact      [01-18-23 @ 05:58]  HBcAb Nonreact      [01-18-23 @ 05:58]  HCV 0.07, Nonreact      [01-18-23 @ 05:58]  HIV Nonreact      [01-21-23 @ 07:41]    SHELTON: titer Negative, pattern --      [01-18-23 @ 05:58]  dsDNA <12      [01-18-23 @ 05:58]  C3 Complement 152      [01-18-23 @ 05:58]  C4 Complement 38      [01-18-23 @ 05:58]  PLA2R: ALESSANDRA <1.8, IFA --      [01-18-23 @ 05:58]  Free Light Chains: kappa 2.61, lambda 1.05, ratio = 2.49      [01-18 @ 05:58]  Immunofixation Serum:   Weak IgM Kappa Band Identified    Reference Range: None Detected      [01-18-23 @ 05:58]  SPEP Interpretation: Weak Gamma-Migrating Paraprotein Identified      [01-18-23 @ 05:58]

## 2023-02-03 NOTE — DISCHARGE NOTE NURSING/CASE MANAGEMENT/SOCIAL WORK - NSDCPEFALRISK_GEN_ALL_CORE
For information on Fall & Injury Prevention, visit: [email or web address] OR  [email or web address] OR  https://www.cdc.gov/steadi/patient.html

## 2023-02-03 NOTE — DISCHARGE NOTE PROVIDER - NSDCMRMEDTOKEN_GEN_ALL_CORE_FT
albuterol 90 mcg/inh inhalation aerosol: 2 puff(s) inhaled every 6 hours, As Needed  aspirin 81 mg oral tablet: 1 tab(s) orally once a day  Crestor 40 mg oral tablet: 1 tab(s) orally once a day  Envarsus XR 4 mg oral tablet, extended release: 1 tab(s) orally 2 times a day   metroNIDAZOLE 500 mg oral tablet: 1 tab(s) orally 2 times a day   prochlorperazine 5 mg oral tablet: 1 tab(s) orally every 4 hours, As Needed -for nausea   scopolamine 1 mg/72 hr transdermal film, extended release: Apply topically to affected area every 72 hours, As Needed -for nausea   sulfamethoxazole-trimethoprim 800 mg-160 mg oral tablet: 1 tab(s) orally once a day  tacrolimus 1 mg oral capsule: 3 cap(s) orally 2 times a day   tacrolimus 1 mg oral capsule: 3 cap(s) orally 2 times a day    albuterol 90 mcg/inh inhalation aerosol: 2 puff(s) inhaled every 6 hours, As Needed  apixaban 5 mg oral tablet: 1 tab(s) orally 2 times a day  aspirin 81 mg oral tablet: 1 tab(s) orally once a day  Ativan 1 mg oral tablet: 1 tab(s) orally every 8 hours MDD:3 mg (3 pills)  metroNIDAZOLE 500 mg oral tablet: 1 tab(s) orally once a day   prochlorperazine 10 mg oral tablet: 1 tab(s) orally every 6 hours MDD:40 mg (4 pills)  sulfamethoxazole-trimethoprim 800 mg-160 mg oral tablet: 1 tab(s) orally Monday, Wednesday, and Friday   tacrolimus 1 mg oral capsule: 3 cap(s) orally 2 times a day    albuterol 90 mcg/inh inhalation aerosol: 2 puff(s) inhaled every 6 hours, As Needed  apixaban 5 mg oral tablet: 1 tab(s) orally 2 times a day  aspirin 81 mg oral tablet: 1 tab(s) orally once a day  Ativan 1 mg oral tablet: 1 tab(s) orally every 8 hours MDD:3 mg (3 pills)  metroNIDAZOLE 500 mg oral tablet: 1 tab(s) orally once a day   pantoprazole 40 mg oral delayed release tablet: 1 tab(s) orally once a day   prochlorperazine 10 mg oral tablet: 1 tab(s) orally every 6 hours MDD:40 mg (4 pills)  sulfamethoxazole-trimethoprim 800 mg-160 mg oral tablet: 1 tab(s) orally Monday, Wednesday, and Friday   tacrolimus 1 mg oral capsule: 3 cap(s) orally 2 times a day

## 2023-02-04 LAB
CALPROTECTIN STL-MCNT: 17 UG/G — SIGNIFICANT CHANGE UP (ref 0–120)
CULTURE RESULTS: SIGNIFICANT CHANGE UP
SPECIMEN SOURCE: SIGNIFICANT CHANGE UP

## 2023-02-06 DIAGNOSIS — N04.9 NEPHROTIC SYNDROME WITH UNSPECIFIED MORPHOLOGIC CHANGES: ICD-10-CM

## 2023-02-07 LAB
GAMMA INTERFERON BACKGROUND BLD IA-ACNC: 0.03 IU/ML — SIGNIFICANT CHANGE UP
M TB IFN-G BLD-IMP: NEGATIVE — SIGNIFICANT CHANGE UP
M TB IFN-G CD4+ BCKGRND COR BLD-ACNC: -0.01 IU/ML — SIGNIFICANT CHANGE UP
M TB IFN-G CD4+CD8+ BCKGRND COR BLD-ACNC: -0.01 IU/ML — SIGNIFICANT CHANGE UP
QUANT TB PLUS MITOGEN MINUS NIL: 1.39 IU/ML — SIGNIFICANT CHANGE UP

## 2023-02-11 NOTE — DISCHARGE NOTE ADULT - MEDICATION SUMMARY - MEDICATIONS TO CHANGE
I will SWITCH the dose or number of times a day I take the medications listed below when I get home from the hospital:  None Chest pain

## 2023-02-15 RX ORDER — RITUXIMAB 10 MG/ML
500 INJECTION, SOLUTION INTRAVENOUS
Qty: 2 | Refills: 0 | Status: ACTIVE | OUTPATIENT
Start: 2023-02-15

## 2023-02-23 ENCOUNTER — APPOINTMENT (OUTPATIENT)
Dept: INTERNAL MEDICINE | Facility: CLINIC | Age: 33
End: 2023-02-23

## 2023-02-23 NOTE — ED ADULT NURSE NOTE - CAS DISCH ACCOMP BY
[Joint Pain] : joint pain [Joint Stiffness] : joint stiffness [Joint Swelling] : joint swelling Self

## 2023-02-27 ENCOUNTER — INPATIENT (INPATIENT)
Facility: HOSPITAL | Age: 33
LOS: 2 days | Discharge: HOME | End: 2023-03-02
Attending: OBSTETRICS & GYNECOLOGY | Admitting: OBSTETRICS & GYNECOLOGY
Payer: MEDICAID

## 2023-02-27 ENCOUNTER — APPOINTMENT (OUTPATIENT)
Dept: NEPHROLOGY | Facility: CLINIC | Age: 33
End: 2023-02-27
Payer: MEDICAID

## 2023-02-27 VITALS — WEIGHT: 178.57 LBS | HEIGHT: 63 IN

## 2023-02-27 VITALS
TEMPERATURE: 98.1 F | DIASTOLIC BLOOD PRESSURE: 73 MMHG | HEART RATE: 105 BPM | OXYGEN SATURATION: 96 % | HEIGHT: 61 IN | SYSTOLIC BLOOD PRESSURE: 119 MMHG | WEIGHT: 170.86 LBS | BODY MASS INDEX: 32.26 KG/M2

## 2023-02-27 DIAGNOSIS — Z98.890 OTHER SPECIFIED POSTPROCEDURAL STATES: Chronic | ICD-10-CM

## 2023-02-27 DIAGNOSIS — Z3A.00 WEEKS OF GESTATION OF PREGNANCY NOT SPECIFIED: ICD-10-CM

## 2023-02-27 DIAGNOSIS — N05.0 UNSPECIFIED NEPHRITIC SYNDROME WITH MINOR GLOMERULAR ABNORMALITY: ICD-10-CM

## 2023-02-27 DIAGNOSIS — O26.899 OTHER SPECIFIED PREGNANCY RELATED CONDITIONS, UNSPECIFIED TRIMESTER: ICD-10-CM

## 2023-02-27 LAB
APTT BLD: 29.6 SEC — SIGNIFICANT CHANGE UP (ref 27.5–35.5)
BASOPHILS # BLD AUTO: 0.06 K/UL — SIGNIFICANT CHANGE UP (ref 0–0.2)
BASOPHILS NFR BLD AUTO: 0.6 % — SIGNIFICANT CHANGE UP (ref 0–2)
COVID-19 SPIKE DOMAIN AB INTERP: POSITIVE
COVID-19 SPIKE DOMAIN ANTIBODY RESULT: >250 U/ML — HIGH
EOSINOPHIL # BLD AUTO: 0.06 K/UL — SIGNIFICANT CHANGE UP (ref 0–0.5)
EOSINOPHIL NFR BLD AUTO: 0.6 % — SIGNIFICANT CHANGE UP (ref 0–6)
FIBRINOGEN PPP-MCNC: 590 MG/DL — HIGH (ref 340–550)
HCT VFR BLD CALC: 33.6 % — LOW (ref 34.5–45)
HGB BLD-MCNC: 11 G/DL — LOW (ref 11.5–15.5)
IMM GRANULOCYTES NFR BLD AUTO: 1 % — HIGH (ref 0–0.9)
INR BLD: 0.98 RATIO — SIGNIFICANT CHANGE UP (ref 0.88–1.16)
LYMPHOCYTES # BLD AUTO: 29 % — SIGNIFICANT CHANGE UP (ref 13–44)
LYMPHOCYTES # BLD AUTO: 3.14 K/UL — SIGNIFICANT CHANGE UP (ref 1–3.3)
MCHC RBC-ENTMCNC: 30.1 PG — SIGNIFICANT CHANGE UP (ref 27–34)
MCHC RBC-ENTMCNC: 32.7 GM/DL — SIGNIFICANT CHANGE UP (ref 32–36)
MCV RBC AUTO: 92.1 FL — SIGNIFICANT CHANGE UP (ref 80–100)
MONOCYTES # BLD AUTO: 0.95 K/UL — HIGH (ref 0–0.9)
MONOCYTES NFR BLD AUTO: 8.8 % — SIGNIFICANT CHANGE UP (ref 2–14)
NEUTROPHILS # BLD AUTO: 6.51 K/UL — SIGNIFICANT CHANGE UP (ref 1.8–7.4)
NEUTROPHILS NFR BLD AUTO: 60 % — SIGNIFICANT CHANGE UP (ref 43–77)
NRBC # BLD: 0 /100 WBCS — SIGNIFICANT CHANGE UP (ref 0–0)
PLATELET # BLD AUTO: 358 K/UL — SIGNIFICANT CHANGE UP (ref 150–400)
PROTHROM AB SERPL-ACNC: 11.6 SEC — SIGNIFICANT CHANGE UP (ref 10.5–13.4)
RBC # BLD: 3.65 M/UL — LOW (ref 3.8–5.2)
RBC # FLD: 13.3 % — SIGNIFICANT CHANGE UP (ref 10.3–14.5)
SARS-COV-2 IGG+IGM SERPL QL IA: >250 U/ML — HIGH
SARS-COV-2 IGG+IGM SERPL QL IA: POSITIVE
SARS-COV-2 RNA SPEC QL NAA+PROBE: SIGNIFICANT CHANGE UP
WBC # BLD: 10.83 K/UL — HIGH (ref 3.8–10.5)
WBC # FLD AUTO: 10.83 K/UL — HIGH (ref 3.8–10.5)

## 2023-02-27 PROCEDURE — 99215 OFFICE O/P EST HI 40 MIN: CPT

## 2023-02-27 RX ORDER — SODIUM CHLORIDE 9 MG/ML
1000 INJECTION, SOLUTION INTRAVENOUS
Refills: 0 | Status: DISCONTINUED | OUTPATIENT
Start: 2023-02-27 | End: 2023-02-28

## 2023-02-27 RX ORDER — FUROSEMIDE 20 MG/1
20 TABLET ORAL DAILY
Qty: 30 | Refills: 3 | Status: DISCONTINUED | COMMUNITY
Start: 2023-02-06 | End: 2023-02-27

## 2023-02-27 RX ORDER — GABAPENTIN 100 MG
100 TABLET ORAL DAILY
Refills: 0 | Status: ACTIVE | COMMUNITY

## 2023-02-27 RX ORDER — CHLORHEXIDINE GLUCONATE 213 G/1000ML
1 SOLUTION TOPICAL ONCE
Refills: 0 | Status: DISCONTINUED | OUTPATIENT
Start: 2023-02-27 | End: 2023-02-28

## 2023-02-27 RX ORDER — CITRIC ACID/SODIUM CITRATE 300-500 MG
15 SOLUTION, ORAL ORAL EVERY 6 HOURS
Refills: 0 | Status: DISCONTINUED | OUTPATIENT
Start: 2023-02-27 | End: 2023-02-28

## 2023-02-27 RX ORDER — PREDNISONE 20 MG/1
20 TABLET ORAL
Qty: 90 | Refills: 3 | Status: DISCONTINUED | COMMUNITY
Start: 2022-01-10 | End: 2023-02-27

## 2023-02-27 NOTE — PATIENT PROFILE OB - FALL HARM RISK - UNIVERSAL INTERVENTIONS
Bed in lowest position, wheels locked, appropriate side rails in place/Call bell, personal items and telephone in reach/Non-slip footwear when patient is out of bed/[place] to call system/Physically safe environment - no spills, clutter or unnecessary equipment/Purposeful Proactive Rounding/Room/bathroom lighting operational, light cord in reach

## 2023-02-27 NOTE — PATIENT PROFILE ADULT - FUNCTIONAL ASSESSMENT - DAILY ACTIVITY 5.

## 2023-02-27 NOTE — PATIENT PROFILE OB - PATIENT'S SEXUAL ORIENTATION
Heterosexual Mauc Instructions: By selecting yes to the question below the MAUC number will be added into the note.  This will be calculated automatically based on the diagnosis chosen, the size entered, the body zone selected (H,M,L) and the specific indications you chose. You will also have the option to override the Mohs AUC if you disagree with the automatically calculated number and this option is found in the Case Summary tab.

## 2023-02-28 LAB — T PALLIDUM AB TITR SER: NEGATIVE — SIGNIFICANT CHANGE UP

## 2023-02-28 RX ORDER — FERROUS SULFATE 325(65) MG
325 TABLET ORAL DAILY
Refills: 0 | Status: DISCONTINUED | OUTPATIENT
Start: 2023-02-28 | End: 2023-03-02

## 2023-02-28 RX ORDER — HYDROCORTISONE 1 %
1 OINTMENT (GRAM) TOPICAL EVERY 6 HOURS
Refills: 0 | Status: DISCONTINUED | OUTPATIENT
Start: 2023-02-28 | End: 2023-03-02

## 2023-02-28 RX ORDER — DIBUCAINE 1 %
1 OINTMENT (GRAM) RECTAL EVERY 6 HOURS
Refills: 0 | Status: DISCONTINUED | OUTPATIENT
Start: 2023-02-28 | End: 2023-03-02

## 2023-02-28 RX ORDER — AER TRAVELER 0.5 G/1
1 SOLUTION RECTAL; TOPICAL EVERY 4 HOURS
Refills: 0 | Status: DISCONTINUED | OUTPATIENT
Start: 2023-02-28 | End: 2023-03-02

## 2023-02-28 RX ORDER — DIPHENHYDRAMINE HCL 50 MG
25 CAPSULE ORAL EVERY 6 HOURS
Refills: 0 | Status: DISCONTINUED | OUTPATIENT
Start: 2023-02-28 | End: 2023-03-02

## 2023-02-28 RX ORDER — OXYTOCIN 10 UNIT/ML
41.67 VIAL (ML) INJECTION
Qty: 20 | Refills: 0 | Status: DISCONTINUED | OUTPATIENT
Start: 2023-02-28 | End: 2023-03-02

## 2023-02-28 RX ORDER — IBUPROFEN 200 MG
600 TABLET ORAL EVERY 6 HOURS
Refills: 0 | Status: DISCONTINUED | OUTPATIENT
Start: 2023-02-28 | End: 2023-03-02

## 2023-02-28 RX ORDER — OXYTOCIN 10 UNIT/ML
VIAL (ML) INJECTION
Qty: 30 | Refills: 0 | Status: DISCONTINUED | OUTPATIENT
Start: 2023-02-28 | End: 2023-02-28

## 2023-02-28 RX ORDER — SIMETHICONE 80 MG/1
80 TABLET, CHEWABLE ORAL EVERY 4 HOURS
Refills: 0 | Status: DISCONTINUED | OUTPATIENT
Start: 2023-02-28 | End: 2023-03-02

## 2023-02-28 RX ORDER — TETANUS TOXOID, REDUCED DIPHTHERIA TOXOID AND ACELLULAR PERTUSSIS VACCINE, ADSORBED 5; 2.5; 8; 8; 2.5 [IU]/.5ML; [IU]/.5ML; UG/.5ML; UG/.5ML; UG/.5ML
0.5 SUSPENSION INTRAMUSCULAR ONCE
Refills: 0 | Status: DISCONTINUED | OUTPATIENT
Start: 2023-02-28 | End: 2023-03-02

## 2023-02-28 RX ORDER — ACETAMINOPHEN 500 MG
975 TABLET ORAL EVERY 6 HOURS
Refills: 0 | Status: DISCONTINUED | OUTPATIENT
Start: 2023-02-28 | End: 2023-03-02

## 2023-02-28 RX ORDER — ASCORBIC ACID 60 MG
500 TABLET,CHEWABLE ORAL DAILY
Refills: 0 | Status: DISCONTINUED | OUTPATIENT
Start: 2023-02-28 | End: 2023-03-02

## 2023-02-28 RX ORDER — OXYCODONE HYDROCHLORIDE 5 MG/1
5 TABLET ORAL EVERY 4 HOURS
Refills: 0 | Status: DISCONTINUED | OUTPATIENT
Start: 2023-02-28 | End: 2023-03-02

## 2023-02-28 RX ORDER — PRAMOXINE HYDROCHLORIDE 150 MG/15G
1 AEROSOL, FOAM RECTAL EVERY 4 HOURS
Refills: 0 | Status: DISCONTINUED | OUTPATIENT
Start: 2023-02-28 | End: 2023-03-02

## 2023-02-28 RX ORDER — KETOROLAC TROMETHAMINE 30 MG/ML
30 SYRINGE (ML) INJECTION ONCE
Refills: 0 | Status: DISCONTINUED | OUTPATIENT
Start: 2023-02-28 | End: 2023-03-02

## 2023-02-28 RX ORDER — LANOLIN
1 OINTMENT (GRAM) TOPICAL EVERY 6 HOURS
Refills: 0 | Status: DISCONTINUED | OUTPATIENT
Start: 2023-02-28 | End: 2023-03-02

## 2023-02-28 RX ORDER — MAGNESIUM HYDROXIDE 400 MG/1
30 TABLET, CHEWABLE ORAL
Refills: 0 | Status: DISCONTINUED | OUTPATIENT
Start: 2023-02-28 | End: 2023-03-02

## 2023-02-28 RX ORDER — BENZOCAINE 10 %
1 GEL (GRAM) MUCOUS MEMBRANE EVERY 6 HOURS
Refills: 0 | Status: DISCONTINUED | OUTPATIENT
Start: 2023-02-28 | End: 2023-03-02

## 2023-02-28 RX ORDER — SODIUM CHLORIDE 9 MG/ML
3 INJECTION INTRAMUSCULAR; INTRAVENOUS; SUBCUTANEOUS EVERY 8 HOURS
Refills: 0 | Status: DISCONTINUED | OUTPATIENT
Start: 2023-02-28 | End: 2023-03-02

## 2023-02-28 RX ADMIN — AER TRAVELER 1 APPLICATION(S): 0.5 SOLUTION RECTAL; TOPICAL at 17:35

## 2023-02-28 RX ADMIN — Medication 125 MILLIUNIT(S)/MIN: at 12:33

## 2023-02-28 RX ADMIN — SODIUM CHLORIDE 125 MILLILITER(S): 9 INJECTION, SOLUTION INTRAVENOUS at 08:21

## 2023-02-28 RX ADMIN — Medication 1 APPLICATION(S): at 17:31

## 2023-02-28 RX ADMIN — Medication 1 SPRAY(S): at 17:31

## 2023-02-28 RX ADMIN — SODIUM CHLORIDE 125 MILLILITER(S): 9 INJECTION, SOLUTION INTRAVENOUS at 10:59

## 2023-02-28 RX ADMIN — SODIUM CHLORIDE 3 MILLILITER(S): 9 INJECTION INTRAMUSCULAR; INTRAVENOUS; SUBCUTANEOUS at 22:00

## 2023-02-28 RX ADMIN — PRAMOXINE HYDROCHLORIDE 1 APPLICATION(S): 150 AEROSOL, FOAM RECTAL at 17:31

## 2023-03-01 LAB
BASOPHILS # BLD AUTO: 0.09 K/UL — SIGNIFICANT CHANGE UP (ref 0–0.2)
BASOPHILS NFR BLD AUTO: 0.5 % — SIGNIFICANT CHANGE UP (ref 0–2)
EOSINOPHIL # BLD AUTO: 0.11 K/UL — SIGNIFICANT CHANGE UP (ref 0–0.5)
EOSINOPHIL NFR BLD AUTO: 0.6 % — SIGNIFICANT CHANGE UP (ref 0–6)
HCT VFR BLD CALC: 29.2 % — LOW (ref 34.5–45)
HGB BLD-MCNC: 9.8 G/DL — LOW (ref 11.5–15.5)
IMM GRANULOCYTES NFR BLD AUTO: 0.6 % — SIGNIFICANT CHANGE UP (ref 0–0.9)
LYMPHOCYTES # BLD AUTO: 26.4 % — SIGNIFICANT CHANGE UP (ref 13–44)
LYMPHOCYTES # BLD AUTO: 4.73 K/UL — HIGH (ref 1–3.3)
MCHC RBC-ENTMCNC: 30.5 PG — SIGNIFICANT CHANGE UP (ref 27–34)
MCHC RBC-ENTMCNC: 33.6 GM/DL — SIGNIFICANT CHANGE UP (ref 32–36)
MCV RBC AUTO: 91 FL — SIGNIFICANT CHANGE UP (ref 80–100)
MONOCYTES # BLD AUTO: 1.38 K/UL — HIGH (ref 0–0.9)
MONOCYTES NFR BLD AUTO: 7.7 % — SIGNIFICANT CHANGE UP (ref 2–14)
NEUTROPHILS # BLD AUTO: 11.53 K/UL — HIGH (ref 1.8–7.4)
NEUTROPHILS NFR BLD AUTO: 64.2 % — SIGNIFICANT CHANGE UP (ref 43–77)
NRBC # BLD: 0 /100 WBCS — SIGNIFICANT CHANGE UP (ref 0–0)
PLATELET # BLD AUTO: 316 K/UL — SIGNIFICANT CHANGE UP (ref 150–400)
RBC # BLD: 3.21 M/UL — LOW (ref 3.8–5.2)
RBC # FLD: 13.4 % — SIGNIFICANT CHANGE UP (ref 10.3–14.5)
WBC # BLD: 17.95 K/UL — HIGH (ref 3.8–10.5)
WBC # FLD AUTO: 17.95 K/UL — HIGH (ref 3.8–10.5)

## 2023-03-01 RX ADMIN — Medication 975 MILLIGRAM(S): at 11:34

## 2023-03-01 RX ADMIN — Medication 500 MILLIGRAM(S): at 11:36

## 2023-03-01 RX ADMIN — Medication 1 TABLET(S): at 11:33

## 2023-03-01 RX ADMIN — SODIUM CHLORIDE 3 MILLILITER(S): 9 INJECTION INTRAMUSCULAR; INTRAVENOUS; SUBCUTANEOUS at 05:50

## 2023-03-01 RX ADMIN — Medication 325 MILLIGRAM(S): at 11:33

## 2023-03-01 RX ADMIN — Medication 975 MILLIGRAM(S): at 12:05

## 2023-03-01 NOTE — PROGRESS NOTE ADULT - PROBLEM SELECTOR PLAN 1
A/P:  PPD#1 s/p     -Continue pain management  -Encourage OOB and ambulation  -Check CBC  -Continue current care  -Plan for discharge tomorrow  pt seen by NEHA mullen  -d/w dr. altamirano

## 2023-03-02 ENCOUNTER — TRANSCRIPTION ENCOUNTER (OUTPATIENT)
Age: 33
End: 2023-03-02

## 2023-03-02 VITALS
HEART RATE: 81 BPM | SYSTOLIC BLOOD PRESSURE: 120 MMHG | TEMPERATURE: 98 F | OXYGEN SATURATION: 100 % | DIASTOLIC BLOOD PRESSURE: 81 MMHG | RESPIRATION RATE: 18 BRPM

## 2023-03-02 LAB
ALBUMIN SERPL ELPH-MCNC: 3.8 G/DL
ALP BLD-CCNC: 95 U/L
ALT SERPL-CCNC: 17 U/L
ANION GAP SERPL CALC-SCNC: 10 MMOL/L
APPEARANCE: CLEAR
AST SERPL-CCNC: 19 U/L
BACTERIA: NEGATIVE
BASOPHILS # BLD AUTO: 0.1 K/UL — SIGNIFICANT CHANGE UP (ref 0–0.2)
BASOPHILS NFR BLD AUTO: 0.7 % — SIGNIFICANT CHANGE UP (ref 0–2)
BILIRUB SERPL-MCNC: 0.5 MG/DL
BILIRUBIN URINE: NEGATIVE
BLOOD URINE: NEGATIVE
BUN SERPL-MCNC: 6 MG/DL
CALCIUM SERPL-MCNC: 9.6 MG/DL
CHLORIDE SERPL-SCNC: 107 MMOL/L
CO2 SERPL-SCNC: 22 MMOL/L
COLOR: YELLOW
CREAT SERPL-MCNC: 0.57 MG/DL
CREAT SPEC-SCNC: 246 MG/DL
CREAT/PROT UR: 0.6 RATIO
EGFR: 125 ML/MIN/1.73M2
EOSINOPHIL # BLD AUTO: 0.2 K/UL — SIGNIFICANT CHANGE UP (ref 0–0.5)
EOSINOPHIL NFR BLD AUTO: 1.3 % — SIGNIFICANT CHANGE UP (ref 0–6)
GLUCOSE QUALITATIVE U: NEGATIVE
GLUCOSE SERPL-MCNC: 93 MG/DL
HCT VFR BLD CALC: 30.5 % — LOW (ref 34.5–45)
HGB BLD-MCNC: 9.9 G/DL — LOW (ref 11.5–15.5)
HYALINE CASTS: 1 /LPF
IMM GRANULOCYTES NFR BLD AUTO: 0.5 % — SIGNIFICANT CHANGE UP (ref 0–0.9)
KETONES URINE: NEGATIVE
LEUKOCYTE ESTERASE URINE: NEGATIVE
LYMPHOCYTES # BLD AUTO: 30.5 % — SIGNIFICANT CHANGE UP (ref 13–44)
LYMPHOCYTES # BLD AUTO: 4.62 K/UL — HIGH (ref 1–3.3)
MCHC RBC-ENTMCNC: 30.5 PG — SIGNIFICANT CHANGE UP (ref 27–34)
MCHC RBC-ENTMCNC: 32.5 GM/DL — SIGNIFICANT CHANGE UP (ref 32–36)
MCV RBC AUTO: 93.8 FL — SIGNIFICANT CHANGE UP (ref 80–100)
MICROSCOPIC-UA: NORMAL
MONOCYTES # BLD AUTO: 1.08 K/UL — HIGH (ref 0–0.9)
MONOCYTES NFR BLD AUTO: 7.1 % — SIGNIFICANT CHANGE UP (ref 2–14)
NEUTROPHILS # BLD AUTO: 9.09 K/UL — HIGH (ref 1.8–7.4)
NEUTROPHILS NFR BLD AUTO: 59.9 % — SIGNIFICANT CHANGE UP (ref 43–77)
NITRITE URINE: NEGATIVE
NRBC # BLD: 0 /100 WBCS — SIGNIFICANT CHANGE UP (ref 0–0)
PH URINE: 6
PLATELET # BLD AUTO: 348 K/UL — SIGNIFICANT CHANGE UP (ref 150–400)
POTASSIUM SERPL-SCNC: 4.2 MMOL/L
PROT SERPL-MCNC: 5.6 G/DL
PROT UR-MCNC: 135 MG/DL
PROTEIN URINE: ABNORMAL
RBC # BLD: 3.25 M/UL — LOW (ref 3.8–5.2)
RBC # FLD: 13.6 % — SIGNIFICANT CHANGE UP (ref 10.3–14.5)
RED BLOOD CELLS URINE: 13 /HPF
SODIUM SERPL-SCNC: 138 MMOL/L
SPECIFIC GRAVITY URINE: 1.03
SQUAMOUS EPITHELIAL CELLS: 3 /HPF
UROBILINOGEN URINE: NORMAL
WBC # BLD: 15.17 K/UL — HIGH (ref 3.8–10.5)
WBC # FLD AUTO: 15.17 K/UL — HIGH (ref 3.8–10.5)
WHITE BLOOD CELLS URINE: 4 /HPF

## 2023-03-02 PROCEDURE — 85025 COMPLETE CBC W/AUTO DIFF WBC: CPT

## 2023-03-02 PROCEDURE — G0463: CPT

## 2023-03-02 PROCEDURE — 86900 BLOOD TYPING SEROLOGIC ABO: CPT

## 2023-03-02 PROCEDURE — 86901 BLOOD TYPING SEROLOGIC RH(D): CPT

## 2023-03-02 PROCEDURE — 87635 SARS-COV-2 COVID-19 AMP PRB: CPT

## 2023-03-02 PROCEDURE — 86769 SARS-COV-2 COVID-19 ANTIBODY: CPT

## 2023-03-02 PROCEDURE — 86780 TREPONEMA PALLIDUM: CPT

## 2023-03-02 PROCEDURE — 85730 THROMBOPLASTIN TIME PARTIAL: CPT

## 2023-03-02 PROCEDURE — 85610 PROTHROMBIN TIME: CPT

## 2023-03-02 PROCEDURE — 85384 FIBRINOGEN ACTIVITY: CPT

## 2023-03-02 PROCEDURE — 59050 FETAL MONITOR W/REPORT: CPT

## 2023-03-02 PROCEDURE — 86850 RBC ANTIBODY SCREEN: CPT

## 2023-03-02 PROCEDURE — 36415 COLL VENOUS BLD VENIPUNCTURE: CPT

## 2023-03-02 PROCEDURE — 59025 FETAL NON-STRESS TEST: CPT

## 2023-03-02 RX ORDER — IBUPROFEN 200 MG
1 TABLET ORAL
Qty: 20 | Refills: 0
Start: 2023-03-02 | End: 2023-03-06

## 2023-03-02 RX ORDER — FERROUS SULFATE 325(65) MG
1 TABLET ORAL
Qty: 30 | Refills: 0
Start: 2023-03-02 | End: 2023-03-31

## 2023-03-02 RX ORDER — ACETAMINOPHEN 500 MG
2 TABLET ORAL
Qty: 40 | Refills: 1
Start: 2023-03-02 | End: 2023-03-11

## 2023-03-02 RX ORDER — BENZOCAINE 10 %
1 GEL (GRAM) MUCOUS MEMBRANE
Qty: 1 | Refills: 2
Start: 2023-03-02 | End: 2023-03-16

## 2023-03-02 RX ORDER — SENNOSIDES/DOCUSATE SODIUM 8.6MG-50MG
1 TABLET ORAL
Qty: 60 | Refills: 0
Start: 2023-03-02 | End: 2023-03-31

## 2023-03-02 RX ADMIN — Medication 975 MILLIGRAM(S): at 10:57

## 2023-03-02 RX ADMIN — Medication 500 MILLIGRAM(S): at 11:58

## 2023-03-02 RX ADMIN — Medication 325 MILLIGRAM(S): at 11:58

## 2023-03-02 RX ADMIN — Medication 1 TABLET(S): at 11:58

## 2023-03-02 NOTE — DISCHARGE NOTE OB - PLAN OF CARE
pls take iron supplements no sex nothing in vagina no heavy lifting no pushing no straining no strenuous activities  pain medication as needed; stool softener; dulcolax as needed if constipated  walk for exercise: helps recovery   continue prenatal vitamins daily especially whole course of breastfeeding  see your OB in the office for follow up post partum check 4-5weeks call the office to set up an appointment

## 2023-03-02 NOTE — DISCHARGE NOTE OB - MATERIALS PROVIDED
[facility] [place] Screening Program/[place]  Immunization Record/Breastfeeding Mother’s Support Group Information/Guide to Postpartum Care/Back To Sleep Handout/Shaken Baby Prevention Handout/Breastfeeding Guide and Packet/Birth Certificate Instructions/Discharge Medication Information for Patients and Families Pocket Guide/Letter of Medical Neccessity

## 2023-03-02 NOTE — DISCHARGE NOTE OB - MEDICATION SUMMARY - MEDICATIONS TO STOP TAKING
I will STOP taking the medications listed below when I get home from the hospital:    Zofran 4 mg oral tablet  -- 1 tab(s) by mouth 2 times a day, As Needed    vancomycin 125 mg oral capsule  -- 1 cap(s) by mouth every 6 hours   -- Finish all this medication unless otherwise directed by prescriber.    vancomycin 125 mg oral capsule  -- 1 cap(s) by mouth every 6 hours MDD:4 caps  -- Finish all this medication unless otherwise directed by prescriber.    Zofran 4 mg oral tablet  -- 1 tab(s) by mouth every 6 hours, As Needed -for nausea MDD:3 tabs

## 2023-03-02 NOTE — PROGRESS NOTE ADULT - ASSESSMENT
PA NOTE:  ppd#2                            9.9    15.17 )-----------( 348      ( 02 Mar 2023 06:51 )             30.5   dc plan today  - ob check routine f/u in the office melissa in 5weeks  - dc instructions verbalized 
A/P:  PPD#1 s/p     -Continue pain management  -Encourage OOB and ambulation  -Check CBC  -Continue current care  -Plan for discharge tomorrow  pt seen by NEHA mullen  -d/w dr. altamirano

## 2023-03-02 NOTE — DISCHARGE NOTE OB - MEDICATION SUMMARY - MEDICATIONS TO CHANGE
Malnutrition... I will SWITCH the dose or number of times a day I take the medications listed below when I get home from the hospital:  None

## 2023-03-02 NOTE — LACTATION INITIAL EVALUATION - INTERVENTION OUTCOME
[facility] Tele-lactation program/verbalizes understanding/demonstrates understanding of teaching/good return demonstration/needs met
verbalizes understanding/demonstrates understanding of teaching/needs met

## 2023-03-02 NOTE — HISTORY OF PRESENT ILLNESS
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 states she recurred in august, went to [facility] - received steroids again and responded but gained 60 pounds. stopped prednisone sometime in December. 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

## 2023-03-02 NOTE — ASSESSMENT
[FreeTextEntry1] : 32 yo lady with minimal change disease ( kidney biopsy done 1/6/2022)\par \par - steroid dependent and had intolerable side effects - detailed treatment history described above \par - started on Tacrolimus 3 mg po BID on 1/28/2023\par - Rituximab 1 gm iv on 2/1/2023 \par - check labs today- she has already taken her tacrolimus so level will be inaccurate \par - 2nd dose of rituxan has been ordered- waiting for it to be approved by insurance \par \par

## 2023-03-02 NOTE — PHYSICAL EXAM
[General Appearance - Alert] : alert [General Appearance - In No Acute Distress] : in no acute distress [General Appearance - Well Nourished] : well nourished [General Appearance - Well Developed] : well developed [General Appearance - Well-Appearing] : healthy appearing [Sclera] : the sclera and conjunctiva were normal [PERRL With Normal Accommodation] : pupils were equal in size, round, and reactive to light [Extraocular Movements] : extraocular movements were intact [Outer Ear] : the ears and nose were normal in appearance [Hearing Threshold Finger Rub Not [place]] : hearing was normal [Examination Of The Oral Cavity] : the lips and gums were normal [Neck Appearance] : the appearance of the neck was normal [Neck Cervical Mass (___cm)] : no neck mass was observed [Jugular Venous Distention Increased] : there was no jugular-venous distention [Respiration, Rhythm And Depth] : normal respiratory rhythm and effort [Exaggerated Use Of Accessory Muscles For Inspiration] : no accessory muscle use [Auscultation Breath Sounds / Voice Sounds] : lungs were clear to auscultation bilaterally [Heart Rate And Rhythm] : heart rate was normal and rhythm regular [Heart Sounds] : normal S1 and S2 [Heart Sounds Gallop] : no gallops [Murmurs] : no murmurs [Arterial Pulses Carotid] : carotid pulses were normal with no bruits [Edema] : there was no peripheral edema [Bowel Sounds] : normal bowel sounds [Abdomen Soft] : soft [Abdomen Tenderness] : non-tender [Abdomen Mass (___ Cm)] : no abdominal mass palpated [No CVA Tenderness] : no ~M costovertebral angle tenderness [No Spinal Tenderness] : no spinal tenderness [Abnormal Walk] : normal gait [Nail Clubbing] : no clubbing  or cyanosis of the fingernails [Musculoskeletal - Swelling] : no joint swelling seen [Skin Color & Pigmentation] : normal skin color and pigmentation [Skin Turgor] : normal skin turgor [] : no rash [Skin Lesions] : no skin lesions [Cranial Nerves] : cranial nerves 2-12 were intact [Deep Tendon Reflexes (DTR)] : deep tendon reflexes were 2+ and symmetric [Sensation] : the sensory exam was normal to light touch and pinprick [Oriented To Time, Place, And Person] : oriented to person, place, and time [Impaired Insight] : insight and judgment were intact [Affect] : the affect was normal [Mood] : the mood was normal

## 2023-03-02 NOTE — PROGRESS NOTE ADULT - PROVIDER SPECIALTY LIST ADULT
Alie Red - Social Work Note    Ortho PERCY Alina requested SW meet w/ mom to discuss compliance w/ recommended use of home stander and nursing at home.    SW met w/ mom alongside .  Pt receives g-tube feed supplies w/ [employer] [facility] ([phone]).  Pt does not currently have home nursing.  Mom denied need for home nursing, but stated family would benefit from additional caregiver support.  SW inquired about compliance w/ using stander at home - and mom stated that she tries, but is sometimes too tired.  SW discussed importance of compliance for pt's wellbeing and mom expressed understanding.  Mom is primary caregiver, though pt's father also lives at home.  Mom also reported family pays out-of-pocket for formula.   RD is recommending increasing formula.  Mom stated that family does not qualify for public aid or government assistance programs.    SW discussed possibility of caregiver fudning via [place] [employer] [facility] ([facility]), and mom is agreeable for referral.  SW will also review possibility of insurance coverage for formula.  Mom inquired about diapers sizing, SW discussed w/ team who recommended discussion w/ PCP.  SW provided mom w/ SW #.  SW will f/u.   OB

## 2023-03-02 NOTE — LACTATION INITIAL EVALUATION - ADDITIONAL HEALTH HISTORY COMMENTS
sab x2; asthma, last attack in November; (R) shoulder surgery
sab x2; asthma, last attack in November; (R) shoulder surgery

## 2023-03-02 NOTE — DISCHARGE NOTE OB - CARE PLAN
Principal Discharge DX:	 (normal spontaneous vaginal delivery)  Assessment and plan of treatment:	no sex nothing in vagina no heavy lifting no pushing no straining no strenuous activities  pain medication as needed; stool softener; dulcolax as needed if constipated  walk for exercise: helps recovery   continue prenatal vitamins daily especially whole course of breastfeeding  see your OB in the office for follow up post partum check 4-5weeks call the office to set up an appointment  Secondary Diagnosis:	Anemia due to acute blood loss  Assessment and plan of treatment:	pls take iron supplements   1

## 2023-03-02 NOTE — LACTATION INITIAL EVALUATION - LACTATION INTERVENTIONS
Reinforced benefits of  exclusive breastfeeding for first 6 months and provided with breastfeeding community resource list for breastfeeding support/assistance available post-discharge and of importance of early f/u with pediatrician within 2-3 days. Referred to [facility] Tele-lactation program for breastfeeding consult post-discharge/initiate/review hand expression/post discharge community resources provided/reviewed components of an effective feeding and at least 8 effective feedings per day required/reviewed importance of monitoring infant diapers, the breastfeeding log, and minimum output each day/reviewed risks of artificial nipples/reviewed benefits and recommendations for rooming in/reviewed feeding on demand/by cue at least 8 times a day/reviewed indications of inadequate milk transfer that would require supplementation
Breastfeeding on cue 8-12X/24 hours with diaper count to assess for adequate intake, safe skin to skin and rooming-in encouraged./initiate/review safe skin-to-skin/initiate/review hand expression/reviewed components of an effective feeding and at least 8 effective feedings per day required/reviewed importance of monitoring infant diapers, the breastfeeding log, and minimum output each day/reviewed risks of unnecessary formula supplementation/reviewed risks of artificial nipples/reviewed feeding on demand/by cue at least 8 times a day

## 2023-03-02 NOTE — PROGRESS NOTE ADULT - SUBJECTIVE AND OBJECTIVE BOX
Patient seen at bedside resting comfortably offers no current complaints. Ambulating and voiding without difficulty.  Passing flatus and tolerating regular diet.  both breast/bottle feeding . Denies HA, CP, SOB, N/V/D, dizziness, palpitations, worsening abdominal pain, worsening vaginal bleeding, or any other concerns.    Vital Signs Last 24 Hrs  T(C): 37.1 (01 Mar 2023 05:51), Max: 37.1 (01 Mar 2023 05:51)  T(F): 98.7 (01 Mar 2023 05:51), Max: 98.7 (01 Mar 2023 05:51)  HR: 93 (01 Mar 2023 05:51) (82 - 99)  BP: 119/74 (01 Mar 2023 05:51) (118/80 - 131/75)  BP(mean): 95 (2023 13:30) (89 - 97)  RR: 18 (01 Mar 2023 05:51) (16 - 19)  SpO2: 96% (01 Mar 2023 05:51) (96% - 100%)    Parameters below as of 01 Mar 2023 05:51  Patient On (Oxygen Delivery Method): room air        Physical Exam:     Gen: A&Ox 3, NAD  Chest: CTA B/L  Cardiac: S1,S2; RRR  Breast: Soft, nontender, nonengorged  Abdomen: +BS, Soft, nontender,  ND; Fundus firm below umbilicus  Gyn: mod lochia, intact/repaired  Ext: Nontender, DTRS 2+, no worsening edema                          9.8    17.95 )-----------( 316      ( 01 Mar 2023 08:58 )             29.2       A/P:  PPD#1 s/p     -Continue pain management  -Encourage OOB and ambulation  -Check CBC  -Continue current care  -Plan for discharge tomorrow  pt seen by NEHA mullen  -d/w dr. altamirano    
PA NOTE:  ppd#2      Patient seen at bedside resting comfortably offers no current complaints.  Ambulating and voiding without difficulty.  Passing flatus and tolerating regular diet.  both breast/bottle feeding.  Denies HA, CP, SOB, N/V/D, dizziness, palpitations, worsening abdominal pain, worsening vaginal bleeding, or any other concerns.      Vital Signs Last 24 Hrs  T(C): 37 (02 Mar 2023 05:53), Max: 37 (02 Mar 2023 05:53)  T(F): 98.6 (02 Mar 2023 05:53), Max: 98.6 (02 Mar 2023 05:53)  HR: 91 (02 Mar 2023 05:53) (89 - 91)  BP: 111/74 (02 Mar 2023 05:53) (111/74 - 129/75)  BP(mean): --  RR: 18 (02 Mar 2023 05:53) (18 - 19)  SpO2: 98% (02 Mar 2023 05:53) (98% - 99%)    Parameters below as of 02 Mar 2023 05:53  Patient On (Oxygen Delivery Method): room air        Physical Exam:     Gen: A&Ox 3, NAD  Chest: CTA B/L  Cardiac: S1+S2; RRR  Breast: Soft, nontender, nonengorged  Abdomen: +BS; Soft, nontender, ND; Fundus firm below umbilicus  Gyn: Min lochia  first degree perineal tear intact   Ext: Nontender, DTRS 2+, no worsening edema

## 2023-03-02 NOTE — DISCHARGE NOTE OB - MEDICATION SUMMARY - MEDICATIONS TO TAKE
I will START or STAY ON the medications listed below when I get home from the hospital:    acetaminophen 500 mg oral capsule  -- 2 cap(s) by mouth every 6 hours, As Needed   -- This product contains acetaminophen.  Do not use  with any other product containing acetaminophen to prevent possible liver damage.    -- Indication: For for pain    ibuprofen 600 mg oral tablet  -- 1 tab(s) by mouth every 6 hours, As needed, Mild pain or headache  -- Indication: For for pain    benzocaine 20% topical spray  -- 1 spray(s) on skin every 6 hours, As needed, for Perineal discomfort  -- Indication: For for perineum 1st degree laceration    Prenatal Multivitamins with Folic Acid 1 mg oral tablet  -- 1 tab(s) by mouth once a day  -- Indication: For supplement breastfeeding    ferrous sulfate (as elemental iron) 45 mg oral tablet, extended release  -- 1 tab(s) by mouth once a day   -- Check with your doctor before becoming pregnant.  May discolor urine or feces.  Some non-prescription drugs may aggravate your condition.  Read all labels carefully.  If a warning appears, check with your doctor before taking.  Swallow whole.  Do not crush.    -- Indication: For anemia post delivery    Colace 2-in-1 50 mg-8.6 mg oral tablet  -- 1 tab(s) by mouth 2 times a day   -- Medication should be taken with plenty of water.    -- Indication: For for bm

## 2023-03-02 NOTE — DISCHARGE NOTE OB - CARE PROVIDER_API CALL
Rika Price)  Obstetrics and Gynecology  [address] [place] [address]  Phone: [phone]  Fax: [phone]  Established Patient  Follow Up Time: 1 month

## 2023-03-02 NOTE — DISCHARGE NOTE OB - PATIENT PORTAL LINK FT
You can access the FollowMyHealth Patient Portal offered by [facility] by registering at the following website: [email or web address]. By joining [employer]’s FollowMyHealth portal, you will also be able to view your health information using other applications (apps) compatible with our system.

## 2023-03-07 ENCOUNTER — EMERGENCY (EMERGENCY)
Facility: HOSPITAL | Age: 33
LOS: 1 days | Discharge: HOME | End: 2023-03-07
Attending: EMERGENCY MEDICINE
Payer: MEDICAID

## 2023-03-07 VITALS
RESPIRATION RATE: 16 BRPM | DIASTOLIC BLOOD PRESSURE: 95 MMHG | SYSTOLIC BLOOD PRESSURE: 141 MMHG | HEART RATE: 54 BPM | OXYGEN SATURATION: 100 %

## 2023-03-07 VITALS
SYSTOLIC BLOOD PRESSURE: 161 MMHG | HEIGHT: 63 IN | RESPIRATION RATE: 18 BRPM | TEMPERATURE: 99 F | OXYGEN SATURATION: 96 % | HEART RATE: 57 BPM | DIASTOLIC BLOOD PRESSURE: 93 MMHG | WEIGHT: 184.97 LBS

## 2023-03-07 DIAGNOSIS — Z98.890 OTHER SPECIFIED POSTPROCEDURAL STATES: Chronic | ICD-10-CM

## 2023-03-07 LAB
ALBUMIN SERPL ELPH-MCNC: 3.7 G/DL — SIGNIFICANT CHANGE UP (ref 3.3–5)
ALP SERPL-CCNC: 166 U/L — HIGH (ref 40–120)
ALT FLD-CCNC: 17 U/L — SIGNIFICANT CHANGE UP (ref 10–45)
ANION GAP SERPL CALC-SCNC: 12 MMOL/L — SIGNIFICANT CHANGE UP (ref 5–17)
APTT BLD: 28.2 SEC — SIGNIFICANT CHANGE UP (ref 27.5–35.5)
AST SERPL-CCNC: 16 U/L — SIGNIFICANT CHANGE UP (ref 10–40)
BASOPHILS # BLD AUTO: 0.07 K/UL — SIGNIFICANT CHANGE UP (ref 0–0.2)
BASOPHILS NFR BLD AUTO: 0.7 % — SIGNIFICANT CHANGE UP (ref 0–2)
BILIRUB SERPL-MCNC: 0.6 MG/DL — SIGNIFICANT CHANGE UP (ref 0.2–1.2)
BUN SERPL-MCNC: 8 MG/DL — SIGNIFICANT CHANGE UP (ref 7–23)
CALCIUM SERPL-MCNC: 9.6 MG/DL — SIGNIFICANT CHANGE UP (ref 8.4–10.5)
CHLORIDE SERPL-SCNC: 107 MMOL/L — SIGNIFICANT CHANGE UP (ref 96–108)
CO2 SERPL-SCNC: 22 MMOL/L — SIGNIFICANT CHANGE UP (ref 22–31)
CREAT SERPL-MCNC: 0.67 MG/DL — SIGNIFICANT CHANGE UP (ref 0.5–1.3)
EGFR: 120 ML/MIN/1.73M2 — SIGNIFICANT CHANGE UP
EOSINOPHIL # BLD AUTO: 0.07 K/UL — SIGNIFICANT CHANGE UP (ref 0–0.5)
EOSINOPHIL NFR BLD AUTO: 0.7 % — SIGNIFICANT CHANGE UP (ref 0–6)
GLUCOSE SERPL-MCNC: 85 MG/DL — SIGNIFICANT CHANGE UP (ref 70–99)
HCT VFR BLD CALC: 32.4 % — LOW (ref 34.5–45)
HGB BLD-MCNC: 10.4 G/DL — LOW (ref 11.5–15.5)
IMM GRANULOCYTES NFR BLD AUTO: 0.5 % — SIGNIFICANT CHANGE UP (ref 0–0.9)
INR BLD: 1.1 RATIO — SIGNIFICANT CHANGE UP (ref 0.88–1.16)
LDH SERPL L TO P-CCNC: 290 U/L — HIGH (ref 50–242)
LYMPHOCYTES # BLD AUTO: 2.49 K/UL — SIGNIFICANT CHANGE UP (ref 1–3.3)
LYMPHOCYTES # BLD AUTO: 24.5 % — SIGNIFICANT CHANGE UP (ref 13–44)
MAGNESIUM SERPL-MCNC: 1.9 MG/DL — SIGNIFICANT CHANGE UP (ref 1.6–2.6)
MCHC RBC-ENTMCNC: 29.8 PG — SIGNIFICANT CHANGE UP (ref 27–34)
MCHC RBC-ENTMCNC: 32.1 GM/DL — SIGNIFICANT CHANGE UP (ref 32–36)
MCV RBC AUTO: 92.8 FL — SIGNIFICANT CHANGE UP (ref 80–100)
MONOCYTES # BLD AUTO: 0.58 K/UL — SIGNIFICANT CHANGE UP (ref 0–0.9)
MONOCYTES NFR BLD AUTO: 5.7 % — SIGNIFICANT CHANGE UP (ref 2–14)
NEUTROPHILS # BLD AUTO: 6.92 K/UL — SIGNIFICANT CHANGE UP (ref 1.8–7.4)
NEUTROPHILS NFR BLD AUTO: 67.9 % — SIGNIFICANT CHANGE UP (ref 43–77)
NRBC # BLD: 0 /100 WBCS — SIGNIFICANT CHANGE UP (ref 0–0)
NT-PROBNP SERPL-SCNC: 487 PG/ML — HIGH (ref 0–300)
PLATELET # BLD AUTO: 361 K/UL — SIGNIFICANT CHANGE UP (ref 150–400)
POTASSIUM SERPL-MCNC: 3.5 MMOL/L — SIGNIFICANT CHANGE UP (ref 3.5–5.3)
POTASSIUM SERPL-SCNC: 3.5 MMOL/L — SIGNIFICANT CHANGE UP (ref 3.5–5.3)
PROT SERPL-MCNC: 6.9 G/DL — SIGNIFICANT CHANGE UP (ref 6–8.3)
PROTHROM AB SERPL-ACNC: 12.8 SEC — SIGNIFICANT CHANGE UP (ref 10.5–13.4)
RBC # BLD: 3.49 M/UL — LOW (ref 3.8–5.2)
RBC # FLD: 13.7 % — SIGNIFICANT CHANGE UP (ref 10.3–14.5)
SODIUM SERPL-SCNC: 141 MMOL/L — SIGNIFICANT CHANGE UP (ref 135–145)
URATE SERPL-MCNC: 4.7 MG/DL — SIGNIFICANT CHANGE UP (ref 2.5–7)
WBC # BLD: 10.18 K/UL — SIGNIFICANT CHANGE UP (ref 3.8–10.5)
WBC # FLD AUTO: 10.18 K/UL — SIGNIFICANT CHANGE UP (ref 3.8–10.5)

## 2023-03-07 PROCEDURE — 99285 EMERGENCY DEPT VISIT HI MDM: CPT

## 2023-03-07 PROCEDURE — 83615 LACTATE (LD) (LDH) ENZYME: CPT

## 2023-03-07 PROCEDURE — 85025 COMPLETE CBC W/AUTO DIFF WBC: CPT

## 2023-03-07 PROCEDURE — 85610 PROTHROMBIN TIME: CPT

## 2023-03-07 PROCEDURE — 84550 ASSAY OF BLOOD/URIC ACID: CPT

## 2023-03-07 PROCEDURE — 83880 ASSAY OF NATRIURETIC PEPTIDE: CPT

## 2023-03-07 PROCEDURE — 83735 ASSAY OF MAGNESIUM: CPT

## 2023-03-07 PROCEDURE — 99284 EMERGENCY DEPT VISIT MOD MDM: CPT

## 2023-03-07 PROCEDURE — 85730 THROMBOPLASTIN TIME PARTIAL: CPT

## 2023-03-07 PROCEDURE — 36415 COLL VENOUS BLD VENIPUNCTURE: CPT

## 2023-03-07 PROCEDURE — 80053 COMPREHEN METABOLIC PANEL: CPT

## 2023-03-07 RX ORDER — ACETAMINOPHEN 500 MG
975 TABLET ORAL ONCE
Refills: 0 | Status: COMPLETED | OUTPATIENT
Start: 2023-03-07 | End: 2023-03-07

## 2023-03-07 RX ADMIN — Medication 975 MILLIGRAM(S): at 18:59

## 2023-03-07 NOTE — ED PROVIDER NOTE - ATTENDING APP SHARED VISIT CONTRIBUTION OF CARE
Private Physician Don Jeffery [phone], [facility]  32y f pmh Asthma. SP vag delivery induced at 40-1/7 wk 2/28/23, No complicaitons, Pt comes to ed c/o I've been having a ha for past several days. Pt used home bp machine 145/90. Pt didn't have any issues w bp during pregnancy. Pt seen at [facility] referred to ed. Ha 5/10 not worse ha of life. "it's nothing like the bad ha I have w migraines" No fever, chills. cp, shortness of breath, palps, nvdc, dysuria, seizures, vision changes, abd pain. Vag bleeding is improving over time. Private Physician Don Jeffery [phone], [facility]  32y f pmh Asthma. SP vag delivery induced at 40-1/7 wk 2/28/23, No complicaitons, Pt comes to ed c/o I've been having a ha for past several days. Pt used home bp machine 145/90. Pt didn't have any issues w bp during pregnancy. Pt seen at [facility] referred to ed. Ha 5/10 not worse ha of life. "it's nothing like the bad ha I have w migraines" No fever, chills. cp, shortness of breath, palps, nvdc, dysuria, seizures, vision changes, abd pain. Vag bleeding is improving over time. PE WDWN female awake alert normocephalic atraumatic neck supple chest clear anterior & posterior cv no rubs, gallops or murmurs abd soft +bs no mass guarding, Neuro awake alert power 5/5 all extr power 5/5 all extr senasation intact, reflex 1+siomara MSK no pedal edema  Reyes Sullivan MD, Facep

## 2023-03-07 NOTE — CONSULT NOTE ADULT - SUBJECTIVE AND OBJECTIVE BOX
RIC JOHNS  32y  Female [id number]    HPI:  31 y/o  s/p uncomplicated  on 23 at [place], presenting with complaint of elevated blood pressures and headache.    Patient states that she had an uncomplicated pregnancy and delivery. Was taking her blood pressure at home with values of 140s/90s. Had a headache starting yesterday unrelieved by 2-3 Tylenol, persisting until today so presented to the ED. At time of evaluation noting mild headache, denying vision changes, RUQ pain, epigastric pain. Recovering well post-partum, trying to breastfeed, with minimal bleeding and pain well-controlled.    Upon arrival initial  /95 (1756), with repeat of 141/82 (1815); BP's since at bedside ranging in 130s-140s/80s.    Name of GYN Physician: Dr Rika Price    ObHx:   6#14, SABx2 (no D&C)  GynHx: Denies fibroids, cysts, endometriosis, STI's, Abnormal pap smears   PMHx: asthma (no intub/hosp), migraines  SurgHx: denies  Meds: Albuterol PRN  Allergies: NKDA  Social History: Denies smoking use, drug use, alcohol use.    Vital Signs Last 24 Hrs  T(C): 36.9 (07 Mar 2023 18:06), Max: 37.1 (07 Mar 2023 17:53)  T(F): 98.5 (07 Mar 2023 18:06), Max: 98.7 (07 Mar 2023 17:53)  HR: 58 (07 Mar 2023 19:15) (54 - 69)  BP: 145/95 (07 Mar 2023 19:15) (134/82 - 161/93)  BP(mean): --  RR: 18 (07 Mar 2023 19:15) (16 - 18)  SpO2: 100% (07 Mar 2023 19:15) (96% - 100%)    Parameters below as of 07 Mar 2023 19:15  Patient On (Oxygen Delivery Method): room air    Physical Exam:   General: sitting comfortably in bed, NAD   HEENT: neck supple, full ROM  CV: RR S1S2 no m/r/g  Lungs: CTA b/l, good air flow b/l   Back: No CVA tenderness  Abd: Soft, non-tender, non-distended.  Bowel sounds present.    :  No bleeding on pad.    LABS:             10.4   10.18 )-----------( 361      ( 07 Mar 2023 18:59 )             32.4     I&O's Detail    PT/INR - ( 07 Mar 2023 18:59 )   PT: 12.8 sec;   INR: 1.10 ratio        PTT - ( 07 Mar 2023 18:59 )  PTT:28.2 sec

## 2023-03-07 NOTE — ED PROVIDER NOTE - NSFOLLOWUPINSTRUCTIONS_ED_ALL_ED_FT
1) Follow-up with your primary care provider and OBGYN in 1-2 days.    Rika Price)  Obstetrics and Gynecology  [address] [place] [address]  Phone: [phone]  Fax: [phone]    2) Continue to take all medications as prescribed, if any.    3) Pain can be managed with Acetaminophen (aka Tylenol) over the counter as directed. Take with food.    4) Return to the ER for any new or worsening symptoms.

## 2023-03-07 NOTE — ED PROVIDER NOTE - PATIENT PORTAL LINK FT
You can access the FollowMyHealth Patient Portal offered by [facility] by registering at the following website: [email or web address]. By joining [employer]’s FollowMyHealth portal, you will also be able to view your health information using other applications (apps) compatible with our system.

## 2023-03-07 NOTE — CONSULT NOTE ADULT - ASSESSMENT
33 y/o  s/p  on  presenting with labile BP's and headache post-partum.    #Elevated BP's  - Pt with one severe BP upon arrival with non-severe repeats: 161/95 (1756), 141/82 (1815), 130s-140s/80s following  - Given pt report of BP's likely gestational hypertensive however at this time without full 4 hours of BP's logged  - Patient with mild range headache, not having received medications at time of evaluation  - Recommend Reglan/Benadryl for headache  - If repeat of >160/110 in under an hour since initial severe, recommend immediate release antihypertensive  - Continue to monitor BP's, HELLP labs  - If patient continues to have mild range blood pressures with resolved headache, stable for discharge with f/u w/ Dr Price this week in office  - Please alert GYN if patient with unrelenting headache following medications or with additional severe-range blood pressures    d/w service attending Dr Walter Childress  PGY-2

## 2023-03-07 NOTE — ED ADULT NURSE NOTE - OBJECTIVE STATEMENT
32y F Radha x4 came in for postpartum preeclampsia. Patient is one week postpartum () with two miscarriages. Patient came in from urgent care after being told her blood pressure was elevated. Patient is reporting a headache at this time. No facial edema observed. Patient denies blurry vison. Reports no issues during her pregnancy. Patient delivered at full-term. Denies any past medical hx. Reports no abdominal cramping or irregular bleeding. Denies fever, chills, weakness, fatigue, dysuria, n/v/d, SOB, and chest pain. Patient provided with breast pump. Call bell within reach, bed in lowest position.

## 2023-03-07 NOTE — ED PROVIDER NOTE - OBJECTIVE STATEMENT
31 yo female  s/p uncomplicated  on 23 at [place], presenting with complaint of elevated blood pressures and headache. Since delivery has been monitoring her BPs at home despite no formal diagnosis of HTN in pregnancy, home values have been around 140s/90s systolic the last 2-3 days with frontal HA that was unrelieved by tylenol prompting ED visit today. Not on medications for HTN currently. Does have hx of migraines which usually go away w/ tylenol so pt was concerned this was different. Denies speech/visual changes, numbness/tingling, weakness, dizziness/lightheadedness, cp, sob, fever/chills, abd pain, n/v/d.

## 2023-03-07 NOTE — ED PROVIDER NOTE - PROGRESS NOTE DETAILS
hTeo Yip PA-C: Patient feeling better.  Well-appearing sitting upright on stretcher eating takeout with friend at bedside.  Most recent /80.  Case discussed with OB/GYN, stable for discharge.  Not recommending antihypertensives at this time.  Patient to follow-up with OB/GYN this week.  All results discharge instructions and return precautions given.

## 2023-03-30 RX ORDER — TACROLIMUS 1 MG/1
1 CAPSULE ORAL
Qty: 180 | Refills: 3 | Status: ACTIVE | COMMUNITY
Start: 1900-01-01 | End: 1900-01-01

## 2023-04-19 NOTE — PROGRESS NOTE ADULT - PROBLEM SELECTOR PLAN 3
+tachycardia to 126 and leukocytosis to 12.19 on admission. Afebrile. At this time, most likely 2/2 to pain/vomiting. Possible 2/2 to incomplete treatment of UTI but less likely given lack of urinary symptoms.    Plan:  [] will monitor off abx at this time  [] Trend WBC daily No indicators present

## 2023-04-28 ENCOUNTER — APPOINTMENT (OUTPATIENT)
Dept: NEPHROLOGY | Facility: CLINIC | Age: 33
End: 2023-04-28

## 2023-08-23 NOTE — ASU PREOP CHECKLIST - HAIR REMOVAL
HPI     Chief Complaint:  Chief Complaint   Patient presents with    Animal Bite       Yuni Perez is a 53 y.o. female with multiple medical diagnoses as listed in the medical history and problem list that presents for animal bite.  Pt is not known to me with her last appointment 8/9/2023.      HPI  Patient presents for follow-up of cat bite. Saw ID on Monday, switched antibiotics to Levaquin and Flagyl. Will f/u with ID in 10 days, possible CT. Cat bite swelling is getting better, continues with redness and tenderness. Pain is 2-4/10.       Assessment & Plan     Problem List Items Addressed This Visit    None  Visit Diagnoses       Cat bite, initial encounter    -  Primary  Follow-up on CAT bite.  Redness swelling and tenderness are improving.  Patient seen by infectious disease 2 days ago and will follow-up with them in 10 days.  ID changed antibiotics to Levaquin and Flagyl.  Follow-up with ID.    Pasteurella cellulitis due to cat bite      Follow-up on CAT bite.  Redness swelling and tenderness are improving.  Patient seen by infectious disease 2 days ago and will follow-up with them in 10 days.  ID changed antibiotics to Levaquin and Flagyl.  Follow-up with ID.    Primary pulmonary hypertension      Stable. The current medical regimen is effective;  continue present plan and medications.              --------------------------------------------      Health Maintenance:  Health Maintenance         Date Due Completion Date    Colorectal Cancer Screening Never done ---    Shingles Vaccine (1 of 2) Never done ---    COVID-19 Vaccine (4 - Moderna series) 08/26/2022 7/1/2022    DEXA Scan 06/25/2023 6/25/2021    Mammogram 11/17/2023 11/17/2022    Override on 9/2/2013: Done    Override on 3/1/2011: Done    Influenza Vaccine (1) 09/01/2023 9/22/2020    Hemoglobin A1c (Diabetic Prevention Screening) 09/21/2023 9/21/2020    Lipid Panel 09/21/2025 9/21/2020    Cervical Cancer Screening 11/23/2025 11/23/2020  "   Override on 9/17/2014: Done (Dr. Khan)    Override on 2/1/2012: Done    Override on 6/10/2003: Done    TETANUS VACCINE 08/09/2033 8/9/2023    Pneumococcal Vaccines (Age 0-64) (3 - PPSV23 or PCV20) 03/18/2036 1/18/2017            Health maintenance reviewed    Follow Up:  No follow-ups on file.    Exam     Review of Systems:  (as noted above)  Review of Systems   Constitutional:  Negative for chills and fever.   Skin:  Positive for wound.       Physical Exam:   Physical Exam      Vitals:    08/23/23 0939   BP: 110/60   Pulse: 78   Temp: 98.6 °F (37 °C)   TempSrc: Oral   SpO2: 98%   Weight: 64.2 kg (141 lb 8.6 oz)   Height: 4' 11" (1.499 m)      Body mass index is 28.59 kg/m².        History     Past Medical History:  Past Medical History:   Diagnosis Date    AR (allergic rhinitis)     Cholelithiasis, common bile duct     Chronic low back pain     Eye pressure 2017    General anesthetics causing adverse effect in therapeutic use     GERD (gastroesophageal reflux disease)     History of migraine headaches     Hypothyroidism     Innocent heart murmur     Lumbar disc disease     Menorrhagia     Mild asthma     Obesity     Plantar fasciitis of left foot     Primary pulmonary hypertension     followed by heart transplant/pulmonary     Seizure disorder     x 1 in 2008    Seizures     Sleep apnea     TMJ (dislocation of temporomandibular joint)     Tricuspid regurgitation        Past Surgical History:  Past Surgical History:   Procedure Laterality Date    CARDIAC CATHETERIZATION      CATHETERIZATION OF BOTH LEFT AND RIGHT HEART Bilateral 9/29/2020    Procedure: CATHETERIZATION, HEART, BOTH LEFT AND RIGHT;  Surgeon: Gucci Pickett MD;  Location: [facility] CATH LAB;  Service: Cardiology;  Laterality: Bilateral;    CENTRAL VENOUS CATHETER INSERTION      CORONARY ANGIOGRAPHY N/A 9/29/2020    Procedure: ANGIOGRAM, CORONARY ARTERY;  Surgeon: Gucci Pickett MD;  Location: [facility] CATH LAB;  Service: Cardiology;  Laterality: " N/A;    gallbladder drain  06/2019    INSERTION OF HAYNES CATHETER Right 6/17/2020    Procedure: INSERTION, CATHETER, CENTRAL VENOUS, HAYNES Replace Single Lumen for Remodulin Infusion;  Surgeon: Bertin Dewitt MD;  [facility];  Service: General;  Laterality: Right;    PORTACATH PLACEMENT      REMOVAL OF TUNNELED CENTRAL VENOUS CATHETER (CVC) N/A 6/17/2020    Procedure: REMOVAL, CATHETER, CENTRAL VENOUS, TUNNELED;  Surgeon: Bertin Dewitt MD;  [facility];  Service: General;  Laterality: N/A;    RIGHT HEART CATHETERIZATION Right 8/20/2018    Procedure: HEART CATH-RIGHT;  Surgeon: Ivonne Rai MD;  Location: [facility] CATH LAB;  Service: Cardiology;  Laterality: Right;    RIGHT HEART CATHETERIZATION Right 9/4/2019    Procedure: INSERTION, CATHETER, RIGHT HEART;  Surgeon: Ivonne Rai MD;  Location: [facility] CATH LAB;  Service: Cardiology;  Laterality: Right;    RIGHT HEART CATHETERIZATION Right 6/10/2022    Procedure: INSERTION, CATHETER, RIGHT HEART;  Surgeon: Keshia Poe MD;  Location: [facility] CATH LAB;  Service: Cardiology;  Laterality: Right;    UPPER GASTROINTESTINAL ENDOSCOPY         Social History:  Social History     Socioeconomic History    Marital status: Single    Number of children: 0   Occupational History    Occupation: disabled     Employer: [employer]   Tobacco Use    Smoking status: Never    Smokeless tobacco: Never   Substance and Sexual Activity    Alcohol use: No     Alcohol/week: 0.8 standard drinks of alcohol     Types: 1 Standard drinks or equivalent per week     Comment: socially    Drug use: No    Sexual activity: Never     Birth control/protection: OCP, Pill     Comment: pt is a virgin       Family History:  Family History   Problem Relation Age of Onset    Diabetes Mother     Heart disease Father     Glaucoma Father     No Known Problems Sister     Cancer Maternal Aunt         breast    Breast cancer Maternal Aunt     No Known Problems  "Maternal Uncle     No Known Problems Paternal Aunt     No Known Problems Paternal Uncle     Cancer Maternal Grandmother         uterine    Diabetes Maternal Grandfather     Heart attack Maternal Grandfather     No Known Problems Paternal Grandmother     Heart disease Paternal Grandfather     Heart attack Paternal Grandfather     Diabetes Paternal Grandfather     Leukemia Brother     Breast cancer Cousin     Breast cancer Cousin     Hypertension Other     Colon cancer Neg Hx     Ovarian cancer Neg Hx     Amblyopia Neg Hx     Blindness Neg Hx     Cataracts Neg Hx     Macular degeneration Neg Hx     Retinal detachment Neg Hx     Strabismus Neg Hx     Stroke Neg Hx     Thyroid disease Neg Hx     Esophageal cancer Neg Hx        Allergies and Medications: (updated and reviewed)  Review of patient's allergies indicates:   Allergen Reactions    Fentanyl Anaphylaxis     Respiratory distress    Vibra-tabs [doxycycline hyclate] Anaphylaxis     "throat felt like it was closing"    Adhesive Hives     Silk tape    Amoxicillin Rash    Nsaids (non-steroidal anti-inflammatory drug) Swelling    Chlorhexidine Other (See Comments)     Blue Chlorhexidine causes hives     Current Outpatient Medications   Medication Sig Dispense Refill    acetaminophen (TYLENOL) 500 MG tablet Take 1,000 mg by mouth every 6 (six) hours as needed for Pain.      ADCIRCA 20 mg Tab Take 2 tablets (40 mg total) by mouth once daily. 60 tablet 11    alprazolam (XANAX ORAL) Take by mouth as needed.      ambrisentan (LETAIRIS) 10 MG Tab Take 1 tablet (10 mg total) by mouth once daily. 30 tablet 11    azelastine (ASTELIN) 137 mcg (0.1 %) nasal spray 2 sprays by Nasal route 2 (two) times daily. Patient uses prn in the evening      butalbital-acetaminophen-caffeine -40 mg (FIORICET, ESGIC) -40 mg per tablet Take 1 tablet by mouth every 4 (four) hours as needed for Pain.      cyanocobalamin, vitamin B-12, 2,500 mcg Subl Place 2,500 mcg under the tongue " every morning.       diphenhydrAMINE (BENADRYL) 25 mg capsule Take 50 mg by mouth every 6 (six) hours as needed for Itching. Patient takes prn evenings      estrogen, conjugated,-medroxyprogesterone 0.625-2.5mg (PREMPRO) 0.625-2.5 mg per tablet Take 1 tablet by mouth once daily. 90 tablet 3    ferrous sulfate 325 (65 FE) MG EC tablet Take 325 mg by mouth daily as needed.       fluticasone furoate-vilanteroL (BREO ELLIPTA) 200-25 mcg/dose DsDv diskus inhaler Inhale 1 puff into the lungs every evening. Controller 180 each 0    fluticasone propionate (FLONASE) 50 mcg/actuation nasal spray 2 sprays (100 mcg total) by Each Nostril route every evening. 16 g 11    folic acid (FOLVITE) 1 MG tablet TAKE ONE TABLET BY MOUTH ONCE DAILY. 90 tablet 0    furosemide (LASIX) 20 MG tablet Take 1 tablet (20 mg total) by mouth once daily. 30 tablet 11    gabapentin (NEURONTIN) 300 MG capsule Take 1 capsule (300 mg total) by mouth every evening. 30 capsule 11    glucosam/chond-msm1/C/michele/bor (GLUCOSAMINE-CHOND-MSM COMPLEX ORAL) Take by mouth.      hyoscyamine (LEVSIN/SL) 0.125 mg Subl Place 1 tablet (0.125 mg total) under the tongue every 6 (six) hours as needed (Aa needed). 60 tablet 0    lactic acid-citric-potassium (PHEXXI) 1.8-1-0.4 % Gel Place 1 Applicatorful vaginally as needed (CONTRACEPTIVE). 5 g 2    levoFLOXacin (LEVAQUIN) 750 MG tablet Take 1 tablet (750 mg total) by mouth once daily. for 10 days 10 tablet 0    levothyroxine (SYNTHROID) 125 MCG tablet TAKE ONE TABLET BY MOUTH EVERY MORNING 1 HOUR BEFORE BREAKFAST AND OTHER MEDICATIONS (FOR THYROID). 90 tablet 0    LIDOcaine (LIDODERM) 5 % Place 1 patch onto the skin once daily. (Patient taking differently: Place 1 patch onto the skin as needed.) 15 patch 0    loratadine (CLARITIN) 10 mg tablet Take 10 mg by mouth every evening.       magnesium oxide (MAG-OX) 400 mg (241.3 mg magnesium) tablet Take 1 tablet (400 mg total) by mouth once daily. 30 tablet 5    metroNIDAZOLE  (FLAGYL) 500 MG tablet Take 1 tablet (500 mg total) by mouth 3 (three) times daily. for 10 days 30 tablet 0    ondansetron (ZOFRAN) 4 MG tablet Take 1 tablet (4 mg total) by mouth every 6 (six) hours as needed. 1 Tablet Oral Every 6 hours 30 tablet 2    pantoprazole (PROTONIX) 40 MG tablet TAKE ONE TABLET BY MOUTH DAILY AS NEEDED FOR HEARTBURN 90 tablet 3    tiZANidine (ZANAFLEX) 4 MG tablet Take 4 mg by mouth every 6 (six) hours as needed.      topiramate (TOPAMAX) 100 MG tablet Take 1 tablet (100 mg total) by mouth 2 (two) times daily. 60 tablet 5    treprostinil (REMODULIN) 2.5 mg/mL Soln Mix cassette as directed and infuse continuously per physician titration orders on dosing sheet. Current dose 80ng/kg/min 60 mL 11    ubrogepant (UBRELVY)tablet 100 mg TAKE ONE TABLET BY MOUTH AS A SINGLE DOSE AS NEEDED FOR MIGRAINE      albuterol (PROAIR HFA) 90 mcg/actuation inhaler Inhale 2 puffs into the lungs every 6 (six) hours as needed for Wheezing. Rescue 18 g 6    albuterol-ipratropium (DUO-NEB) 2.5 mg-0.5 mg/3 mL nebulizer solution Take 3 mLs by nebulization every 6 (six) hours as needed for Wheezing. Rescue 6 each 6     No current facility-administered medications for this visit.       Patient Care Team:  Lulu Sandhu MD as PCP - General (Internal Medicine)  Ralph Whyte MD (Inactive) as Referring Physician (Intensive Care)  Ian Lackey MD as Consulting Physician (Pulmonary Disease)  Monika Dalton LPN as Licensed Practical Nurse  Dorene Ny RN as Registered Nurse  Bárbara Langford NP as Nurse Practitioner (Cardiology)  Mayra Daniel RN as Registered Nurse         - The patient is given an After Visit Summary that lists all medications with directions, allergies, education, orders placed during this encounter and follow-up instructions.      - I have reviewed the patient's medical information including past medical, family, and social history sections including  the medications and allergies.      - We discussed the patient's current medications.     This note was created by combination of typed  and MModal dictation.  Transcription errors may be present.  If there are any questions, please contact me.     Renee Schumacher NP                hair removal not indicated

## 2023-10-27 NOTE — ED PROVIDER NOTE - NSPTACCESSSVCSAPPTDETAILS_ED_ALL_ED_FT
Debridement Text: The wound edges were debrided prior to proceeding with the closure to facilitate wound healing. Pt needs both Infectious Diseases and Gastroenterology follow up for C. Diff

## 2023-11-10 NOTE — ED ADULT NURSE NOTE - NSSUHOSCREENINGYN_ED_ALL_ED
Wendy is growing and developing well. Continue to keep your child rear facing in the car seat until he reaches the limit listed on the stickers on the side of your seat or in your manual.  You can use acetaminophen or ibuprofen for any fevers or discomfort from any shots that were given today. Two-year-old children require constant supervision and they are at a higher risk accidents and drownings.  We discussed physical activity and nutritional requirements for your child today.      Continue reading to your child daily to promote language and literacy development.  You may find that your toddler notices when you skip pages of familiar books.  Take the time let him ask questions or make statements about the story or the pictures.  Teach your baby shapes or colors as well.  These lessons help strengthen his memory.  Don't worry if he's not interested.  You can find something else to attract his attention!     Your child should now return in 6 months for a 2 and 1/2 year old well child check.    If your child was given vaccines, Vaccine Information Sheets were offered and counseling on vaccine side effects was given.  Side effects most commonly include fever, redness at the injection site, or swelling at the site.  Younger children may be fussy and older children may complain of pain. You can use acetaminophen at any age or ibuprofen for age 6 months and up.  Much more rarely, call back or go to the ER if your child has inconsolable crying, wheezing, difficulty breathing, or other concerns.      Hemoglobin/Lead:   ordered    Need a copy of prior shot (immunization) record.     
Yes - the patient is able to be screened

## 2023-11-16 NOTE — PROGRESS NOTE ADULT - PROBLEM SELECTOR PLAN 8
[place] [facility] [place].    - no wheezing on RA  - c/w home albuterol PRN, last use 6 mo ago

## 2023-12-06 NOTE — ED ADULT TRIAGE NOTE - NS ED TRIAGE EKG
Name  Kasie Mcgowan  MRN [id number]   1932  Date of service 23   Primary Care Physician:Pcp, No    Chief complaint:No chief complaint on file.       HPI: Kasie Mcgowan is a 89 year old female with a h/o  left sided bell's palsy (), HTN, HLD, hypothyroidism, CKD III, memory impairment, GERD, and chronic left parietal and occipital lobe radiographic infarcts on MRI () who presents for follow up after left posterior limb infarct with residual deficits of dysarthria secondary to  etiology    Summary of Hospitalization:  Date of hospital admission:23  The patient presented with: Per daughter, she began having a cough and started to develop slurred speech  Admission blood pressure:169/70   Initial exam was notable for:Slurred speech, R hand weakness, RLE drift  In the ED, the patient received the following treatments: TNK was given in ER   MRI brain/CT head showed:MRI brain demonstrated bilateral cerebellar masses with mild perilesional edema and trace petechial micro-hemorrhages. Personally reviewed with Dr. Finney, demonstrating left posterior limb of the internal capsule ischemic stroke with DWI/ADC correlates, not commented on by radiology. MRI brain w aleah demonstrated evolving bilateral cerebellar IPH's 2/2 likely mass and right pontine IPH   Vascular imaging showed:CTA head/neck demonstrated no acute LVO or aneurysm. With proximal left subclavian artery short segment dissection, moderate-severe right vertebral artery stenosis, moderate left vertebrobasilar junction and distal basilar artery narrowing, and probable 2 mm aneurysm of the right distal supraclinoid ICA   TTE showed:EF 78% and no shunt   A1c:5.6  LDL:97  The patient's discharge exam was: Dysarthria, minor RUE weakness   The patient was discharged on the following medications: ATP held due to probable CAA, Atorvastatin 40 mg   Ischemic Stroke Etiology:  as etiology for infarct with involvement of probable CAA    CURRENT  CLINIC VISIT:  Accompanied with daughter and utilized   Denies residual stroke symptoms, feels at baseline  Says she has a PCP (I cannot see notes but says she saw them last week) who prescribed aspirin, per daughter per stroke prevention  No new concerns for a stroke, no BEFAST symptoms   Denies HA, no loss of vision, no unilateral weakness  She states she takes the aspirin daily, does have bloody urine but was diagnosed with UTI   Does not check BP at home as does not have cuff but daughter will  cuff to buy one. They have seen their PCP who states BP is around 120's/60 consistently   Does not use PT/OT since leaving [facility]. Lives with her daughter who helps with bathing, able walk herself, and feed self. She was living with other daughter prior to stroke  Takes atorvastatin 40 mg, no myalgias   No anxiety or depression     PMH/PSH:  Past Medical History:   Diagnosis Date    Asthma     CKD (chronic kidney disease), stage III (CMD)     Eczema     Essential (primary) hypertension     GERD (gastroesophageal reflux disease)     HLD (hyperlipidemia)     Hypothyroid     Lung mass         Medications:  Current Outpatient Medications   Medication Sig Dispense Refill    atorvastatin (LIPITOR) 40 MG tablet Take 1 tablet by mouth daily. 90 tablet 1    metoPROLOL tartrate (LOPRESSOR) 25 MG tablet Take 25 mg by mouth in the morning and 25 mg in the evening.      levothyroxine 75 MCG tablet Take 75 mcg by mouth daily.      aspirin 81 MG EC tablet Take 1 tablet by mouth daily. Do not start before February 19, 2022. 30 tablet 3    CARBOXYMethylcellulose (REFRESH PLUS) 0.5 % ophthalmic solution Place 2 drops into left eye 4 times daily. 30 mL 2    folic acid (FOLATE) 1 MG tablet Take 1 tablet by mouth daily. Do not start before February 19, 2022. 30 tablet 0    pantoprazole (PROTONIX) 40 MG tablet Take 1 tablet by mouth daily (before breakfast). Do not start before February 19, 2022. 14 tablet 0    albuterol 108 (90  Base) MCG/ACT inhaler Inhale 1 puff into the lungs every 4 hours as needed.       amLODIPine (NORVASC) 10 MG tablet Take 10 mg by mouth daily.       No current facility-administered medications for this visit.       No changes to ROS, allergies, Shx, Fhx except as noted above in HPI.    Visit Vitals  /63 (BP Location: RUE - Right upper extremity, Patient Position: Sitting)   Pulse (!) 58      Gen: well appearing, no apparent distress  CV:RRR, no murmurs, no carotid bruits  Ext: no c/c/e    MS:A&Ox2 (date) , attention intact, speech fluent, comprehension intact to complex and crossed midline commands, naming intact, no neglect, normal fund of knowledge  CN: Pupils are 2 mm and non reactive (cataracts), VFF (inconsistent answers but appears intact with prompting) EOMI, no nystagmus, facial sensation intact v1-v3, R mouth facial droop, no dysarthria, palate and tongue midline  Motor: normal bulk and tone, no pronator drift, orbits R am, finger taps reduced on R side   R arm is 4-/5 deltoids, biceps, triceps, wrist extension/flexion, interossei)  L arm full to confrontation  Full strength in legs bilaterally (hip flexion, knee flexion/extension, ankle dorsi and plantar flexion)  Sensory: intact to pinprick, light touch, and temperature in all 4 extremities  Reflexes: 2+ biceps, brachioradialis, triceps, patellar bilaterally  Coordination/Gait: FNF intact bilaterally.  Uses a walker, narrow based gait with walker, defers walking without per daughter    Labs:  HDL (mg/dL)   Date Value   11/10/2023 46 (L)     LDL (mg/dL)   Date Value   11/10/2023 71     Cholesterol (mg/dL)   Date Value   11/10/2023 138     Triglycerides (mg/dL)   Date Value   11/10/2023 106     Hemoglobin A1C (%)   Date Value   11/10/2023 5.6     INR (no units)   Date Value   11/10/2023 1.0       Sodium (mmol/L)   Date Value   11/20/2023 138     Potassium (mmol/L)   Date Value   11/20/2023 3.7     Chloride (mmol/L)   Date Value   11/20/2023 109      Carbon Dioxide (mmol/L)   Date Value   11/20/2023 24     BUN (mg/dL)   Date Value   11/20/2023 18     Creatinine (mg/dL)   Date Value   11/20/2023 1.50 (H)     Glucose (mg/dL)   Date Value   11/20/2023 99     WBC (K/mcL)   Date Value   11/20/2023 7.4     HGB (g/dL)   Date Value   11/20/2023 9.5 (L)     HCT (%)   Date Value   11/20/2023 29.8 (L)     PLT (K/mcL)   Date Value   11/20/2023 202     PTT (sec)   Date Value   11/10/2023 30     Protime- PT (sec)   Date Value   11/10/2023 11.0     INR (no units)   Date Value   11/10/2023 1.0       Radiology/Studies:    CT Head (11/9/23):   IMPRESSION:       1.   No acute intracranial hemorrhage.   2.   Chronic left parieto-occipital infarct and sequela of microvascular  ischemic disease.     CTA Head/Neck (11/9/23):   IMPRESSION:     CTA NECK:    *   No arterial occlusion.  *   Short segment dissection along the proximal left subclavian artery.  *   Postsurgical changes in the right lung with interval enlargement of now  4.5 x 3.9 cm complex cystic lesion in the right hilum. This is previously  been reported as a postsurgical fluid collection, however is indeterminate  and could represent malignancy.  *   Moderate-severe right vertebral artery origin stenosis.  *   Extensive cervical degenerative spondylosis and spondyloarthropathy.      CTA HEAD:    *   No large vessel occlusion.  *   Moderate narrowing of the left vertebrobasilar junction and distal  basilar artery.  *   Ectatic cavernous ICAs bilaterally.  *   Probable 2 mm aneurysm along the right distal supraclinoid ICA.    MRI 11/10/23  IMPRESSION:    1.   Evolving intraparenchymal hematomas in the inferior cerebellar  hemispheres bilaterally have slightly increased compared to the recent  prior MRI, similar to the CT performed immediately prior to the current  study. Within the limits of mildly T1 hyperintense blood products, no  associated enhancement is identified. Mild edema without significant mass  effect.  Continued follow-up CT is recommended.  2.   Punctate hemorrhagic focus in the right ventral galindo is again noted  without enhancement.  3.   Within limits of motion artifact, no other abnormal intracranial  enhancement.    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 Your MRI was suspicious for something called CAA    What is cerebral amyloid angiopathy (CAA)?    Amyloid is a small protein that deposits in the very small blood vessels in the brain and can make them leaky.  Usually this occurs in the blood vessels near the surface of the brain (the cortex).  Blood can leak through the blood vessels and cause small bleeds (hemorrhages) that have no symptoms, or blood can leak through the blood vessels and cause big bleeds that do cause symptoms like headache, seizure, difficulty with balance, vision, talking, weakness, or numbness and tingling    -I advise against aspirin at this time, due to increasing risk of bleeding with the microbleeds seen on MRI. Please talk to PCP ([facility])  -I also advise starting zetia, in place of atorvastatin for antiplatelet effect with goal <70 (stop atorvastatin). Recheck LDL is  -RTC with Dr. Caban in 3 months     There may be some instances in which the risk of taking these medications outweighs the concern for bleeding in the brain, so talk to your doctor if you  are prescribed any of these medications.    Other medications may be included to treat seizure or dementia.     -discussed Mediterranean diet, exercise, medication compliance, and follow up with PCP  -discussed BEFAST and when to come to the emergency room    Total time including pre-charting, chart review, documentation, referral/communication with other healthcare providers, and evaluation/management/discussion  with patient: _45_ minutes      BOB Crespo  Neuroscience    EKG completed

## 2023-12-08 ENCOUNTER — EMERGENCY (EMERGENCY)
Facility: HOSPITAL | Age: 33
LOS: 1 days | Discharge: HOME | End: 2023-12-08
Admitting: STUDENT IN AN ORGANIZED HEALTH CARE EDUCATION/TRAINING PROGRAM
Payer: MEDICAID

## 2023-12-08 ENCOUNTER — EMERGENCY (EMERGENCY)
Facility: HOSPITAL | Age: 33
LOS: 1 days | Discharge: HOME | End: 2023-12-08
Admitting: EMERGENCY MEDICINE
Payer: MEDICAID

## 2023-12-08 VITALS
OXYGEN SATURATION: 99 % | DIASTOLIC BLOOD PRESSURE: 85 MMHG | SYSTOLIC BLOOD PRESSURE: 132 MMHG | RESPIRATION RATE: 18 BRPM | TEMPERATURE: 98 F | HEART RATE: 105 BPM

## 2023-12-08 VITALS
HEART RATE: 89 BPM | DIASTOLIC BLOOD PRESSURE: 83 MMHG | TEMPERATURE: 98 F | SYSTOLIC BLOOD PRESSURE: 125 MMHG | RESPIRATION RATE: 18 BRPM | OXYGEN SATURATION: 99 %

## 2023-12-08 DIAGNOSIS — Z98.89 OTHER SPECIFIED POSTPROCEDURAL STATES: Chronic | ICD-10-CM

## 2023-12-08 DIAGNOSIS — Z90.89 ACQUIRED ABSENCE OF OTHER ORGANS: Chronic | ICD-10-CM

## 2023-12-08 PROCEDURE — 99283 EMERGENCY DEPT VISIT LOW MDM: CPT

## 2023-12-08 PROCEDURE — 99284 EMERGENCY DEPT VISIT MOD MDM: CPT

## 2023-12-08 RX ORDER — ACETAMINOPHEN 500 MG
975 TABLET ORAL ONCE
Refills: 0 | Status: DISCONTINUED | OUTPATIENT
Start: 2023-12-08 | End: 2023-12-08

## 2023-12-08 RX ORDER — OXYCODONE HYDROCHLORIDE 5 MG/1
1 TABLET ORAL
Qty: 6 | Refills: 0
Start: 2023-12-08 | End: 2023-12-09

## 2023-12-08 RX ORDER — OXYCODONE AND ACETAMINOPHEN 5; 325 MG/1; MG/1
1 TABLET ORAL ONCE
Refills: 0 | Status: DISCONTINUED | OUTPATIENT
Start: 2023-12-08 | End: 2023-12-08

## 2023-12-08 RX ORDER — PENICILLIN V POTASIUM 500 MG/1
1 TABLET OROPHARYNGEAL
Qty: 21 | Refills: 0
Start: 2023-12-08 | End: 2023-12-14

## 2023-12-08 RX ORDER — PENICILLIN V POTASIUM 500 MG/1
500 TABLET OROPHARYNGEAL ONCE
Refills: 0 | Status: COMPLETED | OUTPATIENT
Start: 2023-12-08 | End: 2023-12-08

## 2023-12-08 RX ADMIN — OXYCODONE AND ACETAMINOPHEN 1 TABLET(S): 5; 325 TABLET ORAL at 10:41

## 2023-12-08 RX ADMIN — PENICILLIN V POTASIUM 500 MILLIGRAM(S): 500 TABLET OROPHARYNGEAL at 10:31

## 2023-12-08 NOTE — ED ADULT TRIAGE NOTE - CHIEF COMPLAINT QUOTE
Pt AOX4 seen here this morning for Right sided upper and lower dental pain; d/c'd with pain meds and abx, returns because pain is "so much worse."

## 2023-12-08 NOTE — ED PROVIDER NOTE - NSFOLLOWUPINSTRUCTIONS_ED_ALL_ED_FT
Follow-up in the dental clinic within 1 week, clinic information attached please call to make an appointment  Take Tylenol 650 mg every 6 hours as needed for pain  For severe pain take oxycodone 5 mg (1 tablet) every 6-8 hours, caution drowsiness do not drive or drink alcohol while taking  Return to the ER with any worsening or concerning symptoms, fever/chills, gum swelling, facial swelling or any other concerns.

## 2023-12-08 NOTE — ED PROVIDER NOTE - NSFOLLOWUPINSTRUCTIONS_ED_ALL_ED_FT
Please call the dental clinic to schedule a follow-up appointment. Take medication as prescribed and return with any fever, chills, nausea, vomiting or other new/worsening symptoms.

## 2023-12-08 NOTE — ED PROVIDER NOTE - CLINICAL SUMMARY MEDICAL DECISION MAKING FREE TEXT BOX
Patient presents with dental pain x 2 days, afebrile and well appearing, multiple caries, + cracked tooth noted with tenderness, no clear abscess. Recommend analgesia, antibiotics and referral dental clinic

## 2023-12-08 NOTE — ED ADULT NURSE NOTE - OBJECTIVE STATEMENT
Pt AOX4 c/o upper and lower right dental pain x 3 days; pt w/ Hx of minimal change disease of kidney; has been taking ibuprofen with no relief. Patient A&OX4. Breathing non-labored. C/o paint to right side teeth. Penicillin tab given and tolerated well. Percocet to be given for pain.

## 2023-12-08 NOTE — ED PROVIDER NOTE - OBJECTIVE STATEMENT
32-year-old female with past medical history CKD, GERD, asthma presenting to the ER with right upper dental pain.  Patient was seen in the ED this morning with similar complaints and was prescribed penicillin, she was given Percocet while in the ER which helped alleviate her pain.  Patient denies fever/chills, gum or facial swelling.  Tooth is 4 on the back right upper molar that is cracked. 32-year-old female with past medical history CKD, GERD, asthma presenting to the ER with right upper dental pain.  Patient was seen in the ED this morning with similar complaints and was prescribed penicillin, she was given Percocet while in the ER which helped alleviate her pain.  Patient denies fever/chills, gum or facial swelling, headache, dizziness, cp, sob, abd pain, n/v/d, recent travel or illness or any other concerns.

## 2023-12-08 NOTE — ED PROVIDER NOTE - PATIENT PORTAL LINK FT
You can access the FollowMyHealth Patient Portal offered by [facility] by registering at the following website: [email or web address]. By joining [employer]’s FollowMyHealth portal, you will also be able to view your health information using other applications (apps) compatible with our system. You can access the FollowMyHealth Patient Portal offered by [facility] by registering at the following website: [email or web address]. By joining [employer]’s FollowMyHealth portal, you will also be able to view your health information using other applications (apps) compatible with our system.

## 2023-12-08 NOTE — ED PROVIDER NOTE - ENMT, MLM
Airway patent, Nasal mucosa clear. Mouth with normal mucosa. Throat has no vesicles, no oropharyngeal exudates and uvula is midline.    #29 cracked, no abscess, + tender, tenderness #4

## 2023-12-08 NOTE — ED ADULT NURSE NOTE - OBJECTIVE STATEMENT
pt received to wellness. pt A&Ox4, amb at baseline. Pmh of asthma, GERD and gastritis. Pt c/o R upper and lower tooth pain. States seen here this morning and was dc'd with abx and pain meds. Pt came back due to increased pain unrelieved by meds dc'd with. Per MARY vargas, pt never received prescription at pharmacy for pain meds. Pt provided first dose percocet here. New prescription sent. Denies fevers/chills/n/v. to be discharged

## 2023-12-08 NOTE — ED PROVIDER NOTE - CLINICAL SUMMARY MEDICAL DECISION MAKING FREE TEXT BOX
32-year-old female with past medical history CKD, GERD, asthma presenting to the ER with right upper dental pain.  Patient was seen in the ED this morning with similar complaints and was prescribed penicillin, she was given Percocet while in the ER which helped alleviate her pain.  Patient denies fever/chills, gum or facial swelling. On exam pt is well appearing, afebrile, tooth#4 cracked, no surrounding gum swelling or tenderness, no facial swelling. Plan: pain control, continued abx as prescribed, outpt dental follow up. Will send rx for oxycodone.

## 2023-12-08 NOTE — ED PROVIDER NOTE - OBJECTIVE STATEMENT
32F history CKD, GERD, asthma presents with dental pain, right sided - upper and lower. No injury. Denies fever or chills. Denies headache. Was unable to see her dentist due to lapse in insurance.

## 2023-12-08 NOTE — ED PROVIDER NOTE - PATIENT PORTAL LINK FT
You can access the FollowMyHealth Patient Portal offered by [facility] by registering at the following website: [email or web address]. By joining [employer]’s FollowMyHealth portal, you will also be able to view your health information using other applications (apps) compatible with our system. You can access the FollowMyHealth Patient Portal offered by [facility] by registering at the following website: [email or web address]. By joining [employer]’s FollowMyHealth portal, you will also be able to view your health information using other applications (apps) compatible with our system.

## 2023-12-08 NOTE — ED ADULT NURSE NOTE - NSFALLUNIVINTERV_ED_ALL_ED
Bed/Stretcher in lowest position, wheels locked, appropriate side rails in place/Call bell, personal items and telephone in reach/Instruct patient to call for assistance before getting out of bed/chair/stretcher/Non-slip footwear applied when patient is off stretcher/[place] to call system/Physically safe environment - no spills, clutter or unnecessary equipment/Purposeful proactive rounding/Room/bathroom lighting operational, light cord in reach Bed/Stretcher in lowest position, wheels locked, appropriate side rails in place/Call bell, personal items and telephone in reach/Instruct patient to call for assistance before getting out of bed/chair/stretcher/Non-slip footwear applied when patient is off stretcher/[place] to call system/Physically safe environment - no spills, clutter or unnecessary equipment/Purposeful proactive rounding/Room/bathroom lighting operational, light cord in reach

## 2023-12-08 NOTE — ED ADULT TRIAGE NOTE - CHIEF COMPLAINT QUOTE
Pt AOX4 c/o upper and lower right dental pain x 3 days; pt w/ Hx of minimal change disease of kidney; has been taking ibuprofen with no relief

## 2023-12-08 NOTE — ED PROVIDER NOTE - CPE EDP [facility] NORM
[address] [place] [address]-service [facility]) to schedule appointment, with red flag complaint, transferred to RN access for triage. Pt calls with SOB and abdominal pain. Describes is as \"not getting enough air\", and started 3 days ago and has been constant. States the breathing difficulty as moderate. Patient informed of disposition. Care advice as documented. Instructed patient to call back with worsening symptoms. Patient verbalized understanding and denies any further questions/concerns. Please do not respond to the triage nurse through this encounter. Any subsequent communication should be directly with the patient. normal...

## 2023-12-08 NOTE — ED PROVIDER NOTE - CROS ED GI ALL NEG
56 y/o male with a PMHx of PAF years ago with self conversion to NSR never on A/C presents to ED with dizziness and lightheadedness on exertion, found to be in rapid atrial fibrillation.
Paroxysmal atrial fibrillation with rapid ventricular rate and associated symptoms of palpitations, lightheadedness, now converted to SR.  CT findings and response to diuretic overnight consistent with acute CHF, likely diastolic in etio due to arrhythmia  History of elevated BP but with mult readings over past few months that qualify as hypertension  Flat, mildly elev high-sens Trop levels that are likely due to tachycardia, patient has no anginal symptoms    Rec:  Discontinue diltiazem  Start PO metoprolol  Agree with anticoagulation with Eliquis  Check echocardiogram to eval LV function  Tele monitor  If echocardiogram is ok and pt is stable, may discharge later today.
negative...

## 2023-12-09 ENCOUNTER — EMERGENCY (EMERGENCY)
Facility: HOSPITAL | Age: 33
LOS: 1 days | Discharge: HOME | End: 2023-12-09
Attending: EMERGENCY MEDICINE | Admitting: EMERGENCY MEDICINE
Payer: MEDICAID

## 2023-12-09 VITALS
SYSTOLIC BLOOD PRESSURE: 138 MMHG | TEMPERATURE: 98 F | RESPIRATION RATE: 15 BRPM | HEART RATE: 91 BPM | OXYGEN SATURATION: 100 % | DIASTOLIC BLOOD PRESSURE: 90 MMHG

## 2023-12-09 DIAGNOSIS — Z98.89 OTHER SPECIFIED POSTPROCEDURAL STATES: Chronic | ICD-10-CM

## 2023-12-09 DIAGNOSIS — Z90.89 ACQUIRED ABSENCE OF OTHER ORGANS: Chronic | ICD-10-CM

## 2023-12-09 PROCEDURE — 64400 NJX AA&/STRD TRIGEMINAL NRV: CPT | Mod: RT

## 2023-12-09 PROCEDURE — 99283 EMERGENCY DEPT VISIT LOW MDM: CPT | Mod: 25

## 2023-12-09 NOTE — ED ADULT TRIAGE NOTE - CHIEF COMPLAINT QUOTE
Pt c/o right upper toothache x4 days. Endorsing right facial pain now. Pt has scheduled tooth extraction on Jan 9th. Took oxycodone 5mg at 7pm and 9:45pm with no relief. Pt denies fevers and chills.

## 2023-12-10 VITALS
DIASTOLIC BLOOD PRESSURE: 87 MMHG | TEMPERATURE: 98 F | RESPIRATION RATE: 18 BRPM | HEART RATE: 95 BPM | SYSTOLIC BLOOD PRESSURE: 125 MMHG | OXYGEN SATURATION: 100 %

## 2023-12-10 RX ORDER — OXYCODONE HYDROCHLORIDE 5 MG/1
5 TABLET ORAL ONCE
Refills: 0 | Status: DISCONTINUED | OUTPATIENT
Start: 2023-12-10 | End: 2023-12-10

## 2023-12-10 RX ADMIN — OXYCODONE HYDROCHLORIDE 5 MILLIGRAM(S): 5 TABLET ORAL at 01:21

## 2023-12-10 NOTE — ED ADULT NURSE REASSESSMENT NOTE - NS ED NURSE REASSESS COMMENT FT1
Pt A&O x 4, resting comfortably, shows no signs of acute distress. Reports pain relief s/o Oxycodone dosage. VSS. Respirations even and unlabored. Cleared for discharge.

## 2023-12-10 NOTE — ED PROVIDER NOTE - ATTENDING CONTRIBUTION TO CARE
32-year-old female with no significant past medical history presenting with dental pain.  Patient reports pain to her right upper tooth since cracking it is few days ago.  Has been seen in ED twice prior and started on pain medications without any relief.  She is followed with outpatient dental and is scheduled for extraction on January 9.  No fever, chills, drooling, voice change, difficulty breathing.    Well appearing, sitting in stretcher, awake and alert, nontoxic.  AF/VSS.  NCAT face normal symmetric no drooling, voice hoarseness, or stridor.  Neck supple.  No intraoral lesions.  No gingival swelling or fluctuance.  (+)TTP at R upper premolar (#4) no laxity or pulp exposure.    Patient without any signs of infection/abscess to warrant emergent dental consult at this time.  Will offer dental block, can continue oral medications at home and outpatient dental follow-up.

## 2023-12-10 NOTE — ED PROVIDER NOTE - PATIENT PORTAL LINK FT
You can access the FollowMyHealth Patient Portal offered by [facility] by registering at the following website: [email or web address]. By joining [employer]’s FollowMyHealth portal, you will also be able to view your health information using other applications (apps) compatible with our system. You can access the FollowMyHealth Patient Portal offered by [facility] by registering at the following website: [email or web address]. By joining [employer]’s FollowMyHealth portal, you will also be able to view your health information using other applications (apps) compatible with our system.

## 2023-12-10 NOTE — ED PROVIDER NOTE - OBJECTIVE STATEMENT
32-year-old female presents our ED for third visit past few days for worsening odontalgia.  Patient states that a few days ago she cracked her right upper premolar was seen in the ED given pain medication sent out to follow-up with dentistry.  Since then she has been unable to establish with a dentist due to insurance problems.  Pain has been worsening despite ibuprofen, acetaminophen and oxycodone.  Otherwise no recent fevers, chills, throat pain, abdominal pain, nausea, vomiting, other complaints at this time

## 2023-12-10 NOTE — ED ADULT NURSE NOTE - NSFALLUNIVINTERV_ED_ALL_ED
Bed/Stretcher in lowest position, wheels locked, appropriate side rails in place/Call bell, personal items and telephone in reach/Instruct patient to call for assistance before getting out of bed/chair/stretcher/Non-slip footwear applied when patient is off stretcher/[place] to call system/Physically safe environment - no spills, clutter or unnecessary equipment/Purposeful proactive rounding/Room/bathroom lighting operational, light cord in reach Bed/Stretcher in lowest position, wheels locked, appropriate side rails in place/Call bell, personal items and telephone in reach/Instruct patient to call for assistance before getting out of bed/chair/stretcher/Non-slip footwear applied when patient is off stretcher/[place] to call system/Physically safe environment - no spills, clutter or unnecessary equipment/Purposeful proactive rounding/Room/bathroom lighting operational, light cord in reach

## 2023-12-10 NOTE — ED PROCEDURE NOTE - PROCEDURE SUPERVISED BY
"Vivi Mccall is a 36 year old female who is being evaluated via a billable video visit.      The patient has been notified of following:     \"This video visit will be conducted via a call between you and your physician/provider. We have found that certain health care needs can be provided without the need for an in-person physical exam.  This service lets us provide the care you need with a video conversation.  If a prescription is necessary we can send it directly to your pharmacy.  If lab work is needed we can place an order for that and you can then stop by our lab to have the test done at a later time.    Video visits are billed at different rates depending on your insurance coverage.  Please reach out to your insurance provider with any questions.    If during the course of the call the physician/provider feels a video visit is not appropriate, you will not be charged for this service.\"    Patient has given verbal consent for Video visit? Yes    How would you like to obtain your AVS? Daniela    Patient would like the video invitation sent by: Send to e-mail at: [email or web address]    Will anyone else be joining your video visit? No    Subjective     Vivi Mccall is a 36 year old female who presents today via video visit for the following health issues:    HPI  Hypertension Follow-up  Had a headache for 3 days  struggling with BP  Getting 168/110  [place] of [place] protests and death[place]ast few weeks  hadd to leave in middle of night  All has affected.  She started 200 mg and pressures went doen and Headacehs resolved  Taking 200 BID for the last few days.  BP is now mid 150s/100  Did have BP issues prior to pregnanc y  Was on hydrochlorothiazide Lisinopril  Is breastfeeding - going well!    While pregnant had great blood pressures and went off meds    Does feel things are quieter now    Seeing her therapist weekly  Is on celexa 30 mg feels this is better  No panic " attacks  Has used lorazepam once in the last few weeks  Hs\as not had any thoughts of self harm      Do you check your blood pressure regularly outside of the clinic? Yes     Are you following a low salt diet? Yes    Are your blood pressures ever more than 140 on the top number (systolic) OR more   than 90 on the bottom number (diastolic), for example 140/90? Yes    Migraine   Has not had migraines in long time - had a few while pregnant  Takes excedrin migraine - did not help  Has used tryptans in the past headaches improved and stopped    Since your last clinic visit, how have your headaches changed?  Improved    How often are you getting headaches or migraines? Monthly      Are you able to do normal daily activities when you have a migraine? Yes    Are you taking rescue/relief medications? (Select all that apply) ibuprofen (Advil, Motrin)    How helpful is your rescue/relief medication?  I get no relief    Are you taking any medications to prevent migraines? (Select all that apply)  No    In the past 4 weeks, how often have you gone to urgent care or the emergency room because of your headaches?  0      How many servings of fruits and vegetables do you eat daily?  2-3    On average, how many sweetened beverages do you drink each day (Examples: soda, juice, sweet tea, etc.  Do NOT count diet or artificially sweetened beverages)?   1    How many days per week do you exercise enough to make your heart beat faster? 4    How many minutes a day do you exercise enough to make your heart beat faster? 30 - 60  How many days per week do you miss taking your medication? 1    What makes it hard for you to take your medications?  remembering to take    PHQ 11/25/2019 2/4/2020 5/18/2020   PHQ-9 Total Score 1 0 8   Q9: Thoughts of better off dead/self-harm past 2 weeks Not at all Not at all Several days   F/U: Thoughts of suicide or self-harm - - No   F/U: Safety concerns - - No          Video Start Time: 10:10        Patient  Active Problem List   Diagnosis     Benign essential hypertension     ROCHELLE (generalized anxiety disorder)     Major depressive disorder, recurrent episode, moderate (H)     Mild intermittent asthma without complication     Vitamin D deficiency     Pain in both hands     Pain in both wrists     Past Surgical History:   Procedure Laterality Date     GYN SURGERY  2003    Laser treatment of HPV     HC RELEASE FOOT/TOE NERVE  2011     ORTHOPEDIC SURGERY Left 07/21/2017    ganglion cyst removal     ORTHOPEDIC SURGERY Left     nerve release on left leg/ankle      SURGICAL HISTORY OF -       Nerve entrapment release       Social History     Tobacco Use     Smoking status: Never Smoker     Smokeless tobacco: Never Used   Substance Use Topics     Alcohol use: Yes     Comment: Rare      Family History   Problem Relation Age of Onset     Diabetes Mother      Bipolar Disorder Mother      Hypertension Mother      Coronary Artery Disease Mother      Unknown/Adopted Father      Hypertension Father      Breast Cancer Maternal Grandmother      Hypertension Maternal Grandmother      Diabetes Type 2  Maternal Grandfather      Glaucoma Maternal Grandfather      Hypertension Maternal Grandfather      Coronary Artery Disease Maternal Grandfather      Diabetes Maternal Grandfather      Hypertension Paternal Grandmother      Coronary Artery Disease Paternal Grandfather      Ovarian Cancer Other      Hypertension Maternal Aunt      Macular Degeneration No family hx of          Current Outpatient Medications   Medication Sig Dispense Refill     citalopram (CELEXA) 20 MG tablet Take 1.5 tablets (30 mg) by mouth daily 135 tablet 1     labetalol (NORMODYNE) 100 MG tablet Take 1 tablet (100 mg) by mouth 2 times daily 180 tablet 1     LORazepam (ATIVAN) 0.5 MG tablet Take 1 tablet (0.5 mg) by mouth every 6 hours as needed for anxiety 30 tablet 1     Prenatal Multivit-Min-Fe-FA (PRENATAL VITAMINS PO) Take 1 tablet by mouth       VITAMIN D,  "ERGOCALCIFEROL, PO        BP Readings from Last 3 Encounters:   02/20/20 112/80   02/04/20 120/70   01/14/20 114/80    Wt Readings from Last 3 Encounters:   02/20/20 83.5 kg (184 lb)   02/04/20 82.7 kg (182 lb 6.4 oz)   01/14/20 84.4 kg (186 lb)                    Reviewed and updated as needed this visit by Provider         Review of Systems   Constitutional, HEENT, cardiovascular, pulmonary, gi and gu systems are negative, except as otherwise noted.      Objective    There were no vitals taken for this visit.  Estimated body mass index is 27.98 kg/m  as calculated from the following:    Height as of 2/20/20: 1.727 m (5' 8\").    Weight as of 2/20/20: 83.5 kg (184 lb).  Physical Exam     GENERAL: Healthy, alert and no distress  EYES: Eyes grossly normal to inspection.  No discharge or erythema, or obvious scleral/conjunctival abnormalities.  RESP: No audible wheeze, cough, or visible cyanosis.  No visible retractions or increased work of breathing.    SKIN: Visible skin clear. No significant rash, abnormal pigmentation or lesions.  NEURO: Cranial nerves grossly intact.  Mentation and speech appropriate for age.  PSYCH: Mentation appears normal, affect normal/bright, judgement and insight intact, normal speech and appearance well-groomed.      Diagnostic Test Results:  Labs reviewed in Epic        Assessment & Plan     1. Benign essential hypertension  Blood pressures have been high due to stress and trauma in her local neighborhood.  Since then this has significantly improved.  She did start 200 mg labetalol twice daily couple days ago and is tolerating this well and does feel that this is helping with pressures.  As we were on the phone her pressure was 140/92.  I did ask her to keep checking her pressures as they will likely come down further with a little bit more time.  In the past when she was not pregnant she was taking a combination lisinopril hydrochlorothiazide and this worked well for her blood pressure.  " She had very well controlled pressures while in pregnancy but eventually did require labetalol.  I asked her to check in with me in about a week to 10 days to see what her pressures are and if they are at goal we will stay at this 200 mg twice daily dose for another month.  If her pressures are not continuing to come down below 140/90 then she should be seen  - labetalol (NORMODYNE) 200 MG tablet; Take 1 tablet (200 mg) by mouth 2 times daily  Dispense: 60 tablet; Refill: 1    2. ROCHELLE (generalized anxiety disorder)  Anxiety is significantly improved as is her mood.  She is been seeing a therapist weekly she is taking citalopram 30 mg and this coupled with getting outside and taking breaks from work is certainly helping.  Support from her spouse and her in-laws.  She has lorazepam for and as needed med and has only needed to use it once during riots    3. Major depressive disorder, recurrent episode, moderate (H)  As noted above good control she no longer has thoughts or ideas of self-harm which is excellent.         Recheck blood pressure over the phone in 1 to 2 weeks    No follow-ups on file.    Vi Baldwin MD  [facility]      Video-Visit Details    Type of service:  Video Visit    Video End Time:10:30    Originating Location (pt. Location): Home    Distant Location (provider location):  [facility]     Platform used for Video Visit: streamit    No follow-ups on file.       Vi Baldwin MD       Kanchan Landers MD

## 2023-12-10 NOTE — ED PROCEDURE NOTE - CPROC ED TIME OUT STATEMENT1
“Patient's name, , procedure and correct site were confirmed during the [place] Timeout.” “Patient's name, , procedure and correct site were confirmed during the [place] Timeout.”

## 2023-12-10 NOTE — ED PROVIDER NOTE - PHYSICAL EXAMINATION
PHYSICAL EXAM:  GENERAL: Sitting comfortable in bed, in no acute distress  HENMT: Airway patent, Nasal mucosa clear. Mouth with normal mucosa. Throat has no vesicles, no oropharyngeal exudates and uvula is midline.  #29 and #4 cracked, no abscess, + \ tenderness #4  HEART: RRR, S1/S2, no murmur/gallops/rubs  RESPIRATORY: Clear to auscultation bilaterally, no wheezes/rhonchi/rales  ABDOMEN: +BS, soft, nontender, nondistended, no rebound, no guarding  EXTREMITIES: No lower extremity edema, +2 radial pulses b/l  NEURO:  A&Ox4, no focal motor deficits or sensory deficits   Heme/LYMPH: No ecchymosis or bruising  SKIN:  Skin normal color, warm, dry and intact. No evidence of rash.

## 2023-12-10 NOTE — ED PROVIDER NOTE - NSFOLLOWUPINSTRUCTIONS_ED_ALL_ED_FT
You were seen in the Emergency Department for Tooth pain associated with a cracked tooth.  We gave you pain medicine in the ED and performed a nerve block to one of the nerves that supplies your fractured tooth.  You had adequate pain relief.  Is very importantly follow-up with a dentist in the near future for definitive management of this pain. Please follow-up with the 2 dental clinics which we provided you information for as they can typically work with difficult insurance problems and potentially provide you with further management of this problem.    1) Advance activity as tolerated.   2) Continue all previously prescribed medications as directed.    3) Follow up with your primary care physician in 24-48 hours - take copies of your results.    4) Return to the Emergency Department for worsening or persistent symptoms, and/or ANY NEW OR CONCERNING SYMPTOMS.

## 2023-12-10 NOTE — ED PROVIDER NOTE - CLINICAL SUMMARY MEDICAL DECISION MAKING FREE TEXT BOX
Riaz Iglesias MD (PGY-4):  32-year-old female presents for persistent odontalgia after fracturing her right upper premolar.  Otherwise no fevers, chills, no signs of abscess.  Tender to tooth #4.  On exam.  Will perform infraorbital nerve block and treat with p.o. oxy.

## 2023-12-10 NOTE — ED ADULT NURSE NOTE - OBJECTIVE STATEMENT
FEMALE ADULT PREVENTIVE EXAM    CHIEF COMPLAINT:  Female preventive exam.    SUBJECTIVE:  Delfina Govea is a 28 y.o. female who presents for her routine physical exam.    Patient would like to address the following concerns today: New patient, overall healthy, no specific concern today, past medical surgery significant for open reduction internal fixation of right arm fracture.      GYNE HISTORY  Menses: yes regular  Sexually Active: no  Contraception: no  Last Pap: ?3-4 years, unsure  Abnormal Pap: no  Vaginal Discharge: no      She  has no past medical history on file.    No results found for: WBC, HGB, HCT, MCV, PLT, NA, K, BUN  No results found for: CHOL, HDL, LDLCALC, TRIG  No results found for: TSH  BP Readings from Last 3 Encounters:   04/26/18 130/62       Surgeries:  No past surgical history on file.    Family History:  Her family history is not on file.    Social History:  She  reports that she has never smoked. She has never used smokeless tobacco. She reports that she drinks alcohol. She reports that she does not use illicit drugs.    Medications:  No current outpatient prescriptions on file.  HELD MEDICATIONS: None.    Allergies:  No latex allergies.  No Known Allergies         RISK BEHAVIOR & HEALTH HABITS  Self Breast Exam: yes.  Regular Exercise: yes.  Balanced diet: yes.  Seat Belt Use: yes  Calcium intake/Osteoporosis prevention: yes.    Guns: NA  Sun Screen: YES  Dental Care: YES    REVIEW OF SYSTEMS:  Complete head to toe review of systems is otherwise negative except as above.    OBJECTIVE:  VITAL SIGNS:    Vitals:    04/26/18 1501   BP: 130/62   Pulse: 81   SpO2: 98%     GENERAL:  Patient alert, in no acute distress.  EYES: PERRLA. Extraoccular movements intact, pupils equal, reactive to light and accommodation.  Normal conjunctiva and lids.    ENT:  Hearing grossly normal.  Normal appearance to ears and nose.  Bilateral TM s, external canals, oropharynx normal. Normal lips, gums and teeth.     NECK:  Supple, without thyromegaly or mass, no adenopathies.  RESP:  Clear to auscultation without crackles, wheezes or distress.  Normal respiratory effort.   CV:  Regular rate and rhythm without murmurs, rubs or gallops.  Normal pedal pulses.  No varicosities or edema.  ABDOMEN:  Soft, non-tender, without hepatosplenomegaly, masses, or hernias.   BREASTS:  deferred   :  (chaperoned by Miranda ,female [place])  External genitalia:  Normal without lesions.  Urethra: Normal appearing  Vagina: Normal without discharge  Cervix: normal  Uterus:  Nontender, mobile, without masses  Ovaries: Normal without masses  Rectal: No visible external lesions  LYMPHATIC: Normal palpation of neck.  No lymphadenopathy.  No bruising.  NEURO:  CN II-XII intact, motor & sensory function all intact.  DTR and reflexes normal.  PSYCHIATRIC:  Alert & oriented with normal mood and affect.  Good judgment and insight.  SKIN:  Normal inspection and palpation.  Excess hair growth chin and lower abdomen area.  MUSCULOSKELETAL: Normal gait and station.  - Spine / Ribs / Pelvis: Normal inspection, ROM, stability and strength: Spine, Head, Neck, Upper and Lower Extremities.    ASSESSMENT & PLAN  Delfina was seen today for annual exam.    Diagnoses and all orders for this visit:    Visit for preventive health examination    Pap smear for cervical cancer screening  -     Gynecologic Cytology (PAP Smear)    Screening for endocrine, nutritional, metabolic and immunity disorder  -     Comprehensive Metabolic Panel; Future  -     Lipid [place] FASTING; Future  -     Vitamin D, Total (25-Hydroxy); Future  -     Thyroid Stimulating Hormone (TSH); Future  -     Glycosylated Hemoglobin A1c; Future    PCOS (polycystic ovarian syndrome)  -     Glycosylated Hemoglobin A1c; Future  Management discussed with the patient, at this time she just prefer to work on weight loss program.  Obesity (BMI 30-39.9)  Healthy lifestyle changes was discussed  Other orders  -      Hepatitis A adult 2 dose IM (19 yr & older)    General  Immunizations reviewed and updated .  Alcohol use, safety and moderation discussed.  Recommended adequate calcium, vitamin D intake/osteoporosis prevention.  Discussed colon cancer screening at age 50, 45 if -American.  Diet and exercise reviewed, including goal of aerobic exercise 30-90 minutes most days of the week, moderation of portions sizes, avoiding eating out and fast food and increase in fruits and vegetables.  Discussed safe sex practices.    Discussed & recommended seat belt (& motorcycle helmet) use.  Discussed & recommended smoke detector.  Discussed sun protection.  Discussed weight management.  Discussed self breast exam, screening mammogram timing.  I have had an Advance Directives discussion with the patient.  The following high BMI interventions were performed this visit: encouragement to exercise and weight loss from baseline weight    Grant Hernández MD       pt received to spot 1a. a&ox4, amb at baseline. pt c/o R upper toothache pain for 4x days. was seen here 2x days ago and yesterday and was discharged on abx and PO percocet. Pt states despite taking pain has no relief. seen here for further eval. has appt with dentist for tooth extraction on Jan 9th. Denies fevers/chillsn/MD mila at bedside for block. pt provided po oxycodone here. to be discharged

## 2024-03-28 NOTE — H&P ADULT. - MS EXT PE MLT D E PC
We have been filling this patient's Xeloda. We last filled on 3/6/2024. We have been trying to reach the patient to set up shipment of the medication but we have not been able to reach the patient after 3 attempts.     We are unable to send the medication without speaking to the patient. If the patient calls your office, please direct them to [facility] at [phone] to set up shipment.     Thank you,    Arnold Ware  [facility]  Phone: [phone]  [email or web address]     no pedal edema/no cyanosis

## 2024-04-16 NOTE — ASU PREOP CHECKLIST - NSWEIGHTCALCTOOLDRUG_GEN_A_CORE
used Alert-The patient is alert, awake and responds to voice. The patient is oriented to time, place, and person. The triage nurse is able to obtain subjective information.

## 2024-04-23 NOTE — PATIENT PROFILE ADULT - OVER THE PAST TWO WEEKS HAVE YOU FELT DOWN, DEPRESSED OR HOPELESS?
Patient seen and examined at bedside.    Overnight Events: No acute events overnight. Denies chest pain or SOB, states LE swelling has significantly improved since admission. No acute complaints.    Telemetry: AF 66-76    REVIEW OF SYSTEMS:  Constitutional:     [ x] negative [ ] fevers [ ] chills [ ] weight loss [ ] weight gain  HEENT:                  [ x] negative [ ] dry eyes [ ] eye irritation [ ] postnasal drip [ ] nasal congestion  CV:                         [x ] negative  [ ] chest pain [ ] orthopnea [ ] palpitations [ ] murmur  Resp:                     [ x] negative [ ] cough [ ] shortness of breath [ ] dyspnea [ ] wheezing [ ] sputum [ ]hemoptysis  GI:                          [ x] negative [ ] nausea [ ] vomiting [ ] diarrhea [ ] constipation [ ] abd pain [ ] dysphagia   :                        [ x] negative [ ] dysuria [ ] nocturia [ ] hematuria [ ] increased urinary frequency  Musculoskeletal: [ x] negative [ ] back pain [ ] myalgias [ ] arthralgias [ ] fracture  Skin:                       [ x] negative [ ] rash [ ] itch  Neurological:        [ x] negative [ ] headache [ ] dizziness [ ] syncope [ ] weakness [ ] numbness  Psychiatric:           [ x] negative [ ] anxiety [ ] depression  Endocrine:            [ x] negative [ ] diabetes [ ] thyroid problem  Heme/Lymph:      [ x] negative [ ] anemia [ ] bleeding problem  Allergic/Immune: [x ] negative [ ] itchy eyes [ ] nasal discharge [ ] hives [ ] angioedema    [x ] All other systems negative  [ ] Unable to assess ROS due to    Current Meds:  acetaminophen     Tablet .. 650 milliGRAM(s) Oral every 6 hours PRN  digoxin     Tablet 125 MICROGram(s) Oral every other day  heparin   Injectable 2500 Unit(s) IV Push every 6 hours PRN  heparin   Injectable 5500 Unit(s) IV Push every 6 hours PRN  heparin  Infusion.  Unit(s)/Hr IV Continuous <Continuous>  isosorbide   dinitrate Tablet (ISORDIL) 10 milliGRAM(s) Oral three times a day  metoprolol tartrate 50 milliGRAM(s) Oral every 6 hours  ondansetron Injectable 4 milliGRAM(s) IV Push every 6 hours PRN  senna 2 Tablet(s) Oral at bedtime PRN  sodium chloride 0.9%. 1000 milliLiter(s) IV Continuous <Continuous>      PAST MEDICAL & SURGICAL HISTORY:  Renal Calculus      Ureteral Calculus      Acute Renal Failure  9/2009      Wrist Fracture  CYNTHIA      Collar Bone Fracture      Rib Fractures      Kidney Stone removal  3/15/2011      C Section      Percutaneous Stone Extraction  Right      S/P Left Percutaneuos Stone extraction  01/26/2010, 04/20/2010          Vitals:  T(F): 97.8 (04-23), Max: 97.8 (04-22)  HR: 77 (04-23) (66 - 77)  BP: 106/72 (04-23) (88/51 - 111/73)  RR: 18 (04-23)  SpO2: 96% (04-23)  I&O's Summary    22 Apr 2024 07:01  -  23 Apr 2024 07:00  --------------------------------------------------------  IN: 2067 mL / OUT: 850 mL / NET: 1217 mL        Physical Exam:  Appearance: No acute distress; well appearing  Eyes: PERRL, EOMI, pink conjunctiva  HENT: Normal oral mucosa  Cardiovascular: RRR, S1, S2, no murmurs, rubs, or gallops; no edema; no JVD  Respiratory: Clear to auscultation bilaterally  Gastrointestinal: soft, non-tender, non-distended with normal bowel sounds  Musculoskeletal: No clubbing; no joint deformity   Neurologic: Non-focal  Lymphatic: No lymphadenopathy  Psychiatry: AAOx3, mood & affect appropriate  Skin: No rashes, ecchymoses, or cyanosis                          11.3   6.38  )-----------( 138      ( 23 Apr 2024 07:30 )             38.9     04-23    140  |  92<L>  |  101<H>  ----------------------------<  105<H>  3.7   |  25  |  6.53<H>    Ca    9.0      23 Apr 2024 07:33  Phos  7.4     04-23  Mg     2.4     04-23      PTT - ( 23 Apr 2024 07:31 )  PTT:87.7 sec    CONCLUSIONS:      1. Left ventricular cavity is normal in size. Left ventricular wall thickness is normal. Left ventricular systolic function is normal with an ejection fraction visually estimated at 55 to 60 %. There are no regional wall motion abnormalities seen.   2. Analysis of left ventricular diastolic function and filling pressure is made challenging by the presence of atrial fibrillation.   3. There is no evidence of a left ventricular thrombus.   4. Normal right ventricular cavity size and normal systolic function.   5. The left atrium is severely dilated.   6. The right atrium is dilated.   7. Normal left and right atrial size.   8. No significant valvular disease.   9. Estimated pulmonary artery systolic pressure is 22 mmHg.  10. No pericardial effusion seen.    ________________________________________________________________________________________  FINDINGS:     Left Ventricle:  The left ventricular cavity is normal in size. Left ventricular wall thickness is normal. Left ventricular systolic function is normal with an ejection fraction visually estimated at 55 to 60%. There are no regional wall motion abnormalities seen. Analysis of left ventricular diastolic function and filling pressure is made challenging by the presence of atrial fibrillation. No left ventricular hypertrophy. There is no evidence of a left ventricular thrombus.     Right Ventricle:  The right ventricular cavity is normal in size and normal systolic function. Tricuspid annular plane systolic excursion (TAPSE) is 1.6 cm (normal >=1.7 cm).     Left Atrium:  The left atrium is severely dilated.     Right Atrium:  The right atrium is dilated.     Interatrial Septum:  The interatrial septum was not well visualized.     Aortic Valve:  The aortic valve is tricuspid with normal leaflet excursion. There is no aortic valve stenosis. There is no evidenceof aortic regurgitation.     Mitral Valve:  Structurally normal mitral valve with normal leaflet excursion. There is no mitral valve stenosis. There is trace mitral regurgitation.     Tricuspid Valve:  Structurally normal tricuspid valve with normal leaflet excursion. There is mild to moderate tricuspid regurgitation. Estimated pulmonary artery systolic pressure is 22 mmHg.     Pulmonic Valve:  Structurally normal pulmonic valve with normal leaflet excursion. There is trace pulmonic regurgitation.     Aorta:  The aortic root and proximal aorta are not well visualized. The aortic root appears normal in size.     Pericardium:  No pericardial effusion seen.     Systemic Veins:  The inferior vena cava is normal in size (normal <2.1cm) with normalinspiratory collapse (normal >50%) consistent with normal right atrial pressure (~3, range 0-5mmHg).  ____________________________________________________________________  QUANTITATIVE DATA:  Left Ventricle Measurements: (Indexed to BSA)     IVSd (2D):   0.8 cm  LVPWd (2D):  0.9 cm  LVIDd (2D):  4.6 cm  LVIDs (2D):  3.5 cm  LV Mass:     133 g  79.4 g/m²  Visualized LV EF%: 55 to 60%     e' lateral: 12.40 cm/s  e' medial:  8.97 cm/s       Right Ventricle Measurements:     TAPSE: 1.6 cm       Tricuspid Valve Measurements:     TR Vmax:          2.2 m/s  TR Peak Gradient: 18.8 mmHg  RA Pressure:      3 mmHg  PASP:             22 mmHg    ________________________________________________________________________________________  Electronically signedon 4/19/2024 at 3:15:40 PM by Virginia Patel no

## 2024-05-17 NOTE — ED ADULT TRIAGE NOTE - CCCP TRG CHIEF CMPLNT
Morphine Sulfate ER  Pending    Insurance response  Prescription Drug Insurance: [employer]  Notes: Prior authorization submitted - will update provider when decision has been made by insurance.       
Morphine Sulfate ER 15 Approved          
Reza updated on approval  
toothache

## 2024-05-22 NOTE — DISCHARGE NOTE NURSING/CASE MANAGEMENT/SOCIAL WORK - PATIENT PORTAL LINK FT
You can access the FollowMyHealth Patient Portal offered by [facility] by registering at the following website: [email or web address]. By joining [employer]’s FollowMyHealth portal, you will also be able to view your health information using other applications (apps) compatible with our system.
- - -

## 2024-06-05 NOTE — ED ADULT TRIAGE NOTE - HEIGHT IN FEET
Reassuring workup.  EKG unchanged, normal labs.  Chest x-ray without any acute abnormality appears to be found a primary doctor   5

## 2024-08-28 NOTE — ED ADULT TRIAGE NOTE - SPO2 (%)
[Dear  ___] : Dear  [unfilled], [Consult Letter:] : I had the pleasure of evaluating your patient, [unfilled]. [Sincerely,] : Sincerely, [DrRonny  ___] : Dr. LI 100 [DrRonny ___] : Dr. LI

## 2024-09-04 NOTE — H&P ADULT - PROBLEM/PLAN-4
Spoke with patient and provided her with the phone number to the pre- service department for her medication approval.    DISPLAY PLAN FREE TEXT

## 2024-09-13 NOTE — ED ADULT NURSE NOTE - RADIATION
Cassie is a 19 year old year old female here for an OB check.  Patient is      .  Gestational Age: 38w4d.  Concerns today include: none    Patient reports fetal movement.   Patient denies bleeding.  Patient reports cramping.  Patient reports nausea.  Patient denies breast tenderness.             no radiation

## 2024-10-04 NOTE — ED PROVIDER NOTE - CROS ED GU ALL NEG
Patients GI provider: GINGER Solorzano     Number to return call: [phone]    Reason for call: Patient calling in regards to colonoscopy prep. Is concerned that the Miralax Dulcolax prep will not be effective enough. Is asking if the prep can be changed to Golytely and sent to [facility].     Scheduled procedure/appointment date if applicable:Procedure 10/10/24     - - -

## 2024-10-14 ENCOUNTER — LABORATORY RESULT (OUTPATIENT)
Age: 34
End: 2024-10-14

## 2024-10-14 ENCOUNTER — APPOINTMENT (OUTPATIENT)
Dept: NEPHROLOGY | Facility: CLINIC | Age: 34
End: 2024-10-14
Payer: MEDICAID

## 2024-10-14 VITALS
WEIGHT: 154.32 LBS | SYSTOLIC BLOOD PRESSURE: 132 MMHG | HEART RATE: 91 BPM | DIASTOLIC BLOOD PRESSURE: 78 MMHG | TEMPERATURE: 97.9 F | OXYGEN SATURATION: 98 % | HEIGHT: 61 IN | BODY MASS INDEX: 29.14 KG/M2

## 2024-10-14 DIAGNOSIS — N05.0 UNSPECIFIED NEPHRITIC SYNDROME WITH MINOR GLOMERULAR ABNORMALITY: ICD-10-CM

## 2024-10-14 DIAGNOSIS — N04.9 NEPHROTIC SYNDROME WITH UNSPECIFIED MORPHOLOGIC CHANGES: ICD-10-CM

## 2024-10-14 PROCEDURE — 99215 OFFICE O/P EST HI 40 MIN: CPT | Mod: GC

## 2024-10-15 ENCOUNTER — NON-APPOINTMENT (OUTPATIENT)
Age: 34
End: 2024-10-15

## 2024-10-15 LAB
ALBUMIN SERPL ELPH-MCNC: 4.3 G/DL
ALP BLD-CCNC: 116 U/L
ALT SERPL-CCNC: 12 U/L
ANION GAP SERPL CALC-SCNC: 18 MMOL/L
APPEARANCE: CLEAR
AST SERPL-CCNC: 16 U/L
BACTERIA: NEGATIVE /HPF
BASOPHILS # BLD AUTO: 0.08 K/UL
BASOPHILS NFR BLD AUTO: 0.9 %
BILIRUB SERPL-MCNC: 0.2 MG/DL
BILIRUBIN URINE: NEGATIVE
BLOOD URINE: NEGATIVE
BUN SERPL-MCNC: 10 MG/DL
CALCIUM SERPL-MCNC: 9.2 MG/DL
CAST: 0 /LPF
CHLORIDE SERPL-SCNC: 106 MMOL/L
CHOLEST SERPL-MCNC: 190 MG/DL
CHOLEST/HDLC SERPL: 3.6 RATIO
CO2 SERPL-SCNC: 15 MMOL/L
COLOR: YELLOW
CREAT SERPL-MCNC: 0.56 MG/DL
CREAT SPEC-SCNC: 52 MG/DL
CREAT/PROT UR: 0.1 RATIO
EGFR: 124 ML/MIN/1.73M2
EOSINOPHIL # BLD AUTO: 0 K/UL
EOSINOPHIL NFR BLD AUTO: 0 %
EPITHELIAL CELLS: 2 /HPF
GLUCOSE QUALITATIVE U: NEGATIVE MG/DL
GLUCOSE SERPL-MCNC: 90 MG/DL
HCT VFR BLD CALC: 31.6 %
HDLC SERPL-MCNC: 53 MG/DL
HGB BLD-MCNC: 8.3 G/DL
KETONES URINE: NEGATIVE MG/DL
LDLC SERPL CALC-MCNC: 120 MG/DL
LEUKOCYTE ESTERASE URINE: NEGATIVE
LYMPHOCYTES # BLD AUTO: 2.88 K/UL
LYMPHOCYTES NFR BLD AUTO: 32.2 %
MAN DIFF?: NORMAL
MCHC RBC-ENTMCNC: 19.1 PG
MCHC RBC-ENTMCNC: 26.3 GM/DL
MCV RBC AUTO: 72.6 FL
MICROSCOPIC-UA: NORMAL
MONOCYTES # BLD AUTO: 0.38 K/UL
MONOCYTES NFR BLD AUTO: 4.3 %
NEUTROPHILS # BLD AUTO: 5.6 K/UL
NEUTROPHILS NFR BLD AUTO: 62.6 %
NITRITE URINE: NEGATIVE
NONHDLC SERPL-MCNC: 137 MG/DL
PH URINE: 6
PLATELET # BLD AUTO: 356 K/UL
POTASSIUM SERPL-SCNC: 4.3 MMOL/L
PROT SERPL-MCNC: 7.4 G/DL
PROT UR-MCNC: 7 MG/DL
PROTEIN URINE: NEGATIVE MG/DL
RBC # BLD: 4.35 M/UL
RBC # FLD: 20.7 %
RED BLOOD CELLS URINE: 4 /HPF
SODIUM SERPL-SCNC: 138 MMOL/L
SPECIFIC GRAVITY URINE: 1.01
TRIGL SERPL-MCNC: 92 MG/DL
UROBILINOGEN URINE: 0.2 MG/DL
WBC # FLD AUTO: 8.94 K/UL
WHITE BLOOD CELLS URINE: 0 /HPF

## 2024-10-31 DIAGNOSIS — N05.0 UNSPECIFIED NEPHRITIC SYNDROME WITH MINOR GLOMERULAR ABNORMALITY: ICD-10-CM

## 2024-11-04 LAB
ESTIMATED AVERAGE GLUCOSE: 114 MG/DL
HBA1C MFR BLD HPLC: 5.6 %

## 2025-01-03 NOTE — H&P ADULT - PROBLEM SELECTOR PROBLEM 1
Medication:    Disp Refills Start End    tiotropium (Spiriva HandiHaler) 18 MCG capsule for inhaler -- -- 9/4/2024 --    Sig - Route: Place 1 capsule into inhaler and inhale daily. - Inhalation    Class: Historical Med      Last office visit date: 10/15/24    Medication Refill Protocol Failed.  Failed criteria: see below. Sent to clinician to review.     Historical medication    Orders pended, and routed to provider's nurse pool for review and or approval.   Please route any notes back to your nursing pool.       Thank you, Refill Center Staff   Proteinuria, unspecified type

## 2025-01-20 ENCOUNTER — APPOINTMENT (OUTPATIENT)
Dept: ORTHOPEDIC SURGERY | Facility: CLINIC | Age: 35
End: 2025-01-20

## 2025-02-13 ENCOUNTER — EMERGENCY (EMERGENCY)
Facility: HOSPITAL | Age: 35
LOS: 0 days | Discharge: HOME | End: 2025-02-13
Attending: STUDENT IN AN ORGANIZED HEALTH CARE EDUCATION/TRAINING PROGRAM
Payer: MEDICAID

## 2025-02-13 VITALS
WEIGHT: 149.91 LBS | HEART RATE: 91 BPM | OXYGEN SATURATION: 100 % | RESPIRATION RATE: 16 BRPM | SYSTOLIC BLOOD PRESSURE: 124 MMHG | DIASTOLIC BLOOD PRESSURE: 86 MMHG | HEIGHT: 61 IN | TEMPERATURE: 99 F

## 2025-02-13 VITALS
TEMPERATURE: 98 F | DIASTOLIC BLOOD PRESSURE: 88 MMHG | OXYGEN SATURATION: 98 % | SYSTOLIC BLOOD PRESSURE: 133 MMHG | HEART RATE: 92 BPM | RESPIRATION RATE: 15 BRPM

## 2025-02-13 DIAGNOSIS — R20.0 ANESTHESIA OF SKIN: ICD-10-CM

## 2025-02-13 DIAGNOSIS — Z98.89 OTHER SPECIFIED POSTPROCEDURAL STATES: Chronic | ICD-10-CM

## 2025-02-13 DIAGNOSIS — F17.200 NICOTINE DEPENDENCE, UNSPECIFIED, UNCOMPLICATED: ICD-10-CM

## 2025-02-13 DIAGNOSIS — D64.9 ANEMIA, UNSPECIFIED: ICD-10-CM

## 2025-02-13 DIAGNOSIS — Z88.5 ALLERGY STATUS TO NARCOTIC AGENT: ICD-10-CM

## 2025-02-13 DIAGNOSIS — J45.909 UNSPECIFIED ASTHMA, UNCOMPLICATED: ICD-10-CM

## 2025-02-13 DIAGNOSIS — Z90.89 ACQUIRED ABSENCE OF OTHER ORGANS: Chronic | ICD-10-CM

## 2025-02-13 LAB
ALBUMIN SERPL ELPH-MCNC: 4 G/DL — SIGNIFICANT CHANGE UP (ref 3.3–5)
ALP SERPL-CCNC: 94 U/L — SIGNIFICANT CHANGE UP (ref 40–120)
ALT FLD-CCNC: 22 U/L — SIGNIFICANT CHANGE UP (ref 12–78)
ANION GAP SERPL CALC-SCNC: 6 MMOL/L — SIGNIFICANT CHANGE UP (ref 5–17)
AST SERPL-CCNC: 27 U/L — SIGNIFICANT CHANGE UP (ref 15–37)
BILIRUB SERPL-MCNC: 0.8 MG/DL — SIGNIFICANT CHANGE UP (ref 0.2–1.2)
BUN SERPL-MCNC: 8 MG/DL — SIGNIFICANT CHANGE UP (ref 7–23)
CALCIUM SERPL-MCNC: 9.6 MG/DL — SIGNIFICANT CHANGE UP (ref 8.5–10.1)
CHLORIDE SERPL-SCNC: 107 MMOL/L — SIGNIFICANT CHANGE UP (ref 96–108)
CO2 SERPL-SCNC: 24 MMOL/L — SIGNIFICANT CHANGE UP (ref 22–31)
CREAT SERPL-MCNC: 0.62 MG/DL — SIGNIFICANT CHANGE UP (ref 0.5–1.3)
EGFR: 121 ML/MIN/1.73M2 — SIGNIFICANT CHANGE UP
GLUCOSE SERPL-MCNC: 91 MG/DL — SIGNIFICANT CHANGE UP (ref 70–99)
HCG SERPL-ACNC: <1 MIU/ML — SIGNIFICANT CHANGE UP
HCT VFR BLD CALC: 30.9 % — LOW (ref 34.5–45)
HGB BLD-MCNC: 8.9 G/DL — LOW (ref 11.5–15.5)
LIDOCAIN IGE QN: 31 U/L — SIGNIFICANT CHANGE UP (ref 13–75)
MAGNESIUM SERPL-MCNC: 2.1 MG/DL — SIGNIFICANT CHANGE UP (ref 1.6–2.6)
MCHC RBC-ENTMCNC: 19.9 PG — LOW (ref 27–34)
MCHC RBC-ENTMCNC: 28.8 G/DL — LOW (ref 32–36)
MCV RBC AUTO: 69 FL — LOW (ref 80–100)
NT-PROBNP SERPL-SCNC: 54 PG/ML — SIGNIFICANT CHANGE UP (ref 0–125)
PLATELET # BLD AUTO: 599 K/UL — HIGH (ref 150–400)
POTASSIUM SERPL-MCNC: 3.7 MMOL/L — SIGNIFICANT CHANGE UP (ref 3.5–5.3)
POTASSIUM SERPL-SCNC: 3.7 MMOL/L — SIGNIFICANT CHANGE UP (ref 3.5–5.3)
PROT SERPL-MCNC: 7.9 GM/DL — SIGNIFICANT CHANGE UP (ref 6–8.3)
RBC # BLD: 4.48 M/UL — SIGNIFICANT CHANGE UP (ref 3.8–5.2)
RBC # FLD: 21.2 % — HIGH (ref 10.3–14.5)
SODIUM SERPL-SCNC: 137 MMOL/L — SIGNIFICANT CHANGE UP (ref 135–145)
TROPONIN I, HIGH SENSITIVITY RESULT: 4.7 NG/L — SIGNIFICANT CHANGE UP
WBC # BLD: 11.01 K/UL — HIGH (ref 3.8–10.5)
WBC # FLD AUTO: 11.01 K/UL — HIGH (ref 3.8–10.5)

## 2025-02-13 PROCEDURE — 93010 ELECTROCARDIOGRAM REPORT: CPT

## 2025-02-13 PROCEDURE — 71045 X-RAY EXAM CHEST 1 VIEW: CPT | Mod: 26

## 2025-02-13 PROCEDURE — 70450 CT HEAD/BRAIN W/O DYE: CPT | Mod: 26

## 2025-02-13 PROCEDURE — 99285 EMERGENCY DEPT VISIT HI MDM: CPT

## 2025-02-13 NOTE — ED PROVIDER NOTE - NSFOLLOWUPINSTRUCTIONS_ED_ALL_ED_FT
You were seen today for left sided numbness that resolved. There was no emergent lab or CT findings but you did have some anemia with blood count to 8.9 - > please follow up this with primary care doctor and return for any worsening symptoms    We cannot rule out TIA but we have lower suspicion. Follow up with neurology . Return if any symptoms reoccur as this can indicate signs of stroke       SEEK IMMEDIATE MEDICAL CARE IF YOU HAVE ANY OF THE FOLLOWING SYMPTOMS: severe headache, vision changes, chest pain, shortness of breath, vomiting, stiff neck, loss of vision, problems with speech, muscle weakness, loss of balance, trouble walking, passing out, or confusion.

## 2025-02-13 NOTE — ED ADULT NURSE NOTE - CHPI ED NUR SYMPTOMS NEG
09/20/24 1340   Discharge Planning   Living Arrangements Spouse/significant other   Support Systems Spouse/significant other;Children   Type of Residence Private residence   Number of Stairs to Enter Residence 3   Number of Stairs Within Residence 0     Call made to patient at bedside. No answer. Call made to spouse who reports patient resides at home with him. Uses a walker and cane at home as needed.   PCP: Gerardo Ralph   Pharmacy: [facility]  No needs anticipated at discharge. Care Transitions will continue to follow during course of hospital stay for any changes to discharge needs.  Mildred Linda RN    no confusion/no loss of consciousness/no nausea/no vomiting

## 2025-02-13 NOTE — ED PROVIDER NOTE - CARE PROVIDER_API CALL
Leonidas Mejia)  Neurology  [address]  [place] [address]  Phone: [phone]  Fax: [phone]  Follow Up Time:

## 2025-02-13 NOTE — ED PROVIDER NOTE - PATIENT PORTAL LINK FT
You can access the FollowMyHealth Patient Portal offered by [facility] by registering at the following website: [email or web address]. By joining [employer]’s FollowMyHealth portal, you will also be able to view your health information using other applications (apps) compatible with our system.

## 2025-02-13 NOTE — ED ADULT TRIAGE NOTE - CHIEF COMPLAINT QUOTE
BIBA complaining of  numbness on left arm and left leg after taking muscle relaxant 1 hour ago due to sore neck. full ROM,  states her arms feel better but still has "weird feeling/discomfort". cough and sore throat as well. h/o asthma

## 2025-02-13 NOTE — ED PROVIDER NOTE - PHYSICAL EXAMINATION
Gen: aox3, nad   Head: NCAT  ENT: Airway patent, moist mucous membranes, nasal passageways clear, EOMI, eyes PERRL   Cardiac: Normal rate, normal rhythm   Respiratory: Lungs CTA B/L  Gastrointestinal: Abdomen soft, nontender, nondistended, no rebound, no guarding  MSK: No gross abnormalities, FROM of all four extremities, no edema  HEME: Extremities warm and well perfused   Skin: No rashes, no lesions  Neuro: No gross neurologic deficits, CN II-XII intact, no facial asymmetry, no dysarthria, no dysmetria, no drift, strength equal in all four extremities, no gait abnormality, no sensory deficits

## 2025-02-13 NOTE — ED PROVIDER NOTE - ADDITIONAL NOTES AND INSTRUCTIONS:
Patient was evaluated in the emergency room and requires time off until above date to allow for recovery

## 2025-02-13 NOTE — ED PROVIDER NOTE - CLINICAL SUMMARY MEDICAL DECISION MAKING FREE TEXT BOX
34F pmhx asthma, who presents for left arm and left leg numbness sensation with intact motor fcn, states she felt like 'I had to hit the arm to wake it up', states lasted about 45min, resolved upon ED arrival - states she 'think I had a panic attack'. States that this has happened to her about 3 times previously. Denies chest pain, shortness of breath, abd pain, active numbness/weakness. Denies headache . States symptoms started after taking over the counter medication for sore throat and a muscle relaxant to relieve right lateral neck muscle pain   physical exam as above  no focal deficits, ct head rule out ich though, low suspicion for true TIA as pt reports similar symptoms and states outpt w/u always unremarkable and symptoms self resolve, outpt neurology follow up recommended with return precautions, labs to rule out metabolic derangements/ atypical ACS.

## 2025-02-14 LAB
ANISOCYTOSIS BLD QL: SLIGHT — SIGNIFICANT CHANGE UP
BASOPHILS # BLD AUTO: 0.22 K/UL — HIGH (ref 0–0.2)
BASOPHILS NFR BLD AUTO: 2 % — SIGNIFICANT CHANGE UP (ref 0–2)
BURR CELLS BLD QL SMEAR: PRESENT — SIGNIFICANT CHANGE UP
ELLIPTOCYTES BLD QL SMEAR: SLIGHT — SIGNIFICANT CHANGE UP
EOSINOPHIL # BLD AUTO: 0.22 K/UL — SIGNIFICANT CHANGE UP (ref 0–0.5)
EOSINOPHIL NFR BLD AUTO: 2 % — SIGNIFICANT CHANGE UP (ref 0–6)
GIANT PLATELETS BLD QL SMEAR: PRESENT — SIGNIFICANT CHANGE UP
HYPOCHROMIA BLD QL: SIGNIFICANT CHANGE UP
LG PLATELETS BLD QL AUTO: SIGNIFICANT CHANGE UP
LYMPHOCYTES # BLD AUTO: 4.73 K/UL — HIGH (ref 1–3.3)
LYMPHOCYTES # BLD AUTO: 43 % — SIGNIFICANT CHANGE UP (ref 13–44)
MACROCYTES BLD QL: SLIGHT — SIGNIFICANT CHANGE UP
MANUAL SMEAR VERIFICATION: SIGNIFICANT CHANGE UP
MONOCYTES # BLD AUTO: 0.44 K/UL — SIGNIFICANT CHANGE UP (ref 0–0.9)
MONOCYTES NFR BLD AUTO: 4 % — SIGNIFICANT CHANGE UP (ref 2–14)
NEUTROPHILS # BLD AUTO: 5.39 K/UL — SIGNIFICANT CHANGE UP (ref 1.8–7.4)
NEUTROPHILS NFR BLD AUTO: 49 % — SIGNIFICANT CHANGE UP (ref 43–77)
NRBC # BLD: 0 /100 WBCS — SIGNIFICANT CHANGE UP (ref 0–0)
NRBC BLD AUTO-RTO: SIGNIFICANT CHANGE UP /100 WBCS (ref 0–0)
NRBC BLD-RTO: 0 /100 WBCS — SIGNIFICANT CHANGE UP (ref 0–0)
PLAT MORPH BLD: ABNORMAL
POIKILOCYTOSIS BLD QL AUTO: SLIGHT — SIGNIFICANT CHANGE UP
RBC BLD AUTO: ABNORMAL

## 2025-02-19 NOTE — PROVIDER CONTACT NOTE (MEDICATION) - SITUATION
pt cont to refuse pomedication
PT refused noon bactrim and aspirin. PT also refused ativan for nausea at this time.
18

## 2025-02-19 NOTE — DISCHARGE NOTE PROVIDER - NSRESEARCHGRANT_MLMHIDDEN_GEN_A_CORE
Patient called and asked if she was planning on coming for appointment because patient had surgery yesterday. She asks that we check with Dr. Davenport. Dr. Davenport states to reschedule for next week. Patient states she will call back to reschedule..   
yes

## 2025-04-11 ENCOUNTER — EMERGENCY (EMERGENCY)
Facility: HOSPITAL | Age: 35
LOS: 0 days | Discharge: HOME | End: 2025-04-11
Attending: EMERGENCY MEDICINE
Payer: COMMERCIAL

## 2025-04-11 VITALS
DIASTOLIC BLOOD PRESSURE: 74 MMHG | SYSTOLIC BLOOD PRESSURE: 110 MMHG | HEIGHT: 61 IN | RESPIRATION RATE: 18 BRPM | HEART RATE: 66 BPM | WEIGHT: 145.06 LBS | TEMPERATURE: 98 F | OXYGEN SATURATION: 100 %

## 2025-04-11 VITALS
TEMPERATURE: 98 F | RESPIRATION RATE: 18 BRPM | HEART RATE: 70 BPM | OXYGEN SATURATION: 98 % | DIASTOLIC BLOOD PRESSURE: 67 MMHG | SYSTOLIC BLOOD PRESSURE: 100 MMHG

## 2025-04-11 DIAGNOSIS — F17.200 NICOTINE DEPENDENCE, UNSPECIFIED, UNCOMPLICATED: ICD-10-CM

## 2025-04-11 DIAGNOSIS — Z90.89 ACQUIRED ABSENCE OF OTHER ORGANS: Chronic | ICD-10-CM

## 2025-04-11 DIAGNOSIS — N94.0 MITTELSCHMERZ: ICD-10-CM

## 2025-04-11 DIAGNOSIS — Z88.5 ALLERGY STATUS TO NARCOTIC AGENT: ICD-10-CM

## 2025-04-11 DIAGNOSIS — R10.30 LOWER ABDOMINAL PAIN, UNSPECIFIED: ICD-10-CM

## 2025-04-11 DIAGNOSIS — Z98.89 OTHER SPECIFIED POSTPROCEDURAL STATES: Chronic | ICD-10-CM

## 2025-04-11 LAB
ALBUMIN SERPL ELPH-MCNC: 3.5 G/DL — SIGNIFICANT CHANGE UP (ref 3.3–5)
ALP SERPL-CCNC: 97 U/L — SIGNIFICANT CHANGE UP (ref 40–120)
ALT FLD-CCNC: 15 U/L — SIGNIFICANT CHANGE UP (ref 12–78)
ANION GAP SERPL CALC-SCNC: 0 MMOL/L — LOW (ref 5–17)
APPEARANCE UR: CLEAR — SIGNIFICANT CHANGE UP
AST SERPL-CCNC: 15 U/L — SIGNIFICANT CHANGE UP (ref 15–37)
BASOPHILS # BLD AUTO: 0.07 K/UL — SIGNIFICANT CHANGE UP (ref 0–0.2)
BASOPHILS NFR BLD AUTO: 0.8 % — SIGNIFICANT CHANGE UP (ref 0–2)
BILIRUB SERPL-MCNC: 0.3 MG/DL — SIGNIFICANT CHANGE UP (ref 0.2–1.2)
BILIRUB UR-MCNC: NEGATIVE — SIGNIFICANT CHANGE UP
BUN SERPL-MCNC: 10 MG/DL — SIGNIFICANT CHANGE UP (ref 7–23)
CALCIUM SERPL-MCNC: 8.8 MG/DL — SIGNIFICANT CHANGE UP (ref 8.5–10.1)
CHLORIDE SERPL-SCNC: 107 MMOL/L — SIGNIFICANT CHANGE UP (ref 96–108)
CO2 SERPL-SCNC: 28 MMOL/L — SIGNIFICANT CHANGE UP (ref 22–31)
COLOR SPEC: YELLOW — SIGNIFICANT CHANGE UP
CREAT SERPL-MCNC: 0.7 MG/DL — SIGNIFICANT CHANGE UP (ref 0.5–1.3)
DIFF PNL FLD: ABNORMAL
EGFR: 117 ML/MIN/1.73M2 — SIGNIFICANT CHANGE UP
EGFR: 117 ML/MIN/1.73M2 — SIGNIFICANT CHANGE UP
EOSINOPHIL # BLD AUTO: 0.24 K/UL — SIGNIFICANT CHANGE UP (ref 0–0.5)
EOSINOPHIL NFR BLD AUTO: 2.6 % — SIGNIFICANT CHANGE UP (ref 0–6)
GLUCOSE SERPL-MCNC: 100 MG/DL — HIGH (ref 70–99)
GLUCOSE UR QL: NEGATIVE MG/DL — SIGNIFICANT CHANGE UP
HCG UR QL: NEGATIVE — SIGNIFICANT CHANGE UP
HCT VFR BLD CALC: 29.2 % — LOW (ref 34.5–45)
HGB BLD-MCNC: 8.4 G/DL — LOW (ref 11.5–15.5)
IMM GRANULOCYTES NFR BLD AUTO: 0.2 % — SIGNIFICANT CHANGE UP (ref 0–0.9)
INR BLD: 1.02 RATIO — SIGNIFICANT CHANGE UP (ref 0.85–1.16)
KETONES UR-MCNC: ABNORMAL MG/DL
LACTATE SERPL-SCNC: 0.8 MMOL/L — SIGNIFICANT CHANGE UP (ref 0.7–2)
LEUKOCYTE ESTERASE UR-ACNC: NEGATIVE — SIGNIFICANT CHANGE UP
LIDOCAIN IGE QN: 41 U/L — SIGNIFICANT CHANGE UP (ref 13–75)
LYMPHOCYTES # BLD AUTO: 2.73 K/UL — SIGNIFICANT CHANGE UP (ref 1–3.3)
LYMPHOCYTES # BLD AUTO: 29.4 % — SIGNIFICANT CHANGE UP (ref 13–44)
MCHC RBC-ENTMCNC: 20.6 PG — LOW (ref 27–34)
MCHC RBC-ENTMCNC: 28.8 G/DL — LOW (ref 32–36)
MCV RBC AUTO: 71.6 FL — LOW (ref 80–100)
MONOCYTES # BLD AUTO: 0.73 K/UL — SIGNIFICANT CHANGE UP (ref 0–0.9)
MONOCYTES NFR BLD AUTO: 7.9 % — SIGNIFICANT CHANGE UP (ref 2–14)
NEUTROPHILS # BLD AUTO: 5.49 K/UL — SIGNIFICANT CHANGE UP (ref 1.8–7.4)
NEUTROPHILS NFR BLD AUTO: 59.1 % — SIGNIFICANT CHANGE UP (ref 43–77)
NITRITE UR-MCNC: NEGATIVE — SIGNIFICANT CHANGE UP
NRBC BLD AUTO-RTO: 0 /100 WBCS — SIGNIFICANT CHANGE UP (ref 0–0)
PH UR: 6 — SIGNIFICANT CHANGE UP (ref 5–8)
PLATELET # BLD AUTO: 557 K/UL — HIGH (ref 150–400)
POTASSIUM SERPL-MCNC: 3.3 MMOL/L — LOW (ref 3.5–5.3)
POTASSIUM SERPL-SCNC: 3.3 MMOL/L — LOW (ref 3.5–5.3)
PROT SERPL-MCNC: 7 GM/DL — SIGNIFICANT CHANGE UP (ref 6–8.3)
PROT UR-MCNC: 30 MG/DL
PROTHROM AB SERPL-ACNC: 11.5 SEC — SIGNIFICANT CHANGE UP (ref 9.9–13.4)
RBC # BLD: 4.08 M/UL — SIGNIFICANT CHANGE UP (ref 3.8–5.2)
RBC # FLD: 19.9 % — HIGH (ref 10.3–14.5)
SODIUM SERPL-SCNC: 135 MMOL/L — SIGNIFICANT CHANGE UP (ref 135–145)
SP GR SPEC: 1.03 — SIGNIFICANT CHANGE UP (ref 1–1.03)
UROBILINOGEN FLD QL: 1 MG/DL — SIGNIFICANT CHANGE UP (ref 0.2–1)
WBC # BLD: 9.28 K/UL — SIGNIFICANT CHANGE UP (ref 3.8–10.5)
WBC # FLD AUTO: 9.28 K/UL — SIGNIFICANT CHANGE UP (ref 3.8–10.5)

## 2025-04-11 PROCEDURE — 99284 EMERGENCY DEPT VISIT MOD MDM: CPT

## 2025-04-11 RX ORDER — ACETAMINOPHEN 500 MG/5ML
1000 LIQUID (ML) ORAL ONCE
Refills: 0 | Status: COMPLETED | OUTPATIENT
Start: 2025-04-11 | End: 2025-04-11

## 2025-04-11 RX ORDER — KETOROLAC TROMETHAMINE 30 MG/ML
30 INJECTION, SOLUTION INTRAMUSCULAR; INTRAVENOUS ONCE
Refills: 0 | Status: DISCONTINUED | OUTPATIENT
Start: 2025-04-11 | End: 2025-04-11

## 2025-04-11 RX ADMIN — Medication 1000 MILLIGRAM(S): at 05:25

## 2025-04-11 RX ADMIN — Medication 400 MILLIGRAM(S): at 04:42

## 2025-04-11 RX ADMIN — Medication 40 MILLIEQUIVALENT(S): at 06:11

## 2025-04-11 RX ADMIN — KETOROLAC TROMETHAMINE 30 MILLIGRAM(S): 30 INJECTION, SOLUTION INTRAMUSCULAR; INTRAVENOUS at 05:46

## 2025-04-11 RX ADMIN — Medication 1000 MILLILITER(S): at 05:46

## 2025-04-11 RX ADMIN — Medication 1000 MILLIGRAM(S): at 05:00

## 2025-04-11 NOTE — ED ADULT TRIAGE NOTE - CHIEF COMPLAINT QUOTE
BIBA complaining of lower abdominal pain started yesterday morning, nausea as well. currently on menstruation 2 days.states pain is not usual this time. h/o asthma

## 2025-04-11 NOTE — ED ADULT NURSE NOTE - NSFALLRISKINTERV_ED_ALL_ED

## 2025-04-11 NOTE — ED PROVIDER NOTE - OBJECTIVE STATEMENT
The patient is here for severe non-radiating cramping pain in her lower abdomen that has been there since yesterday morning. She states it feels like her period and she took advil with mild relief.

## 2025-04-11 NOTE — ED ADULT NURSE NOTE - SUICIDE SCREENING QUESTION 3
Occupational Therapy    Visit Type: initial evaluation  SUBJECTIVE  Patient agreed to participate in therapy this date.  \"Im in the same place\"  Patient is currently being treated by home health care.  Patient / Family Goal: maximize function and return home    Pain   Patient does not demonstrate pain behaviors., Patient reports pain is not an issue/concern.    OBJECTIVE     Cognitive Status   Level of Consciousness   - alert  Arousal Alertness   - appropriate responses to stimuli  Orientation    - Oriented to: person, place, time and situation  Functional Communication   - Overall Status: within functional limits   - Forms of Communication: verbal  Attention Span    - Attention: intact  Following Direction   - follows all commands and directions consistently  Transition Between Tasks   - transitions without difficulty  Memory   - intact  Safety Awareness/Insight   - intact  Awareness of Deficits   - fully aware of deficits     Range of Motion (ROM)   (degrees unless noted; active unless noted; norms in ( ); negative=lacking to 0, positive=beyond 0)  WFL: JEFFERYE, RUE        Bed Mobility  - Rolling left: moderate assist  - Rolling right: moderate assist  - Supine to sit: moderate assist  Transfers  Assistive devices: 1 person, gait belt, 2-wheeled walker  - Sit to stand: moderate assist  - Stand to sit: moderate assist      Functional Ambulation  - Assistance: supervision  - Assistive device: gait belt, 1 person and 2-wheeled walker  Activities of Daily Living (ADLs)  Upper Body Dressing:  - Assist: minimal assist  - Position: edge of bed  - Assist needed for: thread left upper extermity and thread right upper extermity  Lower Body Dressing:   - Footwear:       - Assistance: total assist - dependent   - Assist needed for: don/doff right sock and don/doff left sock  Interventions    Training provided: ADL training, activity tolerance, body mechanics and functional ambulation  Skilled input: verbal instruction/cues and  tactile instruction/cues  Verbal Consent: Writer verbally educated and received verbal consent for hand placement, positioning of patient, and techniques to be performed today from patient for clothing adjustments for techniques and therapist position for techniques as described above and how they are pertinent to the patient's plan of care.         Education:   - Present and ready to learn: patient  Education provided during session:  - Results of above outlined education: Needs reinforcement    ASSESSMENT   Patient will benefit from inpatient skilled therapy to address current assessed functional limitations and impairments.  Interferring components: decreased activity tolerance and medical status limitations    Summary of function and discharge needs based on today's assessment:  - Current level of function: slightly below baseline level of function  - Therapy needs at discharge: therapy 1-3 times per week  - Activities of daily living (ADLs) requiring support at discharge: bed mobility, transfers, ambulation, dressing, grooming, bathing and toileting  - Instrumental activities of daily living (IADLs) requiring support at discharge: home management, meal preparation, health/medication management, emergency responses, community mobility, shopping and [employer] observances  - Impairments that require further therapy intervention: activity tolerance, safety awareness and balance  AM-PAC  - Prior Level of Function: IND/MOD I ([facility] 22-24)       Key: MOD A=moderate assistance, IND/MOD I=independent/modified independent  - Generalized Current Level of Function     - Current Self-Cares: 15       Scoring Key= >21 Modified Independent; 20-21 Supervision; 18-19 Minimal assist; 13-18 Moderate assist; 9-12 Max assist; <9 Total assist       • Personal Occupations Profile Affected: bathing/showering, functional mobility/transfers, personal device care, personal hygiene/grooming, toileting/toilet hygiene, upper body dressing,  lower body dressing, communication managment, community mobility, health management/maintenance, home establishment/managements, safety/emergency maintenance, social participation community, social participation family      • Clinical decision making: Low - Patient has few limitations (1-3), comorbidities and/or complexities, as noted in problem focused assessment noted above, that impact their occupational profile.  Resulting in few treatment options and no task modification consistent with low clinical decision making complexity.    PLAN  Suggestions for next session as indicated: Bring aide, simple ADL, transfers    OT Frequency: 3-5 x per week         Interventions: ADL retraining, functional transfer training, IADL, transfer training, energy conservation and compensatory techniques  Agreement to plan and goals: patient agrees with goals and treatment plan      GOALS  Review Date: 8/3/2023  Long Term Goals: (to be met by time of discharge from hospital)  Grooming: Patient will complete grooming tasks modified independent.  Upper body dressing: Patient will complete upper body dressing modified independent.  Lower body dressing: Patient will complete lower body dressing modified independent.  Toileting: Patient will complete toileting modified independent.  Bathing: Patient will complete bathingmodified independent Toilet transfer: Patient will complete toilet transfer with modified independent.   Home setting transfer: Patient will complete home setting transfers with modified independent.         Documented in the chart in the following areas: Assessment/Plan.    Admitting diagnosis: Acute on chronic systolic congestive heart failure (CMD) (I50.23);Severe sepsis (CMD) (A41.9, R65.20)    Co-morbidities and problem list:   Patient Active Problem List:   Cardiomyopathy (CMD)   Benign essential HTN   Inclusion body myositis   KYREE (obstructive sleep apnea)   Obesity (BMI 30-39.9)   Bilateral leg edema   Mixed  hyperlipidemia   Pulmonary hypertension, primary (CMD)   Glucose intolerance (impaired glucose tolerance)   Chronic diastolic congestive heart failure (CMD)   Venous stasis ulcers of both lower extremities (CMD)   Lymphedema of both lower extremities   Acute cholecystitis   Chronic respiratory failure with hypoxia, on home oxygen therapy (CMD)   Chronic heart failure with preserved ejection fraction (CMD)   Other secondary pulmonary hypertension (CMD)   Impaired mobility and ADLs   Acute pain of right knee   Acute gout of right knee, unspecified cause   Barretts esophagus   Stage 3a chronic kidney disease (CMD)   Hypervolemia, unspecified hypervolemia type   Ulcers of both lower legs (CMD)   Severe sepsis (CMD)      The referring provider's electronic signature on the evaluation authorizes the therapy plan of care and certifies the need for these services, furnished under this plan of care while under their care.    Patient at End of Session:   Location: in chair  Safety measures: alarm system in place/re-engaged, equipment intact and lines intact  Handoff to: nurse      Therapy procedure time and total treatment time can be found documented on the Time Entry flowsheet   No

## 2025-04-11 NOTE — ED ADULT NURSE NOTE - OBJECTIVE STATEMENT
BIBA complaining of lower abdominal pain radiating to back started yesterday morning, nausea as well. currently on menstruation 2 days.states pain is not usual this time. h/o asthma Patient presents to ED complaining of lower abdominal cramping pain radiating to back and nausea that started yesterday morning, Patient reports she is currently on menstruation, start date yesterday 4/10/25, but states pain is not usual this time. Denies urinary issues. Denies vomiting or diarrhea. PMH: asthma

## 2025-04-11 NOTE — ED PROVIDER NOTE - PATIENT PORTAL LINK FT
You can access the FollowMyHealth Patient Portal offered by [facility] by registering at the following website: [email or web address]. By joining [employer]’s FollowMyHealth portal, you will also be able to view your health information using other applications (apps) compatible with our system.

## 2025-04-21 ENCOUNTER — EMERGENCY (EMERGENCY)
Facility: HOSPITAL | Age: 35
LOS: 0 days | Discharge: HOME | End: 2025-04-21
Attending: EMERGENCY MEDICINE
Payer: COMMERCIAL

## 2025-04-21 VITALS
TEMPERATURE: 97 F | WEIGHT: 149.91 LBS | SYSTOLIC BLOOD PRESSURE: 130 MMHG | DIASTOLIC BLOOD PRESSURE: 63 MMHG | HEART RATE: 95 BPM | HEIGHT: 61 IN | OXYGEN SATURATION: 100 % | RESPIRATION RATE: 17 BRPM

## 2025-04-21 DIAGNOSIS — R10.9 UNSPECIFIED ABDOMINAL PAIN: ICD-10-CM

## 2025-04-21 DIAGNOSIS — Z88.5 ALLERGY STATUS TO NARCOTIC AGENT: ICD-10-CM

## 2025-04-21 DIAGNOSIS — R11.2 NAUSEA WITH VOMITING, UNSPECIFIED: ICD-10-CM

## 2025-04-21 DIAGNOSIS — R11.0 NAUSEA: ICD-10-CM

## 2025-04-21 DIAGNOSIS — Z98.89 OTHER SPECIFIED POSTPROCEDURAL STATES: Chronic | ICD-10-CM

## 2025-04-21 DIAGNOSIS — R45.1 RESTLESSNESS AND AGITATION: ICD-10-CM

## 2025-04-21 DIAGNOSIS — Z90.89 ACQUIRED ABSENCE OF OTHER ORGANS: Chronic | ICD-10-CM

## 2025-04-21 LAB
ALBUMIN SERPL ELPH-MCNC: 4.2 G/DL — SIGNIFICANT CHANGE UP (ref 3.3–5)
ALP SERPL-CCNC: 103 U/L — SIGNIFICANT CHANGE UP (ref 40–120)
ALT FLD-CCNC: 20 U/L — SIGNIFICANT CHANGE UP (ref 12–78)
ANION GAP SERPL CALC-SCNC: 10 MMOL/L — SIGNIFICANT CHANGE UP (ref 5–17)
ANISOCYTOSIS BLD QL: SLIGHT — SIGNIFICANT CHANGE UP
APPEARANCE UR: ABNORMAL
AST SERPL-CCNC: 12 U/L — LOW (ref 15–37)
BACTERIA # UR AUTO: ABNORMAL /HPF
BASOPHILS # BLD AUTO: 0 K/UL — SIGNIFICANT CHANGE UP (ref 0–0.2)
BASOPHILS NFR BLD AUTO: 0 % — SIGNIFICANT CHANGE UP (ref 0–2)
BILIRUB SERPL-MCNC: 0.5 MG/DL — SIGNIFICANT CHANGE UP (ref 0.2–1.2)
BILIRUB UR-MCNC: NEGATIVE — SIGNIFICANT CHANGE UP
BUN SERPL-MCNC: 9 MG/DL — SIGNIFICANT CHANGE UP (ref 7–23)
CALCIUM SERPL-MCNC: 9.3 MG/DL — SIGNIFICANT CHANGE UP (ref 8.5–10.1)
CHLORIDE SERPL-SCNC: 107 MMOL/L — SIGNIFICANT CHANGE UP (ref 96–108)
CO2 SERPL-SCNC: 25 MMOL/L — SIGNIFICANT CHANGE UP (ref 22–31)
COLOR SPEC: YELLOW — SIGNIFICANT CHANGE UP
CREAT SERPL-MCNC: 0.73 MG/DL — SIGNIFICANT CHANGE UP (ref 0.5–1.3)
DIFF PNL FLD: NEGATIVE — SIGNIFICANT CHANGE UP
EGFR: 111 ML/MIN/1.73M2 — SIGNIFICANT CHANGE UP
EGFR: 111 ML/MIN/1.73M2 — SIGNIFICANT CHANGE UP
ELLIPTOCYTES BLD QL SMEAR: SLIGHT — SIGNIFICANT CHANGE UP
EOSINOPHIL # BLD AUTO: 0.13 K/UL — SIGNIFICANT CHANGE UP (ref 0–0.5)
EOSINOPHIL NFR BLD AUTO: 1 % — SIGNIFICANT CHANGE UP (ref 0–6)
EPI CELLS # UR: PRESENT
GIANT PLATELETS BLD QL SMEAR: PRESENT — SIGNIFICANT CHANGE UP
GLUCOSE SERPL-MCNC: 110 MG/DL — HIGH (ref 70–99)
GLUCOSE UR QL: NEGATIVE MG/DL — SIGNIFICANT CHANGE UP
HCG UR QL: NEGATIVE — SIGNIFICANT CHANGE UP
HCT VFR BLD CALC: 32.9 % — LOW (ref 34.5–45)
HGB BLD-MCNC: 9.6 G/DL — LOW (ref 11.5–15.5)
HYPOCHROMIA BLD QL: SLIGHT — SIGNIFICANT CHANGE UP
KETONES UR-MCNC: NEGATIVE MG/DL — SIGNIFICANT CHANGE UP
LACTATE SERPL-SCNC: 3.4 MMOL/L — HIGH (ref 0.7–2)
LEUKOCYTE ESTERASE UR-ACNC: NEGATIVE — SIGNIFICANT CHANGE UP
LYMPHOCYTES # BLD AUTO: 27 % — SIGNIFICANT CHANGE UP (ref 13–44)
LYMPHOCYTES # BLD AUTO: 3.45 K/UL — HIGH (ref 1–3.3)
MACROCYTES BLD QL: SLIGHT — SIGNIFICANT CHANGE UP
MANUAL SMEAR VERIFICATION: SIGNIFICANT CHANGE UP
MCHC RBC-ENTMCNC: 20.6 PG — LOW (ref 27–34)
MCHC RBC-ENTMCNC: 29.2 G/DL — LOW (ref 32–36)
MCV RBC AUTO: 70.4 FL — LOW (ref 80–100)
MICROCYTES BLD QL: SLIGHT — SIGNIFICANT CHANGE UP
MONOCYTES # BLD AUTO: 0.51 K/UL — SIGNIFICANT CHANGE UP (ref 0–0.9)
MONOCYTES NFR BLD AUTO: 4 % — SIGNIFICANT CHANGE UP (ref 2–14)
NEUTROPHILS # BLD AUTO: 8.7 K/UL — HIGH (ref 1.8–7.4)
NEUTROPHILS NFR BLD AUTO: 68 % — SIGNIFICANT CHANGE UP (ref 43–77)
NITRITE UR-MCNC: NEGATIVE — SIGNIFICANT CHANGE UP
NRBC # BLD: 0 /100 WBCS — SIGNIFICANT CHANGE UP (ref 0–0)
NRBC BLD AUTO-RTO: SIGNIFICANT CHANGE UP /100 WBCS (ref 0–0)
NRBC BLD-RTO: 0 /100 WBCS — SIGNIFICANT CHANGE UP (ref 0–0)
PH UR: 8.5 (ref 5–8)
PLAT MORPH BLD: NORMAL — SIGNIFICANT CHANGE UP
PLATELET # BLD AUTO: 691 K/UL — HIGH (ref 150–400)
POIKILOCYTOSIS BLD QL AUTO: SLIGHT — SIGNIFICANT CHANGE UP
POTASSIUM SERPL-MCNC: 3.5 MMOL/L — SIGNIFICANT CHANGE UP (ref 3.5–5.3)
POTASSIUM SERPL-SCNC: 3.5 MMOL/L — SIGNIFICANT CHANGE UP (ref 3.5–5.3)
PROT SERPL-MCNC: 8.6 GM/DL — HIGH (ref 6–8.3)
PROT UR-MCNC: 100 MG/DL
RBC # BLD: 4.67 M/UL — SIGNIFICANT CHANGE UP (ref 3.8–5.2)
RBC # FLD: 20.3 % — HIGH (ref 10.3–14.5)
RBC BLD AUTO: ABNORMAL
RBC CASTS # UR COMP ASSIST: 3 /HPF — SIGNIFICANT CHANGE UP (ref 0–4)
SODIUM SERPL-SCNC: 142 MMOL/L — SIGNIFICANT CHANGE UP (ref 135–145)
SP GR SPEC: 1.02 — SIGNIFICANT CHANGE UP (ref 1–1.03)
UROBILINOGEN FLD QL: 0.2 MG/DL — SIGNIFICANT CHANGE UP (ref 0.2–1)
WBC # BLD: 12.79 K/UL — HIGH (ref 3.8–10.5)
WBC # FLD AUTO: 12.79 K/UL — HIGH (ref 3.8–10.5)
WBC UR QL: 3 /HPF — SIGNIFICANT CHANGE UP (ref 0–5)

## 2025-04-21 PROCEDURE — 99284 EMERGENCY DEPT VISIT MOD MDM: CPT

## 2025-04-21 RX ORDER — ONDANSETRON HCL/PF 4 MG/2 ML
4 VIAL (ML) INJECTION ONCE
Refills: 0 | Status: COMPLETED | OUTPATIENT
Start: 2025-04-21 | End: 2025-04-21

## 2025-04-21 RX ADMIN — Medication 4 MILLIGRAM(S): at 07:25

## 2025-04-21 RX ADMIN — Medication 2100 MILLILITER(S): at 07:25

## 2025-04-21 NOTE — ED PROVIDER NOTE - PROGRESS NOTE DETAILS
pt improved and is now willing to go home.  better with hydration and zofran.  pt wants to be discharged and had all questions answered

## 2025-04-21 NOTE — ED ADULT NURSE NOTE - PAIN RATING/NUMBER SCALE (0-10): ACTIVITY
[Nutrition/ Exercise/ Weight Management] : nutrition, exercise, weight management [Breast Self Exam] : breast self exam 10 (severe pain)

## 2025-04-21 NOTE — ED ADULT NURSE NOTE - OBJECTIVE STATEMENT
AAOx4. 34 year old female presentts to ED after drinking wine and feeling weak, abdominal pain, and vomiting after having wine last night. Denies any other symptoms. Pt screaming in ER. MD madsen.

## 2025-04-21 NOTE — ED PROVIDER NOTE - PATIENT PORTAL LINK FT
You can access the FollowMyHealth Patient Portal offered by [facility] by registering at the following website: [email or web address]. By joining [employer]’s FollowMyHealth portal, you will also be able to view your health information using other applications (apps) compatible with our system.

## 2025-04-21 NOTE — ED ADULT TRIAGE NOTE - CHIEF COMPLAINT QUOTE
felling weak  and vomiting after having wine last night Hx minimal change diseases, had some epigastric chest pain earlier . BIBA feeling weak  and vomiting after having wine last night Hx minimal change diseases, had some epigastric chest pain earlier  now resolved .

## 2025-04-21 NOTE — ED PROVIDER NOTE - PHYSICAL EXAMINATION
Roth:  General: No distress.  Mentation at baseline.   HEENT: WNL  Chest/Lungs: CTAB, No wheeze, No retractions, No increased work of breathing, Normal rate  Heart: S1S2 RRR, No M/R/G, Pules equal Bilaterally in upper and lower extremities distally  Abd: soft, NT/ND, No guarding, No rebound.  No hernias, no palpable masses.  Extrem: FROM in all joints, no significant edema noted, No ulcers.  Cap refil < 2sec.  Skin: No rash noted, warm dry.  Neuro:  Grossly normal.  No difficulty ambulating. No focal deficits.  Psychiatric: No evidence of delusions. No SI/HI.

## 2025-04-21 NOTE — ED ADULT NURSE NOTE - CHIEF COMPLAINT QUOTE
BIBA feeling weak  and vomiting after having wine last night Hx minimal change diseases, had some epigastric chest pain earlier  now resolved .

## 2025-04-21 NOTE — ED PROVIDER NOTE - OBJECTIVE STATEMENT
Detail Level: Detailed
34 year old with dehydration and abdominal poain  and nausea.  pt is aware of her process and feel that she needs hydration and zofran only.  pt was demanding service and became agitated.

## 2025-05-01 NOTE — DISCHARGE NOTE OB - NS MD DC FALL RISK RISK
They were over 30 minutes late for their appointment, rescheduled    For information on Fall & Injury Prevention, visit: [email or web address] OR  [email or web address] OR  https://www.cdc.gov/steadi/patient.html

## (undated) DEVICE — CATH IV SAFE BC 20G X 1.16" (PINK)

## (undated) DEVICE — SYR ALLIANCE II INFLATION 60ML

## (undated) DEVICE — TUBING SUCTION 20FT

## (undated) DEVICE — SENSOR O2 FINGER ADULT

## (undated) DEVICE — FOLEY HOLDER STATLOCK 2 WAY ADULT

## (undated) DEVICE — CATH IV SAFE BC 22G X 1" (BLUE)

## (undated) DEVICE — BALLOON US ENDO

## (undated) DEVICE — BITE BLOCK ADULT 20 X 27MM (GREEN)

## (undated) DEVICE — SUCTION YANKAUER NO CONTROL VENT

## (undated) DEVICE — TUBING IV SET GRAVITY 3Y 100" MACRO

## (undated) DEVICE — TUBING SUCTION CONN 6FT STERILE

## (undated) DEVICE — SOL INJ NS 0.9% 500ML 2 PORT

## (undated) DEVICE — BIOPSY FORCEP RADIAL JAW 4 STANDARD WITH NEEDLE

## (undated) DEVICE — PACK IV START WITH CHG